# Patient Record
Sex: FEMALE | Race: BLACK OR AFRICAN AMERICAN | NOT HISPANIC OR LATINO | Employment: PART TIME | URBAN - METROPOLITAN AREA
[De-identification: names, ages, dates, MRNs, and addresses within clinical notes are randomized per-mention and may not be internally consistent; named-entity substitution may affect disease eponyms.]

---

## 2017-01-28 ENCOUNTER — HOSPITAL ENCOUNTER (EMERGENCY)
Facility: OTHER | Age: 57
Discharge: HOME | End: 2017-01-28
Attending: EMERGENCY MEDICINE
Payer: MEDICAID

## 2017-01-28 VITALS
SYSTOLIC BLOOD PRESSURE: 185 MMHG | RESPIRATION RATE: 14 BRPM | DIASTOLIC BLOOD PRESSURE: 78 MMHG | WEIGHT: 220.44 LBS | BODY MASS INDEX: 33.41 KG/M2 | HEIGHT: 68 IN | OXYGEN SATURATION: 100 % | HEART RATE: 56 BPM | TEMPERATURE: 99 F

## 2017-01-28 DIAGNOSIS — I10 UNCONTROLLED HYPERTENSION: ICD-10-CM

## 2017-01-28 DIAGNOSIS — M25.519 SHOULDER PAIN: Primary | ICD-10-CM

## 2017-01-28 PROCEDURE — 99284 EMERGENCY DEPT VISIT MOD MDM: CPT

## 2017-01-28 PROCEDURE — 25000003 PHARM REV CODE 250: Performed by: PHYSICIAN ASSISTANT

## 2017-01-28 RX ORDER — FUROSEMIDE 40 MG/1
40 TABLET ORAL DAILY
Qty: 30 TABLET | Refills: 0 | Status: SHIPPED | OUTPATIENT
Start: 2017-01-28 | End: 2017-07-30

## 2017-01-28 RX ORDER — HYDROCODONE BITARTRATE AND ACETAMINOPHEN 5; 325 MG/1; MG/1
1 TABLET ORAL
Status: COMPLETED | OUTPATIENT
Start: 2017-01-28 | End: 2017-01-28

## 2017-01-28 RX ORDER — IBUPROFEN 600 MG/1
600 TABLET ORAL EVERY 6 HOURS PRN
Qty: 20 TABLET | Refills: 0 | Status: SHIPPED | OUTPATIENT
Start: 2017-01-28 | End: 2017-07-30 | Stop reason: ALTCHOICE

## 2017-01-28 RX ORDER — TRAMADOL HYDROCHLORIDE 50 MG/1
50 TABLET ORAL EVERY 6 HOURS PRN
Qty: 15 TABLET | Refills: 0 | Status: SHIPPED | OUTPATIENT
Start: 2017-01-28 | End: 2017-02-07

## 2017-01-28 RX ORDER — GABAPENTIN 400 MG/1
400 CAPSULE ORAL 3 TIMES DAILY PRN
Qty: 90 CAPSULE | Refills: 0 | Status: ON HOLD | OUTPATIENT
Start: 2017-01-28 | End: 2018-03-21 | Stop reason: HOSPADM

## 2017-01-28 RX ADMIN — HYDROCODONE BITARTRATE AND ACETAMINOPHEN 1 TABLET: 5; 325 TABLET ORAL at 11:01

## 2017-01-28 RX ADMIN — HYDROCODONE BITARTRATE AND ACETAMINOPHEN 1 TABLET: 5; 325 TABLET ORAL at 12:01

## 2017-01-28 NOTE — ED PROVIDER NOTES
Encounter Date: 1/28/2017       History     Chief Complaint   Patient presents with    Fall     pt fell yesterday on tile floor onto right shoulder. pt now with pain and  tingling in shoulder.     Review of patient's allergies indicates:  No Known Allergies  HPI Comments: Patient is a 56-year-old female with history of diabetes and hypertension who presents to the emergency department with right shoulder pain.  Patient states yesterday she was mopping her floor, when she lost her balance falling.  Patient states she landed onto her right shoulder.  Patient denies hitting her head or loss of consciousness.  Patient reports 10 out of 10 pain in the right shoulder.  Patient states the pain is worse with movement.  Patient denies deformity.  Patient denies numbness or tingling in the arm.  Patient states she is taking Tylenol with no relief of her symptoms.    The history is provided by the patient.     Past Medical History   Diagnosis Date    Diabetes mellitus     Hypertension     Neuropathy     PE (pulmonary embolism)     Pulmonary embolism      No past medical history pertinent negatives.  Past Surgical History   Procedure Laterality Date    Tonsillectomy      Hernia repair      Hysterectomy      Tubal ligation      Cyst removal       Family History   Problem Relation Age of Onset    Kidney disease Mother     Kidney disease Father     Stroke Maternal Uncle      Social History   Substance Use Topics    Smoking status: Never Smoker    Smokeless tobacco: Never Used    Alcohol use No     Review of Systems   Constitutional: Negative for activity change, appetite change, chills, fatigue and fever.   HENT: Negative for congestion, ear discharge, ear pain, hearing loss, mouth sores, postnasal drip, rhinorrhea, sinus pressure, sore throat and trouble swallowing.    Eyes: Negative for photophobia and visual disturbance.   Respiratory: Negative for cough and shortness of breath.    Cardiovascular: Negative for  chest pain.   Gastrointestinal: Negative for abdominal pain, blood in stool, constipation, diarrhea, nausea and vomiting.   Genitourinary: Negative for dysuria, flank pain and hematuria.   Musculoskeletal: Negative for back pain, neck pain and neck stiffness.        Right shoulder pain   Skin: Negative for rash and wound.   Neurological: Negative for dizziness, syncope, speech difficulty, weakness, light-headedness, numbness and headaches.       Physical Exam   Initial Vitals   BP Pulse Resp Temp SpO2   01/28/17 1030 01/28/17 1030 01/28/17 1030 01/28/17 1030 01/28/17 1030   213/93 57 18 97.7 °F (36.5 °C) 98 %     Physical Exam    Nursing note and vitals reviewed.  Constitutional: She appears well-developed and well-nourished. She is not diaphoretic.  Non-toxic appearance. No distress.   HENT:   Head: Normocephalic.   Right Ear: External ear normal.   Left Ear: External ear normal.   Nose: Nose normal.   Mouth/Throat: Oropharynx is clear and moist. No oropharyngeal exudate.   Eyes: Conjunctivae are normal. Pupils are equal, round, and reactive to light.   Neck: Normal range of motion.   Cardiovascular: Normal rate and regular rhythm.   Pulmonary/Chest: Breath sounds normal. No respiratory distress. She has no wheezes. She has no rhonchi. She has no rales. She exhibits no tenderness.   Abdominal: Soft. Bowel sounds are normal. She exhibits no distension and no mass. There is no tenderness. There is no rebound and no guarding.   Musculoskeletal:        Right shoulder: She exhibits decreased range of motion (secondary to pain) and bony tenderness (humeral head). She exhibits no swelling, no crepitus, no deformity and normal strength.   Lymphadenopathy:     She has no cervical adenopathy.   Neurological: She is alert and oriented to person, place, and time. She has normal strength.   Skin: Skin is warm and dry.   Psychiatric: She has a normal mood and affect.         ED Course   Procedures  Labs Reviewed - No data to  display       X-Rays:   Independently Interpreted Readings:   Other Readings:  Degenerative Changes noted with no acute osseous findings.    Imaging Results         X-Ray Shoulder Trauma Right (Final result) Result time:  01/28/17 11:22:42    Final result by Marla Chavez MD (01/28/17 11:22:42)    Impression:     As above.      Electronically signed by: MARLA CHAVEZ MD  Date:     01/28/17  Time:    11:22     Narrative:    XR SHOULDER TRAUMA 3 VIEW RIGHT.  There are no previous examinations for comparison. No acute fracture or bony destructive process is seen.  Alignment is normal.There are degenerative changes at the acromioclavicular joint with spur formation.  There are also small spurs at the humeral head and the lateral aspect of the acromion.  There are no soft tissue calcifications to suggest calcific tendinitis.              Medical Decision Making:   Initial Assessment:   Urgent evaluation of a 56-year-old female who presents to the emergency department with shoulder pain.  Patient is afebrile, nontoxic appearing, and hemodynamically stable.  Patient did not hit head or lose consciousness during fall.  Patient has bony tenderness of the right shoulder with decreased range of motion.  X-ray will be obtained.  Clinical Tests:   Radiological Study: Ordered and Reviewed  ED Management:  12:17 PM  X-ray reveals degenerative changes with no acute findings.  Patient will be placed in sling.  Given instructions to range shoulder to prevent frozen shoulder.  She is advised to follow-up with ortho for possible MRI.  Patient be sent home with anti-inflammatories and pain medication.  Patient is advised to follow-up with PCP about uncontrolled blood pressure.  Upon discharge, patient states she needs refills on blood pressure medications.  Patient is advised to return to the emergency department with any worsening symptoms or concerns.  Other:   I have discussed this case with another health care provider.        <> Summary of the Discussion: Dr. Perdomo                   ED Course     Clinical Impression:   The primary encounter diagnosis was Shoulder pain. A diagnosis of Uncontrolled hypertension was also pertinent to this visit.          Gay Arriaza PA-C  01/28/17 1228

## 2017-01-28 NOTE — DISCHARGE INSTRUCTIONS
Shoulder Sprain  A sprain is a stretching or tearing of the ligaments that hold a joint together. A sprain may take up to 6 weeks to fully heal, depending on how severe it is. Moderate to severe shoulder sprains are treated with a sling or shoulder immobilizer. Minor sprains can be treated without any special support.  Home care  The following guidelines will help you care for your injury at home:  · If a sling was given to you, leave it in place for the time advised by your health care provider. If you arent sure how long to wear it, ask for advice. If the sling becomes loose, adjust it so that your forearm is level with the ground. Your shoulder should feel well supported.  · Put an ice pack on the injured area for 20 minutes every 1 to 2 hours the first day. You can make your own ice pack by putting ice cubes in a plastic bag. Wrap the bag in a thin towel. Continue with ice packs 3 to 4 times a day for the next 2 days. Then use the pack as needed to ease pain and swelling.  · You may use acetaminophen or ibuprofen to control pain, unless another pain medicine was prescribed. If you have chronic liver or kidney disease, talk with your health care provider before using these medicines. Also talk with your provider if youve had a stomach ulcer or GI bleeding.  · Shoulder joints become stiff if left in a sling for too long. You should start range of motion exercises about 10 days after the injury. Talk with your provider to find out what type of exercises to do and how soon to start.  Follow-up care  Follow up with your health care provider as directed.  Any X-rays you had today dont show any broken bones, breaks, or fractures. Sometimes fractures dont show up on the first X-ray. Bruises and sprains can sometimes hurt as much as a fracture. These injuries can take time to heal completely. If your symptoms dont improve or they get worse, talk with your provider. You may need a repeat X-ray.  When to seek  medical advice  Call your health care provider right away if any of the following occur:  · Shoulder pain or swelling in your arm that gets worse  · Fingers become cold, blue, numb, or tingly  · Large amount of bruising of the shoulder or upper arm  © 1173-3618 [employer]. [address] [place] [address]. All rights reserved. This information is not intended as a substitute for professional medical care. Always follow your healthcare professional's instructions.

## 2017-01-28 NOTE — ED AVS SNAPSHOT
[facility]  [address]  [facility] [address]               Devika Bustillo   2017 10:37 AM   ED    Description:  Female : 1960   Department:  [facility]           Your Care was Coordinated By:     Provider Role From To    Ora Pedromo MD Attending Provider 17 1038 --    Gay Arriaza PA-C Physician Assistant 17 1037 --      Reason for Visit     Fall           Diagnoses this Visit        Comments    Shoulder pain    -  Primary     Uncontrolled hypertension           ED Disposition     None           To Do List           Follow-up Information     Follow up with [facility] In 2 days.    Contact information:    [address] [facility] [id number] [phone]          Follow up with [facility] In 2 days.    Specialty:  Orthopedics    Contact information:    [address]  [facility] [address] [phone]    Additional information:    [facility] [address]       These Medications        Disp Refills Start End    tramadol (ULTRAM) 50 mg tablet 15 tablet 0 2017    Take 1 tablet (50 mg total) by mouth every 6 (six) hours as needed for Pain. - Oral    Pharmacy: [id number] - [place], [place] - [address] Ph #: [phone]       ibuprofen (ADVIL,MOTRIN) 600 MG tablet 20 tablet 0 2017     Take 1 tablet (600 mg total) by mouth every 6 (six) hours as needed for Pain. - Oral    Pharmacy: [facility] - [place], [place] - [address] Ph #: [phone]         [facility]     [facility] Care [facility] -  Assistance  Registered nurses in the [facility] provide clinical advisement, health education, appointment booking, and other advisory services.  Call for this free service at [phone].             Medications           Message regarding Medications     Verify the changes and/or additions to your  medication regime listed below are the same as discussed with your clinician today.  If any of these changes or additions are incorrect, please notify your healthcare provider.        START taking these NEW medications        Refills    tramadol (ULTRAM) 50 mg tablet 0    Sig: Take 1 tablet (50 mg total) by mouth every 6 (six) hours as needed for Pain.    Class: Print    Route: Oral    ibuprofen (ADVIL,MOTRIN) 600 MG tablet 0    Sig: Take 1 tablet (600 mg total) by mouth every 6 (six) hours as needed for Pain.    Class: Print    Route: Oral      These medications were administered today        Dose Freq    hydrocodone-acetaminophen 5-325mg per tablet 1 tablet 1 tablet ED 1 Time    Sig: Take 1 tablet by mouth ED 1 Time.    Class: Normal    Route: Oral    hydrocodone-acetaminophen 5-325mg per tablet 1 tablet 1 tablet ED 1 Time    Sig: Take 1 tablet by mouth ED 1 Time.    Class: Normal    Route: Oral      STOP taking these medications     olmesartan-amlodipin-hcthiazid (TRIBENZOR) 40-5-12.5 mg Tab Take by mouth.    naproxen (NAPROSYN) 500 MG tablet Take 1 tablet (500 mg total) by mouth 2 (two) times daily with meals.           Verify that the below list of medications is an accurate representation of the medications you are currently taking.  If none reported, the list may be blank. If incorrect, please contact your healthcare provider. Carry this list with you in case of emergency.           Current Medications     furosemide (LASIX) 40 MG tablet Take 40 mg by mouth 2 (two) times daily.    gabapentin (NEURONTIN) 400 MG capsule Take 400 mg by mouth 3 (three) times daily.    GLIPIZIDE ORAL Take by mouth.    metformin (GLUCOPHAGE) 1000 MG tablet Take 1,000 mg by mouth 2 (two) times daily with meals.    ibuprofen (ADVIL,MOTRIN) 600 MG tablet Take 1 tablet (600 mg total) by mouth every 6 (six) hours as needed for Pain.    insulin NPH-insulin regular, 70/30, (NOVOLIN 70/30) 100 unit/mL (70-30) injection Inject 30 Units into  "the skin 2 (two) times daily (morning and evening).    lisinopril (PRINIVIL,ZESTRIL) 40 MG tablet Take 40 mg by mouth once daily.    tramadol (ULTRAM) 50 mg tablet Take 1 tablet (50 mg total) by mouth every 6 (six) hours as needed for Pain.           Clinical Reference Information           Your Vitals Were     BP Pulse Temp Resp Height Weight    185/78 (BP Location: Left arm, Patient Position: Sitting, BP Method: Automatic) 56 98.5 °F (36.9 °C) (Oral) 14 5' 8" (1.727 m) 100 kg (220 lb 7.4 oz)    SpO2 BMI             100% 33.52 kg/m2         Allergies as of 1/28/2017     No Known Allergies      Immunizations Administered on Date of Encounter - 1/28/2017     None      ED Micro, Lab, POCT     None      ED Imaging Orders     Start Ordered       Status Ordering Provider    01/28/17 1050 01/28/17 1049  X-Ray Shoulder Trauma Right  1 time imaging      Final result         Discharge Instructions         Shoulder Sprain  A sprain is a stretching or tearing of the ligaments that hold a joint together. A sprain may take up to 6 weeks to fully heal, depending on how severe it is. Moderate to severe shoulder sprains are treated with a sling or shoulder immobilizer. Minor sprains can be treated without any special support.  Home care  The following guidelines will help you care for your injury at home:  · If a sling was given to you, leave it in place for the time advised by your health care provider. If you arent sure how long to wear it, ask for advice. If the sling becomes loose, adjust it so that your forearm is level with the ground. Your shoulder should feel well supported.  · Put an ice pack on the injured area for 20 minutes every 1 to 2 hours the first day. You can make your own ice pack by putting ice cubes in a plastic bag. Wrap the bag in a thin towel. Continue with ice packs 3 to 4 times a day for the next 2 days. Then use the pack as needed to ease pain and swelling.  · You may use acetaminophen or ibuprofen to " control pain, unless another pain medicine was prescribed. If you have chronic liver or kidney disease, talk with your health care provider before using these medicines. Also talk with your provider if youve had a stomach ulcer or GI bleeding.  · Shoulder joints become stiff if left in a sling for too long. You should start range of motion exercises about 10 days after the injury. Talk with your provider to find out what type of exercises to do and how soon to start.  Follow-up care  Follow up with your health care provider as directed.  Any X-rays you had today dont show any broken bones, breaks, or fractures. Sometimes fractures dont show up on the first X-ray. Bruises and sprains can sometimes hurt as much as a fracture. These injuries can take time to heal completely. If your symptoms dont improve or they get worse, talk with your provider. You may need a repeat X-ray.  When to seek medical advice  Call your health care provider right away if any of the following occur:  · Shoulder pain or swelling in your arm that gets worse  · Fingers become cold, blue, numb, or tingly  · Large amount of bruising of the shoulder or upper arm  © 4364-8224 "[employer] [address] [place] [address]. All rights reserved. This information is not intended as a substitute for professional medical care. Always follow your healthcare professional's instructions.          [facility] Sign-Up     Activating your [facility] account is as easy as 1-2-3!     1) Visit [email or web address], select Sign Up Now, enter this activation code and your date of birth, then select Next.  [email or web address]  Expires: 3/14/2017 12:22 PM      2) Create a username and password to use when you visit [facility] in the future and select a security question in case you lose your password and select Next.    3) Enter your e-mail address and click Sign Up!    Additional Information  If you have questions, please e-mail [email or web address]  or call [phone] to talk to our [facility] staff. Remember, [facility] is NOT to be used for urgent needs. For medical emergencies, dial 911.          [facility] complies with applicable Federal civil rights laws and does not discriminate on the basis of race, color, national origin, age, disability, or sex.        Language Assistance Services     ATTENTION: Language assistance services are available, free of charge. Please call [phone].      ATENCIÓN: Si habla vedaelva, tiene a flores disposición servicios gratuitos de asistencia lingüística. Llame al [phone].     LUIS ENRIQUE Ý: N?u b?n nói Ti?ng Vi?t, có các d?ch v? h? tr? ngôn ng? mi?n phí dành cho b?n. G?i s? [phone].

## 2017-01-28 NOTE — ED TRIAGE NOTES
Pt reports having a fall yesterday on tile floor onto right shoulder. pt now with pain and tingling in right shoulder/arm. ROM intact but pain increases with movement

## 2017-06-30 ENCOUNTER — HOSPITAL ENCOUNTER (EMERGENCY)
Facility: HOSPITAL | Age: 57
Discharge: HOME | End: 2017-06-30
Attending: EMERGENCY MEDICINE | Admitting: EMERGENCY MEDICINE
Payer: MEDICAID

## 2017-06-30 VITALS
HEART RATE: 52 BPM | SYSTOLIC BLOOD PRESSURE: 164 MMHG | TEMPERATURE: 98 F | OXYGEN SATURATION: 100 % | DIASTOLIC BLOOD PRESSURE: 79 MMHG | RESPIRATION RATE: 17 BRPM | BODY MASS INDEX: 34.97 KG/M2 | WEIGHT: 230 LBS

## 2017-06-30 DIAGNOSIS — R07.9 CHEST PAIN: ICD-10-CM

## 2017-06-30 LAB
ALBUMIN SERPL BCP-MCNC: 3 G/DL
ALP SERPL-CCNC: 114 U/L
ALT SERPL W/O P-5'-P-CCNC: 27 U/L
ANION GAP SERPL CALC-SCNC: 10 MMOL/L
AST SERPL-CCNC: 17 U/L
BASOPHILS # BLD AUTO: 0.02 K/UL
BASOPHILS NFR BLD: 0.3 %
BILIRUB SERPL-MCNC: 0.3 MG/DL
BNP SERPL-MCNC: 14 PG/ML
BUN SERPL-MCNC: 17 MG/DL
CALCIUM SERPL-MCNC: 9.3 MG/DL
CHLORIDE SERPL-SCNC: 103 MMOL/L
CO2 SERPL-SCNC: 21 MMOL/L
CREAT SERPL-MCNC: 0.9 MG/DL
D DIMER PPP IA.FEU-MCNC: 0.35 MG/L FEU
DIFFERENTIAL METHOD: ABNORMAL
EOSINOPHIL # BLD AUTO: 0.1 K/UL
EOSINOPHIL NFR BLD: 2.1 %
ERYTHROCYTE [DISTWIDTH] IN BLOOD BY AUTOMATED COUNT: 12.4 %
EST. GFR  (AFRICAN AMERICAN): >60 ML/MIN/1.73 M^2
EST. GFR  (NON AFRICAN AMERICAN): >60 ML/MIN/1.73 M^2
GLUCOSE SERPL-MCNC: 376 MG/DL
HCT VFR BLD AUTO: 36.5 %
HGB BLD-MCNC: 12.4 G/DL
LYMPHOCYTES # BLD AUTO: 2.8 K/UL
LYMPHOCYTES NFR BLD: 45 %
MAGNESIUM SERPL-MCNC: 1.7 MG/DL
MCH RBC QN AUTO: 26.7 PG
MCHC RBC AUTO-ENTMCNC: 34 %
MCV RBC AUTO: 79 FL
MONOCYTES # BLD AUTO: 0.5 K/UL
MONOCYTES NFR BLD: 7.7 %
NEUTROPHILS # BLD AUTO: 2.7 K/UL
NEUTROPHILS NFR BLD: 44.6 %
PLATELET # BLD AUTO: 246 K/UL
PMV BLD AUTO: 10 FL
POTASSIUM SERPL-SCNC: 4.2 MMOL/L
PROT SERPL-MCNC: 7.2 G/DL
RBC # BLD AUTO: 4.65 M/UL
SODIUM SERPL-SCNC: 134 MMOL/L
TROPONIN I SERPL DL<=0.01 NG/ML-MCNC: <0.006 NG/ML
WBC # BLD AUTO: 6.11 K/UL

## 2017-06-30 PROCEDURE — 99285 EMERGENCY DEPT VISIT HI MDM: CPT | Mod: 25

## 2017-06-30 PROCEDURE — 84484 ASSAY OF TROPONIN QUANT: CPT

## 2017-06-30 PROCEDURE — 93010 ELECTROCARDIOGRAM REPORT: CPT | Mod: ,,, | Performed by: INTERNAL MEDICINE

## 2017-06-30 PROCEDURE — 99285 EMERGENCY DEPT VISIT HI MDM: CPT | Mod: ,,, | Performed by: EMERGENCY MEDICINE

## 2017-06-30 PROCEDURE — 83735 ASSAY OF MAGNESIUM: CPT

## 2017-06-30 PROCEDURE — 85025 COMPLETE CBC W/AUTO DIFF WBC: CPT

## 2017-06-30 PROCEDURE — 96375 TX/PRO/DX INJ NEW DRUG ADDON: CPT

## 2017-06-30 PROCEDURE — 96374 THER/PROPH/DIAG INJ IV PUSH: CPT

## 2017-06-30 PROCEDURE — 83880 ASSAY OF NATRIURETIC PEPTIDE: CPT

## 2017-06-30 PROCEDURE — 85379 FIBRIN DEGRADATION QUANT: CPT

## 2017-06-30 PROCEDURE — 25000003 PHARM REV CODE 250: Performed by: INTERNAL MEDICINE

## 2017-06-30 PROCEDURE — 80053 COMPREHEN METABOLIC PANEL: CPT

## 2017-06-30 PROCEDURE — 63600175 PHARM REV CODE 636 W HCPCS: Performed by: INTERNAL MEDICINE

## 2017-06-30 PROCEDURE — 63600175 PHARM REV CODE 636 W HCPCS: Performed by: EMERGENCY MEDICINE

## 2017-06-30 PROCEDURE — 93005 ELECTROCARDIOGRAM TRACING: CPT

## 2017-06-30 RX ORDER — ASPIRIN 325 MG
325 TABLET ORAL
Status: COMPLETED | OUTPATIENT
Start: 2017-06-30 | End: 2017-06-30

## 2017-06-30 RX ORDER — MORPHINE SULFATE 2 MG/ML
4 INJECTION, SOLUTION INTRAMUSCULAR; INTRAVENOUS
Status: COMPLETED | OUTPATIENT
Start: 2017-06-30 | End: 2017-06-30

## 2017-06-30 RX ORDER — NITROGLYCERIN 0.4 MG/1
0.4 TABLET SUBLINGUAL EVERY 5 MIN PRN
Status: COMPLETED | OUTPATIENT
Start: 2017-06-30 | End: 2017-06-30

## 2017-06-30 RX ORDER — KETOROLAC TROMETHAMINE 30 MG/ML
10 INJECTION, SOLUTION INTRAMUSCULAR; INTRAVENOUS
Status: COMPLETED | OUTPATIENT
Start: 2017-06-30 | End: 2017-06-30

## 2017-06-30 RX ORDER — KETOROLAC TROMETHAMINE 30 MG/ML
15 INJECTION, SOLUTION INTRAMUSCULAR; INTRAVENOUS
Status: DISCONTINUED | OUTPATIENT
Start: 2017-06-30 | End: 2017-06-30

## 2017-06-30 RX ORDER — ACETAMINOPHEN 10 MG/ML
1000 INJECTION, SOLUTION INTRAVENOUS EVERY 8 HOURS
Status: DISCONTINUED | OUTPATIENT
Start: 2017-06-30 | End: 2017-06-30

## 2017-06-30 RX ORDER — NAPROXEN 375 MG/1
375 TABLET ORAL 2 TIMES DAILY WITH MEALS
Qty: 14 TABLET | Refills: 0 | Status: SHIPPED | OUTPATIENT
Start: 2017-06-30 | End: 2019-03-10

## 2017-06-30 RX ADMIN — NITROGLYCERIN 0.4 MG: 0.4 TABLET SUBLINGUAL at 12:06

## 2017-06-30 RX ADMIN — MORPHINE SULFATE 4 MG: 2 INJECTION, SOLUTION INTRAMUSCULAR; INTRAVENOUS at 01:06

## 2017-06-30 RX ADMIN — ASPIRIN 325 MG ORAL TABLET 325 MG: 325 PILL ORAL at 12:06

## 2017-06-30 RX ADMIN — KETOROLAC TROMETHAMINE 10 MG: 30 INJECTION, SOLUTION INTRAMUSCULAR at 01:06

## 2017-06-30 NOTE — ED PROVIDER NOTES
Encounter Date: 6/30/2017    SCRIBE #1 NOTE: I, Fauzia uYn, am scribing for, and in the presence of,  Dr. García. I have scribed the following portions of the note - the Resident attestation.       History     Chief Complaint   Patient presents with    Chest Pain     chest pressure started yesterday while at work; Hx HTN; did not take BP meds today; no meds given by EMS     HPI   Ms. Bustillo is a pleasant 58 yo AAF w/ h/o PE in mid 90's and uncontrolled DM (2013 11.4%) who presents with chest pain.  Patient reports that starting at 8 pm last night she started having a crushing right sternal border chest pain that radiates to her R arm.  She reports this pain is different from her previous PE since this is associated with more pain that shortness of breath as her previous PE was.  She states that her pain is worse whenever she moves around or stands up, but that is it improved with rest and NTG tablets given in the ED (8/10 - 6/10 after 3 tablets with some improvement noted with all 3 tabs given).    Review of patient's allergies indicates:  No Known Allergies    Past Medical History:   Diagnosis Date    Diabetes mellitus     Hypertension     Neuropathy     PE (pulmonary embolism)     Pulmonary embolism      Past Surgical History:   Procedure Laterality Date    CYST REMOVAL      HERNIA REPAIR      HYSTERECTOMY      TONSILLECTOMY      TUBAL LIGATION       Family History   Problem Relation Age of Onset    Kidney disease Mother     Kidney disease Father     Stroke Maternal Uncle      Social History   Substance Use Topics    Smoking status: Never Smoker    Smokeless tobacco: Never Used    Alcohol use No     Review of Systems   Constitutional: Negative for chills and fever.   HENT: Negative for ear pain, hearing loss, mouth sores and sore throat.    Eyes: Negative for photophobia, pain and visual disturbance.   Respiratory: Positive for chest tightness. Negative for apnea, cough and shortness of breath.     Cardiovascular: Positive for chest pain. Negative for palpitations.   Gastrointestinal: Positive for nausea. Negative for abdominal distention, abdominal pain, anal bleeding, blood in stool, constipation, diarrhea, rectal pain and vomiting.   Endocrine: Negative for cold intolerance, heat intolerance, polydipsia and polyuria.   Genitourinary: Negative for dysuria and hematuria.   Musculoskeletal: Negative for arthralgias and myalgias.   Skin: Negative for rash and wound.   Neurological: Negative for dizziness, syncope and numbness.       Physical Exam     Initial Vitals [06/30/17 1202]   BP Pulse Resp Temp SpO2   (!) 180/79 (!) 52 16 98.4 °F (36.9 °C) 99 %      MAP       112.67         Physical Exam    Nursing note and vitals reviewed.  Constitutional: She is not diaphoretic. She appears distressed.   HENT:   Head: Normocephalic and atraumatic.   Eyes: EOM are normal. Pupils are equal, round, and reactive to light.   Neck: Normal range of motion. Neck supple.   Cardiovascular: Normal rate, regular rhythm and intact distal pulses. Exam reveals no gallop and no friction rub.    No murmur heard.  Pulmonary/Chest: Breath sounds normal. No respiratory distress. She has no wheezes. She has no rhonchi. She has no rales.   Abdominal: Soft. Bowel sounds are normal. She exhibits no distension.   Musculoskeletal: She exhibits no edema or tenderness.   Neurological: She is alert and oriented to person, place, and time. No cranial nerve deficit.   Skin: Skin is warm and dry.         ED Course   Procedures  Labs Reviewed   CBC W/ AUTO DIFFERENTIAL - Abnormal; Notable for the following:        Result Value    Hematocrit 36.5 (*)     MCV 79 (*)     MCH 26.7 (*)     All other components within normal limits   COMPREHENSIVE METABOLIC PANEL - Abnormal; Notable for the following:     Sodium 134 (*)     CO2 21 (*)     Glucose 376 (*)     Albumin 3.0 (*)     All other components within normal limits   MAGNESIUM   TROPONIN I   B-TYPE  NATRIURETIC PEPTIDE   D DIMER, QUANTITATIVE   D DIMER, QUANTITATIVE    Narrative:     ADD ON D DIMER QUANT ORDER [id number] PER DR. ANUSHKA OWEN  06/30/2017    13:55      EKG Readings: (Independently Interpreted)   Sinus bradycardia at 50 BPM, Normal axis, no evidence of hypertrophy or ischemia          Medical Decision Making:   History:   Old Medical Records: I decided to obtain old medical records.  Initial Assessment:   Uncomfortable appearing 58 yo AAF w/ h/o uncontrolled DM & PE p/w crushing chest pain concerning for ACS  Differential Diagnosis:   ACS, PE, costochondritis  Clinical Tests:   Lab Tests: Ordered and Reviewed  Radiological Study: Ordered and Reviewed  Medical Tests: Ordered and Reviewed  ED Management:  12:59 PM  Labs & imaging still pending    1:52 PM  Troponin negative for any involvement and patient reports morphine 4 did not impact her pain making it more suspicious for costochondritis vs muscle strain.  Will give patient one dose of toradol and discharge to home with a work excuse            Scribe Attestation:   Scribe #1: I performed the above scribed service and the documentation accurately describes the services I performed. I attest to the accuracy of the note.    Attending Attestation:   Physician Attestation Statement for Resident:  As the supervising MD   Physician Attestation Statement: I have personally seen and examined this patient.   I agree with the above history. -:   As the supervising MD I agree with the above PE.    As the supervising MD I agree with the above treatment, course, plan, and disposition.   -: Sharp, right-sided chest pain that worsens with inspiration and certain movements, present since yesterday. Pain is exactly reproducible when I pushed on parasternal border. I think she has a pectoralis strain, less likely costochondritis. I highly doubt PE or cardiac ischemia. She has been ruled out for MI. Will discharge with pain control.          Physician Attestation  for Scribe:  Physician Attestation Statement for Scribe #1: I, Dr. García, reviewed documentation, as scribed by Fauzia Yun in my presence, and it is both accurate and complete.                 ED Course     Clinical Impression:   The encounter diagnosis was Chest pain.            Neal García MD  07/01/17 5621

## 2017-06-30 NOTE — ED NOTES
Patient received for discharge.  Patient is awake, alert, and oriented x 4.  Speech clear and appropriate.  RR regular and non labored.  Skin W/D/P.  Given written and verbal discharge instruction, with verbal understanding.  Patient given the opportunity to ask questions, with answers to meet needs.  No complaints or noted concerns.  No pain.  IV access discontinued from left forearm, with catheter intact prior to discharge.

## 2017-06-30 NOTE — ED NOTES
Patient here via EMS c/o right side chest pain that radiates to right shoulder and right arm onset last night.  Pt denies recent trauma and denies shortness of breath.  Pt in no acute distress, respirations even and unlabored, AA&Ox3

## 2017-07-30 ENCOUNTER — HOSPITAL ENCOUNTER (EMERGENCY)
Facility: OTHER | Age: 57
Discharge: HOME | End: 2017-07-30
Attending: EMERGENCY MEDICINE
Payer: MEDICAID

## 2017-07-30 VITALS
OXYGEN SATURATION: 100 % | RESPIRATION RATE: 16 BRPM | WEIGHT: 245.13 LBS | SYSTOLIC BLOOD PRESSURE: 166 MMHG | HEIGHT: 68 IN | HEART RATE: 60 BPM | BODY MASS INDEX: 37.15 KG/M2 | TEMPERATURE: 98 F | DIASTOLIC BLOOD PRESSURE: 78 MMHG

## 2017-07-30 DIAGNOSIS — F43.21 GRIEF REACTION: ICD-10-CM

## 2017-07-30 DIAGNOSIS — R73.9 HYPERGLYCEMIA: ICD-10-CM

## 2017-07-30 DIAGNOSIS — E86.0 MILD DEHYDRATION: Primary | ICD-10-CM

## 2017-07-30 LAB
AMPHET+METHAMPHET UR QL: NEGATIVE
ANION GAP SERPL CALC-SCNC: 10 MMOL/L
BACTERIA #/AREA URNS HPF: NORMAL /HPF
BARBITURATES UR QL SCN>200 NG/ML: NEGATIVE
BASOPHILS # BLD AUTO: 0.02 K/UL
BASOPHILS NFR BLD: 0.2 %
BENZODIAZ UR QL SCN>200 NG/ML: NEGATIVE
BILIRUB UR QL STRIP: NEGATIVE
BUN SERPL-MCNC: 25 MG/DL
BZE UR QL SCN: NEGATIVE
CALCIUM SERPL-MCNC: 9.3 MG/DL
CANNABINOIDS UR QL SCN: NEGATIVE
CHLORIDE SERPL-SCNC: 105 MMOL/L
CLARITY UR: CLEAR
CO2 SERPL-SCNC: 22 MMOL/L
COLOR UR: YELLOW
CREAT SERPL-MCNC: 0.8 MG/DL
CREAT UR-MCNC: 74.4 MG/DL
DIFFERENTIAL METHOD: ABNORMAL
EOSINOPHIL # BLD AUTO: 0.1 K/UL
EOSINOPHIL NFR BLD: 1.3 %
ERYTHROCYTE [DISTWIDTH] IN BLOOD BY AUTOMATED COUNT: 12.4 %
EST. GFR  (AFRICAN AMERICAN): >60 ML/MIN/1.73 M^2
EST. GFR  (NON AFRICAN AMERICAN): >60 ML/MIN/1.73 M^2
GLUCOSE SERPL-MCNC: 325 MG/DL
GLUCOSE UR QL STRIP: ABNORMAL
HCT VFR BLD AUTO: 36.7 %
HGB BLD-MCNC: 12.3 G/DL
HGB UR QL STRIP: NEGATIVE
KETONES UR QL STRIP: NEGATIVE
LEUKOCYTE ESTERASE UR QL STRIP: NEGATIVE
LYMPHOCYTES # BLD AUTO: 3.6 K/UL
LYMPHOCYTES NFR BLD: 43.7 %
MAGNESIUM SERPL-MCNC: 1.9 MG/DL
MCH RBC QN AUTO: 26.4 PG
MCHC RBC AUTO-ENTMCNC: 33.5 G/DL
MCV RBC AUTO: 79 FL
METHADONE UR QL SCN>300 NG/ML: NEGATIVE
MICROSCOPIC COMMENT: NORMAL
MONOCYTES # BLD AUTO: 0.4 K/UL
MONOCYTES NFR BLD: 5.4 %
NEUTROPHILS # BLD AUTO: 4 K/UL
NEUTROPHILS NFR BLD: 49 %
NITRITE UR QL STRIP: NEGATIVE
OPIATES UR QL SCN: NEGATIVE
PCP UR QL SCN>25 NG/ML: NEGATIVE
PH UR STRIP: 6 [PH] (ref 5–8)
PLATELET # BLD AUTO: 257 K/UL
PMV BLD AUTO: 10.3 FL
POCT GLUCOSE: 206 MG/DL (ref 70–110)
POCT GLUCOSE: 278 MG/DL (ref 70–110)
POCT GLUCOSE: 362 MG/DL (ref 70–110)
POCT GLUCOSE: 384 MG/DL (ref 70–110)
POTASSIUM SERPL-SCNC: 4.3 MMOL/L
PROT UR QL STRIP: NEGATIVE
RBC # BLD AUTO: 4.66 M/UL
SODIUM SERPL-SCNC: 137 MMOL/L
SP GR UR STRIP: 1.02 (ref 1–1.03)
SQUAMOUS #/AREA URNS HPF: 3 /HPF
T4 FREE SERPL-MCNC: 0.98 NG/DL
TOXICOLOGY INFORMATION: NORMAL
TSH SERPL DL<=0.005 MIU/L-ACNC: 0.1 UIU/ML
URN SPEC COLLECT METH UR: ABNORMAL
UROBILINOGEN UR STRIP-ACNC: NEGATIVE EU/DL
WBC # BLD AUTO: 8.17 K/UL
YEAST URNS QL MICRO: NORMAL

## 2017-07-30 PROCEDURE — 96374 THER/PROPH/DIAG INJ IV PUSH: CPT

## 2017-07-30 PROCEDURE — 99284 EMERGENCY DEPT VISIT MOD MDM: CPT | Mod: 25

## 2017-07-30 PROCEDURE — 81000 URINALYSIS NONAUTO W/SCOPE: CPT

## 2017-07-30 PROCEDURE — 96361 HYDRATE IV INFUSION ADD-ON: CPT

## 2017-07-30 PROCEDURE — 80048 BASIC METABOLIC PNL TOTAL CA: CPT

## 2017-07-30 PROCEDURE — 82962 GLUCOSE BLOOD TEST: CPT

## 2017-07-30 PROCEDURE — 80307 DRUG TEST PRSMV CHEM ANLYZR: CPT

## 2017-07-30 PROCEDURE — 25000003 PHARM REV CODE 250: Performed by: EMERGENCY MEDICINE

## 2017-07-30 PROCEDURE — 84439 ASSAY OF FREE THYROXINE: CPT

## 2017-07-30 PROCEDURE — 83735 ASSAY OF MAGNESIUM: CPT

## 2017-07-30 PROCEDURE — 84443 ASSAY THYROID STIM HORMONE: CPT

## 2017-07-30 PROCEDURE — 85025 COMPLETE CBC W/AUTO DIFF WBC: CPT

## 2017-07-30 PROCEDURE — 63600175 PHARM REV CODE 636 W HCPCS: Performed by: EMERGENCY MEDICINE

## 2017-07-30 RX ORDER — FLUCONAZOLE 100 MG/1
200 TABLET ORAL
Status: COMPLETED | OUTPATIENT
Start: 2017-07-30 | End: 2017-07-30

## 2017-07-30 RX ORDER — BUTALBITAL, ACETAMINOPHEN AND CAFFEINE 50; 325; 40 MG/1; MG/1; MG/1
1 TABLET ORAL
Status: COMPLETED | OUTPATIENT
Start: 2017-07-30 | End: 2017-07-30

## 2017-07-30 RX ORDER — LISINOPRIL 40 MG/1
40 TABLET ORAL DAILY
Qty: 30 TABLET | Refills: 1 | Status: ON HOLD | OUTPATIENT
Start: 2017-07-30 | End: 2018-03-21 | Stop reason: HOSPADM

## 2017-07-30 RX ORDER — FUROSEMIDE 40 MG/1
40 TABLET ORAL DAILY
Qty: 30 TABLET | Refills: 1 | Status: ON HOLD | OUTPATIENT
Start: 2017-07-30 | End: 2018-03-21

## 2017-07-30 RX ORDER — METFORMIN HYDROCHLORIDE 1000 MG/1
1000 TABLET ORAL 2 TIMES DAILY WITH MEALS
Qty: 60 TABLET | Refills: 1 | Status: SHIPPED | OUTPATIENT
Start: 2017-07-30 | End: 2019-03-10

## 2017-07-30 RX ORDER — BUTALBITAL, ACETAMINOPHEN AND CAFFEINE 50; 325; 40 MG/1; MG/1; MG/1
1 TABLET ORAL EVERY 4 HOURS PRN
Qty: 12 TABLET | Refills: 0 | Status: SHIPPED | OUTPATIENT
Start: 2017-07-30 | End: 2017-08-29

## 2017-07-30 RX ADMIN — BUTALBITAL, ACETAMINOPHEN AND CAFFEINE 1 TABLET: 50; 325; 40 TABLET ORAL at 04:07

## 2017-07-30 RX ADMIN — SODIUM CHLORIDE 1000 ML: 0.9 INJECTION, SOLUTION INTRAVENOUS at 04:07

## 2017-07-30 RX ADMIN — FLUCONAZOLE 200 MG: 100 TABLET ORAL at 06:07

## 2017-07-30 RX ADMIN — INSULIN HUMAN 6 UNITS: 100 INJECTION, SOLUTION PARENTERAL at 06:07

## 2017-07-30 NOTE — ED PROVIDER NOTES
"Encounter Date: 2017    SCRIBE #1 NOTE: I, Ely Tristangiselle, am scribing for, and in the presence of, Dr. Perdomo.       History     Chief Complaint   Patient presents with    Fatigue     " Since my baby  a few days ago I just keep falling asleep. I just feel liek I am always tired". Denies weakness, dizziness, blurred vision, chest pain or SOB at this time.      Time seen by provider: 3:55 PM    This is a 57 y.o. female who presents with complaint of fatigue that has persisted for the past 5 days.  The patient reports that her symptoms have persisted since her son committed suicide, and as such, she admits that she has been under significant stress.  She claims that she has been falling asleep "in the middle of conversations," but she "can hear what everyone is saying."  She also endorses generalized tingling and a HA.  She denies appetite changes, fever, chills, sore throat, rhinorrhea, congestion, cough, SOB, CP, leg swelling, abdominal pain, N/V/D, lightheadedness, or dizziness.  The patient reports no identifying, alleviating, or exacerbating factors.  She admits to history of NIDDM, HTN, and PE.  She admits that she has not been complaint with her medications recently, and she requests recommendations for PCP's with whom she can follow up.  She has no additional complaints.     Additional past medical, surgical, and social history as outlined in the nursing assessment was reviewed by me.       The history is provided by the patient.     Review of patient's allergies indicates:  No Known Allergies  Past Medical History:   Diagnosis Date    Diabetes mellitus     Hypertension     Neuropathy     PE (pulmonary embolism)     Pulmonary embolism      Past Surgical History:   Procedure Laterality Date    CYST REMOVAL      HERNIA REPAIR      HYSTERECTOMY      TONSILLECTOMY      TUBAL LIGATION       Family History   Problem Relation Age of Onset    Kidney disease Mother     Kidney disease Father  "    Stroke Maternal Uncle      Social History   Substance Use Topics    Smoking status: Never Smoker    Smokeless tobacco: Never Used    Alcohol use No     Review of Systems   Constitutional: Positive for fatigue. Negative for appetite change, chills and fever.   HENT: Negative for congestion, rhinorrhea and sore throat.    Respiratory: Negative for cough and shortness of breath.    Cardiovascular: Negative for chest pain, palpitations and leg swelling.   Gastrointestinal: Negative for abdominal pain, diarrhea, nausea and vomiting.   Endocrine: Negative for polyuria.   Genitourinary: Negative for dysuria.        External vaginal itching similar to prior yeast infections.    Musculoskeletal: Negative for back pain.   Skin: Negative for rash.   Allergic/Immunologic: Negative for immunocompromised state.   Neurological: Positive for headaches. Negative for dizziness and weakness.        Positive for generalized tingling.     Hematological: Does not bruise/bleed easily.   Psychiatric/Behavioral: Negative for confusion.        Positive for emotional stress.       Physical Exam     Initial Vitals [07/30/17 1401]   BP Pulse Resp Temp SpO2   (!) 176/79 65 16 98.4 °F (36.9 °C) 99 %      MAP       111.33         Physical Exam    Constitutional: Vital signs are normal. She appears well-developed and well-nourished. She is not diaphoretic. She does not appear ill. No distress.   HENT:   Head: Normocephalic and atraumatic.   Right Ear: External ear normal.   Left Ear: External ear normal.   Eyes: Conjunctivae and EOM are normal. Right eye exhibits no discharge. Left eye exhibits no discharge.   Neck: Normal range of motion. Neck supple. No tracheal deviation present.   Cardiovascular: Normal rate, regular rhythm, S1 normal, S2 normal, normal heart sounds and intact distal pulses. Exam reveals no gallop and no friction rub.    No murmur heard.  Abdominal: Soft. Bowel sounds are normal. She exhibits no distension and no mass.  There is no tenderness. There is no rebound and no guarding.   Musculoskeletal: Normal range of motion. She exhibits edema.   1+ pitting edema. Normal range of motion. He exhibits no  tenderness.    Neurological: She is alert and oriented to person, place, and time. She has normal strength. No cranial nerve deficit. She exhibits normal muscle tone. Gait normal.   Skin: Skin is warm and dry. Capillary refill takes less than 2 seconds. No rash noted. No pallor.   Psychiatric: She has a normal mood and affect. Her speech is normal. Thought content normal.         ED Course   Procedures  Labs Reviewed   URINALYSIS - Abnormal; Notable for the following:        Result Value    Glucose, UA 3+ (*)     All other components within normal limits   BASIC METABOLIC PANEL - Abnormal; Notable for the following:     CO2 22 (*)     Glucose 325 (*)     BUN, Bld 25 (*)     All other components within normal limits   CBC W/ AUTO DIFFERENTIAL - Abnormal; Notable for the following:     Hematocrit 36.7 (*)     MCV 79 (*)     MCH 26.4 (*)     All other components within normal limits   TSH - Abnormal; Notable for the following:     TSH 0.100 (*)     All other components within normal limits   POCT GLUCOSE - Abnormal; Notable for the following:     POCT Glucose 384 (*)     All other components within normal limits   POCT GLUCOSE - Abnormal; Notable for the following:     POCT Glucose 362 (*)     All other components within normal limits   POCT GLUCOSE - Abnormal; Notable for the following:     POCT Glucose 278 (*)     All other components within normal limits   POCT GLUCOSE - Abnormal; Notable for the following:     POCT Glucose 206 (*)     All other components within normal limits   DRUG SCREEN PANEL, URINE EMERGENCY   URINALYSIS MICROSCOPIC   MAGNESIUM   T4, FREE     Imaging Results          CT Head Without Contrast (Final result)  Result time 07/30/17 16:33:47    Final result by Jonnie Wei MD (07/30/17 16:33:47)                  Impression:      No acute intracranial abnormalities are identified. There is no evidence for intracranial hemorrhage.      Electronically signed by: SUMMER APARICIO MD  Date:     07/30/17  Time:    16:33              Narrative:    Comparison is made to prior examination dated 2/18/15.    Contiguous 5 mm noncontrast axial images were obtained through the brain with both coronal and sagittal reconstructions of the brain obtained.    The ventricles are normal in size and configuration. There is normal gray-white matter differentiation maintained. There is no evidence for abnormal masses, mass effect, or midline shift. No abnormal intra- or extra-axial fluid collections are identified, specifically there is no evidence for intracranial hemorrhage. Mastoid air cells and visualized paranasal sinuses are clear. Bony calvarium is intact.                                      Medical Decision Making:   History:   Old Medical Records: I decided to obtain old medical records.  Initial Assessment:   Patient presents with fatigue since her son committed suicide 5 days ago.  Her symptoms are likely somatization given her recent stress.  However, I will obtain a head CT, check her electrolytes, and give IV fluids.  I will reassess.  Clinical Tests:   Lab Tests: Ordered and Reviewed  Radiological Study: Ordered and Reviewed  ED Management:  18:00 - Patient is feeling better. Her FSG is 268. I will give insulin in the ED and discharge her with metformin. I have discussed with patient the diagnostic results, diagnosis, treatment plan, and need for follow-up. Patient has expressed understanding of my instructions. I am comfortable with her discharge home at this time.              Scribe Attestation:   Scribe #1: I performed the above scribed service and the documentation accurately describes the services I performed. I attest to the accuracy of the note.    Attending Attestation:           Physician Attestation for Scribe:  Physician  Attestation Statement for Scribe #1: I, Dr. Perdomo, reviewed documentation, as scribed by Ely Cha in my presence, and it is both accurate and complete.                 ED Course     Clinical Impression:     1. Mild dehydration    2. Grief reaction    3. Hyperglycemia                                 Ora Perdomo MD  07/30/17 3731

## 2017-07-30 NOTE — ED TRIAGE NOTES
"Pt c/o lightheadeness. Reports son committed suicide last Tuesday, symptoms started after that. Pt has DM and HTN and was on medication to treat, but has not taken any medications since march, reports "has not been taking care of herself." Denies any pain, SOB. Pt reports something is "off with her nerves". Pt reports increased thirst, increased volume not frequency with urination  "

## 2017-07-31 NOTE — ED NOTES
"Informed by discharging nurse informed me that patient and her daughter were requesting to speak to someone as they were unhappy with the way the patient's IV was removed.     Upon entering the room, patient began explaining how her IV was removed and she did not feel the proper technique was used. At the same the daughter began talking loudly and angrily over her mother (the patient). The patient continued to explain how the IV was removed and that it caused her discomfort. Apologized to the patient and informed her that I would have the nurse show me her technique and will provide education as needed. Daughter still talking over patient states "she knew she was hurting her, and another thing I been back here for hours with my son and now all the sudden we are told he can't be back here. There were 3 nurses, the lady at the  that brought us back and a doctor in and out and no one said anything."    Attempted to explain that it is an [facility] policy to only have one visitor per patient and no one under the age of 12 allowed in the back unless they are the patient. Daughter listing all of the encounters and facilities which she has been allowed to bring her son in the back with her. While attempting to explain the reasons why a small child should not be in the back the daughter continually talking over me. I asked the daughter to please let me talk, to which she stated "I see where this is going, you are not the person I want to talk to anyway. I want patient relations." Informed both the patient and the daughter that patient relations is not available on the weekend, but that I can provide the number for them to call tomorrow. Patient and daughter provided with the number to patient relations ([phone]).    "

## 2017-07-31 NOTE — ED NOTES
Pt dissatisfied with IV discontinuation technique. RN kindly apologized to pt multiple times. Pt and pt family member reporting RN did not apologize. Pt and family member requesting to speak with charge RN.

## 2017-08-31 ENCOUNTER — HOSPITAL ENCOUNTER (EMERGENCY)
Facility: OTHER | Age: 57
Discharge: HOME | End: 2017-08-31
Attending: EMERGENCY MEDICINE
Payer: MEDICAID

## 2017-08-31 VITALS
HEIGHT: 68 IN | BODY MASS INDEX: 34.86 KG/M2 | WEIGHT: 230 LBS | DIASTOLIC BLOOD PRESSURE: 78 MMHG | SYSTOLIC BLOOD PRESSURE: 142 MMHG | OXYGEN SATURATION: 99 % | HEART RATE: 72 BPM | TEMPERATURE: 99 F | RESPIRATION RATE: 16 BRPM

## 2017-08-31 DIAGNOSIS — L02.414 ABSCESS OF LEFT FOREARM: Primary | ICD-10-CM

## 2017-08-31 PROCEDURE — 99283 EMERGENCY DEPT VISIT LOW MDM: CPT | Mod: 25

## 2017-08-31 PROCEDURE — 25000003 PHARM REV CODE 250: Performed by: EMERGENCY MEDICINE

## 2017-08-31 PROCEDURE — 10060 I&D ABSCESS SIMPLE/SINGLE: CPT

## 2017-08-31 RX ORDER — LIDOCAINE HYDROCHLORIDE 10 MG/ML
10 INJECTION INFILTRATION; PERINEURAL
Status: COMPLETED | OUTPATIENT
Start: 2017-08-31 | End: 2017-08-31

## 2017-08-31 RX ORDER — SULFAMETHOXAZOLE AND TRIMETHOPRIM 800; 160 MG/1; MG/1
2 TABLET ORAL 2 TIMES DAILY
Qty: 28 TABLET | Refills: 0 | Status: SHIPPED | OUTPATIENT
Start: 2017-08-31 | End: 2017-09-07

## 2017-08-31 RX ORDER — SULFAMETHOXAZOLE AND TRIMETHOPRIM 800; 160 MG/1; MG/1
2 TABLET ORAL
Status: COMPLETED | OUTPATIENT
Start: 2017-08-31 | End: 2017-08-31

## 2017-08-31 RX ORDER — FLUCONAZOLE 100 MG/1
200 TABLET ORAL
Status: COMPLETED | OUTPATIENT
Start: 2017-08-31 | End: 2017-08-31

## 2017-08-31 RX ADMIN — SULFAMETHOXAZOLE AND TRIMETHOPRIM 2 TABLET: 800; 160 TABLET ORAL at 02:08

## 2017-08-31 RX ADMIN — FLUCONAZOLE 200 MG: 100 TABLET ORAL at 02:08

## 2017-08-31 RX ADMIN — LIDOCAINE HYDROCHLORIDE 10 ML: 10 INJECTION, SOLUTION INFILTRATION; PERINEURAL at 02:08

## 2017-08-31 NOTE — ED PROVIDER NOTES
Encounter Date: 8/31/2017    SCRIBE #1 NOTE: I, Alondra Desouza, am scribing for, and in the presence of,  Dr. Ramirez. I have scribed the entire note.       History     Chief Complaint   Patient presents with    Abscess     X 2 days to left forearm     Time seen by provider: 1:50 AM    This is a 57 y.o. female with NIDDM, HTN, neuropathy, and a hx of PE who presents with complaint of abscess to the L forearm x 2 days. Pt tried to christina the abscess herself with a clean, but not new, razor. She was able to produce some drainage with pressure. She has a history of one other abscess in the past. She denies any fever, chills, or myalgias.       The history is provided by the patient.     Review of patient's allergies indicates:  No Known Allergies  Past Medical History:   Diagnosis Date    Diabetes mellitus     Hypertension     Neuropathy     PE (pulmonary embolism)     Pulmonary embolism 1998     Past Surgical History:   Procedure Laterality Date    CYST REMOVAL      HERNIA REPAIR      HYSTERECTOMY      TONSILLECTOMY      TUBAL LIGATION       Family History   Problem Relation Age of Onset    Kidney disease Mother     Kidney disease Father     Stroke Maternal Uncle      Social History   Substance Use Topics    Smoking status: Never Smoker    Smokeless tobacco: Never Used    Alcohol use No     Review of Systems   Constitutional: Negative for chills, diaphoresis and fever.   HENT: Negative for congestion, rhinorrhea and sore throat.    Eyes: Negative for pain and visual disturbance.   Respiratory: Negative for cough and shortness of breath.    Cardiovascular: Negative for chest pain.   Gastrointestinal: Negative for abdominal pain, diarrhea, nausea and vomiting.   Endocrine: Negative for polyuria.   Genitourinary: Negative for decreased urine volume and dysuria.   Musculoskeletal: Negative for back pain, myalgias and neck pain.   Skin: Negative for pallor and rash.        Abscess to L forearm.    Allergic/Immunologic: Negative for immunocompromised state.   Neurological: Negative for dizziness and weakness.   Hematological: Does not bruise/bleed easily.   Psychiatric/Behavioral: Negative for behavioral problems and confusion.       Physical Exam     Initial Vitals [08/31/17 0047]   BP Pulse Resp Temp SpO2   -- 69 18 -- 97 %      MAP       --         Physical Exam    Nursing note and vitals reviewed.  Constitutional: She appears well-developed and well-nourished. She is not diaphoretic. No distress.   HENT:   Head: Normocephalic and atraumatic.   Right Ear: External ear normal.   Left Ear: External ear normal.   Eyes: Right eye exhibits no discharge. Left eye exhibits no discharge.   Neck: Normal range of motion. Neck supple.   Cardiovascular: Normal rate, regular rhythm and normal heart sounds. Exam reveals no gallop and no friction rub.    No murmur heard.  Pulmonary/Chest: Breath sounds normal. No respiratory distress. She has no wheezes. She has no rhonchi. She has no rales.   Musculoskeletal: Normal range of motion. She exhibits no edema or tenderness.   Lymphadenopathy:     She has no cervical adenopathy.   Neurological: She is alert and oriented to person, place, and time. She has normal strength. No sensory deficit.   Skin: Skin is warm and dry. Capillary refill takes less than 2 seconds. No rash noted. There is erythema.   4.0cm area of erythema and warmth to the dorsal aspect of the L forarm with minimal fluctuance. No lymphangitis.   Psychiatric: She has a normal mood and affect. Her behavior is normal.         ED Course   I & D - Incision and Drainage  Date/Time: 8/31/2017 1:57 AM  Performed by: SABRINA MANZANARES  Authorized by: SABRINA MANZANARES   Consent Done: Not Needed  Type: abscess  Body area: upper extremity (dorsal aspect of L forearm)  Anesthesia: local infiltration    Anesthesia:  Local Anesthetic: lidocaine 1% without epinephrine  Anesthetic total: 3 mL  Patient sedated: no  Scalpel size:  11  Incision type: single straight  Complexity: simple  Drainage: purulent  Drainage amount: trace.  Wound treatment: incision and  drainage  Complications: No  Specimens: No  Implants: No  Patient tolerance: Patient tolerated the procedure well with no immediate complications        Labs Reviewed - No data to display          Medical Decision Making:   Initial Assessment:    57-year-old female presents with complaint of an abscess to her left forearm that has been present for approximately 2 days.  She denies any systemic symptoms.  ED Management:  Abscess was incised and drained with only a scant amount of purulent drainage expressed.  She was started on antibiotics and instructed to follow-up with her primary care doctor for wound check in 48-72 hours.  She was also given a fluconazole as she reported at the time of discharge having symptoms consistent with a yeast infection.            Scribe Attestation:   Scribe #1: I performed the above scribed service and the documentation accurately describes the services I performed. I attest to the accuracy of the note.    Attending Attestation:           Physician Attestation for Scribe:  Physician Attestation Statement for Scribe #1: I, Dr. Ramirez, reviewed documentation, as scribed by Alondra Desouza in my presence, and it is both accurate and complete.                 ED Course     Clinical Impression:     1. Abscess of left forearm                                 Lucia Ramirez MD  08/31/17 0302

## 2017-11-05 ENCOUNTER — OFFICE VISIT (OUTPATIENT)
Dept: URGENT CARE | Facility: CLINIC | Age: 57
End: 2017-11-05
Payer: MEDICAID

## 2017-11-05 VITALS
RESPIRATION RATE: 16 BRPM | DIASTOLIC BLOOD PRESSURE: 84 MMHG | HEART RATE: 69 BPM | TEMPERATURE: 98 F | WEIGHT: 235 LBS | SYSTOLIC BLOOD PRESSURE: 160 MMHG | OXYGEN SATURATION: 96 % | HEIGHT: 68 IN | BODY MASS INDEX: 35.61 KG/M2

## 2017-11-05 DIAGNOSIS — L85.3 DRY SKIN: ICD-10-CM

## 2017-11-05 DIAGNOSIS — J02.9 PHARYNGITIS, UNSPECIFIED ETIOLOGY: Primary | ICD-10-CM

## 2017-11-05 LAB
CTP QC/QA: YES
S PYO RRNA THROAT QL PROBE: NEGATIVE

## 2017-11-05 PROCEDURE — 87880 STREP A ASSAY W/OPTIC: CPT | Mod: QW,S$GLB,, | Performed by: NURSE PRACTITIONER

## 2017-11-05 PROCEDURE — 99203 OFFICE O/P NEW LOW 30 MIN: CPT | Mod: 25,S$GLB,, | Performed by: NURSE PRACTITIONER

## 2017-11-05 RX ORDER — HYDROXYZINE HYDROCHLORIDE 25 MG/1
25 TABLET, FILM COATED ORAL 3 TIMES DAILY PRN
Qty: 30 TABLET | Refills: 0 | Status: SHIPPED | OUTPATIENT
Start: 2017-11-05 | End: 2017-11-15

## 2017-11-05 RX ORDER — AMOXICILLIN AND CLAVULANATE POTASSIUM 875; 125 MG/1; MG/1
1 TABLET, FILM COATED ORAL 2 TIMES DAILY
Qty: 14 TABLET | Refills: 0 | Status: SHIPPED | OUTPATIENT
Start: 2017-11-05 | End: 2017-11-12

## 2017-11-05 NOTE — PROGRESS NOTES
"Subjective:       Patient ID: Devika Bustillo is a 57 y.o. female.    Vitals:  height is 5' 8" (1.727 m) and weight is 106.6 kg (235 lb). Her oral temperature is 98.3 °F (36.8 °C). Her blood pressure is 160/84 (abnormal) and her pulse is 69. Her respiration is 16 and oxygen saturation is 96%.     Chief Complaint: Sore Throat    Pt reports subjective fevers and sore throat that started last week and is worsening.  Pt reports generalized itch with no rash.      Sore Throat    This is a new problem. The current episode started in the past 7 days. The problem has been gradually worsening. Neither side of throat is experiencing more pain than the other. There has been no fever. The pain is at a severity of 7/10. The pain is moderate. Associated symptoms include a hoarse voice and swollen glands. Pertinent negatives include no congestion, coughing, diarrhea, ear discharge, ear pain, shortness of breath, stridor or trouble swallowing. She has tried oral narcotic analgesics for the symptoms. The treatment provided no relief.     Review of Systems   Constitution: Negative for malaise/fatigue.   HENT: Positive for hoarse voice and sore throat. Negative for congestion, ear discharge, ear pain, stridor and trouble swallowing.    Eyes: Negative for discharge and redness.   Cardiovascular: Negative for dyspnea on exertion and leg swelling.   Respiratory: Negative for cough, shortness of breath, sputum production and wheezing.    Skin: Positive for itching.   Gastrointestinal: Negative for diarrhea.       Objective:      Physical Exam   Constitutional: She is oriented to person, place, and time. Vital signs are normal. She appears well-developed and well-nourished. She is cooperative.  Non-toxic appearance. She does not have a sickly appearance. She does not appear ill. No distress.   HENT:   Head: Normocephalic and atraumatic.   Right Ear: Hearing, external ear and ear canal normal. A middle ear effusion is present.   Left Ear: " Hearing, tympanic membrane, external ear and ear canal normal.   Nose: Mucosal edema and rhinorrhea present.   Mouth/Throat: Uvula is midline. Posterior oropharyngeal edema and posterior oropharyngeal erythema present. No oropharyngeal exudate or tonsillar abscesses.   Eyes: Conjunctivae and lids are normal.   Neck: Normal range of motion and full passive range of motion without pain. Neck supple. No neck rigidity. No edema, no erythema and normal range of motion present.   Cardiovascular: Normal rate, regular rhythm and normal heart sounds.    Pulmonary/Chest: Effort normal and breath sounds normal. No accessory muscle usage. No apnea, no tachypnea and no bradypnea. No respiratory distress. She has no decreased breath sounds. She has no wheezes. She has no rhonchi. She has no rales.   Abdominal:   Obese abdomen   Lymphadenopathy:        Head (right side): No submental, no submandibular, no tonsillar, no preauricular, no posterior auricular and no occipital adenopathy present.        Head (left side): No submental, no submandibular, no tonsillar, no preauricular, no posterior auricular and no occipital adenopathy present.     She has cervical adenopathy.        Right cervical: Superficial cervical adenopathy present. No deep cervical and no posterior cervical adenopathy present.       Left cervical: Superficial cervical adenopathy present. No deep cervical and no posterior cervical adenopathy present.   Neurological: She is alert and oriented to person, place, and time.   Skin: Skin is warm. Capillary refill takes less than 2 seconds. No abrasion, no bruising, no burn, no ecchymosis, no laceration, no lesion, no petechiae and no rash noted. No erythema.   Psychiatric: She has a normal mood and affect. Her speech is normal and behavior is normal.   Nursing note and vitals reviewed.      Assessment:       1. Pharyngitis, unspecified etiology    2. Dry skin        Plan:         Pharyngitis, unspecified etiology  -      POCT rapid strep A  -     amoxicillin-clavulanate 875-125mg (AUGMENTIN) 875-125 mg per tablet; Take 1 tablet by mouth 2 (two) times daily.  Dispense: 14 tablet; Refill: 0  -     hydrOXYzine HCl (ATARAX) 25 MG tablet; Take 1 tablet (25 mg total) by mouth 3 (three) times daily as needed for Itching.  Dispense: 30 tablet; Refill: 0    Dry skin  -     hydrOXYzine HCl (ATARAX) 25 MG tablet; Take 1 tablet (25 mg total) by mouth 3 (three) times daily as needed for Itching.  Dispense: 30 tablet; Refill: 0    Pt instructed to go to ER for worsening of symptoms.  Pt states she has not been taking her medications like she should since she lost her baby.  Discussed with her the importance of her medications and encouraged her to follow up with her primary care.

## 2017-11-05 NOTE — PATIENT INSTRUCTIONS
Please drink plenty of fluids.  Please get plenty of rest.    Please go to the Emergency Department for any concerns or worsening of condition including shortness of breath, difficulty breathing, swelling of tongue or face.    If you were prescribed antibiotics, please take them to completion.    it is ok to take plain over the counter Mucinex for congestion as directed on box.  Take with 2 glasses of water.      If not allergic, please take over the counter Tylenol (Acetaminophen) and/or Motrin (Ibuprofen) as directed on bottle for control of pain and/or fever.    Please follow up with your primary care doctor or specialist as needed.    If you  smoke, please stop smoking.    Self-Care for Sore Throats    Sore throats happen for many reasons, such as colds, allergies, and infections caused by viruses or bacteria. In any case, your throat becomes red and sore. Your goal for self-care is to reduce your discomfort while giving your throat a chance to heal.  Moisten and soothe your throat  Tips include the following:  · Try a sip of water first thing after waking up.  · Keep your throat moist by drinking 6 or more glasses of clear liquids every day.  · Run a cool-air humidifier in your room overnight.  · Avoid cigarette smoke.   · Suck on throat lozenges, cough drops, hard candy, ice chips, or frozen fruit-juice bars. Use the sugar-free versions if your diet or medical condition requires them.  Gargle to ease irritation  Gargling every hour or 2 can ease irritation. Try gargling with 1 of these solutions:  · 1/4 teaspoon of salt in 1/2 cup of warm water  · An over-the-counter anesthetic gargle  Use medicine for more relief  Over-the-counter medicine can reduce sore throat symptoms. Ask your pharmacist if you have questions about which medicine to use:  · Ease pain with anesthetic sprays. Aspirin or an aspirin substitute also helps. Remember, never give aspirin to anyone 18 or younger, or if you are already taking blood  thinners.   · For sore throats caused by allergies, try antihistamines to block the allergic reaction.  · Remember: unless a sore throat is caused by a bacterial infection, antibiotics wont help you.  Prevent future sore throats  Prevention tips include the following:  · Stop smoking or reduce contact with secondhand smoke. Smoke irritates the tender throat lining.  · Limit contact with pets and with allergy-causing substances, such as pollen and mold.  · When youre around someone with a sore throat or cold, wash your hands often to keep viruses or bacteria from spreading.  · Dont strain your vocal cords.  Call your healthcare provider  Contact your healthcare provider if you have:  · A temperature over 101°F (38.3°C)  · White spots on the throat  · Great difficulty swallowing  · Trouble breathing  · A skin rash  · Recent exposure to someone else with strep bacteria  · Severe hoarseness and swollen glands in the neck or jaw   Date Last Reviewed: 8/1/2016  © 4854-9076 [employer]. [address] [place] [address], [place] [address]. All rights reserved. This information is not intended as a substitute for professional medical care. Always follow your healthcare professional's instructions.        When You Have a Sore Throat    A sore throat can be painful. There are many reasons why you may have a sore throat. Your healthcare provider will work with you to find the cause of your sore throat. He or she will also find the best treatment for you.  What causes a sore throat?  Sore throats can be caused or worsened by:  · Cold or flu viruses  · Bacteria  · Irritants such as tobacco smoke or air pollution  · Acid reflux  A healthy throat  The tonsils are on the sides of the throat near the base of the tongue. They collect viruses and bacteria and help fight infection. The throat (pharynx) is the passage for air. Mucus from the nasal cavity also moves down the passage.  An inflamed throat  The tonsils and pharynx can  become inflamed due to a cold or flu virus. Postnasal drip (excess mucus draining from the nasal cavity) can irritate the throat. It can also make the throat or tonsils more likely to be infected by bacteria. Severe, untreated tonsillitis in children or adults can cause a pocket of pus (abscess) to form near the tonsil.  Your evaluation  A medical evaluation can help find the cause of your sore throat. It can also help your healthcare provider choose the best treatment for you. The evaluation may include a health history, physical exam, and diagnostic tests.  Health history  Your healthcare provider may ask you the following:  · How long has the sore throat lasted and how have you been treating it?  · Do you have any other symptoms, such as body aches, fever, or cough?  · Does your sore throat recur? If so, how often? How many days of school or work have you missed because of a sore throat?  · Do you have trouble eating or swallowing?  · Have you been told that you snore or have other sleep problems?  · Do you have bad breath?  · Do you cough up bad-tasting mucus?  Physical exam  During the exam, your healthcare provider checks your ears, nose, and throat for problems. He or she also checks for swelling in the neck, and may listen to your chest.  Possible tests  Other tests your healthcare provider may perform include:  · A throat swab to check for bacteria such as streptococcus (the bacteria that causes strep throat)  · A blood test to check for mononucleosis (a viral infection)  · A chest X-ray to rule out pneumonia, especially if you have a cough  Treating a sore throat  Treatment depends on many factors. What is the likely cause? Is the problem recent? Does it keep coming back? In many cases, the best thing to do is to treat the symptoms, rest, and let the problem heal itself. Antibiotics may help clear up some bacterial infections. For cases of severe or recurring tonsillitis, the tonsils may need to be  "removed.  Relieving your symptoms  · Dont smoke, and avoid secondhand smoke.  · For children, try throat sprays or Popsicles. Adults and older children may try lozenges.  · Drink warm liquids to soothe the throat and help thin mucus. Avoid alcohol, spicy foods, and acidic drinks such as orange juice. These can irritate the throat.  · Gargle with warm saltwater (1 teaspoon of salt to 8 ounces of warm water).  · Use a humidifier to keep air moist and relieve throat dryness.  · Try over-the-counter pain relievers such as acetaminophen or ibuprofen. Use as directed, and dont exceed the recommended dose. Dont give aspirin to children.   Are antibiotics needed?  If your sore throat is due to a bacterial infection, antibiotics may speed healing and prevent complications. Although group A streptococcus ("strep throat" or GAS) is the major treatable infection for a sore throat, GAS causes only 5% to 15% of sore throats in adults who seek medical care. Most sore throats are caused by cold or flu viruses. And antibiotics dont treat viral illness. In fact, using antibiotics when theyre not needed may produce bacteria that are harder to kill. Your healthcare provider will prescribe antibiotics only if he or she thinks they are likely to help.  If antibiotics are prescribed  Take the medicine exactly as directed. Be sure to finish your prescription even if youre feeling better. And be sure to ask your healthcare provider or pharmacist what side effects are common and what to do about them.  Is surgery needed?  In some cases, tonsils need to be removed. This is often done as outpatient (same-day) surgery. Your healthcare provider may advise removing the tonsils in cases of:  · Several severe bouts of tonsillitis in a year. Severe episodes include those that lead to missed days of school or work, or that need to be treated with antibiotics.  · Tonsillitis that causes breathing problems during sleep  · Tonsillitis caused by " food particles collecting in pouches in the tonsils (cryptic tonsillitis)  Call your healthcare provider if any of the following occur:  · Symptoms worsen, or new symptoms develop.  · Swollen tonsils make breathing difficult.  · The pain is severe enough to keep you from drinking liquids.  · A skin rash, hives, or wheezing develops. Any of these could signal an allergic reaction to antibiotics.  · Symptoms dont improve within a week.  · Symptoms dont improve within 2 to 3 days of starting antibiotics.   Date Last Reviewed: 10/1/2016  © 8800-7009 [employer]. [address] [place] [address]. All rights reserved. This information is not intended as a substitute for professional medical care. Always follow your healthcare professional's instructions.

## 2017-11-08 ENCOUNTER — TELEPHONE (OUTPATIENT)
Dept: URGENT CARE | Facility: CLINIC | Age: 57
End: 2017-11-08

## 2018-03-20 ENCOUNTER — HOSPITAL ENCOUNTER (OUTPATIENT)
Facility: HOSPITAL | Age: 58
Discharge: HOME | End: 2018-03-21
Attending: EMERGENCY MEDICINE | Admitting: HOSPITALIST
Payer: MEDICAID

## 2018-03-20 DIAGNOSIS — I10 HYPERTENSION, UNSPECIFIED TYPE: ICD-10-CM

## 2018-03-20 DIAGNOSIS — I10 HYPERTENSION: ICD-10-CM

## 2018-03-20 DIAGNOSIS — R79.89 ELEVATED TROPONIN: ICD-10-CM

## 2018-03-20 DIAGNOSIS — E11.9: ICD-10-CM

## 2018-03-20 DIAGNOSIS — R07.9 CHEST PAIN: Primary | ICD-10-CM

## 2018-03-20 DIAGNOSIS — E04.1 THYROID NODULE: ICD-10-CM

## 2018-03-20 DIAGNOSIS — R51.9 NONINTRACTABLE HEADACHE, UNSPECIFIED CHRONICITY PATTERN, UNSPECIFIED HEADACHE TYPE: ICD-10-CM

## 2018-03-20 PROCEDURE — 96374 THER/PROPH/DIAG INJ IV PUSH: CPT

## 2018-03-20 PROCEDURE — 99285 EMERGENCY DEPT VISIT HI MDM: CPT

## 2018-03-20 PROCEDURE — 99285 EMERGENCY DEPT VISIT HI MDM: CPT | Mod: ,,, | Performed by: EMERGENCY MEDICINE

## 2018-03-20 PROCEDURE — 93010 ELECTROCARDIOGRAM REPORT: CPT | Mod: ,,, | Performed by: INTERNAL MEDICINE

## 2018-03-20 PROCEDURE — 93005 ELECTROCARDIOGRAM TRACING: CPT

## 2018-03-20 PROCEDURE — 96361 HYDRATE IV INFUSION ADD-ON: CPT

## 2018-03-20 RX ORDER — HYDROCHLOROTHIAZIDE 25 MG/1
25 TABLET ORAL DAILY
COMMUNITY
End: 2018-11-29

## 2018-03-20 RX ORDER — OMEGA-3-ACID ETHYL ESTERS 1 G/1
2 CAPSULE, LIQUID FILLED ORAL 2 TIMES DAILY
COMMUNITY
End: 2018-09-07

## 2018-03-20 RX ORDER — PRAVASTATIN SODIUM 40 MG/1
40 TABLET ORAL DAILY
Status: ON HOLD | COMMUNITY
End: 2020-05-22 | Stop reason: HOSPADM

## 2018-03-21 VITALS
OXYGEN SATURATION: 98 % | HEIGHT: 68 IN | SYSTOLIC BLOOD PRESSURE: 119 MMHG | BODY MASS INDEX: 35.31 KG/M2 | TEMPERATURE: 96 F | WEIGHT: 233 LBS | HEART RATE: 60 BPM | RESPIRATION RATE: 16 BRPM | DIASTOLIC BLOOD PRESSURE: 59 MMHG

## 2018-03-21 PROBLEM — R07.9 CHEST PAIN: Status: ACTIVE | Noted: 2018-03-21

## 2018-03-21 LAB
ALBUMIN SERPL BCP-MCNC: 3.6 G/DL
ALP SERPL-CCNC: 126 U/L
ALT SERPL W/O P-5'-P-CCNC: 24 U/L
ANION GAP SERPL CALC-SCNC: 12 MMOL/L
AST SERPL-CCNC: 17 U/L
BASOPHILS # BLD AUTO: 0.06 K/UL
BASOPHILS NFR BLD: 0.7 %
BILIRUB SERPL-MCNC: 0.3 MG/DL
BNP SERPL-MCNC: 10 PG/ML
BUN SERPL-MCNC: 23 MG/DL
CALCIUM SERPL-MCNC: 10.2 MG/DL
CHLORIDE SERPL-SCNC: 98 MMOL/L
CO2 SERPL-SCNC: 24 MMOL/L
CREAT SERPL-MCNC: 1.3 MG/DL
DIASTOLIC DYSFUNCTION: NO
DIFFERENTIAL METHOD: ABNORMAL
EOSINOPHIL # BLD AUTO: 0.1 K/UL
EOSINOPHIL NFR BLD: 1.2 %
ERYTHROCYTE [DISTWIDTH] IN BLOOD BY AUTOMATED COUNT: 12.4 %
EST. GFR  (AFRICAN AMERICAN): 53.4 ML/MIN/1.73 M^2
EST. GFR  (NON AFRICAN AMERICAN): 46.3 ML/MIN/1.73 M^2
ESTIMATED AVG GLUCOSE: ABNORMAL MG/DL
ESTIMATED PA SYSTOLIC PRESSURE: 26.04
GLUCOSE SERPL-MCNC: 423 MG/DL
HBA1C MFR BLD HPLC: >14 %
HCT VFR BLD AUTO: 43.5 %
HGB BLD-MCNC: 13.7 G/DL
IMM GRANULOCYTES # BLD AUTO: 0.02 K/UL
IMM GRANULOCYTES NFR BLD AUTO: 0.2 %
LYMPHOCYTES # BLD AUTO: 3.8 K/UL
LYMPHOCYTES NFR BLD: 44.2 %
MCH RBC QN AUTO: 25.8 PG
MCHC RBC AUTO-ENTMCNC: 31.5 G/DL
MCV RBC AUTO: 82 FL
MONOCYTES # BLD AUTO: 0.6 K/UL
MONOCYTES NFR BLD: 7.2 %
NEUTROPHILS # BLD AUTO: 4 K/UL
NEUTROPHILS NFR BLD: 46.5 %
NRBC BLD-RTO: 0 /100 WBC
PLATELET # BLD AUTO: 291 K/UL
PMV BLD AUTO: 10.2 FL
POCT GLUCOSE: 368 MG/DL (ref 70–110)
POTASSIUM SERPL-SCNC: 4.7 MMOL/L
PROT SERPL-MCNC: 8.6 G/DL
RBC # BLD AUTO: 5.3 M/UL
RETIRED EF AND QEF - SEE NOTES: 70 (ref 55–65)
SODIUM SERPL-SCNC: 134 MMOL/L
T4 SERPL-MCNC: 9.5 UG/DL
TROPONIN I SERPL DL<=0.01 NG/ML-MCNC: 0.06 NG/ML
TROPONIN I SERPL DL<=0.01 NG/ML-MCNC: <0.006 NG/ML
TSH SERPL DL<=0.005 MIU/L-ACNC: 0.56 UIU/ML
WBC # BLD AUTO: 8.52 K/UL

## 2018-03-21 PROCEDURE — 99220 PR INITIAL OBSERVATION CARE,LEVL III: CPT | Mod: ,,, | Performed by: INTERNAL MEDICINE

## 2018-03-21 PROCEDURE — 25000003 PHARM REV CODE 250: Performed by: INTERNAL MEDICINE

## 2018-03-21 PROCEDURE — 84443 ASSAY THYROID STIM HORMONE: CPT

## 2018-03-21 PROCEDURE — 25000003 PHARM REV CODE 250: Performed by: EMERGENCY MEDICINE

## 2018-03-21 PROCEDURE — 93306 TTE W/DOPPLER COMPLETE: CPT | Mod: 26,,, | Performed by: INTERNAL MEDICINE

## 2018-03-21 PROCEDURE — 25500020 PHARM REV CODE 255: Performed by: EMERGENCY MEDICINE

## 2018-03-21 PROCEDURE — 84436 ASSAY OF TOTAL THYROXINE: CPT

## 2018-03-21 PROCEDURE — 63600175 PHARM REV CODE 636 W HCPCS: Performed by: INTERNAL MEDICINE

## 2018-03-21 PROCEDURE — 93306 TTE W/DOPPLER COMPLETE: CPT

## 2018-03-21 PROCEDURE — G0378 HOSPITAL OBSERVATION PER HR: HCPCS

## 2018-03-21 PROCEDURE — 83880 ASSAY OF NATRIURETIC PEPTIDE: CPT

## 2018-03-21 PROCEDURE — 84484 ASSAY OF TROPONIN QUANT: CPT | Mod: 91

## 2018-03-21 PROCEDURE — 93010 ELECTROCARDIOGRAM REPORT: CPT | Mod: ,,, | Performed by: INTERNAL MEDICINE

## 2018-03-21 PROCEDURE — 25000003 PHARM REV CODE 250: Performed by: STUDENT IN AN ORGANIZED HEALTH CARE EDUCATION/TRAINING PROGRAM

## 2018-03-21 PROCEDURE — 36415 COLL VENOUS BLD VENIPUNCTURE: CPT

## 2018-03-21 PROCEDURE — 63600175 PHARM REV CODE 636 W HCPCS: Performed by: EMERGENCY MEDICINE

## 2018-03-21 PROCEDURE — 83036 HEMOGLOBIN GLYCOSYLATED A1C: CPT

## 2018-03-21 PROCEDURE — 80053 COMPREHEN METABOLIC PANEL: CPT

## 2018-03-21 PROCEDURE — 85025 COMPLETE CBC W/AUTO DIFF WBC: CPT

## 2018-03-21 RX ORDER — INSULIN GLARGINE 300 [IU]/ML
20 INJECTION, SOLUTION SUBCUTANEOUS NIGHTLY
COMMUNITY
End: 2020-01-01

## 2018-03-21 RX ORDER — LISINOPRIL 20 MG/1
40 TABLET ORAL DAILY
Status: DISCONTINUED | OUTPATIENT
Start: 2018-03-21 | End: 2018-03-21 | Stop reason: HOSPADM

## 2018-03-21 RX ORDER — HYDRALAZINE HYDROCHLORIDE 20 MG/ML
10 INJECTION INTRAMUSCULAR; INTRAVENOUS
Status: COMPLETED | OUTPATIENT
Start: 2018-03-21 | End: 2018-03-21

## 2018-03-21 RX ORDER — LISINOPRIL 10 MG/1
10 TABLET ORAL DAILY
Status: ON HOLD | COMMUNITY
End: 2018-03-21 | Stop reason: HOSPADM

## 2018-03-21 RX ORDER — PRAVASTATIN SODIUM 40 MG/1
40 TABLET ORAL DAILY
Status: DISCONTINUED | OUTPATIENT
Start: 2018-03-21 | End: 2018-03-21 | Stop reason: HOSPADM

## 2018-03-21 RX ORDER — HYDROCHLOROTHIAZIDE 25 MG/1
25 TABLET ORAL DAILY
Status: DISCONTINUED | OUTPATIENT
Start: 2018-03-21 | End: 2018-03-21 | Stop reason: HOSPADM

## 2018-03-21 RX ORDER — IBUPROFEN 400 MG/1
800 TABLET ORAL ONCE
Status: COMPLETED | OUTPATIENT
Start: 2018-03-21 | End: 2018-03-21

## 2018-03-21 RX ORDER — GABAPENTIN 300 MG/1
300 CAPSULE ORAL 2 TIMES DAILY
Status: DISCONTINUED | OUTPATIENT
Start: 2018-03-21 | End: 2018-03-21 | Stop reason: HOSPADM

## 2018-03-21 RX ORDER — IBUPROFEN 400 MG/1
800 TABLET ORAL EVERY 6 HOURS PRN
Status: DISCONTINUED | OUTPATIENT
Start: 2018-03-21 | End: 2018-03-21 | Stop reason: HOSPADM

## 2018-03-21 RX ORDER — INSULIN ASPART 100 [IU]/ML
1-10 INJECTION, SOLUTION INTRAVENOUS; SUBCUTANEOUS EVERY 6 HOURS PRN
Status: DISCONTINUED | OUTPATIENT
Start: 2018-03-21 | End: 2018-03-21 | Stop reason: HOSPADM

## 2018-03-21 RX ORDER — LORAZEPAM 0.5 MG/1
0.5 TABLET ORAL
Status: COMPLETED | OUTPATIENT
Start: 2018-03-21 | End: 2018-03-21

## 2018-03-21 RX ORDER — LISINOPRIL 40 MG/1
40 TABLET ORAL DAILY
Qty: 90 TABLET | Refills: 3 | Status: SHIPPED | OUTPATIENT
Start: 2018-03-22 | End: 2019-03-22

## 2018-03-21 RX ORDER — LABETALOL HYDROCHLORIDE 5 MG/ML
20 INJECTION, SOLUTION INTRAVENOUS
Status: DISCONTINUED | OUTPATIENT
Start: 2018-03-21 | End: 2018-03-21

## 2018-03-21 RX ORDER — GLUCAGON 1 MG
1 KIT INJECTION
Status: DISCONTINUED | OUTPATIENT
Start: 2018-03-21 | End: 2018-03-21 | Stop reason: HOSPADM

## 2018-03-21 RX ORDER — ACETAMINOPHEN 500 MG
1000 TABLET ORAL
Status: COMPLETED | OUTPATIENT
Start: 2018-03-21 | End: 2018-03-21

## 2018-03-21 RX ORDER — SODIUM CHLORIDE 0.9 % (FLUSH) 0.9 %
5 SYRINGE (ML) INJECTION
Status: DISCONTINUED | OUTPATIENT
Start: 2018-03-21 | End: 2018-03-21 | Stop reason: HOSPADM

## 2018-03-21 RX ORDER — GABAPENTIN 300 MG/1
300 CAPSULE ORAL 2 TIMES DAILY
COMMUNITY
End: 2020-03-01 | Stop reason: CLARIF

## 2018-03-21 RX ORDER — LISINOPRIL 40 MG/1
40 TABLET ORAL DAILY
Qty: 90 TABLET | Refills: 3 | Status: SHIPPED | OUTPATIENT
Start: 2018-03-22 | End: 2018-03-21

## 2018-03-21 RX ORDER — OMEGA-3-ACID ETHYL ESTERS 1 G/1
2 CAPSULE, LIQUID FILLED ORAL 2 TIMES DAILY
Status: DISCONTINUED | OUTPATIENT
Start: 2018-03-21 | End: 2018-03-21 | Stop reason: HOSPADM

## 2018-03-21 RX ADMIN — GABAPENTIN 300 MG: 300 CAPSULE ORAL at 09:03

## 2018-03-21 RX ADMIN — PRAVASTATIN SODIUM 40 MG: 40 TABLET ORAL at 09:03

## 2018-03-21 RX ADMIN — IOHEXOL 100 ML: 350 INJECTION, SOLUTION INTRAVENOUS at 02:03

## 2018-03-21 RX ADMIN — ACETAMINOPHEN 1000 MG: 500 TABLET ORAL at 12:03

## 2018-03-21 RX ADMIN — LORAZEPAM 0.5 MG: 0.5 TABLET ORAL at 12:03

## 2018-03-21 RX ADMIN — INSULIN ASPART 10 UNITS: 100 INJECTION, SOLUTION INTRAVENOUS; SUBCUTANEOUS at 03:03

## 2018-03-21 RX ADMIN — HYDROCHLOROTHIAZIDE 25 MG: 25 TABLET ORAL at 09:03

## 2018-03-21 RX ADMIN — LISINOPRIL 40 MG: 20 TABLET ORAL at 09:03

## 2018-03-21 RX ADMIN — INSULIN DETEMIR 10 UNITS: 100 INJECTION, SOLUTION SUBCUTANEOUS at 09:03

## 2018-03-21 RX ADMIN — SODIUM CHLORIDE 1000 ML: 0.9 INJECTION, SOLUTION INTRAVENOUS at 12:03

## 2018-03-21 RX ADMIN — HYDRALAZINE HYDROCHLORIDE 10 MG: 20 INJECTION INTRAMUSCULAR; INTRAVENOUS at 12:03

## 2018-03-21 RX ADMIN — IBUPROFEN 800 MG: 400 TABLET, FILM COATED ORAL at 09:03

## 2018-03-21 RX ADMIN — OMEGA-3-ACID ETHYL ESTERS 2 G: 1 CAPSULE, LIQUID FILLED ORAL at 10:03

## 2018-03-21 NOTE — PROVIDER PROGRESS NOTES - EMERGENCY DEPT.
Encounter Date: 3/20/2018    ED Physician Progress Notes         EKG - STEMI Decision  Initial Reading: No STEMI present.  Response: No Action Needed.

## 2018-03-21 NOTE — H&P
Hospital Medicine  History and Physical Exam    [place] [facility] Dewey Groves MD  Admit Date: 3/20/2018  FRANK 3/21   Principal Problem:  <principal problem not specified>   Patient information was obtained from patient and ER records.   Primary care Physician: [facility]  Code status: Full Code    HPI: Ms. Bustillo is a 57F h/o HTN, HLD, Dm, remote PE (off anticoagulation since late 90s), presenting with neck / chest pain with shortness of breath, found to be in hypertensive urgency/emergency. Pt reports approx 8pm 3/20 noted to have gradual worsening of R sided neck pain described as sharp, radiation to L side, accompanied by headache. Pt reports has these episodes frequently and usually improves with ibuprofen however began to have blurry vision which prompted her to head to hospital for evaluation. En route to hospital patient noted chest pressure with shortness of breath, overall has improved since admit with pressure now more on R side of chest. Pt denies orthopnea, PND, LE edema. Reports no recent F/C/N/V.     Patient had been out of care of physician for some time recently started back on medications, has been taking HCTZ 25mg daily and lisinopril 10mg daily, has not taken insulin yet as had not picked up syringes.     In ED patient was given 1L Ns, noted to be hypertensive in 200s systolic, given hydralazine 10mg IV x1 with improvement, tylenol. Evaluated for dissection with CTA which was negative, repeat troponin mildly elevated, EKG NSR.     Past Medical History: Patient has a past medical history of Diabetes mellitus; Hypertension; Neuropathy; PE (pulmonary embolism); and Pulmonary embolism (1998).    Past Surgical History: Patient has a past surgical history that includes Tonsillectomy; Hernia repair; Hysterectomy; Tubal ligation; and Cyst Removal.    Social History: Patient reports that she has never smoked. She has never used smokeless tobacco. She reports that she does not  drink alcohol or use drugs.    Family History: family history includes Kidney disease in her father and mother; Stroke in her maternal uncle.    Medications: reviewed     Allergies: Patient is allergic to codeine.    ROS  Pain Scale: 3/10   Constitutional: no fever or chills  Respiratory: no cough, positive for shortness of breath  Cardiovascular:  chest pain present, no palpitations  Gastrointestinal: no nausea or vomiting, no abdominal pain or change in bowel habits  Genitourinary: no hematuria or dysuria  Integument/Breast: no rash or pruritis  Hematologic/Lymphatic: no easy bruising or lymphadenopathy  Musculoskeletal: no arthralgias, + myalgias (neck pain)  Neurological: no seizures or tremors  Behavioral/Psych: no depression or anxiety    PEx  Temp:  [97.2 °F (36.2 °C)-98 °F (36.7 °C)]   Pulse:  [55-86]   Resp:  [11-20]   BP: (106-213)/()   SpO2:  [95 %-100 %]   Body mass index is 35.43 kg/m².     General appearance: no distress, awake alert  Mental status: Alert and oriented x 3  HEENT:  conjunctivae/corneas clear, PERRL  Neck: supple, thyroid not enlarged, normal ROM, tender to palpation paraspinal mm bilatearlly  Pulm:   normal respiratory effort, CTA B, no c/w/r  Card: RRR, S1, S2 normal, no murmur, click, rub or gallop, noted + tenderness to palpation  Abd: soft, NT, ND, BS present; no masses, no organomegaly  Ext: no c/c/e  Pulses: 2+, symmetric  Skin: color, texture, turgor normal. No rashes or lesions  Neuro: CN II-XII grossly intact, no focal numbness or weakness, normal strength and tone         Intake/Output Summary (Last 24 hours) at 03/21/18 0814  Last data filed at 03/21/18 0200   Gross per 24 hour   Intake             1000 ml   Output                0 ml   Net             1000 ml       Recent Labs  Lab 03/21/18  0028   WBC 8.52   HGB 13.7   HCT 43.5          Recent Labs  Lab 03/21/18 0028   *   K 4.7   CL 98   CO2 24   BUN 23*   CREATININE 1.3   *   CALCIUM 10.2        Recent Labs  Lab 03/21/18  0028   ALKPHOS 126   ALT 24   AST 17   ALBUMIN 3.6   PROT 8.6*   BILITOT 0.3      No results for input(s): POCTGLUCOSE in the last 168 hours.  Recent Labs      03/21/18 0028  03/21/18   0320   TROPONINI  <0.006  0.056*       No results for input(s): LACTATE in the last 72 hours.     Active Hospital Problems    Diagnosis  POA    Chest pain [R07.9]  Yes      Resolved Hospital Problems    Diagnosis Date Resolved POA   No resolved problems to display.       Overview    57F h/o Dm, HTN, HLD presenting with chest pain, atypical in nature along with likely MSK neck pain, mild troponin elevation, was found to be in hypertensive emergency/urgency which has improved.    Assessment and Plan for Problems addressed today:    Hypertensive emergency/urgency  -Pt with BP >200 systolic with blurry vision, Ha, chest pressure  -Improved with hydralazine 10mg IV x1  -Restart home medications, increase lisinopril to 40mg daily, cont HCTZ 25mg daily  -Check TSH/T4    Chest pain  -Atypical, reproducible on palpation, improved overall, not worse with exertion, improved with ibuprofen/tylenol  -Mild troponin elevation  -Repeat Trop x1  -If stable or downtrending can d/c with outpatient stress  -2d ECHO with CFD    Neck pain - acute on chronic  -Likely MSK in nature, possible ass'n with hypertensive urgency, dissection ruled out with CTA  -Ibuprofen 800mg TID PRN (limit 3d)    DM II  -Hyperglycemic to 400s in Ed, no insulin given (pt off insuln)  -Detimir 10u daily  -SSI  -Accuchecks AC/HS    Thyroid nodule  -Noted on CT  -Outpatient u/s on discharge  -F/u T4/TSH    DVT PPx: SCD, ambulation    Dewey Groves MD  Department of Hospital Medicine  Pager: [phone]  Spectra: [phone]

## 2018-03-21 NOTE — ED TRIAGE NOTES
"Pt reports she is havnig pains beginning from the back of her neck and radiating to the top of her neck that began a few hours ago. Pt reports pain feels like "something sticking her". Denies any trauma to the area. Pt also reports chest discomfort beginning on the left side and moving to the right side, described as pressure. Reports some nausea, denies emesis.   "

## 2018-03-21 NOTE — PLAN OF CARE
03/21/18 0830   Discharge Assessment   Assessment Type Discharge Planning Assessment   Confirmed/corrected address and phone number on facesheet? Yes   Assessment information obtained from? Patient;Medical Record   Expected Length of Stay (days) 2   Communicated expected length of stay with patient/caregiver yes   Prior to hospitilization cognitive status: Alert/Oriented   Prior to hospitalization functional status: Independent   Current cognitive status: Alert/Oriented   Current Functional Status: Independent   Lives With alone   Able to Return to Prior Arrangements yes   Is patient able to care for self after discharge? Yes   Patient's perception of discharge disposition home or selfcare   Readmission Within The Last 30 Days no previous admission in last 30 days   Patient currently being followed by outpatient case management? No   Patient currently receives any other outside agency services? No   Equipment Currently Used at Home none   Do you have any problems affording any of your prescribed medications? No   Is the patient taking medications as prescribed? yes   Does the patient have transportation home? Yes   Transportation Available car   Does the patient receive services at the [facility]? No   Discharge Plan A Home   Placed in [facility] for CP. Lives alone and is independent in her ADLs. Plan is to DC home. No DC needs identified.

## 2018-03-21 NOTE — ED PROVIDER NOTES
Encounter Date: 3/20/2018       History     Chief Complaint   Patient presents with    Neck Pain     x 2 hours - pt reports bilateral neck pain to the back of her neck; pt states her neck pain moves down her shoulders    Chest Pain     x 30 min ago -  pt describes pain as sticking pains with weighted pressure     Devika Bustillo is a 57 y.o. female with past medical history of diabetes on metformin, hypertension, and pulmonary embolism presents with 2 hours of right-sided chest pain radiating to the neck.  She has also developed a headache recently blurry vision.  Her chest pain is described as a dull pressure-like sensation and radiates up the posterior aspect of the bilateral neck musculature.  Her pain is exacerbated in her neck when she moves, or chest pain is persistent.  She has not attempted any sort of remedies or treatments for her pains.  She states she does frequently get headaches however this one seems worse than normal.  She is noted blurred vision which has been worsening over the past 2 hours.  Upon presentation to the emergency room blood pressure was 213 of the right arm, this was rechecked in the left arm with a systolic blood pressure 187.  She states that her blood pressure is always high, that she has stopped taking her antihypertensives after her son passed away recently.          Chest pain and headache developed within the last 2 hours prior to presentation  Chest pain 8/10, non-radiating, Headache post occipital started gradually 6/10      Review of patient's allergies indicates:   Allergen Reactions    Codeine      Past Medical History:   Diagnosis Date    Diabetes mellitus     Hypertension     Neuropathy     PE (pulmonary embolism)     Pulmonary embolism 1998     Past Surgical History:   Procedure Laterality Date    CYST REMOVAL      HERNIA REPAIR      HYSTERECTOMY      TONSILLECTOMY      TUBAL LIGATION       Family History   Problem Relation Age of Onset    Kidney disease  Mother     Kidney disease Father     Stroke Maternal Uncle      Social History   Substance Use Topics    Smoking status: Never Smoker    Smokeless tobacco: Never Used    Alcohol use No     Review of Systems         Constitutional : neg  HEENT neg  Resp neg  Cardiac  Pos for chest pain  GI neg   neg  Neuro pos for occipital headache  Heme/Immune: neg  Endo neg  Skin neg            Physical Exam     Initial Vitals [03/20/18 2226]   BP Pulse Resp Temp SpO2   (!) 213/119 78 20 98 °F (36.7 °C) 100 %      MAP       150.33         Physical Exam     general: anxious  VS: triage VS reviewed  HEENT: NC/AT, PERRL, EOMI  Neck: trachea midline  CV: RRR, no m/r/g, no LE pitting edema  Resp: CTAB  ABD:  ND, + normal BS, NT  Renal: No CVAT  Neuro: AAO x 3, 5/5 muscle strength in upper and lower extremities, sensation grossly intact, face symmetric, speech normal  Ext: no deformity, +2 symmetrical peripheral pulses        ED Course   Procedures  Labs Reviewed   CBC W/ AUTO DIFFERENTIAL - Abnormal; Notable for the following:        Result Value    MCH 25.8 (*)     MCHC 31.5 (*)     All other components within normal limits   COMPREHENSIVE METABOLIC PANEL - Abnormal; Notable for the following:     Sodium 134 (*)     Glucose 423 (*)     BUN, Bld 23 (*)     Total Protein 8.6 (*)     eGFR if  53.4 (*)     eGFR if non  46.3 (*)     All other components within normal limits   TROPONIN I - Abnormal; Notable for the following:     Troponin I 0.056 (*)     All other components within normal limits   TROPONIN I   B-TYPE NATRIURETIC PEPTIDE                   APC / Resident Notes:   Devika Bustillo is a 57 y.o. female with a pressure-like chest pain on the right side of the chest, neck pain, headaches, blurry vision.  Given the care of physicians for her symptoms and was concerned for MI, intracranial process, aortic dissection.  We will obtain a cardiac workup including chest x-ray and labs at this time.  We  will also give her 10 of IV labetalol.  This is a dissection, this will also help reduce her blood pressure.    Update 0452  All admission labs reviewed.  The chest x-ray and CT of the chest abdomen pelvis are unremarkable.  Her initial troponin was undetectable at less than 0.006.  Repeat troponin was obtained 4 hours later which bumped 0.056.  We will obtain a repeat EKG for comparison.    Update 0513  Repeat EKG is unchanged from EKG upon presentation.  She will be placed in observation to general medicine for cardiac workup and repeat troponins and 5-6 hours.                 Clinical Impression:   The primary encounter diagnosis was Chest pain. Diagnoses of Nonintractable headache, unspecified chronicity pattern, unspecified headache type, Hypertension, unspecified type, Thyroid nodule, and Elevated troponin were also pertinent to this visit.    Disposition:   Disposition: Placed in Observation  Dr Green         I have reviewed and concur with the resident's history, physical, assessment, and plan.  I have personally interviewed and examined the patient at bedside.  See below addendum for my evaluation and additional findings.  initial plan for labetalol for BP control, however, HR 58, given hydralazine 10 mg Iv. Ativan as the patient looks anxious and tylenol 1 g for headache. The rest of the ED stay: patient comfortable, sleeping. Initial trop neg, ekg no stemi/acute ischemic changes. Due to BP differential between arms, will obtain CTA r/o aortic dissection. CTA neg for dissection. 2nd trop positive. Admitted for HTN urgency/emergency               Vanessa Brown MD  03/21/18 1446

## 2018-03-21 NOTE — DISCHARGE SUMMARY
Discharge Summary  Hospital Medicine    Attending Provider on Discharge: Dewey Groves MD  Hospital Medicine Team: [facility]  Date of Admission:  3/20/2018     Date of Discharge:    Code status: Full Code    Active Hospital Problems    Diagnosis  POA    *Chest pain [R07.9]  Yes      Resolved Hospital Problems    Diagnosis Date Resolved POA   No resolved problems to display.        HPI: Ms. Bustillo is a 57F h/o HTN, HLD, Dm, remote PE (off anticoagulation since late 90s), presenting with neck / chest pain with shortness of breath, found to be in hypertensive urgency/emergency. Pt reports approx 8pm 3/20 noted to have gradual worsening of R sided neck pain described as sharp, radiation to L side, accompanied by headache. Pt reports has these episodes frequently and usually improves with ibuprofen however began to have blurry vision which prompted her to head to hospital for evaluation. En route to hospital patient noted chest pressure with shortness of breath, overall has improved since admit with pressure now more on R side of chest. Pt denies orthopnea, PND, LE edema. Reports no recent F/C/N/V.      Patient had been out of care of physician for some time recently started back on medications, has been taking HCTZ 25mg daily and lisinopril 10mg daily, has not taken insulin yet as had not picked up syringes.      In ED patient was given 1L Ns, noted to be hypertensive in 200s systolic, given hydralazine 10mg IV x1 with improvement, tylenol. Evaluated for dissection with CTA which was negative, repeat troponin mildly elevated, EKG NSR.       Hospital Course: Patient admitted to hospital medicine for chest pain, atypical in nature as described above, noted to be hypertensive (urgency vs emergency) given hydralazine 10mg with improvement in BP and symptoms, initial troponin negative however 2nd troponin 0.056, admitted for observation. Pt with improvement in symptoms after ibuprofen. 3rd troponin negative. EKG NSR.  ECHO unremarkable. Glucose elevated 400s on admit, patient had not picked up insulin syringes but has insulin at home and close PCP f/u, received detimir 10u with improvement to 300s, asymptomatic. Patient discharged in stable condition, increased lisinopril from 10mg to 40mg daily, rx provided upon discharge. Pt with close f/[facility] 1 week [facility]. Outpatient Stress ECHO ordered given patient intermediate risk (DM/HTN/HLD, atypical chest pain, last stress >5 yrs ago negative).       Recent Labs  Lab 03/21/18  0028   WBC 8.52   HGB 13.7   HCT 43.5          Recent Labs  Lab 03/21/18  0028   *   K 4.7   CL 98   CO2 24   BUN 23*   CREATININE 1.3   *   CALCIUM 10.2       Recent Labs  Lab 03/21/18  0028   ALKPHOS 126   ALT 24   AST 17   ALBUMIN 3.6   PROT 8.6*   BILITOT 0.3        Recent Labs  Lab 03/21/18  1446   POCTGLUCOSE 368*     Recent Labs      03/21/18   0028  03/21/18   0320  03/21/18   0911   TROPONINI  <0.006  0.056*  <0.006  <0.006       No results for input(s): LACTATE in the last 72 hours.     Procedures: None    Consultants: None     Medication List      CHANGE how you take these medications    gabapentin 300 MG capsule  Commonly known as:  NEURONTIN  What changed:  Another medication with the same name was removed. Continue taking this medication, and follow the directions you see here.     lisinopril 40 MG tablet  Commonly known as:  PRINIVIL,ZESTRIL  Take 1 tablet (40 mg total) by mouth once daily.  What changed:  Another medication with the same name was removed. Continue taking this medication, and follow the directions you see here.        CONTINUE taking these medications    hydroCHLOROthiazide 25 MG tablet  Commonly known as:  HYDRODIURIL     insulin glargine (TOUJEO) 300 unit/mL (1.5 mL) Inpn pen  Commonly known as:  TOUJEO     metFORMIN 1000 MG tablet  Commonly known as:  GLUCOPHAGE  Take 1 tablet (1,000 mg total) by mouth 2 (two) times daily with meals.     naproxen  375 MG tablet  Commonly known as:  NAPROSYN  Take 1 tablet (375 mg total) by mouth 2 (two) times daily with meals.     omega-3 acid ethyl esters 1 gram capsule  Commonly known as:  LOVAZA     pravastatin 40 MG tablet  Commonly known as:  PRAVACHOL        STOP taking these medications    furosemide 40 MG tablet  Commonly known as:  LASIX     GLIPIZIDE ORAL     NovoLIN 70/30 U-100 Insulin 100 unit/mL (70-30) injection  Generic drug:  insulin NPH-insulin regular (70/30)           Where to Get Your Medications      These medications were sent to [facility] [address] [place] [address] [address] [place] [address]  [address] [place] [address]    Phone:  [phone]   · lisinopril 40 MG tablet         Discharge Diet:diabetic diet: 2000 calorie with Normal Fluid intake of 1500 - 2000 mL per day    Activity: activity as tolerated    Discharge Condition: Good    Disposition: Home or Self Care    Tests pending at the time of discharge: none      Time spent  on the discharge of the patient including review of hospital course with the patient. reviewing discharge medications and arranging follow-up care 32    Discharge examination Patient was seen and examined on the date of discharge and determined to be suitable for discharge.    Discharge plan and follow up: F/u PCP 1 week, outpt stress echo (dobutamine)       No future appointments.     Dewey Groves MD  Department of Hospital Medicine  Pager: [phone]  Spectra: [phone]

## 2018-03-22 NOTE — PLAN OF CARE
03/22/18 0736   Final Note   Assessment Type Final Discharge Note   Discharge Disposition Home   Hospital Follow Up  Appt(s) scheduled? Yes

## 2018-04-18 ENCOUNTER — HOSPITAL ENCOUNTER (EMERGENCY)
Facility: HOSPITAL | Age: 58
Discharge: HOME | End: 2018-04-18
Attending: EMERGENCY MEDICINE
Payer: MEDICAID

## 2018-04-18 VITALS
WEIGHT: 230 LBS | DIASTOLIC BLOOD PRESSURE: 68 MMHG | HEIGHT: 68 IN | SYSTOLIC BLOOD PRESSURE: 142 MMHG | HEART RATE: 57 BPM | BODY MASS INDEX: 34.86 KG/M2 | TEMPERATURE: 98 F | OXYGEN SATURATION: 97 % | RESPIRATION RATE: 18 BRPM

## 2018-04-18 DIAGNOSIS — R51.9 NONINTRACTABLE HEADACHE, UNSPECIFIED CHRONICITY PATTERN, UNSPECIFIED HEADACHE TYPE: ICD-10-CM

## 2018-04-18 DIAGNOSIS — S13.4XXA WHIPLASH INJURY TO NECK, INITIAL ENCOUNTER: Primary | ICD-10-CM

## 2018-04-18 DIAGNOSIS — M54.50 ACUTE LEFT-SIDED LOW BACK PAIN WITHOUT SCIATICA: ICD-10-CM

## 2018-04-18 DIAGNOSIS — R07.9 CHEST PAIN: ICD-10-CM

## 2018-04-18 DIAGNOSIS — R10.9 ABDOMINAL PAIN, UNSPECIFIED ABDOMINAL LOCATION: ICD-10-CM

## 2018-04-18 LAB
ALBUMIN SERPL BCP-MCNC: 3 G/DL
ALP SERPL-CCNC: 84 U/L
ALT SERPL W/O P-5'-P-CCNC: 20 U/L
ANION GAP SERPL CALC-SCNC: 10 MMOL/L
AST SERPL-CCNC: 20 U/L
BACTERIA #/AREA URNS HPF: NORMAL /HPF
BASOPHILS # BLD AUTO: 0.02 K/UL
BASOPHILS NFR BLD: 0.3 %
BILIRUB SERPL-MCNC: 0.2 MG/DL
BILIRUB UR QL STRIP: NEGATIVE
BNP SERPL-MCNC: 26 PG/ML
BUN SERPL-MCNC: 17 MG/DL
CALCIUM SERPL-MCNC: 9 MG/DL
CHLORIDE SERPL-SCNC: 104 MMOL/L
CLARITY UR: CLEAR
CO2 SERPL-SCNC: 22 MMOL/L
COLOR UR: YELLOW
CREAT SERPL-MCNC: 0.9 MG/DL
DIFFERENTIAL METHOD: ABNORMAL
EOSINOPHIL # BLD AUTO: 0.1 K/UL
EOSINOPHIL NFR BLD: 1.4 %
ERYTHROCYTE [DISTWIDTH] IN BLOOD BY AUTOMATED COUNT: 12.9 %
EST. GFR  (AFRICAN AMERICAN): >60 ML/MIN/1.73 M^2
EST. GFR  (NON AFRICAN AMERICAN): >60 ML/MIN/1.73 M^2
GLUCOSE SERPL-MCNC: 315 MG/DL
GLUCOSE UR QL STRIP: ABNORMAL
HCT VFR BLD AUTO: 34.6 %
HGB BLD-MCNC: 11.4 G/DL
HGB UR QL STRIP: NEGATIVE
KETONES UR QL STRIP: NEGATIVE
LEUKOCYTE ESTERASE UR QL STRIP: NEGATIVE
LIPASE SERPL-CCNC: 25 U/L
LYMPHOCYTES # BLD AUTO: 3.4 K/UL
LYMPHOCYTES NFR BLD: 44.4 %
MCH RBC QN AUTO: 26.1 PG
MCHC RBC AUTO-ENTMCNC: 32.9 G/DL
MCV RBC AUTO: 79 FL
MICROSCOPIC COMMENT: NORMAL
MONOCYTES # BLD AUTO: 0.5 K/UL
MONOCYTES NFR BLD: 6.3 %
NEUTROPHILS # BLD AUTO: 3.7 K/UL
NEUTROPHILS NFR BLD: 47.3 %
NITRITE UR QL STRIP: NEGATIVE
PH UR STRIP: 5 [PH] (ref 5–8)
PLATELET # BLD AUTO: 235 K/UL
PMV BLD AUTO: 10.7 FL
POTASSIUM SERPL-SCNC: 4.5 MMOL/L
PROT SERPL-MCNC: 6.9 G/DL
PROT UR QL STRIP: NEGATIVE
RBC # BLD AUTO: 4.36 M/UL
SODIUM SERPL-SCNC: 136 MMOL/L
SP GR UR STRIP: >1.03 (ref 1–1.03)
TROPONIN I SERPL DL<=0.01 NG/ML-MCNC: <0.006 NG/ML
URN SPEC COLLECT METH UR: ABNORMAL
UROBILINOGEN UR STRIP-ACNC: NEGATIVE EU/DL
WBC # BLD AUTO: 7.72 K/UL
YEAST URNS QL MICRO: NORMAL

## 2018-04-18 PROCEDURE — 63600175 PHARM REV CODE 636 W HCPCS: Performed by: EMERGENCY MEDICINE

## 2018-04-18 PROCEDURE — 85025 COMPLETE CBC W/AUTO DIFF WBC: CPT

## 2018-04-18 PROCEDURE — 81000 URINALYSIS NONAUTO W/SCOPE: CPT

## 2018-04-18 PROCEDURE — 96372 THER/PROPH/DIAG INJ SC/IM: CPT

## 2018-04-18 PROCEDURE — 93005 ELECTROCARDIOGRAM TRACING: CPT

## 2018-04-18 PROCEDURE — 93010 ELECTROCARDIOGRAM REPORT: CPT | Mod: ,,, | Performed by: INTERNAL MEDICINE

## 2018-04-18 PROCEDURE — 83880 ASSAY OF NATRIURETIC PEPTIDE: CPT

## 2018-04-18 PROCEDURE — 83690 ASSAY OF LIPASE: CPT

## 2018-04-18 PROCEDURE — 87086 URINE CULTURE/COLONY COUNT: CPT

## 2018-04-18 PROCEDURE — 25000003 PHARM REV CODE 250: Performed by: EMERGENCY MEDICINE

## 2018-04-18 PROCEDURE — 80053 COMPREHEN METABOLIC PANEL: CPT

## 2018-04-18 PROCEDURE — 99284 EMERGENCY DEPT VISIT MOD MDM: CPT | Mod: 25

## 2018-04-18 PROCEDURE — 84484 ASSAY OF TROPONIN QUANT: CPT

## 2018-04-18 RX ORDER — ONDANSETRON 4 MG/1
4 TABLET, ORALLY DISINTEGRATING ORAL EVERY 4 HOURS PRN
Qty: 20 TABLET | Refills: 0 | Status: SHIPPED | OUTPATIENT
Start: 2018-04-18 | End: 2019-04-14

## 2018-04-18 RX ORDER — ASPIRIN 325 MG
325 TABLET ORAL
Status: COMPLETED | OUTPATIENT
Start: 2018-04-18 | End: 2018-04-18

## 2018-04-18 RX ORDER — METOCLOPRAMIDE HYDROCHLORIDE 5 MG/ML
10 INJECTION INTRAMUSCULAR; INTRAVENOUS
Status: COMPLETED | OUTPATIENT
Start: 2018-04-18 | End: 2018-04-18

## 2018-04-18 RX ORDER — KETOROLAC TROMETHAMINE 10 MG/1
10 TABLET, FILM COATED ORAL EVERY 6 HOURS PRN
Qty: 20 TABLET | Refills: 0 | Status: SHIPPED | OUTPATIENT
Start: 2018-04-18 | End: 2018-09-07

## 2018-04-18 RX ORDER — KETOROLAC TROMETHAMINE 30 MG/ML
15 INJECTION, SOLUTION INTRAMUSCULAR; INTRAVENOUS
Status: DISCONTINUED | OUTPATIENT
Start: 2018-04-18 | End: 2018-04-18

## 2018-04-18 RX ORDER — KETOROLAC TROMETHAMINE 30 MG/ML
60 INJECTION, SOLUTION INTRAMUSCULAR; INTRAVENOUS
Status: COMPLETED | OUTPATIENT
Start: 2018-04-18 | End: 2018-04-18

## 2018-04-18 RX ADMIN — ASPIRIN 325 MG ORAL TABLET 325 MG: 325 PILL ORAL at 03:04

## 2018-04-18 RX ADMIN — KETOROLAC TROMETHAMINE 60 MG: 30 INJECTION, SOLUTION INTRAMUSCULAR at 04:04

## 2018-04-18 RX ADMIN — METOCLOPRAMIDE 10 MG: 5 INJECTION, SOLUTION INTRAMUSCULAR; INTRAVENOUS at 06:04
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
## 2018-04-20 LAB — BACTERIA UR CULT: NO GROWTH

## 2018-04-22 ENCOUNTER — HOSPITAL ENCOUNTER (EMERGENCY)
Facility: OTHER | Age: 58
Discharge: HOME | End: 2018-04-22
Attending: EMERGENCY MEDICINE
Payer: MEDICAID

## 2018-04-22 VITALS
WEIGHT: 235.69 LBS | SYSTOLIC BLOOD PRESSURE: 171 MMHG | HEIGHT: 68 IN | TEMPERATURE: 99 F | BODY MASS INDEX: 35.72 KG/M2 | OXYGEN SATURATION: 98 % | HEART RATE: 74 BPM | RESPIRATION RATE: 17 BRPM | DIASTOLIC BLOOD PRESSURE: 67 MMHG

## 2018-04-22 DIAGNOSIS — R60.9 EDEMA: ICD-10-CM

## 2018-04-22 DIAGNOSIS — R60.0 BILATERAL LEG EDEMA: Primary | ICD-10-CM

## 2018-04-22 LAB
ALBUMIN SERPL BCP-MCNC: 3.5 G/DL
ALP SERPL-CCNC: 100 U/L
ALT SERPL W/O P-5'-P-CCNC: 25 U/L
ANION GAP SERPL CALC-SCNC: 12 MMOL/L
AST SERPL-CCNC: 19 U/L
BASOPHILS # BLD AUTO: 0.02 K/UL
BASOPHILS NFR BLD: 0.2 %
BILIRUB SERPL-MCNC: 0.2 MG/DL
BNP SERPL-MCNC: 14 PG/ML
BUN SERPL-MCNC: 21 MG/DL
CALCIUM SERPL-MCNC: 9.7 MG/DL
CHLORIDE SERPL-SCNC: 99 MMOL/L
CO2 SERPL-SCNC: 25 MMOL/L
CREAT SERPL-MCNC: 1.1 MG/DL
DIFFERENTIAL METHOD: ABNORMAL
EOSINOPHIL # BLD AUTO: 0.1 K/UL
EOSINOPHIL NFR BLD: 1.1 %
ERYTHROCYTE [DISTWIDTH] IN BLOOD BY AUTOMATED COUNT: 13 %
EST. GFR  (AFRICAN AMERICAN): >60 ML/MIN/1.73 M^2
EST. GFR  (NON AFRICAN AMERICAN): 57 ML/MIN/1.73 M^2
GLUCOSE SERPL-MCNC: 365 MG/DL
HCT VFR BLD AUTO: 36.9 %
HGB BLD-MCNC: 12.3 G/DL
LYMPHOCYTES # BLD AUTO: 3.8 K/UL
LYMPHOCYTES NFR BLD: 42.8 %
MCH RBC QN AUTO: 26.8 PG
MCHC RBC AUTO-ENTMCNC: 33.3 G/DL
MCV RBC AUTO: 80 FL
MONOCYTES # BLD AUTO: 0.6 K/UL
MONOCYTES NFR BLD: 6.3 %
NEUTROPHILS # BLD AUTO: 4.4 K/UL
NEUTROPHILS NFR BLD: 49.5 %
PLATELET # BLD AUTO: 266 K/UL
PMV BLD AUTO: 10.1 FL
POTASSIUM SERPL-SCNC: 4.4 MMOL/L
PROT SERPL-MCNC: 7.9 G/DL
RBC # BLD AUTO: 4.59 M/UL
SODIUM SERPL-SCNC: 136 MMOL/L
WBC # BLD AUTO: 8.88 K/UL

## 2018-04-22 PROCEDURE — 63600175 PHARM REV CODE 636 W HCPCS: Performed by: PHYSICIAN ASSISTANT

## 2018-04-22 PROCEDURE — 80053 COMPREHEN METABOLIC PANEL: CPT

## 2018-04-22 PROCEDURE — 96374 THER/PROPH/DIAG INJ IV PUSH: CPT

## 2018-04-22 PROCEDURE — 83880 ASSAY OF NATRIURETIC PEPTIDE: CPT

## 2018-04-22 PROCEDURE — 85025 COMPLETE CBC W/AUTO DIFF WBC: CPT

## 2018-04-22 PROCEDURE — 99284 EMERGENCY DEPT VISIT MOD MDM: CPT | Mod: 25

## 2018-04-22 RX ORDER — FUROSEMIDE 40 MG/1
40 TABLET ORAL 2 TIMES DAILY
Qty: 42 TABLET | Refills: 0 | Status: SHIPPED | OUTPATIENT
Start: 2018-04-22 | End: 2018-07-17

## 2018-04-22 RX ORDER — FUROSEMIDE 40 MG/1
40 TABLET ORAL 2 TIMES DAILY
COMMUNITY
End: 2018-04-22

## 2018-04-22 RX ORDER — FUROSEMIDE 10 MG/ML
40 INJECTION INTRAMUSCULAR; INTRAVENOUS
Status: COMPLETED | OUTPATIENT
Start: 2018-04-22 | End: 2018-04-22

## 2018-04-22 RX ADMIN — FUROSEMIDE 40 MG: 10 INJECTION, SOLUTION INTRAVENOUS at 08:04

## 2018-04-23 NOTE — ED PROVIDER NOTES
Encounter Date: 4/22/2018       History     Chief Complaint   Patient presents with    Leg Swelling     2+ pitting edema to BLE x 1 week. Out of lasix x 2 weeks. Denies chest pain or SOB.      Patient is a 57 y.o. female with a past medical history of hypertension, presenting to the emergency department with complaints of lower extremity swelling.  The patient states that she typically takes Lasix for her hypertension.  She states she's been out for the past 2 weeks as she has been unable to see her primary care provider in almost one year.  She reports that over the past week she's noticed increased swelling in both of her legs.  She denies any shortness of breath or chest pain.  She states that she has been trying to work out but is not helped her symptoms.  She denies any other complaints.  She denies orthopnea.  She denies a history of CHF.      The history is provided by the patient.     Review of patient's allergies indicates:   Allergen Reactions    Codeine      Past Medical History:   Diagnosis Date    Diabetes mellitus     Hypertension     Neuropathy     PE (pulmonary embolism)     Pulmonary embolism 1998     Past Surgical History:   Procedure Laterality Date    CYST REMOVAL      HERNIA REPAIR      HYSTERECTOMY      TONSILLECTOMY      TUBAL LIGATION       Family History   Problem Relation Age of Onset    Kidney disease Mother     Kidney disease Father     Stroke Maternal Uncle      Social History   Substance Use Topics    Smoking status: Never Smoker    Smokeless tobacco: Never Used    Alcohol use No     Review of Systems   Constitutional: Negative for activity change, appetite change, chills, fatigue and fever.   HENT: Negative for congestion, rhinorrhea and sore throat.    Eyes: Negative for photophobia and visual disturbance.   Respiratory: Negative for cough, shortness of breath and wheezing.    Cardiovascular: Positive for leg swelling. Negative for chest pain.   Gastrointestinal:  Negative for abdominal pain, diarrhea, nausea and vomiting.   Genitourinary: Negative for dysuria, hematuria and urgency.   Musculoskeletal: Negative for back pain, myalgias and neck pain.   Skin: Negative for color change and wound.   Neurological: Negative for weakness and headaches.   Psychiatric/Behavioral: Negative for agitation and confusion.       Physical Exam     Initial Vitals [04/22/18 1814]   BP Pulse Resp Temp SpO2   (!) 176/78 70 14 98.4 °F (36.9 °C) 99 %      MAP       110.67         Physical Exam    Nursing note and vitals reviewed.  Constitutional: She appears well-developed and well-nourished. She is not diaphoretic. She is cooperative.  Non-toxic appearance. She does not have a sickly appearance. She does not appear ill. No distress.   Well appearing, obese, -American female accompanied by another female me emergency department.  Speaking clear and full sentences.  No acute distress.   HENT:   Head: Normocephalic and atraumatic.   Right Ear: External ear normal.   Left Ear: External ear normal.   Nose: Nose normal.   Mouth/Throat: Oropharynx is clear and moist.   Eyes: Conjunctivae and EOM are normal.   Neck: Normal range of motion. Neck supple.   Cardiovascular: Normal rate, regular rhythm and normal heart sounds.   Pulmonary/Chest: Breath sounds normal. No respiratory distress. She has no wheezes.   Musculoskeletal: Normal range of motion.   2+ pitting edema to bilateral lower extremities up to the mid calf with no erythema or warmth.   Neurological: She is alert and oriented to person, place, and time.   Skin: Skin is warm.   Psychiatric: She has a normal mood and affect. Her behavior is normal. Judgment and thought content normal.         ED Course   Procedures  Labs Reviewed   CBC W/ AUTO DIFFERENTIAL - Abnormal; Notable for the following:        Result Value    Hematocrit 36.9 (*)     MCV 80 (*)     MCH 26.8 (*)     All other components within normal limits   COMPREHENSIVE METABOLIC  PANEL - Abnormal; Notable for the following:     Glucose 365 (*)     BUN, Bld 21 (*)     eGFR if non  57 (*)     All other components within normal limits   B-TYPE NATRIURETIC PEPTIDE        Imaging Results          X-Ray Chest PA And Lateral (Final result)  Result time 04/22/18 19:48:48    Final result by Preston Hitchcock MD (04/22/18 19:48:48)                 Impression:      1. No acute cardiopulmonary process, stable chronic findings.      Electronically signed by: Preston Hitchcock MD  Date:    04/22/2018  Time:    19:48             Narrative:    EXAMINATION:  XR CHEST PA AND LATERAL    CLINICAL HISTORY:  Edema, unspecified    TECHNIQUE:  PA and lateral views of the chest were performed.    COMPARISON:  04/18/2018    FINDINGS:  The cardiomediastinal silhouette is not enlarged.  There is no pleural effusion.  The trachea is midline.  The lungs are symmetrically expanded bilaterally with minimally coarse interstitial attenuation.  No large focal consolidation seen.  There is no pneumothorax.  The osseous structures are remarkable for degenerative changes..                               X-Rays:   Independently Interpreted Readings:   Chest X-Ray: Normal heart size.  No infiltrates.  No acute abnormalities.     Medical Decision Making:   Initial Assessment:   Urgent evaluation of a 57-year-old female past medical history of hypertension, presenting with complaints of bilateral lower extremity edema.  Patient is afebrile, nontoxic appearing, hemodynamically stable.  Physical exam reveals 2+ pitting edema to bilateral lower extremities, lungs are clear to auscultation bilaterally.  Will plan for labs including BNP and chest x-ray.  Will also administer IV Lasix.  Independently Interpreted Test(s):   I have ordered and independently interpreted X-rays - see prior notes.  Clinical Tests:   Lab Tests: Ordered and Reviewed  The following lab test(s) were unremarkable: CBC, BMP and BNP  Radiological Study:  Ordered and Reviewed  ED Management:  Chest x-ray shows no significant findings.  CBC shows no leukocytosis, H&H is 12.3/36.9.  CMP and BNP showed no significant abnormalities.  At this time, do not feel that further testing or imaging is warranted.  Patient will be given a refill of her Lasix for 3 weeks, I did talk with her the absolute importance of obtaining close follow-up with PCP. The patient was instructed to follow up with a primary care provider in 2 days or to return to the emergency department for worsening symptoms. The treatment plan was discussed with the patient who demonstrated understanding and comfort with plan. The patient's history, physical exam, and plan of care was discussed with and agreed upon with my supervising physician.    Other:   I have discussed this case with another health care provider.       <> Summary of the Discussion: Dr. Weldon  This note was created using Dragon Medical Dictation. There may be typographical errors secondary to dictation.                       Clinical Impression:     1. Bilateral leg edema    2. Edema       Disposition:   Disposition: Discharged  Condition: Stable                        Kamilla Weller PA-C  04/22/18 [id number]

## 2018-04-23 NOTE — ED TRIAGE NOTES
"Pt presents to the ED with c/o bilateral leg edema x 1.5 weeks. Pt states it hurts when she walks. Pt has a hx of HTN and DM. Pt prescribed 40 mg of Lasix 2x a day and has been out for aprox 2 weeks. Pt reports not taking any of her medications today stating "I came straight here from [facility]".   "

## 2018-07-17 ENCOUNTER — HOSPITAL ENCOUNTER (EMERGENCY)
Facility: OTHER | Age: 58
Discharge: HOME | End: 2018-07-17
Attending: EMERGENCY MEDICINE
Payer: MEDICAID

## 2018-07-17 VITALS
HEART RATE: 68 BPM | WEIGHT: 232.56 LBS | TEMPERATURE: 99 F | SYSTOLIC BLOOD PRESSURE: 150 MMHG | DIASTOLIC BLOOD PRESSURE: 68 MMHG | HEIGHT: 68 IN | BODY MASS INDEX: 35.25 KG/M2 | OXYGEN SATURATION: 95 % | RESPIRATION RATE: 16 BRPM

## 2018-07-17 DIAGNOSIS — R73.9 HYPERGLYCEMIA: Primary | ICD-10-CM

## 2018-07-17 DIAGNOSIS — R42 DIZZINESS: ICD-10-CM

## 2018-07-17 DIAGNOSIS — R06.00 DYSPNEA: ICD-10-CM

## 2018-07-17 LAB
ALBUMIN SERPL BCP-MCNC: 3.2 G/DL
ALP SERPL-CCNC: 106 U/L
ALT SERPL W/O P-5'-P-CCNC: 30 U/L
ANION GAP SERPL CALC-SCNC: 8 MMOL/L
AST SERPL-CCNC: 20 U/L
BACTERIA #/AREA URNS HPF: NORMAL /HPF
BASOPHILS # BLD AUTO: 0.03 K/UL
BASOPHILS NFR BLD: 0.4 %
BILIRUB SERPL-MCNC: 0.2 MG/DL
BILIRUB UR QL STRIP: NEGATIVE
BNP SERPL-MCNC: 14 PG/ML
BUN SERPL-MCNC: 36 MG/DL
CALCIUM SERPL-MCNC: 9.6 MG/DL
CHLORIDE SERPL-SCNC: 100 MMOL/L
CLARITY UR: CLEAR
CO2 SERPL-SCNC: 26 MMOL/L
COLOR UR: YELLOW
CREAT SERPL-MCNC: 1.2 MG/DL
DIFFERENTIAL METHOD: ABNORMAL
EOSINOPHIL # BLD AUTO: 0.1 K/UL
EOSINOPHIL NFR BLD: 1 %
ERYTHROCYTE [DISTWIDTH] IN BLOOD BY AUTOMATED COUNT: 12.8 %
EST. GFR  (AFRICAN AMERICAN): 58 ML/MIN/1.73 M^2
EST. GFR  (NON AFRICAN AMERICAN): 51 ML/MIN/1.73 M^2
GLUCOSE SERPL-MCNC: 398 MG/DL
GLUCOSE UR QL STRIP: ABNORMAL
HCT VFR BLD AUTO: 35 %
HGB BLD-MCNC: 11.4 G/DL
HGB UR QL STRIP: NEGATIVE
KETONES UR QL STRIP: NEGATIVE
LEUKOCYTE ESTERASE UR QL STRIP: NEGATIVE
LYMPHOCYTES # BLD AUTO: 3.2 K/UL
LYMPHOCYTES NFR BLD: 39.9 %
MCH RBC QN AUTO: 26.1 PG
MCHC RBC AUTO-ENTMCNC: 32.6 G/DL
MCV RBC AUTO: 80 FL
MICROSCOPIC COMMENT: NORMAL
MONOCYTES # BLD AUTO: 0.4 K/UL
MONOCYTES NFR BLD: 5.2 %
NEUTROPHILS # BLD AUTO: 4.2 K/UL
NEUTROPHILS NFR BLD: 53.1 %
NITRITE UR QL STRIP: NEGATIVE
PH UR STRIP: 6 [PH] (ref 5–8)
PLATELET # BLD AUTO: 341 K/UL
PMV BLD AUTO: 9.8 FL
POCT GLUCOSE: 335 MG/DL (ref 70–110)
POTASSIUM SERPL-SCNC: 4.5 MMOL/L
PROT SERPL-MCNC: 7.6 G/DL
PROT UR QL STRIP: NEGATIVE
RBC # BLD AUTO: 4.36 M/UL
SODIUM SERPL-SCNC: 134 MMOL/L
SP GR UR STRIP: 1.01 (ref 1–1.03)
SQUAMOUS #/AREA URNS HPF: 1 /HPF
TSH SERPL DL<=0.005 MIU/L-ACNC: 0.66 UIU/ML
URN SPEC COLLECT METH UR: ABNORMAL
UROBILINOGEN UR STRIP-ACNC: NEGATIVE EU/DL
WBC # BLD AUTO: 7.96 K/UL
WBC #/AREA URNS HPF: NORMAL /HPF
YEAST URNS QL MICRO: NORMAL

## 2018-07-17 PROCEDURE — 84443 ASSAY THYROID STIM HORMONE: CPT

## 2018-07-17 PROCEDURE — 82962 GLUCOSE BLOOD TEST: CPT

## 2018-07-17 PROCEDURE — 80053 COMPREHEN METABOLIC PANEL: CPT

## 2018-07-17 PROCEDURE — 63600175 PHARM REV CODE 636 W HCPCS: Performed by: EMERGENCY MEDICINE

## 2018-07-17 PROCEDURE — 85025 COMPLETE CBC W/AUTO DIFF WBC: CPT

## 2018-07-17 PROCEDURE — 96360 HYDRATION IV INFUSION INIT: CPT

## 2018-07-17 PROCEDURE — 81000 URINALYSIS NONAUTO W/SCOPE: CPT

## 2018-07-17 PROCEDURE — 83880 ASSAY OF NATRIURETIC PEPTIDE: CPT

## 2018-07-17 PROCEDURE — 25000003 PHARM REV CODE 250: Performed by: EMERGENCY MEDICINE

## 2018-07-17 PROCEDURE — 96372 THER/PROPH/DIAG INJ SC/IM: CPT | Mod: 59

## 2018-07-17 PROCEDURE — 93010 ELECTROCARDIOGRAM REPORT: CPT | Mod: ,,, | Performed by: INTERNAL MEDICINE

## 2018-07-17 PROCEDURE — 99284 EMERGENCY DEPT VISIT MOD MDM: CPT | Mod: 25

## 2018-07-17 RX ORDER — MECLIZINE HYDROCHLORIDE 25 MG/1
25 TABLET ORAL
Status: COMPLETED | OUTPATIENT
Start: 2018-07-17 | End: 2018-07-17

## 2018-07-17 RX ORDER — ALPRAZOLAM 0.5 MG/1
0.5 TABLET ORAL
Status: COMPLETED | OUTPATIENT
Start: 2018-07-17 | End: 2018-07-17

## 2018-07-17 RX ORDER — FUROSEMIDE 40 MG/1
40 TABLET ORAL DAILY
Qty: 21 TABLET | Refills: 0 | OUTPATIENT
Start: 2018-07-17 | End: 2018-11-29

## 2018-07-17 RX ORDER — SODIUM CHLORIDE 9 MG/ML
500 INJECTION, SOLUTION INTRAVENOUS
Status: COMPLETED | OUTPATIENT
Start: 2018-07-17 | End: 2018-07-17

## 2018-07-17 RX ADMIN — SODIUM CHLORIDE 500 ML: 0.9 INJECTION, SOLUTION INTRAVENOUS at 05:07

## 2018-07-17 RX ADMIN — ALPRAZOLAM 0.5 MG: 0.5 TABLET ORAL at 03:07

## 2018-07-17 RX ADMIN — MECLIZINE HYDROCHLORIDE 25 MG: 25 TABLET ORAL at 03:07

## 2018-07-17 RX ADMIN — INSULIN HUMAN 6 UNITS: 100 INJECTION, SOLUTION PARENTERAL at 05:07

## 2018-07-17 NOTE — ED NOTES
Pt resting quietly on stretcher with eyes closed. Awakens easily to nurse entering room. Resp even and unlabored. Side rails raised x 2. Call light within reach. Bed low and locked. NAD. Will cont to monitor.

## 2018-07-17 NOTE — ED PROVIDER NOTES
"Encounter Date: 7/17/2018    SCRIBE #1 NOTE: I, Hermelindo West, am scribing for, and in the presence of, Dr. Fatima.       History     Chief Complaint   Patient presents with    Dizziness     Pt c/o bilateral lower extremity & hand swelling with SOB X 2 weeks. Pt also c/o dizziness which has caused pt to stumble  X 2 weeks. Pt describes spinning sensation.     Seen by provider: 2:30 PM    Patient is a 58 y.o. female with a history of HTN, DM, and distant PE who presents to the ED with complaint of intermittent dizziness for the past week and a half. She describes dizziness as "room spinning sensation" and states she feels off-balance when dizziness occurs. Dizziness typically occurs while "walking, moving around, and doing things," however she currently feels dizzy in the ED bed. She reports mild associated nausea as well as chronic intermittent headaches. She denies near-syncopal sensation. She denies preceding illness or trauma prior to onset of dizziness. She reports experiencing dizziness in the past "a long time ago," but states she was never prescribed medications for dizziness.     Patient also complains of intermittent bilateral leg swelling for the last month. She states she ran out of her lasix, which she was taking BID, about one month ago. She reports swelling is worse after driving or at the end of the day, and notes she works as a . She also states her abdomen feels swollen.    Patient also reports intermittent shortness of breath, which she states occurs either while sitting or walking. She recently began to exercise again after a one year hiatus. She reports experiencing shortness of breath the two time she exercised recently. She denies fever, cough, or chest pain.    Patient also complains of a dry mouth and lips for about one month. She states she drinks a lot of water. She denies decreased appetite, but states she is "trying not to eat as much" in order to lose weight.     Patient " "also complains of anxiety and depression. She reports that her son committed suicide one year ago. She states she sees a psychiatrist at mental health clinic, but is not on medication for anxiety. She states she was prescribed medication for sleep, but does not take this.       The history is provided by the patient.     Review of patient's allergies indicates:   Allergen Reactions    Codeine      Past Medical History:   Diagnosis Date    Diabetes mellitus     Hypertension     Neuropathy     PE (pulmonary embolism)     Pulmonary embolism 1998     Past Surgical History:   Procedure Laterality Date    CYST REMOVAL      HERNIA REPAIR      HYSTERECTOMY      TONSILLECTOMY      TUBAL LIGATION       Family History   Problem Relation Age of Onset    Kidney disease Mother     Kidney disease Father     Stroke Maternal Uncle      Social History   Substance Use Topics    Smoking status: Never Smoker    Smokeless tobacco: Never Used    Alcohol use No     Review of Systems   Constitutional: Negative for chills and fever.   HENT:        Positive for dry mouth and lips.   Eyes: Negative for visual disturbance.   Respiratory: Positive for shortness of breath. Negative for cough.    Cardiovascular: Positive for leg swelling. Negative for chest pain.   Gastrointestinal: Positive for abdominal distention ("feels swollen") and nausea. Negative for abdominal pain and vomiting.   Genitourinary: Negative for difficulty urinating, dysuria and hematuria.   Musculoskeletal: Negative for back pain.   Skin: Negative for rash.   Neurological: Positive for dizziness and headaches. Negative for weakness, light-headedness and numbness.   Psychiatric/Behavioral: Negative for confusion. The patient is nervous/anxious.        Physical Exam     Initial Vitals [07/17/18 1338]   BP Pulse Resp Temp SpO2   (!) 172/82 78 18 98 °F (36.7 °C) 98 %      MAP       --         Physical Exam    Nursing note and vitals reviewed.  Constitutional: She " appears well-developed and well-nourished. She is not diaphoretic. No distress.   HENT:   Head: Normocephalic and atraumatic.   Eyes: Conjunctivae and EOM are normal. Pupils are equal, round, and reactive to light.   Neck: Normal range of motion. Neck supple.   Cardiovascular: Normal rate, regular rhythm, normal heart sounds and normal pulses.   No murmur heard.  Pulmonary/Chest: Breath sounds normal. No respiratory distress. She has no wheezes. She has no rhonchi. She has no rales.   Abdominal: Soft. There is no tenderness. There is no rebound and no guarding.   Musculoskeletal: Normal range of motion. She exhibits edema (2+ bilateral lower extremity edema).   Neurological: She is alert and oriented to person, place, and time. She has normal strength. No cranial nerve deficit.   Skin: Skin is warm and dry.   Psychiatric: Her behavior is normal. Thought content normal. Her mood appears anxious.   Tearful.         ED Course   Procedures  Labs Reviewed   CBC W/ AUTO DIFFERENTIAL - Abnormal; Notable for the following:        Result Value    Hemoglobin 11.4 (*)     Hematocrit 35.0 (*)     MCV 80 (*)     MCH 26.1 (*)     All other components within normal limits   COMPREHENSIVE METABOLIC PANEL - Abnormal; Notable for the following:     Sodium 134 (*)     Glucose 398 (*)     BUN, Bld 36 (*)     Albumin 3.2 (*)     eGFR if  58 (*)     eGFR if non  51 (*)     All other components within normal limits   URINALYSIS - Abnormal; Notable for the following:     Glucose, UA 3+ (*)     All other components within normal limits   POCT GLUCOSE - Abnormal; Notable for the following:     POCT Glucose 335 (*)     All other components within normal limits   B-TYPE NATRIURETIC PEPTIDE   TSH   URINALYSIS MICROSCOPIC     EKG Readings: (Independently Interpreted)   Normal sinus rhythm. Rate of 76. No acute ischemia. No change from previous.       Imaging Results          X-Ray Chest PA And Lateral (Final  result)  Result time 07/17/18 15:08:24    Final result by Art Tavarez MD (07/17/18 15:08:24)                 Impression:      No evidence of acute cardiopulmonary disease.      Electronically signed by: Art Tavarez MD  Date:    07/17/2018  Time:    15:08             Narrative:    EXAMINATION:  XR CHEST PA AND LATERAL    CLINICAL HISTORY:  Dyspnea, unspecified    TECHNIQUE:  PA and lateral views of the chest were performed.    COMPARISON:  04/22/2018    FINDINGS:  Multiple overlying cardiac monitoring leads. The cardiomediastinal silhouette is normal in size and midline. Pulmonary vascularity appears within normal limits.    The lungs appear clear without confluent pulmonary parenchymal opacity. No pleural fluid.    Osseous structures appear intact.                                 Medical Decision Making:   Initial Assessment:       58F with DM, HTN, distant PE presents with multiple complaints. 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 No unilateral edema to suggest DVT, no sign cellulitis. She also has intermittent SOB; BNP normal and no sign CHF on CXR. She had admission earlier this year for CP with normal Echo and workup. No hypoxia or current SOB to suggest PE, no CP or EKG changes now to suggest ACS.     She also c/o dizziness described as vertigo, worse with standing or movement, with mild nausea. No associated URI sx. Neuro intact with normal gait in ED, no sign CVA. Also with chronic HA which was been worked up in the past with normal imaging. Labs checked and with glucose 398 with no sign DKA. She admits to non-compliance with insulin x months, which likely lead to chronic dehydration, possible orthostatic component to dizziness in addition to vertigo. After IVF and subQ insulin FS improved, patient educated on importance of compliance with insulin and better DM control. This is also likely cause of dry mouth.     She is seeing  psychiatrist and starting therapy for anxiety/depression related to anniversary of her son's death, no SI or indication for PEC. This is likely contributing to her complaints and non-compliance with insulin.   After extensive discussion about workup results, she feels comfortable with discharge and close PCP f/u. She will wear compression stockings and elevate legs for edema, restart Lasix (at QD not BID) only when FS improved and she is no longer dehydrated. Advised to return to ED for any worsening symptoms or other concerns.      Independently Interpreted Test(s):   I have ordered and independently interpreted X-rays - see prior notes.  I have ordered and independently interpreted EKG Reading(s) - see prior notes  Clinical Tests:   Lab Tests: Ordered and Reviewed  Radiological Study: Reviewed and Ordered  Medical Tests: Ordered and Reviewed            Scribe Attestation:   Scribe #1: I performed the above scribed service and the documentation accurately describes the services I performed. I attest to the accuracy of the note.    Attending Attestation:           Physician Attestation for Scribe:  Physician Attestation Statement for Scribe #1: I, Dr. Fatima, reviewed documentation, as scribed by Hermelindo West in my presence, and it is both accurate and complete.                    Clinical Impression:     1. Hyperglycemia    2. Dyspnea    3. Dizziness                                Jcarlos Fatima MD  07/18/18 0033

## 2018-09-07 ENCOUNTER — HOSPITAL ENCOUNTER (EMERGENCY)
Facility: HOSPITAL | Age: 58
Discharge: HOME | End: 2018-09-07
Attending: EMERGENCY MEDICINE
Payer: MEDICAID

## 2018-09-07 VITALS
HEART RATE: 62 BPM | SYSTOLIC BLOOD PRESSURE: 172 MMHG | TEMPERATURE: 97 F | RESPIRATION RATE: 18 BRPM | DIASTOLIC BLOOD PRESSURE: 79 MMHG | BODY MASS INDEX: 34.97 KG/M2 | OXYGEN SATURATION: 99 % | WEIGHT: 230 LBS

## 2018-09-07 DIAGNOSIS — R51.9 NONINTRACTABLE HEADACHE, UNSPECIFIED CHRONICITY PATTERN, UNSPECIFIED HEADACHE TYPE: Primary | ICD-10-CM

## 2018-09-07 PROCEDURE — 99284 EMERGENCY DEPT VISIT MOD MDM: CPT | Mod: ,,, | Performed by: EMERGENCY MEDICINE

## 2018-09-07 PROCEDURE — 63600175 PHARM REV CODE 636 W HCPCS: Performed by: EMERGENCY MEDICINE

## 2018-09-07 PROCEDURE — 99284 EMERGENCY DEPT VISIT MOD MDM: CPT | Mod: 25

## 2018-09-07 PROCEDURE — 96375 TX/PRO/DX INJ NEW DRUG ADDON: CPT

## 2018-09-07 PROCEDURE — 96374 THER/PROPH/DIAG INJ IV PUSH: CPT

## 2018-09-07 PROCEDURE — 25000003 PHARM REV CODE 250: Performed by: EMERGENCY MEDICINE

## 2018-09-07 RX ORDER — KETOROLAC TROMETHAMINE 30 MG/ML
15 INJECTION, SOLUTION INTRAMUSCULAR; INTRAVENOUS
Status: COMPLETED | OUTPATIENT
Start: 2018-09-07 | End: 2018-09-07

## 2018-09-07 RX ORDER — METOCLOPRAMIDE HYDROCHLORIDE 5 MG/ML
10 INJECTION INTRAMUSCULAR; INTRAVENOUS
Status: COMPLETED | OUTPATIENT
Start: 2018-09-07 | End: 2018-09-07

## 2018-09-07 RX ORDER — PROPARACAINE HYDROCHLORIDE 5 MG/ML
1 SOLUTION/ DROPS OPHTHALMIC
Status: COMPLETED | OUTPATIENT
Start: 2018-09-07 | End: 2018-09-07

## 2018-09-07 RX ADMIN — KETOROLAC TROMETHAMINE 15 MG: 30 INJECTION, SOLUTION INTRAMUSCULAR at 06:09

## 2018-09-07 RX ADMIN — PROPARACAINE HYDROCHLORIDE 1 DROP: 5 SOLUTION/ DROPS OPHTHALMIC at 07:09

## 2018-09-07 RX ADMIN — METOCLOPRAMIDE 10 MG: 5 INJECTION, SOLUTION INTRAMUSCULAR; INTRAVENOUS at 06:09

## 2018-09-07 NOTE — ED TRIAGE NOTES
H/a and left eye pain  H'/a  Began  Last year and eye pain  began yesterday.Has been having blurred vision for months.  C/O lower leg and pedal edema. For awhile now. Having sternal chest pain - constant for several years.

## 2018-09-07 NOTE — ED PROVIDER NOTES
"Encounter Date: 2018    SCRIBE #1 NOTE: I, Ct Betts, am scribing for, and in the presence of,  Dr. Simons. I have scribed the following portions of the note - Other sections scribed: HPI, ROS, PE.       History     Chief Complaint   Patient presents with    Headache     "i been having these pains in my head for a while now"     Time patient was seen by the provider: 5:39 PM      The patient is a 58 y.o. female with co-morbidities including: DM, HTN, neuropathy and PE who presents to the ED with a complaint of intermittent headache with onset several months ago. Patient describes headache as, "striking pain," located on the top and back of her head. She notes there is no particular pattern when they occur; however, they occur everyday. Patient reports her headaches initially onset a little over a year ago when her son . She thought it was stress, but they worsened within the past 2 months. She has taken Tylenol, with no relief. She endorses nausea and left eye photophobia that onset yesterday, stating, "it feels like when you have your eyes dilated." She denies vomiting, numbness, or weakness.      The history is provided by the patient and medical records.     Review of patient's allergies indicates:   Allergen Reactions    Codeine      Past Medical History:   Diagnosis Date    Diabetes mellitus     Hyperlipidemia     Hypertension     Neuropathy     PE (pulmonary embolism)     Pulmonary embolism      Past Surgical History:   Procedure Laterality Date    CYST REMOVAL      HERNIA REPAIR      HYSTERECTOMY      TONSILLECTOMY      TUBAL LIGATION       Family History   Problem Relation Age of Onset    Kidney disease Mother     Kidney disease Father     Stroke Maternal Uncle      Social History     Tobacco Use    Smoking status: Never Smoker    Smokeless tobacco: Never Used   Substance Use Topics    Alcohol use: No    Drug use: No     Review of Systems   Constitutional: Negative for " fever.   HENT: Negative for sore throat.    Eyes: Positive for photophobia (left).   Respiratory: Negative for shortness of breath.    Cardiovascular: Negative for chest pain.   Gastrointestinal: Positive for nausea. Negative for vomiting.   Genitourinary: Negative for dysuria.   Musculoskeletal: Negative for back pain.   Skin: Negative for rash.   Neurological: Positive for headaches. Negative for weakness.   Hematological: Does not bruise/bleed easily.       Physical Exam     Initial Vitals [09/07/18 1714]   BP Pulse Resp Temp SpO2   (!) 165/71 72 18 98.9 °F (37.2 °C) 96 %      MAP       --         Physical Exam    Nursing note and vitals reviewed.  Constitutional: She appears well-developed and well-nourished. She is not diaphoretic. No distress.   HENT:   Head: Normocephalic and atraumatic.   Mouth/Throat: Oropharynx is clear and moist.   Eyes: Conjunctivae and EOM are normal. Pupils are equal, round, and reactive to light.   No chemosis, no conjunctival injection   Neck: Normal range of motion. Neck supple.   Cardiovascular: Normal rate and regular rhythm.   Pulmonary/Chest: Breath sounds normal. No respiratory distress.   Abdominal: Soft. She exhibits no distension. There is no tenderness. There is no guarding.   Musculoskeletal: Normal range of motion. She exhibits no edema.   Neurological: She is alert and oriented to person, place, and time. No cranial nerve deficit.   Skin: Skin is warm and dry. No rash noted.         ED Course   Procedures  Labs Reviewed - No data to display       Imaging Results          CT Head Without Contrast (Final result)  Result time 09/07/18 19:14:14    Final result by Preston Hitchcock MD (09/07/18 19:14:14)                 Impression:      1. No acute intracranial abnormalities.      Electronically signed by: Preston Hitchcock MD  Date:    09/07/2018  Time:    19:14             Narrative:    EXAMINATION:  CT HEAD WITHOUT CONTRAST    CLINICAL HISTORY:  headache, severe,  new;    TECHNIQUE:  Low dose axial images were obtained through the head.  Coronal and sagittal reformations were also performed. Contrast was not administered.    COMPARISON:  03/21/2018    FINDINGS:  There is no evidence of acute major vascular territory infarct, hemorrhage, or mass.  There is no hydrocephalus.  There are no abnormal extra-axial fluid collections.  The paranasal sinuses and mastoid air cells are clear, and there is no evidence of calvarial fracture.  The visualized soft tissues are unremarkable.                                 Medical Decision Making:   History:   Old Medical Records: I decided to obtain old medical records.  Initial Assessment:   57 yo f, h/o HTN, DM, here with atypical HA syndrome.  Some concerning features - severe, no h/o HAs, L eye pain but HA has been present for 1 year now (onset after death of son), does fully resolve at times.      On exam, VSS, neuro exam normal      Differential Diagnosis:   Tension HA vs migraine vs CNS process like malignancy    Glaucoma would also be a concern - will need to check IOP in L eye    Meningitis/SAH seem very unlikely given long duration of sx  Clinical Tests:   Radiological Study: Ordered and Reviewed  ED Management:  Reglan/toradol  CT head  Pressure check L eye    7:43 PM  CT head negative  Pt reports HA improved  L IOP measured: 11  Pt now reporting that she just saw a neurologist last week, has f/u with optho and PCP in next couple weeks  Will d/c home            Scribe Attestation:   Scribe #1: I performed the above scribed service and the documentation accurately describes the services I performed. I attest to the accuracy of the note.               Clinical Impression:   The encounter diagnosis was Nonintractable headache, unspecified chronicity pattern, unspecified headache type.      Disposition:   Disposition: Discharged  Condition: Stable    I, Dr. Marti Simons, personally performed the services described in this  documentation. All medical record entries made by the scribe were at my direction and in my presence.  I have reviewed the chart and agree that the record reflects my personal performance and is accurate and complete. Marti Simons MD.  6:54 PM 09/09/2018                        Marti Simons MD  09/09/18 0922

## 2018-09-07 NOTE — ED NOTES
LOC: The patient is awake and alert; oriented x 3 and speaking appropriately.  APPEARANCE: Patient is uncomfortable w/ a h/a., patient is clean and well groomed  SKIN: warm and dry, normal skin turgor & moist mucus membranes, skin intact, no breakdown noted.  MUSCULOSKELETAL: Patient moving all extremities well, no obvious swelling or deformities noted  RESPIRATORY: Airway is open and patent, ; respirations are spontaneous, normal effort and rate  CARDIAC: Patient has a normal rate,  peripheral edema noted in both lower extremities. ( chronic). , capillary refill < 3 seconds; c/o sternal  complaints of chest pain for several yrs - chronic , no change   ABDOMEN: Soft and non tender to palpation, no distention noted. Bowel sounds present x 4

## 2018-10-13 ENCOUNTER — HOSPITAL ENCOUNTER (EMERGENCY)
Facility: OTHER | Age: 58
Discharge: HOME | End: 2018-10-14
Attending: EMERGENCY MEDICINE
Payer: MEDICAID

## 2018-10-13 DIAGNOSIS — E86.0 DEHYDRATION: ICD-10-CM

## 2018-10-13 DIAGNOSIS — E87.6 HYPOKALEMIA: ICD-10-CM

## 2018-10-13 DIAGNOSIS — R73.9 HYPERGLYCEMIA: ICD-10-CM

## 2018-10-13 DIAGNOSIS — G43.909 MIGRAINE WITHOUT STATUS MIGRAINOSUS, NOT INTRACTABLE, UNSPECIFIED MIGRAINE TYPE: Primary | ICD-10-CM

## 2018-10-13 LAB
BACTERIA #/AREA URNS HPF: ABNORMAL /HPF
BILIRUB UR QL STRIP: NEGATIVE
CLARITY UR: CLEAR
COLOR UR: YELLOW
GLUCOSE UR QL STRIP: ABNORMAL
HGB UR QL STRIP: NEGATIVE
KETONES UR QL STRIP: NEGATIVE
LEUKOCYTE ESTERASE UR QL STRIP: NEGATIVE
MICROSCOPIC COMMENT: ABNORMAL
NITRITE UR QL STRIP: NEGATIVE
PH UR STRIP: 6 [PH] (ref 5–8)
POCT GLUCOSE: 346 MG/DL (ref 70–110)
PROT UR QL STRIP: NEGATIVE
RBC #/AREA URNS HPF: 0 /HPF (ref 0–4)
SP GR UR STRIP: 1.01 (ref 1–1.03)
URN SPEC COLLECT METH UR: ABNORMAL
UROBILINOGEN UR STRIP-ACNC: NEGATIVE EU/DL
WBC #/AREA URNS HPF: 3 /HPF (ref 0–5)
YEAST URNS QL MICRO: ABNORMAL

## 2018-10-13 PROCEDURE — 99284 EMERGENCY DEPT VISIT MOD MDM: CPT | Mod: 25

## 2018-10-13 PROCEDURE — 96374 THER/PROPH/DIAG INJ IV PUSH: CPT

## 2018-10-13 PROCEDURE — 82962 GLUCOSE BLOOD TEST: CPT

## 2018-10-13 PROCEDURE — 93010 ELECTROCARDIOGRAM REPORT: CPT | Mod: ,,, | Performed by: INTERNAL MEDICINE

## 2018-10-13 PROCEDURE — 25000003 PHARM REV CODE 250: Performed by: EMERGENCY MEDICINE

## 2018-10-13 PROCEDURE — 96361 HYDRATE IV INFUSION ADD-ON: CPT

## 2018-10-13 PROCEDURE — 93005 ELECTROCARDIOGRAM TRACING: CPT

## 2018-10-13 PROCEDURE — 81000 URINALYSIS NONAUTO W/SCOPE: CPT

## 2018-10-13 RX ORDER — BUTALBITAL, ACETAMINOPHEN AND CAFFEINE 50; 325; 40 MG/1; MG/1; MG/1
1 TABLET ORAL
Status: COMPLETED | OUTPATIENT
Start: 2018-10-13 | End: 2018-10-13

## 2018-10-13 RX ADMIN — BUTALBITAL, ACETAMINOPHEN AND CAFFEINE 1 TABLET: 50; 325; 40 TABLET ORAL at 10:10

## 2018-10-14 VITALS
OXYGEN SATURATION: 96 % | HEART RATE: 66 BPM | TEMPERATURE: 98 F | DIASTOLIC BLOOD PRESSURE: 63 MMHG | SYSTOLIC BLOOD PRESSURE: 137 MMHG | BODY MASS INDEX: 34.65 KG/M2 | RESPIRATION RATE: 12 BRPM | WEIGHT: 228.63 LBS | HEIGHT: 68 IN

## 2018-10-14 LAB
ALBUMIN SERPL BCP-MCNC: 2.9 G/DL
ALLENS TEST: ABNORMAL
ALP SERPL-CCNC: 103 U/L
ALT SERPL W/O P-5'-P-CCNC: 30 U/L
ANION GAP SERPL CALC-SCNC: 13 MMOL/L
AST SERPL-CCNC: 21 U/L
B-OH-BUTYR BLD STRIP-SCNC: 0.1 MMOL/L
BASOPHILS # BLD AUTO: 0.02 K/UL
BASOPHILS NFR BLD: 0.2 %
BILIRUB SERPL-MCNC: 0.2 MG/DL
BUN SERPL-MCNC: 25 MG/DL
CALCIUM SERPL-MCNC: 9.3 MG/DL
CHLORIDE SERPL-SCNC: 106 MMOL/L
CO2 SERPL-SCNC: 22 MMOL/L
CREAT SERPL-MCNC: 0.9 MG/DL
DELSYS: ABNORMAL
DIFFERENTIAL METHOD: ABNORMAL
EOSINOPHIL # BLD AUTO: 0.1 K/UL
EOSINOPHIL NFR BLD: 0.8 %
ERYTHROCYTE [DISTWIDTH] IN BLOOD BY AUTOMATED COUNT: 12.6 %
ERYTHROCYTE [SEDIMENTATION RATE] IN BLOOD BY WESTERGREN METHOD: 20 MM/H
EST. GFR  (AFRICAN AMERICAN): >60 ML/MIN/1.73 M^2
EST. GFR  (NON AFRICAN AMERICAN): >60 ML/MIN/1.73 M^2
FIO2: 21
FLOW: 0
GLUCOSE SERPL-MCNC: 193 MG/DL
HCO3 UR-SCNC: 28.1 MMOL/L (ref 24–28)
HCT VFR BLD AUTO: 38.5 %
HGB BLD-MCNC: 12.7 G/DL
LYMPHOCYTES # BLD AUTO: 3.6 K/UL
LYMPHOCYTES NFR BLD: 44 %
MCH RBC QN AUTO: 26.6 PG
MCHC RBC AUTO-ENTMCNC: 33 G/DL
MCV RBC AUTO: 81 FL
MODE: ABNORMAL
MONOCYTES # BLD AUTO: 0.6 K/UL
MONOCYTES NFR BLD: 6.8 %
NEUTROPHILS # BLD AUTO: 4 K/UL
NEUTROPHILS NFR BLD: 48.1 %
PCO2 BLDA: 46.9 MMHG (ref 35–45)
PH SMN: 7.39 [PH] (ref 7.35–7.45)
PLATELET # BLD AUTO: 281 K/UL
PMV BLD AUTO: 10.4 FL
PO2 BLDA: 33 MMHG (ref 40–60)
POC BE: 3 MMOL/L
POC SATURATED O2: 63 % (ref 95–100)
POCT GLUCOSE: 181 MG/DL (ref 70–110)
POTASSIUM SERPL-SCNC: 3.4 MMOL/L
PROT SERPL-MCNC: 7 G/DL
RBC # BLD AUTO: 4.77 M/UL
SAMPLE: ABNORMAL
SITE: ABNORMAL
SODIUM SERPL-SCNC: 141 MMOL/L
SP02: 99
WBC # BLD AUTO: 8.27 K/UL

## 2018-10-14 PROCEDURE — 85025 COMPLETE CBC W/AUTO DIFF WBC: CPT

## 2018-10-14 PROCEDURE — 25000003 PHARM REV CODE 250: Performed by: EMERGENCY MEDICINE

## 2018-10-14 PROCEDURE — 99900035 HC TECH TIME PER 15 MIN (STAT)

## 2018-10-14 PROCEDURE — 63600175 PHARM REV CODE 636 W HCPCS: Performed by: EMERGENCY MEDICINE

## 2018-10-14 PROCEDURE — 80053 COMPREHEN METABOLIC PANEL: CPT

## 2018-10-14 PROCEDURE — 82010 KETONE BODYS QUAN: CPT

## 2018-10-14 PROCEDURE — 82803 BLOOD GASES ANY COMBINATION: CPT

## 2018-10-14 RX ADMIN — INSULIN HUMAN 6 UNITS: 100 INJECTION, SOLUTION PARENTERAL at 12:10

## 2018-10-14 RX ADMIN — SODIUM CHLORIDE 1000 ML: 0.9 INJECTION, SOLUTION INTRAVENOUS at 12:10

## 2018-10-14 NOTE — ED TRIAGE NOTES
"C/o headaches, nausea, left low back pain, "I just feel sick"    Nausea is associated with headaches;    This headache started today but did have some mild ha pain yesterday;     Onset January, states this is much worse;     Had a CT scan done ~2 months ago;    On meds but ha not improving  "

## 2018-10-31 ENCOUNTER — HOSPITAL ENCOUNTER (EMERGENCY)
Facility: OTHER | Age: 58
Discharge: HOME | End: 2018-10-31
Attending: EMERGENCY MEDICINE
Payer: MEDICAID

## 2018-10-31 VITALS
HEART RATE: 65 BPM | RESPIRATION RATE: 16 BRPM | SYSTOLIC BLOOD PRESSURE: 144 MMHG | WEIGHT: 222.88 LBS | BODY MASS INDEX: 33.78 KG/M2 | TEMPERATURE: 98 F | HEIGHT: 68 IN | DIASTOLIC BLOOD PRESSURE: 54 MMHG | OXYGEN SATURATION: 98 %

## 2018-10-31 DIAGNOSIS — B96.89 BACTERIAL VAGINOSIS: Primary | ICD-10-CM

## 2018-10-31 DIAGNOSIS — N76.0 BACTERIAL VAGINOSIS: Primary | ICD-10-CM

## 2018-10-31 LAB
BACTERIA #/AREA URNS HPF: ABNORMAL /HPF
BACTERIA GENITAL QL WET PREP: ABNORMAL
BILIRUB UR QL STRIP: NEGATIVE
CLARITY UR: CLEAR
CLUE CELLS VAG QL WET PREP: ABNORMAL
COLOR UR: YELLOW
FILAMENT FUNGI VAG WET PREP-#/AREA: ABNORMAL
GLUCOSE UR QL STRIP: ABNORMAL
HGB UR QL STRIP: NEGATIVE
KETONES UR QL STRIP: NEGATIVE
LEUKOCYTE ESTERASE UR QL STRIP: ABNORMAL
MICROSCOPIC COMMENT: ABNORMAL
NITRITE UR QL STRIP: NEGATIVE
PH UR STRIP: 6 [PH] (ref 5–8)
PROT UR QL STRIP: NEGATIVE
SP GR UR STRIP: <=1.005 (ref 1–1.03)
SPECIMEN SOURCE: ABNORMAL
T VAGINALIS GENITAL QL WET PREP: ABNORMAL
URN SPEC COLLECT METH UR: ABNORMAL
UROBILINOGEN UR STRIP-ACNC: NEGATIVE EU/DL
WBC #/AREA URNS HPF: 15 /HPF (ref 0–5)
WBC #/AREA VAG WET PREP: ABNORMAL
WBC CLUMPS URNS QL MICRO: ABNORMAL
YEAST GENITAL QL WET PREP: ABNORMAL
YEAST URNS QL MICRO: ABNORMAL

## 2018-10-31 PROCEDURE — 87086 URINE CULTURE/COLONY COUNT: CPT

## 2018-10-31 PROCEDURE — 87186 SC STD MICRODIL/AGAR DIL: CPT

## 2018-10-31 PROCEDURE — 87088 URINE BACTERIA CULTURE: CPT

## 2018-10-31 PROCEDURE — 87077 CULTURE AEROBIC IDENTIFY: CPT

## 2018-10-31 PROCEDURE — 87210 SMEAR WET MOUNT SALINE/INK: CPT

## 2018-10-31 PROCEDURE — 81000 URINALYSIS NONAUTO W/SCOPE: CPT

## 2018-10-31 PROCEDURE — 87491 CHLMYD TRACH DNA AMP PROBE: CPT

## 2018-10-31 PROCEDURE — 99284 EMERGENCY DEPT VISIT MOD MDM: CPT | Mod: 25

## 2018-10-31 RX ORDER — INSULIN ASPART 100 [IU]/ML
22 INJECTION, SOLUTION INTRAVENOUS; SUBCUTANEOUS
COMMUNITY
End: 2020-01-01

## 2018-10-31 RX ORDER — METRONIDAZOLE 500 MG/1
500 TABLET ORAL EVERY 12 HOURS
Qty: 14 TABLET | Refills: 0 | Status: SHIPPED | OUTPATIENT
Start: 2018-10-31 | End: 2018-11-07

## 2018-10-31 NOTE — ED PROVIDER NOTES
Encounter Date: 10/31/2018       History     Chief Complaint   Patient presents with    Vaginal Itching     Pt c/o vaginal itching and burning for the past four days. She denies vag discharge.      Patient is a 58 y.o. female presenting to the emergency department with complaints of vaginal itching.  The patient states that she had unprotected sex with someone 4 days ago.  She states her symptoms started shortly thereafter.  She denies any vaginal discharge but states she has itching in her introitus.  She states this is not feel like a typical yeast infection.  She admits that she is only sexually active with 1 partner.  She denies any significant abdominal pain, fever, chills. No dysuria hematuria.  No previous episode.  This is the extent of the patient's complaints at this time.         The history is provided by the patient.     Review of patient's allergies indicates:  No Known Allergies  Past Medical History:   Diagnosis Date    Diabetes mellitus     Hyperlipidemia     Hypertension     Neuropathy     PE (pulmonary embolism)     Pulmonary embolism 1998     Past Surgical History:   Procedure Laterality Date    CYST REMOVAL      HERNIA REPAIR      HYSTERECTOMY      TONSILLECTOMY      TUBAL LIGATION       Family History   Problem Relation Age of Onset    Kidney disease Mother     Kidney disease Father     Stroke Maternal Uncle      Social History     Tobacco Use    Smoking status: Never Smoker    Smokeless tobacco: Never Used   Substance Use Topics    Alcohol use: No    Drug use: No     Review of Systems   Constitutional: Negative for activity change, appetite change, chills, fatigue and fever.   HENT: Negative for congestion, rhinorrhea and sore throat.    Eyes: Negative for photophobia and visual disturbance.   Respiratory: Negative for cough, shortness of breath and wheezing.    Cardiovascular: Negative for chest pain.   Gastrointestinal: Negative for abdominal pain, diarrhea, nausea and  vomiting.   Genitourinary: Negative for dysuria, hematuria and urgency.        Vaginal irritation   Musculoskeletal: Negative for back pain, myalgias and neck pain.   Skin: Negative for color change and wound.   Neurological: Negative for weakness and headaches.   Psychiatric/Behavioral: Negative for agitation and confusion.       Physical Exam     Initial Vitals [10/31/18 0954]   BP Pulse Resp Temp SpO2   (!) 122/57 67 18 98.3 °F (36.8 °C) 100 %      MAP       --         Physical Exam    Nursing note and vitals reviewed.  Constitutional: She appears well-developed and well-nourished. She is not diaphoretic. She is cooperative.  Non-toxic appearance. She does not have a sickly appearance. She does not appear ill. No distress.   Well-appearing,  female unaccompanied in the emergency department.  No acute distress, sitting comfortably.   HENT:   Head: Normocephalic and atraumatic.   Right Ear: External ear normal.   Left Ear: External ear normal.   Nose: Nose normal.   Mouth/Throat: Oropharynx is clear and moist.   Eyes: Conjunctivae and EOM are normal.   Neck: Normal range of motion. Neck supple.   Abdominal: Soft. Bowel sounds are normal. She exhibits no distension. There is no tenderness. There is no rebound and no guarding.   Genitourinary:   Genitourinary Comments: Normal appearance of the external genitalia, no skin lesions, erythema or masses. Pink vaginal mucosa. Cervix pink with no erythema, moderate white discharge noted. Cervical os is closed. No CMT, adnexal tenderness.      Musculoskeletal: Normal range of motion.   Neurological: She is alert and oriented to person, place, and time.   Skin: Skin is warm.   Psychiatric: She has a normal mood and affect. Her behavior is normal. Judgment and thought content normal.         ED Course   Procedures  Labs Reviewed   URINALYSIS, REFLEX TO URINE CULTURE - Abnormal; Notable for the following components:       Result Value    Specific Gravity, UA  <=1.005 (*)     Glucose, UA 3+ (*)     Leukocytes, UA 1+ (*)     All other components within normal limits    Narrative:     Preferred Collection Type->Urine, Clean Catch   URINALYSIS MICROSCOPIC - Abnormal; Notable for the following components:    WBC, UA 15 (*)     WBC Clumps, UA Few (*)     All other components within normal limits    Narrative:     Preferred Collection Type->Urine, Clean Catch   VAGINAL SCREEN - Abnormal; Notable for the following components:    Clue Cells, Wet Prep Few (*)     WBC - Vaginal Screen Moderate (*)     Bacteria - Vaginal Screen Many (*)     All other components within normal limits   C. TRACHOMATIS/N. GONORRHOEAE BY AMP DNA   CULTURE, URINE           Medical Decision Making:   Initial Assessment:   Urgent evaluation a 58-year-old female presenting to the emergency department with complaints of vaginal irritation x4 days.  Patient is afebrile, nontoxic appearing, hemodynamically stable. Abdomen is soft and nontender.  Will plan for vaginal exam.  Clinical Tests:   Lab Tests: Ordered and Reviewed  The following lab test(s) were unremarkable: Urinalysis and UPT       <> Summary of Lab: Vaginal screen, chlamydia and gonorrhea cultures  ED Management:  UPT is negative.  UA shows evidence of glucose with white blood cells.  Culture sent.  Vaginal exam is unremarkable, moderate discharge in the vaginal vault with no CMT or adnexal tenderness. Vaginal screen shows clue cells indicative of bacterial vaginosis, will treat with Flagyl.  Counseled on symptomatic care and treatment.  Stable for discharge home. The patient was instructed to follow up with a primary care provider in 2 days or to return to the emergency department for worsening symptoms. The treatment plan was discussed with the patient who demonstrated understanding and comfort with plan. The patient's history, physical exam, and plan of care was discussed with and agreed upon with my supervising physician.     This note was  created using "[employer] Direct. There may be typographical errors secondary to dictation.                         Clinical Impression:     1. Bacterial vaginosis         Disposition:   Disposition: Discharged  Condition: Stable                        Kamilla Weller PA-C  10/31/18 7819

## 2018-10-31 NOTE — ED NOTES
Pt presents with c/o vaginal itching and burning x4  days. Pt denies any discharge. Pt is AAOx4. RR are even and unlabored. Skin is warm and dry. Pt is ambulatory with a steady gait.

## 2018-11-01 LAB
C TRACH DNA SPEC QL NAA+PROBE: NOT DETECTED
N GONORRHOEA DNA SPEC QL NAA+PROBE: NOT DETECTED

## 2018-11-02 LAB — BACTERIA UR CULT: NORMAL

## 2018-11-29 ENCOUNTER — HOSPITAL ENCOUNTER (EMERGENCY)
Facility: HOSPITAL | Age: 58
Discharge: HOME | End: 2018-11-29
Attending: EMERGENCY MEDICINE
Payer: MEDICAID

## 2018-11-29 VITALS
OXYGEN SATURATION: 100 % | WEIGHT: 220 LBS | DIASTOLIC BLOOD PRESSURE: 64 MMHG | RESPIRATION RATE: 20 BRPM | TEMPERATURE: 99 F | SYSTOLIC BLOOD PRESSURE: 162 MMHG | BODY MASS INDEX: 33.34 KG/M2 | HEART RATE: 65 BPM | HEIGHT: 68 IN

## 2018-11-29 DIAGNOSIS — E86.0 DEHYDRATION: ICD-10-CM

## 2018-11-29 DIAGNOSIS — R42 DIZZINESS: ICD-10-CM

## 2018-11-29 DIAGNOSIS — N30.00 ACUTE CYSTITIS WITHOUT HEMATURIA: ICD-10-CM

## 2018-11-29 LAB
ALBUMIN SERPL BCP-MCNC: 3.3 G/DL
ALLENS TEST: ABNORMAL
ALP SERPL-CCNC: 111 U/L
ALT SERPL W/O P-5'-P-CCNC: 31 U/L
ANION GAP SERPL CALC-SCNC: 11 MMOL/L
AST SERPL-CCNC: 18 U/L
B-OH-BUTYR BLD STRIP-SCNC: 0.1 MMOL/L
BACTERIA #/AREA URNS HPF: ABNORMAL /HPF
BASOPHILS # BLD AUTO: 0.02 K/UL
BASOPHILS NFR BLD: 0.2 %
BILIRUB SERPL-MCNC: 0.2 MG/DL
BILIRUB UR QL STRIP: NEGATIVE
BUN SERPL-MCNC: 22 MG/DL
CALCIUM SERPL-MCNC: 10 MG/DL
CHLORIDE SERPL-SCNC: 105 MMOL/L
CLARITY UR: ABNORMAL
CO2 SERPL-SCNC: 21 MMOL/L
COLOR UR: YELLOW
CREAT SERPL-MCNC: 0.9 MG/DL
DELSYS: ABNORMAL
DIFFERENTIAL METHOD: ABNORMAL
EOSINOPHIL # BLD AUTO: 0.1 K/UL
EOSINOPHIL NFR BLD: 0.9 %
ERYTHROCYTE [DISTWIDTH] IN BLOOD BY AUTOMATED COUNT: 13 %
EST. GFR  (AFRICAN AMERICAN): >60 ML/MIN/1.73 M^2
EST. GFR  (NON AFRICAN AMERICAN): >60 ML/MIN/1.73 M^2
FIO2: 21
GLUCOSE SERPL-MCNC: 220 MG/DL (ref 70–110)
GLUCOSE SERPL-MCNC: 269 MG/DL
GLUCOSE SERPL-MCNC: 395 MG/DL (ref 70–110)
GLUCOSE UR QL STRIP: ABNORMAL
HCO3 UR-SCNC: 24.7 MMOL/L (ref 24–28)
HCT VFR BLD AUTO: 40.1 %
HGB BLD-MCNC: 13.1 G/DL
HGB UR QL STRIP: ABNORMAL
KETONES UR QL STRIP: NEGATIVE
LEUKOCYTE ESTERASE UR QL STRIP: ABNORMAL
LYMPHOCYTES # BLD AUTO: 3.6 K/UL
LYMPHOCYTES NFR BLD: 44.3 %
MCH RBC QN AUTO: 25.9 PG
MCHC RBC AUTO-ENTMCNC: 32.7 G/DL
MCV RBC AUTO: 79 FL
MICROSCOPIC COMMENT: ABNORMAL
MODE: ABNORMAL
MONOCYTES # BLD AUTO: 0.5 K/UL
MONOCYTES NFR BLD: 6.2 %
NEUTROPHILS # BLD AUTO: 3.9 K/UL
NEUTROPHILS NFR BLD: 48.4 %
NITRITE UR QL STRIP: NEGATIVE
PCO2 BLDA: 36.8 MMHG (ref 35–45)
PH SMN: 7.43 [PH] (ref 7.35–7.45)
PH UR STRIP: 5 [PH] (ref 5–8)
PLATELET # BLD AUTO: 231 K/UL
PMV BLD AUTO: 10.3 FL
PO2 BLDA: 100 MMHG (ref 40–60)
POC BE: 1 MMOL/L
POC SATURATED O2: 98 % (ref 95–100)
POC TCO2: 26 MMOL/L (ref 24–29)
POCT GLUCOSE: 268 MG/DL (ref 70–110)
POTASSIUM SERPL-SCNC: 4.1 MMOL/L
PROT SERPL-MCNC: 7.7 G/DL
PROT UR QL STRIP: NEGATIVE
RBC # BLD AUTO: 5.05 M/UL
RBC #/AREA URNS HPF: 0 /HPF (ref 0–4)
SAMPLE: ABNORMAL
SITE: ABNORMAL
SODIUM SERPL-SCNC: 137 MMOL/L
SP GR UR STRIP: 1.03 (ref 1–1.03)
SP02: 100
SQUAMOUS #/AREA URNS HPF: ABNORMAL /HPF
URN SPEC COLLECT METH UR: ABNORMAL
UROBILINOGEN UR STRIP-ACNC: NEGATIVE EU/DL
WBC # BLD AUTO: 8.11 K/UL
WBC #/AREA URNS HPF: 100 /HPF (ref 0–5)
WBC CLUMPS URNS QL MICRO: ABNORMAL
YEAST URNS QL MICRO: ABNORMAL

## 2018-11-29 PROCEDURE — 25000003 PHARM REV CODE 250: Performed by: EMERGENCY MEDICINE

## 2018-11-29 PROCEDURE — 87186 SC STD MICRODIL/AGAR DIL: CPT | Mod: 59

## 2018-11-29 PROCEDURE — 99900035 HC TECH TIME PER 15 MIN (STAT)

## 2018-11-29 PROCEDURE — 25000003 PHARM REV CODE 250: Performed by: NURSE PRACTITIONER

## 2018-11-29 PROCEDURE — 85025 COMPLETE CBC W/AUTO DIFF WBC: CPT

## 2018-11-29 PROCEDURE — 87088 URINE BACTERIA CULTURE: CPT

## 2018-11-29 PROCEDURE — 96375 TX/PRO/DX INJ NEW DRUG ADDON: CPT

## 2018-11-29 PROCEDURE — 87086 URINE CULTURE/COLONY COUNT: CPT

## 2018-11-29 PROCEDURE — 81000 URINALYSIS NONAUTO W/SCOPE: CPT

## 2018-11-29 PROCEDURE — 93010 ELECTROCARDIOGRAM REPORT: CPT | Mod: ,,, | Performed by: INTERNAL MEDICINE

## 2018-11-29 PROCEDURE — 63600175 PHARM REV CODE 636 W HCPCS: Performed by: EMERGENCY MEDICINE

## 2018-11-29 PROCEDURE — 87077 CULTURE AEROBIC IDENTIFY: CPT | Mod: 59

## 2018-11-29 PROCEDURE — 82803 BLOOD GASES ANY COMBINATION: CPT

## 2018-11-29 PROCEDURE — 82962 GLUCOSE BLOOD TEST: CPT

## 2018-11-29 PROCEDURE — 96374 THER/PROPH/DIAG INJ IV PUSH: CPT

## 2018-11-29 PROCEDURE — 82010 KETONE BODYS QUAN: CPT

## 2018-11-29 PROCEDURE — 80053 COMPREHEN METABOLIC PANEL: CPT

## 2018-11-29 PROCEDURE — 99285 EMERGENCY DEPT VISIT HI MDM: CPT | Mod: 25

## 2018-11-29 PROCEDURE — 93005 ELECTROCARDIOGRAM TRACING: CPT

## 2018-11-29 PROCEDURE — 96361 HYDRATE IV INFUSION ADD-ON: CPT

## 2018-11-29 RX ORDER — CEPHALEXIN 500 MG/1
1000 CAPSULE ORAL EVERY 12 HOURS
Qty: 28 CAPSULE | Refills: 0 | Status: SHIPPED | OUTPATIENT
Start: 2018-11-29 | End: 2018-12-06

## 2018-11-29 RX ORDER — FUROSEMIDE 40 MG/1
40 TABLET ORAL 2 TIMES DAILY
COMMUNITY
End: 2018-11-29

## 2018-11-29 RX ADMIN — CEFTRIAXONE 1 G: 1 INJECTION, SOLUTION INTRAVENOUS at 02:11

## 2018-11-29 RX ADMIN — SODIUM CHLORIDE 1000 ML: 0.9 INJECTION, SOLUTION INTRAVENOUS at 11:11

## 2018-11-29 RX ADMIN — INSULIN HUMAN 10 UNITS: 100 INJECTION, SOLUTION PARENTERAL at 11:11

## 2018-11-29 NOTE — DISCHARGE INSTRUCTIONS
Lots of liquids.  Follow your diet exercise an insulin regimen as directed.  Please see your doctor in 3 days.  Please check her blood sugar twice a day.  Right the values down.  Return immediately if you get worse or if new problems develop.  Antibiotics as directed.  Rest.  Lots of liquids without sugar.

## 2018-11-29 NOTE — PROGRESS NOTES
Results for TAMMIE BARAHONA (MRN [id number]) as of 11/29/2018 14:38   Ref. Range 11/29/2018 14:31   POC PH Latest Ref Range: 7.35 - 7.45  7.435   POC PCO2 Latest Ref Range: 35 - 45 mmHg 36.8   POC PO2 Latest Ref Range: 40 - 60 mmHg 100 (HH)   POC BE Latest Ref Range: -2 to 2 mmol/L 1   POC HCO3 Latest Ref Range: 24 - 28 mmol/L 24.7   POC SATURATED O2 Latest Ref Range: 95 - 100 % 98   POC TCO2 Latest Ref Range: 24 - 29 mmol/L 26   FiO2 Unknown 21   Sample Unknown VENOUS   DelSys Unknown Room Air   Allens Test Unknown N/A   Site Unknown Other   Mode Unknown SPONT

## 2018-11-29 NOTE — ED PROVIDER NOTES
"Encounter Date: 11/29/2018       History     Chief Complaint   Patient presents with    Hyperglycemia     "my sugar is high 400-500, dizziness and nausea x 3 days;" midsternal chest pain x 1 month     HPI   This 58-year-old female presents to the emergency room complaining of a high blood sugar today.  The patient has been dizzy and nauseated for 3 days.  Her blood sugar was 469 this morning when the [facility] came to her house.  She has some mild frontal head pain and some mild dysuria.  She is otherwise well.  She denies other injuries or problems.  There is no history of fever.  Review of patient's allergies indicates:  No Known Allergies  Past Medical History:   Diagnosis Date    Diabetes mellitus     Hyperlipidemia     Hypertension     Neuropathy     PE (pulmonary embolism)     Pulmonary embolism 1998     Past Surgical History:   Procedure Laterality Date    CYST REMOVAL      HERNIA REPAIR      HYSTERECTOMY      TONSILLECTOMY      TUBAL LIGATION       Family History   Problem Relation Age of Onset    Kidney disease Mother     Kidney disease Father     Stroke Maternal Uncle      Social History     Tobacco Use    Smoking status: Never Smoker    Smokeless tobacco: Never Used   Substance Use Topics    Alcohol use: No    Drug use: No     Review of Systems  The patient was questioned specifically with regard to the following.  General: Fever, chills, sweats. Neuro: Headache. Eyes: eye problems. ENT: Ear pain, sore throat. Cardiovascular: Chest pain. Respiratory: Cough, shortness of breath. Gastrointestinal: Abdominal pain, vomiting, diarrhea. Genitourinary: Painful urination.  Musculoskeletal: Arm and leg problems. Skin: Rash.  The review of systems was negative except for the following:  Dizzy nausea high blood sugar frontal headache painful urination  Physical Exam     Initial Vitals [11/29/18 1106]   BP Pulse Resp Temp SpO2   (!) 176/81 73 20 98.1 °F (36.7 °C) 98 %      MAP       --     "     Physical Exam  The patient was examined specifically for the following:   General:No significant distress, Good color, Warm and dry. Head and neck:Scalp atraumatic, Neck supple. Neurological:Appropriate conversation, Gross motor deficits. Eyes:Conjugate gaze, Clear corneas. ENT: No epistaxis. Cardiac: Regular rate and rhythm, Grossly normal heart tones. Pulmonary: Wheezing, Rales. Gastrointestinal: Abdominal tenderness, Abdominal distention. Musculoskeletal: Extremity deformity, Apparent pain with range of motion of the joints. Skin: Rash.   The findings on examination were normal.  The lungs are clear.  The heart tones are normal.  The abdomen is soft.  The neurologic examination is grossly nonfocal.  ED Course   Procedures  Labs Reviewed   CBC W/ AUTO DIFFERENTIAL - Abnormal; Notable for the following components:       Result Value    MCV 79 (*)     MCH 25.9 (*)     All other components within normal limits   COMPREHENSIVE METABOLIC PANEL - Abnormal; Notable for the following components:    CO2 21 (*)     Glucose 269 (*)     BUN, Bld 22 (*)     Albumin 3.3 (*)     All other components within normal limits   URINALYSIS, REFLEX TO URINE CULTURE - Abnormal; Notable for the following components:    Appearance, UA Hazy (*)     Glucose, UA 3+ (*)     Occult Blood UA 1+ (*)     Leukocytes, UA 2+ (*)     All other components within normal limits    Narrative:     Preferred Collection Type->Urine, Clean Catch   URINALYSIS MICROSCOPIC - Abnormal; Notable for the following components:    WBC,  (*)     WBC Clumps, UA Few (*)     Bacteria, UA Moderate (*)     All other components within normal limits    Narrative:     Preferred Collection Type->Urine, Clean Catch   POCT GLUCOSE - Abnormal; Notable for the following components:    POCT Glucose 268 (*)     All other components within normal limits   ISTAT PROCEDURE - Abnormal; Notable for the following components:    POC PO2 100 (*)     All other components within  normal limits   CULTURE, URINE   BETA - HYDROXYBUTYRATE, SERUM   POCT GLUCOSE MONITORING CONTINUOUS   POCT GLUCOSE MONITORING CONTINUOUS     EKG Readings: (Independently Interpreted)   This patient is in a normal sinus rhythm with a heart rate of 64.  There are no significant ST segment or T-wave changes.  This is a normal EKG.       Imaging Results    None       Medical decision making:  Given the above, this patient presents to the emergency room with a high blood sugar.  She had a low serum CO2.  She has a normal pH she is not in DKA.  She was treated with IV fluids and insulin and she improved.  She was treated with IV Rocephin.  I will discharge her on Keflex.  I will have her return if she gets worse or if new problems develop.  I doubt sepsis.                           Clinical Impression:   The primary encounter diagnosis was Uncontrolled diabetes mellitus type 2 without complications. Diagnoses of Dizziness, Acute cystitis without hematuria, and Dehydration were also pertinent to this visit.                             Robert Villatoro MD  11/29/18 1502       Robert Villatoro MD  11/29/18 3036

## 2018-11-29 NOTE — ED TRIAGE NOTES
"Pt arrived to Ed with c/o hyperglycemia. Pt state "my sugar at home was 469." Pt reports dizziness/N/HA. Pt connected to continuous cardiac monitor, bp cuff, and pulse ox. No acute distress noted.   "

## 2018-12-01 LAB
BACTERIA UR CULT: NORMAL
BACTERIA UR CULT: NORMAL

## 2018-12-03 LAB — POCT GLUCOSE: 395 MG/DL (ref 70–110)

## 2019-02-12 ENCOUNTER — HOSPITAL ENCOUNTER (EMERGENCY)
Facility: HOSPITAL | Age: 59
Discharge: HOME | End: 2019-02-12
Attending: EMERGENCY MEDICINE
Payer: MEDICAID

## 2019-02-12 VITALS
OXYGEN SATURATION: 98 % | HEART RATE: 66 BPM | HEIGHT: 68 IN | WEIGHT: 220 LBS | RESPIRATION RATE: 16 BRPM | TEMPERATURE: 98 F | BODY MASS INDEX: 33.34 KG/M2 | SYSTOLIC BLOOD PRESSURE: 154 MMHG | DIASTOLIC BLOOD PRESSURE: 88 MMHG

## 2019-02-12 DIAGNOSIS — H53.8 BLURRED VISION: ICD-10-CM

## 2019-02-12 DIAGNOSIS — N30.00 ACUTE CYSTITIS WITHOUT HEMATURIA: Primary | ICD-10-CM

## 2019-02-12 DIAGNOSIS — R42 DIZZINESS: ICD-10-CM

## 2019-02-12 DIAGNOSIS — R07.89 CHEST PAIN, NON-CARDIAC: ICD-10-CM

## 2019-02-12 DIAGNOSIS — R73.9 HYPERGLYCEMIA: ICD-10-CM

## 2019-02-12 LAB
ALBUMIN SERPL BCP-MCNC: 3.3 G/DL
ALLENS TEST: ABNORMAL
ALP SERPL-CCNC: 110 U/L
ALT SERPL W/O P-5'-P-CCNC: 27 U/L
ANION GAP SERPL CALC-SCNC: 12 MMOL/L
AST SERPL-CCNC: 17 U/L
B-OH-BUTYR BLD STRIP-SCNC: 0.1 MMOL/L
BACTERIA #/AREA URNS HPF: ABNORMAL /HPF
BASOPHILS # BLD AUTO: 0.04 K/UL
BASOPHILS NFR BLD: 0.6 %
BILIRUB SERPL-MCNC: 0.2 MG/DL
BILIRUB UR QL STRIP: NEGATIVE
BUN SERPL-MCNC: 24 MG/DL
CALCIUM SERPL-MCNC: 9.6 MG/DL
CHLORIDE SERPL-SCNC: 101 MMOL/L
CLARITY UR: CLEAR
CO2 SERPL-SCNC: 21 MMOL/L
COLOR UR: YELLOW
CREAT SERPL-MCNC: 0.9 MG/DL
CTP QC/QA: YES
DELSYS: ABNORMAL
DIFFERENTIAL METHOD: ABNORMAL
EOSINOPHIL # BLD AUTO: 0.1 K/UL
EOSINOPHIL NFR BLD: 1.3 %
ERYTHROCYTE [DISTWIDTH] IN BLOOD BY AUTOMATED COUNT: 12.8 %
EST. GFR  (AFRICAN AMERICAN): >60 ML/MIN/1.73 M^2
EST. GFR  (NON AFRICAN AMERICAN): >60 ML/MIN/1.73 M^2
FIO2: 21
FLUAV AG NPH QL: NEGATIVE
FLUBV AG NPH QL: NEGATIVE
GLUCOSE SERPL-MCNC: 312 MG/DL
GLUCOSE SERPL-MCNC: 322 MG/DL (ref 70–110)
GLUCOSE UR QL STRIP: ABNORMAL
HCO3 UR-SCNC: 22.2 MMOL/L (ref 24–28)
HCT VFR BLD AUTO: 42 %
HGB BLD-MCNC: 13.7 G/DL
HGB UR QL STRIP: NEGATIVE
KETONES UR QL STRIP: NEGATIVE
LEUKOCYTE ESTERASE UR QL STRIP: ABNORMAL
LYMPHOCYTES # BLD AUTO: 3.6 K/UL
LYMPHOCYTES NFR BLD: 51 %
MCH RBC QN AUTO: 26.2 PG
MCHC RBC AUTO-ENTMCNC: 32.6 G/DL
MCV RBC AUTO: 81 FL
MICROSCOPIC COMMENT: ABNORMAL
MODE: ABNORMAL
MONOCYTES # BLD AUTO: 0.4 K/UL
MONOCYTES NFR BLD: 5.4 %
NEUTROPHILS # BLD AUTO: 2.9 K/UL
NEUTROPHILS NFR BLD: 41.7 %
NITRITE UR QL STRIP: NEGATIVE
PCO2 BLDA: 18.8 MMHG (ref 35–45)
PH SMN: 7.68 [PH] (ref 7.35–7.45)
PH UR STRIP: 7 [PH] (ref 5–8)
PLATELET # BLD AUTO: 258 K/UL
PMV BLD AUTO: 10.2 FL
PO2 BLDA: 134 MMHG (ref 40–60)
POC BE: 4 MMOL/L
POC SATURATED O2: 100 % (ref 95–100)
POC TCO2: 23 MMOL/L (ref 24–29)
POCT GLUCOSE: 198 MG/DL (ref 70–110)
POCT GLUCOSE: 312 MG/DL (ref 70–110)
POCT GLUCOSE: 364 MG/DL (ref 70–110)
POTASSIUM SERPL-SCNC: 4.4 MMOL/L
PROT SERPL-MCNC: 7.5 G/DL
PROT UR QL STRIP: NEGATIVE
RBC # BLD AUTO: 5.22 M/UL
RBC #/AREA URNS HPF: 2 /HPF (ref 0–4)
SAMPLE: ABNORMAL
SITE: ABNORMAL
SODIUM SERPL-SCNC: 134 MMOL/L
SP GR UR STRIP: 1.01 (ref 1–1.03)
SP02: 100
SQUAMOUS #/AREA URNS HPF: 2 /HPF
URN SPEC COLLECT METH UR: ABNORMAL
UROBILINOGEN UR STRIP-ACNC: NEGATIVE EU/DL
WBC # BLD AUTO: 7.02 K/UL
WBC #/AREA URNS HPF: 10 /HPF (ref 0–5)
YEAST URNS QL MICRO: ABNORMAL

## 2019-02-12 PROCEDURE — 25000003 PHARM REV CODE 250: Performed by: NURSE PRACTITIONER

## 2019-02-12 PROCEDURE — 82962 GLUCOSE BLOOD TEST: CPT

## 2019-02-12 PROCEDURE — 80053 COMPREHEN METABOLIC PANEL: CPT

## 2019-02-12 PROCEDURE — 82803 BLOOD GASES ANY COMBINATION: CPT

## 2019-02-12 PROCEDURE — 25000003 PHARM REV CODE 250: Performed by: EMERGENCY MEDICINE

## 2019-02-12 PROCEDURE — 96361 HYDRATE IV INFUSION ADD-ON: CPT

## 2019-02-12 PROCEDURE — 99285 EMERGENCY DEPT VISIT HI MDM: CPT | Mod: 25

## 2019-02-12 PROCEDURE — 93005 ELECTROCARDIOGRAM TRACING: CPT

## 2019-02-12 PROCEDURE — 85025 COMPLETE CBC W/AUTO DIFF WBC: CPT

## 2019-02-12 PROCEDURE — 63600175 PHARM REV CODE 636 W HCPCS: Performed by: EMERGENCY MEDICINE

## 2019-02-12 PROCEDURE — 82010 KETONE BODYS QUAN: CPT

## 2019-02-12 PROCEDURE — 93010 EKG 12-LEAD: ICD-10-PCS | Mod: ,,, | Performed by: INTERNAL MEDICINE

## 2019-02-12 PROCEDURE — 99900035 HC TECH TIME PER 15 MIN (STAT)

## 2019-02-12 PROCEDURE — 93010 ELECTROCARDIOGRAM REPORT: CPT | Mod: ,,, | Performed by: INTERNAL MEDICINE

## 2019-02-12 PROCEDURE — 96374 THER/PROPH/DIAG INJ IV PUSH: CPT

## 2019-02-12 PROCEDURE — 36416 COLLJ CAPILLARY BLOOD SPEC: CPT

## 2019-02-12 PROCEDURE — 81000 URINALYSIS NONAUTO W/SCOPE: CPT

## 2019-02-12 RX ORDER — CEPHALEXIN 500 MG/1
1000 CAPSULE ORAL EVERY 12 HOURS
Qty: 28 CAPSULE | Refills: 0 | Status: SHIPPED | OUTPATIENT
Start: 2019-02-12 | End: 2019-02-19

## 2019-02-12 RX ORDER — HYDROCODONE BITARTRATE AND ACETAMINOPHEN 5; 325 MG/1; MG/1
1 TABLET ORAL
Status: COMPLETED | OUTPATIENT
Start: 2019-02-12 | End: 2019-02-12

## 2019-02-12 RX ADMIN — HYDROCODONE BITARTRATE AND ACETAMINOPHEN 1 TABLET: 5; 325 TABLET ORAL at 03:02

## 2019-02-12 RX ADMIN — INSULIN HUMAN 6 UNITS: 100 INJECTION, SOLUTION PARENTERAL at 03:02

## 2019-02-12 RX ADMIN — SODIUM CHLORIDE 1000 ML: 9 INJECTION, SOLUTION INTRAVENOUS at 01:02

## 2019-02-12 NOTE — ED PROVIDER NOTES
"Encounter Date: 2/12/2019  SORT MSE:  Pt is a 58 y.o. female who presents for emergent consideration for hyperglycemia. Pt will be moved to room when one is available, otherwise will wait in waiting room with triage nurse supervision.  Pt arrived by ambulatory. She is not in distress. Orders have been placed. VU Boyer DNP ACNP-BC 2/12/2019 12:44 PM     SCRIBE #1 NOTE: I, Gary Chino, am scribing for, and in the presence of,  Robert Villatoro MD. I have scribed the following portions of the note - Other sections scribed: HPI and ROS.       History     Chief Complaint   Patient presents with    Hyperglycemia     Sent here from [facility] due to elevated blood glucose level of  greater than 400. reports headache, nausea, dizziness. Reports having dizzy spells for the last few days REPORTS TAKING 22 UNITS OF SHORT ACTING INSULIN 30 MINUTES AGO  IN TRIAGE     CC: Hyperglycemia     HPI: This 58 y.o F with DM, HLD, HTN, Neuropathy and PE presents [facility] the ED [facility] for emergent evaluation of hyperglycemia. The pt reports a CBG of 480mg/dL PTA. She also reports bilateral rib pain and cough x2 days. Her rib pain is worse when coughing. She also reports a frontal headache, blurry vision, dizziness described as "stumbling, loosing balance", nausea and malodorous urine x4 days. The pt is Rx'ed 22 units of Novolog at night, which she has been not compliant with. She is compliant with her Lisinopril. The pt denies fever, chills, diaphoresis, emesis, diarrhea, dysuria, SOB, light-headedness, chest pain and rash. She does not smoke cigarettes, consume EtOH or use illicit drugs. She did take 22 units of short acting insulin 30 minutes PTA.     She does have an upcoming appointment with her PCP 2/14/19.      The history is provided by the patient. No  was used.     Review of patient's allergies indicates:  No Known Allergies  Past Medical History:   Diagnosis Date    Diabetes " "mellitus     Hyperlipidemia     Hypertension     Neuropathy     PE (pulmonary embolism)     Pulmonary embolism 1998     Past Surgical History:   Procedure Laterality Date    CYST REMOVAL      HERNIA REPAIR      HYSTERECTOMY      TONSILLECTOMY      TUBAL LIGATION       Family History   Problem Relation Age of Onset    Kidney disease Mother     Kidney disease Father     Stroke Maternal Uncle      Social History     Tobacco Use    Smoking status: Never Smoker    Smokeless tobacco: Never Used   Substance Use Topics    Alcohol use: No    Drug use: No     Review of Systems   Constitutional: Negative for chills, diaphoresis and fever.   HENT: Negative for congestion, ear pain, rhinorrhea and sore throat.    Eyes: Positive for visual disturbance (blurry vision). Negative for pain.   Respiratory: Positive for cough. Negative for shortness of breath.    Cardiovascular: Negative for chest pain.   Gastrointestinal: Negative for abdominal pain, diarrhea, nausea and vomiting.   Genitourinary: Negative for dysuria.        (+) malodorous urine   Musculoskeletal: Negative for back pain and neck pain.        (+) bilateral rib pain   Skin: Negative for rash.   Neurological: Positive for dizziness ("stumbling and losing balance") and headaches (frontal). Negative for light-headedness.       Physical Exam     Initial Vitals [02/12/19 1245]   BP Pulse Resp Temp SpO2   (!) 165/71 64 20 98.5 °F (36.9 °C) 100 %      MAP       --         Physical Exam  The patient was examined specifically for the following:   General:No significant distress, Good color, Warm and dry. Head and neck:Scalp atraumatic, Neck supple. Neurological:Appropriate conversation, Gross motor deficits. Eyes:Conjugate gaze, Clear corneas. ENT: No epistaxis. Cardiac: Regular rate and rhythm, Grossly normal heart tones. Pulmonary: Wheezing, Rales. Gastrointestinal: Abdominal tenderness, Abdominal distention. Musculoskeletal: Extremity deformity, Apparent " pain with range of motion of the joints. Skin: Rash.   The findings on examination were normal except for the following:  The patient has marked tenderness at the costal margins of the ribs bilaterally.  Heart tones are normal.  The patient has regular rate and rhythm. The abdomen is nontender. There is no guarding rebound mass or distention. Patient has no respiratory distress.  Vital signs are stable.   ED Course   Procedures  Labs Reviewed   CBC W/ AUTO DIFFERENTIAL - Abnormal; Notable for the following components:       Result Value    MCV 81 (*)     MCH 26.2 (*)     Lymph% 51.0 (*)     All other components within normal limits   COMPREHENSIVE METABOLIC PANEL - Abnormal; Notable for the following components:    Sodium 134 (*)     CO2 21 (*)     Glucose 312 (*)     BUN, Bld 24 (*)     Albumin 3.3 (*)     All other components within normal limits   URINALYSIS, REFLEX TO URINE CULTURE - Abnormal; Notable for the following components:    Glucose, UA 3+ (*)     Leukocytes, UA Trace (*)     All other components within normal limits    Narrative:     Preferred Collection Type->Urine, Clean Catch   URINALYSIS MICROSCOPIC - Abnormal; Notable for the following components:    WBC, UA 10 (*)     Bacteria, UA Many (*)     All other components within normal limits    Narrative:     Preferred Collection Type->Urine, Clean Catch   POCT GLUCOSE - Abnormal; Notable for the following components:    POCT Glucose 364 (*)     All other components within normal limits   ISTAT PROCEDURE - Abnormal; Notable for the following components:    POC PH 7.679 (*)     POC PCO2 18.8 (*)     POC PO2 134 (*)     POC HCO3 22.2 (*)     POC TCO2 23 (*)     All other components within normal limits   POCT GLUCOSE - Abnormal; Notable for the following components:    POCT Glucose 312 (*)     All other components within normal limits   POCT GLUCOSE - Abnormal; Notable for the following components:    POCT Glucose 198 (*)     All other components within  normal limits   BETA - HYDROXYBUTYRATE, SERUM   POCT INFLUENZA A/B   POCT GLUCOSE MONITORING CONTINUOUS   POCT GLUCOSE MONITORING CONTINUOUS   POCT GLUCOSE MONITORING CONTINUOUS     EKG Readings: (Independently Interpreted)   This patient is in a normal sinus rhythm with a heart rate of 69.  The p.r. QRS and QT intervals are normal.  There is no definite evidence of acute myocardial infarction or malignant arrhythmia.  ST segments and T-waves are normal     ECG Results          EKG 12-lead (In process)  Result time 02/12/19 14:54:37    In process by Interface, Lab In [facility] (02/12/19 14:54:37)                 Narrative:    Test Reason : R73.9,    Vent. Rate : 069 BPM     Atrial Rate : 069 BPM     P-R Int : 146 ms          QRS Dur : 084 ms      QT Int : 394 ms       P-R-T Axes : 078 078 052 degrees     QTc Int : 422 ms    Normal sinus rhythm  Normal ECG  When compared with ECG of 29-NOV-2018 11:11,  No significant change was found    Referred By: [email or web address]   SELF           Confirmed By:                   In process by Interface, Lab In [facility] (02/12/19 14:52:56)                 Narrative:    Test Reason : R73.9,    Vent. Rate : 069 BPM     Atrial Rate : 069 BPM     P-R Int : 146 ms          QRS Dur : 084 ms      QT Int : 394 ms       P-R-T Axes : 078 078 052 degrees     QTc Int : 422 ms    Normal sinus rhythm  Normal ECG  When compared with ECG of 29-NOV-2018 11:11,  No significant change was found    Referred By: [email or web address]   SELF           Confirmed By:                             Imaging Results          X-Ray Chest AP Portable (Final result)  Result time 02/12/19 14:13:14    Final result by Carlos Ledezma MD (02/12/19 14:13:14)                 Impression:      No acute abnormality.      Electronically signed by: Carlos Ledezma MD  Date:    02/12/2019  Time:    14:13             Narrative:    EXAMINATION:  XR CHEST AP PORTABLE    CLINICAL HISTORY:  hyperglycemia;.    TECHNIQUE:  Single frontal portable view of  the chest was performed.    COMPARISON:  07/17/2018    FINDINGS:  Support devices: None    The lungs are clear, with normal appearance of pulmonary vasculature and no pleural effusion or pneumothorax.    The cardiac silhouette is normal in size. The hilar and mediastinal contours are unremarkable.    Bones are intact.                              Medical decision making:  Given the above this patient felt bad.  She has known have hyperglycemia.  She has not been compliant with her insulin.  She took 25 units of insulin prior to coming to the emergency room.  She was treated with IV fluid.  She was treated with IV insulin.  Her blood sugar fell to 198.  I will discharge her to outpatient evaluation and treatment encourage her to be compliant with her diet and with her insulin.  She had some urinary tract symptoms. She has back to urea.  I will treat her for urinary tract infection.  Cultures pending.  I took this patient for a walk.  There is no dizziness or stumbling.  There is no evidence of neurologic deficit at 4:15 a.m. at the time of discharge. I believe the symptoms were from the patient's malaise.  She has a little cough.  She has sharp chest pain at the costal margin, anteriorly.  She is tender there.  I do not think the patient has coronary insufficiency a troponin is negative chest x-ray fails to reveal pneumothorax pneumonia pleural effusion and the EKG fails to reveal evidence of pericarditis myocarditis and myocardial infarction.  I will discharge to outpatient evaluation and treatment.                Scribe Attestation:   Scribe #1: I performed the above scribed service and the documentation accurately describes the services I performed. I attest to the accuracy of the note.    Attending Attestation:           Physician Attestation for Scribe:  Physician Attestation Statement for Scribe #1: I, Robert Villatoro MD, reviewed documentation, as scribed by Gary Chino in my presence, and it is both accurate  and complete.                    Clinical Impression:   The primary encounter diagnosis was Acute cystitis without hematuria. Diagnoses of Hyperglycemia, Chest pain, non-cardiac, Blurred vision, and Dizziness were also pertinent to this visit.                             Robert Villatoro MD  02/12/19 0129

## 2019-02-12 NOTE — PROGRESS NOTES
Results for TAMMIE BARAHONA (MRN [id number]) as of 2/12/2019 15:02   Ref. Range 2/12/2019 14:43   POC PH Latest Ref Range: 7.35 - 7.45  7.679 (H)   POC PCO2 Latest Ref Range: 35 - 45 mmHg 18.8 (L)   POC PO2 Latest Ref Range: 40 - 60 mmHg 134 (HH)   POC BE Latest Ref Range: -2 to 2 mmol/L 4   POC HCO3 Latest Ref Range: 24 - 28 mmol/L 22.2 (L)   POC SATURATED O2 Latest Ref Range: 95 - 100 % 100   POC TCO2 Latest Ref Range: 24 - 29 mmol/L 23 (L)   FiO2 Unknown 21   Sample Unknown VENOUS   DelSys Unknown Room Air   Allens Test Unknown N/A   Site Unknown LF   Mode Unknown SPONT

## 2019-02-12 NOTE — DISCHARGE INSTRUCTIONS
Please follow  your diabetic diet.  Lots of liquids without sugar.  Please take her insulin exactly as directed.  Please return immediately if you get worse or if new problems develop, especially fever.  Please follow-up with your primary care doctor in 48 hr.  Be sure your doctor checks your urine culture.

## 2019-02-14 LAB — POCT GLUCOSE: 322 MG/DL (ref 70–110)

## 2019-03-10 ENCOUNTER — HOSPITAL ENCOUNTER (EMERGENCY)
Facility: HOSPITAL | Age: 59
Discharge: HOME | End: 2019-03-11
Attending: EMERGENCY MEDICINE
Payer: MEDICAID

## 2019-03-10 DIAGNOSIS — K29.70 GASTRITIS WITHOUT BLEEDING, UNSPECIFIED CHRONICITY, UNSPECIFIED GASTRITIS TYPE: Primary | ICD-10-CM

## 2019-03-10 DIAGNOSIS — N30.00 ACUTE CYSTITIS WITHOUT HEMATURIA: ICD-10-CM

## 2019-03-10 DIAGNOSIS — M79.89 LOCALIZED SWELLING OF LOWER EXTREMITY: ICD-10-CM

## 2019-03-10 DIAGNOSIS — R06.02 SHORTNESS OF BREATH: ICD-10-CM

## 2019-03-10 LAB
ALBUMIN SERPL BCP-MCNC: 3.2 G/DL
ALP SERPL-CCNC: 134 U/L
ALT SERPL W/O P-5'-P-CCNC: 40 U/L
ANION GAP SERPL CALC-SCNC: 17 MMOL/L (ref 8–16)
ANION GAP SERPL CALC-SCNC: 8 MMOL/L
AST SERPL-CCNC: 25 U/L
BACTERIA #/AREA URNS HPF: ABNORMAL /HPF
BASOPHILS # BLD AUTO: 0.03 K/UL
BASOPHILS NFR BLD: 0.3 %
BILIRUB SERPL-MCNC: 0.1 MG/DL
BILIRUB UR QL STRIP: NEGATIVE
BNP SERPL-MCNC: 22 PG/ML
BUN SERPL-MCNC: 21 MG/DL
BUN SERPL-MCNC: 21 MG/DL (ref 6–30)
CALCIUM SERPL-MCNC: 9.8 MG/DL
CHLORIDE SERPL-SCNC: 103 MMOL/L (ref 95–110)
CHLORIDE SERPL-SCNC: 104 MMOL/L
CLARITY UR: CLEAR
CO2 SERPL-SCNC: 27 MMOL/L
COLOR UR: ABNORMAL
CREAT SERPL-MCNC: 0.7 MG/DL (ref 0.5–1.4)
CREAT SERPL-MCNC: 1 MG/DL
DIFFERENTIAL METHOD: ABNORMAL
EOSINOPHIL # BLD AUTO: 0.1 K/UL
EOSINOPHIL NFR BLD: 1.3 %
ERYTHROCYTE [DISTWIDTH] IN BLOOD BY AUTOMATED COUNT: 12.7 %
EST. GFR  (AFRICAN AMERICAN): >60 ML/MIN/1.73 M^2
EST. GFR  (NON AFRICAN AMERICAN): >60 ML/MIN/1.73 M^2
GLUCOSE SERPL-MCNC: 330 MG/DL
GLUCOSE SERPL-MCNC: 368 MG/DL (ref 70–110)
GLUCOSE UR QL STRIP: ABNORMAL
HCT VFR BLD AUTO: 37 %
HCT VFR BLD CALC: 43 %PCV (ref 36–54)
HGB BLD-MCNC: 11.8 G/DL
HGB UR QL STRIP: NEGATIVE
KETONES UR QL STRIP: NEGATIVE
LACTATE SERPL-SCNC: 1.4 MMOL/L
LEUKOCYTE ESTERASE UR QL STRIP: ABNORMAL
LIPASE SERPL-CCNC: 37 U/L
LYMPHOCYTES # BLD AUTO: 3.7 K/UL
LYMPHOCYTES NFR BLD: 36.7 %
MCH RBC QN AUTO: 26.2 PG
MCHC RBC AUTO-ENTMCNC: 31.9 G/DL
MCV RBC AUTO: 82 FL
MICROSCOPIC COMMENT: ABNORMAL
MONOCYTES # BLD AUTO: 0.6 K/UL
MONOCYTES NFR BLD: 6 %
NEUTROPHILS # BLD AUTO: 5.6 K/UL
NEUTROPHILS NFR BLD: 55.7 %
NITRITE UR QL STRIP: NEGATIVE
PH UR STRIP: 5 [PH] (ref 5–8)
PLATELET # BLD AUTO: 276 K/UL
PMV BLD AUTO: 10.4 FL
POC IONIZED CALCIUM: 1.21 MMOL/L (ref 1.06–1.42)
POC TCO2 (MEASURED): 24 MMOL/L (ref 23–29)
POTASSIUM BLD-SCNC: 4.1 MMOL/L (ref 3.5–5.1)
POTASSIUM SERPL-SCNC: 3.8 MMOL/L
PROT SERPL-MCNC: 7.7 G/DL
PROT UR QL STRIP: NEGATIVE
RBC # BLD AUTO: 4.51 M/UL
SAMPLE: ABNORMAL
SODIUM BLD-SCNC: 140 MMOL/L (ref 136–145)
SODIUM SERPL-SCNC: 139 MMOL/L
SP GR UR STRIP: 1.03 (ref 1–1.03)
TROPONIN I SERPL DL<=0.01 NG/ML-MCNC: <0.006 NG/ML
URN SPEC COLLECT METH UR: ABNORMAL
UROBILINOGEN UR STRIP-ACNC: NEGATIVE EU/DL
WBC # BLD AUTO: 10.01 K/UL
WBC #/AREA URNS HPF: 15 /HPF (ref 0–5)
YEAST URNS QL MICRO: ABNORMAL

## 2019-03-10 PROCEDURE — 82565 ASSAY OF CREATININE: CPT | Mod: 59

## 2019-03-10 PROCEDURE — 81000 URINALYSIS NONAUTO W/SCOPE: CPT

## 2019-03-10 PROCEDURE — 87077 CULTURE AEROBIC IDENTIFY: CPT | Mod: 59

## 2019-03-10 PROCEDURE — 82330 ASSAY OF CALCIUM: CPT

## 2019-03-10 PROCEDURE — 84295 ASSAY OF SERUM SODIUM: CPT | Mod: 59

## 2019-03-10 PROCEDURE — 84484 ASSAY OF TROPONIN QUANT: CPT

## 2019-03-10 PROCEDURE — 83605 ASSAY OF LACTIC ACID: CPT

## 2019-03-10 PROCEDURE — 93010 ELECTROCARDIOGRAM REPORT: CPT | Mod: ,,, | Performed by: INTERNAL MEDICINE

## 2019-03-10 PROCEDURE — 84132 ASSAY OF SERUM POTASSIUM: CPT

## 2019-03-10 PROCEDURE — 85379 FIBRIN DEGRADATION QUANT: CPT

## 2019-03-10 PROCEDURE — 87086 URINE CULTURE/COLONY COUNT: CPT

## 2019-03-10 PROCEDURE — 85025 COMPLETE CBC W/AUTO DIFF WBC: CPT

## 2019-03-10 PROCEDURE — 83880 ASSAY OF NATRIURETIC PEPTIDE: CPT

## 2019-03-10 PROCEDURE — 83690 ASSAY OF LIPASE: CPT

## 2019-03-10 PROCEDURE — 99900035 HC TECH TIME PER 15 MIN (STAT)

## 2019-03-10 PROCEDURE — 87186 SC STD MICRODIL/AGAR DIL: CPT | Mod: 59

## 2019-03-10 PROCEDURE — 85014 HEMATOCRIT: CPT | Mod: 59

## 2019-03-10 PROCEDURE — 99285 EMERGENCY DEPT VISIT HI MDM: CPT | Mod: 25

## 2019-03-10 PROCEDURE — 87088 URINE BACTERIA CULTURE: CPT

## 2019-03-10 PROCEDURE — 25000003 PHARM REV CODE 250: Performed by: EMERGENCY MEDICINE

## 2019-03-10 PROCEDURE — 80053 COMPREHEN METABOLIC PANEL: CPT

## 2019-03-10 PROCEDURE — 93005 ELECTROCARDIOGRAM TRACING: CPT

## 2019-03-10 PROCEDURE — 93010 EKG 12-LEAD: ICD-10-PCS | Mod: ,,, | Performed by: INTERNAL MEDICINE

## 2019-03-10 RX ORDER — MORPHINE SULFATE 10 MG/ML
3 INJECTION INTRAMUSCULAR; INTRAVENOUS; SUBCUTANEOUS
Status: DISCONTINUED | OUTPATIENT
Start: 2019-03-11 | End: 2019-03-11 | Stop reason: HOSPADM

## 2019-03-10 RX ADMIN — LIDOCAINE HYDROCHLORIDE: 20 SOLUTION ORAL; TOPICAL at 10:03

## 2019-03-11 VITALS
RESPIRATION RATE: 17 BRPM | TEMPERATURE: 98 F | HEIGHT: 68 IN | HEART RATE: 71 BPM | BODY MASS INDEX: 34.86 KG/M2 | SYSTOLIC BLOOD PRESSURE: 170 MMHG | DIASTOLIC BLOOD PRESSURE: 73 MMHG | OXYGEN SATURATION: 100 % | WEIGHT: 230 LBS

## 2019-03-11 LAB — D DIMER PPP IA.FEU-MCNC: 0.33 MG/L FEU

## 2019-03-11 PROCEDURE — 25000003 PHARM REV CODE 250: Performed by: EMERGENCY MEDICINE

## 2019-03-11 RX ORDER — IBUPROFEN 400 MG/1
800 TABLET ORAL
Status: COMPLETED | OUTPATIENT
Start: 2019-03-11 | End: 2019-03-11

## 2019-03-11 RX ORDER — SULFAMETHOXAZOLE AND TRIMETHOPRIM 800; 160 MG/1; MG/1
1 TABLET ORAL 2 TIMES DAILY
Qty: 14 TABLET | Refills: 0 | Status: SHIPPED | OUTPATIENT
Start: 2019-03-11 | End: 2019-03-18

## 2019-03-11 RX ORDER — PANTOPRAZOLE SODIUM 40 MG/1
40 TABLET, DELAYED RELEASE ORAL DAILY
Qty: 30 TABLET | Refills: 0 | Status: SHIPPED | OUTPATIENT
Start: 2019-03-11 | End: 2020-01-01

## 2019-03-11 RX ORDER — SULFAMETHOXAZOLE AND TRIMETHOPRIM 800; 160 MG/1; MG/1
1 TABLET ORAL
Status: COMPLETED | OUTPATIENT
Start: 2019-03-11 | End: 2019-03-11

## 2019-03-11 RX ADMIN — SULFAMETHOXAZOLE AND TRIMETHOPRIM 1 TABLET: 800; 160 TABLET ORAL at 03:03

## 2019-03-11 RX ADMIN — IBUPROFEN 800 MG: 400 TABLET, FILM COATED ORAL at 03:03

## 2019-03-11 NOTE — ED TRIAGE NOTES
Patient presents to ED via EMS. Patient complains of having sore throat last week, now patient is complaining about neck pain radiating to ears and jaw. Patient complains of headache 6 out of 10.

## 2019-03-11 NOTE — ED PROVIDER NOTES
Encounter Date: 3/10/2019  SORT:   57 y/o female with history of HTN, HLD, DM, PE presenting for evaluation of bilateral lower extremity swelling x 4 days. She reports she is taking diiuretic but reports not compliant with meds daily. Burning throat and abdominal burning. Reports some chest discomfort just started. Denies SOB. Initial orders placed. HAJA Frye PA-C    SCRIBE #1 NOTE: I, Yannick Hargrove, am scribing for, and in the presence of,  Reilly Titus MD. I have scribed the following portions of the note - Other sections scribed: HPI and ROS.       History     Chief Complaint   Patient presents with    Leg Swelling     bilateral leg swelling; sore throat; denies fever or chills; abd pain; symptoms since 4 days     CC: Abdominal Pain    HPI: This is a 58 y.o. female PMHx pulmonary embolism, DM, HTN, HLD and PSHx hernia repair, hysterectomy, tubal ligation who presents with a 3-day history of constant burning epigastric/peribumbilical abdominal pain, chest discomfort. She also complains of sore throat that began yesterday. Pt states she has had similar chest pain in the past; however, it does not feel like heartburn which she had when she was pregnant. She has no prior episodes of similar abdominal pain.     Pt also reports productive cough, nausea and constipation for the past 2 days. Denies emesis. Denies any black or bloody stools. No known allergies. No known sick contact reported. She is able to pass flatus.     Pt reports leg swelling for the past 3 or 4 days as well. Denies smoking, EtOH, illicits.       The history is provided by the patient. No  was used.     Review of patient's allergies indicates:  No Known Allergies  Past Medical History:   Diagnosis Date    Diabetes mellitus     Hyperlipidemia     Hypertension     Neuropathy     PE (pulmonary embolism)     Pulmonary embolism 1998     Past Surgical History:   Procedure Laterality Date    CYST REMOVAL      HERNIA  REPAIR      HYSTERECTOMY      TONSILLECTOMY      TUBAL LIGATION       Family History   Problem Relation Age of Onset    Kidney disease Mother     Kidney disease Father     Stroke Maternal Uncle      Social History     Tobacco Use    Smoking status: Never Smoker    Smokeless tobacco: Never Used   Substance Use Topics    Alcohol use: No    Drug use: No     Review of Systems   Constitutional: Negative for chills, diaphoresis and fever.   HENT: Positive for sore throat. Negative for rhinorrhea.    Eyes: Negative for visual disturbance.   Respiratory: Negative for cough and shortness of breath.    Cardiovascular: Positive for chest pain.   Gastrointestinal: Positive for abdominal pain (epigastric/periumbilical). Negative for constipation, diarrhea, nausea and vomiting.   Genitourinary: Negative for dysuria.   Musculoskeletal: Negative for back pain.        (+) leg swelling   Skin: Negative for rash.   Neurological: Negative for headaches.   Psychiatric/Behavioral: Negative for confusion.   All other systems reviewed and are negative.      Physical Exam     Initial Vitals [03/10/19 1912]   BP Pulse Resp Temp SpO2   (!) 199/84 79 18 98.3 °F (36.8 °C) 99 %      MAP       --         Physical Exam    Nursing note and vitals reviewed.  Constitutional: She appears well-developed and well-nourished. She is not diaphoretic. No distress.   HENT:   Head: Normocephalic and atraumatic.   Nose: Nose normal.   Eyes: EOM are normal. Pupils are equal, round, and reactive to light. No scleral icterus.   Neck: Normal range of motion. Neck supple.   Cardiovascular: Normal rate, regular rhythm, normal heart sounds and intact distal pulses. Exam reveals no gallop and no friction rub.    No murmur heard.  Pulmonary/Chest: Breath sounds normal. No stridor. No respiratory distress. She has no wheezes. She has no rhonchi. She has no rales.   Abdominal: Soft. Normal appearance and bowel sounds are normal. She exhibits no distension.  There is tenderness. There is no rebound and no guarding.   Right lower quadrant tenderness   Musculoskeletal: Normal range of motion. She exhibits no edema or tenderness.   Neurological: She is alert and oriented to person, place, and time. No cranial nerve deficit. GCS score is 15. GCS eye subscore is 4. GCS verbal subscore is 5. GCS motor subscore is 6.   Skin: Skin is warm and dry. No rash noted.   Psychiatric: She has a normal mood and affect. Her behavior is normal.         ED Course   Procedures  Labs Reviewed   CBC W/ AUTO DIFFERENTIAL - Abnormal; Notable for the following components:       Result Value    Hemoglobin 11.8 (*)     MCH 26.2 (*)     MCHC 31.9 (*)     All other components within normal limits   COMPREHENSIVE METABOLIC PANEL - Abnormal; Notable for the following components:    Glucose 330 (*)     BUN, Bld 21 (*)     Albumin 3.2 (*)     All other components within normal limits   URINALYSIS, REFLEX TO URINE CULTURE - Abnormal; Notable for the following components:    Glucose, UA 3+ (*)     Leukocytes, UA Trace (*)     All other components within normal limits    Narrative:     Preferred Collection Type->Urine, Clean Catch   URINALYSIS MICROSCOPIC - Abnormal; Notable for the following components:    WBC, UA 15 (*)     Bacteria, UA Many (*)     All other components within normal limits    Narrative:     Preferred Collection Type->Urine, Clean Catch   ISTAT PROCEDURE - Abnormal; Notable for the following components:    POC Glucose 368 (*)     POC Anion Gap 17 (*)     All other components within normal limits   CULTURE, URINE   TROPONIN I   B-TYPE NATRIURETIC PEPTIDE   LIPASE   LACTIC ACID, PLASMA   D DIMER, QUANTITATIVE   D DIMER, QUANTITATIVE   ISTAT CHEM8          Imaging Results          CT Abdomen Pelvis  Without Contrast (Final result)  Result time 03/11/19 03:30:36   Procedure changed from CT Abdomen Pelvis With Contrast     Final result by Erick Pinedo MD (03/11/19 03:30:36)                  Impression:      No acute noncontrast findings in the abdomen or pelvis.    Stable to improved appearance of multicystic left ovary when compared back to 2015.  This could be followed with nonemergent ultrasound if the patient is postmenopausal.    Prior hysterectomy.      Electronically signed by: Erick Pinedo MD  Date:    03/11/2019  Time:    03:30             Narrative:    EXAMINATION:  CT ABDOMEN PELVIS WITHOUT CONTRAST    CLINICAL HISTORY:  RLQ pain, nausea, vomiting;    TECHNIQUE:  Low dose axial images, sagittal and coronal reformations were obtained from the lung bases to the pubic symphysis.  Oral contrast was not administered.    COMPARISON:  03/21/2018 and 11/14/2015.    FINDINGS:  Lower chest: Heart size is normal.  There are coronary artery and mitral valve calcifications.  Mild bibasilar subsegmental atelectasis or scarring.    Abdomen:    Evaluation of the solid abdominal organs and bowel is limited in the absence of IV contrast.    Liver is enlarged but stable in contour.  No focal hepatic lesion identified.  Gallbladder is contracted and not well evaluated.  No intra-or extrahepatic biliary ductal dilatation.    Spleen, adrenals, and pancreas are unremarkable.    Kidneys are symmetric.  No renal or ureteral stones. No hydronephrosis.    No distended loops of small bowel. The appendix is normal.  There is mild to moderate fecal loading noted throughout the colon.    Abdominal aorta is normal in course and caliber.  There is moderate calcific atherosclerosis involving the aorta and its major branches.    No abdominal lymphadenopathy.    No abdominal free fluid or pneumoperitoneum.    Pelvis: Cystic changes and asymmetric enlargement of the left ovary is stable to improved when compared back to 2015.  The largest cyst in the left ovary measures 2.1 cm.  This could be followed up with pelvic ultrasound as an outpatient.  Urinary bladder is unremarkable.  Uterus is surgically absent.  Rectum  appears within normal limits.  No free fluid in the pelvis.    Bones and soft tissues: No aggressive osseous lesions. There is mild generalized body wall edema.                               US Lower Extremity Veins Bilateral (Final result)  Result time 03/11/19 01:17:15    Final result by Luba Payan MD (03/11/19 01:17:15)                 Impression:      No evidence of deep venous thrombosis in either lower extremity.      Electronically signed by: Luba Payan MD  Date:    03/11/2019  Time:    01:17             Narrative:    EXAMINATION:  US LOWER EXTREMITY VEINS BILATERAL    CLINICAL HISTORY:  Other specified soft tissue disorders    TECHNIQUE:  Duplex and color flow Doppler and dynamic compression was performed of the bilateral lower extremity veins was performed.    COMPARISON:  None    FINDINGS:  Right thigh veins: The common femoral, femoral, popliteal, upper greater saphenous, and deep femoral veins are patent and free of thrombus. The veins are normally compressible and have normal phasic flow and augmentation response.    Right calf veins: The visualized calf veins are patent.    Left thigh veins: The common femoral, femoral, popliteal, upper greater saphenous, and deep femoral veins are patent and free of thrombus. The veins are normally compressible and have normal phasic flow and augmentation response.    Left calf veins: The visualized calf veins are patent.    Miscellaneous: None                               X-Ray Chest AP Portable (Final result)  Result time 03/10/19 21:02:00    Final result by Lucie Isaacs MD (03/10/19 21:02:00)                 Impression:      No acute intrathoracic abnormality detected.      Electronically signed by: Lucie Isaacs  Date:    03/10/2019  Time:    21:02             Narrative:    EXAMINATION:  AP PORTABLE CHEST    CLINICAL HISTORY:  CHF;    TECHNIQUE:  AP portable chest radiograph was submitted.    COMPARISON:  02/12/2019    FINDINGS:  AP portable chest  radiograph demonstrates a cardiac silhouette within normal limits.  There is no focal consolidation, pneumothorax, or pleural effusion.                                 Medical Decision Making:   Initial Assessment:   58-year-old female with a history of diabetes, hyperlipidemia, hypertension presenting with abdominal pain, burning, nausea, as well as throat pain. On exam, patient well-appearing in no acute distress. Mild 1+ pitting edema of her lower extremities. Some abdominal tenderness to palpation mostly in the right lower and left lower quadrant, no rebound or guarding. No peritoneal signs.  Bowel sounds normal. Differential includes GERD, pancreatitis, diverticulitis, appendicitis, UTI.  We will get labs, urinalysis, give IV fluids, GI cocktail, and reassess.  ED Management:  Workup largely unremarkable.  Possible UTI noted. Patient given prescription for Bactrim.  Abdominal pain improved with GI cocktail.  D-dimer negative, doubt dissection or PE.  Chest x-ray negative. Troponin negative, symptoms ongoing x3 days, doubt ACS.  EKG shows normal sinus rhythm, rate 81, no significant ST segment elevations or depressions concerning for acute ischemia.  Largely unchanged since 12 February.  Attempted CT scan with contrast.  Some difficulty with IV insertion.  Two ultrasound-guided IVs both infiltrated.  CT scan without contrast negative for acute pathology.  Believe she is safe for discharge home.  Discussed return precautions as well as need for primary care follow-up.            Scribe Attestation:   Scribe #1: I performed the above scribed service and the documentation accurately describes the services I performed. I attest to the accuracy of the note.    Attending Attestation:           Physician Attestation for Scribe:  Physician Attestation Statement for Scribe #1: I, Reilly Titus MD, reviewed documentation, as scribed by Yannick Hargrove in my presence, and it is both accurate and complete.                     Clinical Impression:       ICD-10-CM ICD-9-CM   1. Gastritis without bleeding, unspecified chronicity, unspecified gastritis type K29.70 535.50   2. Shortness of breath R06.02 786.05   3. Localized swelling of lower extremity M79.89 729.81   4. Acute cystitis without hematuria N30.00 595.0         Disposition:   Disposition: Discharged  Condition: Stable                        Reilly Titus MD  03/11/19 0437

## 2019-03-11 NOTE — DISCHARGE INSTRUCTIONS
You were seen in the emergency department for abdominal pain. Your labs, exam, and vitals are reassuring. We gave you some pain medication and you felt better.  We think you're safe to go home.  Please follow-up with your primary care provider.  Please return for any new or worsening pain, black or bloody stool, persistent nausea and vomiting, fevers, chills, dizziness, or you become concerned in any other way.

## 2019-03-11 NOTE — ED NOTES
Attempted 2 PIV's for patient by Nurse Holden. Dr. Titus notified. Dr. Titus verified ultrasound use to access PIV. Charge Nurse Viviana notified.

## 2019-03-23 LAB — BACTERIA UR CULT: NORMAL

## 2019-04-14 ENCOUNTER — HOSPITAL ENCOUNTER (EMERGENCY)
Facility: HOSPITAL | Age: 59
Discharge: HOME | End: 2019-04-14
Attending: EMERGENCY MEDICINE
Payer: MEDICAID

## 2019-04-14 VITALS
WEIGHT: 230 LBS | SYSTOLIC BLOOD PRESSURE: 138 MMHG | OXYGEN SATURATION: 99 % | HEART RATE: 66 BPM | TEMPERATURE: 99 F | HEIGHT: 68 IN | BODY MASS INDEX: 34.86 KG/M2 | DIASTOLIC BLOOD PRESSURE: 65 MMHG | RESPIRATION RATE: 18 BRPM

## 2019-04-14 DIAGNOSIS — N39.0 URINARY TRACT INFECTION WITHOUT HEMATURIA, SITE UNSPECIFIED: Primary | ICD-10-CM

## 2019-04-14 DIAGNOSIS — N30.90 CYSTITIS: ICD-10-CM

## 2019-04-14 LAB
BACTERIA #/AREA URNS HPF: ABNORMAL /HPF
BILIRUB UR QL STRIP: NEGATIVE
CLARITY UR: CLEAR
COLOR UR: ABNORMAL
GLUCOSE UR QL STRIP: ABNORMAL
HGB UR QL STRIP: NEGATIVE
KETONES UR QL STRIP: NEGATIVE
LEUKOCYTE ESTERASE UR QL STRIP: ABNORMAL
MICROSCOPIC COMMENT: ABNORMAL
NITRITE UR QL STRIP: NEGATIVE
PH UR STRIP: 6 [PH] (ref 5–8)
PROT UR QL STRIP: NEGATIVE
SP GR UR STRIP: 1.03 (ref 1–1.03)
SQUAMOUS #/AREA URNS HPF: 2 /HPF
URN SPEC COLLECT METH UR: ABNORMAL
UROBILINOGEN UR STRIP-ACNC: NEGATIVE EU/DL
WBC #/AREA URNS HPF: >100 /HPF (ref 0–5)
WBC CLUMPS URNS QL MICRO: ABNORMAL
YEAST URNS QL MICRO: ABNORMAL

## 2019-04-14 PROCEDURE — 99283 EMERGENCY DEPT VISIT LOW MDM: CPT

## 2019-04-14 PROCEDURE — 87088 URINE BACTERIA CULTURE: CPT

## 2019-04-14 PROCEDURE — 87086 URINE CULTURE/COLONY COUNT: CPT

## 2019-04-14 PROCEDURE — 81000 URINALYSIS NONAUTO W/SCOPE: CPT

## 2019-04-14 PROCEDURE — 87077 CULTURE AEROBIC IDENTIFY: CPT

## 2019-04-14 PROCEDURE — 25000003 PHARM REV CODE 250: Performed by: EMERGENCY MEDICINE

## 2019-04-14 PROCEDURE — 87186 SC STD MICRODIL/AGAR DIL: CPT

## 2019-04-14 RX ORDER — NITROFURANTOIN 25; 75 MG/1; MG/1
100 CAPSULE ORAL ONCE
Status: COMPLETED | OUTPATIENT
Start: 2019-04-14 | End: 2019-04-14

## 2019-04-14 RX ORDER — NITROFURANTOIN 25; 75 MG/1; MG/1
100 CAPSULE ORAL 2 TIMES DAILY
Qty: 20 CAPSULE | Refills: 0 | Status: SHIPPED | OUTPATIENT
Start: 2019-04-14 | End: 2019-04-24

## 2019-04-14 RX ORDER — IBUPROFEN 400 MG/1
800 TABLET ORAL
Status: COMPLETED | OUTPATIENT
Start: 2019-04-14 | End: 2019-04-14

## 2019-04-14 RX ADMIN — NITROFURANTOIN MONOHYDRATE/MACROCRYSTALLINE 100 MG: 25; 75 CAPSULE ORAL at 07:04

## 2019-04-14 RX ADMIN — IBUPROFEN 800 MG: 400 TABLET, FILM COATED ORAL at 06:04

## 2019-04-14 NOTE — DISCHARGE INSTRUCTIONS
Thank you for coming to our Emergency Department today. It is important to remember that some problems are difficult to diagnose and may not be found during your first visit. Be sure to follow up with your primary care doctor and review any labs/imaging that was performed with them. If you do not have a primary care doctor, you may contact the one listed on your discharge paperwork or you may also call the [facility] Appointment Desk at [phone] to schedule an appointment with one.     Return to the ER with any questions/concerns, new/concerning symptoms, worsening or failure to improve. Do not drive or make any important decisions for 24 hours if you have received any pain medications, sedatives or mood altering drugs during your ER visit.

## 2019-04-14 NOTE — ED PROVIDER NOTES
Encounter Date: 4/14/2019       History     Chief Complaint   Patient presents with    Groin Pain     L sided groin pain only when walking x2 days. Pt also reporting dysuria. Denies fever     58 y.o. female Past Medical History:  No date: Diabetes mellitus  No date: Hyperlipidemia  No date: Hypertension  No date: Neuropathy  No date: PE (pulmonary embolism)  1998: Pulmonary embolism     Notes that she had dysuria yesterday, today noted suprapubic pain, notes when she walks she feels pain into her L leg, denies n/v/d or other complaints.          Review of patient's allergies indicates:  No Known Allergies  Past Medical History:   Diagnosis Date    Diabetes mellitus     Hyperlipidemia     Hypertension     Neuropathy     PE (pulmonary embolism)     Pulmonary embolism 1998     Past Surgical History:   Procedure Laterality Date    CYST REMOVAL      HERNIA REPAIR      HYSTERECTOMY      TONSILLECTOMY      TUBAL LIGATION       Family History   Problem Relation Age of Onset    Kidney disease Mother     Kidney disease Father     Stroke Maternal Uncle      Social History     Tobacco Use    Smoking status: Never Smoker    Smokeless tobacco: Never Used   Substance Use Topics    Alcohol use: No    Drug use: No     Review of Systems   Constitutional: Negative for fever.   HENT: Negative for sore throat.    Respiratory: Negative for shortness of breath.    Cardiovascular: Negative for chest pain.   Gastrointestinal: Negative for nausea.   Genitourinary: Negative for dysuria.   Musculoskeletal: Negative for back pain.   Skin: Negative for rash.   Neurological: Negative for weakness.   Hematological: Does not bruise/bleed easily.   All other systems reviewed and are negative.  +dysuria    Physical Exam     Initial Vitals [04/14/19 1826]   BP Pulse Resp Temp SpO2   (!) 178/78 62 16 98.4 °F (36.9 °C) 98 %      MAP       --         Physical Exam    Nursing note and vitals reviewed.  Constitutional: She appears  well-developed and well-nourished.   HENT:   Head: Normocephalic and atraumatic.   Eyes: Conjunctivae and EOM are normal. Pupils are equal, round, and reactive to light.   Neck: Normal range of motion.   Cardiovascular: Normal rate.   Pulmonary/Chest: No respiratory distress.   Abdominal: She exhibits no distension.   Musculoskeletal: Normal range of motion.   Neurological: She is alert. No cranial nerve deficit. GCS score is 15. GCS eye subscore is 4. GCS verbal subscore is 5. GCS motor subscore is 6.   Skin: Skin is warm and dry.   Psychiatric: She has a normal mood and affect. Thought content normal.     +suprapubic ttp, no ttp b/l LQ    ED Course   Procedures  Labs Reviewed   URINALYSIS, REFLEX TO URINE CULTURE          Imaging Results    None          Medical Decision Making:   Initial Assessment:   Given recent dysuria and now suprapubic ttp/pain will treat for cystitis.                      Clinical Impression:       ICD-10-CM ICD-9-CM   1. Urinary tract infection without hematuria, site unspecified N39.0 599.0   2. Cystitis N30.90 595.9                                Bud Briscoe MD  04/14/19 [id number]

## 2019-04-15 NOTE — ED TRIAGE NOTES
Patient presented to the ED stating that she has left sided groin pain that hurts when she walks for the last 2 days. Patient stated that she has pain when she urinates. Patient stated being nauseous at times but denies any vomiting. Patient denies any ELIZABETH or dizziness.

## 2019-04-16 LAB — BACTERIA UR CULT: NORMAL

## 2019-06-05 NOTE — ED PROVIDER NOTES
"Encounter Date: 10/13/2018    SCRIBE #1 NOTE: I, Viktor Ferguson, am scribing for, and in the presence of, Dr. Zavala .       History     Chief Complaint   Patient presents with    Headache     to the top of the head w/ dizziness, SOB and chest pain starting today, head pain has been going on for months     Time seen by provider: 9:52 PM    This is a 58 y.o. female who presents with complaint of intermittent, stabbing, headaches that has been present for ten months. Onset of these headaches reportedly began after the death of her son in July 2017. She states the headaches have worsened in the past two months. Patient states she went to work today, completed a workout, felt nauseous after, and ate a small bowl of food. She states she went to lay down and began experiencing a headache. She states the headache has been constant ever since onset. 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 Patient reports that she experiences these symptoms when she is stressed. She has not been evaluated by neurology. She reportedly presented to [facility] last month, had a CT of her head completed, and states the results were negative. She states "there is a problem with my discs and they put me on a muscle relaxer". She has not been experiencing fevers, chills, congestion, rhinorrhea, sore throat, cough, SOB, chest pain, abdominal pain, vomiting, diarrhea, dysuria, urinary frequency, or urinary urgency. She denies use of alcohol, tobacco, or illicit drugs.         The history is provided by the patient.     Review of patient's allergies indicates:   Allergen Reactions    Codeine      Past Medical History:   Diagnosis Date    Diabetes mellitus     Hyperlipidemia     Hypertension     Neuropathy     PE (pulmonary embolism)     Pulmonary embolism 1998     Past Surgical History:   Procedure Laterality Date    CYST REMOVAL      HERNIA REPAIR      HYSTERECTOMY      TONSILLECTOMY      TUBAL LIGATION       Family History "   Problem Relation Age of Onset    Kidney disease Mother     Kidney disease Father     Stroke Maternal Uncle      Social History     Tobacco Use    Smoking status: Never Smoker    Smokeless tobacco: Never Used   Substance Use Topics    Alcohol use: No    Drug use: No     Review of Systems   Constitutional: Negative for chills and fever.   HENT: Negative for congestion, rhinorrhea and sore throat.    Respiratory: Negative for cough and shortness of breath.    Cardiovascular: Negative for chest pain.   Gastrointestinal: Positive for nausea (resolved). Negative for abdominal pain, diarrhea and vomiting.   Genitourinary: Negative for dysuria, frequency and urgency.   Musculoskeletal: Negative for back pain.   Skin: Negative for rash.   Neurological: Positive for headaches. Negative for dizziness.   Psychiatric/Behavioral: Negative for confusion.       Physical Exam     Initial Vitals [10/13/18 1845]   BP Pulse Resp Temp SpO2   (!) 170/72 74 20 98.8 °F (37.1 °C) 99 %      MAP       --         Physical Exam    Nursing note and vitals reviewed.  Constitutional: She appears well-developed and well-nourished. She is cooperative.  Non-toxic appearance. No distress.   HENT:   Head: Normocephalic and atraumatic.   Mouth/Throat: Oropharynx is clear and moist.   Eyes: Conjunctivae and EOM are normal. Pupils are equal, round, and reactive to light.   Neck: Normal range of motion and full passive range of motion without pain. Neck supple.   Cardiovascular: Normal rate, regular rhythm, normal heart sounds and normal pulses.   Pulmonary/Chest: Effort normal and breath sounds normal. No respiratory distress.   Abdominal: Normal appearance.   Musculoskeletal: Normal range of motion. She exhibits no edema.   Neurological: She is alert and oriented to person, place, and time. She has normal strength. No cranial nerve deficit or sensory deficit.   Skin: Skin is warm, dry and intact.   Psychiatric: She has a normal mood and affect.  Her speech is normal and behavior is normal. Judgment and thought content normal.         ED Course   Procedures  Labs Reviewed   URINALYSIS - Abnormal; Notable for the following components:       Result Value    Glucose, UA 3+ (*)     All other components within normal limits   URINALYSIS MICROSCOPIC - Abnormal; Notable for the following components:    Bacteria, UA Few (*)     All other components within normal limits   CBC W/ AUTO DIFFERENTIAL - Abnormal; Notable for the following components:    MCV 81 (*)     MCH 26.6 (*)     All other components within normal limits   COMPREHENSIVE METABOLIC PANEL - Abnormal; Notable for the following components:    Potassium 3.4 (*)     CO2 22 (*)     Glucose 193 (*)     BUN, Bld 25 (*)     Albumin 2.9 (*)     All other components within normal limits    Narrative:     Recoll. [id number] by LELIA at 10/14/2018 01:06, reason: Specimen   hemolyzed. notified Tobias Lennon RN   POCT GLUCOSE - Abnormal; Notable for the following components:    POCT Glucose 346 (*)     All other components within normal limits   ISTAT PROCEDURE - Abnormal; Notable for the following components:    POC PCO2 46.9 (*)     POC PO2 33 (*)     POC HCO3 28.1 (*)     POC SATURATED O2 63 (*)     All other components within normal limits   POCT GLUCOSE - Abnormal; Notable for the following components:    POCT Glucose 181 (*)     All other components within normal limits   BETA - HYDROXYBUTYRATE, SERUM     EKG Readings: (Independently Interpreted)   Initial Reading: No STEMI.   Normal sinus rhythm at a rate of 74.        Imaging Results    None          Medical Decision Making:   History:   Old Medical Records: I decided to obtain old medical records.  Old Records Summarized: records from another hospital.       <> Summary of Records: On review of patient's medical records, she had a CT of her head completed on 5/22/2014, 2/18/2015, 7/30/2017, 3/21/2018, and 9/7/2018 that were all negative.   Clinical Tests:   Lab  Tests: Ordered and Reviewed  Medical Tests: Ordered and Reviewed            Scribe Attestation:   Scribe #1: I performed the above scribed service and the documentation accurately describes the services I performed. I attest to the accuracy of the note.    Attending Attestation:           Physician Attestation for Scribe:  Physician Attestation Statement for Scribe #1: I, Dr. Whitaker, reviewed documentation, as scribed by Viktor Ferguson  in my presence, and it is both accurate and complete.         Attending ED Notes:   Emergent evaluation a 58-year-old female with complaint of intermittent headaches for the past 10 months.  Patient is afebrile, nontoxic-appearing stable vital signs. Patient is neurovascularly intact without focal neurologic deficits.  Patient has had multiple CT scans over the past few years with no acute findings.  I did review patient's medical record in epic.  No acute findings on CMP except for potassium of 3.4, BUN of 25, albumin of 2.9 and blood glucose of of 193.  Patient has no elevation of white blood cell count.  H&H within normal limits.  Beta hydroxybutyrate is 0.1.  PH is 7.386.  Urinary analysis reveals 3+ sugar.  The patient is extensively counseled her diagnosis and treatment including all diagnostic, laboratory and physical exam findings.  The patient is discharged in good condition and directed to follow up with her PCP and Neurology in the next 24-48 hours.             Clinical Impression:     1. Migraine without status migrainosus, not intractable, unspecified migraine type    2. Hyperglycemia    3. Dehydration    4. Hypokalemia                                   Kong Whitaker MD  10/16/18 6960     Include Z78.9 (Other Specified Conditions Influencing Health Status) As An Associated Diagnosis?: Yes Render Note In Bullet Format When Appropriate: No Medical Necessity Clause: This procedure was medically necessary because the lesions that were treated were: Medical Necessity Information: It is in your best interest to select a reason for this procedure from the list below. All of these items fulfill various CMS LCD requirements except the new and changing color options. Post-Care Instructions: I reviewed with the patient in detail post-care instructions. Patient is to wear sunprotection, and avoid picking at any of the treated lesions. Pt may apply Vaseline to crusted or scabbing areas. Detail Level: Detailed Consent: The patient's consent was obtained including but not limited to risks of crusting, scabbing, blistering, scarring, darker or lighter pigmentary change, recurrence, incomplete removal and infection.

## 2019-10-04 DIAGNOSIS — M54.50 LOW BACK PAIN: Primary | ICD-10-CM

## 2019-11-05 ENCOUNTER — CLINICAL SUPPORT (OUTPATIENT)
Dept: REHABILITATION | Facility: HOSPITAL | Age: 59
End: 2019-11-05
Payer: MEDICAID

## 2019-11-05 DIAGNOSIS — R26.9 GAIT ABNORMALITY: ICD-10-CM

## 2019-11-05 DIAGNOSIS — M54.50 CHRONIC BILATERAL LOW BACK PAIN WITHOUT SCIATICA: ICD-10-CM

## 2019-11-05 DIAGNOSIS — R29.898 DECREASED STRENGTH OF LOWER EXTREMITY: ICD-10-CM

## 2019-11-05 DIAGNOSIS — R29.898 DECREASED STRENGTH OF TRUNK AND BACK: ICD-10-CM

## 2019-11-05 DIAGNOSIS — G89.29 CHRONIC BILATERAL LOW BACK PAIN WITHOUT SCIATICA: ICD-10-CM

## 2019-11-05 DIAGNOSIS — M53.86 DECREASED ROM OF LUMBAR SPINE: ICD-10-CM

## 2019-11-05 PROCEDURE — 97110 THERAPEUTIC EXERCISES: CPT | Mod: PN

## 2019-11-05 PROCEDURE — 97161 PT EVAL LOW COMPLEX 20 MIN: CPT | Mod: PN

## 2019-11-08 PROBLEM — G89.29 CHRONIC BILATERAL LOW BACK PAIN WITHOUT SCIATICA: Status: ACTIVE | Noted: 2019-11-08

## 2019-11-08 PROBLEM — R29.898 DECREASED STRENGTH OF TRUNK AND BACK: Status: ACTIVE | Noted: 2019-11-08

## 2019-11-08 PROBLEM — R26.9 GAIT ABNORMALITY: Status: ACTIVE | Noted: 2019-11-08

## 2019-11-08 PROBLEM — M54.50 CHRONIC BILATERAL LOW BACK PAIN WITHOUT SCIATICA: Status: ACTIVE | Noted: 2019-11-08

## 2019-11-08 PROBLEM — R29.898 DECREASED STRENGTH OF LOWER EXTREMITY: Status: ACTIVE | Noted: 2019-11-08

## 2019-11-08 PROBLEM — M53.86 DECREASED ROM OF LUMBAR SPINE: Status: ACTIVE | Noted: 2019-11-08

## 2019-11-08 NOTE — PLAN OF CARE
"[facility]  Physical Therapy Initial Evaluation    Name: Devika Bustillo  Clinic Number: [id number]    Therapy Diagnosis:   Encounter Diagnoses   Name Primary?    Decreased strength of trunk and back     Decreased strength of lower extremity     Chronic bilateral low back pain without sciatica     Gait abnormality     Decreased ROM of lumbar spine      Physician: Hoang Culver MD    Physician Orders: PT Eval and Treat   Medical Diagnosis from Referral: M54.5 (ICD-10-CM) - Low back pain  Evaluation Date: 11/5/2019  Authorization Period Expiration: 10/3/2020  Plan of Care Expiration: 2/5/2020  Visit # / Visits authorized: 1/ 20    Time In: 1110  Time Out: 1255  Total Billable Time: 45 minutes    Precautions: Standard and Diabetes    Subjective   Date of onset: Chronic, increased in last 6 months  History of current condition - Devika reports: history of back pain that hurts "when I lean back". States "when I walk, pain goes into my butt with every step". Pt reports she had two falls and since back pain has always been there states "I have never been the same". Pt reports history of MRI at one time and reports "it said I have a crack at the bottom of my discs". First fall was in May 2019 and second fall was in June, went to the ER took an X-Ray and didn't see anything. Pt states she has a treadmill at home that she tries to get on and use. Pain is reported in bilateral posterior hips and moves up into back, pain into legs sometimes but not as frequently as back pain. Denies pain below knees.      Medical History:   Past Medical History:   Diagnosis Date    Diabetes mellitus     Hyperlipidemia     Hypertension     Neuropathy     PE (pulmonary embolism)     Pulmonary embolism 1998       Surgical History:   Devika Bustillo  has a past surgical history that includes Tonsillectomy; Hernia repair; Hysterectomy; Tubal ligation; and Cyst Removal.    Medications:   Devika has a current " medication list which includes the following prescription(s): gabapentin, insulin aspart u-100, insulin glargine (toujeo), lisinopril, pantoprazole, and pravastatin.    Allergies:   Review of patient's allergies indicates:  No Known Allergies     Imaging, no recent imagine of low back in chart    Prior Therapy: PT last year from July- I closed my case   Social History: lives alone, I got on ladders and counterops when moving into my new house  Occupation: Working part time, requires sitting a lot it hurts  Prior Level of Function: Prior to two falls had back pain about 3-4/ not limited in any activities  Current Level of Function: limited in walking, sitting, standing, and working out    Pain:  Current 10/10, worst 10/10, best 7/10   Location: bilateral low back   Description: Stabbing, back leg and feet  Aggravating Factors: Standing and sitting for a long time, when I get up it hurts in a standing position  Easing Factors: rest    Pts goals: I want to do for myself and be able to go to the gym     Objective     Observation: Pt with antalgic gait on B LE, decreased step length bilaterally, increased AMANDA    Lumbar Range of Motion:    %   Flexion 40     Extension 0   Left Side Bending 10% !!   Right Side Bending 10%   Left rotation   25% !!   Right Rotation   25% !     Lower Extremity Strength    Right LE  Left LE    Hip flexion: 4/5 Hip flexion: 4/5   Knee extension: 4/5 Knee extension: 4/5   Knee flexion: 4/5 Knee flexion: 4-/5   Hip extension:  NT Hip extension: NT   Hip abduction: 4/5 seated Hip abduction: 4/5 seated   Hip adduction: 4/5 seated Hip adduction 4/5 seated   Ankle dorsiflexion: 4/5 Ankle dorsiflexion: 4-/5     Special Tests:  -Repeated Flexion: positive, decreased pain in supine  -Repeated Ext:positive, increased pain in standing  -Piriformis Test: positive, bilaterally  -Bridge Test: positive    Neuro Dynamic Testing:    Sciatic nerve:      SLR: negative    Tibial bias: negative    Common Peroneal  bias: negative    Palpation: Increased TTP to B QL, paraspinals, and piriformis in standing    Flexibility: Decreased flexibility to B hamstrings, gastrocs, and B piriformis    Popliteal Angle: R = 40 degrees ; L = 40 degrees     PT Evaluation Completed? Yes  Discussed Plan of Care with patient: Yes    CMS Impairment/Limitation/Restriction for FOTO Lumbar Survey    Therapist reviewed FOTO scores for Devika Bustillo on 11/5/2019.   FOTO documents entered into [employer] - see Media section.    Limitation Score: 62%  Category: Mobility    Current : CL = least 60% but < 80% impaired, limited or restricted  Goal: CK = at least 40% but < 60% impaired, limited or restricted         TREATMENT   Treatment Time In: 1140  Treatment Time Out: 1155  Total Treatment time separate from Evaluation: 15 minutes    Devika received therapeutic exercises to develop strength, ROM and core stabilization for 15 minutes including:    +SKTC 10x10 sec ea  +TrA activation 10x10 sec  +LTR x 20    Home Exercises and Patient Education Provided    Education provided:   - role of PT, plan of care, goals    Written Home Exercises Provided: yes.  Exercises were reviewed and Devika was able to demonstrate them prior to the end of the session.  Devika demonstrated good  understanding of the education provided.     See EMR under Patient Instructions for exercises provided 11/5/2019.    Assessment   Devika is a 59 y.o. female referred to outpatient Physical Therapy with a medical diagnosis of low back pain. Pt presents with deficits in B LE strength, pain free lumbar ROM, flexibility, core stabilization, gait, and functional mobility. Pt with decreased pain with LE flexion in supine and significant increase in pain with standing lumbar extension. Pt would benefit from skilled PT services in order to address global muscle imbalance, improve core stabilization, decrease low back pain, and maximize functional independence. Pt appears motivated to participate in exercise  to improve her functional mobility and would like to return to general wellness routine following PT discharge.     Pt prognosis is Good.   Pt will benefit from skilled outpatient Physical Therapy to address the deficits stated above and in the chart below, provide pt/family education, and to maximize pt's level of independence.     Plan of care discussed with patient: Yes  Pt's spiritual, cultural and educational needs considered and patient is agreeable to the plan of care and goals as stated below:     Anticipated Barriers for therapy: intensity of reported pain, chronicity of pain    Medical Necessity is demonstrated by the following  History  Co-morbidities and personal factors that may impact the plan of care Co-morbidities:   diabetes and HTN    Personal Factors:   no deficits     low   Examination  Body Structures and Functions, activity limitations and participation restrictions that may impact the plan of care Body Regions:   lower extremities  trunk    Body Systems:    ROM  strength  balance  gait  transfers  motor control    Participation Restrictions:   Ability to exercise without increased low back pain    Activity limitations:   Learning and applying knowledge  no deficits    General Tasks and Commands  no deficits    Communication  no deficits    Mobility  walking    Self care  no deficits    Domestic Life  shopping  cooking  doing house work (cleaning house, washing dishes, laundry)  assisting others    Interactions/Relationships  no deficits    Life Areas  no deficits    Community and Social Life  no deficits         Complexity: low  See FOTO outcome assessment   Clinical Presentation stable and uncomplicated low   Decision Making/ Complexity Score: low     GOALS: Short Term Goals:  4 weeks  1. Report decreased in pain at worse less than  <   / =  6/10  to increase tolerance for functional activities  2. Pt to increase B popliteal angle by greater than > or = 10 degrees in order to improve  flexibility and posture.   3. Increased B LE MMT 1/2  to increase tolerance for ADL and work activities.  4. Pt to tolerate HEP to improve ROM and independence with ADL's  5. Pt to improve range of motion by 25% to allow for improved functional mobility to allow for improvement in IADLs.     Long Term Goals: 8 weeks  1. Report decreased in pain at worse less than  <   / =  2/10  to increase tolerance for functional mobility.   2 .Pt to increase B popliteal angle by greater than > or = 20 degrees in order to improve flexibility and posture.   3. Increased B LE MMT 1 grade to increase tolerance for ADL and work activities.  4. Pt will report at 49% or less limitation on FOTO Lumbar Survey to demonstrate decrease in disability and improvement in back pain.  5. Pt to be Independent with HEP to improve ROM and independence with ADL.  6. Pt to demonstrate negative Bridge Test in order to show improved core strength for lumbar stabilization.   7. Pt to improve range of motion by 75% to allow for improved functional mobility to allow for improvement in IADLs.     Plan   Plan of care Certification: 11/5/2019 to 2/5/2019.    Outpatient Physical Therapy 2 times weekly for 8 weeks to include the following interventions: Gait Training, Manual Therapy, Moist Heat/ Ice, Neuromuscular Re-ed, Patient Education, Self Care, Therapeutic Activites and Therapeutic Exercise.     Soumya Perez, PT, DPT  11/5/2019

## 2019-11-14 ENCOUNTER — CLINICAL SUPPORT (OUTPATIENT)
Dept: REHABILITATION | Facility: HOSPITAL | Age: 59
End: 2019-11-14
Payer: MEDICAID

## 2019-11-14 DIAGNOSIS — M53.86 DECREASED ROM OF LUMBAR SPINE: ICD-10-CM

## 2019-11-14 DIAGNOSIS — M54.50 CHRONIC BILATERAL LOW BACK PAIN WITHOUT SCIATICA: ICD-10-CM

## 2019-11-14 DIAGNOSIS — G89.29 CHRONIC BILATERAL LOW BACK PAIN WITHOUT SCIATICA: ICD-10-CM

## 2019-11-14 DIAGNOSIS — R29.898 DECREASED STRENGTH OF TRUNK AND BACK: ICD-10-CM

## 2019-11-14 DIAGNOSIS — R29.898 DECREASED STRENGTH OF LOWER EXTREMITY: ICD-10-CM

## 2019-11-14 DIAGNOSIS — R26.9 GAIT ABNORMALITY: ICD-10-CM

## 2019-11-14 PROCEDURE — 97110 THERAPEUTIC EXERCISES: CPT | Mod: PN

## 2019-11-14 PROCEDURE — 97140 MANUAL THERAPY 1/> REGIONS: CPT | Mod: PN

## 2019-11-14 NOTE — PROGRESS NOTES
"  Physical Therapy Daily Treatment Note     Name: Devika Bustillo  Clinic Number: [id number]    Therapy Diagnosis:   Encounter Diagnoses   Name Primary?    Decreased strength of trunk and back     Decreased strength of lower extremity     Chronic bilateral low back pain without sciatica     Gait abnormality     Decreased ROM of lumbar spine      Physician: Hoang Culver MD    Visit Date: 11/14/2019    Physician Orders: PT Eval and Treat   Medical Diagnosis from Referral: M54.5 (ICD-10-CM) - Low back pain  Evaluation Date: 11/5/2019  Authorization Period Expiration: 10/3/2020  Plan of Care Expiration: 2/5/2020  Visit # / Visits authorized: 2/ 20  PN Due: 12-5-19       Time In: 1200  Time Out: 1302  Total Billable Time: 60 minutes    Precautions: Standard and Diabetes       Subjective     Pt reports: constant low back pain.  She was compliant with home exercise program.  Response to previous treatment: mild decrease in pain  Functional change: none to note yet    Pain: 7/10  Location: bilateral low back      Objective       Devika received therapeutic exercises to develop strength, ROM and core stabilization for 40 minutes including:     Bike or Nustep x6'  Standing gastroc stretch 3 x 30"  Seated lumbar flexion roll outs with yoga ball 10 x 10" hold  Supine hamstring stretch 3 x 30" each  Supine piriformis stretch 3 x 30" each  DKTC x 20  SKTC 10x10 sec ea  TrA activation 10x10 sec  LTR x 20  PPT x 15  Marching with PPT x 15    SLR x 10 NP, try next visit if able  Bridges x 10 NP, try next visit if able      Devika received the following manual therapy techniques: Soft tissue Mobilization were applied to the: B lumbar region for 10 minutes, including:  STM using theraroller   Manual TPR        Devika received hot pack for 10 minutes to B low back.        Home Exercises Provided and Patient Education Provided     Education provided:   Cont to perform HEP as provided.   +place pillow under knees when supine to " decrease low back strain    Written Home Exercises Provided: Patient instructed to cont prior HEP.  Exercises were reviewed and Devika was able to demonstrate them prior to the end of the session.  Devika demonstrated good  understanding of the education provided.     See EMR under Patient Instructions for exercises provided 11/5/19.    Assessment     Pt tolerated 1st treatment session post PT eval well. Pt with reports of constant pain, however stretching this session seemed to decrease her pain. Pt demonstrates significant B piriformis and hamstring tightness with good response to stretching. Today's session also focused on lumbar flexion and core strengthening exercises. Notable low back and piriformis tenderness with STM and TPR. Pt able to express relief during/after manual therapy and MHP.    Devika is progressing well towards her goals.   Pt prognosis is Good.     Pt will continue to benefit from skilled outpatient physical therapy to address the deficits listed in the problem list box on initial evaluation, provide pt/family education and to maximize pt's level of independence in the home and community environment.     Pt's spiritual, cultural and educational needs considered and pt agreeable to plan of care and goals.    Anticipated Barriers for therapy: intensity of reported pain, chronicity of pain     GOALS:   Short Term Goals:  4 weeks  1. Report decreased in pain at worse less than  <   / =  6/10  to increase tolerance for functional activities (In Progress)  2. Pt to increase B popliteal angle by greater than > or = 10 degrees in order to improve flexibility and posture. (In Progress)  3. Increased B LE MMT 1/2  to increase tolerance for ADL and work activities. (In Progress)  4. Pt to tolerate HEP to improve ROM and independence with ADL's (In Progress)  5. Pt to improve range of motion by 25% to allow for improved functional mobility to allow for improvement in IADLs.  (In Progress)     Long Term  Goals: 8 weeks  1. Report decreased in pain at worse less than  <   / =  2/10  to increase tolerance for functional mobility.   2 .Pt to increase B popliteal angle by greater than > or = 20 degrees in order to improve flexibility and posture.   3. Increased B LE MMT 1 grade to increase tolerance for ADL and work activities.  4. Pt will report at 49% or less limitation on FOTO Lumbar Survey to demonstrate decrease in disability and improvement in back pain.  5. Pt to be Independent with HEP to improve ROM and independence with ADL.  6. Pt to demonstrate negative Bridge Test in order to show improved core strength for lumbar stabilization.   7. Pt to improve range of motion by 75% to allow for improved functional mobility to allow for improvement in IADLs.       Plan     Plan of care Certification: 11/5/2019 to 2/5/2019.     Outpatient Physical Therapy 2 times weekly for 8 weeks to include the following interventions: Gait Training, Manual Therapy, Moist Heat/ Ice, Neuromuscular Re-ed, Patient Education, Self Care, Therapeutic Activites and Therapeutic Exercise.       Cont skilled PT session towards PT and patient's goals.    Lily Catalan, PTA   11/14/2019

## 2019-11-20 ENCOUNTER — CLINICAL SUPPORT (OUTPATIENT)
Dept: REHABILITATION | Facility: HOSPITAL | Age: 59
End: 2019-11-20
Payer: MEDICAID

## 2019-11-20 DIAGNOSIS — R29.898 DECREASED STRENGTH OF TRUNK AND BACK: ICD-10-CM

## 2019-11-20 DIAGNOSIS — R26.9 GAIT ABNORMALITY: ICD-10-CM

## 2019-11-20 DIAGNOSIS — M53.86 DECREASED ROM OF LUMBAR SPINE: ICD-10-CM

## 2019-11-20 DIAGNOSIS — R29.898 DECREASED STRENGTH OF LOWER EXTREMITY: ICD-10-CM

## 2019-11-20 DIAGNOSIS — G89.29 CHRONIC BILATERAL LOW BACK PAIN WITHOUT SCIATICA: ICD-10-CM

## 2019-11-20 DIAGNOSIS — M54.50 CHRONIC BILATERAL LOW BACK PAIN WITHOUT SCIATICA: ICD-10-CM

## 2019-11-20 PROCEDURE — 97110 THERAPEUTIC EXERCISES: CPT | Mod: PN

## 2019-11-20 PROCEDURE — 97140 MANUAL THERAPY 1/> REGIONS: CPT | Mod: PN

## 2019-11-20 NOTE — PROGRESS NOTES
"  Physical Therapy Daily Treatment Note     Name: Devika Bustillo  Clinic Number: [id number]    Therapy Diagnosis:   Encounter Diagnoses   Name Primary?    Decreased strength of trunk and back     Decreased strength of lower extremity     Chronic bilateral low back pain without sciatica     Gait abnormality     Decreased ROM of lumbar spine      Physician: Hoang Culver MD    Visit Date: 11/20/2019    Physician Orders: PT Eval and Treat   Medical Diagnosis from Referral: M54.5 (ICD-10-CM) - Low back pain  Evaluation Date: 11/5/2019  Authorization Period Expiration: 10/3/2020  Plan of Care Expiration: 2/5/2020  Visit # / Visits authorized: 3/ 20  PN Due: 12-5-19       Time In: 0900  Time Out: 1004  Total Billable Time: 60 minutes    Precautions: Standard and Diabetes       Subjective     Pt reports: continued pain but reports not as intense.   She was compliant with home exercise program.  Response to previous treatment: mild decrease in pain  Functional change: none to note yet    Pain: 7/10  Location: bilateral low back      Objective       Devika received therapeutic exercises to develop strength, ROM and core stabilization for 44 minutes including:     Bike or Nustep x6'  Standing gastroc stretch 3 x 30"  Seated lumbar flexion roll outs with yoga ball 10 x 10" hold  Supine hamstring stretch 3 x 30" each  Supine piriformis stretch 3 x 30" each  DKTC x 20  SKTC 10x10 sec ea  TrA activation 10x10 sec  LTR x 20  PPT x 15  Marching with PPT x 15    +SLR x 10   +Bridges x 10       Devika received the following manual therapy techniques: Soft tissue Mobilization were applied to the: B lumbar region for 10 minutes, including:  STM using theraroller   Manual TPR        Devika received hot pack for 10 minutes to B low back.        Home Exercises Provided and Patient Education Provided     Education provided:   Cont to perform HEP as provided.   +place pillow under knees when supine to decrease low back strain    Written " Home Exercises Provided: Patient instructed to cont prior HEP.  Exercises were reviewed and Devika was able to demonstrate them prior to the end of the session.  Devika demonstrated good  understanding of the education provided.     See EMR under Patient Instructions for exercises provided 11/5/19.    Assessment     Pt tolerated treatment well with no adverse reaction. Pt with reports of constant pain, however pain is less intense since last session. Pt continues to demonstrate significant B piriformis and hamstring tightness with good response to stretching. Added SLR and bridges with emphasis on core activation this visit. Notable low back and piriformis tenderness with STM and TPR. Pt able to express relief during/after manual therapy and MHP.    Devika is progressing well towards her goals.   Pt prognosis is Good.     Pt will continue to benefit from skilled outpatient physical therapy to address the deficits listed in the problem list box on initial evaluation, provide pt/family education and to maximize pt's level of independence in the home and community environment.     Pt's spiritual, cultural and educational needs considered and pt agreeable to plan of care and goals.    Anticipated Barriers for therapy: intensity of reported pain, chronicity of pain     GOALS:   Short Term Goals:  4 weeks  1. Report decreased in pain at worse less than  <   / =  6/10  to increase tolerance for functional activities (In Progress)  2. Pt to increase B popliteal angle by greater than > or = 10 degrees in order to improve flexibility and posture. (In Progress)  3. Increased B LE MMT 1/2  to increase tolerance for ADL and work activities. (In Progress)  4. Pt to tolerate HEP to improve ROM and independence with ADL's (In Progress)  5. Pt to improve range of motion by 25% to allow for improved functional mobility to allow for improvement in IADLs.  (In Progress)     Long Term Goals: 8 weeks  1. Report decreased in pain at worse  less than  <   / =  2/10  to increase tolerance for functional mobility.   2 .Pt to increase B popliteal angle by greater than > or = 20 degrees in order to improve flexibility and posture.   3. Increased B LE MMT 1 grade to increase tolerance for ADL and work activities.  4. Pt will report at 49% or less limitation on FOTO Lumbar Survey to demonstrate decrease in disability and improvement in back pain.  5. Pt to be Independent with HEP to improve ROM and independence with ADL.  6. Pt to demonstrate negative Bridge Test in order to show improved core strength for lumbar stabilization.   7. Pt to improve range of motion by 75% to allow for improved functional mobility to allow for improvement in IADLs.       Plan     Plan of care Certification: 11/5/2019 to 2/5/2019.     Outpatient Physical Therapy 2 times weekly for 8 weeks to include the following interventions: Gait Training, Manual Therapy, Moist Heat/ Ice, Neuromuscular Re-ed, Patient Education, Self Care, Therapeutic Activites and Therapeutic Exercise.       Cont skilled PT session towards PT and patient's goals.    Lily Catalan, PTA   11/20/2019

## 2019-11-21 ENCOUNTER — CLINICAL SUPPORT (OUTPATIENT)
Dept: REHABILITATION | Facility: HOSPITAL | Age: 59
End: 2019-11-21
Payer: MEDICAID

## 2019-11-21 DIAGNOSIS — M54.50 CHRONIC BILATERAL LOW BACK PAIN WITHOUT SCIATICA: ICD-10-CM

## 2019-11-21 DIAGNOSIS — M53.86 DECREASED ROM OF LUMBAR SPINE: ICD-10-CM

## 2019-11-21 DIAGNOSIS — R29.898 DECREASED STRENGTH OF TRUNK AND BACK: ICD-10-CM

## 2019-11-21 DIAGNOSIS — R29.898 DECREASED STRENGTH OF LOWER EXTREMITY: ICD-10-CM

## 2019-11-21 DIAGNOSIS — G89.29 CHRONIC BILATERAL LOW BACK PAIN WITHOUT SCIATICA: ICD-10-CM

## 2019-11-21 DIAGNOSIS — R26.9 GAIT ABNORMALITY: ICD-10-CM

## 2019-11-21 PROCEDURE — 97124 MASSAGE THERAPY: CPT | Mod: PN

## 2019-11-21 PROCEDURE — 97110 THERAPEUTIC EXERCISES: CPT | Mod: PN

## 2019-11-21 NOTE — PROGRESS NOTES
"  Physical Therapy Daily Treatment Note     Name: Devika Bustillo  Clinic Number: [id number]    Therapy Diagnosis:   No diagnosis found.  Physician: Hoang Culver MD    Visit Date: 11/21/2019    Physician Orders: PT Eval and Treat   Medical Diagnosis from Referral: M54.5 (ICD-10-CM) - Low back pain  Evaluation Date: 11/5/2019  Authorization Period Expiration: 10/3/2020  Plan of Care Expiration: 2/5/2020  Visit # / Visits authorized: 3/ 20  PN Due: 12-5-19       Time In: 1005  Time Out: 1100  Total Billable Time: 30 minutes with 1 other    Precautions: Standard and Diabetes       Subjective     Pt reports: feeling much better. " Therapy is really helping me.  I'm begininng to feel much better.  Do you think that it's ok for me to return to the gym."  She was compliant with home exercise program.  Response to previous treatment: mild decrease in pain  Functional change: none to note yet    Pain: 7/10  Location: bilateral low back      Objective     Devika received therapeutic exercises to develop strength, ROM and core stabilization for 44 minutes including:     Bike or Nustep x6'  Standing gastroc stretch 3 x 30"  Seated lumbar flexion roll outs with yoga ball 10 x 10" hold  Supine hamstring stretch 3 x 30" each  Supine piriformis stretch 3 x 30" each  DKTC x 20  SKTC 10x10 sec ea  TrA activation 10x10 sec  LTR x 20  PPT x 15  Marching with PPT x 15    +SLR x 10   +Bridges x 10       Devika received the following manual therapy techniques: Soft tissue Mobilization were applied to the: B lumbar region for 10 minutes, including:  STM using theraroller   Manual TPR        Devika received hot pack for 10 minutes to B low back.        Home Exercises Provided and Patient Education Provided     Education provided:   Cont to perform HEP as provided.   +place pillow under knees when supine to decrease low back strain    Written Home Exercises Provided: Patient instructed to cont prior HEP.  Exercises were reviewed and Devika was " able to demonstrate them prior to the end of the session.  Devika demonstrated good  understanding of the education provided.     See EMR under Patient Instructions for exercises provided 11/5/19.    Assessment     Pt tolerated treatment well with no adverse reaction. Pt with reports of constant pain, however pain is less intense /p tx. Pt continues to demonstrate significant B piriformis and hamstring tightness with good response to stretching. Added SLR and bridges with emphasis on core activation this visit. Notable low back and piriformis tenderness with STM and TPR. Pt able to express relief during/after manual therapy.    Devika is progressing well towards her goals.   Pt prognosis is Good.     Pt will continue to benefit from skilled outpatient physical therapy to address the deficits listed in the problem list box on initial evaluation, provide pt/family education and to maximize pt's level of independence in the home and community environment.     Pt's spiritual, cultural and educational needs considered and pt agreeable to plan of care and goals.    Anticipated Barriers for therapy: intensity of reported pain, chronicity of pain     GOALS:   Short Term Goals:  4 weeks  1. Report decreased in pain at worse less than  <   / =  6/10  to increase tolerance for functional activities (In Progress)  2. Pt to increase B popliteal angle by greater than > or = 10 degrees in order to improve flexibility and posture. (In Progress)  3. Increased B LE MMT 1/2  to increase tolerance for ADL and work activities. (In Progress)  4. Pt to tolerate HEP to improve ROM and independence with ADL's (In Progress)  5. Pt to improve range of motion by 25% to allow for improved functional mobility to allow for improvement in IADLs.  (In Progress)     Long Term Goals: 8 weeks  1. Report decreased in pain at worse less than  <   / =  2/10  to increase tolerance for functional mobility.   2 .Pt to increase B popliteal angle by greater  than > or = 20 degrees in order to improve flexibility and posture.   3. Increased B LE MMT 1 grade to increase tolerance for ADL and work activities.  4. Pt will report at 49% or less limitation on FOTO Lumbar Survey to demonstrate decrease in disability and improvement in back pain.  5. Pt to be Independent with HEP to improve ROM and independence with ADL.  6. Pt to demonstrate negative Bridge Test in order to show improved core strength for lumbar stabilization.   7. Pt to improve range of motion by 75% to allow for improved functional mobility to allow for improvement in IADLs.       Plan     Plan of care Certification: 11/5/2019 to 2/5/2019.     Outpatient Physical Therapy 2 times weekly for 8 weeks to include the following interventions: Gait Training, Manual Therapy, Moist Heat/ Ice, Neuromuscular Re-ed, Patient Education, Self Care, Therapeutic Activites and Therapeutic Exercise.       Cont skilled PT session towards PT and patient's goals.    Frederic Stein, PTA   11/21/2019

## 2019-11-26 ENCOUNTER — CLINICAL SUPPORT (OUTPATIENT)
Dept: REHABILITATION | Facility: HOSPITAL | Age: 59
End: 2019-11-26
Payer: MEDICAID

## 2019-11-26 DIAGNOSIS — M54.50 CHRONIC BILATERAL LOW BACK PAIN WITHOUT SCIATICA: ICD-10-CM

## 2019-11-26 DIAGNOSIS — R29.898 DECREASED STRENGTH OF TRUNK AND BACK: ICD-10-CM

## 2019-11-26 DIAGNOSIS — G89.29 CHRONIC BILATERAL LOW BACK PAIN WITHOUT SCIATICA: ICD-10-CM

## 2019-11-26 DIAGNOSIS — M53.86 DECREASED ROM OF LUMBAR SPINE: ICD-10-CM

## 2019-11-26 DIAGNOSIS — R26.9 GAIT ABNORMALITY: ICD-10-CM

## 2019-11-26 DIAGNOSIS — R29.898 DECREASED STRENGTH OF LOWER EXTREMITY: ICD-10-CM

## 2019-11-26 PROCEDURE — 97110 THERAPEUTIC EXERCISES: CPT | Mod: PN

## 2019-11-26 PROCEDURE — 97140 MANUAL THERAPY 1/> REGIONS: CPT | Mod: PN

## 2019-11-26 NOTE — PROGRESS NOTES
"  Physical Therapy Daily Treatment Note     Name: Devika Bustillo  Clinic Number: [id number]    Therapy Diagnosis:   Encounter Diagnoses   Name Primary?    Decreased strength of trunk and back     Decreased strength of lower extremity     Chronic bilateral low back pain without sciatica     Gait abnormality     Decreased ROM of lumbar spine      Physician: Hoang Culver MD    Visit Date: 11/26/2019    Physician Orders: PT Eval and Treat   Medical Diagnosis from Referral: M54.5 (ICD-10-CM) - Low back pain  Evaluation Date: 11/5/2019  Authorization Period Expiration: 10/3/2020  Plan of Care Expiration: 2/5/2020  Visit # / Visits authorized: 5/ 20  PN Due: 12-5-19       Time In: 1025  Time Out: 1135  Total Billable Time: 30 minutes with 1 other with PTA    Precautions: Standard and Diabetes       Subjective     Pt reports: "I have a new pain in the middle of my back."  She was compliant with home exercise program.  Response to previous treatment: mild decrease in pain  Functional change: none to note yet    Pain: 6/10  Location: bilateral low back  And midback    Objective     Devika received therapeutic exercises to develop strength, ROM and core stabilization for 60 minutes including:     Bike or Nustep x6'  Standing gastroc stretch 3 x 30"  Seated lumbar flexion roll outs with yoga ball 10 x 10" hold  Supine hamstring stretch 3 x 30" each  Supine piriformis stretch 3 x 30" each  DKTC x 20  SKTC 10x10 sec ea  TrA activation 10x10 sec  LTR x 20  PPT x 15  Marching with PPT x 15    +SLR x 10   +Bridges x 10       Devika received the following manual therapy techniques: Soft tissue Mobilization were applied to the: B lumbar region for 10 minutes, including:  STM using theraroller   Manual TPR        Devika received hot pack for 10 minutes to B low back.        Home Exercises Provided and Patient Education Provided     Education provided:   Cont to perform HEP as provided.   +place pillow under knees when supine to " decrease low back strain    Written Home Exercises Provided: Patient instructed to cont prior HEP.  Exercises were reviewed and Devika was able to demonstrate them prior to the end of the session.  Devika demonstrated good  understanding of the education provided.     See EMR under Patient Instructions for exercises provided 11/5/19.    Assessment     Pt tolerated treatment well with some adverse reaction, advised to work within pain free zone.. Pt with reports of constant pain, however pain is less intense /p tx. Pt continues to demonstrate significant B piriformis and hamstring tightness with good response to stretching. Notable low back and piriformis tenderness with STM and TPR. Pt  Had increased pain /p tx today and dizziness.  Pt advised to sit for a while before going home.  Pt sat in lobby with her sister, who was notified by therapist of problem. Pt and sister agreed to wait before heading home.    Devika is progressing well towards her goals.   Pt prognosis is Good.     Pt will continue to benefit from skilled outpatient physical therapy to address the deficits listed in the problem list box on initial evaluation, provide pt/family education and to maximize pt's level of independence in the home and community environment.     Pt's spiritual, cultural and educational needs considered and pt agreeable to plan of care and goals.    Anticipated Barriers for therapy: intensity of reported pain, chronicity of pain     GOALS:   Short Term Goals:  4 weeks  1. Report decreased in pain at worse less than  <   / =  6/10  to increase tolerance for functional activities (In Progress)  2. Pt to increase B popliteal angle by greater than > or = 10 degrees in order to improve flexibility and posture. (In Progress)  3. Increased B LE MMT 1/2  to increase tolerance for ADL and work activities. (In Progress)  4. Pt to tolerate HEP to improve ROM and independence with ADL's (In Progress)  5. Pt to improve range of motion by 25%  to allow for improved functional mobility to allow for improvement in IADLs.  (In Progress)     Long Term Goals: 8 weeks  1. Report decreased in pain at worse less than  <   / =  2/10  to increase tolerance for functional mobility.   2 .Pt to increase B popliteal angle by greater than > or = 20 degrees in order to improve flexibility and posture.   3. Increased B LE MMT 1 grade to increase tolerance for ADL and work activities.  4. Pt will report at 49% or less limitation on FOTO Lumbar Survey to demonstrate decrease in disability and improvement in back pain.  5. Pt to be Independent with HEP to improve ROM and independence with ADL.  6. Pt to demonstrate negative Bridge Test in order to show improved core strength for lumbar stabilization.   7. Pt to improve range of motion by 75% to allow for improved functional mobility to allow for improvement in IADLs.       Plan     Plan of care Certification: 11/5/2019 to 2/5/2019.     Outpatient Physical Therapy 2 times weekly for 8 weeks to include the following interventions: Gait Training, Manual Therapy, Moist Heat/ Ice, Neuromuscular Re-ed, Patient Education, Self Care, Therapeutic Activites and Therapeutic Exercise.       Cont skilled PT session towards PT and patient's goals.    Frederic Stein, PTA   11/26/2019

## 2019-12-05 ENCOUNTER — CLINICAL SUPPORT (OUTPATIENT)
Dept: REHABILITATION | Facility: HOSPITAL | Age: 59
End: 2019-12-05
Payer: MEDICAID

## 2019-12-05 DIAGNOSIS — R29.898 DECREASED STRENGTH OF LOWER EXTREMITY: ICD-10-CM

## 2019-12-05 DIAGNOSIS — R29.898 DECREASED STRENGTH OF TRUNK AND BACK: ICD-10-CM

## 2019-12-05 DIAGNOSIS — M53.86 DECREASED ROM OF LUMBAR SPINE: ICD-10-CM

## 2019-12-05 DIAGNOSIS — M54.50 CHRONIC BILATERAL LOW BACK PAIN WITHOUT SCIATICA: ICD-10-CM

## 2019-12-05 DIAGNOSIS — G89.29 CHRONIC BILATERAL LOW BACK PAIN WITHOUT SCIATICA: ICD-10-CM

## 2019-12-05 DIAGNOSIS — R26.9 GAIT ABNORMALITY: ICD-10-CM

## 2019-12-05 PROCEDURE — 97110 THERAPEUTIC EXERCISES: CPT | Mod: PN

## 2019-12-05 NOTE — PROGRESS NOTES
"  Physical Therapy Daily Treatment Note     Name: Devika Bustillo  Clinic Number: [id number]    Therapy Diagnosis:   Encounter Diagnoses   Name Primary?    Decreased strength of trunk and back     Decreased strength of lower extremity     Chronic bilateral low back pain without sciatica     Gait abnormality     Decreased ROM of lumbar spine      Physician: Hoang Culver MD    Visit Date: 12/5/2019    Physician Orders: PT Eval and Treat   Medical Diagnosis from Referral: M54.5 (ICD-10-CM) - Low back pain  Evaluation Date: 11/5/2019  Authorization Period Expiration: 10/3/2020  Plan of Care Expiration: 2/5/2020  Visit # / Visits authorized: 6/ 20  PN Due: 12-5-19       Time In: 0910 (pt late to session)  Time Out: 1005  Total Billable Time: 30 minutes     Precautions: Standard and Diabetes       Subjective     Pt reports: feeling better today and only minimal pain  She was compliant with home exercise program.  Response to previous treatment: mild decrease in pain  Functional change: none to note yet    Pain: 2/10  Location: bilateral low back  And midback    Objective     Devika received therapeutic exercises to develop strength, ROM and core stabilization for 60 minutes including:     Bike or Nustep x6'  TM incline lv 1 at 1.5 MPH x 7'  Standing gastroc stretch 3 x 30"  Seated lumbar flexion roll outs +3 way with yoga ball 10 x 10" hold  Supine hamstring stretch 3 x 30" each  Supine piriformis stretch 3 x 30" each  DKTC x 20  SKTC 10x10 sec ea  TrA activation 10x10 sec  LTR x 20  PPT x 15  Marching with PPT + on PB x 10 each LE  +Pulldowns with YTB while sitting on PB 2 x 10    SLR x 10   Bridges x 10       Devika received the following manual therapy techniques: Soft tissue Mobilization were applied to the: B lumbar region for 00 minutes, including:  STM using theraroller   Manual TPR        Devika received hot pack for 10 minutes to B low back.        Home Exercises Provided and Patient Education Provided "     Education provided:   Cont to perform HEP as provided.   +place pillow under knees when supine to decrease low back strain    Written Home Exercises Provided: Patient instructed to cont prior HEP.  Exercises were reviewed and Devika was able to demonstrate them prior to the end of the session.  Devika demonstrated good  understanding of the education provided.     See EMR under Patient Instructions for exercises provided 11/5/19.    Assessment     Pt tolerated treatment well with no reports of increased pain or adverse reaction. Added marching and pull downs on PB this date to promote core strengthening and stabilization.  Pt denied dizziness this session.  Able to express pain relief post therex and MHP.     Devika is progressing well towards her goals.   Pt prognosis is Good.     Pt will continue to benefit from skilled outpatient physical therapy to address the deficits listed in the problem list box on initial evaluation, provide pt/family education and to maximize pt's level of independence in the home and community environment.     Pt's spiritual, cultural and educational needs considered and pt agreeable to plan of care and goals.    Anticipated Barriers for therapy: intensity of reported pain, chronicity of pain     GOALS:   Short Term Goals:  4 weeks  1. Report decreased in pain at worse less than  <   / =  6/10  to increase tolerance for functional activities (In Progress)  2. Pt to increase B popliteal angle by greater than > or = 10 degrees in order to improve flexibility and posture. (In Progress)  3. Increased B LE MMT 1/2  to increase tolerance for ADL and work activities. (In Progress)  4. Pt to tolerate HEP to improve ROM and independence with ADL's (In Progress)  5. Pt to improve range of motion by 25% to allow for improved functional mobility to allow for improvement in IADLs.  (In Progress)     Long Term Goals: 8 weeks  1. Report decreased in pain at worse less than  <   / =  2/10  to increase  tolerance for functional mobility.   2 .Pt to increase B popliteal angle by greater than > or = 20 degrees in order to improve flexibility and posture.   3. Increased B LE MMT 1 grade to increase tolerance for ADL and work activities.  4. Pt will report at 49% or less limitation on FOTO Lumbar Survey to demonstrate decrease in disability and improvement in back pain.  5. Pt to be Independent with HEP to improve ROM and independence with ADL.  6. Pt to demonstrate negative Bridge Test in order to show improved core strength for lumbar stabilization.   7. Pt to improve range of motion by 75% to allow for improved functional mobility to allow for improvement in IADLs.       Plan     Plan of care Certification: 11/5/2019 to 2/5/2019.     Outpatient Physical Therapy 2 times weekly for 8 weeks to include the following interventions: Gait Training, Manual Therapy, Moist Heat/ Ice, Neuromuscular Re-ed, Patient Education, Self Care, Therapeutic Activites and Therapeutic Exercise.       Cont skilled PT session towards PT and patient's goals.    Lily Catalan, PTA   12/05/2019

## 2020-01-01 ENCOUNTER — HOSPITAL ENCOUNTER (EMERGENCY)
Facility: HOSPITAL | Age: 60
Discharge: HOME | End: 2020-01-01
Attending: EMERGENCY MEDICINE
Payer: MEDICAID

## 2020-01-01 VITALS
HEART RATE: 68 BPM | RESPIRATION RATE: 18 BRPM | WEIGHT: 237.63 LBS | DIASTOLIC BLOOD PRESSURE: 70 MMHG | BODY MASS INDEX: 36.02 KG/M2 | OXYGEN SATURATION: 98 % | TEMPERATURE: 98 F | HEIGHT: 68 IN | SYSTOLIC BLOOD PRESSURE: 160 MMHG

## 2020-01-01 DIAGNOSIS — R60.0 BILATERAL LEG EDEMA: ICD-10-CM

## 2020-01-01 DIAGNOSIS — R60.9 EDEMA, UNSPECIFIED TYPE: Primary | ICD-10-CM

## 2020-01-01 DIAGNOSIS — R73.9 HYPERGLYCEMIA: ICD-10-CM

## 2020-01-01 DIAGNOSIS — M79.89 LEG SWELLING: ICD-10-CM

## 2020-01-01 LAB
ALBUMIN SERPL BCP-MCNC: 3.1 G/DL (ref 3.5–5.2)
ALP SERPL-CCNC: 129 U/L (ref 55–135)
ALT SERPL W/O P-5'-P-CCNC: 33 U/L (ref 10–44)
ANION GAP SERPL CALC-SCNC: 11 MMOL/L (ref 8–16)
AST SERPL-CCNC: 19 U/L (ref 10–40)
BASOPHILS # BLD AUTO: 0.04 K/UL (ref 0–0.2)
BASOPHILS NFR BLD: 0.6 % (ref 0–1.9)
BILIRUB SERPL-MCNC: 0.3 MG/DL (ref 0.1–1)
BNP SERPL-MCNC: 32 PG/ML (ref 0–99)
BUN SERPL-MCNC: 17 MG/DL (ref 6–20)
BUN SERPL-MCNC: 17 MG/DL (ref 6–30)
CALCIUM SERPL-MCNC: 9.2 MG/DL (ref 8.7–10.5)
CHLORIDE SERPL-SCNC: 104 MMOL/L (ref 95–110)
CHLORIDE SERPL-SCNC: 105 MMOL/L (ref 95–110)
CO2 SERPL-SCNC: 23 MMOL/L (ref 23–29)
CREAT SERPL-MCNC: 0.5 MG/DL (ref 0.5–1.4)
CREAT SERPL-MCNC: 0.9 MG/DL (ref 0.5–1.4)
DIFFERENTIAL METHOD: ABNORMAL
EOSINOPHIL # BLD AUTO: 0.1 K/UL (ref 0–0.5)
EOSINOPHIL NFR BLD: 1.2 % (ref 0–8)
ERYTHROCYTE [DISTWIDTH] IN BLOOD BY AUTOMATED COUNT: 12.8 % (ref 11.5–14.5)
EST. GFR  (AFRICAN AMERICAN): >60 ML/MIN/1.73 M^2
EST. GFR  (NON AFRICAN AMERICAN): >60 ML/MIN/1.73 M^2
GLUCOSE SERPL-MCNC: 296 MG/DL (ref 70–110)
GLUCOSE SERPL-MCNC: 301 MG/DL (ref 70–110)
HCT VFR BLD AUTO: 38.2 % (ref 37–48.5)
HCT VFR BLD CALC: 39 %PCV (ref 36–54)
HGB BLD-MCNC: 12.4 G/DL (ref 12–16)
IMM GRANULOCYTES # BLD AUTO: 0.02 K/UL (ref 0–0.04)
IMM GRANULOCYTES NFR BLD AUTO: 0.3 % (ref 0–0.5)
INR PPP: 1 (ref 0.8–1.2)
LYMPHOCYTES # BLD AUTO: 2.8 K/UL (ref 1–4.8)
LYMPHOCYTES NFR BLD: 41.8 % (ref 18–48)
MCH RBC QN AUTO: 26.4 PG (ref 27–31)
MCHC RBC AUTO-ENTMCNC: 32.5 G/DL (ref 32–36)
MCV RBC AUTO: 81 FL (ref 82–98)
MONOCYTES # BLD AUTO: 0.5 K/UL (ref 0.3–1)
MONOCYTES NFR BLD: 7.3 % (ref 4–15)
NEUTROPHILS # BLD AUTO: 3.3 K/UL (ref 1.8–7.7)
NEUTROPHILS NFR BLD: 48.8 % (ref 38–73)
NRBC BLD-RTO: 0 /100 WBC
PLATELET # BLD AUTO: 291 K/UL (ref 150–350)
PMV BLD AUTO: 10.3 FL (ref 9.2–12.9)
POC IONIZED CALCIUM: 1.15 MMOL/L (ref 1.06–1.42)
POC TCO2 (MEASURED): 25 MMOL/L (ref 23–29)
POTASSIUM BLD-SCNC: 3.9 MMOL/L (ref 3.5–5.1)
POTASSIUM SERPL-SCNC: 3.9 MMOL/L (ref 3.5–5.1)
PROT SERPL-MCNC: 7.3 G/DL (ref 6–8.4)
PROTHROMBIN TIME: 10 SEC (ref 9–12.5)
RBC # BLD AUTO: 4.69 M/UL (ref 4–5.4)
SAMPLE: ABNORMAL
SODIUM BLD-SCNC: 138 MMOL/L (ref 136–145)
SODIUM SERPL-SCNC: 139 MMOL/L (ref 136–145)
TROPONIN I SERPL DL<=0.01 NG/ML-MCNC: <0.006 NG/ML (ref 0–0.03)
WBC # BLD AUTO: 6.73 K/UL (ref 3.9–12.7)

## 2020-01-01 PROCEDURE — 80053 COMPREHEN METABOLIC PANEL: CPT

## 2020-01-01 PROCEDURE — 93010 EKG 12-LEAD: ICD-10-PCS | Mod: ,,, | Performed by: INTERNAL MEDICINE

## 2020-01-01 PROCEDURE — 85610 PROTHROMBIN TIME: CPT

## 2020-01-01 PROCEDURE — 84484 ASSAY OF TROPONIN QUANT: CPT

## 2020-01-01 PROCEDURE — 80047 BASIC METABLC PNL IONIZED CA: CPT | Mod: 59

## 2020-01-01 PROCEDURE — 83880 ASSAY OF NATRIURETIC PEPTIDE: CPT

## 2020-01-01 PROCEDURE — 93005 ELECTROCARDIOGRAM TRACING: CPT | Performed by: INTERNAL MEDICINE

## 2020-01-01 PROCEDURE — 99285 PR EMERGENCY DEPT VISIT,LEVEL V: ICD-10-PCS | Mod: ,,, | Performed by: EMERGENCY MEDICINE

## 2020-01-01 PROCEDURE — 85025 COMPLETE CBC W/AUTO DIFF WBC: CPT

## 2020-01-01 PROCEDURE — 63600175 PHARM REV CODE 636 W HCPCS: Performed by: EMERGENCY MEDICINE

## 2020-01-01 PROCEDURE — 96374 THER/PROPH/DIAG INJ IV PUSH: CPT

## 2020-01-01 PROCEDURE — 99285 EMERGENCY DEPT VISIT HI MDM: CPT | Mod: 25

## 2020-01-01 PROCEDURE — 99285 EMERGENCY DEPT VISIT HI MDM: CPT | Mod: ,,, | Performed by: EMERGENCY MEDICINE

## 2020-01-01 PROCEDURE — 93010 ELECTROCARDIOGRAM REPORT: CPT | Mod: ,,, | Performed by: INTERNAL MEDICINE

## 2020-01-01 RX ORDER — METFORMIN HYDROCHLORIDE 500 MG/1
1000 TABLET, EXTENDED RELEASE ORAL
Status: ON HOLD | COMMUNITY
Start: 2014-12-22 | End: 2020-05-22 | Stop reason: HOSPADM

## 2020-01-01 RX ORDER — PEN NEEDLE, DIABETIC 30 GX3/16"
1 NEEDLE, DISPOSABLE MISCELLANEOUS
COMMUNITY
Start: 2014-12-09

## 2020-01-01 RX ORDER — DEXTROSE 4 G
TABLET,CHEWABLE ORAL
COMMUNITY
Start: 2014-09-12 | End: 2020-06-05

## 2020-01-01 RX ORDER — INSULIN GLARGINE 100 [IU]/ML
INJECTION, SOLUTION SUBCUTANEOUS
Status: ON HOLD | COMMUNITY
Start: 2019-10-02 | End: 2020-05-22 | Stop reason: SDUPTHER

## 2020-01-01 RX ORDER — IBUPROFEN 800 MG/1
TABLET ORAL
Status: ON HOLD | COMMUNITY
Start: 2019-12-11 | End: 2020-05-22 | Stop reason: HOSPADM

## 2020-01-01 RX ORDER — FUROSEMIDE 10 MG/ML
80 INJECTION INTRAMUSCULAR; INTRAVENOUS
Status: COMPLETED | OUTPATIENT
Start: 2020-01-01 | End: 2020-01-01

## 2020-01-01 RX ORDER — FUROSEMIDE 20 MG/1
TABLET ORAL
COMMUNITY
Start: 2019-11-25 | End: 2020-01-01

## 2020-01-01 RX ADMIN — FUROSEMIDE 80 MG: 10 INJECTION, SOLUTION INTRAMUSCULAR; INTRAVENOUS at 02:01

## 2020-01-01 NOTE — DISCHARGE INSTRUCTIONS
Please continue take your water pills (furosemide) as prescribed and follow up with your primary care this week for repeat evaluation.  Avoid salt products including fried foods and increased sweets.  Continue to avoid increased carbohydrates to keep her blood sugars improved.    Our goal in the emergency department is to always give you outstanding care and exceptional service. You may receive a survey by mail or e-mail in the next week regarding your experience in our ED. We would greatly appreciate your completing and returning the survey. Your feedback provides us with a way to recognize our staff who give very good care and it helps us learn how to improve when your experience was below our aspiration of excellence.

## 2020-01-01 NOTE — ED PROVIDER NOTES
Encounter Date: 1/1/2020       History     Chief Complaint   Patient presents with    Edema     both my legs up to my thighs     HPI     The patient is a 59-year-old female with a history of type 2 diabetes, hyperlipidemia, hypertension, peripheral neuropathy and a history of PE presenting with bilateral lower extremity edema.  Patient is currently on furosemide at home, but has run out of her prescription.  She states she is having increased of salt diet for the holidays, and has noticed increased weight gain and bilateral lower extremity swelling. She denies any shortness of breath, chest pain, back pain, neck pain, lightheadedness or dizziness.  She states her legs feel heavy, bilaterally. No recent falls, trauma or injury. Denies any leg pain or calf pain.  Current pain scale 0/10, no other aggravating or alleviating factors.    Review of patient's allergies indicates:  No Known Allergies  Past Medical History:   Diagnosis Date    Diabetes mellitus     Hyperlipidemia     Hypertension     Neuropathy     PE (pulmonary embolism)     Pulmonary embolism 1998     Past Surgical History:   Procedure Laterality Date    CYST REMOVAL      HERNIA REPAIR      HYSTERECTOMY      TONSILLECTOMY      TUBAL LIGATION       Family History   Problem Relation Age of Onset    Kidney disease Mother     Kidney disease Father     Stroke Maternal Uncle      Social History     Tobacco Use    Smoking status: Never Smoker    Smokeless tobacco: Never Used   Substance Use Topics    Alcohol use: No    Drug use: No     Review of Systems   Constitutional: Negative for chills, fatigue and fever.   HENT: Negative for congestion and sore throat.    Eyes: Negative for photophobia and visual disturbance.   Respiratory: Negative for apnea, chest tightness, shortness of breath and wheezing.    Cardiovascular: Positive for leg swelling. Negative for chest pain and palpitations.   Gastrointestinal: Negative for abdominal pain, nausea and  vomiting.   Endocrine: Negative for polydipsia.   Genitourinary: Negative for dysuria, hematuria and pelvic pain.   Musculoskeletal: Negative for back pain, myalgias and neck stiffness.   Skin: Negative for color change and wound.   Neurological: Negative for tremors, light-headedness and headaches.   Hematological: Negative for adenopathy.   Psychiatric/Behavioral: Negative for confusion. The patient is not nervous/anxious.        Physical Exam     Initial Vitals [01/01/20 1216]   BP Pulse Resp Temp SpO2   (!) 164/100 75 18 98.1 °F (36.7 °C) 97 %      MAP       --         Physical Exam    Nursing note and vitals reviewed.    Gen/Constitutional: Interactive. No acute distress  Head: Normocephalic, Atraumatic  Neck: supple, no masses or LAD, no JVD  Eyes: PERRLA, conjunctiva clear  Ears, Nose and Throat: No rhinorrhea or stridor.  Cardiac:  Regular rate, Reg Rhythm, No murmur, rubs or gallops  Pulmonary: CTA Bilat, no wheezes, rhonchi, rales.  GI: Abdomen soft, non-tender, non-distended; no rebound or guarding  : No CVA tenderness.  Musculoskeletal: Extremities warm, well perfused, no erythema, , negative Homans sign,  bilateral 2+ pitting edema to the knees, no calf tenderness, soft compartments, no calf pain  Skin: No rashes  Neuro: Alert and Oriented x 3; No focal motor or sensory deficits.    Psych: Normal affect      ED Course   Procedures  Labs Reviewed   CBC W/ AUTO DIFFERENTIAL - Abnormal; Notable for the following components:       Result Value    Mean Corpuscular Volume 81 (*)     Mean Corpuscular Hemoglobin 26.4 (*)     All other components within normal limits   COMPREHENSIVE METABOLIC PANEL - Abnormal; Notable for the following components:    Glucose 296 (*)     Albumin 3.1 (*)     All other components within normal limits   ISTAT PROCEDURE - Abnormal; Notable for the following components:    POC Glucose 301 (*)     All other components within normal limits   TROPONIN I   B-TYPE NATRIURETIC PEPTIDE    PROTIME-INR     EKG Readings: (Independently Interpreted)   Initial Reading: No STEMI. Previous EKG: Compared with most recent EKG Rhythm: Normal Sinus Rhythm. Heart Rate: 64. ST Segments: Normal ST Segments. T Waves: Normal.     ECG Results          EKG 12-lead (Final result)  Result time 01/02/20 09:22:37    Final result by Interface, Lab In [facility] (01/02/20 09:22:37)                 Narrative:    Test Reason : R06.02,    Vent. Rate : 064 BPM     Atrial Rate : 064 BPM     P-R Int : 144 ms          QRS Dur : 084 ms      QT Int : 400 ms       P-R-T Axes : 062 061 029 degrees     QTc Int : 412 ms    Normal sinus rhythm  Normal ECG  When compared with ECG of 10-MAR-2019 19:50,  Nonspecific T wave abnormality no longer evident in Lateral leads  Confirmed by Horacio Hernandes MD ([id number]) on 1/2/2020 9:22:28 AM    Referred By: [email or web address]   SELF           Confirmed By:Horacio Hernandes MD                            Imaging Results          X-Ray Chest PA And Lateral (Final result)  Result time 01/01/20 13:34:37    Final result by Preston Hitchcock MD (01/01/20 13:34:37)                 Impression:      1. No acute cardiopulmonary process.      Electronically signed by: Preston Hitchcock MD  Date:    01/01/2020  Time:    13:34             Narrative:    EXAMINATION:  XR CHEST PA AND LATERAL    CLINICAL HISTORY:  Localized edema    TECHNIQUE:  PA and lateral views of the chest were performed.    COMPARISON:  03/10/2019    FINDINGS:  The cardiomediastinal silhouette is not enlarged.  There is mild elevation of the right hemidiaphragm..  There is no pleural effusion.  The trachea is midline.  The lungs are symmetrically expanded bilaterally without evidence of acute parenchymal process. No large focal consolidation seen.  There is no pneumothorax.  The osseous structures are remarkable for degenerative changes..                              X-Rays:   Independently Interpreted Readings:   Chest X-Ray: Normal heart size.  No  infiltrates.  No acute abnormalities.     Medical Decision Making:   History:   Old Medical Records: I decided to obtain old medical records.  Old Records Summarized: records from clinic visits and records from previous admission(s).  Initial Assessment:     The patient is a 59-year-old female with a history of type 2 diabetes, hyperlipidemia, hypertension, peripheral neuropathy and a history of PE presenting with bilateral lower extremity edema.  Differential Diagnosis:   Differential diagnosis includes but is not limited to:  Hyperkalemia, bilateral lower extremity edema, DVT, heart failure exacerbation, poor diet, metabolic abnormality, ACS, PE, renal failure,    Independently Interpreted Test(s):   I have ordered and independently interpreted X-rays - see prior notes.  I have ordered and independently interpreted EKG Reading(s) - see prior notes  Clinical Tests:   Lab Tests: Ordered and Reviewed  Radiological Study: Ordered and Reviewed  Medical Tests: Ordered and Reviewed    Emergent evaluation of patient presenting with bilateral lower extremity edema.  She is afebrile, vital signs are stable. Physical exam findings remarkable for bilateral pitting edema to the knees, 2+ without calf tenderness, negative Homans sign. Suspect likely volume overload, as patient is asymptomatic otherwise.  No shortness of breath, chest pain, pleuritic chest pain, fevers, chills, knee pain or signs of trauma.  Broad workup conducted including ECG obtained with no signs of ischemia or STEMI on my read.  Chest x-ray with no acute effusions, pulmonary edema or acute findings on my read.  Labs unremarkable, and at baseline.  However, patient does have hyperglycemia, and was educating the patient on diet control, diabetes and appropriate management of carbohydrates especially during holidays.  Patient verbalized understanding, will continue glycemic control at home.  No evidence of renal failure.  Suspect likely poor compliance with  home furosemide and poor diet.  Patient was given IV dose of Lasix 80 mg in the ED with significant urinary output greater than 1500 mL.  Symptoms improved, weight improved.  Patient feels comfortable with discharge, as no other symptoms.  Patient's furosemide was refilled for 10 days with plans for her to follow up with her PCP in the next 3-4 days for repeat evaluation, continued prescription and ongoing management.  No other red flags for any other or acute emergent pathology to include PE, DVT, heart failure exacerbation, ACS or renal failure. Patient agreeable to discharge plan. Strict ED precautions and return instructions discussed at length and patient verbalized understanding. All questions were answered and ample time was given for questions.      Complexity:  High risk-level 5                               Clinical Impression:       ICD-10-CM ICD-9-CM   1. Edema, unspecified type R60.9 782.3   2. Bilateral leg edema R60.0 782.3   3. Leg swelling M79.89 729.81   4. Hyperglycemia R73.9 790.29         Disposition:   Disposition: Discharged  Condition: Stable      Agapito Martinez DO  Dept of Emergency Medicine   [facility]  Spectralink: [id number]                   Agapito Martinez DO  01/03/20 1114

## 2020-01-01 NOTE — ED TRIAGE NOTES
"Pt reports ongoing issues with fluid retention for 2 weeks, states she's been taking lasix 20 mg BID but is still retaining fluid, also reports frequent dizziness and and blurred vision, also reports CP 5/10 under left breast feels like something "sticking" her, denies SOB, cold or flu like symptoms, weakness or loss of consciousness or recent illness.          LOC: The patient is awake, alert, and oriented to place, time, situation. Affect is appropriate.  Speech is appropriate and clear.     APPEARANCE: Patient resting comfortably in no acute distress.  Patient is clean and well groomed.    SKIN: The skin is warm and dry; color consistent with ethnicity.  Patient has normal skin turgor and moist mucus membranes.  Skin intact; no breakdown or bruising noted.     MUSCULOSKELETAL: Patient moving upper and lower extremities without difficulty.  Denies weakness.     RESPIRATORY: Airway is open and patent. Respirations spontaneous, even, easy, and non-labored.  Patient has a normal effort and rate.  No accessory muscle use noted. Denies cough.     CARDIAC:  Normal rhythm and rate noted.  BLE peripheral edema 2+ pitting noted. complaints of chest pain.      ABDOMEN: Soft and non tender to palpation.  No distention noted.     NEUROLOGIC: Eyes open spontaneously.  Behavior appropriate to situation.  Follows commands; facial expression symmetrical.  Purposeful motor response noted; normal sensation in all extremities.      "

## 2020-01-26 ENCOUNTER — HOSPITAL ENCOUNTER (EMERGENCY)
Facility: HOSPITAL | Age: 60
Discharge: HOME | End: 2020-01-27
Attending: EMERGENCY MEDICINE
Payer: MEDICAID

## 2020-01-26 DIAGNOSIS — E11.65 TYPE 2 DIABETES MELLITUS WITH HYPERGLYCEMIA, WITH LONG-TERM CURRENT USE OF INSULIN: ICD-10-CM

## 2020-01-26 DIAGNOSIS — J02.0 STREP PHARYNGITIS: Primary | ICD-10-CM

## 2020-01-26 DIAGNOSIS — Z79.4 TYPE 2 DIABETES MELLITUS WITH HYPERGLYCEMIA, WITH LONG-TERM CURRENT USE OF INSULIN: ICD-10-CM

## 2020-01-26 DIAGNOSIS — Z91.148 NON COMPLIANCE W MEDICATION REGIMEN: ICD-10-CM

## 2020-01-26 DIAGNOSIS — R50.9 FEVER, UNSPECIFIED FEVER CAUSE: ICD-10-CM

## 2020-01-26 LAB
CTP QC/QA: YES
DEPRECATED S PYO AG THROAT QL EIA: POSITIVE
GLUCOSE SERPL-MCNC: 434 MG/DL (ref 70–110)
POC MOLECULAR INFLUENZA A AGN: NEGATIVE
POC MOLECULAR INFLUENZA B AGN: NEGATIVE
POCT GLUCOSE: 427 MG/DL (ref 70–110)

## 2020-01-26 PROCEDURE — 82962 GLUCOSE BLOOD TEST: CPT

## 2020-01-26 PROCEDURE — 87880 STREP A ASSAY W/OPTIC: CPT

## 2020-01-26 PROCEDURE — 87502 INFLUENZA DNA AMP PROBE: CPT

## 2020-01-26 PROCEDURE — 63600175 PHARM REV CODE 636 W HCPCS: Performed by: PHYSICIAN ASSISTANT

## 2020-01-26 PROCEDURE — 99284 PR EMERGENCY DEPT VISIT,LEVEL IV: ICD-10-PCS | Mod: ,,, | Performed by: PHYSICIAN ASSISTANT

## 2020-01-26 PROCEDURE — 96372 THER/PROPH/DIAG INJ SC/IM: CPT

## 2020-01-26 PROCEDURE — 99284 EMERGENCY DEPT VISIT MOD MDM: CPT | Mod: 25

## 2020-01-26 PROCEDURE — 25000003 PHARM REV CODE 250: Performed by: PHYSICIAN ASSISTANT

## 2020-01-26 PROCEDURE — 96360 HYDRATION IV INFUSION INIT: CPT

## 2020-01-26 PROCEDURE — 99284 EMERGENCY DEPT VISIT MOD MDM: CPT | Mod: ,,, | Performed by: PHYSICIAN ASSISTANT

## 2020-01-26 RX ORDER — ACETAMINOPHEN 500 MG
1000 TABLET ORAL
Status: COMPLETED | OUTPATIENT
Start: 2020-01-26 | End: 2020-01-26

## 2020-01-26 RX ADMIN — ACETAMINOPHEN 1000 MG: 500 TABLET ORAL at 11:01

## 2020-01-26 RX ADMIN — INSULIN HUMAN 10 UNITS: 100 INJECTION, SOLUTION PARENTERAL at 11:01

## 2020-01-27 VITALS
RESPIRATION RATE: 16 BRPM | BODY MASS INDEX: 36.14 KG/M2 | TEMPERATURE: 99 F | WEIGHT: 237.63 LBS | HEART RATE: 100 BPM | OXYGEN SATURATION: 96 % | DIASTOLIC BLOOD PRESSURE: 79 MMHG | SYSTOLIC BLOOD PRESSURE: 170 MMHG

## 2020-01-27 LAB
POCT GLUCOSE: 395 MG/DL (ref 70–110)
POCT GLUCOSE: 423 MG/DL (ref 70–110)
POCT GLUCOSE: 434 MG/DL (ref 70–110)

## 2020-01-27 PROCEDURE — 63600175 PHARM REV CODE 636 W HCPCS: Performed by: PHYSICIAN ASSISTANT

## 2020-01-27 PROCEDURE — 25000003 PHARM REV CODE 250: Performed by: PHYSICIAN ASSISTANT

## 2020-01-27 RX ORDER — IBUPROFEN 400 MG/1
800 TABLET ORAL
Status: COMPLETED | OUTPATIENT
Start: 2020-01-27 | End: 2020-01-27

## 2020-01-27 RX ADMIN — IBUPROFEN 800 MG: 400 TABLET, FILM COATED ORAL at 12:01

## 2020-01-27 RX ADMIN — SODIUM CHLORIDE 1000 ML: 0.9 INJECTION, SOLUTION INTRAVENOUS at 01:01

## 2020-01-27 RX ADMIN — PENICILLIN G BENZATHINE 1.2 MILLION UNITS: 1200000 INJECTION, SUSPENSION INTRAMUSCULAR at 12:01

## 2020-01-27 NOTE — ED PROVIDER NOTES
"Encounter Date: 1/26/2020       History     Chief Complaint   Patient presents with    Sore Throat     Patient states that she has a sore throat. States "I feel bad."      The patient presents to the ER c/o fever, sore throat, chills, and body aches. She states that she has been feeling ill for the past 2 days. She reports moderate to severe pain with swallowing. She states that the course is constant. She denies any additional symptoms. She denies any drooling, trismus, or stridor. She denies any trouble speaking or breathing. She denies any known sick contacts. She denies any pre-arrival treatment. She states that she had a tonsillectomy as a child. She states that she has had Strep throat in the past and this feels the same. Pt states that she did not take her insulin today. She is eating today.         Review of patient's allergies indicates:  No Known Allergies  Past Medical History:   Diagnosis Date    Diabetes mellitus     Hyperlipidemia     Hypertension     Neuropathy     Pulmonary embolism 1998     Past Surgical History:   Procedure Laterality Date    CYST REMOVAL      HERNIA REPAIR      HYSTERECTOMY      TONSILLECTOMY      TUBAL LIGATION       Family History   Problem Relation Age of Onset    Kidney disease Mother     Kidney disease Father     Stroke Maternal Uncle      Social History     Tobacco Use    Smoking status: Never Smoker    Smokeless tobacco: Never Used   Substance Use Topics    Alcohol use: No    Drug use: No     Review of Systems   Constitutional: Positive for chills and fever.   HENT: Positive for sore throat. Negative for congestion, drooling, facial swelling, mouth sores, sinus pain, trouble swallowing and voice change.    Respiratory: Negative for apnea, cough, choking, chest tightness, shortness of breath, wheezing and stridor.    Cardiovascular: Negative for chest pain.   Gastrointestinal: Negative for abdominal pain, diarrhea, nausea and vomiting.   Genitourinary: " Negative for difficulty urinating.   Musculoskeletal: Positive for myalgias. Negative for arthralgias, gait problem, neck pain and neck stiffness.   Skin: Negative for color change and rash.   Allergic/Immunologic: Negative for immunocompromised state.   Neurological: Negative for dizziness, syncope, light-headedness and headaches.   Hematological: Negative for adenopathy.   Psychiatric/Behavioral: Negative for confusion.       Physical Exam     Initial Vitals [01/26/20 2247]   BP Pulse Resp Temp SpO2   (!) 185/81 104 20 (!) 101 °F (38.3 °C) 99 %      MAP       --         Physical Exam    Nursing note and vitals reviewed.  Constitutional: She appears well-developed and well-nourished. She is not diaphoretic.   HENT:   Head: Normocephalic.   Mild posterior oropharyngeal erythema and swelling w/o obvious exudate. Patent airway. No abscess. No induration to oral floor. No drooling, trismus, or stridor. No muffled voice. No adenopathy.    Eyes: Conjunctivae are normal.   Neck: Normal range of motion. Neck supple.   Cardiovascular: Normal rate and intact distal pulses.   Pulmonary/Chest: No respiratory distress. She has no wheezes. She has no rhonchi. She has no rales.   Abdominal: Soft. She exhibits no distension. There is no tenderness. There is no rebound and no guarding.   Musculoskeletal: Normal range of motion.   Lymphadenopathy:     She has no cervical adenopathy.   Neurological: She is alert and oriented to person, place, and time. She has normal strength. No sensory deficit.   Skin: Skin is warm and dry. No rash noted.   Psychiatric: She has a normal mood and affect. Her behavior is normal.         ED Course   Procedures  Labs Reviewed   THROAT SCREEN, RAPID - Abnormal; Notable for the following components:       Result Value    Rapid Strep A Screen Positive (*)     All other components within normal limits   POCT GLUCOSE - Abnormal; Notable for the following components:    POCT Glucose 427 (*)     All other  components within normal limits   POCT GLUCOSE - Abnormal; Notable for the following components:    POCT Glucose 423 (*)     All other components within normal limits   POCT GLUCOSE - Abnormal; Notable for the following components:    POCT Glucose 434 (*)     All other components within normal limits   POCT INFLUENZA A/B MOLECULAR   POCT GLUCOSE MONITORING CONTINUOUS   POCT GLUCOSE MONITORING CONTINUOUS     Results for orders placed or performed during the hospital encounter of 01/26/20   Rapid strep screen   Result Value Ref Range    Rapid Strep A Screen Positive (A) Negative   POCT Influenza A/B Molecular   Result Value Ref Range    POC Molecular Influenza A Ag Negative Negative, Not Reported    POC Molecular Influenza B Ag Negative Negative, Not Reported     Acceptable Yes    POCT Glucose, Hand-Held Device   Result Value Ref Range    POC Glucose 434 (A) 70 - 110 MG/DL   POCT glucose   Result Value Ref Range    POCT Glucose 427 (H) 70 - 110 mg/dL   POCT glucose   Result Value Ref Range    POCT Glucose 423 (H) 70 - 110 mg/dL   POCT glucose   Result Value Ref Range    POCT Glucose 434 (H) 70 - 110 mg/dL     Vitals:    01/26/20 2247 01/26/20 2354 01/27/20 0028   BP: (!) 185/81 (!) 170/79    BP Location:  Left arm    Patient Position:  Lying    Pulse: 104 100    Resp: 20 16    Temp: (!) 101 °F (38.3 °C) (!) 100.9 °F (38.3 °C) 100.1 °F (37.8 °C)   TempSrc: Oral Oral Oral   SpO2: 99% 96%    Weight: 107.8 kg (237 lb 10.5 oz)                Imaging Results    None          Medical Decision Making:   History:   Old Medical Records: I decided to obtain old medical records.  Initial Assessment:   Pt here due to acute fever, body aches, and sore throat since yesterday. Handles secretions.   Differential Diagnosis:   Strep, Mono, viral pharyngitis, Influenza, epiglottitis, Retropharyngeal abscess, Laryngitis, URI, etc   Clinical Tests:   Lab Tests: Ordered and Reviewed  ED Management:  I discussed the case with  the ER attending physician   Tylenol and Ibuprofen given for fever and pain   Strep positive - Bicillin LA IM ordered   Accucheck done - glucose greater than 400 - pt did not take her insulin at home   10 units regular insulin given SQ with 1 Liter NS bolus   Temp and Glucose improved  Pt advised to take medication as directed   Pt advised to take Tylenol as directed as needed for any fever or pain   Pt advised to follow up with primary care in the next 2 days for re-check   Pt advised to return to the ER if worse in any way                                  Clinical Impression:       ICD-10-CM ICD-9-CM   1. Strep pharyngitis J02.0 034.0   2. Fever, unspecified fever cause R50.9 780.60   3. Type 2 diabetes mellitus with hyperglycemia, with long-term current use of insulin E11.65 250.00    Z79.4 790.29     V58.67   4. Non compliance w medication regimen Z91.14 V15.81         Disposition:   Disposition: Discharged  Condition: Stable                     Matheus Mcgill PA-C  01/27/20 0156

## 2020-01-27 NOTE — ED TRIAGE NOTES
"Devika Bustillo, an 59 y.o. female presents to the ED. Pt states she started to feel bad earlier today (bodyaches, headache, sore throat,cough, runny nose, generalized abd pain, burning sensation when peeing, constipation-LBM today, fever and nausea. Reports taking ibuprofen with slight relief.      Chief Complaint   Patient presents with    Sore Throat     Patient states that she has a sore throat. States "I feel bad."      Review of patient's allergies indicates:  No Known Allergies  Past Medical History:   Diagnosis Date    Diabetes mellitus     Hyperlipidemia     Hypertension     Neuropathy     PE (pulmonary embolism)     Pulmonary embolism 1998       LOC: The patient is awake, alert, aware of environment with an appropriate affect. Oriented x4, speaking appropriately  APPEARANCE: Pt resting comfortably, in no acute distress, pt is clean and well groomed, clothing properly fastened  SKIN:The skin is warm and dry, color consistent with ethnicity, patient has normal skin turgor and moist mucus membranes  RESPIRATORY:Airway is open and patent, respirations are spontaneous, patient has a normal effort and rate, no accessory muscle use noted. +cough  CARDIAC: Normal rate and rhythm, no peripheral edema noted, capillary refill < 3 seconds, bilateral radial pulses 2+.  ABDOMEN: Soft, non tender, non distended. +pain and nausea  NEUROLOGIC: PERRLA, facial expression is symmetrical, patient moving all extremities spontaneously, normal sensation in all extremities when touched with a finger.  Follows all commands appropriately  MUSCULOSKELETAL: Patient moving all extremities spontaneously, no obvious swelling or deformities noted.         "

## 2020-03-01 ENCOUNTER — HOSPITAL ENCOUNTER (EMERGENCY)
Facility: HOSPITAL | Age: 60
Discharge: HOME | End: 2020-03-02
Attending: EMERGENCY MEDICINE
Payer: MEDICAID

## 2020-03-01 VITALS
SYSTOLIC BLOOD PRESSURE: 112 MMHG | BODY MASS INDEX: 33.34 KG/M2 | OXYGEN SATURATION: 100 % | HEIGHT: 68 IN | DIASTOLIC BLOOD PRESSURE: 64 MMHG | RESPIRATION RATE: 16 BRPM | HEART RATE: 89 BPM | WEIGHT: 220 LBS | TEMPERATURE: 99 F

## 2020-03-01 DIAGNOSIS — R07.9 CHEST PAIN: ICD-10-CM

## 2020-03-01 DIAGNOSIS — R10.11 RIGHT UPPER QUADRANT ABDOMINAL PAIN: ICD-10-CM

## 2020-03-01 DIAGNOSIS — R19.7 DIARRHEA, UNSPECIFIED TYPE: Primary | ICD-10-CM

## 2020-03-01 LAB
ALBUMIN SERPL BCP-MCNC: 3.2 G/DL (ref 3.5–5.2)
ALP SERPL-CCNC: 148 U/L (ref 55–135)
ALT SERPL W/O P-5'-P-CCNC: 18 U/L (ref 10–44)
ANION GAP SERPL CALC-SCNC: 15 MMOL/L (ref 8–16)
AST SERPL-CCNC: 14 U/L (ref 10–40)
BASOPHILS # BLD AUTO: 0.05 K/UL (ref 0–0.2)
BASOPHILS NFR BLD: 0.4 % (ref 0–1.9)
BILIRUB SERPL-MCNC: 0.2 MG/DL (ref 0.1–1)
BNP SERPL-MCNC: <10 PG/ML (ref 0–99)
BUN SERPL-MCNC: 31 MG/DL (ref 6–20)
CALCIUM SERPL-MCNC: 9.4 MG/DL (ref 8.7–10.5)
CHLORIDE SERPL-SCNC: 102 MMOL/L (ref 95–110)
CO2 SERPL-SCNC: 19 MMOL/L (ref 23–29)
CREAT SERPL-MCNC: 1.5 MG/DL (ref 0.5–1.4)
DIFFERENTIAL METHOD: ABNORMAL
EOSINOPHIL # BLD AUTO: 0.7 K/UL (ref 0–0.5)
EOSINOPHIL NFR BLD: 4.9 % (ref 0–8)
ERYTHROCYTE [DISTWIDTH] IN BLOOD BY AUTOMATED COUNT: 12.4 % (ref 11.5–14.5)
EST. GFR  (AFRICAN AMERICAN): 44.3 ML/MIN/1.73 M^2
EST. GFR  (NON AFRICAN AMERICAN): 38.4 ML/MIN/1.73 M^2
GLUCOSE SERPL-MCNC: 326 MG/DL (ref 70–110)
HCT VFR BLD AUTO: 47.5 % (ref 37–48.5)
HGB BLD-MCNC: 14.9 G/DL (ref 12–16)
IMM GRANULOCYTES # BLD AUTO: 0.16 K/UL (ref 0–0.04)
IMM GRANULOCYTES NFR BLD AUTO: 1.1 % (ref 0–0.5)
LIPASE SERPL-CCNC: 16 U/L (ref 4–60)
LYMPHOCYTES # BLD AUTO: 3.3 K/UL (ref 1–4.8)
LYMPHOCYTES NFR BLD: 23.3 % (ref 18–48)
MCH RBC QN AUTO: 26.1 PG (ref 27–31)
MCHC RBC AUTO-ENTMCNC: 31.4 G/DL (ref 32–36)
MCV RBC AUTO: 83 FL (ref 82–98)
MONOCYTES # BLD AUTO: 0.7 K/UL (ref 0.3–1)
MONOCYTES NFR BLD: 5.1 % (ref 4–15)
NEUTROPHILS # BLD AUTO: 9.3 K/UL (ref 1.8–7.7)
NEUTROPHILS NFR BLD: 65.2 % (ref 38–73)
NRBC BLD-RTO: 0 /100 WBC
PLATELET # BLD AUTO: 274 K/UL (ref 150–350)
PMV BLD AUTO: 10.4 FL (ref 9.2–12.9)
POTASSIUM SERPL-SCNC: 4.4 MMOL/L (ref 3.5–5.1)
PROT SERPL-MCNC: 8.4 G/DL (ref 6–8.4)
RBC # BLD AUTO: 5.71 M/UL (ref 4–5.4)
SODIUM SERPL-SCNC: 136 MMOL/L (ref 136–145)
TROPONIN I SERPL DL<=0.01 NG/ML-MCNC: <0.006 NG/ML (ref 0–0.03)
WBC # BLD AUTO: 14.22 K/UL (ref 3.9–12.7)

## 2020-03-01 PROCEDURE — 99285 EMERGENCY DEPT VISIT HI MDM: CPT | Mod: ,,, | Performed by: EMERGENCY MEDICINE

## 2020-03-01 PROCEDURE — 93010 ELECTROCARDIOGRAM REPORT: CPT | Mod: ,,, | Performed by: INTERNAL MEDICINE

## 2020-03-01 PROCEDURE — 96374 THER/PROPH/DIAG INJ IV PUSH: CPT

## 2020-03-01 PROCEDURE — 85025 COMPLETE CBC W/AUTO DIFF WBC: CPT

## 2020-03-01 PROCEDURE — 80053 COMPREHEN METABOLIC PANEL: CPT

## 2020-03-01 PROCEDURE — 84484 ASSAY OF TROPONIN QUANT: CPT

## 2020-03-01 PROCEDURE — 93010 EKG 12-LEAD: ICD-10-PCS | Mod: ,,, | Performed by: INTERNAL MEDICINE

## 2020-03-01 PROCEDURE — 99285 EMERGENCY DEPT VISIT HI MDM: CPT | Mod: 25

## 2020-03-01 PROCEDURE — 83880 ASSAY OF NATRIURETIC PEPTIDE: CPT

## 2020-03-01 PROCEDURE — 83690 ASSAY OF LIPASE: CPT

## 2020-03-01 PROCEDURE — 25000003 PHARM REV CODE 250: Performed by: EMERGENCY MEDICINE

## 2020-03-01 PROCEDURE — 93005 ELECTROCARDIOGRAM TRACING: CPT

## 2020-03-01 PROCEDURE — 63600175 PHARM REV CODE 636 W HCPCS: Performed by: EMERGENCY MEDICINE

## 2020-03-01 PROCEDURE — 99285 PR EMERGENCY DEPT VISIT,LEVEL V: ICD-10-PCS | Mod: ,,, | Performed by: EMERGENCY MEDICINE

## 2020-03-01 RX ORDER — POTASSIUM CHLORIDE 20 MEQ/1
20 TABLET, EXTENDED RELEASE ORAL 2 TIMES DAILY
Status: ON HOLD | COMMUNITY
End: 2020-05-22 | Stop reason: HOSPADM

## 2020-03-01 RX ORDER — LISINOPRIL 10 MG/1
10 TABLET ORAL DAILY
Status: ON HOLD | COMMUNITY
End: 2020-05-22 | Stop reason: HOSPADM

## 2020-03-01 RX ORDER — BUMETANIDE 2 MG/1
2 TABLET ORAL DAILY
Status: ON HOLD | COMMUNITY
End: 2023-06-27

## 2020-03-01 RX ORDER — ONDANSETRON 2 MG/ML
4 INJECTION INTRAMUSCULAR; INTRAVENOUS
Status: COMPLETED | OUTPATIENT
Start: 2020-03-01 | End: 2020-03-01

## 2020-03-01 RX ORDER — LOPERAMIDE HYDROCHLORIDE 2 MG/1
4 CAPSULE ORAL
Status: COMPLETED | OUTPATIENT
Start: 2020-03-01 | End: 2020-03-01

## 2020-03-01 RX ADMIN — ALUMINUM HYDROXIDE, MAGNESIUM HYDROXIDE, AND SIMETHICONE 50 ML: 200; 200; 20 SUSPENSION ORAL at 09:03

## 2020-03-01 RX ADMIN — ONDANSETRON 4 MG: 2 INJECTION INTRAMUSCULAR; INTRAVENOUS at 08:03

## 2020-03-01 RX ADMIN — LOPERAMIDE HYDROCHLORIDE 4 MG: 2 CAPSULE ORAL at 08:03

## 2020-03-01 RX ADMIN — SODIUM CHLORIDE 500 ML: 0.9 INJECTION, SOLUTION INTRAVENOUS at 08:03

## 2020-03-02 NOTE — DISCHARGE INSTRUCTIONS
Use pepto bismol and imodium over the counter as directed on packaging.    Our goal in the emergency department is to always give you outstanding care and exceptional service. You may receive a survey by mail or e-mail in the next week regarding your experience in our ED. We would greatly appreciate your completing and returning the survey. Your feedback provides us with a way to recognize our staff who give very good care and it helps us learn how to improve when your experience was below our aspiration of excellence.

## 2020-03-02 NOTE — ED TRIAGE NOTES
"Pt reports around 0300 began having diarrhea and stomach burning with about 5 episodes in the past two hours, CP, dizziness, blurry vision that began around noon today. "feel like I'm about to pass out and fall"      "

## 2020-03-02 NOTE — ED PROVIDER NOTES
Encounter Date: 3/1/2020    SCRIBE #1 NOTE: I, Jose Apodaca, am scribing for, and in the presence of,  Dr. Tolliver. I have scribed the following portions of the note - Other sections scribed: HPI, ROS, PE.       History     Chief Complaint   Patient presents with    Chest Pain     Patient is a 59 year old diabetic female with hypertension and hyperlipidemia, presenting with chest pain. This chest pain began around 10AM and other symptoms began around 11. Other associated symptoms include nausea, light-headedness, diarrhea, and shortness of breath. Notes too many episodes of diarrhea to count. Around 2PM she began to experience blurry vision in both eyes.  Abdominal surgeries include hysterectomy and tubal ligation. She has not had any uncooked foods or recent travel.  No recent antibiotics.  Patient is a difficult historian with limited insight into their medical issues.  A ten point review of systems was completed and is negative except as documented above.  Patient denies any other acute medical complaint.  The patients available PMH, PSH, Social History, medications, allergies, and triage vital signs were reviewed just prior to their medical evaluation.    The history is provided by the patient and medical records.     Review of patient's allergies indicates:  No Known Allergies  Past Medical History:   Diagnosis Date    Diabetes mellitus     Hyperlipidemia     Hypertension     Neuropathy     Pulmonary embolism 1998     Past Surgical History:   Procedure Laterality Date    CYST REMOVAL      HERNIA REPAIR      HYSTERECTOMY      TONSILLECTOMY      TUBAL LIGATION       Family History   Problem Relation Age of Onset    Kidney disease Mother     Kidney disease Father     Stroke Maternal Uncle      Social History     Tobacco Use    Smoking status: Never Smoker    Smokeless tobacco: Never Used   Substance Use Topics    Alcohol use: No    Drug use: No     Review of Systems   Constitutional: Negative  for fever.   HENT: Negative for sore throat.    Eyes: Negative for discharge.   Respiratory: Positive for shortness of breath. Negative for cough.    Cardiovascular: Positive for chest pain. Negative for leg swelling.   Gastrointestinal: Positive for abdominal pain, diarrhea and nausea. Negative for blood in stool and vomiting.   Genitourinary: Negative for dysuria.   Musculoskeletal: Negative for neck pain.   Skin: Negative for rash and wound.   Allergic/Immunologic: Negative for immunocompromised state.   Neurological: Negative for syncope.   Psychiatric/Behavioral: Negative for confusion.       Physical Exam     Initial Vitals [03/01/20 1725]   BP Pulse Resp Temp SpO2   112/64 89 16 99.3 °F (37.4 °C) 100 %      MAP       --         Physical Exam    Nursing note and vitals reviewed.  Constitutional: She appears well-developed and well-nourished. She is not diaphoretic. No distress.   HENT:   Head: Normocephalic and atraumatic.   Nose: Nose normal.   Eyes: Conjunctivae are normal. Right eye exhibits no discharge. Left eye exhibits no discharge.   Neck: Normal range of motion. Neck supple.   Cardiovascular: Normal rate, regular rhythm, normal heart sounds and intact distal pulses. Exam reveals no gallop and no friction rub.    No murmur heard.  Pulmonary/Chest: Breath sounds normal. No respiratory distress. She has no wheezes. She has no rhonchi. She has no rales.   Abdominal: Soft. She exhibits no distension. There is tenderness in the right upper quadrant. There is no rebound and no guarding.   Musculoskeletal: Normal range of motion. She exhibits no edema or tenderness.   Neurological: She is alert and oriented to person, place, and time. She has normal strength. GCS score is 15. GCS eye subscore is 4. GCS verbal subscore is 5. GCS motor subscore is 6.   Skin: Skin is warm and dry. No rash noted. No erythema.   Psychiatric: She has a normal mood and affect. Her behavior is normal. Judgment and thought content  normal.         ED Course   Procedures  Labs Reviewed   CBC W/ AUTO DIFFERENTIAL - Abnormal; Notable for the following components:       Result Value    WBC 14.22 (*)     RBC 5.71 (*)     Mean Corpuscular Hemoglobin 26.1 (*)     Mean Corpuscular Hemoglobin Conc 31.4 (*)     Immature Granulocytes 1.1 (*)     Gran # (ANC) 9.3 (*)     Immature Grans (Abs) 0.16 (*)     Eos # 0.7 (*)     All other components within normal limits   COMPREHENSIVE METABOLIC PANEL - Abnormal; Notable for the following components:    CO2 19 (*)     Glucose 326 (*)     BUN, Bld 31 (*)     Creatinine 1.5 (*)     Albumin 3.2 (*)     Alkaline Phosphatase 148 (*)     eGFR if  44.3 (*)     eGFR if non  38.4 (*)     All other components within normal limits   TROPONIN I   B-TYPE NATRIURETIC PEPTIDE   LIPASE     EKG Readings: (Independently Interpreted)   Initial Reading: No STEMI. Rhythm: Normal Sinus Rhythm. Heart Rate: 82. Ectopy: No Ectopy. Conduction: Normal. ST Segments: Normal ST Segments. T Waves: Normal. Clinical Impression: Normal Sinus Rhythm     ECG Results          EKG 12-lead (In process)  Result time 03/02/20 09:14:19    In process by Interface, Lab In [facility] (03/02/20 09:14:19)                 Narrative:    Test Reason : R07.9,    Vent. Rate : 082 BPM     Atrial Rate : 082 BPM     P-R Int : 126 ms          QRS Dur : 088 ms      QT Int : 376 ms       P-R-T Axes : 086 066 048 degrees     QTc Int : 439 ms    Normal sinus rhythm  Normal ECG  When compared with ECG of 01-JAN-2020 12:51,  No significant change was found    Referred By: [email or web address]   SELF           Confirmed By:                             Imaging Results          US Abdomen Limited (Final result)  Result time 03/01/20 23:05:17    Final result by Rico Sewell MD (03/01/20 23:05:17)                 Impression:      Cholelithiasis without evidence of acute cholecystitis.    Electronically signed by resident: Foreign  Deshawn  Date:    03/01/2020  Time:    22:46    Electronically signed by: Rico Sewell MD  Date:    03/01/2020  Time:    23:05             Narrative:    EXAMINATION:  US ABDOMEN LIMITED    CLINICAL HISTORY:  ruq ttp    TECHNIQUE:  Limited ultrasound of the abdomen (including pancreas, liver, gallbladder, common bile duct, and spleen) was performed.    COMPARISON:  CT abdomen and pelvis 03/11/2019    FINDINGS:  Gallbladder: Small echogenic structures floating non dependently within the gallbladder, measuring up to 2 mm, likely small stones.  No pericholecystic fluid or gallbladder wall thickening.  No gallbladder hyperemia or hypervascularity.    Biliary system: The common duct is not dilated, measuring 4 mm.    Pancreas: Unremarkable.    Miscellaneous: No upper abdominal ascites.                               X-Ray Chest PA And Lateral (Final result)  Result time 03/01/20 20:21:14    Final result by Thang Tadeo MD (03/01/20 20:21:14)                 Impression:      No detrimental change or radiographic acute intrathoracic process seen.      Electronically signed by: Thang Tadeo MD  Date:    03/01/2020  Time:    20:21             Narrative:    EXAMINATION:  XR CHEST PA AND LATERAL    CLINICAL HISTORY:  Chest Pain;    TECHNIQUE:  PA and lateral views of the chest were performed.    COMPARISON:  Chest radiograph 01/01/2020 and CT thorax 03/21/2018    FINDINGS:  No detrimental change.The lungs are clear, with normal appearance of pulmonary vasculature and no pleural effusion or pneumothorax.    The cardiac silhouette is normal in size. The hilar and mediastinal contours are unremarkable.    Osseous structures appear grossly stable without acute or destructive process seen.  Large body habitus.                                 Medical Decision Making:   History:   I obtained history from: someone other than patient.       <> Summary of History:  assists HPI  Old Medical Records: I decided to obtain old  medical records.  Independently Interpreted Test(s):   I have ordered and independently interpreted EKG Reading(s) - see prior notes  Clinical Tests:   Lab Tests: Ordered and Reviewed  Radiological Study: Ordered and Reviewed  Medical Tests: Ordered and Reviewed  ED Management:  58 yo F presents with multiple complaints:  CP, SOB, abd pain, and diarrhea.  Vitals normal.  PE as above.  Labs with elevated Glucose and WBC which is nonspecific.  ECG is normal.  CXR unremarkable.  Troponin negative x 1.  No indication for repeat troponin testing given duration of symptoms.  Doubt ACS, PE, dissection, PNX, PNA, or tamponade.  2 episodes of diarrhea in ED.  No risk factors for c.diff or dysentery.  Improved with imodium.  Viral etiology likely.  RUQ US negative for acute cholecystitis.  Doubt acute life threat or surgical emergency.  She will take pepto and imodium over the counter as directed on packaging.  Patient will return to ED for worsening symptoms, inability to eat/drink, fever greater than 100.4, or any other concerns.  Did bedside teaching with return precautions.  All questions answered.  The patient acknowledges understanding.  Gave verbal discharge instructions.            Scribe Attestation:   Scribe #1: I performed the above scribed service and the documentation accurately describes the services I performed. I attest to the accuracy of the note.                          Clinical Impression:       ICD-10-CM ICD-9-CM   1. Diarrhea, unspecified type R19.7 787.91   2. Chest pain R07.9 786.50   3. Right upper quadrant abdominal pain R10.11 789.01         Disposition:   Disposition: Discharged  Condition: Stable     ED Disposition Condition    Discharge Stable        ED Prescriptions     None        Follow-up Information     Follow up With Specialties Details Why Contact Info    Follow up with primary physician as soon as possible.  Call tomorrow for an appointment.        [facility]  Medicine  Return to ED for worsening symptoms, inability to eat/drink, fever greater than 100.4, or any other concerns. [address] [facility] [address] [phone]                    Level of Complexity:  High, level 5.                 Jose Tolliver MD  03/02/20 4827

## 2020-03-05 ENCOUNTER — HOSPITAL ENCOUNTER (EMERGENCY)
Facility: HOSPITAL | Age: 60
Discharge: HOME | End: 2020-03-06
Attending: EMERGENCY MEDICINE
Payer: MEDICAID

## 2020-03-05 DIAGNOSIS — R19.7 DIARRHEA, UNSPECIFIED TYPE: Primary | ICD-10-CM

## 2020-03-05 DIAGNOSIS — K92.1 BLOOD IN STOOL: ICD-10-CM

## 2020-03-05 LAB
ALBUMIN SERPL BCP-MCNC: 3 G/DL (ref 3.5–5.2)
ALP SERPL-CCNC: 117 U/L (ref 55–135)
ALT SERPL W/O P-5'-P-CCNC: 12 U/L (ref 10–44)
ANION GAP SERPL CALC-SCNC: 10 MMOL/L (ref 8–16)
AST SERPL-CCNC: 18 U/L (ref 10–40)
BASOPHILS # BLD AUTO: 0.05 K/UL (ref 0–0.2)
BASOPHILS NFR BLD: 0.4 % (ref 0–1.9)
BILIRUB SERPL-MCNC: 0.1 MG/DL (ref 0.1–1)
BUN SERPL-MCNC: 19 MG/DL (ref 6–20)
CALCIUM SERPL-MCNC: 9 MG/DL (ref 8.7–10.5)
CHLORIDE SERPL-SCNC: 112 MMOL/L (ref 95–110)
CO2 SERPL-SCNC: 17 MMOL/L (ref 23–29)
CREAT SERPL-MCNC: 0.9 MG/DL (ref 0.5–1.4)
CTP QC/QA: YES
DIFFERENTIAL METHOD: ABNORMAL
EOSINOPHIL # BLD AUTO: 2.5 K/UL (ref 0–0.5)
EOSINOPHIL NFR BLD: 18.2 % (ref 0–8)
ERYTHROCYTE [DISTWIDTH] IN BLOOD BY AUTOMATED COUNT: 12.5 % (ref 11.5–14.5)
EST. GFR  (AFRICAN AMERICAN): >60 ML/MIN/1.73 M^2
EST. GFR  (NON AFRICAN AMERICAN): >60 ML/MIN/1.73 M^2
GLUCOSE SERPL-MCNC: 147 MG/DL (ref 70–110)
HCT VFR BLD AUTO: 39 % (ref 37–48.5)
HGB BLD-MCNC: 12.2 G/DL (ref 12–16)
IMM GRANULOCYTES # BLD AUTO: 0.09 K/UL (ref 0–0.04)
IMM GRANULOCYTES NFR BLD AUTO: 0.7 % (ref 0–0.5)
LIPASE SERPL-CCNC: 19 U/L (ref 4–60)
LYMPHOCYTES # BLD AUTO: 4.8 K/UL (ref 1–4.8)
LYMPHOCYTES NFR BLD: 34.8 % (ref 18–48)
MCH RBC QN AUTO: 25.7 PG (ref 27–31)
MCHC RBC AUTO-ENTMCNC: 31.3 G/DL (ref 32–36)
MCV RBC AUTO: 82 FL (ref 82–98)
MONOCYTES # BLD AUTO: 0.9 K/UL (ref 0.3–1)
MONOCYTES NFR BLD: 6.5 % (ref 4–15)
NEUTROPHILS # BLD AUTO: 5.4 K/UL (ref 1.8–7.7)
NEUTROPHILS NFR BLD: 39.4 % (ref 38–73)
NRBC BLD-RTO: 0 /100 WBC
OB PNL STL: POSITIVE
PLATELET # BLD AUTO: 238 K/UL (ref 150–350)
PMV BLD AUTO: 10.6 FL (ref 9.2–12.9)
POC MOLECULAR INFLUENZA A AGN: NEGATIVE
POC MOLECULAR INFLUENZA B AGN: NEGATIVE
POCT GLUCOSE: 156 MG/DL (ref 70–110)
POTASSIUM SERPL-SCNC: 4.6 MMOL/L (ref 3.5–5.1)
PROT SERPL-MCNC: 7.6 G/DL (ref 6–8.4)
RBC # BLD AUTO: 4.75 M/UL (ref 4–5.4)
RV AG STL QL IA.RAPID: NEGATIVE
SODIUM SERPL-SCNC: 139 MMOL/L (ref 136–145)
WBC # BLD AUTO: 13.76 K/UL (ref 3.9–12.7)
WBC #/AREA STL HPF: NORMAL /[HPF]

## 2020-03-05 PROCEDURE — 63600175 PHARM REV CODE 636 W HCPCS: Performed by: PHYSICIAN ASSISTANT

## 2020-03-05 PROCEDURE — 82272 OCCULT BLD FECES 1-3 TESTS: CPT

## 2020-03-05 PROCEDURE — 87449 NOS EACH ORGANISM AG IA: CPT

## 2020-03-05 PROCEDURE — 87209 SMEAR COMPLEX STAIN: CPT

## 2020-03-05 PROCEDURE — 89055 LEUKOCYTE ASSESSMENT FECAL: CPT

## 2020-03-05 PROCEDURE — 83690 ASSAY OF LIPASE: CPT

## 2020-03-05 PROCEDURE — 87045 FECES CULTURE AEROBIC BACT: CPT

## 2020-03-05 PROCEDURE — 89125 SPECIMEN FAT STAIN: CPT

## 2020-03-05 PROCEDURE — 82962 GLUCOSE BLOOD TEST: CPT

## 2020-03-05 PROCEDURE — 25000003 PHARM REV CODE 250: Performed by: PHYSICIAN ASSISTANT

## 2020-03-05 PROCEDURE — 99284 EMERGENCY DEPT VISIT MOD MDM: CPT | Mod: 25

## 2020-03-05 PROCEDURE — 96374 THER/PROPH/DIAG INJ IV PUSH: CPT

## 2020-03-05 PROCEDURE — C9113 INJ PANTOPRAZOLE SODIUM, VIA: HCPCS | Performed by: PHYSICIAN ASSISTANT

## 2020-03-05 PROCEDURE — 87324 CLOSTRIDIUM AG IA: CPT

## 2020-03-05 PROCEDURE — 81003 URINALYSIS AUTO W/O SCOPE: CPT

## 2020-03-05 PROCEDURE — 87427 SHIGA-LIKE TOXIN AG IA: CPT | Mod: 59

## 2020-03-05 PROCEDURE — 87502 INFLUENZA DNA AMP PROBE: CPT

## 2020-03-05 PROCEDURE — 85025 COMPLETE CBC W/AUTO DIFF WBC: CPT

## 2020-03-05 PROCEDURE — 87338 HPYLORI STOOL AG IA: CPT

## 2020-03-05 PROCEDURE — 80053 COMPREHEN METABOLIC PANEL: CPT

## 2020-03-05 PROCEDURE — 87425 ROTAVIRUS AG IA: CPT

## 2020-03-05 PROCEDURE — 87046 STOOL CULTR AEROBIC BACT EA: CPT | Mod: 59

## 2020-03-05 RX ORDER — FUROSEMIDE 20 MG/1
20 TABLET ORAL 2 TIMES DAILY
Status: ON HOLD | COMMUNITY
End: 2020-05-22 | Stop reason: HOSPADM

## 2020-03-05 RX ORDER — LOPERAMIDE HYDROCHLORIDE 2 MG/1
4 CAPSULE ORAL
Status: COMPLETED | OUTPATIENT
Start: 2020-03-05 | End: 2020-03-05

## 2020-03-05 RX ORDER — PANTOPRAZOLE SODIUM 40 MG/10ML
40 INJECTION, POWDER, LYOPHILIZED, FOR SOLUTION INTRAVENOUS
Status: COMPLETED | OUTPATIENT
Start: 2020-03-05 | End: 2020-03-05

## 2020-03-05 RX ORDER — IBUPROFEN 800 MG/1
800 TABLET ORAL 3 TIMES DAILY
Status: ON HOLD | COMMUNITY
End: 2020-05-22 | Stop reason: HOSPADM

## 2020-03-05 RX ORDER — LISINOPRIL 10 MG/1
10 TABLET ORAL DAILY
Status: ON HOLD | COMMUNITY
End: 2021-11-04 | Stop reason: HOSPADM

## 2020-03-05 RX ADMIN — SODIUM CHLORIDE 1000 ML: 0.9 INJECTION, SOLUTION INTRAVENOUS at 11:03

## 2020-03-05 RX ADMIN — PANTOPRAZOLE SODIUM 40 MG: 40 INJECTION, POWDER, FOR SOLUTION INTRAVENOUS at 11:03

## 2020-03-05 RX ADMIN — LOPERAMIDE HYDROCHLORIDE 4 MG: 2 CAPSULE ORAL at 11:03

## 2020-03-06 VITALS
BODY MASS INDEX: 32.89 KG/M2 | WEIGHT: 217 LBS | SYSTOLIC BLOOD PRESSURE: 149 MMHG | TEMPERATURE: 98 F | HEIGHT: 68 IN | HEART RATE: 77 BPM | RESPIRATION RATE: 18 BRPM | OXYGEN SATURATION: 100 % | DIASTOLIC BLOOD PRESSURE: 78 MMHG

## 2020-03-06 LAB
BILIRUB UR QL STRIP: NEGATIVE
C DIFF GDH STL QL: NEGATIVE
C DIFF TOX A+B STL QL IA: NEGATIVE
CLARITY UR: CLEAR
COLOR UR: COLORLESS
E COLI SXT1 STL QL IA: NEGATIVE
E COLI SXT2 STL QL IA: NEGATIVE
GLUCOSE UR QL STRIP: ABNORMAL
HGB UR QL STRIP: NEGATIVE
KETONES UR QL STRIP: NEGATIVE
LEUKOCYTE ESTERASE UR QL STRIP: NEGATIVE
NITRITE UR QL STRIP: NEGATIVE
PH UR STRIP: 5 [PH] (ref 5–8)
PROT UR QL STRIP: NEGATIVE
SP GR UR STRIP: 1 (ref 1–1.03)
URN SPEC COLLECT METH UR: ABNORMAL
UROBILINOGEN UR STRIP-ACNC: NEGATIVE EU/DL

## 2020-03-06 RX ORDER — CIPROFLOXACIN 500 MG/1
500 TABLET ORAL 2 TIMES DAILY
Qty: 20 TABLET | Refills: 0 | Status: SHIPPED | OUTPATIENT
Start: 2020-03-06 | End: 2020-03-16

## 2020-03-06 RX ORDER — METRONIDAZOLE 500 MG/1
500 TABLET ORAL 3 TIMES DAILY
Qty: 30 TABLET | Refills: 0 | Status: SHIPPED | OUTPATIENT
Start: 2020-03-06 | End: 2020-03-16

## 2020-03-06 RX ORDER — PANTOPRAZOLE SODIUM 20 MG/1
20 TABLET, DELAYED RELEASE ORAL DAILY
Qty: 30 TABLET | Refills: 0 | Status: ON HOLD | OUTPATIENT
Start: 2020-03-06 | End: 2023-06-27

## 2020-03-06 NOTE — ED PROVIDER NOTES
Encounter Date: 3/5/2020       History     Chief Complaint   Patient presents with    Diarrhea     pt reports diarrhea ongoing for 7 days; pt reports being seen in ED earlier this week, given fluids & medicine for diarrhea; pt took Imodium & Pepto-Bismol with no relief of diarrhea     60yo F with pmh HTN, HLD, IDDM, hx peripheral neuropathy, hx LE edema, with chief complaint 7 day hx watery stools. No melena, no hematochezia, no hx GI bleed. No fever. No urinary complaints or flank pain. No neck pain/stiffness. No headache or photophobia. She does admit to generalized cramping abdominal pain over the past week as well, cramping in nature, constant; pain not worsened with meals. No hx IBD, no recent travel, no sick contacts at home, no immunosuppression. Denies n/v. No abdominal distension. Abdominal surgical hx: hysterectomy.    She does admit to nonproductive cough over the past week, which is improved. She denies recent hospitalization or abx. No SOB, STOUT, or CP.     Recently evaluated at local ED due to diarrhea, CP, RUQ pain. Discharged with pepto and imodium after reassuring w/u.        Review of patient's allergies indicates:  No Known Allergies  Past Medical History:   Diagnosis Date    Asthma     Diabetes mellitus     Hypertension      No past surgical history on file.  No family history on file.  Social History     Tobacco Use    Smoking status: Not on file   Substance Use Topics    Alcohol use: Not on file    Drug use: Not on file     Review of Systems   Constitutional: Negative for appetite change, chills and fever.   Respiratory: Positive for cough. Negative for chest tightness and shortness of breath.    Cardiovascular: Negative for chest pain and leg swelling.   Gastrointestinal: Positive for abdominal pain and diarrhea. Negative for abdominal distention, blood in stool, constipation, nausea and vomiting.   Genitourinary: Negative for dysuria, flank pain and frequency.   Musculoskeletal:  Negative for arthralgias, myalgias, neck pain and neck stiffness.   Skin: Negative for rash.   Neurological: Negative for dizziness, syncope, weakness, light-headedness and headaches.       Physical Exam     Initial Vitals [03/05/20 2043]   BP Pulse Resp Temp SpO2   (!) 158/84 70 17 98.6 °F (37 °C) 100 %      MAP       --         Physical Exam    Nursing note and vitals reviewed.  Constitutional: She appears well-developed and well-nourished. She is not diaphoretic. No distress.   Well-appearing and nontoxic.  Resting comfortably on exam table.   HENT:   Head: Normocephalic and atraumatic.   Mouth/Throat: Oropharynx is clear and moist.   Eyes: EOM are normal.   Neck: Normal range of motion. Neck supple.   Cardiovascular: Intact distal pulses.   Pulmonary/Chest: No respiratory distress.   Abdominal:   Abdomen overall soft, normal bowel sounds ×4. TTP epigastric, suprapubic region.  No rebound or guarding.  No palpable mass or distention.  No flank or CVA tenderness.  Negative Catalan sign.  No pain over McBurney's point.   Neurological: She is alert and oriented to person, place, and time.   Skin: Skin is warm and dry. Capillary refill takes less than 2 seconds.   Psychiatric: She has a normal mood and affect. Thought content normal.         ED Course   Procedures  Labs Reviewed   CBC W/ AUTO DIFFERENTIAL - Abnormal; Notable for the following components:       Result Value    WBC 13.76 (*)     Mean Corpuscular Hemoglobin 25.7 (*)     Mean Corpuscular Hemoglobin Conc 31.3 (*)     Immature Granulocytes 0.7 (*)     Immature Grans (Abs) 0.09 (*)     Eos # 2.5 (*)     Eosinophil% 18.2 (*)     All other components within normal limits   COMPREHENSIVE METABOLIC PANEL - Abnormal; Notable for the following components:    Chloride 112 (*)     CO2 17 (*)     Glucose 147 (*)     Albumin 3.0 (*)     All other components within normal limits   URINALYSIS, REFLEX TO URINE CULTURE - Abnormal; Notable for the following components:     Color, UA Colorless (*)     Glucose, UA 1+ (*)     All other components within normal limits    Narrative:     Preferred Collection Type->Urine, Clean Catch   OCCULT BLOOD X 1, STOOL - Abnormal; Notable for the following components:    Occult Blood Positive (*)     All other components within normal limits   POCT GLUCOSE - Abnormal; Notable for the following components:    POCT Glucose 156 (*)     All other components within normal limits   CULTURE, STOOL   CLOSTRIDIUM DIFFICILE   ENTEROHEMORRHAGIC E.COLI   LIPASE   WBC, STOOL   ROTAVIRUS ANTIGEN, STOOL   H. PYLORI ANTIGEN, STOOL   FECAL FAT, QUALITATIVE   STOOL EXAM-OVA,CYSTS,PARASITES   POCT INFLUENZA A/B MOLECULAR          Imaging Results    None          Medical Decision Making:   Differential Diagnosis:   Viral gastroenteritis, enteritis, colitis  ED Management:  No antibiotic or healthcare exposure. No travel. No fever. No bloody stools. No immunosuppression. Appears overall well hydrated with normal BP and HR, normal skin turgor, normal cap refill, moist mucous membranes, continues with normal urination frequency. No known community outbreak. No hx C. Difficile infection.    Labs unremarkable. Hemoccult positive, but no melena or c/o hematochezia. No anemia. No granulocytosis. Low suspicion for bacterial pathogen, but cultures sent. Will start on cipro/flagyl as pt has hard time getting into PCP. Gastroenterology referral placed. On reevaluation, pt's abdominal pain resolved, no longer tender. Will d/c with instructions to f/u. Return precautions given.                                  Clinical Impression:       ICD-10-CM ICD-9-CM   1. Diarrhea, unspecified type R19.7 787.91   2. Blood in stool K92.1 578.1         Disposition:   Disposition: Discharged  Condition: Stable     ED Disposition Condition    Discharge Stable        ED Prescriptions     Medication Sig Dispense Start Date End Date Auth. Provider    pantoprazole (PROTONIX) 20 MG tablet Take 1 tablet  (20 mg total) by mouth once daily. 30 tablet 3/6/2020 3/6/2021 Nehemiah Fuller PA-C    ciprofloxacin HCl (CIPRO) 500 MG tablet Take 1 tablet (500 mg total) by mouth 2 (two) times daily. for 10 days 20 tablet 3/6/2020 3/16/2020 Nehemiah Fuller PA-C    metroNIDAZOLE (FLAGYL) 500 MG tablet Take 1 tablet (500 mg total) by mouth 3 (three) times daily. for 10 days 30 tablet 3/6/2020 3/16/2020 Nehemiah Fuller PA-C        Follow-up Information     Follow up With Specialties Details Why Contact Info    [facility] - Med Records  Schedule an appointment as soon as possible for a visit  For reevaluation [address] [facility] [address]  [phone]      [facility] - Gastroenterology Gastroenterology Schedule an appointment as soon as possible for a visit  For reevaluation by Gastroenterology [address] [facility] [address]  [phone]      [facility] Emergency Medicine  As needed, If symptoms worsen [address]  [facility] [id number] [phone]                                     Nehemiah Fuller PA-C  03/06/20 0609

## 2020-03-06 NOTE — DISCHARGE INSTRUCTIONS
Continue with Imodium and Pepto Bismol as written on packaging. Begin taking Protonix daily. Ciprofloxacin and Flagyl antibiotics as prescribed. Follow-up with your primary care provider for reevaluation of loose stools; schedule an appt with gastroenterology.    Please return if you begin with bloody stools, if you begin with fever, if any worsening abdominal pain, if any other problems occur.

## 2020-03-06 NOTE — ED NOTES
BM passed, foul odor, ? c-dif.  Specimen sent to lab.  Back to BR voiding and loose stool, IVF infusing to left FA 20g.  Pt AAO x 3.

## 2020-03-06 NOTE — ED TRIAGE NOTES
amb to BR voided and hopefully we collected a stool sample.  Awaiting pt to come out of BR.  Son at bedside.   Vs and orders noted.

## 2020-03-08 LAB — BACTERIA STL CULT: NORMAL

## 2020-03-09 LAB
FAT STL SUDAN IV STN: NORMAL
H PYLORI AG STL QL IA: NOT DETECTED
O+P STL TRI STN: NORMAL

## 2020-05-20 ENCOUNTER — HOSPITAL ENCOUNTER (OUTPATIENT)
Facility: HOSPITAL | Age: 60
Discharge: HOME | End: 2020-05-22
Attending: EMERGENCY MEDICINE | Admitting: INTERNAL MEDICINE
Payer: MEDICAID

## 2020-05-20 DIAGNOSIS — I10 ESSENTIAL HYPERTENSION: ICD-10-CM

## 2020-05-20 DIAGNOSIS — R07.9 CHEST PAIN: Primary | ICD-10-CM

## 2020-05-20 DIAGNOSIS — Z86.711 HISTORY OF PULMONARY EMBOLISM: ICD-10-CM

## 2020-05-20 DIAGNOSIS — R07.89 OTHER CHEST PAIN: ICD-10-CM

## 2020-05-20 DIAGNOSIS — E11.69 TYPE 2 DIABETES MELLITUS WITH OTHER SPECIFIED COMPLICATION, WITH LONG-TERM CURRENT USE OF INSULIN: ICD-10-CM

## 2020-05-20 DIAGNOSIS — E66.9 OBESITY (BMI 30.0-34.9): ICD-10-CM

## 2020-05-20 DIAGNOSIS — E11.59 OBESITY, DIABETES, AND HYPERTENSION SYNDROME: ICD-10-CM

## 2020-05-20 DIAGNOSIS — E04.9 ENLARGED THYROID GLAND: Chronic | ICD-10-CM

## 2020-05-20 DIAGNOSIS — E66.9 OBESITY, DIABETES, AND HYPERTENSION SYNDROME: ICD-10-CM

## 2020-05-20 DIAGNOSIS — E66.9 OBESITY, UNSPECIFIED CLASSIFICATION, UNSPECIFIED OBESITY TYPE, UNSPECIFIED WHETHER SERIOUS COMORBIDITY PRESENT: ICD-10-CM

## 2020-05-20 DIAGNOSIS — R07.9 CHEST PAIN, UNSPECIFIED TYPE: ICD-10-CM

## 2020-05-20 DIAGNOSIS — E11.65 TYPE 2 DIABETES MELLITUS WITH HYPERGLYCEMIA, WITH LONG-TERM CURRENT USE OF INSULIN: ICD-10-CM

## 2020-05-20 DIAGNOSIS — E11.69 OBESITY, DIABETES, AND HYPERTENSION SYNDROME: ICD-10-CM

## 2020-05-20 DIAGNOSIS — I20.0 UNSTABLE ANGINA: ICD-10-CM

## 2020-05-20 DIAGNOSIS — Z79.4 TYPE 2 DIABETES MELLITUS WITH HYPERGLYCEMIA, WITH LONG-TERM CURRENT USE OF INSULIN: ICD-10-CM

## 2020-05-20 DIAGNOSIS — Z79.4 TYPE 2 DIABETES MELLITUS WITH OTHER SPECIFIED COMPLICATION, WITH LONG-TERM CURRENT USE OF INSULIN: ICD-10-CM

## 2020-05-20 DIAGNOSIS — I15.2 OBESITY, DIABETES, AND HYPERTENSION SYNDROME: ICD-10-CM

## 2020-05-20 PROBLEM — L85.3 DRY SKIN: Status: RESOLVED | Noted: 2017-11-05 | Resolved: 2020-05-20

## 2020-05-20 PROBLEM — J02.9 SORE THROAT: Status: RESOLVED | Noted: 2017-11-05 | Resolved: 2020-05-20

## 2020-05-20 LAB
ALBUMIN SERPL BCP-MCNC: 3.2 G/DL (ref 3.5–5.2)
ALP SERPL-CCNC: 126 U/L (ref 55–135)
ALT SERPL W/O P-5'-P-CCNC: 34 U/L (ref 10–44)
ANION GAP SERPL CALC-SCNC: 11 MMOL/L (ref 8–16)
AST SERPL-CCNC: 22 U/L (ref 10–40)
BASOPHILS # BLD AUTO: 0.05 K/UL (ref 0–0.2)
BASOPHILS NFR BLD: 0.7 % (ref 0–1.9)
BILIRUB SERPL-MCNC: 0.2 MG/DL (ref 0.1–1)
BNP SERPL-MCNC: 19 PG/ML (ref 0–99)
BUN SERPL-MCNC: 27 MG/DL (ref 6–20)
CALCIUM SERPL-MCNC: 9.4 MG/DL (ref 8.7–10.5)
CHLORIDE SERPL-SCNC: 102 MMOL/L (ref 95–110)
CO2 SERPL-SCNC: 22 MMOL/L (ref 23–29)
CREAT SERPL-MCNC: 1 MG/DL (ref 0.5–1.4)
D DIMER PPP IA.FEU-MCNC: 0.58 MG/L FEU
DIFFERENTIAL METHOD: ABNORMAL
EOSINOPHIL # BLD AUTO: 0.1 K/UL (ref 0–0.5)
EOSINOPHIL NFR BLD: 0.8 % (ref 0–8)
ERYTHROCYTE [DISTWIDTH] IN BLOOD BY AUTOMATED COUNT: 13.6 % (ref 11.5–14.5)
EST. GFR  (AFRICAN AMERICAN): >60 ML/MIN/1.73 M^2
EST. GFR  (NON AFRICAN AMERICAN): >60 ML/MIN/1.73 M^2
ESTIMATED AVG GLUCOSE: 315 MG/DL (ref 68–131)
GLUCOSE SERPL-MCNC: 289 MG/DL (ref 70–110)
HBA1C MFR BLD HPLC: 12.6 % (ref 4–5.6)
HCT VFR BLD AUTO: 41.8 % (ref 37–48.5)
HGB BLD-MCNC: 12.5 G/DL (ref 12–16)
IMM GRANULOCYTES # BLD AUTO: 0.02 K/UL (ref 0–0.04)
IMM GRANULOCYTES NFR BLD AUTO: 0.3 % (ref 0–0.5)
LYMPHOCYTES # BLD AUTO: 3 K/UL (ref 1–4.8)
LYMPHOCYTES NFR BLD: 39.3 % (ref 18–48)
MCH RBC QN AUTO: 26.1 PG (ref 27–31)
MCHC RBC AUTO-ENTMCNC: 29.9 G/DL (ref 32–36)
MCV RBC AUTO: 87 FL (ref 82–98)
MONOCYTES # BLD AUTO: 0.5 K/UL (ref 0.3–1)
MONOCYTES NFR BLD: 6.8 % (ref 4–15)
NEUTROPHILS # BLD AUTO: 3.9 K/UL (ref 1.8–7.7)
NEUTROPHILS NFR BLD: 52.1 % (ref 38–73)
NRBC BLD-RTO: 0 /100 WBC
PLATELET # BLD AUTO: 193 K/UL (ref 150–350)
PMV BLD AUTO: 11.4 FL (ref 9.2–12.9)
POCT GLUCOSE: 190 MG/DL (ref 70–110)
POCT GLUCOSE: 195 MG/DL (ref 70–110)
POTASSIUM SERPL-SCNC: 4 MMOL/L (ref 3.5–5.1)
PROT SERPL-MCNC: 8 G/DL (ref 6–8.4)
RBC # BLD AUTO: 4.79 M/UL (ref 4–5.4)
SARS-COV-2 RDRP RESP QL NAA+PROBE: NEGATIVE
SODIUM SERPL-SCNC: 135 MMOL/L (ref 136–145)
TROPONIN I SERPL DL<=0.01 NG/ML-MCNC: <0.006 NG/ML (ref 0–0.03)
TSH SERPL DL<=0.005 MIU/L-ACNC: 0.49 UIU/ML (ref 0.4–4)
WBC # BLD AUTO: 7.5 K/UL (ref 3.9–12.7)

## 2020-05-20 PROCEDURE — C1751 CATH, INF, PER/CENT/MIDLINE: HCPCS

## 2020-05-20 PROCEDURE — C9399 UNCLASSIFIED DRUGS OR BIOLOG: HCPCS | Performed by: HOSPITALIST

## 2020-05-20 PROCEDURE — 93005 ELECTROCARDIOGRAM TRACING: CPT

## 2020-05-20 PROCEDURE — 93010 EKG 12-LEAD: ICD-10-PCS | Mod: ,,, | Performed by: INTERNAL MEDICINE

## 2020-05-20 PROCEDURE — 85379 FIBRIN DEGRADATION QUANT: CPT

## 2020-05-20 PROCEDURE — 76937 US GUIDE VASCULAR ACCESS: CPT

## 2020-05-20 PROCEDURE — 25000003 PHARM REV CODE 250: Performed by: HOSPITALIST

## 2020-05-20 PROCEDURE — 99285 EMERGENCY DEPT VISIT HI MDM: CPT | Mod: ,,, | Performed by: PHYSICIAN ASSISTANT

## 2020-05-20 PROCEDURE — 99220 PR INITIAL OBSERVATION CARE,LEVL III: CPT | Mod: ,,, | Performed by: HOSPITALIST

## 2020-05-20 PROCEDURE — 99285 EMERGENCY DEPT VISIT HI MDM: CPT | Mod: 25

## 2020-05-20 PROCEDURE — 83880 ASSAY OF NATRIURETIC PEPTIDE: CPT

## 2020-05-20 PROCEDURE — G0378 HOSPITAL OBSERVATION PER HR: HCPCS

## 2020-05-20 PROCEDURE — 25000003 PHARM REV CODE 250: Performed by: PHYSICIAN ASSISTANT

## 2020-05-20 PROCEDURE — 83036 HEMOGLOBIN GLYCOSYLATED A1C: CPT

## 2020-05-20 PROCEDURE — 96372 THER/PROPH/DIAG INJ SC/IM: CPT

## 2020-05-20 PROCEDURE — 85025 COMPLETE CBC W/AUTO DIFF WBC: CPT

## 2020-05-20 PROCEDURE — 63600175 PHARM REV CODE 636 W HCPCS: Performed by: HOSPITALIST

## 2020-05-20 PROCEDURE — U0002 COVID-19 LAB TEST NON-CDC: HCPCS

## 2020-05-20 PROCEDURE — 99220 PR INITIAL OBSERVATION CARE,LEVL III: ICD-10-PCS | Mod: ,,, | Performed by: HOSPITALIST

## 2020-05-20 PROCEDURE — 25000242 PHARM REV CODE 250 ALT 637 W/ HCPCS: Performed by: PHYSICIAN ASSISTANT

## 2020-05-20 PROCEDURE — 25500020 PHARM REV CODE 255: Performed by: INTERNAL MEDICINE

## 2020-05-20 PROCEDURE — 84443 ASSAY THYROID STIM HORMONE: CPT

## 2020-05-20 PROCEDURE — 99285 PR EMERGENCY DEPT VISIT,LEVEL V: ICD-10-PCS | Mod: ,,, | Performed by: PHYSICIAN ASSISTANT

## 2020-05-20 PROCEDURE — 80053 COMPREHEN METABOLIC PANEL: CPT

## 2020-05-20 PROCEDURE — 93010 ELECTROCARDIOGRAM REPORT: CPT | Mod: ,,, | Performed by: INTERNAL MEDICINE

## 2020-05-20 PROCEDURE — 36410 VNPNXR 3YR/> PHY/QHP DX/THER: CPT

## 2020-05-20 PROCEDURE — 84484 ASSAY OF TROPONIN QUANT: CPT

## 2020-05-20 RX ORDER — IBUPROFEN 200 MG
16 TABLET ORAL
Status: DISCONTINUED | OUTPATIENT
Start: 2020-05-20 | End: 2020-05-22 | Stop reason: HOSPADM

## 2020-05-20 RX ORDER — NITROGLYCERIN 0.4 MG/1
0.4 TABLET SUBLINGUAL EVERY 5 MIN PRN
Status: DISCONTINUED | OUTPATIENT
Start: 2020-05-20 | End: 2020-05-22 | Stop reason: HOSPADM

## 2020-05-20 RX ORDER — SODIUM CHLORIDE 0.9 % (FLUSH) 0.9 %
5 SYRINGE (ML) INJECTION
Status: DISCONTINUED | OUTPATIENT
Start: 2020-05-20 | End: 2020-05-22 | Stop reason: HOSPADM

## 2020-05-20 RX ORDER — LISINOPRIL 10 MG/1
10 TABLET ORAL
Status: COMPLETED | OUTPATIENT
Start: 2020-05-20 | End: 2020-05-20

## 2020-05-20 RX ORDER — ATORVASTATIN CALCIUM 40 MG/1
40 TABLET, FILM COATED ORAL DAILY
Status: DISCONTINUED | OUTPATIENT
Start: 2020-05-21 | End: 2020-05-21

## 2020-05-20 RX ORDER — SODIUM CHLORIDE 0.9 % (FLUSH) 0.9 %
10 SYRINGE (ML) INJECTION
Status: CANCELLED | OUTPATIENT
Start: 2020-05-20

## 2020-05-20 RX ORDER — ASPIRIN 325 MG
325 TABLET ORAL
Status: COMPLETED | OUTPATIENT
Start: 2020-05-20 | End: 2020-05-20

## 2020-05-20 RX ORDER — GLUCAGON 1 MG
1 KIT INJECTION
Status: DISCONTINUED | OUTPATIENT
Start: 2020-05-20 | End: 2020-05-20

## 2020-05-20 RX ORDER — IBUPROFEN 200 MG
16 TABLET ORAL
Status: DISCONTINUED | OUTPATIENT
Start: 2020-05-20 | End: 2020-05-20

## 2020-05-20 RX ORDER — ACETAMINOPHEN 325 MG/1
650 TABLET ORAL EVERY 4 HOURS PRN
Status: DISCONTINUED | OUTPATIENT
Start: 2020-05-20 | End: 2020-05-22 | Stop reason: HOSPADM

## 2020-05-20 RX ORDER — BUMETANIDE 1 MG/1
2 TABLET ORAL 2 TIMES DAILY
Status: DISCONTINUED | OUTPATIENT
Start: 2020-05-20 | End: 2020-05-22 | Stop reason: HOSPADM

## 2020-05-20 RX ORDER — IBUPROFEN 200 MG
24 TABLET ORAL
Status: DISCONTINUED | OUTPATIENT
Start: 2020-05-20 | End: 2020-05-20

## 2020-05-20 RX ORDER — IBUPROFEN 200 MG
24 TABLET ORAL
Status: DISCONTINUED | OUTPATIENT
Start: 2020-05-20 | End: 2020-05-22 | Stop reason: HOSPADM

## 2020-05-20 RX ORDER — AMOXICILLIN 250 MG
1 CAPSULE ORAL 2 TIMES DAILY PRN
Status: DISCONTINUED | OUTPATIENT
Start: 2020-05-20 | End: 2020-05-22 | Stop reason: HOSPADM

## 2020-05-20 RX ORDER — ONDANSETRON 2 MG/ML
8 INJECTION INTRAMUSCULAR; INTRAVENOUS EVERY 8 HOURS PRN
Status: DISCONTINUED | OUTPATIENT
Start: 2020-05-20 | End: 2020-05-22 | Stop reason: HOSPADM

## 2020-05-20 RX ORDER — LISINOPRIL 5 MG/1
10 TABLET ORAL DAILY
Status: DISCONTINUED | OUTPATIENT
Start: 2020-05-21 | End: 2020-05-22 | Stop reason: HOSPADM

## 2020-05-20 RX ORDER — NAPROXEN SODIUM 220 MG/1
81 TABLET, FILM COATED ORAL DAILY
Status: DISCONTINUED | OUTPATIENT
Start: 2020-05-21 | End: 2020-05-22 | Stop reason: HOSPADM

## 2020-05-20 RX ORDER — TALC
6 POWDER (GRAM) TOPICAL NIGHTLY PRN
Status: DISCONTINUED | OUTPATIENT
Start: 2020-05-20 | End: 2020-05-22 | Stop reason: HOSPADM

## 2020-05-20 RX ORDER — HYDROCODONE BITARTRATE AND ACETAMINOPHEN 10; 325 MG/1; MG/1
1 TABLET ORAL EVERY 4 HOURS PRN
Status: DISCONTINUED | OUTPATIENT
Start: 2020-05-20 | End: 2020-05-22 | Stop reason: HOSPADM

## 2020-05-20 RX ORDER — POTASSIUM CHLORIDE 20 MEQ/1
20 TABLET, EXTENDED RELEASE ORAL 2 TIMES DAILY
Status: DISCONTINUED | OUTPATIENT
Start: 2020-05-20 | End: 2020-05-22 | Stop reason: HOSPADM

## 2020-05-20 RX ORDER — ENOXAPARIN SODIUM 100 MG/ML
40 INJECTION SUBCUTANEOUS EVERY 24 HOURS
Status: DISCONTINUED | OUTPATIENT
Start: 2020-05-20 | End: 2020-05-22 | Stop reason: HOSPADM

## 2020-05-20 RX ORDER — INSULIN ASPART 100 [IU]/ML
1-10 INJECTION, SOLUTION INTRAVENOUS; SUBCUTANEOUS
Status: DISCONTINUED | OUTPATIENT
Start: 2020-05-20 | End: 2020-05-21

## 2020-05-20 RX ORDER — GLUCAGON 1 MG
1 KIT INJECTION
Status: DISCONTINUED | OUTPATIENT
Start: 2020-05-20 | End: 2020-05-22 | Stop reason: HOSPADM

## 2020-05-20 RX ORDER — HYDROCODONE BITARTRATE AND ACETAMINOPHEN 5; 325 MG/1; MG/1
1 TABLET ORAL EVERY 4 HOURS PRN
Status: DISCONTINUED | OUTPATIENT
Start: 2020-05-20 | End: 2020-05-22 | Stop reason: HOSPADM

## 2020-05-20 RX ADMIN — INSULIN DETEMIR 20 UNITS: 100 INJECTION, SOLUTION SUBCUTANEOUS at 08:05

## 2020-05-20 RX ADMIN — LISINOPRIL 10 MG: 10 TABLET ORAL at 04:05

## 2020-05-20 RX ADMIN — IOHEXOL 100 ML: 350 INJECTION, SOLUTION INTRAVENOUS at 07:05

## 2020-05-20 RX ADMIN — ACETAMINOPHEN 650 MG: 325 TABLET ORAL at 08:05

## 2020-05-20 RX ADMIN — NITROGLYCERIN 0.4 MG: 0.4 TABLET SUBLINGUAL at 12:05

## 2020-05-20 RX ADMIN — Medication 6 MG: at 08:05

## 2020-05-20 RX ADMIN — ENOXAPARIN SODIUM 40 MG: 100 INJECTION SUBCUTANEOUS at 05:05

## 2020-05-20 RX ADMIN — HYDROCODONE BITARTRATE AND ACETAMINOPHEN 1 TABLET: 5; 325 TABLET ORAL at 08:05

## 2020-05-20 RX ADMIN — BUMETANIDE 2 MG: 1 TABLET ORAL at 05:05

## 2020-05-20 RX ADMIN — ASPIRIN 325 MG ORAL TABLET 325 MG: 325 PILL ORAL at 04:05

## 2020-05-20 RX ADMIN — POTASSIUM CHLORIDE 20 MEQ: 1500 TABLET, EXTENDED RELEASE ORAL at 08:05

## 2020-05-20 NOTE — CONSULTS
Placed 18g X 10cm midline in left brachial vein of LUE using realtime ultrasound guidance. Lot#[id number]. Remove on or before 06/18/2020.

## 2020-05-20 NOTE — ED TRIAGE NOTES
Patient states irregular HR this am at 0700 lasting 1 hour, states after episode began with right sided CP, positive cough, denies fevers. Positive nausea, no emesis

## 2020-05-20 NOTE — EKG INTERPRETATIONS - EMERGENCY DEPT.
Time of study: 05/20/2020 10:37    Independent interpretation by ED physician.    Sinus rhythm with occasional PVCs.  Ventricular rate 78 beats per minute.  Normal axis.  Normal QRS and QT intervals.  No ST segment elevation or depression.  Normal T-wave morphology.

## 2020-05-20 NOTE — ED PROVIDER NOTES
"Encounter Date: 5/20/2020       History     Chief Complaint   Patient presents with    Chest Pain     states "heart started fluttering this morning and after it stopped I got chest pain"     59-year-old female with DM2, hypertension, asthma, history of PE presents for chest pain.  Her symptoms started this morning with sensation of heart racing and fluttering, when this resolved she started to feel pain in her chest which is like "a weight".  Her pain radiates down to her epigastrium.  Her pain is worse with exertion.  She denies any palliating factors.  She has some very mild shortness of breath with exertion but denies shortness of breath at rest.  She reports associated bilateral leg swelling and nausea.  She denies abdominal pain, vomiting, fever/chills or cough.  She denies any recent surgeries or immobilization.  She is unsure whether or not her last pulmonary embolism was provoked.        Review of patient's allergies indicates:  No Known Allergies  Past Medical History:   Diagnosis Date    Asthma     Diabetes mellitus     Diabetes mellitus     Hyperlipidemia     Hypertension     Neuropathy     Pulmonary embolism 1998     Past Surgical History:   Procedure Laterality Date    CYST REMOVAL      HERNIA REPAIR      HYSTERECTOMY      TONSILLECTOMY      TUBAL LIGATION       Family History   Problem Relation Age of Onset    Kidney disease Mother     Kidney disease Father     Stroke Maternal Uncle      Social History     Tobacco Use    Smoking status: Never Smoker    Smokeless tobacco: Never Used   Substance Use Topics    Alcohol use: No    Drug use: No     Review of Systems   Constitutional: Negative for chills, diaphoresis, fatigue and fever.   HENT: Negative for sore throat.    Respiratory: Negative for cough and shortness of breath.    Cardiovascular: Positive for chest pain, palpitations and leg swelling.   Gastrointestinal: Positive for abdominal pain and nausea. Negative for abdominal " distention, anal bleeding, blood in stool, constipation, diarrhea, rectal pain and vomiting.   Endocrine: Positive for heat intolerance. Negative for cold intolerance, polydipsia, polyphagia and polyuria.   Genitourinary: Negative for dysuria and hematuria.   Musculoskeletal: Negative for back pain.   Skin: Negative for rash.   Neurological: Negative for weakness and numbness.   Hematological: Does not bruise/bleed easily.       Physical Exam     Initial Vitals [05/20/20 1031]   BP Pulse Resp Temp SpO2   (!) 195/88 78 15 97.9 °F (36.6 °C) 98 %      MAP       --         Physical Exam    Nursing note and vitals reviewed.  Constitutional: She appears well-developed and well-nourished. She is not diaphoretic. No distress.   HENT:   Head: Normocephalic and atraumatic.   Eyes: EOM are normal. Pupils are equal, round, and reactive to light.   Neck: Normal range of motion. No JVD present.   Cardiovascular: Normal rate, regular rhythm, normal heart sounds and intact distal pulses. Exam reveals no gallop and no friction rub.    No murmur heard.  1+ pitting edema bilateral lower legs   Pulmonary/Chest: Breath sounds normal. No respiratory distress. She has no wheezes. She has no rhonchi. She has no rales. She exhibits no tenderness.   Abdominal: Soft. Bowel sounds are normal. She exhibits no distension and no mass. There is no tenderness. There is no rebound and no guarding.   Musculoskeletal: Normal range of motion.   Neurological: She is alert and oriented to person, place, and time.   Skin: Skin is warm and dry.   Psychiatric: She has a normal mood and affect.         ED Course   Procedures  Labs Reviewed   CBC W/ AUTO DIFFERENTIAL - Abnormal; Notable for the following components:       Result Value    Mean Corpuscular Hemoglobin 26.1 (*)     Mean Corpuscular Hemoglobin Conc 29.9 (*)     All other components within normal limits   COMPREHENSIVE METABOLIC PANEL - Abnormal; Notable for the following components:    Sodium 135  (*)     CO2 22 (*)     Glucose 289 (*)     BUN, Bld 27 (*)     Albumin 3.2 (*)     All other components within normal limits   D DIMER, QUANTITATIVE - Abnormal; Notable for the following components:    D-Dimer 0.58 (*)     All other components within normal limits   B-TYPE NATRIURETIC PEPTIDE   TROPONIN I   TSH   SARS-COV-2 RNA AMPLIFICATION, QUAL   HEMOGLOBIN A1C     EKG Readings: (Independently Interpreted)   Initial Reading: No STEMI. Previous EKG: Compared with most recent EKG Previous EKG Date: 3/1/2020. Rhythm: Normal Sinus Rhythm. Heart Rate: 78. Ectopy: PVCs. ST Segments: Normal ST Segments. T Waves: Normal. Clinical Impression: Normal Sinus Rhythm with PVCs     ECG Results          EKG 12-lead (In process)  Result time 05/20/20 11:02:18    In process by Interface, Lab In [facility] (05/20/20 11:02:18)                 Narrative:    Test Reason : R07.9,    Vent. Rate : 078 BPM     Atrial Rate : 078 BPM     P-R Int : 140 ms          QRS Dur : 086 ms      QT Int : 376 ms       P-R-T Axes : 077 064 048 degrees     QTc Int : 428 ms    Sinus rhythm with occasional Premature ventricular complexes  Otherwise normal ECG  When compared with ECG of 01-MAR-2020 17:26,  Premature ventricular complexes are now Present    Referred By: [email or web address]   SELF           Confirmed By:                             Imaging Results          CTA Chest Non-Coronary - PE Study (No Result on File)                X-Ray Chest PA And Lateral (Final result)  Result time 05/20/20 12:39:22    Final result by Carlos Ledezma MD (05/20/20 12:39:22)                 Impression:      No acute abnormality.      Electronically signed by: Carlos Ledezma MD  Date:    05/20/2020  Time:    12:39             Narrative:    EXAMINATION:  XR CHEST PA AND LATERAL    CLINICAL HISTORY:  Chest pain, unspecified    TECHNIQUE:  PA and lateral views of the chest were performed.    COMPARISON:  03/01/2020    FINDINGS:  The lungs are clear, with normal appearance of  pulmonary vasculature and no pleural effusion or pneumothorax.    The cardiac silhouette is normal in size. The hilar and mediastinal contours are unremarkable.    Bones show mild degenerative changes.                                 Medical Decision Making:   History:   Old Medical Records: I decided to obtain old medical records.  Old Records Summarized: records from previous admission(s).       <> Summary of Records: Patient was discharged 03/22/2020 after a 2 day admission for hypertensive emergency  Initial Assessment:   59-year-old female presenting for exertional chest pain.  She is hypertensive at 95/88 with otherwise normal vitals.  She appears uncomfortable but nontoxic.  Differential Diagnosis:   Hypertensive emergency  ACS  Lower clinical suspicion for pulmonary embolism, however I do have some concern given the patient's history therefore will check D-dimer  Anemia  Independently Interpreted Test(s):   I have ordered and independently interpreted X-rays - see summary below.       <> Summary of X-Ray Reading(s): No consolidation or pulmonary edema  I have ordered and independently interpreted EKG Reading(s) - see prior notes  Clinical Tests:   Lab Tests: Ordered and Reviewed  Radiological Study: Ordered and Reviewed  Medical Tests: Ordered and Reviewed  ED Management:  Will check labs, chest x-ray, EKG, give nitro and reassess.    Chest pain relieved after nitro x3.  BP normalized.  Unclear if relief from nitro is secondary to CAD versus hypertensive urgency.  Labs are notable for mildly elevated D-dimer.  There was significant delay in obtaining CTA of the chest due to inability to keep IV access.  The PICC team was consulted.  Clinically, I think that pulmonary embolism is unlikely.  The CTA of the chest is pending.  The patient will be admitted to Hospital Medicine for observation.  Patient is comfortable with admission.  I discussed this patient with my supervising physician.    Additional MDM:      Well's Criteria Score:  -Clinical symptoms of DVT (leg swelling, pain with palpation) = 0.0  -Other diagnosis less likely than pulmonary embolism =            0.0  -Heart Rate >100 =   0.0  -Immobilization (= or > than 3 days) or surgery in the previous 4 weeks = 0.0  -Previous DVT/PE = 1.5  -Hemoptysis =          0.0  -Malignancy =           0.0  Well's Probability Score =    1.5      Heart Score:    History:          Highly suspicious.  ECG:             Normal  Age:               45-65 years  Risk factors: >= 3 risk factors or history of atherosclerotic disease  Troponin:       Less than or equal to normal limit  Final Score: 5                    ED Course as of May 20 1616   Wed May 20, 2020   1201 No anemia or leukocytosis   CBC auto differential(!) [CC]   1314 Troponin I [CC]   1556 COVID-19 Rapid Screening [CC]      ED Course User Index  [CC] Ally Genao PA-C                Clinical Impression:       ICD-10-CM ICD-9-CM   1. Chest pain R07.9 786.50         Disposition:   Disposition: Placed in Observation  Condition: Fair                        Ally Genao PA-C  05/20/20 2596

## 2020-05-20 NOTE — H&P
Hospital Medicine  History and Physical  [facility]      Patient Name: Devika Bustillo  MRN:  [id number]  [facility] Medicine Team: [facility] J Katy Jeffers MD  Date of Admission:  5/20/2020     Principal Problem:  Chest pain   Primary Care Physician: Primary Doctor No       History of Present Illness:    Ms. Devika Bustillo is a 59 y.o.  female with T2DM, HTN, and obesity who presents to the ER for evaluation of chest pain.  She mentions that she had just cleaning a closet, when she sat down and developed a fluttering in her chest.  Shortly after, she developed midsternal pressure that radiated down her arm.  She denies any radiation of the pain to her back or neck.  She has had similar pain before that has been off and on, but this persisted, prompting her to go to the ER.  She endorses some lower extremity edema, but has had some shortness of breath.  Of note, she does have a history of a PE but says that this feels different.  She is not on Aspirin or a Statin.    Upon arrival to the ER, vitals were temp 97.9F, HR 78 and /88.  EKG was normal.  Labs showed a D-dimer of 0.58.  She was given 3 doses of Nitro with improvement in her pain from a 10 to a 7, but then the pain returned.  Her case was discussed with Cardiology, who suggested a stress test and to hold on further medications given normal EKG and troponin.  CTA was ordered to rule out PE.  She was admitted to Hospital Medicine for further management.        Review of Systems:  Constitutional: Negative for chills, fever, fatigue, weakness  HENT: Negative for sore throat, trouble swallowing.    Eyes: Negative for photophobia, visual disturbance.   Respiratory: + shortness of breath.    Cardiovascular: + chest pain, palpitations, leg swelling.   Gastrointestinal: Negative for abdominal pain, nausea, vomiting, diarrhea, constipation  Endocrine: Negative for cold intolerance, heat intolerance.   Genitourinary: Negative  for dysuria, frequency.   Musculoskeletal: Negative for arthralgias, myalgias.   Skin: Negative for rash, wound, erythema   Neurological: Negative for numbness, paresthesias  Psychiatric/Behavioral: Negative for confusion, hallucinations, anxiety  All other systems reviewed and are negative.      Past Medical History: Patient has a past medical history of Asthma, Diabetes mellitus, Diabetes mellitus, Hyperlipidemia, Hypertension, Neuropathy, and Pulmonary embolism (1998).      Past Surgical History: Patient has a past surgical history that includes Tonsillectomy; Hernia repair; Hysterectomy; Tubal ligation; and Cyst Removal.      Social History: Patient reports that she has never smoked. She has never used smokeless tobacco. She reports that she does not drink alcohol or use drugs.      Family History: Patient's family history includes Kidney disease in her father and mother; Stroke in her maternal uncle.      Medications: Scheduled Meds:   [START ON 5/21/2020] aspirin  81 mg Oral Daily    aspirin  325 mg Oral ED 1 Time    [START ON 5/21/2020] atorvastatin  40 mg Oral Daily    bumetanide  2 mg Oral BID    [START ON 5/21/2020] lisinopriL  10 mg Oral Daily    potassium chloride SA  20 mEq Oral BID     Continuous Infusions:  PRN Meds:.Dextrose 10% Bolus, Dextrose 10% Bolus, glucagon (human recombinant), glucose, glucose, insulin aspart U-100, nitroGLYCERIN      Allergies: Patient has No Known Allergies.      Physical Exam:    Temp:  [97.9 °F (36.6 °C)]   Pulse:  [78]   Resp:  [15]   BP: (127-195)/(57-88)   SpO2:  [98 %]     Constitutional: Appears well developed and well nourished.  Obese  Head: Normocephalic and atraumatic.   Mouth/Throat: Oropharynx is clear and moist.   Eyes: EOM are normal. Pupils are equal, round, and reactive to light. No scleral icterus.   Neck: Normal range of motion. Neck supple.   Cardiovascular: Normal heart rate.  Regular heart rhythm.  No murmur heard.  Pulmonary/Chest: Effort  normal. No respiratory distress. No wheezes, rales, or rhonchi  Abdominal: Soft. Bowel sounds are normal.  No distension.  No tenderness  Musculoskeletal: Normal range of motion. Mild pedal edema.  Neurological: Alert and oriented to person, place, and time.   Skin: Skin is warm and dry.   Psychiatric: Normal mood and affect. Behavior is normal.       No intake or output data in the 24 hours ending 05/20/20 1644  Recent Labs   Lab 05/20/20  1114   WBC 7.50   HGB 12.5   HCT 41.8        Recent Labs   Lab 05/20/20  1114   *   K 4.0      CO2 22*   BUN 27*   CREATININE 1.0   *   CALCIUM 9.4     Recent Labs   Lab 05/20/20  1114   ALKPHOS 126   ALT 34   AST 22   ALBUMIN 3.2*   PROT 8.0   BILITOT 0.2      No results for input(s): LACTATE in the last 72 hours.   Recent Labs     05/20/20  1114   TROPONINI <0.006         Assessment and Plan:    Ms. Devika Bustillo is a 59 y.o. female who presented to [facility] on 5/20/2020 with chest pain.    Chest Pain  · EKG without STEMI or any acute ST/T wave changes  · Initial troponin negative, will trend until peak  · Start Aspirin 81mg PO daily  · Start Lipitor 40mg PO daily  · Continue tele  · Nitro prn  · NPO at midnight for stress echo  · Can consider Cardiology consult pending echo    Type 2 DM with Long Term Insulin Use  · HbA1c pending  · Home DM regimen:  Insulin 40 units qHS  · Start Detemir 20 units qHS  · SSI with POCT accuchecks and Diabetic diet    Essential HTN  · Chronic issue but hypertensive in the ER  · Continue Lisinopril 10mg PO daily.  Can consider increasing dose  · Continue Bumex 2mg PO BID    Morbid Obesity  · Body mass index is 33.45 kg/m².  · Encourage diet, weight loss, exercise    Obesity, Diabetes, and HTN Syndrome  · As above    History of PE  · Chronic issue   · Not on anticoagulation  · D dimer 0.58 with an age adjusted level of 0.57  · Wells Score 1.5 (low) given history of PE  · CTA pending     Diet:  Diabetic and Cardiac, then NPO  at midnight  VTE PPx:  Lovenox  Goals of Care:  Full      Disposition:  Pending chest pain  Discharge Needs:  Cardiology clinic      Katy Jeffers MD  [facility]  Cell:  [phone]  Spectra:  [phone]

## 2020-05-21 ENCOUNTER — TELEPHONE (OUTPATIENT)
Dept: ENDOCRINOLOGY | Facility: HOSPITAL | Age: 60
End: 2020-05-21

## 2020-05-21 PROBLEM — E04.9 ENLARGED THYROID GLAND: Chronic | Status: ACTIVE | Noted: 2020-05-21

## 2020-05-21 LAB
ALBUMIN SERPL BCP-MCNC: 2.9 G/DL (ref 3.5–5.2)
ALP SERPL-CCNC: 115 U/L (ref 55–135)
ALT SERPL W/O P-5'-P-CCNC: 24 U/L (ref 10–44)
ANION GAP SERPL CALC-SCNC: 11 MMOL/L (ref 8–16)
AORTIC ROOT ANNULUS: 1.9 CM
ASCENDING AORTA: 2.44 CM
AST SERPL-CCNC: 14 U/L (ref 10–40)
BASOPHILS # BLD AUTO: 0.04 K/UL (ref 0–0.2)
BASOPHILS NFR BLD: 0.5 % (ref 0–1.9)
BILIRUB SERPL-MCNC: 0.3 MG/DL (ref 0.1–1)
BSA FOR ECHO PROCEDURE: 2.19 M2
BUN SERPL-MCNC: 28 MG/DL (ref 6–20)
CALCIUM SERPL-MCNC: 8.9 MG/DL (ref 8.7–10.5)
CHLORIDE SERPL-SCNC: 102 MMOL/L (ref 95–110)
CHOLEST SERPL-MCNC: 232 MG/DL (ref 120–199)
CHOLEST/HDLC SERPL: 5.8 {RATIO} (ref 2–5)
CO2 SERPL-SCNC: 25 MMOL/L (ref 23–29)
CREAT SERPL-MCNC: 1.3 MG/DL (ref 0.5–1.4)
CV ECHO LV RWT: 0.3 CM
CV STRESS BASE HR: 67 BPM
DIASTOLIC BLOOD PRESSURE: 57 MMHG
DIFFERENTIAL METHOD: ABNORMAL
DOP CALC LVOT AREA: 2.8 CM2
DOP CALC LVOT DIAMETER: 1.88 CM
DOP CALC LVOT PEAK VEL: 1.16 M/S
DOP CALC LVOT STROKE VOLUME: 72.33 CM3
DOP CALCLVOT PEAK VEL VTI: 26.07 CM
E WAVE DECELERATION TIME: 223 MSEC
E/A RATIO: 0.89
E/E' RATIO: 10 M/S
ECHO LV POSTERIOR WALL: 0.74 CM (ref 0.6–1.1)
EOSINOPHIL # BLD AUTO: 0.1 K/UL (ref 0–0.5)
EOSINOPHIL NFR BLD: 1.3 % (ref 0–8)
ERYTHROCYTE [DISTWIDTH] IN BLOOD BY AUTOMATED COUNT: 13.2 % (ref 11.5–14.5)
EST. GFR  (AFRICAN AMERICAN): 52.6 ML/MIN/1.73 M^2
EST. GFR  (NON AFRICAN AMERICAN): 45.6 ML/MIN/1.73 M^2
FRACTIONAL SHORTENING: 34 % (ref 28–44)
GLUCOSE SERPL-MCNC: 287 MG/DL (ref 70–110)
HCT VFR BLD AUTO: 34.8 % (ref 37–48.5)
HDLC SERPL-MCNC: 40 MG/DL (ref 40–75)
HDLC SERPL: 17.2 % (ref 20–50)
HGB BLD-MCNC: 10.7 G/DL (ref 12–16)
IMM GRANULOCYTES # BLD AUTO: 0.02 K/UL (ref 0–0.04)
IMM GRANULOCYTES NFR BLD AUTO: 0.3 % (ref 0–0.5)
INTERVENTRICULAR SEPTUM: 0.65 CM (ref 0.6–1.1)
IVRT: 91.34 MSEC
LA MAJOR: 4.65 CM
LA MINOR: 4.93 CM
LA WIDTH: 4.25 CM
LDLC SERPL CALC-MCNC: 143.8 MG/DL (ref 63–159)
LEFT ATRIUM SIZE: 4.15 CM
LEFT ATRIUM VOLUME INDEX: 33.7 ML/M2
LEFT ATRIUM VOLUME: 71.75 CM3
LEFT INTERNAL DIMENSION IN SYSTOLE: 3.29 CM (ref 2.1–4)
LEFT VENTRICLE DIASTOLIC VOLUME INDEX: 54.38 ML/M2
LEFT VENTRICLE DIASTOLIC VOLUME: 115.75 ML
LEFT VENTRICLE MASS INDEX: 52 G/M2
LEFT VENTRICLE SYSTOLIC VOLUME INDEX: 20.5 ML/M2
LEFT VENTRICLE SYSTOLIC VOLUME: 43.67 ML
LEFT VENTRICULAR INTERNAL DIMENSION IN DIASTOLE: 4.95 CM (ref 3.5–6)
LEFT VENTRICULAR MASS: 111.72 G
LV LATERAL E/E' RATIO: 8.64 M/S
LV SEPTAL E/E' RATIO: 11.88 M/S
LYMPHOCYTES # BLD AUTO: 3 K/UL (ref 1–4.8)
LYMPHOCYTES NFR BLD: 41 % (ref 18–48)
MAGNESIUM SERPL-MCNC: 1.7 MG/DL (ref 1.6–2.6)
MCH RBC QN AUTO: 25.9 PG (ref 27–31)
MCHC RBC AUTO-ENTMCNC: 30.7 G/DL (ref 32–36)
MCV RBC AUTO: 84 FL (ref 82–98)
MONOCYTES # BLD AUTO: 0.6 K/UL (ref 0.3–1)
MONOCYTES NFR BLD: 8 % (ref 4–15)
MV PEAK A VEL: 1.07 M/S
MV PEAK E VEL: 0.95 M/S
NEUTROPHILS # BLD AUTO: 3.6 K/UL (ref 1.8–7.7)
NEUTROPHILS NFR BLD: 48.9 % (ref 38–73)
NONHDLC SERPL-MCNC: 192 MG/DL
NRBC BLD-RTO: 0 /100 WBC
OHS CV CPX 1 MINUTE RECOVERY HEART RATE: 100 BPM
OHS CV CPX 85 PERCENT MAX PREDICTED HEART RATE MALE: 132
OHS CV CPX ESTIMATED METS: 9
OHS CV CPX MAX PREDICTED HEART RATE: 156
OHS CV CPX PATIENT IS FEMALE: 1
OHS CV CPX PATIENT IS MALE: 0
OHS CV CPX PEAK DIASTOLIC BLOOD PRESSURE: 106 MMHG
OHS CV CPX PEAK HEAR RATE: 139 BPM
OHS CV CPX PEAK RATE PRESSURE PRODUCT: NORMAL
OHS CV CPX PEAK SYSTOLIC BLOOD PRESSURE: 194 MMHG
OHS CV CPX PERCENT MAX PREDICTED HEART RATE ACHIEVED: 89
OHS CV CPX RATE PRESSURE PRODUCT PRESENTING: 6767
PHOSPHATE SERPL-MCNC: 6 MG/DL (ref 2.7–4.5)
PLATELET # BLD AUTO: 294 K/UL (ref 150–350)
PMV BLD AUTO: 10.3 FL (ref 9.2–12.9)
POCT GLUCOSE: 179 MG/DL (ref 70–110)
POCT GLUCOSE: 184 MG/DL (ref 70–110)
POCT GLUCOSE: 247 MG/DL (ref 70–110)
POCT GLUCOSE: 266 MG/DL (ref 70–110)
POTASSIUM SERPL-SCNC: 4.3 MMOL/L (ref 3.5–5.1)
PROT SERPL-MCNC: 7.1 G/DL (ref 6–8.4)
PULM VEIN S/D RATIO: 1.85
PV PEAK D VEL: 0.27 M/S
PV PEAK S VEL: 0.5 M/S
RA MAJOR: 4.56 CM
RA WIDTH: 2.48 CM
RBC # BLD AUTO: 4.13 M/UL (ref 4–5.4)
RIGHT VENTRICULAR END-DIASTOLIC DIMENSION: 2.5 CM
RV TISSUE DOPPLER FREE WALL SYSTOLIC VELOCITY 1 (APICAL 4 CHAMBER VIEW): 12.03 CM/S
SINUS: 2.14 CM
SODIUM SERPL-SCNC: 138 MMOL/L (ref 136–145)
STJ: 2.2 CM
STRESS ECHO POST EXERCISE DUR MIN: 5 MINUTES
STRESS ECHO POST EXERCISE DUR SEC: 52 SECONDS
SYSTOLIC BLOOD PRESSURE: 101 MMHG
TDI LATERAL: 0.11 M/S
TDI SEPTAL: 0.08 M/S
TDI: 0.1 M/S
TRICUSPID ANNULAR PLANE SYSTOLIC EXCURSION: 1.55 CM
TRIGL SERPL-MCNC: 241 MG/DL (ref 30–150)
TROPONIN I SERPL DL<=0.01 NG/ML-MCNC: 0.02 NG/ML (ref 0–0.03)
WBC # BLD AUTO: 7.42 K/UL (ref 3.9–12.7)

## 2020-05-21 PROCEDURE — 96372 THER/PROPH/DIAG INJ SC/IM: CPT | Mod: 59

## 2020-05-21 PROCEDURE — 25000003 PHARM REV CODE 250: Performed by: HOSPITALIST

## 2020-05-21 PROCEDURE — 96374 THER/PROPH/DIAG INJ IV PUSH: CPT

## 2020-05-21 PROCEDURE — C9399 UNCLASSIFIED DRUGS OR BIOLOG: HCPCS | Performed by: NURSE PRACTITIONER

## 2020-05-21 PROCEDURE — 85025 COMPLETE CBC W/AUTO DIFF WBC: CPT

## 2020-05-21 PROCEDURE — 25000003 PHARM REV CODE 250: Performed by: NURSE PRACTITIONER

## 2020-05-21 PROCEDURE — 80053 COMPREHEN METABOLIC PANEL: CPT

## 2020-05-21 PROCEDURE — 99226 PR SUBSEQUENT OBSERVATION CARE,LEVEL III: CPT | Mod: ,,, | Performed by: NURSE PRACTITIONER

## 2020-05-21 PROCEDURE — 99214 PR OFFICE/OUTPT VISIT, EST, LEVL IV, 30-39 MIN: ICD-10-PCS | Mod: ,,, | Performed by: NURSE PRACTITIONER

## 2020-05-21 PROCEDURE — 63600175 PHARM REV CODE 636 W HCPCS: Performed by: NURSE PRACTITIONER

## 2020-05-21 PROCEDURE — 97803 MED NUTRITION INDIV SUBSEQ: CPT

## 2020-05-21 PROCEDURE — 99226 PR SUBSEQUENT OBSERVATION CARE,LEVEL III: ICD-10-PCS | Mod: ,,, | Performed by: NURSE PRACTITIONER

## 2020-05-21 PROCEDURE — G0378 HOSPITAL OBSERVATION PER HR: HCPCS

## 2020-05-21 PROCEDURE — 84100 ASSAY OF PHOSPHORUS: CPT

## 2020-05-21 PROCEDURE — 83735 ASSAY OF MAGNESIUM: CPT

## 2020-05-21 PROCEDURE — 80061 LIPID PANEL: CPT

## 2020-05-21 PROCEDURE — 84484 ASSAY OF TROPONIN QUANT: CPT

## 2020-05-21 PROCEDURE — 36415 COLL VENOUS BLD VENIPUNCTURE: CPT

## 2020-05-21 PROCEDURE — 63600175 PHARM REV CODE 636 W HCPCS: Performed by: HOSPITALIST

## 2020-05-21 PROCEDURE — 99214 OFFICE O/P EST MOD 30 MIN: CPT | Mod: ,,, | Performed by: NURSE PRACTITIONER

## 2020-05-21 RX ORDER — INSULIN ASPART 100 [IU]/ML
8 INJECTION, SOLUTION INTRAVENOUS; SUBCUTANEOUS
Status: DISCONTINUED | OUTPATIENT
Start: 2020-05-21 | End: 2020-05-22 | Stop reason: HOSPADM

## 2020-05-21 RX ORDER — ATORVASTATIN CALCIUM 40 MG/1
80 TABLET, FILM COATED ORAL DAILY
Status: DISCONTINUED | OUTPATIENT
Start: 2020-05-22 | End: 2020-05-22 | Stop reason: HOSPADM

## 2020-05-21 RX ORDER — INSULIN ASPART 100 [IU]/ML
0-5 INJECTION, SOLUTION INTRAVENOUS; SUBCUTANEOUS
Status: DISCONTINUED | OUTPATIENT
Start: 2020-05-21 | End: 2020-05-22 | Stop reason: HOSPADM

## 2020-05-21 RX ORDER — OMEGA-3/DHA/EPA/FISH OIL 300-1000MG
1 CAPSULE,DELAYED RELEASE (ENTERIC COATED) ORAL DAILY
Status: DISCONTINUED | OUTPATIENT
Start: 2020-05-21 | End: 2020-05-22 | Stop reason: HOSPADM

## 2020-05-21 RX ORDER — MAGNESIUM SULFATE HEPTAHYDRATE 40 MG/ML
2 INJECTION, SOLUTION INTRAVENOUS ONCE
Status: DISCONTINUED | OUTPATIENT
Start: 2020-05-21 | End: 2020-05-22 | Stop reason: HOSPADM

## 2020-05-21 RX ORDER — INSULIN ASPART 100 [IU]/ML
10 INJECTION, SOLUTION INTRAVENOUS; SUBCUTANEOUS
Status: DISCONTINUED | OUTPATIENT
Start: 2020-05-21 | End: 2020-05-21

## 2020-05-21 RX ADMIN — INSULIN ASPART 4 UNITS: 100 INJECTION, SOLUTION INTRAVENOUS; SUBCUTANEOUS at 09:05

## 2020-05-21 RX ADMIN — ASPIRIN 81 MG 81 MG: 81 TABLET ORAL at 09:05

## 2020-05-21 RX ADMIN — OMEGA-3 FATTY ACIDS CAP DELAYED RELEASE 1000 MG 1 CAPSULE: 1000 CAPSULE DELAYED RELEASE at 11:05

## 2020-05-21 RX ADMIN — BUMETANIDE 2 MG: 1 TABLET ORAL at 05:05

## 2020-05-21 RX ADMIN — ATORVASTATIN CALCIUM 40 MG: 40 TABLET, FILM COATED ORAL at 09:05

## 2020-05-21 RX ADMIN — BUMETANIDE 2 MG: 1 TABLET ORAL at 09:05

## 2020-05-21 RX ADMIN — INSULIN DETEMIR 32 UNITS: 100 INJECTION, SOLUTION SUBCUTANEOUS at 09:05

## 2020-05-21 RX ADMIN — INSULIN ASPART 8 UNITS: 100 INJECTION, SOLUTION INTRAVENOUS; SUBCUTANEOUS at 04:05

## 2020-05-21 RX ADMIN — POTASSIUM CHLORIDE 20 MEQ: 1500 TABLET, EXTENDED RELEASE ORAL at 09:05

## 2020-05-21 RX ADMIN — LISINOPRIL 10 MG: 5 TABLET ORAL at 09:05

## 2020-05-21 NOTE — PROGRESS NOTES
Nutrition-Related Diabetes Education      Time Spent:10 mins    Learners: Pt    Current HbA1c: 12.6     Is patient aware of their A1c and their goal A1c?    yes    Home diabetes medication(s): On home insulin    Nutrition Education with handouts: Diabetic diet, A1C education    Comments: Unable to reach pt but spoke with nurse. She will inform pt that education on A1C and the diabetic diet will be attached to her discharge papers. Nurse will also tell the pt about her A1C numbers. Pt can contact RD with any follow up questions.      Barriers to Learning: none    Follow up:yes 5/28/20    Please consult as needed.  Thank you!

## 2020-05-21 NOTE — PLAN OF CARE
CM met with patient at the bedside to discuss discharge planning assessment. Pt lives alone and will drive herself home at discharge.  Pt verified PCP and Pharmacy. CM will continue to follow for discharge needs.        05/21/20 5187   Discharge Assessment   Assessment Type Discharge Planning Assessment   Confirmed/corrected address and phone number on facesheet? Yes   Assessment information obtained from? Patient   Expected Length of Stay (days) 1   Communicated expected length of stay with patient/caregiver yes   Prior to hospitilization cognitive status: Alert/Oriented   Prior to hospitalization functional status: Independent   Current cognitive status: Alert/Oriented   Current Functional Status: Independent   Lives With alone   Able to Return to Prior Arrangements yes   Is patient able to care for self after discharge? Yes   Who are your caregiver(s) and their phone number(s)? Brittny Bustillo- [phone]   Patient's perception of discharge disposition home or selfcare   Readmission Within the Last 30 Days no previous admission in last 30 days   Patient currently being followed by outpatient case management? No   Patient currently receives any other outside agency services? No   Equipment Currently Used at Home glucometer   Do you have any problems affording any of your prescribed medications? No   Is the patient taking medications as prescribed? yes   Does the patient have transportation home? Yes   Transportation Anticipated family or friend will provide   Does the patient receive services at the Coumadin Clinic? No   Discharge Plan A Home   Discharge Plan B Home with family   DME Needed Upon Discharge  none   Patient/Family in Agreement with Plan yes

## 2020-05-21 NOTE — SUBJECTIVE & OBJECTIVE
Interval HPI:   Overnight events: BG elevated above goal ranges on current SQ insulin regimen. ADA/Cardiac diet ordered this afternoon.  Eating:   NPO this morning for echo  Nausea: No  Hypoglycemia and intervention: No  Fever: No  TPN and/or TF: No  If yes, type of TF/TPN and rate: none    PMH, PSH, FH, SH reviewed     ROS:  Constitutional: Negative for weight changes.  Eyes: Negative for visual disturbance.  Respiratory: Negative for cough.   Cardiovascular: Positive for chest pain.  Gastrointestinal: Positve for nausea.  Endocrine: Negative for polyuria, polydipsia.  Musculoskeletal: Negative for back pain.  Skin: Negative for rash.  Neurological: Negative for syncope.  Psychiatric/Behavioral: Negative for depression.    Review of Systems    Current Medications and/or Treatments Impacting Glycemic Control  Immunotherapy:    Immunosuppressants     None        Steroids:   Hormones (From admission, onward)    Start     Stop Route Frequency Ordered    05/20/20 1751  melatonin tablet 6 mg      -- Oral Nightly PRN 05/20/20 1652        Pressors:    Autonomic Drugs (From admission, onward)    None        Hyperglycemia/Diabetes Medications:   Antihyperglycemics (From admission, onward)    Start     Stop Route Frequency Ordered    05/20/20 2100  insulin detemir U-100 pen 20 Units      -- SubQ Nightly 05/20/20 1650    05/20/20 1728  insulin aspart U-100 pen 1-10 Units      -- SubQ Before meals & nightly PRN 05/20/20 1629             PHYSICAL EXAMINATION:  Vitals:    05/21/20 0929   BP:    Pulse: 72   Resp:    Temp:      Body mass index is 33.45 kg/m².    Physical Exam   PE deferred to decrease risk/exposure of COVID-19.

## 2020-05-21 NOTE — HPI
Reason for Consult: Management of T2DM, Hyperglycemia     Surgical Procedure and Date: n/a    Diabetes diagnosis year: 1996    Lab Results   Component Value Date    HGBA1C 12.6 (H) 05/20/2020     Home Diabetes Medications:  Toujeo 40 units q HS  Patient reports she has taken Metformin in the past and stopped do to nausea and dizziness.    How often checking glucose at home? daily   BG readings on regimen: 200-400s  Hypoglycemia on the regimen?  No  Missed doses on regimen?  Yes, reports frequently skipping Toujeo    Diabetes Complications include:     Hyperglycemia and Diabetic peripheral neuropathy     Complicating diabetes co morbidities:   HTN, HLD, obesity       HPI:   Patient is a 59 y.o. female with a diagnosis of T2DM, HTN, and obesity who presented to the ER for evaluation of chest pain.   Upon arrival to the ER, vitals were temp 97.9F, HR 78 and /88.  EKG was normal.  Labs showed a D-dimer of 0.58.  She was given 3 doses of Nitro with improvement in her pain from a 10 to a 7, but then the pain returned.  Her case was discussed with Cardiology, who suggested a stress test and to hold on further medications given normal EKG and troponin.  CTA was ordered to rule out PE.  She was admitted to Hospital Medicine for further management. Pt sees Dr. Elizalde for DM management.  Endocrinology consulted for management of T2DM.

## 2020-05-21 NOTE — CONSULTS
[facility]  Endocrinology  Diabetes Consult Note    Consult Requested by: Sherman Medina MD   Reason for admit: Chest pain    HISTORY OF PRESENT ILLNESS:  Reason for Consult: Management of T2DM, Hyperglycemia     Surgical Procedure and Date: n/a    Diabetes diagnosis year: 1996    Lab Results   Component Value Date    HGBA1C 12.6 (H) 05/20/2020     Home Diabetes Medications:  Toujeo 40 units q HS  Patient reports she has taken Metformin in the past and stopped do to nausea and dizziness.    How often checking glucose at home? daily   BG readings on regimen: 200-400s  Hypoglycemia on the regimen?  No  Missed doses on regimen?  Yes, reports frequently skipping Toujeo    Diabetes Complications include:     Hyperglycemia and Diabetic peripheral neuropathy     Complicating diabetes co morbidities:   HTN, HLD, obesity       HPI:   Patient is a 59 y.o. female with a diagnosis of T2DM, HTN, and obesity who presented to the ER for evaluation of chest pain. Upon arrival to the ER, vitals were temp 97.9F, HR 78 and /88.  EKG was normal.  Labs showed a D-dimer of 0.58.  She was given 3 doses of Nitro with improvement in her pain from a 10 to a 7, but then the pain returned.  Her case was discussed with Cardiology, who suggested a stress test and to hold on further medications given normal EKG and troponin.  CTA was ordered to rule out PE.  She was admitted to Hospital Medicine for further management. Pt sees Dr. Elizalde for DM management.  Endocrinology consulted for management of T2DM.          Interval HPI:   Overnight events: BG elevated above goal ranges on current SQ insulin regimen. ADA/Cardiac diet ordered this afternoon.  Eating:   NPO this morning for echo  Nausea: No  Hypoglycemia and intervention: No  Fever: No  TPN and/or TF: No  If yes, type of TF/TPN and rate: none    PMH, PSH, FH, SH reviewed     ROS:  Constitutional: Negative for weight changes.  Eyes: Negative for visual  disturbance.  Respiratory: Negative for cough.   Cardiovascular: Positive for chest pain.  Gastrointestinal: Positve for nausea.  Endocrine: Negative for polyuria, polydipsia.  Musculoskeletal: Negative for back pain.  Skin: Negative for rash.  Neurological: Negative for syncope.  Psychiatric/Behavioral: Negative for depression.    Review of Systems    Current Medications and/or Treatments Impacting Glycemic Control  Immunotherapy:    Immunosuppressants     None        Steroids:   Hormones (From admission, onward)    Start     Stop Route Frequency Ordered    05/20/20 1751  melatonin tablet 6 mg      -- Oral Nightly PRN 05/20/20 1652        Pressors:    Autonomic Drugs (From admission, onward)    None        Hyperglycemia/Diabetes Medications:   Antihyperglycemics (From admission, onward)    Start     Stop Route Frequency Ordered    05/20/20 2100  insulin detemir U-100 pen 20 Units      -- SubQ Nightly 05/20/20 1650    05/20/20 1728  insulin aspart U-100 pen 1-10 Units      -- SubQ Before meals & nightly PRN 05/20/20 1629             PHYSICAL EXAMINATION:  Vitals:    05/21/20 0929   BP:    Pulse: 72   Resp:    Temp:      Body mass index is 33.45 kg/m².    Physical Exam   PE deferred to decrease risk/exposure of COVID-19.      Labs Reviewed and Include   Recent Labs   Lab 05/21/20  0350   *   CALCIUM 8.9   ALBUMIN 2.9*   PROT 7.1      K 4.3   CO2 25      BUN 28*   CREATININE 1.3   ALKPHOS 115   ALT 24   AST 14   BILITOT 0.3     Lab Results   Component Value Date    WBC 7.42 05/21/2020    HGB 10.7 (L) 05/21/2020    HCT 34.8 (L) 05/21/2020    MCV 84 05/21/2020     05/21/2020     Recent Labs   Lab 05/20/20  1114   TSH 0.488     Lab Results   Component Value Date    HGBA1C 12.6 (H) 05/20/2020       Nutritional status:   Body mass index is 33.45 kg/m².  Lab Results   Component Value Date    ALBUMIN 2.9 (L) 05/21/2020    ALBUMIN 3.2 (L) 05/20/2020    ALBUMIN 3.0 (L) 03/05/2020     No results found  for: PREALBUMIN    Estimated Creatinine Clearance: 59 mL/min (based on SCr of 1.3 mg/dL).    Accu-Checks  Recent Labs     05/20/20  1754 05/20/20  2044 05/21/20  0717 05/21/20  1059   POCTGLUCOSE 195* 190* 247* 266*        ASSESSMENT and PLAN    * Chest pain  Managed per primary team  Optimize BG control      Diabetes mellitus type II  BG goal 140-180  BG elevated above goal ranges.     Increase Levemir to 32 units q HS (20% reduction from home dose) Pt reports frequently skipping Toujeo at home.   Start Novolog 8 units TID with meals (0.5 u/kg dosing)   Low Dose Correction Scale  BG monitoring ac/hs    ** Please call Endocrine for any BG related issues **    Discharge plans:  Lab Results   Component Value Date    HGBA1C 12.6 (H) 05/20/2020      Please notify endocrine prior to discharge. Given current a1c, recommend patient discharge on MDI regimen.  Tenatively plan, continue Toujeo 40 units q HS and start Novolog 8 units TID with meals and Novolog Correction Scale (150-200/+1) prn ac/hs.  Patient to monitor and record BG 4x daily. Patient requesting to schedule a follow up clinic apt in [facility] endocrine clinic.       Obesity  Body mass index is 33.45 kg/m².  May increase insulin resistance.             Plan discussed with patient, family, and RN at bedside.     Sherly Heredia NP  Endocrinology  [facility]

## 2020-05-21 NOTE — NURSING
Pt wheeled to room 731 from ED via hospital transport staff.   Pt presented to the ED earlier in the day 2/2 persistent radiating CP.   Hx of DMT2, HTN, and PE  Pt ambulatory, A/Ox4, GCS of 15, CM in place-NSR.  At this time; pt denies CP, SOB, n/v  Bed in lowest position, wheels locked, HOB elevated, SR upx2, call light within reach.   Pending MT workup, NADN.  Will cont to monitor.

## 2020-05-21 NOTE — ASSESSMENT & PLAN NOTE
BG goal 140-180  BG elevated above goal ranges.     Increase Levemir to 32 units q HS (20% reduction from home dose) Pt reports frequently skipping Toujeo at home.   Start Novolog 10 units TID with meals (Basal/Prandial match)   Low Dose Correction Scale  BG monitoring ac/hs    ** Please call Endocrine for any BG related issues **    Discharge plans:  Lab Results   Component Value Date    HGBA1C 12.6 (H) 05/20/2020      Please notify endocrine prior to discharge. Given current a1c, recommend patient discharge on MDI regimen.  Tenatively plan, continue Toujeo 40 units q HS and start Novolog 10 units TID with meals and Novolog Correction Scale (150-200/+1) prn ac/hs.  Patient to monitor and record BG 4x daily. Patient requesting to schedule a follow up clinic apt in [facility] endocrine clinic.

## 2020-05-21 NOTE — NURSING NOTE
Pt here for CASTRO.  Attempted to flush WALT midline IV, unable to flush.  Dressing removed, catheter manipulated & still unable to flush line.  Midline IV removed w/ catheter intact.  Attempted IV access X 4 by 2 different nurses, all unsuccessful.  Pts nurse Cindy notified.

## 2020-05-22 VITALS
SYSTOLIC BLOOD PRESSURE: 123 MMHG | HEART RATE: 63 BPM | OXYGEN SATURATION: 98 % | WEIGHT: 220 LBS | HEIGHT: 68 IN | BODY MASS INDEX: 33.34 KG/M2 | DIASTOLIC BLOOD PRESSURE: 60 MMHG | TEMPERATURE: 98 F | RESPIRATION RATE: 18 BRPM

## 2020-05-22 PROBLEM — R07.9 CHEST PAIN: Status: RESOLVED | Noted: 2018-03-21 | Resolved: 2020-05-22

## 2020-05-22 LAB
ALBUMIN SERPL BCP-MCNC: 2.8 G/DL (ref 3.5–5.2)
ALP SERPL-CCNC: 111 U/L (ref 55–135)
ALT SERPL W/O P-5'-P-CCNC: 25 U/L (ref 10–44)
ANION GAP SERPL CALC-SCNC: 10 MMOL/L (ref 8–16)
AST SERPL-CCNC: 22 U/L (ref 10–40)
BASOPHILS # BLD AUTO: 0.03 K/UL (ref 0–0.2)
BASOPHILS NFR BLD: 0.5 % (ref 0–1.9)
BILIRUB SERPL-MCNC: 0.2 MG/DL (ref 0.1–1)
BUN SERPL-MCNC: 36 MG/DL (ref 6–20)
CALCIUM SERPL-MCNC: 8.9 MG/DL (ref 8.7–10.5)
CHLORIDE SERPL-SCNC: 106 MMOL/L (ref 95–110)
CO2 SERPL-SCNC: 24 MMOL/L (ref 23–29)
CREAT SERPL-MCNC: 1.2 MG/DL (ref 0.5–1.4)
DIFFERENTIAL METHOD: ABNORMAL
EOSINOPHIL # BLD AUTO: 0.1 K/UL (ref 0–0.5)
EOSINOPHIL NFR BLD: 1.5 % (ref 0–8)
ERYTHROCYTE [DISTWIDTH] IN BLOOD BY AUTOMATED COUNT: 13.1 % (ref 11.5–14.5)
EST. GFR  (AFRICAN AMERICAN): 58 ML/MIN/1.73 M^2
EST. GFR  (NON AFRICAN AMERICAN): 50.3 ML/MIN/1.73 M^2
GLUCOSE SERPL-MCNC: 242 MG/DL (ref 70–110)
HCT VFR BLD AUTO: 35.3 % (ref 37–48.5)
HGB BLD-MCNC: 11 G/DL (ref 12–16)
IMM GRANULOCYTES # BLD AUTO: 0.02 K/UL (ref 0–0.04)
IMM GRANULOCYTES NFR BLD AUTO: 0.3 % (ref 0–0.5)
LYMPHOCYTES # BLD AUTO: 2.8 K/UL (ref 1–4.8)
LYMPHOCYTES NFR BLD: 42.5 % (ref 18–48)
MAGNESIUM SERPL-MCNC: 2.1 MG/DL (ref 1.6–2.6)
MCH RBC QN AUTO: 26.5 PG (ref 27–31)
MCHC RBC AUTO-ENTMCNC: 31.2 G/DL (ref 32–36)
MCV RBC AUTO: 85 FL (ref 82–98)
MONOCYTES # BLD AUTO: 0.5 K/UL (ref 0.3–1)
MONOCYTES NFR BLD: 8 % (ref 4–15)
NEUTROPHILS # BLD AUTO: 3.1 K/UL (ref 1.8–7.7)
NEUTROPHILS NFR BLD: 47.2 % (ref 38–73)
NRBC BLD-RTO: 0 /100 WBC
PHOSPHATE SERPL-MCNC: 5.3 MG/DL (ref 2.7–4.5)
PLATELET # BLD AUTO: 289 K/UL (ref 150–350)
PMV BLD AUTO: 9.8 FL (ref 9.2–12.9)
POCT GLUCOSE: 192 MG/DL (ref 70–110)
POCT GLUCOSE: 213 MG/DL (ref 70–110)
POTASSIUM SERPL-SCNC: 4.4 MMOL/L (ref 3.5–5.1)
PROT SERPL-MCNC: 6.9 G/DL (ref 6–8.4)
RBC # BLD AUTO: 4.15 M/UL (ref 4–5.4)
SODIUM SERPL-SCNC: 140 MMOL/L (ref 136–145)
WBC # BLD AUTO: 6.51 K/UL (ref 3.9–12.7)

## 2020-05-22 PROCEDURE — 99226 PR SUBSEQUENT OBSERVATION CARE,LEVEL III: CPT | Mod: ,,, | Performed by: NURSE PRACTITIONER

## 2020-05-22 PROCEDURE — 99213 OFFICE O/P EST LOW 20 MIN: CPT | Mod: ,,, | Performed by: NURSE PRACTITIONER

## 2020-05-22 PROCEDURE — 83735 ASSAY OF MAGNESIUM: CPT

## 2020-05-22 PROCEDURE — 96372 THER/PROPH/DIAG INJ SC/IM: CPT

## 2020-05-22 PROCEDURE — 85025 COMPLETE CBC W/AUTO DIFF WBC: CPT

## 2020-05-22 PROCEDURE — 84100 ASSAY OF PHOSPHORUS: CPT

## 2020-05-22 PROCEDURE — 99213 PR OFFICE/OUTPT VISIT, EST, LEVL III, 20-29 MIN: ICD-10-PCS | Mod: ,,, | Performed by: NURSE PRACTITIONER

## 2020-05-22 PROCEDURE — G0378 HOSPITAL OBSERVATION PER HR: HCPCS

## 2020-05-22 PROCEDURE — 36415 COLL VENOUS BLD VENIPUNCTURE: CPT

## 2020-05-22 PROCEDURE — 25000003 PHARM REV CODE 250: Performed by: HOSPITALIST

## 2020-05-22 PROCEDURE — 80053 COMPREHEN METABOLIC PANEL: CPT

## 2020-05-22 PROCEDURE — 99226 PR SUBSEQUENT OBSERVATION CARE,LEVEL III: ICD-10-PCS | Mod: ,,, | Performed by: NURSE PRACTITIONER

## 2020-05-22 PROCEDURE — 25000003 PHARM REV CODE 250: Performed by: NURSE PRACTITIONER

## 2020-05-22 RX ORDER — OMEGA-3S/DHA/EPA/FISH OIL/D3 300MG-1000
1 CAPSULE ORAL DAILY
Status: ON HOLD | COMMUNITY
Start: 2020-05-22 | End: 2023-06-27

## 2020-05-22 RX ORDER — INSULIN ASPART 100 [IU]/ML
8 INJECTION, SOLUTION INTRAVENOUS; SUBCUTANEOUS
Qty: 15 ML | Refills: 0 | Status: ON HOLD | OUTPATIENT
Start: 2020-05-22 | End: 2021-11-04 | Stop reason: HOSPADM

## 2020-05-22 RX ORDER — ATORVASTATIN CALCIUM 80 MG/1
80 TABLET, FILM COATED ORAL DAILY
Qty: 90 TABLET | Refills: 3 | Status: SHIPPED | OUTPATIENT
Start: 2020-05-23 | End: 2023-06-27

## 2020-05-22 RX ORDER — NAPROXEN SODIUM 220 MG/1
81 TABLET, FILM COATED ORAL DAILY
Refills: 0 | Status: ON HOLD
Start: 2020-05-23 | End: 2023-06-27

## 2020-05-22 RX ORDER — INSULIN ASPART 100 [IU]/ML
1-10 INJECTION, SOLUTION INTRAVENOUS; SUBCUTANEOUS
Qty: 15 ML | Refills: 2 | Status: SHIPPED | OUTPATIENT
Start: 2020-05-22 | End: 2020-06-05

## 2020-05-22 RX ORDER — INSULIN GLARGINE 100 [IU]/ML
38 INJECTION, SOLUTION SUBCUTANEOUS NIGHTLY
Qty: 15 ML | Refills: 1 | Status: SHIPPED | OUTPATIENT
Start: 2020-05-22 | End: 2020-06-05

## 2020-05-22 RX ORDER — ACETAMINOPHEN 325 MG/1
650 TABLET ORAL EVERY 4 HOURS PRN
Qty: 30 TABLET | Refills: 0 | Status: ON HOLD
Start: 2020-05-22 | End: 2021-11-04 | Stop reason: HOSPADM

## 2020-05-22 RX ADMIN — ASPIRIN 81 MG 81 MG: 81 TABLET ORAL at 09:05

## 2020-05-22 RX ADMIN — INSULIN ASPART 8 UNITS: 100 INJECTION, SOLUTION INTRAVENOUS; SUBCUTANEOUS at 12:05

## 2020-05-22 RX ADMIN — LISINOPRIL 10 MG: 5 TABLET ORAL at 09:05

## 2020-05-22 RX ADMIN — OMEGA-3 FATTY ACIDS CAP DELAYED RELEASE 1000 MG 1 CAPSULE: 1000 CAPSULE DELAYED RELEASE at 09:05

## 2020-05-22 RX ADMIN — POTASSIUM CHLORIDE 20 MEQ: 1500 TABLET, EXTENDED RELEASE ORAL at 09:05

## 2020-05-22 RX ADMIN — BUMETANIDE 2 MG: 1 TABLET ORAL at 09:05

## 2020-05-22 RX ADMIN — INSULIN ASPART 8 UNITS: 100 INJECTION, SOLUTION INTRAVENOUS; SUBCUTANEOUS at 09:05

## 2020-05-22 RX ADMIN — ATORVASTATIN CALCIUM 80 MG: 40 TABLET, FILM COATED ORAL at 09:05

## 2020-05-22 NOTE — ASSESSMENT & PLAN NOTE
· Chronic issue but hypertensive in the ER  · Continue Lisinopril 10mg PO daily.    · Continue Bumex 2mg PO BID

## 2020-05-22 NOTE — ASSESSMENT & PLAN NOTE
· Enlarged thyroid gland, noting the known 3.8 cm left thyroid lobe nodule is not as conspicuous on today's exam due to contrast bolus timing.  Recommend further evaluation with a dedicated thyroid ultrasound on a nonemergent basis if not already performed.  - h/o biopsy, will recheck US to see if new changes as it's been 15 yrs or more

## 2020-05-22 NOTE — ASSESSMENT & PLAN NOTE
· HbA1c 12.5/303  · Home DM regimen:  Insulin 40 units qHS  Start Detemir 20 units qHS, endo consulted increased Levemir to 32 units q HS (20% reduction from home dose) Pt reports frequently skipping Toujeo at home.   Start Novolog 8 units TID with meals (0.5 u/kg dosing)   Low Dose Correction Scale  · BG monitoring ac/hs  · SSI with POCT accuchecks and Diabetic diet

## 2020-05-22 NOTE — PLAN OF CARE
05/22/2020      Devika Bustillo  [address] [facility] [address]          Hospital Medicine Dept.  [facility] [address] [address] [phone] after hours  [phone] fax Devika Bustillo has been hospitalized at the [facility] since 5/20/2020.  Please excuse the patient from duties.  Patient may return on 5/22/20.  No restrictions.     Please contact me if you have any questions.                  __________________________  Simona Boyer NP  05/22/2020

## 2020-05-22 NOTE — ASSESSMENT & PLAN NOTE
· HbA1c 12.5/303  · Home DM regimen:  Insulin 40 units qHS  Endo consulted increased Toujeo to 38 units q HS and start Novolog 8 units TID with meals and Novolog Correction Scale (150-200/+1) prn ac/hs.  Patient to monitor and record BG 4x daily. Patient to follow up at [facility] for long term DM management.    · BG monitoring ac/hs

## 2020-05-22 NOTE — SUBJECTIVE & OBJECTIVE
"Interval HPI:   Overnight events: BG reasonably controlled on current SQ insulin regimen. Possible discharge today per primary.  Eatin%  Nausea: No  Hypoglycemia and intervention: No  Fever: No  TPN and/or TF: No  If yes, type of TF/TPN and rate: none    /60 (BP Location: Right arm, Patient Position: Lying)   Pulse 63   Temp 97.8 °F (36.6 °C) (Oral)   Resp 18   Ht 5' 8" (1.727 m)   Wt 99.8 kg (220 lb)   SpO2 98%   Breastfeeding? No   BMI 33.45 kg/m²     Labs Reviewed and Include    Recent Labs   Lab 20  0416   *   CALCIUM 8.9   ALBUMIN 2.8*   PROT 6.9      K 4.4   CO2 24      BUN 36*   CREATININE 1.2   ALKPHOS 111   ALT 25   AST 22   BILITOT 0.2     Lab Results   Component Value Date    WBC 6.51 2020    HGB 11.0 (L) 2020    HCT 35.3 (L) 2020    MCV 85 2020     2020     Recent Labs   Lab 20  1114   TSH 0.488     Lab Results   Component Value Date    HGBA1C 12.6 (H) 2020       Nutritional status:   Body mass index is 33.45 kg/m².  Lab Results   Component Value Date    ALBUMIN 2.8 (L) 2020    ALBUMIN 2.9 (L) 2020    ALBUMIN 3.2 (L) 2020     No results found for: PREALBUMIN    Estimated Creatinine Clearance: 63.9 mL/min (based on SCr of 1.2 mg/dL).    Accu-Checks  Recent Labs     20  1754 20  2044 20  0717 20  1059 20  1550 20  2116 20  0726   POCTGLUCOSE 195* 190* 247* 266* 184* 179* 192*       Current Medications and/or Treatments Impacting Glycemic Control  Immunotherapy:    Immunosuppressants     None        Steroids:   Hormones (From admission, onward)    Start     Stop Route Frequency Ordered    20 1751  melatonin tablet 6 mg      -- Oral Nightly PRN 20 1652        Pressors:    Autonomic Drugs (From admission, onward)    None        Hyperglycemia/Diabetes Medications:   Antihyperglycemics (From admission, onward)    Start     Stop Route Frequency " Ordered    05/21/20 2100  insulin detemir U-100 pen 32 Units      -- SubQ Nightly 05/21/20 1553    05/21/20 1700  insulin aspart U-100 pen 8 Units      -- SubQ 3 times daily with meals 05/21/20 1601    05/21/20 1654  insulin aspart U-100 pen 0-5 Units      -- SubQ Before meals & nightly PRN 05/21/20 8899

## 2020-05-22 NOTE — ASSESSMENT & PLAN NOTE
BG goal 140-180  BG reasonably well controlled on current regimen. Fasting BG slightly above goal ranges.    Increase Levemir to 38 units q HS (20% dose increase)   Continue Novolog 8 units TID with meals (will monitor for prandial excursions)  Low Dose Correction Scale  BG monitoring ac/hs    ** Please call Endocrine for any BG related issues **    Discharge plans:  Lab Results   Component Value Date    HGBA1C 12.6 (H) 05/20/2020     Given current a1c, recommend patient discharge on MDI regimen. Toujeo 38 units q HS and start Novolog 8 units TID with meals and Novolog Correction Scale (150-200/+1) prn ac/hs.  Patient to monitor and record BG 4x daily. Patient to follow up at [facility] for long term DM management.

## 2020-05-22 NOTE — DISCHARGE SUMMARY
[facility] Hospital Medicine  Discharge Summary      Patient Name: Devika Bustillo  MRN: [id number]  Admission Date: 5/20/2020  Hospital Length of Stay: 0 days  Discharge Date and Time:  05/22/2020 1:40 PM  Attending Physician: Sherman Medina MD   Discharging Provider: Simona Boyer NP  Primary Care Provider: JADE Cabrera  [facility] Medicine Team: [facility] HOSP MED  Simona Boyer NP    HPI:   Ms. Devika Bustillo is a 59 y.o.  female with T2DM, HTN, and obesity who presents to the ER for evaluation of chest pain.  She mentions that she had just cleaning a closet, when she sat down and developed a fluttering in her chest.  Shortly after, she developed midsternal pressure that radiated down her arm.  She denies any radiation of the pain to her back or neck.  She has had similar pain before that has been off and on, but this persisted, prompting her to go to the ER.  She endorses some lower extremity edema, but has had some shortness of breath.  Of note, she does have a history of a PE but says that this feels different.  She is not on Aspirin or a Statin.   Upon arrival to the ER, vitals were temp 97.9F, HR 78 and /88.  EKG was normal.  Labs showed a D-dimer of 0.58.  She was given 3 doses of Nitro with improvement in her pain from a 10 to a 7, but then the pain returned.  Her case was discussed with Cardiology, who suggested a stress test and to hold on further medications given normal EKG and troponin.  CTA was ordered to rule out PE.  She was admitted to Hospital Medicine for further management.      * No surgery found *      Hospital Course:   Placed in observation for c/o chest pain s/p exercise stress echo.  Her IV was nonfunctioning and images were not optimized d/t inability to get optison. Trop negative x2. Stress test read as negative for ischemia.  Chest pain resolved.  Notable hyperphosphatemia 6.0 is most likely d/t elevated lipid panel added fish oil and  changed pravastatin to high dose lipitor.  Her Echo was unremarkable with normal EF 65%, Normal LV Diastolic fxn, RV fxn.  CTA neg for PE but noted a continued enlarged thyroid gland which she had biopsied around 15 years ago during [facility].  She had an US of her thyroid which revealed multinodular thyroid glands.  The largest nodule in the left thyroid lobe meets criteria for FNA and the 2nd largest meets criteria for follow-up by TI RAD. Her Hgba1c notable at 12.5, endocrine consulted and changed her insulin regimen and observed overnight.      Patient is still reeling from the loss of her son to suicide and she continues to be forgetful of her own care/ medication regimen. She was encouraged to reach out to a  or psychiatrist to continue grief counseling as she continues to lack self care.      Dispo: f/u [place] [facility] with PCP and Endo to schedule a biopsy of her thyroid gland, continued exercise and diet to lose weight and tone up     Consults:   Consults (From admission, onward)        Status Ordering Provider     Inpatient consult to Endocrinology  Once     Provider:  (Not yet assigned)    Completed MAJO NDIAYE     Inpatient consult to PICC team ([facility])  Once     Provider:  (Not yet assigned)    Completed JOSE ANGEL YADAV     Inpatient consult to PICC team ([facility])  Once     Provider:  (Not yet assigned)    Completed RADHA ROY      Review of Systems   Constitutional: Negative for activity change, appetite change, chills, fatigue and fever.   HENT: Negative for congestion, sore throat and trouble swallowing.    Eyes: Negative for visual disturbance.   Respiratory: Negative for cough, chest tightness (resolved since admit), shortness of breath and wheezing.    Cardiovascular: Negative for chest pain, palpitations and leg swelling.   Gastrointestinal: Negative for abdominal pain, constipation, diarrhea, nausea and vomiting.   Genitourinary: Negative for decreased urine volume,  difficulty urinating and hematuria.   Musculoskeletal: Negative for back pain and myalgias.   Skin: Negative for color change, rash and wound.   Neurological: Negative for dizziness, weakness, light-headedness, numbness and headaches.   Hematological: Does not bruise/bleed easily.   Psychiatric/Behavioral: Negative for agitation, decreased concentration and sleep disturbance. The patient is not nervous/anxious.      Physical Exam   Constitutional: She is oriented to person, place, and time. She appears well-developed and well-nourished. No distress.   HENT:   Head: Normocephalic and atraumatic.   Right Ear: External ear normal.   Left Ear: External ear normal.   Nose: Nose normal.   Eyes: Conjunctivae are normal.   Neck: Normal range of motion. Neck supple. No JVD present.   Cardiovascular: Normal rate, regular rhythm, normal heart sounds and intact distal pulses.   No murmur heard.  Pulmonary/Chest: Effort normal and breath sounds normal. No respiratory distress. She has no wheezes. She has no rales.   Abdominal: Soft. Bowel sounds are normal. She exhibits no distension and no mass. There is no tenderness. There is no guarding.   Musculoskeletal: Normal range of motion. She exhibits no edema.   Neurological: She is alert and oriented to person, place, and time. No cranial nerve deficit or sensory deficit. Coordination normal.   Skin: Skin is warm and dry. No rash noted. She is not diaphoretic. No erythema.   Psychiatric: She has a normal mood and affect. Her behavior is normal. Judgment and thought content normal.     * Chest pain-resolved as of 5/22/2020  Chest Pain  · EKG without STEMI or any acute ST/T wave changes  · troponin negative x2  ·  Aspirin 81mg PO daily  · Lipitor 80mg PO daily  · Risk factors and lipid panel elevated, added fish oil   · Continue tele  · Nitro prn  · stress echo negative for ischemia  · Pulm htn on last echo, most likely contributory       Enlarged thyroid gland  · Enlarged thyroid  gland, noting the known 3.8 cm left thyroid lobe nodule is not as conspicuous on today's exam due to contrast bolus timing.  Recommend further evaluation with a dedicated thyroid ultrasound on a nonemergent basis if not already performed.  - h/o biopsy 15 yrs ago during [facility],   US : Right thyroid lobe measures 5.9 x 2.6 x 2.4 cm isthmus measures 1.1 cm.  Left thyroid lobe measures 6.5 x 3.9 x 3.9 cm.    There are 2 small nodules identified in the right thyroid lobe appearing solid and isoechoic to hyperechoic.  The largest measures 0.8 x 0.6 x 0.8 cm.  These nodules do not meet criteria for FNA.  Multiple small hyperechoic nodule noted at the isthmus.    There are several nodules identified within the left thyroid lobe, the largest is seen inferiorly and appears to be taller than wide measuring in the order of 4.0 x 2.9 x 3.1 cm.  This nodule is isoechoic to hypoechoic, solid with smooth margins.  Nodule is TR 4 by TI RAD criteria and meets criteria for FNA.  The 2nd largest nodule is seen just medially measuring in the order of 2.7 x 1.7 x 1.7 cm.  This nodule is isodense to hypoechoic solid with smooth margins.  Nodule is TR 3 by TI RAD criteria and meets criteria for follow-up.  Other smaller nodules no right meet criteria for FNA or follow-up.    History of pulmonary embolism  · Chronic issue   · Not on anticoagulation  · D dimer 0.58 with an age adjusted level of 0.57  · Wells Score 1.5 (low) given history of PE  CTA No pulmonary thromboembolism.  Patchy ground-glass attenuation throughout both lungs with a lower lobe predominance, suggesting an airspace process such as pulmonary edema, atypical pneumonia, or aspiration.        Obesity, diabetes, and hypertension syndrome  · As above      Obesity  · Body mass index is 33.45 kg/m².  · Encouraged diet, weight loss, exercise         Hypertension  · Chronic issue but hypertensive in the ER  · Continue Lisinopril 10mg PO daily.    · Continue Bumex 2mg PO  BID    Diabetes mellitus type II  · HbA1c 12.5/303  · Home DM regimen:  Insulin 40 units qHS  Endo consulted increased Toujeo to 38 units q HS and start Novolog 8 units TID with meals and Novolog Correction Scale (150-200/+1) prn ac/hs.  Patient to monitor and record BG 4x daily. Patient to follow up at [facility] for long term DM management.    · BG monitoring ac/hs        Final Active Diagnoses:    Diagnosis Date Noted POA    Enlarged thyroid gland [E04.9] 05/21/2020 Yes     Chronic    Obesity, diabetes, and hypertension syndrome [E11.69, I10, E66.9, E11.59] 05/20/2020 Yes    History of pulmonary embolism [Z86.711] 05/20/2020 Yes    Obesity [E66.9] 07/08/2013 Yes    Diabetes mellitus type II [E11.9] 02/28/2013 Yes    Hypertension [I10] 02/28/2013 Yes      Problems Resolved During this Admission:    Diagnosis Date Noted Date Resolved POA    PRINCIPAL PROBLEM:  Chest pain [R07.9] 03/21/2018 05/22/2020 Yes       Discharged Condition: good    Disposition: Home or Self Care    Follow Up:  Follow-up Information     [facility] - Endo/Diabetes/Coumadin .    Specialties:  Endocrinology, Nephrology  Contact information:  [address] [place] [id number]  [phone]                 Patient Instructions:      Ambulatory referral/consult to Endocrinology   Standing Status: Future   Referral Priority: Routine Referral Type: Consultation   Referred to Provider: [facility] - ENDO/DIABETES/COUMADIN Requested Specialty: Endocrinology   Number of Visits Requested: 1     Diet diabetic     Diet Cardiac     Notify your health care provider if you experience any of the following:  temperature >100.4     Notify your health care provider if you experience any of the following:  persistent nausea and vomiting or diarrhea     Notify your health care provider if you experience any of the following:  severe uncontrolled pain     Notify your health care provider if you experience any of the  following:  redness, tenderness, or signs of infection (pain, swelling, redness, odor or green/yellow discharge around incision site)     Notify your health care provider if you experience any of the following:  difficulty breathing or increased cough     Notify your health care provider if you experience any of the following:  severe persistent headache     Notify your health care provider if you experience any of the following:  worsening rash     Notify your health care provider if you experience any of the following:  persistent dizziness, light-headedness, or visual disturbances     Notify your health care provider if you experience any of the following:  increased confusion or weakness     Activity as tolerated       Significant Diagnostic Studies: Labs: All labs within the past 24 hours have been reviewed    Pending Diagnostic Studies:     None         Medications:  Reconciled Home Medications:      Medication List      START taking these medications    acetaminophen 325 MG tablet  Commonly known as:  TYLENOL  Take 2 tablets (650 mg total) by mouth every 4 (four) hours as needed.     aspirin 81 MG Chew  Take 1 tablet (81 mg total) by mouth once daily.  Start taking on:  May 23, 2020     atorvastatin 80 MG tablet  Commonly known as:  LIPITOR  Take 1 tablet (80 mg total) by mouth once daily.  Start taking on:  May 23, 2020     * insulin aspart U-100 100 unit/mL (3 mL) Inpn pen  Commonly known as:  NovoLOG  Inject 8 Units into the skin 3 (three) times daily with meals. Inject 1-10 Units into the skin before meals and at bedtime as needed. 150 - 200 + 1 unit 201 - 250 + 2 units 251 - 300 + 3 units 301 - 350 + 4 units  > 350   + 5 units     * insulin aspart U-100 100 unit/mL (3 mL) Inpn pen  Commonly known as:  NovoLOG Flexpen U-100 Insulin  Inject 1-10 Units into the skin before meals and at bedtime as needed. 150 - 200 + 1 unit 201 - 250 + 2 units 251 - 300 + 3 units 301 - 350 + 4 units  > 350   + 5 units    "  omega-3s-dha-epa-fish oil 1,000-1,400 mg Cpdr  Take 1 capsule by mouth once daily.         * This list has 2 medication(s) that are the same as other medications prescribed for you. Read the directions carefully, and ask your doctor or other care provider to review them with you.            CHANGE how you take these medications    LANTUS SOLOSTAR U-100 INSULIN glargine 100 units/mL (3mL) SubQ pen  Generic drug:  insulin  Inject 38 Units into the skin every evening.  What changed:    · how much to take  · how to take this  · when to take this     lisinopriL 10 MG tablet  Take 10 mg by mouth once daily.  What changed:  Another medication with the same name was removed. Continue taking this medication, and follow the directions you see here.        CONTINUE taking these medications    blood-glucose meter Misc  Use as directed to check sugar     bumetanide 2 MG tablet  Commonly known as:  BUMEX  Take 2 mg by mouth once daily.     pantoprazole 20 MG tablet  Commonly known as:  PROTONIX  Take 1 tablet (20 mg total) by mouth once daily.     potassium bicarbonate disintegrating tablet  Commonly known as:  K-LYTE  Take 25 mEq by mouth 2 (two) times daily.        STOP taking these medications    furosemide 20 MG tablet  Commonly known as:  LASIX     ibuprofen 800 MG tablet  Commonly known as:  ADVIL,MOTRIN     metFORMIN 500 MG XR 24hr tablet  Commonly known as:  GLUCOPHAGE-XR     potassium chloride SA 20 MEQ tablet  Commonly known as:  K-DUR,KLOR-CON     pravastatin 40 MG tablet  Commonly known as:  PRAVACHOL        ASK your doctor about these medications    * pen needle, diabetic 31 gauge x 3/16" Ndle  1 each.  Ask about: Which instructions should I use?     * pen needle, diabetic 32 gauge x 5/32" Ndle  Use to inject insulin four times daily  Ask about: Which instructions should I use?         * This list has 2 medication(s) that are the same as other medications prescribed for you. Read the directions carefully, and ask " your doctor or other care provider to review them with you.                Indwelling Lines/Drains at time of discharge:   Lines/Drains/Airways     None                 Time spent on the discharge of patient: 45 minutes  Patient was seen and examined on the date of discharge and determined to be suitable for discharge.         Simona Boyer NP  Department of Hospital Medicine  [facility]

## 2020-05-22 NOTE — PROGRESS NOTES
"[facility]  Endocrinology  Progress Note    Admit Date: 2020     Reason for Consult: Management of T2DM, Hyperglycemia     Surgical Procedure and Date: n/a    Diabetes diagnosis year:     Lab Results   Component Value Date    HGBA1C 12.6 (H) 2020     Home Diabetes Medications:  Toujeo 40 units q HS  Patient reports she has taken Metformin in the past and stopped do to nausea and dizziness.    How often checking glucose at home? daily   BG readings on regimen: 200-400s  Hypoglycemia on the regimen?  No  Missed doses on regimen?  Yes, reports frequently skipping Toujeo    Diabetes Complications include:     Hyperglycemia and Diabetic peripheral neuropathy     Complicating diabetes co morbidities:   HTN, HLD, obesity       HPI:   Patient is a 59 y.o. female with a diagnosis of T2DM, HTN, and obesity who presented to the ER for evaluation of chest pain.   Upon arrival to the ER, vitals were temp 97.9F, HR 78 and /88.  EKG was normal.  Labs showed a D-dimer of 0.58.  She was given 3 doses of Nitro with improvement in her pain from a 10 to a 7, but then the pain returned.  Her case was discussed with Cardiology, who suggested a stress test and to hold on further medications given normal EKG and troponin.  CTA was ordered to rule out PE.  She was admitted to Hospital Medicine for further management. Pt sees Dr. Elizalde for DM management.  Endocrinology consulted for management of T2DM.          Interval HPI:   Overnight events: BG reasonably controlled on current SQ insulin regimen. Possible discharge today per primary.  Eatin%  Nausea: No  Hypoglycemia and intervention: No  Fever: No  TPN and/or TF: No  If yes, type of TF/TPN and rate: none    /60 (BP Location: Right arm, Patient Position: Lying)   Pulse 63   Temp 97.8 °F (36.6 °C) (Oral)   Resp 18   Ht 5' 8" (1.727 m)   Wt 99.8 kg (220 lb)   SpO2 98%   Breastfeeding? No   BMI 33.45 kg/m²      Labs Reviewed and " Include    Recent Labs   Lab 05/22/20  0416   *   CALCIUM 8.9   ALBUMIN 2.8*   PROT 6.9      K 4.4   CO2 24      BUN 36*   CREATININE 1.2   ALKPHOS 111   ALT 25   AST 22   BILITOT 0.2     Lab Results   Component Value Date    WBC 6.51 05/22/2020    HGB 11.0 (L) 05/22/2020    HCT 35.3 (L) 05/22/2020    MCV 85 05/22/2020     05/22/2020     Recent Labs   Lab 05/20/20  1114   TSH 0.488     Lab Results   Component Value Date    HGBA1C 12.6 (H) 05/20/2020       Nutritional status:   Body mass index is 33.45 kg/m².  Lab Results   Component Value Date    ALBUMIN 2.8 (L) 05/22/2020    ALBUMIN 2.9 (L) 05/21/2020    ALBUMIN 3.2 (L) 05/20/2020     No results found for: PREALBUMIN    Estimated Creatinine Clearance: 63.9 mL/min (based on SCr of 1.2 mg/dL).    Accu-Checks  Recent Labs     05/20/20  1754 05/20/20  2044 05/21/20  0717 05/21/20  1059 05/21/20  1550 05/21/20  2116 05/22/20  0726   POCTGLUCOSE 195* 190* 247* 266* 184* 179* 192*       Current Medications and/or Treatments Impacting Glycemic Control  Immunotherapy:    Immunosuppressants     None        Steroids:   Hormones (From admission, onward)    Start     Stop Route Frequency Ordered    05/20/20 1751  melatonin tablet 6 mg      -- Oral Nightly PRN 05/20/20 1652        Pressors:    Autonomic Drugs (From admission, onward)    None        Hyperglycemia/Diabetes Medications:   Antihyperglycemics (From admission, onward)    Start     Stop Route Frequency Ordered    05/21/20 2100  insulin detemir U-100 pen 32 Units      -- SubQ Nightly 05/21/20 1553    05/21/20 1700  insulin aspart U-100 pen 8 Units      -- SubQ 3 times daily with meals 05/21/20 1601    05/21/20 1654  insulin aspart U-100 pen 0-5 Units      -- SubQ Before meals & nightly PRN 05/21/20 1554          ASSESSMENT and PLAN    * Chest pain  Managed per primary team  Optimize BG control      Diabetes mellitus type II  BG goal 140-180  BG reasonably well controlled on current regimen.  Fasting BG slightly above goal ranges.    Increase Levemir to 38 units q HS (20% dose increase)   Continue Novolog 8 units TID with meals (will monitor for prandial excursions)  Low Dose Correction Scale  BG monitoring ac/hs    ** Please call Endocrine for any BG related issues **    Discharge plans:  Lab Results   Component Value Date    HGBA1C 12.6 (H) 05/20/2020     Given current a1c, recommend patient discharge on MDI regimen. Toujeo 38 units q HS and start Novolog 8 units TID with meals and Novolog Correction Scale (150-200/+1) prn ac/hs.  Patient to monitor and record BG 4x daily. Patient to follow up at [facility] for long term DM management.       Obesity  Body mass index is 33.45 kg/m².  May increase insulin resistance.             Sherly Heredia NP  Endocrinology  [facility]

## 2020-05-22 NOTE — SUBJECTIVE & OBJECTIVE
Interval History: Stress test negative, endo wants to monitor o/n with med changes    Review of Systems   Constitutional: Negative for activity change, appetite change, chills, fatigue and fever.   HENT: Negative for congestion, sore throat and trouble swallowing.    Eyes: Negative for visual disturbance.   Respiratory: Negative for cough, chest tightness (resolved since admit), shortness of breath and wheezing.    Cardiovascular: Negative for chest pain, palpitations and leg swelling.   Gastrointestinal: Negative for abdominal pain, constipation, diarrhea, nausea and vomiting.   Genitourinary: Negative for decreased urine volume, difficulty urinating and hematuria.   Musculoskeletal: Negative for back pain and myalgias.   Skin: Negative for color change, rash and wound.   Neurological: Negative for dizziness, weakness, light-headedness, numbness and headaches.   Hematological: Does not bruise/bleed easily.   Psychiatric/Behavioral: Negative for agitation, decreased concentration and sleep disturbance. The patient is not nervous/anxious.      Objective:     Vital Signs (Most Recent):  Temp: 97.9 °F (36.6 °C) (05/21/20 1942)  Pulse: 71 (05/21/20 2000)  Resp: 18 (05/21/20 1942)  BP: (!) 142/66 (05/21/20 1942)  SpO2: 95 % (05/21/20 1942) Vital Signs (24h Range):  Temp:  [96.5 °F (35.8 °C)-98.5 °F (36.9 °C)] 97.9 °F (36.6 °C)  Pulse:  [60-73] 71  Resp:  [18] 18  SpO2:  [95 %-100 %] 95 %  BP: (114-145)/(62-66) 142/66     Weight: 99.8 kg (220 lb)  Body mass index is 33.45 kg/m².    Intake/Output Summary (Last 24 hours) at 5/21/2020 2159  Last data filed at 5/21/2020 1700  Gross per 24 hour   Intake --   Output 1000 ml   Net -1000 ml      Physical Exam   Constitutional: She is oriented to person, place, and time. She appears well-developed and well-nourished. No distress.   HENT:   Head: Normocephalic and atraumatic.   Right Ear: External ear normal.   Left Ear: External ear normal.   Nose: Nose normal.   Eyes:  Conjunctivae are normal.   Neck: Normal range of motion. Neck supple. No JVD present.   Cardiovascular: Normal rate, regular rhythm, normal heart sounds and intact distal pulses.   No murmur heard.  Pulmonary/Chest: Effort normal and breath sounds normal. No respiratory distress. She has no wheezes. She has no rales.   Abdominal: Soft. Bowel sounds are normal. She exhibits no distension and no mass. There is no tenderness. There is no guarding.   Musculoskeletal: Normal range of motion. She exhibits no edema.   Neurological: She is alert and oriented to person, place, and time. No cranial nerve deficit or sensory deficit. Coordination normal.   Skin: Skin is warm and dry. No rash noted. She is not diaphoretic. No erythema.   Psychiatric: She has a normal mood and affect. Her behavior is normal. Judgment and thought content normal.   Nursing note and vitals reviewed.      Significant Labs:   CBC:   Recent Labs   Lab 05/20/20  1114 05/21/20  0350   WBC 7.50 7.42   HGB 12.5 10.7*   HCT 41.8 34.8*    294     CMP:   Recent Labs   Lab 05/20/20  1114 05/21/20  0350   * 138   K 4.0 4.3    102   CO2 22* 25   * 287*   BUN 27* 28*   CREATININE 1.0 1.3   CALCIUM 9.4 8.9   PROT 8.0 7.1   ALBUMIN 3.2* 2.9*   BILITOT 0.2 0.3   ALKPHOS 126 115   AST 22 14   ALT 34 24   ANIONGAP 11 11   EGFRNONAA >60.0 45.6*     Cardiac Markers:   Recent Labs   Lab 05/20/20  1114   BNP 19     Lipid Panel:   Recent Labs   Lab 05/21/20  0350   CHOL 232*   HDL 40   LDLCALC 143.8   TRIG 241*   CHOLHDL 17.2*     Magnesium:   Recent Labs   Lab 05/21/20  0350   MG 1.7       Significant Imaging: I have reviewed all pertinent imaging results/findings within the past 24 hours.

## 2020-05-22 NOTE — ASSESSMENT & PLAN NOTE
· Chronic issue but hypertensive in the ER  · Continue Lisinopril 10mg PO daily.  Can consider increasing dose  · Continue Bumex 2mg PO BID

## 2020-05-22 NOTE — HOSPITAL COURSE
Placed in observation for c/o chest pain s/p exercise stress echo.  Her IV was nonfunctioning and images were not optimized d/t inability to get optison. Trop negative x2. Stress test read as negative for ischemia.  Chest pain resolved.  Notable hyperphosphatemia 6.0 is most likely d/t elevated lipid panel added fish oil and changed pravastatin to high dose lipitor.  Her Echo was unremarkable with normal EF 65%, Normal LV Diastolic fxn, RV fxn.  CTA neg for PE but noted a continued enlarged thyroid gland which she had biopsied around 15 years ago during [facility].  She had an US of her thyroid which revealed multinodular thyroid glands.  The largest nodule in the left thyroid lobe meets criteria for FNA and the 2nd largest meets criteria for follow-up by TI RAD. Her Hgba1c notable at 12.5, endocrine consulted and changed her insulin regimen and observed overnight.      Patient is still reeling from the loss of her son to suicide and she continues to be forgetful of her own care/ medication regimen. She was encouraged to reach out to a  or psychiatrist to continue grief counseling as she continues to lack self care.      Dispo: f/u [facility] with PCP and Endo to schedule a biopsy of her thyroid gland, continued exercise and diet to lose weight and tone up

## 2020-05-22 NOTE — PLAN OF CARE
05/22/20 1337   Final Note   Assessment Type Final Discharge Note   Anticipated Discharge Disposition Home   What phone number can be called within the next 1-3 days to see how you are doing after discharge? [phone]   Discharge plans and expectations educations in teach back method with documentation complete? Yes   Right Care Referral Info   Post Acute Recommendation No Care   Post-Acute Status   Post-Acute Authorization Other   Other Status No Post-Acute Service Needs     Future Appointments   Date Time Provider Department Center   6/5/2020  7:30 AM Lillian Bergeron NP [facility] PRANEETH B[place] sarita

## 2020-05-22 NOTE — ASSESSMENT & PLAN NOTE
Chest Pain  · EKG without STEMI or any acute ST/T wave changes  · troponin negative x2  ·  Aspirin 81mg PO daily  · Lipitor 80mg PO daily  · Risk factors and lipid panel elevated, added fish oil   · Continue tele  · Nitro prn  · stress echo negative for ischemia  · Pulm htn on last echo, most likely contributory

## 2020-05-22 NOTE — HPI
Ms. Devika Bustillo is a 59 y.o.  female with T2DM, HTN, and obesity who presents to the ER for evaluation of chest pain.  She mentions that she had just cleaning a closet, when she sat down and developed a fluttering in her chest.  Shortly after, she developed midsternal pressure that radiated down her arm.  She denies any radiation of the pain to her back or neck.  She has had similar pain before that has been off and on, but this persisted, prompting her to go to the ER.  She endorses some lower extremity edema, but has had some shortness of breath.  Of note, she does have a history of a PE but says that this feels different.  She is not on Aspirin or a Statin.     Upon arrival to the ER, vitals were temp 97.9F, HR 78 and /88.  EKG was normal.  Labs showed a D-dimer of 0.58.  She was given 3 doses of Nitro with improvement in her pain from a 10 to a 7, but then the pain returned.  Her case was discussed with Cardiology, who suggested a stress test and to hold on further medications given normal EKG and troponin.  CTA was ordered to rule out PE.  She was admitted to Hospital Medicine for further management.

## 2020-05-22 NOTE — ASSESSMENT & PLAN NOTE
· Enlarged thyroid gland, noting the known 3.8 cm left thyroid lobe nodule is not as conspicuous on today's exam due to contrast bolus timing.  Recommend further evaluation with a dedicated thyroid ultrasound on a nonemergent basis if not already performed.  - h/o biopsy 15 yrs ago during [facility],   US : Right thyroid lobe measures 5.9 x 2.6 x 2.4 cm isthmus measures 1.1 cm.  Left thyroid lobe measures 6.5 x 3.9 x 3.9 cm.    There are 2 small nodules identified in the right thyroid lobe appearing solid and isoechoic to hyperechoic.  The largest measures 0.8 x 0.6 x 0.8 cm.  These nodules do not meet criteria for FNA.  Multiple small hyperechoic nodule noted at the isthmus.    There are several nodules identified within the left thyroid lobe, the largest is seen inferiorly and appears to be taller than wide measuring in the order of 4.0 x 2.9 x 3.1 cm.  This nodule is isoechoic to hypoechoic, solid with smooth margins.  Nodule is TR 4 by TI RAD criteria and meets criteria for FNA.  The 2nd largest nodule is seen just medially measuring in the order of 2.7 x 1.7 x 1.7 cm.  This nodule is isodense to hypoechoic solid with smooth margins.  Nodule is TR 3 by TI RAD criteria and meets criteria for follow-up.  Other smaller nodules no right meet criteria for FNA or follow-up.

## 2020-05-22 NOTE — PROGRESS NOTES
[facility] Hospital Medicine  Progress Note    Patient Name: Devika Bustillo  MRN: [id number]  Patient Class: OP- Observation   Admission Date: 5/20/2020  Length of Stay: 0 days  Attending Physician: Sherman Medina MD  Primary Care Provider: JADE Cabrera    [facility] Medicine Team: [facility] MED ADAM Boyer NP    Subjective:     Principal Problem:Chest pain        HPI:  Ms. Devika Bustillo is a 59 y.o.  female with T2DM, HTN, and obesity who presents to the ER for evaluation of chest pain.  She mentions that she had just cleaning a closet, when she sat down and developed a fluttering in her chest.  Shortly after, she developed midsternal pressure that radiated down her arm.  She denies any radiation of the pain to her back or neck.  She has had similar pain before that has been off and on, but this persisted, prompting her to go to the ER.  She endorses some lower extremity edema, but has had some shortness of breath.  Of note, she does have a history of a PE but says that this feels different.  She is not on Aspirin or a Statin.   Upon arrival to the ER, vitals were temp 97.9F, HR 78 and /88.  EKG was normal.  Labs showed a D-dimer of 0.58.  She was given 3 doses of Nitro with improvement in her pain from a 10 to a 7, but then the pain returned.  Her case was discussed with Cardiology, who suggested a stress test and to hold on further medications given normal EKG and troponin.  CTA was ordered to rule out PE.  She was admitted to Hospital Medicine for further management.      Overview/Hospital Course:  Placed in observation for c/o chest pain s/p exercise stress echo however IV nonfunctioning and unable to get optison. Trop negative x2. Stress test negative for ischemia.  Hgba1c notable at 12.5, endocrine consulted and changed her insulin regimen and wanted to observe overnight.  Patient is still reeling from the loss of her son to suicide and she continues to be  forgetful of her own care/ medication regimen.  Notable hyperphosphatemia 6.0 is most likely d/t elevated lipid panel.  Echo portion EF 65%, Normal LV Diastolic fxn, RV fxn.     Dispo: home in am with endo f/u, f/u [facility], continued exercise and diet to lose weight and tone up    Interval History: Stress test negative, endo wants to monitor o/n with med changes    Review of Systems   Constitutional: Negative for activity change, appetite change, chills, fatigue and fever.   HENT: Negative for congestion, sore throat and trouble swallowing.    Eyes: Negative for visual disturbance.   Respiratory: Negative for cough, chest tightness (resolved since admit), shortness of breath and wheezing.    Cardiovascular: Negative for chest pain, palpitations and leg swelling.   Gastrointestinal: Negative for abdominal pain, constipation, diarrhea, nausea and vomiting.   Genitourinary: Negative for decreased urine volume, difficulty urinating and hematuria.   Musculoskeletal: Negative for back pain and myalgias.   Skin: Negative for color change, rash and wound.   Neurological: Negative for dizziness, weakness, light-headedness, numbness and headaches.   Hematological: Does not bruise/bleed easily.   Psychiatric/Behavioral: Negative for agitation, decreased concentration and sleep disturbance. The patient is not nervous/anxious.      Objective:     Vital Signs (Most Recent):  Temp: 97.9 °F (36.6 °C) (05/21/20 1942)  Pulse: 71 (05/21/20 2000)  Resp: 18 (05/21/20 1942)  BP: (!) 142/66 (05/21/20 1942)  SpO2: 95 % (05/21/20 1942) Vital Signs (24h Range):  Temp:  [96.5 °F (35.8 °C)-98.5 °F (36.9 °C)] 97.9 °F (36.6 °C)  Pulse:  [60-73] 71  Resp:  [18] 18  SpO2:  [95 %-100 %] 95 %  BP: (114-145)/(62-66) 142/66     Weight: 99.8 kg (220 lb)  Body mass index is 33.45 kg/m².    Intake/Output Summary (Last 24 hours) at 5/21/2020 7158  Last data filed at 5/21/2020 1700  Gross per 24 hour   Intake --   Output 1000 ml   Net -1000 ml       Physical Exam   Constitutional: She is oriented to person, place, and time. She appears well-developed and well-nourished. No distress.   HENT:   Head: Normocephalic and atraumatic.   Right Ear: External ear normal.   Left Ear: External ear normal.   Nose: Nose normal.   Eyes: Conjunctivae are normal.   Neck: Normal range of motion. Neck supple. No JVD present.   Cardiovascular: Normal rate, regular rhythm, normal heart sounds and intact distal pulses.   No murmur heard.  Pulmonary/Chest: Effort normal and breath sounds normal. No respiratory distress. She has no wheezes. She has no rales.   Abdominal: Soft. Bowel sounds are normal. She exhibits no distension and no mass. There is no tenderness. There is no guarding.   Musculoskeletal: Normal range of motion. She exhibits no edema.   Neurological: She is alert and oriented to person, place, and time. No cranial nerve deficit or sensory deficit. Coordination normal.   Skin: Skin is warm and dry. No rash noted. She is not diaphoretic. No erythema.   Psychiatric: She has a normal mood and affect. Her behavior is normal. Judgment and thought content normal.   Nursing note and vitals reviewed.      Significant Labs:   CBC:   Recent Labs   Lab 05/20/20  1114 05/21/20  0350   WBC 7.50 7.42   HGB 12.5 10.7*   HCT 41.8 34.8*    294     CMP:   Recent Labs   Lab 05/20/20  1114 05/21/20  0350   * 138   K 4.0 4.3    102   CO2 22* 25   * 287*   BUN 27* 28*   CREATININE 1.0 1.3   CALCIUM 9.4 8.9   PROT 8.0 7.1   ALBUMIN 3.2* 2.9*   BILITOT 0.2 0.3   ALKPHOS 126 115   AST 22 14   ALT 34 24   ANIONGAP 11 11   EGFRNONAA >60.0 45.6*     Cardiac Markers:   Recent Labs   Lab 05/20/20  1114   BNP 19     Lipid Panel:   Recent Labs   Lab 05/21/20  0350   CHOL 232*   HDL 40   LDLCALC 143.8   TRIG 241*   CHOLHDL 17.2*     Magnesium:   Recent Labs   Lab 05/21/20  0350   MG 1.7       Significant Imaging: I have reviewed all pertinent imaging results/findings within  the past 24 hours.      Assessment/Plan:      * Chest pain  Chest Pain  · EKG without STEMI or any acute ST/T wave changes  · troponin negative x2  ·  Aspirin 81mg PO daily  · Lipitor 80mg PO daily  · Risk factors and lipid panel elevated, added fish oil   · Continue tele  · Nitro prn  · stress echo negative for ischemia  · Pulm htn on last echo, most likely contributory       Enlarged thyroid gland  · Enlarged thyroid gland, noting the known 3.8 cm left thyroid lobe nodule is not as conspicuous on today's exam due to contrast bolus timing.  Recommend further evaluation with a dedicated thyroid ultrasound on a nonemergent basis if not already performed.  - h/o biopsy, will recheck US to see if new changes as it's been 15 yrs or more      History of pulmonary embolism  · Chronic issue   · Not on anticoagulation  · D dimer 0.58 with an age adjusted level of 0.57  · Wells Score 1.5 (low) given history of PE  CTA No pulmonary thromboembolism.  Patchy ground-glass attenuation throughout both lungs with a lower lobe predominance, suggesting an airspace process such as pulmonary edema, atypical pneumonia, or aspiration.        Obesity, diabetes, and hypertension syndrome  · As above      Obesity  · Body mass index is 33.45 kg/m².  · Encouraged diet, weight loss, exercise         Hypertension  · Chronic issue but hypertensive in the ER  · Continue Lisinopril 10mg PO daily.  Can consider increasing dose  · Continue Bumex 2mg PO BID    Diabetes mellitus type II  · HbA1c 12.5/303  · Home DM regimen:  Insulin 40 units qHS  Start Detemir 20 units qHS, endo consulted increased Levemir to 32 units q HS (20% reduction from home dose) Pt reports frequently skipping Toujeo at home.   Start Novolog 8 units TID with meals (0.5 u/kg dosing)   Low Dose Correction Scale  · BG monitoring ac/hs  · SSI with POCT accuchecks and Diabetic diet        VTE Risk Mitigation (From admission, onward)         Ordered     enoxaparin injection 40 mg   Daily      05/20/20 1652     IP VTE HIGH RISK PATIENT  Once      05/20/20 1652                      Simona Boyer NP  Department of Hospital Medicine   [facility]

## 2020-05-22 NOTE — ASSESSMENT & PLAN NOTE
· Chronic issue   · Not on anticoagulation  · D dimer 0.58 with an age adjusted level of 0.57  · Wells Score 1.5 (low) given history of PE  CTA No pulmonary thromboembolism.  Patchy ground-glass attenuation throughout both lungs with a lower lobe predominance, suggesting an airspace process such as pulmonary edema, atypical pneumonia, or aspiration.

## 2020-05-22 NOTE — PLAN OF CARE
Pt awake alert and oriented,vss tele monitor shows SR no complaints of CP no SOB noted. Pt. Up in room without difficulty safety precautions in place no injuries sustained. Last blood sugar 179 no additional insulin required. Continue current plan of care.

## 2020-06-03 NOTE — PROGRESS NOTES
"Subjective:      Patient ID: Devika Bustillo is a 59 y.o. female.    Chief Complaint:  Follow-up    History of Present Illness  Devika Bustillo is here for follow up of T2DM.  Previously seen by inpatient endocrine team 5/2020. This is their first visit with me.    Evaluate thyroid nodule.    Hospital admit 5/2020 for chest pain - endocrine consulted for management of DM.    With regards to diabetes:    Diagnosed: 1994  Known complications:  DKA -  RN -  PN +  Nephropathy -  CAD -    Current regimen:  Toujeo 40units nightly  Novolog 8units TIDAC    Occasionally forgets insulin. Giving Novolog after meals or with snacks.     Ran out of Januvia "a while ago".    Other medications tried:  MFM - GI  Lantus - GI  Januvia 100mg daily    Glucose Monitor:   0 times a day testing  Log reviewed: None.  Declined in clinic glucose check.     Hypoglycemia:  Unsure.   Knows how to correct with 15 grams of carbs- juice, coke, or a peppermint.     Diet/Exercise:  Eats 3 meals a day.   B: cereal, toast, milk  L: salad, meat  D: meat, vegetable, rice or pasta  Snacks : occasionally  Drinks : water, occasionally soft drinks   Exercise - tries to stay active      Education - last visit: None  Eye Exam: within the last year - outside [facility]  Podiatry: Denies    Denies history of pancreatitis & personal/family history of medullary thyroid cancer.     Diabetes Management Status    Hemoglobin A1C   Date Value Ref Range Status   05/20/2020 12.6 (H) 4.0 - 5.6 % Final     Comment:     ADA Screening Guidelines:  5.7-6.4%  Consistent with prediabetes  >or=6.5%  Consistent with diabetes  High levels of fetal hemoglobin interfere with the HbA1C  assay. Heterozygous hemoglobin variants (HbS, HgC, etc)do  not significantly interfere with this assay.   However, presence of multiple variants may affect accuracy.     03/21/2018 >14.0 (H) 4.0 - 5.6 % Final     Comment:     According to ADA guidelines, hemoglobin A1c <7.0% represents  optimal control in " non-pregnant diabetic patients. Different  metrics may apply to specific patient populations.   Standards of Medical Care in Diabetes-2016.  For the purpose of screening for the presence of diabetes:  <5.7%     Consistent with the absence of diabetes  5.7-6.4%  Consistent with increasing risk for diabetes   (prediabetes)  >or=6.5%  Consistent with diabetes  Currently, no consensus exists for use of hemoglobin A1c  for diagnosis of diabetes for children.  This Hemoglobin A1c assay has significant interference with fetal   hemoglobin   (HbF). The results are invalid for patients with abnormal amounts of   HbF,   including those with known Hereditary Persistence   of Fetal Hemoglobin. Heterozygous hemoglobin variants (HbAS, HbAC,   HbAD, HbAE, HbA2) do not significantly interfere with this assay;   however, presence of multiple variants in a sample may impact the %   interference.     07/08/2013 11.4 (H) 4.0 - 6.2 % Final       Statin: Taking  ACE/ARB: Taking  Screening or Prevention Patient's value Goal Complete/Controlled?   HgA1C Testing and Control   Lab Results   Component Value Date    HGBA1C 12.6 (H) 05/20/2020      Annually/Less than 8% No   Lipid profile : 05/21/2020 Annually Yes   LDL control Lab Results   Component Value Date    LDLCALC 143.8 05/21/2020    Annually/Less than 100 mg/dl  No   Nephropathy screening No results found for: LABMICR  Lab Results   Component Value Date    PROTEINUA Negative 03/05/2020    Annually Yes   Blood pressure BP Readings from Last 1 Encounters:   06/05/20 132/86    Less than 140/90 Yes   Dilated retinal exam Most Recent Eye Exam Date: Not Found Annually No   Foot exam   Most Recent Foot Exam Date: Not Found Annually No     With regards to thyroid nodule:    Thyroid US: 5/22/20  Right thyroid lobe measures 5.9 x 2.6 x 2.4 cm isthmus measures 1.1 cm.  Left thyroid lobe measures 6.5 x 3.9 x 3.9 cm.  There are 2 small nodules identified in the right thyroid lobe appearing solid  and isoechoic to hyperechoic.  The largest measures 0.8 x 0.6 x 0.8 cm.  These nodules do not meet criteria for FNA.  Multiple small hyperechoic nodule noted at the isthmus.  There are several nodules identified within the left thyroid lobe, the largest is seen inferiorly and appears to be taller than wide measuring in the order of 4.0 x 2.9 x 3.1 cm.  This nodule is isoechoic to hypoechoic, solid with smooth margins.  Nodule is TR 4 by TI RAD criteria and meets criteria for FNA.  The 2nd largest nodule is seen just medially measuring in the order of 2.7 x 1.7 x 1.7 cm.  This nodule is isodense to hypoechoic solid with smooth margins.  Nodule is TR 3 by TI RAD criteria and meets criteria for follow-up.  Other smaller nodules no right meet criteria for FNA or follow-up.  There is a benign appearing right cervical lymph node with fatty hilum.  IMPRESSION:   Multinodular thyroid glands.  The largest nodule in the left thyroid lobe meets criteria for FNA and the 2nd largest meets criteria for follow-up by TI RAD, as above.    FNA:   Per patient, 2005 benign as far as she knows.    Signs or Symptoms:   Difficulty breathing: Denies  Difficulty swallowing: Denies  Voice Changes: Denies  FH of thyroid cancer: Denies  Personal history of radiation treatment or exposure: Denies    Lab Results   Component Value Date    TSH 0.488 05/20/2020    T9RSTJJ 9.5 03/21/2018    FREET4 0.98 07/30/2017     Denies signs or symptoms of hyper or hypothyroidism    Review of Systems   Constitutional: Negative for fatigue.   Eyes: Negative for visual disturbance.   Respiratory: Negative for shortness of breath.    Cardiovascular: Negative for chest pain.   Gastrointestinal: Negative for abdominal pain.   Musculoskeletal: Negative for arthralgias.   Skin: Negative for wound.   Neurological: Negative for headaches.   Hematological: Does not bruise/bleed easily.   Psychiatric/Behavioral: Negative for sleep disturbance.     Objective:   Physical  "Exam   Neck: Thyromegaly present.   Cardiovascular: Normal rate.   No edema present   Pulmonary/Chest: Effort normal.   Abdominal: Soft.   Vitals reviewed.    Injection sites are without edema or erythema. No lipo hypertropthy or atrophy.    Visit Vitals  /86   Pulse 78   Ht 5' 8" (1.727 m)   Wt 104.9 kg (231 lb 4.2 oz)   BMI 35.16 kg/m²     Body mass index is 35.16 kg/m².    Lab Review:   Lab Results   Component Value Date    HGBA1C 12.6 (H) 05/20/2020    HGBA1C >14.0 (H) 03/21/2018    HGBA1C 11.4 (H) 07/08/2013       Lab Results   Component Value Date    CHOL 232 (H) 05/21/2020    HDL 40 05/21/2020    LDLCALC 143.8 05/21/2020    TRIG 241 (H) 05/21/2020    CHOLHDL 17.2 (L) 05/21/2020     Lab Results   Component Value Date     05/22/2020    K 4.4 05/22/2020     05/22/2020    CO2 24 05/22/2020     (H) 05/22/2020    BUN 36 (H) 05/22/2020    CREATININE 1.2 05/22/2020    CALCIUM 8.9 05/22/2020    PROT 6.9 05/22/2020    ALBUMIN 2.8 (L) 05/22/2020    BILITOT 0.2 05/22/2020    ALKPHOS 111 05/22/2020    AST 22 05/22/2020    ALT 25 05/22/2020    ANIONGAP 10 05/22/2020    ESTGFRAFRICA 58.0 (A) 05/22/2020    EGFRNONAA 50.3 (A) 05/22/2020    TSH 0.488 05/20/2020     No results found for: [id number]  Assessment and Plan     1. Type 2 diabetes mellitus with hyperglycemia, with long-term current use of insulin  Ambulatory referral/consult to Optometry    Ambulatory referral/consult to Podiatry    blood-glucose meter kit    lancets Misc    blood sugar diagnostic Strp    JANUVIA 100 mg Tab    TOUJEO SOLOSTAR U-300 INSULIN 300 unit/mL (1.5 mL) InPn pen    Ambulatory referral/consult to Diabetes Education   2. Multinodular goiter  US FNA Thyroid, 1st Lesion    US FNA Thyroid Ea Addl Lesion   3. Enlarged thyroid gland  US FNA Thyroid, 1st Lesion    US FNA Thyroid Ea Addl Lesion   4. Class 2 obesity with body mass index (BMI) of 38.0 to 38.9 in adult, unspecified obesity type, unspecified whether serious " comorbidity present     5. Essential hypertension     6. Dyslipidemia       Diabetes mellitus type II  -- [place] referral.  Optometry referral.  Podiatry referral.  -- A1c goal <7.0%.  -- Medications discussed:  MFM - GI  GLP1-DPP4   Insulin   -- Reviewed logs/CGM:  Insufficient data to make medication changes. Recent addition of prandial insulin. Will weight base insulin and titrate up slowly.  Glucometer ordered.  Glucose logs provided. Instructed to check before meals and before bed.  Instructed to send glucose logs in 4 days.  Reach out to me sooner for any glucose <70 or consistently >250.  -- Medication Changes:   CHANGE:  Toujeo 26units nightly  Novolog 8 units TIDAC - in depth instruction into timing of administration  Januvia 100mg daily  -- Reviewed goals of therapy are to get the best control we can without hypoglycemia.  -- Reviewed patient's current insulin regimen. Clarified proper insulin dose and timing in relation to meals, etc. Insulin injection sites and proper rotation instructed.    -- Advised frequent self blood glucose monitoring.  Patient encouraged to document glucose results and bring them to every clinic visit.  -- Hypoglycemia precautions discussed. Instructed on precautions before driving.    -- Call for Bg repeatedly < 90 or > 180.   -- Close adherence to lifestyle changes recommended.   -- Periodic follow ups for eye evaluations, foot care and dental care suggested.    Multinodular goiter  -- I have reviewed management options including observation, FNA or surgery.  -- Will proceed with FNA.  Left 4.0 x 2.9 x 3.1 cm  Left 2.7 x 1.7 x 1.7 cm  -- Discussed it would be reasonable if she were experiencing compressive symptoms to have a surgical evaluation.  She is okay continuing to monitor this (after FNA) and will go to surgery if needed or development of compressive symptoms.   -- FNA procedure explained in depth.  -- Discussed indications for repeat FNA as well as surgical indications  (abnormal FNA, compressive symptoms or interval change).  -- Discussed possible results of FNA- Benign, Malignant, FLUS, or insufficient cells.   -- Patient is not on any blood thinners   -- If FNA is negative then plan RTO in 1 year with TSH and thyroid US prior.  -- All of the patients questions were answered.    Hypertension  -- On ACEI.  -- Controlled.  -- Blood pressure goals discussed with patient.    Dyslipidemia  -- Controlled.  -- On statin per ADA recommendations.    Obesity  -- Encouraged dietary and lifestyle modifications.  -- Emphasized weight loss goals.    Follow up in about 2 weeks (around 6/19/2020).    Patient to set up patient portal - tech support number given.

## 2020-06-05 ENCOUNTER — OFFICE VISIT (OUTPATIENT)
Dept: ENDOCRINOLOGY | Facility: CLINIC | Age: 60
End: 2020-06-05
Payer: MEDICAID

## 2020-06-05 VITALS
HEART RATE: 78 BPM | BODY MASS INDEX: 35.05 KG/M2 | SYSTOLIC BLOOD PRESSURE: 132 MMHG | HEIGHT: 68 IN | DIASTOLIC BLOOD PRESSURE: 86 MMHG | WEIGHT: 231.25 LBS

## 2020-06-05 DIAGNOSIS — E66.9 CLASS 2 OBESITY WITH BODY MASS INDEX (BMI) OF 38.0 TO 38.9 IN ADULT, UNSPECIFIED OBESITY TYPE, UNSPECIFIED WHETHER SERIOUS COMORBIDITY PRESENT: ICD-10-CM

## 2020-06-05 DIAGNOSIS — E04.9 ENLARGED THYROID GLAND: Chronic | ICD-10-CM

## 2020-06-05 DIAGNOSIS — Z79.4 TYPE 2 DIABETES MELLITUS WITH HYPERGLYCEMIA, WITH LONG-TERM CURRENT USE OF INSULIN: Primary | ICD-10-CM

## 2020-06-05 DIAGNOSIS — E78.5 DYSLIPIDEMIA: ICD-10-CM

## 2020-06-05 DIAGNOSIS — E04.2 MULTINODULAR GOITER: ICD-10-CM

## 2020-06-05 DIAGNOSIS — E11.65 TYPE 2 DIABETES MELLITUS WITH HYPERGLYCEMIA, WITH LONG-TERM CURRENT USE OF INSULIN: Primary | ICD-10-CM

## 2020-06-05 DIAGNOSIS — I10 ESSENTIAL HYPERTENSION: ICD-10-CM

## 2020-06-05 PROCEDURE — 99999 PR PBB SHADOW E&M-EST. PATIENT-LVL V: CPT | Mod: PBBFAC,,, | Performed by: NURSE PRACTITIONER

## 2020-06-05 PROCEDURE — 99214 OFFICE O/P EST MOD 30 MIN: CPT | Mod: S$PBB,,, | Performed by: NURSE PRACTITIONER

## 2020-06-05 PROCEDURE — 99999 PR PBB SHADOW E&M-EST. PATIENT-LVL V: ICD-10-PCS | Mod: PBBFAC,,, | Performed by: NURSE PRACTITIONER

## 2020-06-05 PROCEDURE — 99214 PR OFFICE/OUTPT VISIT, EST, LEVL IV, 30-39 MIN: ICD-10-PCS | Mod: S$PBB,,, | Performed by: NURSE PRACTITIONER

## 2020-06-05 PROCEDURE — 99215 OFFICE O/P EST HI 40 MIN: CPT | Mod: PBBFAC | Performed by: NURSE PRACTITIONER

## 2020-06-05 RX ORDER — INSULIN PUMP SYRINGE, 3 ML
EACH MISCELLANEOUS
Qty: 1 EACH | Refills: 0 | Status: SHIPPED | OUTPATIENT
Start: 2020-06-05

## 2020-06-05 RX ORDER — INSULIN GLARGINE 300 U/ML
26 INJECTION, SOLUTION SUBCUTANEOUS NIGHTLY
Qty: 3 SYRINGE | Refills: 6 | Status: ON HOLD | OUTPATIENT
Start: 2020-06-05 | End: 2021-11-04 | Stop reason: SDUPTHER

## 2020-06-05 RX ORDER — INSULIN GLARGINE 300 U/ML
INJECTION, SOLUTION SUBCUTANEOUS
COMMUNITY
Start: 2020-04-19 | End: 2020-06-05

## 2020-06-05 RX ORDER — SITAGLIPTIN 100 MG/1
100 TABLET, FILM COATED ORAL DAILY
Qty: 30 TABLET | Refills: 6 | Status: SHIPPED | OUTPATIENT
Start: 2020-06-05 | End: 2021-05-07

## 2020-06-05 RX ORDER — SITAGLIPTIN 100 MG/1
100 TABLET, FILM COATED ORAL DAILY
COMMUNITY
Start: 2020-05-08 | End: 2020-06-05 | Stop reason: SDUPTHER

## 2020-06-05 RX ORDER — LANCETS
EACH MISCELLANEOUS
Qty: 200 EACH | Refills: 11 | Status: SHIPPED | OUTPATIENT
Start: 2020-06-05

## 2020-06-05 NOTE — ASSESSMENT & PLAN NOTE
-- I have reviewed management options including observation, FNA or surgery.  -- Will proceed with FNA.  Left 4.0 x 2.9 x 3.1 cm  Left 2.7 x 1.7 x 1.7 cm  -- Discussed it would be reasonable if she were experiencing compressive symptoms to have a surgical evaluation.  She is okay continuing to monitor this (after FNA) and will go to surgery if needed or development of compressive symptoms.   -- FNA procedure explained in depth.  -- Discussed indications for repeat FNA as well as surgical indications (abnormal FNA, compressive symptoms or interval change).  -- Discussed possible results of FNA- Benign, Malignant, FLUS, or insufficient cells.   -- Patient is not on any blood thinners   -- If FNA is negative then plan RTO in 1 year with TSH and thyroid US prior.  -- All of the patients questions were answered.

## 2020-06-05 NOTE — ASSESSMENT & PLAN NOTE
-- [place] referral.  Optometry referral.  Podiatry referral.  -- A1c goal <7.0%.  -- Medications discussed:  MFM - GI  GLP1-DPP4   Insulin   -- Reviewed logs/CGM:  Insufficient data to make medication changes. Recent addition of prandial insulin. Will weight base insulin and titrate up slowly.  Glucometer ordered.  Glucose logs provided. Instructed to check before meals and before bed.  Instructed to send glucose logs in 4 days.  Reach out to me sooner for any glucose <70 or consistently >250.  -- Medication Changes:   CHANGE:  Toujeo 26units nightly  Novolog 8 units TIDAC - in depth instruction into timing of administration  Januvia 100mg daily  -- Reviewed goals of therapy are to get the best control we can without hypoglycemia.  -- Reviewed patient's current insulin regimen. Clarified proper insulin dose and timing in relation to meals, etc. Insulin injection sites and proper rotation instructed.    -- Advised frequent self blood glucose monitoring.  Patient encouraged to document glucose results and bring them to every clinic visit.  -- Hypoglycemia precautions discussed. Instructed on precautions before driving.    -- Call for Bg repeatedly < 90 or > 180.   -- Close adherence to lifestyle changes recommended.   -- Periodic follow ups for eye evaluations, foot care and dental care suggested.

## 2020-06-05 NOTE — PATIENT INSTRUCTIONS
Instructed to send glucose logs in 4 days.  Reach out to me sooner for any glucose <70 or consistently >250.

## 2020-06-05 NOTE — ASSESSMENT & PLAN NOTE
-- Encouraged dietary and lifestyle modifications.  -- Emphasized weight loss goals.   Pt called back, she does need a script for the pill form sent to [facility]  

## 2020-06-09 ENCOUNTER — TELEPHONE (OUTPATIENT)
Dept: ENDOCRINOLOGY | Facility: CLINIC | Age: 60
End: 2020-06-09

## 2020-06-09 NOTE — TELEPHONE ENCOUNTER
----- Message from Delphine Garcia sent at 6/9/2020  8:12 AM CDT -----  Contact: Devika     [phone]   Pt. Says  She is supposed to have an ultrasound today at 3:15pm /  Pls call.

## 2020-06-15 ENCOUNTER — PATIENT OUTREACH (OUTPATIENT)
Dept: ADMINISTRATIVE | Facility: HOSPITAL | Age: 60
End: 2020-06-15

## 2020-06-15 NOTE — PROGRESS NOTES
Health Maintenance Due   Topic Date Due    Hepatitis C Screening  1960    Foot Exam  06/30/1972    Eye Exam  06/30/1972    HIV Screening  06/30/1977    TETANUS VACCINE  06/30/1980    Pneumococcal Vaccine (Medium Risk) (1 of 1 - PPSV23) 06/30/1981    Cervical Cancer Screening  06/30/1983    Mammogram  06/30/2002    Shingles Vaccine (1 of 2) 06/30/2012    Colorectal Cancer Screening  03/06/2020     Pt has appointment scheduled with optometry on 7/28/2020.  Chart review completed.

## 2020-06-16 ENCOUNTER — HOSPITAL ENCOUNTER (OUTPATIENT)
Dept: ENDOCRINOLOGY | Facility: CLINIC | Age: 60
Discharge: HOME | End: 2020-06-16
Attending: NURSE PRACTITIONER
Payer: MEDICAID

## 2020-06-16 DIAGNOSIS — E04.2 MULTINODULAR GOITER: ICD-10-CM

## 2020-06-16 DIAGNOSIS — E04.9 ENLARGED THYROID GLAND: Chronic | ICD-10-CM

## 2020-06-16 PROCEDURE — 88173 PR  INTERPRETATION OF FNA SMEAR: ICD-10-PCS | Mod: 26,59,, | Performed by: PATHOLOGY

## 2020-06-16 PROCEDURE — 88173 CYTOPATH EVAL FNA REPORT: CPT | Mod: 26,,, | Performed by: PATHOLOGY

## 2020-06-16 PROCEDURE — 10006 US FINE NEEDLE ASPIRATION THYROID EA ADDITIONAL LESION: ICD-10-PCS | Mod: ,,, | Performed by: INTERNAL MEDICINE

## 2020-06-16 PROCEDURE — 10005 FNA BX W/US GDN 1ST LES: CPT | Mod: ,,, | Performed by: INTERNAL MEDICINE

## 2020-06-16 PROCEDURE — 88173 CYTOPATH EVAL FNA REPORT: CPT | Performed by: PATHOLOGY

## 2020-06-16 PROCEDURE — 10005 US FINE NEEDLE ASPIRATION THYROID, FIRST LESION: ICD-10-PCS | Mod: ,,, | Performed by: INTERNAL MEDICINE

## 2020-06-16 PROCEDURE — 88173 CYTOPATH EVAL FNA REPORT: CPT | Mod: 26,59,, | Performed by: PATHOLOGY

## 2020-06-16 PROCEDURE — 88173 PR  INTERPRETATION OF FNA SMEAR: ICD-10-PCS | Mod: 26,,, | Performed by: PATHOLOGY

## 2020-06-16 PROCEDURE — 10006 FNA BX W/US GDN EA ADDL: CPT | Mod: ,,, | Performed by: INTERNAL MEDICINE

## 2020-06-16 PROCEDURE — 88173 CYTOPATH EVAL FNA REPORT: CPT | Mod: 59 | Performed by: PATHOLOGY

## 2020-06-18 LAB — FINAL PATHOLOGIC DIAGNOSIS: NORMAL

## 2020-06-19 LAB — FINAL PATHOLOGIC DIAGNOSIS: NORMAL

## 2020-08-05 ENCOUNTER — TELEPHONE (OUTPATIENT)
Dept: ENDOCRINOLOGY | Facility: CLINIC | Age: 60
End: 2020-08-05

## 2020-08-05 RX ORDER — FLUCONAZOLE 150 MG/1
150 TABLET ORAL DAILY
Qty: 1 TABLET | Refills: 1 | Status: SHIPPED | OUTPATIENT
Start: 2020-08-05 | End: 2020-08-06

## 2020-08-05 NOTE — TELEPHONE ENCOUNTER
----- Message from Mi Llanos sent at 8/5/2020 10:41 AM CDT -----  Pt states she would like to speak with nurse regards to questions about her diabetes medication

## 2020-08-05 NOTE — TELEPHONE ENCOUNTER
Spoke to patient and she wants to know if you can order something for her yeast infection and bladder infection? She also needs an appointment soon , the first available was in December , she wants to see if the doctor can see her sooner?

## 2020-08-11 ENCOUNTER — TELEPHONE (OUTPATIENT)
Dept: ENDOCRINOLOGY | Facility: CLINIC | Age: 60
End: 2020-08-11

## 2020-08-11 NOTE — TELEPHONE ENCOUNTER
----- Message from Mi Llanos sent at 8/11/2020  1:51 PM CDT -----  Pt states she need a prescription for bladder infection states when she urinates it burns please call back to discuss

## 2020-09-11 ENCOUNTER — TELEPHONE (OUTPATIENT)
Dept: ENDOCRINOLOGY | Facility: CLINIC | Age: 60
End: 2020-09-11

## 2020-09-11 NOTE — TELEPHONE ENCOUNTER
Spoke with Devika and informed he there are no available appointments this month she understood and had no further questions she will schedule with someone else.

## 2020-12-04 ENCOUNTER — CLINICAL SUPPORT (OUTPATIENT)
Dept: REHABILITATION | Facility: OTHER | Age: 60
End: 2020-12-04
Payer: MEDICAID

## 2020-12-04 DIAGNOSIS — R29.898 DECREASED STRENGTH OF TRUNK AND BACK: Primary | ICD-10-CM

## 2020-12-04 DIAGNOSIS — M54.50 CHRONIC BILATERAL LOW BACK PAIN WITHOUT SCIATICA: ICD-10-CM

## 2020-12-04 DIAGNOSIS — R29.898 DECREASED STRENGTH OF LOWER EXTREMITY: ICD-10-CM

## 2020-12-04 DIAGNOSIS — G89.29 CHRONIC BILATERAL LOW BACK PAIN WITHOUT SCIATICA: ICD-10-CM

## 2020-12-04 PROBLEM — M53.86 DECREASED ROM OF LUMBAR SPINE: Status: RESOLVED | Noted: 2019-11-08 | Resolved: 2020-12-04

## 2020-12-04 PROCEDURE — 97162 PT EVAL MOD COMPLEX 30 MIN: CPT | Mod: PN
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
## 2021-03-04 ENCOUNTER — IMMUNIZATION (OUTPATIENT)
Dept: OBSTETRICS AND GYNECOLOGY | Facility: CLINIC | Age: 61
End: 2021-03-04
Payer: MEDICAID

## 2021-03-04 DIAGNOSIS — Z23 NEED FOR VACCINATION: Primary | ICD-10-CM

## 2021-03-04 PROCEDURE — 91300 COVID-19, MRNA, LNP-S, PF, 30 MCG/0.3 ML DOSE VACCINE: CPT | Mod: PBBFAC | Performed by: FAMILY MEDICINE

## 2021-03-21 ENCOUNTER — HOSPITAL ENCOUNTER (EMERGENCY)
Facility: HOSPITAL | Age: 61
Discharge: HOME | End: 2021-03-21
Attending: EMERGENCY MEDICINE
Payer: MEDICAID

## 2021-03-21 VITALS
DIASTOLIC BLOOD PRESSURE: 81 MMHG | WEIGHT: 230 LBS | HEIGHT: 68 IN | HEART RATE: 77 BPM | BODY MASS INDEX: 34.86 KG/M2 | SYSTOLIC BLOOD PRESSURE: 185 MMHG | TEMPERATURE: 98 F | OXYGEN SATURATION: 100 % | RESPIRATION RATE: 18 BRPM

## 2021-03-21 DIAGNOSIS — M25.561 ACUTE PAIN OF RIGHT KNEE: Primary | ICD-10-CM

## 2021-03-21 DIAGNOSIS — M25.569 KNEE PAIN: ICD-10-CM

## 2021-03-21 PROCEDURE — 99284 EMERGENCY DEPT VISIT MOD MDM: CPT | Mod: 25

## 2021-03-21 PROCEDURE — 96372 THER/PROPH/DIAG INJ SC/IM: CPT

## 2021-03-21 PROCEDURE — 63600175 PHARM REV CODE 636 W HCPCS: Performed by: EMERGENCY MEDICINE

## 2021-03-21 RX ORDER — NAPROXEN 500 MG/1
500 TABLET ORAL 2 TIMES DAILY PRN
Qty: 20 TABLET | Refills: 0 | Status: ON HOLD | OUTPATIENT
Start: 2021-03-21 | End: 2021-11-04 | Stop reason: HOSPADM

## 2021-03-21 RX ORDER — TRAMADOL HYDROCHLORIDE 50 MG/1
50 TABLET ORAL EVERY 6 HOURS PRN
Qty: 10 TABLET | Refills: 0 | Status: ON HOLD | OUTPATIENT
Start: 2021-03-21 | End: 2023-06-27

## 2021-03-21 RX ORDER — KETOROLAC TROMETHAMINE 30 MG/ML
15 INJECTION, SOLUTION INTRAMUSCULAR; INTRAVENOUS
Status: COMPLETED | OUTPATIENT
Start: 2021-03-21 | End: 2021-03-21

## 2021-03-21 RX ADMIN — KETOROLAC TROMETHAMINE 15 MG: 30 INJECTION, SOLUTION INTRAMUSCULAR; INTRAVENOUS at 01:03

## 2021-03-25 ENCOUNTER — IMMUNIZATION (OUTPATIENT)
Dept: OBSTETRICS AND GYNECOLOGY | Facility: CLINIC | Age: 61
End: 2021-03-25
Payer: MEDICAID

## 2021-03-25 DIAGNOSIS — Z23 NEED FOR VACCINATION: Primary | ICD-10-CM

## 2021-03-25 PROCEDURE — 0002A COVID-19, MRNA, LNP-S, PF, 30 MCG/0.3 ML DOSE VACCINE: ICD-10-PCS | Mod: CV19,S$GLB,, | Performed by: FAMILY MEDICINE

## 2021-03-25 PROCEDURE — 0002A COVID-19, MRNA, LNP-S, PF, 30 MCG/0.3 ML DOSE VACCINE: CPT | Mod: CV19,S$GLB,, | Performed by: FAMILY MEDICINE

## 2021-03-25 PROCEDURE — 91300 COVID-19, MRNA, LNP-S, PF, 30 MCG/0.3 ML DOSE VACCINE: ICD-10-PCS | Mod: S$GLB,,, | Performed by: FAMILY MEDICINE

## 2021-03-25 PROCEDURE — 91300 COVID-19, MRNA, LNP-S, PF, 30 MCG/0.3 ML DOSE VACCINE: CPT | Mod: S$GLB,,, | Performed by: FAMILY MEDICINE

## 2021-05-07 ENCOUNTER — HOSPITAL ENCOUNTER (EMERGENCY)
Facility: HOSPITAL | Age: 61
Discharge: HOME | End: 2021-05-07
Attending: EMERGENCY MEDICINE
Payer: MEDICAID

## 2021-05-07 ENCOUNTER — PATIENT OUTREACH (OUTPATIENT)
Dept: EMERGENCY MEDICINE | Facility: HOSPITAL | Age: 61
End: 2021-05-07

## 2021-05-07 VITALS
WEIGHT: 230 LBS | OXYGEN SATURATION: 100 % | HEART RATE: 70 BPM | TEMPERATURE: 99 F | BODY MASS INDEX: 34.97 KG/M2 | SYSTOLIC BLOOD PRESSURE: 147 MMHG | DIASTOLIC BLOOD PRESSURE: 67 MMHG | RESPIRATION RATE: 17 BRPM

## 2021-05-07 DIAGNOSIS — R06.02 SHORTNESS OF BREATH: Primary | ICD-10-CM

## 2021-05-07 DIAGNOSIS — R42 DIZZINESS: ICD-10-CM

## 2021-05-07 LAB
ALBUMIN SERPL BCP-MCNC: 2.6 G/DL (ref 3.5–5.2)
ALP SERPL-CCNC: 111 U/L (ref 55–135)
ALT SERPL W/O P-5'-P-CCNC: 66 U/L (ref 10–44)
ANION GAP SERPL CALC-SCNC: 9 MMOL/L (ref 8–16)
AST SERPL-CCNC: 28 U/L (ref 10–40)
BASOPHILS # BLD AUTO: 0.06 K/UL (ref 0–0.2)
BASOPHILS NFR BLD: 0.6 % (ref 0–1.9)
BILIRUB SERPL-MCNC: 0.2 MG/DL (ref 0.1–1)
BNP SERPL-MCNC: <10 PG/ML (ref 0–99)
BUN SERPL-MCNC: 47 MG/DL (ref 6–20)
BUN SERPL-MCNC: 77 MG/DL (ref 6–30)
CALCIUM SERPL-MCNC: 8.5 MG/DL (ref 8.7–10.5)
CHLORIDE SERPL-SCNC: 107 MMOL/L (ref 95–110)
CHLORIDE SERPL-SCNC: 113 MMOL/L (ref 95–110)
CO2 SERPL-SCNC: 20 MMOL/L (ref 23–29)
CREAT SERPL-MCNC: 1.2 MG/DL (ref 0.5–1.4)
CREAT SERPL-MCNC: 1.5 MG/DL (ref 0.5–1.4)
CTP QC/QA: YES
D DIMER PPP IA.FEU-MCNC: 0.48 MG/L FEU
DIFFERENTIAL METHOD: ABNORMAL
EOSINOPHIL # BLD AUTO: 0.1 K/UL (ref 0–0.5)
EOSINOPHIL NFR BLD: 1 % (ref 0–8)
ERYTHROCYTE [DISTWIDTH] IN BLOOD BY AUTOMATED COUNT: 13.2 % (ref 11.5–14.5)
EST. GFR  (AFRICAN AMERICAN): 57.6 ML/MIN/1.73 M^2
EST. GFR  (NON AFRICAN AMERICAN): 49.9 ML/MIN/1.73 M^2
GLUCOSE SERPL-MCNC: 74 MG/DL (ref 70–110)
GLUCOSE SERPL-MCNC: 93 MG/DL (ref 70–110)
HCT VFR BLD AUTO: 39.2 % (ref 37–48.5)
HCT VFR BLD CALC: 39 %PCV (ref 36–54)
HCV AB SERPL QL IA: NEGATIVE
HGB BLD-MCNC: 12.5 G/DL (ref 12–16)
IMM GRANULOCYTES # BLD AUTO: 0.04 K/UL (ref 0–0.04)
IMM GRANULOCYTES NFR BLD AUTO: 0.4 % (ref 0–0.5)
LYMPHOCYTES # BLD AUTO: 3.8 K/UL (ref 1–4.8)
LYMPHOCYTES NFR BLD: 36.5 % (ref 18–48)
MAGNESIUM SERPL-MCNC: 1.7 MG/DL (ref 1.6–2.6)
MCH RBC QN AUTO: 27.2 PG (ref 27–31)
MCHC RBC AUTO-ENTMCNC: 31.9 G/DL (ref 32–36)
MCV RBC AUTO: 85 FL (ref 82–98)
MONOCYTES # BLD AUTO: 0.6 K/UL (ref 0.3–1)
MONOCYTES NFR BLD: 5.5 % (ref 4–15)
NEUTROPHILS # BLD AUTO: 5.9 K/UL (ref 1.8–7.7)
NEUTROPHILS NFR BLD: 56 % (ref 38–73)
NRBC BLD-RTO: 0 /100 WBC
PLATELET # BLD AUTO: 301 K/UL (ref 150–450)
PMV BLD AUTO: 10.5 FL (ref 9.2–12.9)
POC IONIZED CALCIUM: 1.05 MMOL/L (ref 1.06–1.42)
POC TCO2 (MEASURED): 26 MMOL/L (ref 23–29)
POCT GLUCOSE: 113 MG/DL (ref 70–110)
POTASSIUM BLD-SCNC: 8.7 MMOL/L (ref 3.5–5.1)
POTASSIUM SERPL-SCNC: 5 MMOL/L (ref 3.5–5.1)
PROT SERPL-MCNC: 6.6 G/DL (ref 6–8.4)
RBC # BLD AUTO: 4.59 M/UL (ref 4–5.4)
SAMPLE: ABNORMAL
SARS-COV-2 RDRP RESP QL NAA+PROBE: NEGATIVE
SODIUM BLD-SCNC: 135 MMOL/L (ref 136–145)
SODIUM SERPL-SCNC: 142 MMOL/L (ref 136–145)
TROPONIN I SERPL DL<=0.01 NG/ML-MCNC: <0.006 NG/ML (ref 0–0.03)
TROPONIN I SERPL DL<=0.01 NG/ML-MCNC: <0.006 NG/ML (ref 0–0.03)
WBC # BLD AUTO: 10.43 K/UL (ref 3.9–12.7)

## 2021-05-07 PROCEDURE — 82962 GLUCOSE BLOOD TEST: CPT

## 2021-05-07 PROCEDURE — U0002 COVID-19 LAB TEST NON-CDC: HCPCS | Performed by: PHYSICIAN ASSISTANT

## 2021-05-07 PROCEDURE — 25500020 PHARM REV CODE 255: Performed by: EMERGENCY MEDICINE

## 2021-05-07 PROCEDURE — 93010 EKG 12-LEAD: ICD-10-PCS | Mod: ,,, | Performed by: INTERNAL MEDICINE

## 2021-05-07 PROCEDURE — 85025 COMPLETE CBC W/AUTO DIFF WBC: CPT | Performed by: PHYSICIAN ASSISTANT

## 2021-05-07 PROCEDURE — 83880 ASSAY OF NATRIURETIC PEPTIDE: CPT | Performed by: PHYSICIAN ASSISTANT

## 2021-05-07 PROCEDURE — 83735 ASSAY OF MAGNESIUM: CPT | Performed by: EMERGENCY MEDICINE

## 2021-05-07 PROCEDURE — 80047 BASIC METABLC PNL IONIZED CA: CPT

## 2021-05-07 PROCEDURE — 84484 ASSAY OF TROPONIN QUANT: CPT | Performed by: PHYSICIAN ASSISTANT

## 2021-05-07 PROCEDURE — 86803 HEPATITIS C AB TEST: CPT | Performed by: EMERGENCY MEDICINE

## 2021-05-07 PROCEDURE — 82330 ASSAY OF CALCIUM: CPT

## 2021-05-07 PROCEDURE — 85379 FIBRIN DEGRADATION QUANT: CPT | Performed by: PHYSICIAN ASSISTANT

## 2021-05-07 PROCEDURE — 80053 COMPREHEN METABOLIC PANEL: CPT | Performed by: EMERGENCY MEDICINE

## 2021-05-07 PROCEDURE — 87389 HIV-1 AG W/HIV-1&-2 AB AG IA: CPT | Performed by: EMERGENCY MEDICINE

## 2021-05-07 PROCEDURE — 93005 ELECTROCARDIOGRAM TRACING: CPT

## 2021-05-07 PROCEDURE — 84484 ASSAY OF TROPONIN QUANT: CPT | Mod: 91 | Performed by: PHYSICIAN ASSISTANT

## 2021-05-07 PROCEDURE — 86703 HIV-1/HIV-2 1 RESULT ANTBDY: CPT | Performed by: EMERGENCY MEDICINE

## 2021-05-07 PROCEDURE — 99285 EMERGENCY DEPT VISIT HI MDM: CPT | Mod: 25

## 2021-05-07 PROCEDURE — 25000003 PHARM REV CODE 250: Performed by: PHYSICIAN ASSISTANT

## 2021-05-07 PROCEDURE — 99284 PR EMERGENCY DEPT VISIT,LEVEL IV: ICD-10-PCS | Mod: CR,CS,, | Performed by: PHYSICIAN ASSISTANT

## 2021-05-07 PROCEDURE — 93010 ELECTROCARDIOGRAM REPORT: CPT | Mod: ,,, | Performed by: INTERNAL MEDICINE

## 2021-05-07 PROCEDURE — 99284 EMERGENCY DEPT VISIT MOD MDM: CPT | Mod: CR,CS,, | Performed by: PHYSICIAN ASSISTANT

## 2021-05-07 RX ORDER — ASPIRIN 325 MG
325 TABLET ORAL
Status: COMPLETED | OUTPATIENT
Start: 2021-05-07 | End: 2021-05-07

## 2021-05-07 RX ORDER — DULAGLUTIDE 0.75 MG/.5ML
0.75 INJECTION, SOLUTION SUBCUTANEOUS
COMMUNITY
Start: 2021-05-06 | End: 2023-06-27 | Stop reason: DRUGHIGH

## 2021-05-07 RX ORDER — NAPROXEN 500 MG/1
500 TABLET ORAL 2 TIMES DAILY WITH MEALS
Qty: 14 TABLET | Refills: 0 | Status: ON HOLD | OUTPATIENT
Start: 2021-05-07 | End: 2021-11-04 | Stop reason: HOSPADM

## 2021-05-07 RX ADMIN — ASPIRIN 325 MG ORAL TABLET 325 MG: 325 PILL ORAL at 12:05

## 2021-05-07 RX ADMIN — IOHEXOL 100 ML: 350 INJECTION, SOLUTION INTRAVENOUS at 02:05

## 2021-05-10 LAB
HIV 1+2 AB+HIV1 P24 AG SERPL QL IA: POSITIVE
HIV SUPPLEMENTAL ASSAY INTERPRETATION: NORMAL
HIV-1 RESULT: NEGATIVE
HIV-2 RESULT: NEGATIVE

## 2021-05-11 ENCOUNTER — TELEPHONE (OUTPATIENT)
Dept: EMERGENCY MEDICINE | Facility: HOSPITAL | Age: 61
End: 2021-05-11

## 2021-05-11 ENCOUNTER — LAB VISIT (OUTPATIENT)
Dept: LAB | Facility: HOSPITAL | Age: 61
End: 2021-05-11
Attending: NURSE PRACTITIONER
Payer: MEDICAID

## 2021-05-11 DIAGNOSIS — Z21 HIV ANTIBODY POSITIVE: ICD-10-CM

## 2021-05-11 DIAGNOSIS — Z21 HIV ANTIBODY POSITIVE: Primary | ICD-10-CM

## 2021-05-11 PROCEDURE — 87535 HIV-1 PROBE&REVERSE TRNSCRPJ: CPT | Performed by: NURSE PRACTITIONER

## 2021-05-11 PROCEDURE — 36415 COLL VENOUS BLD VENIPUNCTURE: CPT | Performed by: NURSE PRACTITIONER

## 2021-05-14 LAB — HIV 1 RNA+PROVIRAL DNA PLAS QL NAA+PROBE: NORMAL

## 2021-08-24 ENCOUNTER — PATIENT OUTREACH (OUTPATIENT)
Dept: EMERGENCY MEDICINE | Facility: HOSPITAL | Age: 61
End: 2021-08-24

## 2021-08-24 ENCOUNTER — HOSPITAL ENCOUNTER (EMERGENCY)
Facility: HOSPITAL | Age: 61
Discharge: HOME | End: 2021-08-24
Attending: EMERGENCY MEDICINE
Payer: MEDICAID

## 2021-08-24 VITALS
TEMPERATURE: 98 F | SYSTOLIC BLOOD PRESSURE: 183 MMHG | WEIGHT: 230 LBS | HEART RATE: 87 BPM | RESPIRATION RATE: 18 BRPM | BODY MASS INDEX: 34.86 KG/M2 | HEIGHT: 68 IN | DIASTOLIC BLOOD PRESSURE: 77 MMHG | OXYGEN SATURATION: 99 %

## 2021-08-24 DIAGNOSIS — R42 DIZZINESS: ICD-10-CM

## 2021-08-24 DIAGNOSIS — R06.02 SOB (SHORTNESS OF BREATH): ICD-10-CM

## 2021-08-24 DIAGNOSIS — R73.9 HYPERGLYCEMIA: ICD-10-CM

## 2021-08-24 DIAGNOSIS — I99.8 BLOOD PRESSURE INSTABILITY: Primary | ICD-10-CM

## 2021-08-24 LAB
ALBUMIN SERPL BCP-MCNC: 3.3 G/DL (ref 3.5–5.2)
ALP SERPL-CCNC: 130 U/L (ref 55–135)
ALT SERPL W/O P-5'-P-CCNC: 29 U/L (ref 10–44)
ANION GAP SERPL CALC-SCNC: 11 MMOL/L (ref 8–16)
AST SERPL-CCNC: 18 U/L (ref 10–40)
BACTERIA #/AREA URNS HPF: ABNORMAL /HPF
BASOPHILS # BLD AUTO: 0.03 K/UL (ref 0–0.2)
BASOPHILS NFR BLD: 0.5 % (ref 0–1.9)
BILIRUB SERPL-MCNC: 0.2 MG/DL (ref 0.1–1)
BILIRUB UR QL STRIP: NEGATIVE
BNP SERPL-MCNC: <10 PG/ML (ref 0–99)
BUN SERPL-MCNC: 44 MG/DL (ref 6–20)
CALCIUM SERPL-MCNC: 10 MG/DL (ref 8.7–10.5)
CHLORIDE SERPL-SCNC: 107 MMOL/L (ref 95–110)
CLARITY UR: CLEAR
CO2 SERPL-SCNC: 21 MMOL/L (ref 23–29)
COLOR UR: YELLOW
CREAT SERPL-MCNC: 1.2 MG/DL (ref 0.5–1.4)
CTP QC/QA: YES
DIFFERENTIAL METHOD: ABNORMAL
EOSINOPHIL # BLD AUTO: 0.2 K/UL (ref 0–0.5)
EOSINOPHIL NFR BLD: 2.6 % (ref 0–8)
ERYTHROCYTE [DISTWIDTH] IN BLOOD BY AUTOMATED COUNT: 12.8 % (ref 11.5–14.5)
EST. GFR  (AFRICAN AMERICAN): 57 ML/MIN/1.73 M^2
EST. GFR  (NON AFRICAN AMERICAN): 50 ML/MIN/1.73 M^2
GLUCOSE SERPL-MCNC: 251 MG/DL (ref 70–110)
GLUCOSE SERPL-MCNC: 275 MG/DL (ref 70–110)
GLUCOSE UR QL STRIP: ABNORMAL
HCT VFR BLD AUTO: 41 % (ref 37–48.5)
HGB BLD-MCNC: 12.9 G/DL (ref 12–16)
HGB UR QL STRIP: NEGATIVE
HYALINE CASTS #/AREA URNS LPF: 7 /LPF
IMM GRANULOCYTES # BLD AUTO: 0.03 K/UL (ref 0–0.04)
IMM GRANULOCYTES NFR BLD AUTO: 0.5 % (ref 0–0.5)
KETONES UR QL STRIP: NEGATIVE
LEUKOCYTE ESTERASE UR QL STRIP: NEGATIVE
LYMPHOCYTES # BLD AUTO: 2.1 K/UL (ref 1–4.8)
LYMPHOCYTES NFR BLD: 32 % (ref 18–48)
MCH RBC QN AUTO: 26.7 PG (ref 27–31)
MCHC RBC AUTO-ENTMCNC: 31.5 G/DL (ref 32–36)
MCV RBC AUTO: 85 FL (ref 82–98)
MICROSCOPIC COMMENT: ABNORMAL
MONOCYTES # BLD AUTO: 0.5 K/UL (ref 0.3–1)
MONOCYTES NFR BLD: 7.3 % (ref 4–15)
NEUTROPHILS # BLD AUTO: 3.8 K/UL (ref 1.8–7.7)
NEUTROPHILS NFR BLD: 57.1 % (ref 38–73)
NITRITE UR QL STRIP: NEGATIVE
NRBC BLD-RTO: 0 /100 WBC
PH UR STRIP: 6 [PH] (ref 5–8)
PLATELET # BLD AUTO: 214 K/UL (ref 150–450)
PLATELET BLD QL SMEAR: ABNORMAL
PMV BLD AUTO: 10.7 FL (ref 9.2–12.9)
POCT GLUCOSE: 275 MG/DL (ref 70–110)
POTASSIUM SERPL-SCNC: 4.9 MMOL/L (ref 3.5–5.1)
PROT SERPL-MCNC: 8 G/DL (ref 6–8.4)
PROT UR QL STRIP: ABNORMAL
RBC # BLD AUTO: 4.84 M/UL (ref 4–5.4)
SARS-COV-2 RDRP RESP QL NAA+PROBE: NEGATIVE
SODIUM SERPL-SCNC: 139 MMOL/L (ref 136–145)
SP GR UR STRIP: 1.02 (ref 1–1.03)
SQUAMOUS #/AREA URNS HPF: 0 /HPF
TROPONIN I SERPL DL<=0.01 NG/ML-MCNC: <0.006 NG/ML (ref 0–0.03)
URN SPEC COLLECT METH UR: ABNORMAL
UROBILINOGEN UR STRIP-ACNC: NEGATIVE EU/DL
WBC # BLD AUTO: 6.59 K/UL (ref 3.9–12.7)
YEAST URNS QL MICRO: ABNORMAL

## 2021-08-24 PROCEDURE — 93010 EKG 12-LEAD: ICD-10-PCS | Mod: ,,, | Performed by: INTERNAL MEDICINE

## 2021-08-24 PROCEDURE — U0002 COVID-19 LAB TEST NON-CDC: HCPCS | Performed by: EMERGENCY MEDICINE

## 2021-08-24 PROCEDURE — 84484 ASSAY OF TROPONIN QUANT: CPT | Performed by: EMERGENCY MEDICINE

## 2021-08-24 PROCEDURE — 83880 ASSAY OF NATRIURETIC PEPTIDE: CPT | Performed by: EMERGENCY MEDICINE

## 2021-08-24 PROCEDURE — 80053 COMPREHEN METABOLIC PANEL: CPT | Performed by: EMERGENCY MEDICINE

## 2021-08-24 PROCEDURE — 85025 COMPLETE CBC W/AUTO DIFF WBC: CPT | Performed by: EMERGENCY MEDICINE

## 2021-08-24 PROCEDURE — 82962 GLUCOSE BLOOD TEST: CPT

## 2021-08-24 PROCEDURE — 93005 ELECTROCARDIOGRAM TRACING: CPT

## 2021-08-24 PROCEDURE — 93010 ELECTROCARDIOGRAM REPORT: CPT | Mod: ,,, | Performed by: INTERNAL MEDICINE

## 2021-08-24 PROCEDURE — 96361 HYDRATE IV INFUSION ADD-ON: CPT

## 2021-08-24 PROCEDURE — 99285 EMERGENCY DEPT VISIT HI MDM: CPT | Mod: 25

## 2021-08-24 PROCEDURE — 25000003 PHARM REV CODE 250: Performed by: EMERGENCY MEDICINE

## 2021-08-24 PROCEDURE — 96360 HYDRATION IV INFUSION INIT: CPT

## 2021-08-24 PROCEDURE — 81000 URINALYSIS NONAUTO W/SCOPE: CPT | Performed by: EMERGENCY MEDICINE

## 2021-08-24 RX ORDER — CLOTRIMAZOLE AND BETAMETHASONE DIPROPIONATE 10; .64 MG/G; MG/G
CREAM TOPICAL 2 TIMES DAILY
Qty: 45 G | Refills: 1 | Status: ON HOLD | OUTPATIENT
Start: 2021-08-24 | End: 2023-06-27

## 2021-08-24 RX ADMIN — SODIUM CHLORIDE 1000 ML: 0.9 INJECTION, SOLUTION INTRAVENOUS at 11:08

## 2021-09-06 ENCOUNTER — PATIENT OUTREACH (OUTPATIENT)
Dept: EMERGENCY MEDICINE | Facility: HOSPITAL | Age: 61
End: 2021-09-06

## 2021-09-13 ENCOUNTER — PATIENT OUTREACH (OUTPATIENT)
Dept: EMERGENCY MEDICINE | Facility: HOSPITAL | Age: 61
End: 2021-09-13

## 2021-09-23 ENCOUNTER — PATIENT OUTREACH (OUTPATIENT)
Dept: EMERGENCY MEDICINE | Facility: HOSPITAL | Age: 61
End: 2021-09-23

## 2021-09-28 ENCOUNTER — PATIENT OUTREACH (OUTPATIENT)
Dept: EMERGENCY MEDICINE | Facility: HOSPITAL | Age: 61
End: 2021-09-28

## 2021-11-03 ENCOUNTER — HOSPITAL ENCOUNTER (OUTPATIENT)
Facility: HOSPITAL | Age: 61
Discharge: HOME | End: 2021-11-04
Attending: EMERGENCY MEDICINE | Admitting: STUDENT IN AN ORGANIZED HEALTH CARE EDUCATION/TRAINING PROGRAM
Payer: MEDICAID

## 2021-11-03 DIAGNOSIS — R06.02 SOB (SHORTNESS OF BREATH): ICD-10-CM

## 2021-11-03 DIAGNOSIS — R79.89 ELEVATED TROPONIN: ICD-10-CM

## 2021-11-03 DIAGNOSIS — E11.65 TYPE 2 DIABETES MELLITUS WITH HYPERGLYCEMIA, WITH LONG-TERM CURRENT USE OF INSULIN: ICD-10-CM

## 2021-11-03 DIAGNOSIS — R07.89 CHEST PRESSURE: ICD-10-CM

## 2021-11-03 DIAGNOSIS — M79.604 RIGHT LEG PAIN: ICD-10-CM

## 2021-11-03 DIAGNOSIS — Z79.4 TYPE 2 DIABETES MELLITUS WITH HYPERGLYCEMIA, WITH LONG-TERM CURRENT USE OF INSULIN: ICD-10-CM

## 2021-11-03 DIAGNOSIS — R06.00 DYSPNEA: ICD-10-CM

## 2021-11-03 DIAGNOSIS — R06.02 SHORTNESS OF BREATH: ICD-10-CM

## 2021-11-03 DIAGNOSIS — R06.09 DOE (DYSPNEA ON EXERTION): Primary | ICD-10-CM

## 2021-11-03 LAB
ALBUMIN SERPL BCP-MCNC: 3.3 G/DL (ref 3.5–5.2)
ALP SERPL-CCNC: 151 U/L (ref 55–135)
ALT SERPL W/O P-5'-P-CCNC: 55 U/L (ref 10–44)
ANION GAP SERPL CALC-SCNC: 13 MMOL/L (ref 8–16)
AST SERPL-CCNC: 28 U/L (ref 10–40)
BACTERIA #/AREA URNS HPF: ABNORMAL /HPF
BASOPHILS # BLD AUTO: 0.05 K/UL (ref 0–0.2)
BASOPHILS NFR BLD: 0.5 % (ref 0–1.9)
BILIRUB SERPL-MCNC: 0.3 MG/DL (ref 0.1–1)
BILIRUB UR QL STRIP: NEGATIVE
BNP SERPL-MCNC: 17 PG/ML (ref 0–99)
BUN SERPL-MCNC: 24 MG/DL (ref 6–20)
CALCIUM SERPL-MCNC: 10.3 MG/DL (ref 8.7–10.5)
CHLORIDE SERPL-SCNC: 99 MMOL/L (ref 95–110)
CLARITY UR: CLEAR
CO2 SERPL-SCNC: 25 MMOL/L (ref 23–29)
COLOR UR: COLORLESS
CREAT SERPL-MCNC: 1.3 MG/DL (ref 0.5–1.4)
D DIMER PPP IA.FEU-MCNC: 0.69 MG/L FEU
DIFFERENTIAL METHOD: ABNORMAL
EOSINOPHIL # BLD AUTO: 0.1 K/UL (ref 0–0.5)
EOSINOPHIL NFR BLD: 1.2 % (ref 0–8)
ERYTHROCYTE [DISTWIDTH] IN BLOOD BY AUTOMATED COUNT: 12.7 % (ref 11.5–14.5)
EST. GFR  (AFRICAN AMERICAN): 52 ML/MIN/1.73 M^2
EST. GFR  (NON AFRICAN AMERICAN): 45 ML/MIN/1.73 M^2
GLUCOSE SERPL-MCNC: 343 MG/DL (ref 70–110)
GLUCOSE UR QL STRIP: ABNORMAL
HCT VFR BLD AUTO: 38.3 % (ref 37–48.5)
HGB BLD-MCNC: 12.1 G/DL (ref 12–16)
HGB UR QL STRIP: NEGATIVE
HYALINE CASTS #/AREA URNS LPF: 6 /LPF
IMM GRANULOCYTES # BLD AUTO: 0.03 K/UL (ref 0–0.04)
IMM GRANULOCYTES NFR BLD AUTO: 0.3 % (ref 0–0.5)
KETONES UR QL STRIP: NEGATIVE
LEUKOCYTE ESTERASE UR QL STRIP: NEGATIVE
LYMPHOCYTES # BLD AUTO: 2.6 K/UL (ref 1–4.8)
LYMPHOCYTES NFR BLD: 27.2 % (ref 18–48)
MCH RBC QN AUTO: 26.4 PG (ref 27–31)
MCHC RBC AUTO-ENTMCNC: 31.6 G/DL (ref 32–36)
MCV RBC AUTO: 83 FL (ref 82–98)
MICROSCOPIC COMMENT: ABNORMAL
MONOCYTES # BLD AUTO: 0.7 K/UL (ref 0.3–1)
MONOCYTES NFR BLD: 6.9 % (ref 4–15)
NEUTROPHILS # BLD AUTO: 6.1 K/UL (ref 1.8–7.7)
NEUTROPHILS NFR BLD: 63.9 % (ref 38–73)
NITRITE UR QL STRIP: NEGATIVE
NRBC BLD-RTO: 0 /100 WBC
PH UR STRIP: 8 [PH] (ref 5–8)
PLATELET # BLD AUTO: 363 K/UL (ref 150–450)
PMV BLD AUTO: 10 FL (ref 9.2–12.9)
POCT GLUCOSE: 346 MG/DL (ref 70–110)
POTASSIUM SERPL-SCNC: 4.5 MMOL/L (ref 3.5–5.1)
PROT SERPL-MCNC: 8.2 G/DL (ref 6–8.4)
PROT UR QL STRIP: ABNORMAL
RBC # BLD AUTO: 4.59 M/UL (ref 4–5.4)
RBC #/AREA URNS HPF: 1 /HPF (ref 0–4)
SODIUM SERPL-SCNC: 137 MMOL/L (ref 136–145)
SP GR UR STRIP: 1.01 (ref 1–1.03)
SQUAMOUS #/AREA URNS HPF: 1 /HPF
TROPONIN I SERPL DL<=0.01 NG/ML-MCNC: 0.02 NG/ML (ref 0–0.03)
TROPONIN I SERPL DL<=0.01 NG/ML-MCNC: 0.04 NG/ML (ref 0–0.03)
URN SPEC COLLECT METH UR: ABNORMAL
UROBILINOGEN UR STRIP-ACNC: NEGATIVE EU/DL
WBC # BLD AUTO: 9.46 K/UL (ref 3.9–12.7)
WBC #/AREA URNS HPF: 0 /HPF (ref 0–5)
YEAST URNS QL MICRO: ABNORMAL

## 2021-11-03 PROCEDURE — 99285 EMERGENCY DEPT VISIT HI MDM: CPT | Mod: 25

## 2021-11-03 PROCEDURE — 93010 ELECTROCARDIOGRAM REPORT: CPT | Mod: ,,, | Performed by: INTERNAL MEDICINE

## 2021-11-03 PROCEDURE — 81000 URINALYSIS NONAUTO W/SCOPE: CPT | Performed by: EMERGENCY MEDICINE

## 2021-11-03 PROCEDURE — 96374 THER/PROPH/DIAG INJ IV PUSH: CPT | Mod: 59

## 2021-11-03 PROCEDURE — 82962 GLUCOSE BLOOD TEST: CPT

## 2021-11-03 PROCEDURE — 85379 FIBRIN DEGRADATION QUANT: CPT | Performed by: EMERGENCY MEDICINE

## 2021-11-03 PROCEDURE — 63600175 PHARM REV CODE 636 W HCPCS: Performed by: EMERGENCY MEDICINE

## 2021-11-03 PROCEDURE — 25000003 PHARM REV CODE 250: Performed by: EMERGENCY MEDICINE

## 2021-11-03 PROCEDURE — 93010 EKG 12-LEAD: ICD-10-PCS | Mod: ,,, | Performed by: INTERNAL MEDICINE

## 2021-11-03 PROCEDURE — 93005 ELECTROCARDIOGRAM TRACING: CPT

## 2021-11-03 PROCEDURE — 96361 HYDRATE IV INFUSION ADD-ON: CPT

## 2021-11-03 PROCEDURE — 85025 COMPLETE CBC W/AUTO DIFF WBC: CPT | Performed by: EMERGENCY MEDICINE

## 2021-11-03 PROCEDURE — 84484 ASSAY OF TROPONIN QUANT: CPT | Performed by: EMERGENCY MEDICINE

## 2021-11-03 PROCEDURE — 25500020 PHARM REV CODE 255: Performed by: EMERGENCY MEDICINE

## 2021-11-03 PROCEDURE — 80053 COMPREHEN METABOLIC PANEL: CPT | Performed by: EMERGENCY MEDICINE

## 2021-11-03 PROCEDURE — 83880 ASSAY OF NATRIURETIC PEPTIDE: CPT | Performed by: EMERGENCY MEDICINE

## 2021-11-03 RX ORDER — LISINOPRIL 5 MG/1
10 TABLET ORAL
Status: COMPLETED | OUTPATIENT
Start: 2021-11-03 | End: 2021-11-03

## 2021-11-03 RX ADMIN — SODIUM CHLORIDE 1000 ML: 0.9 INJECTION, SOLUTION INTRAVENOUS at 09:11

## 2021-11-03 RX ADMIN — INSULIN HUMAN 2 UNITS: 100 INJECTION, SOLUTION PARENTERAL at 09:11

## 2021-11-03 RX ADMIN — IOHEXOL 70 ML: 350 INJECTION, SOLUTION INTRAVENOUS at 08:11

## 2021-11-03 RX ADMIN — LISINOPRIL 10 MG: 5 TABLET ORAL at 06:11

## 2021-11-04 VITALS
TEMPERATURE: 98 F | WEIGHT: 222 LBS | BODY MASS INDEX: 33.65 KG/M2 | SYSTOLIC BLOOD PRESSURE: 138 MMHG | OXYGEN SATURATION: 98 % | HEART RATE: 76 BPM | RESPIRATION RATE: 18 BRPM | HEIGHT: 68 IN | DIASTOLIC BLOOD PRESSURE: 101 MMHG

## 2021-11-04 PROBLEM — J45.909 ASTHMA: Status: ACTIVE | Noted: 2021-11-04

## 2021-11-04 PROBLEM — R06.02 SHORTNESS OF BREATH: Status: ACTIVE | Noted: 2021-11-04

## 2021-11-04 PROBLEM — R79.89 ELEVATED TROPONIN: Status: ACTIVE | Noted: 2021-11-04

## 2021-11-04 PROBLEM — Z86.711 HISTORY OF PULMONARY EMBOLISM: Chronic | Status: ACTIVE | Noted: 2020-05-20

## 2021-11-04 LAB
ALBUMIN SERPL BCP-MCNC: 1.8 G/DL (ref 3.5–5.2)
ALBUMIN SERPL BCP-MCNC: 2.8 G/DL (ref 3.5–5.2)
ALP SERPL-CCNC: 129 U/L (ref 55–135)
ALP SERPL-CCNC: 84 U/L (ref 55–135)
ALT SERPL W/O P-5'-P-CCNC: 29 U/L (ref 10–44)
ALT SERPL W/O P-5'-P-CCNC: 41 U/L (ref 10–44)
ANION GAP SERPL CALC-SCNC: 13 MMOL/L (ref 8–16)
ANION GAP SERPL CALC-SCNC: 8 MMOL/L (ref 8–16)
AORTIC ROOT ANNULUS: 2.39 CM
AORTIC VALVE CUSP SEPERATION: 1.67 CM
AST SERPL-CCNC: 15 U/L (ref 10–40)
AST SERPL-CCNC: 20 U/L (ref 10–40)
AV INDEX (PROSTH): 0.61
AV MEAN GRADIENT: 9 MMHG
AV PEAK GRADIENT: 17 MMHG
AV VALVE AREA: 2.12 CM2
AV VELOCITY RATIO: 0.65
BASOPHILS # BLD AUTO: 0.06 K/UL (ref 0–0.2)
BASOPHILS NFR BLD: 0.6 % (ref 0–1.9)
BILIRUB SERPL-MCNC: 0.2 MG/DL (ref 0.1–1)
BILIRUB SERPL-MCNC: 0.3 MG/DL (ref 0.1–1)
BSA FOR ECHO PROCEDURE: 2.2 M2
BUN SERPL-MCNC: 17 MG/DL (ref 6–20)
BUN SERPL-MCNC: 25 MG/DL (ref 6–20)
CALCIUM SERPL-MCNC: 6.2 MG/DL (ref 8.7–10.5)
CALCIUM SERPL-MCNC: 9.6 MG/DL (ref 8.7–10.5)
CHLORIDE SERPL-SCNC: 107 MMOL/L (ref 95–110)
CHLORIDE SERPL-SCNC: 119 MMOL/L (ref 95–110)
CHOLEST SERPL-MCNC: 107 MG/DL (ref 120–199)
CHOLEST/HDLC SERPL: 4.3 {RATIO} (ref 2–5)
CO2 SERPL-SCNC: 16 MMOL/L (ref 23–29)
CO2 SERPL-SCNC: 20 MMOL/L (ref 23–29)
CREAT SERPL-MCNC: 0.6 MG/DL (ref 0.5–1.4)
CREAT SERPL-MCNC: 1.1 MG/DL (ref 0.5–1.4)
CV ECHO LV RWT: 0.51 CM
CV STRESS BASE HR: 61 BPM
DIASTOLIC BLOOD PRESSURE: 94 MMHG
DIFFERENTIAL METHOD: ABNORMAL
DOP CALC AO PEAK VEL: 2.06 M/S
DOP CALC AO VTI: 43.9 CM
DOP CALC LVOT AREA: 3.5 CM2
DOP CALC LVOT DIAMETER: 2.11 CM
DOP CALC LVOT PEAK VEL: 1.34 M/S
DOP CALC LVOT STROKE VOLUME: 93 CM3
DOP CALCLVOT PEAK VEL VTI: 26.61 CM
E WAVE DECELERATION TIME: 212.58 MSEC
E/A RATIO: 0.7
E/E' RATIO: 17 M/S
ECHO LV POSTERIOR WALL: 1.05 CM (ref 0.6–1.1)
EJECTION FRACTION: 65 %
EOSINOPHIL # BLD AUTO: 0.1 K/UL (ref 0–0.5)
EOSINOPHIL NFR BLD: 1.3 % (ref 0–8)
ERYTHROCYTE [DISTWIDTH] IN BLOOD BY AUTOMATED COUNT: 12.6 % (ref 11.5–14.5)
EST. GFR  (AFRICAN AMERICAN): >60 ML/MIN/1.73 M^2
EST. GFR  (AFRICAN AMERICAN): >60 ML/MIN/1.73 M^2
EST. GFR  (NON AFRICAN AMERICAN): 55 ML/MIN/1.73 M^2
EST. GFR  (NON AFRICAN AMERICAN): >60 ML/MIN/1.73 M^2
ESTIMATED AVG GLUCOSE: 278 MG/DL (ref 68–131)
FERRITIN SERPL-MCNC: 578 NG/ML (ref 20–300)
FOLATE SERPL-MCNC: 7.6 NG/ML (ref 4–24)
FRACTIONAL SHORTENING: 45 % (ref 28–44)
GLUCOSE SERPL-MCNC: 161 MG/DL (ref 70–110)
GLUCOSE SERPL-MCNC: 267 MG/DL (ref 70–110)
HBA1C MFR BLD: 11.3 % (ref 4–5.6)
HCT VFR BLD AUTO: 34.8 % (ref 37–48.5)
HDLC SERPL-MCNC: 25 MG/DL (ref 40–75)
HDLC SERPL: 23.4 % (ref 20–50)
HGB BLD-MCNC: 10.9 G/DL (ref 12–16)
IMM GRANULOCYTES # BLD AUTO: 0.05 K/UL (ref 0–0.04)
IMM GRANULOCYTES NFR BLD AUTO: 0.5 % (ref 0–0.5)
INTERVENTRICULAR SEPTUM: 0.88 CM (ref 0.6–1.1)
IRON SERPL-MCNC: 58 UG/DL (ref 30–160)
IVRT: 101.5 MSEC
LA MAJOR: 5.5 CM
LA MINOR: 5.1 CM
LA WIDTH: 4.7 CM
LDLC SERPL CALC-MCNC: 62.2 MG/DL (ref 63–159)
LEFT ATRIUM SIZE: 4.13 CM
LEFT ATRIUM VOLUME INDEX: 40.8 ML/M2
LEFT ATRIUM VOLUME: 87.32 CM3
LEFT INTERNAL DIMENSION IN SYSTOLE: 2.24 CM (ref 2.1–4)
LEFT VENTRICLE DIASTOLIC VOLUME INDEX: 34.93 ML/M2
LEFT VENTRICLE DIASTOLIC VOLUME: 74.75 ML
LEFT VENTRICLE MASS INDEX: 59 G/M2
LEFT VENTRICLE SYSTOLIC VOLUME INDEX: 8 ML/M2
LEFT VENTRICLE SYSTOLIC VOLUME: 17.04 ML
LEFT VENTRICULAR INTERNAL DIMENSION IN DIASTOLE: 4.11 CM (ref 3.5–6)
LEFT VENTRICULAR MASS: 126.17 G
LV LATERAL E/E' RATIO: 14.57 M/S
LV SEPTAL E/E' RATIO: 20.4 M/S
LYMPHOCYTES # BLD AUTO: 3.3 K/UL (ref 1–4.8)
LYMPHOCYTES NFR BLD: 32.1 % (ref 18–48)
MAGNESIUM SERPL-MCNC: 1.2 MG/DL (ref 1.6–2.6)
MCH RBC QN AUTO: 26.7 PG (ref 27–31)
MCHC RBC AUTO-ENTMCNC: 31.3 G/DL (ref 32–36)
MCV RBC AUTO: 85 FL (ref 82–98)
MONOCYTES # BLD AUTO: 0.8 K/UL (ref 0.3–1)
MONOCYTES NFR BLD: 8 % (ref 4–15)
MV PEAK A VEL: 1.46 M/S
MV PEAK E VEL: 1.02 M/S
MV STENOSIS PRESSURE HALF TIME: 86.54 MS
MV VALVE AREA P 1/2 METHOD: 2.54 CM2
NEUTROPHILS # BLD AUTO: 5.9 K/UL (ref 1.8–7.7)
NEUTROPHILS NFR BLD: 57.5 % (ref 38–73)
NONHDLC SERPL-MCNC: 82 MG/DL
NRBC BLD-RTO: 0 /100 WBC
NUC STRESS EJECTION FRACTION: 68 %
OHS CV CPX 85 PERCENT MAX PREDICTED HEART RATE MALE: 131
OHS CV CPX MAX PREDICTED HEART RATE: 154
OHS CV CPX PATIENT IS FEMALE: 1
OHS CV CPX PATIENT IS MALE: 0
OHS CV CPX PEAK DIASTOLIC BLOOD PRESSURE: 61 MMHG
OHS CV CPX PEAK HEAR RATE: 81 BPM
OHS CV CPX PEAK RATE PRESSURE PRODUCT: 7533
OHS CV CPX PEAK SYSTOLIC BLOOD PRESSURE: 93 MMHG
OHS CV CPX PERCENT MAX PREDICTED HEART RATE ACHIEVED: 53
OHS CV CPX RATE PRESSURE PRODUCT PRESENTING: 8540
PHOSPHATE SERPL-MCNC: 2.4 MG/DL (ref 2.7–4.5)
PISA TR MAX VEL: 3.41 M/S
PLATELET # BLD AUTO: 319 K/UL (ref 150–450)
PMV BLD AUTO: 10 FL (ref 9.2–12.9)
POCT GLUCOSE: 254 MG/DL (ref 70–110)
POCT GLUCOSE: 276 MG/DL (ref 70–110)
POCT GLUCOSE: 357 MG/DL (ref 70–110)
POCT GLUCOSE: 366 MG/DL (ref 70–110)
POTASSIUM SERPL-SCNC: 2.9 MMOL/L (ref 3.5–5.1)
POTASSIUM SERPL-SCNC: 4.8 MMOL/L (ref 3.5–5.1)
POTASSIUM SERPL-SCNC: 4.9 MMOL/L (ref 3.5–5.1)
PROT SERPL-MCNC: 4.4 G/DL (ref 6–8.4)
PROT SERPL-MCNC: 6.9 G/DL (ref 6–8.4)
PULM VEIN S/D RATIO: 2.13
PV PEAK D VEL: 0.31 M/S
PV PEAK S VEL: 0.66 M/S
PV PEAK VELOCITY: 1.1 CM/S
RA MAJOR: 4.4 CM
RA PRESSURE: 3 MMHG
RA WIDTH: 3.84 CM
RBC # BLD AUTO: 4.08 M/UL (ref 4–5.4)
RIGHT VENTRICULAR END-DIASTOLIC DIMENSION: 2.76 CM
RV TISSUE DOPPLER FREE WALL SYSTOLIC VELOCITY 1 (APICAL 4 CHAMBER VIEW): 11.2 CM/S
SATURATED IRON: 18 % (ref 20–50)
SINUS: 2.39 CM
SODIUM SERPL-SCNC: 140 MMOL/L (ref 136–145)
SODIUM SERPL-SCNC: 143 MMOL/L (ref 136–145)
STJ: 1.95 CM
SYSTOLIC BLOOD PRESSURE: 140 MMHG
TDI LATERAL: 0.07 M/S
TDI SEPTAL: 0.05 M/S
TDI: 0.06 M/S
TOTAL IRON BINDING CAPACITY: 327 UG/DL (ref 250–450)
TR MAX PG: 47 MMHG
TRANSFERRIN SERPL-MCNC: 221 MG/DL (ref 200–375)
TRICUSPID ANNULAR PLANE SYSTOLIC EXCURSION: 2.6 CM
TRIGL SERPL-MCNC: 99 MG/DL (ref 30–150)
TROPONIN I SERPL DL<=0.01 NG/ML-MCNC: 0.02 NG/ML (ref 0–0.03)
TROPONIN I SERPL DL<=0.01 NG/ML-MCNC: 0.03 NG/ML (ref 0–0.03)
TROPONIN I SERPL DL<=0.01 NG/ML-MCNC: 0.04 NG/ML (ref 0–0.03)
TSH SERPL DL<=0.005 MIU/L-ACNC: 0.61 UIU/ML (ref 0.4–4)
TV REST PULMONARY ARTERY PRESSURE: 50 MMHG
VIT B12 SERPL-MCNC: 453 PG/ML (ref 210–950)
WBC # BLD AUTO: 10.24 K/UL (ref 3.9–12.7)

## 2021-11-04 PROCEDURE — G0378 HOSPITAL OBSERVATION PER HR: HCPCS

## 2021-11-04 PROCEDURE — 82728 ASSAY OF FERRITIN: CPT | Performed by: HOSPITALIST

## 2021-11-04 PROCEDURE — 82962 GLUCOSE BLOOD TEST: CPT

## 2021-11-04 PROCEDURE — 36415 COLL VENOUS BLD VENIPUNCTURE: CPT | Performed by: STUDENT IN AN ORGANIZED HEALTH CARE EDUCATION/TRAINING PROGRAM

## 2021-11-04 PROCEDURE — 99220 PR INITIAL OBSERVATION CARE,LEVL III: ICD-10-PCS | Mod: 25,,, | Performed by: INTERNAL MEDICINE

## 2021-11-04 PROCEDURE — 25000242 PHARM REV CODE 250 ALT 637 W/ HCPCS: Performed by: HOSPITALIST

## 2021-11-04 PROCEDURE — 83036 HEMOGLOBIN GLYCOSYLATED A1C: CPT | Performed by: HOSPITALIST

## 2021-11-04 PROCEDURE — 25000003 PHARM REV CODE 250: Performed by: HOSPITALIST

## 2021-11-04 PROCEDURE — 85025 COMPLETE CBC W/AUTO DIFF WBC: CPT | Performed by: HOSPITALIST

## 2021-11-04 PROCEDURE — 84100 ASSAY OF PHOSPHORUS: CPT | Performed by: HOSPITALIST

## 2021-11-04 PROCEDURE — 63600175 PHARM REV CODE 636 W HCPCS: Performed by: HOSPITALIST

## 2021-11-04 PROCEDURE — 84484 ASSAY OF TROPONIN QUANT: CPT | Mod: 91 | Performed by: HOSPITALIST

## 2021-11-04 PROCEDURE — 93010 ELECTROCARDIOGRAM REPORT: CPT | Mod: ,,, | Performed by: INTERNAL MEDICINE

## 2021-11-04 PROCEDURE — C9399 UNCLASSIFIED DRUGS OR BIOLOG: HCPCS | Performed by: HOSPITALIST

## 2021-11-04 PROCEDURE — 84132 ASSAY OF SERUM POTASSIUM: CPT | Performed by: HOSPITALIST

## 2021-11-04 PROCEDURE — 84466 ASSAY OF TRANSFERRIN: CPT | Performed by: HOSPITALIST

## 2021-11-04 PROCEDURE — 82607 VITAMIN B-12: CPT | Performed by: HOSPITALIST

## 2021-11-04 PROCEDURE — 83735 ASSAY OF MAGNESIUM: CPT | Performed by: HOSPITALIST

## 2021-11-04 PROCEDURE — 96376 TX/PRO/DX INJ SAME DRUG ADON: CPT

## 2021-11-04 PROCEDURE — 93010 EKG 12-LEAD: ICD-10-PCS | Mod: ,,, | Performed by: INTERNAL MEDICINE

## 2021-11-04 PROCEDURE — 63600175 PHARM REV CODE 636 W HCPCS: Performed by: EMERGENCY MEDICINE

## 2021-11-04 PROCEDURE — 82746 ASSAY OF FOLIC ACID SERUM: CPT | Performed by: HOSPITALIST

## 2021-11-04 PROCEDURE — 96375 TX/PRO/DX INJ NEW DRUG ADDON: CPT | Mod: 59 | Performed by: EMERGENCY MEDICINE

## 2021-11-04 PROCEDURE — 84443 ASSAY THYROID STIM HORMONE: CPT | Performed by: HOSPITALIST

## 2021-11-04 PROCEDURE — 96376 TX/PRO/DX INJ SAME DRUG ADON: CPT | Mod: 59 | Performed by: EMERGENCY MEDICINE

## 2021-11-04 PROCEDURE — 36415 COLL VENOUS BLD VENIPUNCTURE: CPT | Performed by: HOSPITALIST

## 2021-11-04 PROCEDURE — 80053 COMPREHEN METABOLIC PANEL: CPT | Mod: 91 | Performed by: HOSPITALIST

## 2021-11-04 PROCEDURE — 84484 ASSAY OF TROPONIN QUANT: CPT | Mod: 91 | Performed by: EMERGENCY MEDICINE

## 2021-11-04 PROCEDURE — 99220 PR INITIAL OBSERVATION CARE,LEVL III: CPT | Mod: 25,,, | Performed by: INTERNAL MEDICINE

## 2021-11-04 PROCEDURE — 63600175 PHARM REV CODE 636 W HCPCS: Performed by: INTERNAL MEDICINE

## 2021-11-04 PROCEDURE — 80053 COMPREHEN METABOLIC PANEL: CPT | Performed by: STUDENT IN AN ORGANIZED HEALTH CARE EDUCATION/TRAINING PROGRAM

## 2021-11-04 PROCEDURE — 80061 LIPID PANEL: CPT | Performed by: HOSPITALIST

## 2021-11-04 PROCEDURE — 93005 ELECTROCARDIOGRAM TRACING: CPT

## 2021-11-04 RX ORDER — REGADENOSON 0.08 MG/ML
0.4 INJECTION, SOLUTION INTRAVENOUS ONCE
Status: COMPLETED | OUTPATIENT
Start: 2021-11-04 | End: 2021-11-04

## 2021-11-04 RX ORDER — POTASSIUM CHLORIDE 20 MEQ/1
25 TABLET, EXTENDED RELEASE ORAL 2 TIMES DAILY
Qty: 60 TABLET | Refills: 1 | Status: ON HOLD | OUTPATIENT
Start: 2021-11-04 | End: 2023-06-27 | Stop reason: DRUGHIGH

## 2021-11-04 RX ORDER — HYDRALAZINE HYDROCHLORIDE 20 MG/ML
15 INJECTION INTRAMUSCULAR; INTRAVENOUS EVERY 4 HOURS PRN
Status: DISCONTINUED | OUTPATIENT
Start: 2021-11-04 | End: 2021-11-05 | Stop reason: HOSPADM

## 2021-11-04 RX ORDER — NAPROXEN SODIUM 220 MG/1
81 TABLET, FILM COATED ORAL DAILY
Status: DISCONTINUED | OUTPATIENT
Start: 2021-11-04 | End: 2021-11-05 | Stop reason: HOSPADM

## 2021-11-04 RX ORDER — INSULIN GLARGINE 300 U/ML
55 INJECTION, SOLUTION SUBCUTANEOUS NIGHTLY
Qty: 3 PEN | Refills: 6 | Status: ON HOLD
Start: 2021-11-04 | End: 2023-06-28 | Stop reason: HOSPADM

## 2021-11-04 RX ORDER — PANTOPRAZOLE SODIUM 40 MG/1
40 TABLET, DELAYED RELEASE ORAL DAILY
Status: DISCONTINUED | OUTPATIENT
Start: 2021-11-04 | End: 2021-11-05 | Stop reason: HOSPADM

## 2021-11-04 RX ORDER — GLUCAGON 1 MG
1 KIT INJECTION
Status: DISCONTINUED | OUTPATIENT
Start: 2021-11-04 | End: 2021-11-05 | Stop reason: HOSPADM

## 2021-11-04 RX ORDER — CETIRIZINE HYDROCHLORIDE 5 MG/1
5 TABLET ORAL DAILY
Status: DISCONTINUED | OUTPATIENT
Start: 2021-11-04 | End: 2021-11-05 | Stop reason: HOSPADM

## 2021-11-04 RX ORDER — ACETAMINOPHEN 325 MG/1
650 TABLET ORAL EVERY 4 HOURS PRN
Status: DISCONTINUED | OUTPATIENT
Start: 2021-11-04 | End: 2021-11-05 | Stop reason: HOSPADM

## 2021-11-04 RX ORDER — METOPROLOL TARTRATE 25 MG/1
25 TABLET, FILM COATED ORAL 2 TIMES DAILY
Status: DISCONTINUED | OUTPATIENT
Start: 2021-11-04 | End: 2021-11-05 | Stop reason: HOSPADM

## 2021-11-04 RX ORDER — LISINOPRIL 5 MG/1
10 TABLET ORAL DAILY
Status: DISCONTINUED | OUTPATIENT
Start: 2021-11-04 | End: 2021-11-04

## 2021-11-04 RX ORDER — LISINOPRIL 20 MG/1
20 TABLET ORAL DAILY
Status: DISCONTINUED | OUTPATIENT
Start: 2021-11-04 | End: 2021-11-05 | Stop reason: HOSPADM

## 2021-11-04 RX ORDER — MAGNESIUM SULFATE HEPTAHYDRATE 40 MG/ML
2 INJECTION, SOLUTION INTRAVENOUS ONCE
Status: COMPLETED | OUTPATIENT
Start: 2021-11-04 | End: 2021-11-04

## 2021-11-04 RX ORDER — ENOXAPARIN SODIUM 100 MG/ML
40 INJECTION SUBCUTANEOUS EVERY 24 HOURS
Status: DISCONTINUED | OUTPATIENT
Start: 2021-11-04 | End: 2021-11-05 | Stop reason: HOSPADM

## 2021-11-04 RX ORDER — LISINOPRIL 20 MG/1
20 TABLET ORAL DAILY
Qty: 30 TABLET | Refills: 1 | Status: ON HOLD | OUTPATIENT
Start: 2021-11-05 | End: 2024-01-18

## 2021-11-04 RX ORDER — SODIUM CHLORIDE 0.9 % (FLUSH) 0.9 %
10 SYRINGE (ML) INJECTION
Status: DISCONTINUED | OUTPATIENT
Start: 2021-11-04 | End: 2021-11-05 | Stop reason: HOSPADM

## 2021-11-04 RX ORDER — INSULIN ASPART 100 [IU]/ML
1-10 INJECTION, SOLUTION INTRAVENOUS; SUBCUTANEOUS
Status: DISCONTINUED | OUTPATIENT
Start: 2021-11-04 | End: 2021-11-05 | Stop reason: HOSPADM

## 2021-11-04 RX ORDER — FLUTICASONE PROPIONATE 50 MCG
2 SPRAY, SUSPENSION (ML) NASAL DAILY
Status: DISCONTINUED | OUTPATIENT
Start: 2021-11-04 | End: 2021-11-05 | Stop reason: HOSPADM

## 2021-11-04 RX ORDER — METOPROLOL TARTRATE 25 MG/1
25 TABLET, FILM COATED ORAL 2 TIMES DAILY
Qty: 60 TABLET | Refills: 1 | Status: ON HOLD | OUTPATIENT
Start: 2021-11-04 | End: 2023-06-27

## 2021-11-04 RX ORDER — IBUPROFEN 200 MG
16 TABLET ORAL
Status: DISCONTINUED | OUTPATIENT
Start: 2021-11-04 | End: 2021-11-05 | Stop reason: HOSPADM

## 2021-11-04 RX ORDER — BUMETANIDE 1 MG/1
2 TABLET ORAL DAILY
Status: DISCONTINUED | OUTPATIENT
Start: 2021-11-04 | End: 2021-11-05 | Stop reason: HOSPADM

## 2021-11-04 RX ORDER — HYDROXYZINE HYDROCHLORIDE 25 MG/1
25 TABLET, FILM COATED ORAL 3 TIMES DAILY PRN
Status: DISCONTINUED | OUTPATIENT
Start: 2021-11-04 | End: 2021-11-05 | Stop reason: HOSPADM

## 2021-11-04 RX ORDER — PANTOPRAZOLE SODIUM 20 MG/1
20 TABLET, DELAYED RELEASE ORAL DAILY
Status: DISCONTINUED | OUTPATIENT
Start: 2021-11-04 | End: 2021-11-04 | Stop reason: CLARIF

## 2021-11-04 RX ORDER — IBUPROFEN 200 MG
24 TABLET ORAL
Status: DISCONTINUED | OUTPATIENT
Start: 2021-11-04 | End: 2021-11-05 | Stop reason: HOSPADM

## 2021-11-04 RX ADMIN — REGADENOSON 0.4 MG: 0.08 INJECTION, SOLUTION INTRAVENOUS at 12:11

## 2021-11-04 RX ADMIN — MAGNESIUM SULFATE 2 G: 2 INJECTION INTRAVENOUS at 05:11

## 2021-11-04 RX ADMIN — INSULIN HUMAN 5 UNITS: 100 INJECTION, SOLUTION PARENTERAL at 01:11

## 2021-11-04 RX ADMIN — FLUTICASONE PROPIONATE 100 MCG: 50 SPRAY, METERED NASAL at 10:11

## 2021-11-04 RX ADMIN — BUMETANIDE 2 MG: 1 TABLET ORAL at 10:11

## 2021-11-04 RX ADMIN — CETIRIZINE HYDROCHLORIDE 5 MG: 5 TABLET ORAL at 10:11

## 2021-11-04 RX ADMIN — METOPROLOL TARTRATE 25 MG: 25 TABLET, FILM COATED ORAL at 10:11

## 2021-11-04 RX ADMIN — ASPIRIN 81 MG CHEWABLE TABLET 81 MG: 81 TABLET CHEWABLE at 10:11

## 2021-11-04 RX ADMIN — LISINOPRIL 20 MG: 20 TABLET ORAL at 10:11

## 2021-11-04 RX ADMIN — PANTOPRAZOLE SODIUM 40 MG: 40 TABLET, DELAYED RELEASE ORAL at 10:11

## 2021-11-04 RX ADMIN — INSULIN DETEMIR 20 UNITS: 100 INJECTION, SOLUTION SUBCUTANEOUS at 10:11

## 2021-11-04 RX ADMIN — INSULIN ASPART 6 UNITS: 100 INJECTION, SOLUTION INTRAVENOUS; SUBCUTANEOUS at 11:11

## 2021-12-10 ENCOUNTER — PATIENT OUTREACH (OUTPATIENT)
Dept: EMERGENCY MEDICINE | Facility: HOSPITAL | Age: 61
End: 2021-12-10
Payer: MEDICAID

## 2021-12-11 ENCOUNTER — HOSPITAL ENCOUNTER (EMERGENCY)
Facility: HOSPITAL | Age: 61
Discharge: HOME | End: 2021-12-11
Attending: EMERGENCY MEDICINE
Payer: MEDICAID

## 2021-12-11 VITALS
BODY MASS INDEX: 34.1 KG/M2 | TEMPERATURE: 98 F | HEART RATE: 81 BPM | OXYGEN SATURATION: 100 % | WEIGHT: 225 LBS | RESPIRATION RATE: 22 BRPM | HEIGHT: 68 IN | SYSTOLIC BLOOD PRESSURE: 153 MMHG | DIASTOLIC BLOOD PRESSURE: 68 MMHG

## 2021-12-11 DIAGNOSIS — E86.0 DEHYDRATION: ICD-10-CM

## 2021-12-11 DIAGNOSIS — R06.02 SOB (SHORTNESS OF BREATH): ICD-10-CM

## 2021-12-11 DIAGNOSIS — R73.9 HYPERGLYCEMIA: Primary | ICD-10-CM

## 2021-12-11 LAB
ALBUMIN SERPL BCP-MCNC: 3 G/DL (ref 3.5–5.2)
ALLENS TEST: ABNORMAL
ALP SERPL-CCNC: 152 U/L (ref 55–135)
ALT SERPL W/O P-5'-P-CCNC: 51 U/L (ref 10–44)
ANION GAP SERPL CALC-SCNC: 14 MMOL/L (ref 8–16)
AST SERPL-CCNC: 25 U/L (ref 10–40)
B-OH-BUTYR BLD STRIP-SCNC: 0.1 MMOL/L (ref 0–0.5)
BACTERIA #/AREA URNS HPF: ABNORMAL /HPF
BASOPHILS # BLD AUTO: 0.05 K/UL (ref 0–0.2)
BASOPHILS NFR BLD: 0.6 % (ref 0–1.9)
BILIRUB SERPL-MCNC: 0.2 MG/DL (ref 0.1–1)
BILIRUB UR QL STRIP: NEGATIVE
BNP SERPL-MCNC: <10 PG/ML (ref 0–99)
BUN SERPL-MCNC: 44 MG/DL (ref 6–20)
CALCIUM SERPL-MCNC: 9.4 MG/DL (ref 8.7–10.5)
CHLORIDE SERPL-SCNC: 103 MMOL/L (ref 95–110)
CLARITY UR: CLEAR
CO2 SERPL-SCNC: 20 MMOL/L (ref 23–29)
COLOR UR: COLORLESS
CREAT SERPL-MCNC: 1.4 MG/DL (ref 0.5–1.4)
DELSYS: ABNORMAL
DIFFERENTIAL METHOD: ABNORMAL
EOSINOPHIL # BLD AUTO: 0.1 K/UL (ref 0–0.5)
EOSINOPHIL NFR BLD: 0.8 % (ref 0–8)
ERYTHROCYTE [DISTWIDTH] IN BLOOD BY AUTOMATED COUNT: 12.4 % (ref 11.5–14.5)
EST. GFR  (AFRICAN AMERICAN): 47 ML/MIN/1.73 M^2
EST. GFR  (NON AFRICAN AMERICAN): 41 ML/MIN/1.73 M^2
GLUCOSE SERPL-MCNC: 298 MG/DL (ref 70–110)
GLUCOSE UR QL STRIP: ABNORMAL
HCO3 UR-SCNC: 16.2 MMOL/L (ref 24–28)
HCT VFR BLD AUTO: 38 % (ref 37–48.5)
HGB BLD-MCNC: 12.3 G/DL (ref 12–16)
HGB UR QL STRIP: NEGATIVE
IMM GRANULOCYTES # BLD AUTO: 0.03 K/UL (ref 0–0.04)
IMM GRANULOCYTES NFR BLD AUTO: 0.3 % (ref 0–0.5)
KETONES UR QL STRIP: NEGATIVE
LEUKOCYTE ESTERASE UR QL STRIP: ABNORMAL
LYMPHOCYTES # BLD AUTO: 3.2 K/UL (ref 1–4.8)
LYMPHOCYTES NFR BLD: 36.5 % (ref 18–48)
MAGNESIUM SERPL-MCNC: 1.8 MG/DL (ref 1.6–2.6)
MCH RBC QN AUTO: 26.6 PG (ref 27–31)
MCHC RBC AUTO-ENTMCNC: 32.4 G/DL (ref 32–36)
MCV RBC AUTO: 82 FL (ref 82–98)
MICROSCOPIC COMMENT: ABNORMAL
MONOCYTES # BLD AUTO: 0.7 K/UL (ref 0.3–1)
MONOCYTES NFR BLD: 8.3 % (ref 4–15)
NEUTROPHILS # BLD AUTO: 4.8 K/UL (ref 1.8–7.7)
NEUTROPHILS NFR BLD: 53.5 % (ref 38–73)
NITRITE UR QL STRIP: NEGATIVE
NRBC BLD-RTO: 0 /100 WBC
PCO2 BLDA: 30.1 MMHG (ref 35–45)
PH SMN: 7.34 [PH] (ref 7.35–7.45)
PH UR STRIP: 6 [PH] (ref 5–8)
PLATELET # BLD AUTO: 300 K/UL (ref 150–450)
PMV BLD AUTO: 10.8 FL (ref 9.2–12.9)
PO2 BLDA: 33 MMHG (ref 40–60)
POC BE: -9 MMOL/L
POC SATURATED O2: 60 % (ref 95–100)
POC TCO2: 17 MMOL/L (ref 24–29)
POCT GLUCOSE: 258 MG/DL (ref 70–110)
POCT GLUCOSE: 330 MG/DL (ref 70–110)
POCT GLUCOSE: 489 MG/DL (ref 70–110)
POTASSIUM SERPL-SCNC: 5.4 MMOL/L (ref 3.5–5.1)
PROT SERPL-MCNC: 7.3 G/DL (ref 6–8.4)
PROT UR QL STRIP: NEGATIVE
RBC # BLD AUTO: 4.63 M/UL (ref 4–5.4)
RBC #/AREA URNS HPF: 0 /HPF (ref 0–4)
SAMPLE: ABNORMAL
SITE: ABNORMAL
SODIUM SERPL-SCNC: 137 MMOL/L (ref 136–145)
SP GR UR STRIP: 1.01 (ref 1–1.03)
TROPONIN I SERPL DL<=0.01 NG/ML-MCNC: 0.01 NG/ML (ref 0–0.03)
URN SPEC COLLECT METH UR: ABNORMAL
UROBILINOGEN UR STRIP-ACNC: NEGATIVE EU/DL
WBC # BLD AUTO: 8.88 K/UL (ref 3.9–12.7)
WBC #/AREA URNS HPF: 65 /HPF (ref 0–5)
WBC CLUMPS URNS QL MICRO: ABNORMAL
YEAST URNS QL MICRO: ABNORMAL

## 2021-12-11 PROCEDURE — 83735 ASSAY OF MAGNESIUM: CPT | Performed by: EMERGENCY MEDICINE

## 2021-12-11 PROCEDURE — 87088 URINE BACTERIA CULTURE: CPT | Performed by: EMERGENCY MEDICINE

## 2021-12-11 PROCEDURE — 96361 HYDRATE IV INFUSION ADD-ON: CPT

## 2021-12-11 PROCEDURE — 83880 ASSAY OF NATRIURETIC PEPTIDE: CPT | Performed by: EMERGENCY MEDICINE

## 2021-12-11 PROCEDURE — 82962 GLUCOSE BLOOD TEST: CPT

## 2021-12-11 PROCEDURE — 63600175 PHARM REV CODE 636 W HCPCS: Performed by: EMERGENCY MEDICINE

## 2021-12-11 PROCEDURE — 84484 ASSAY OF TROPONIN QUANT: CPT | Performed by: EMERGENCY MEDICINE

## 2021-12-11 PROCEDURE — 93010 EKG 12-LEAD: ICD-10-PCS | Mod: ,,, | Performed by: INTERNAL MEDICINE

## 2021-12-11 PROCEDURE — 82010 KETONE BODYS QUAN: CPT | Performed by: EMERGENCY MEDICINE

## 2021-12-11 PROCEDURE — 93005 ELECTROCARDIOGRAM TRACING: CPT

## 2021-12-11 PROCEDURE — 93010 ELECTROCARDIOGRAM REPORT: CPT | Mod: ,,, | Performed by: INTERNAL MEDICINE

## 2021-12-11 PROCEDURE — 87186 SC STD MICRODIL/AGAR DIL: CPT | Performed by: EMERGENCY MEDICINE

## 2021-12-11 PROCEDURE — 25000003 PHARM REV CODE 250: Performed by: EMERGENCY MEDICINE

## 2021-12-11 PROCEDURE — 87086 URINE CULTURE/COLONY COUNT: CPT | Performed by: EMERGENCY MEDICINE

## 2021-12-11 PROCEDURE — 83605 ASSAY OF LACTIC ACID: CPT | Performed by: EMERGENCY MEDICINE

## 2021-12-11 PROCEDURE — 87077 CULTURE AEROBIC IDENTIFY: CPT | Performed by: EMERGENCY MEDICINE

## 2021-12-11 PROCEDURE — 82803 BLOOD GASES ANY COMBINATION: CPT

## 2021-12-11 PROCEDURE — 87040 BLOOD CULTURE FOR BACTERIA: CPT | Performed by: EMERGENCY MEDICINE

## 2021-12-11 PROCEDURE — 81000 URINALYSIS NONAUTO W/SCOPE: CPT | Performed by: EMERGENCY MEDICINE

## 2021-12-11 PROCEDURE — 85025 COMPLETE CBC W/AUTO DIFF WBC: CPT | Performed by: EMERGENCY MEDICINE

## 2021-12-11 PROCEDURE — 80053 COMPREHEN METABOLIC PANEL: CPT | Performed by: EMERGENCY MEDICINE

## 2021-12-11 PROCEDURE — 96374 THER/PROPH/DIAG INJ IV PUSH: CPT

## 2021-12-11 PROCEDURE — 99285 EMERGENCY DEPT VISIT HI MDM: CPT | Mod: 25

## 2021-12-11 PROCEDURE — 99900035 HC TECH TIME PER 15 MIN (STAT)

## 2021-12-11 RX ADMIN — INSULIN HUMAN 10 UNITS: 100 INJECTION, SOLUTION PARENTERAL at 02:12

## 2021-12-11 RX ADMIN — SODIUM CHLORIDE 1000 ML: 0.9 INJECTION, SOLUTION INTRAVENOUS at 03:12

## 2021-12-12 LAB — POCT GLUCOSE: >500 MG/DL (ref 70–110)

## 2021-12-13 ENCOUNTER — TELEPHONE (OUTPATIENT)
Dept: EMERGENCY MEDICINE | Facility: HOSPITAL | Age: 61
End: 2021-12-13
Payer: MEDICAID

## 2021-12-13 LAB — BACTERIA UR CULT: ABNORMAL

## 2021-12-13 RX ORDER — CEPHALEXIN 500 MG/1
500 CAPSULE ORAL 4 TIMES DAILY
Qty: 20 CAPSULE | Refills: 0 | Status: SHIPPED | OUTPATIENT
Start: 2021-12-13 | End: 2021-12-18

## 2021-12-15 LAB
BACTERIA BLD CULT: NORMAL
BACTERIA BLD CULT: NORMAL

## 2022-10-13 ENCOUNTER — HOSPITAL ENCOUNTER (OUTPATIENT)
Dept: RADIOLOGY | Facility: HOSPITAL | Age: 62
Discharge: HOME | End: 2022-10-13
Payer: MEDICAID

## 2022-10-13 DIAGNOSIS — Z12.31 VISIT FOR SCREENING MAMMOGRAM: ICD-10-CM

## 2022-10-13 PROCEDURE — 77063 MAMMO DIGITAL SCREENING BILAT WITH TOMO: ICD-10-PCS | Mod: 26,,, | Performed by: RADIOLOGY

## 2022-10-13 PROCEDURE — 77067 SCR MAMMO BI INCL CAD: CPT | Mod: 26,,, | Performed by: RADIOLOGY

## 2022-10-13 PROCEDURE — 77063 BREAST TOMOSYNTHESIS BI: CPT | Mod: TC

## 2022-10-13 PROCEDURE — 77063 BREAST TOMOSYNTHESIS BI: CPT | Mod: 26,,, | Performed by: RADIOLOGY

## 2022-10-13 PROCEDURE — 77067 MAMMO DIGITAL SCREENING BILAT WITH TOMO: ICD-10-PCS | Mod: 26,,, | Performed by: RADIOLOGY

## 2022-10-26 ENCOUNTER — HOSPITAL ENCOUNTER (OUTPATIENT)
Dept: RADIOLOGY | Facility: HOSPITAL | Age: 62
Discharge: HOME | End: 2022-10-26
Payer: MEDICAID

## 2022-10-26 DIAGNOSIS — M79.671 RIGHT FOOT PAIN: ICD-10-CM

## 2022-10-26 DIAGNOSIS — M79.672 LEFT FOOT PAIN: ICD-10-CM

## 2022-10-26 DIAGNOSIS — M72.2 PLANTAR FASCIAL FIBROMATOSIS: ICD-10-CM

## 2022-10-26 DIAGNOSIS — M72.2 PLANTAR FASCIAL FIBROMATOSIS: Primary | ICD-10-CM

## 2022-10-26 PROCEDURE — 73630 XR FOOT COMPLETE 3 VIEW RIGHT: ICD-10-PCS | Mod: 26,RT,, | Performed by: RADIOLOGY

## 2022-10-26 PROCEDURE — 73630 X-RAY EXAM OF FOOT: CPT | Mod: TC,FY,LT

## 2022-10-26 PROCEDURE — 73630 X-RAY EXAM OF FOOT: CPT | Mod: TC,FY,RT

## 2022-10-26 PROCEDURE — 73630 X-RAY EXAM OF FOOT: CPT | Mod: 26,RT,, | Performed by: RADIOLOGY

## 2022-10-26 PROCEDURE — 73630 X-RAY EXAM OF FOOT: CPT | Mod: 26,LT,, | Performed by: RADIOLOGY

## 2022-12-20 ENCOUNTER — HOSPITAL ENCOUNTER (EMERGENCY)
Facility: HOSPITAL | Age: 62
Discharge: HOME | End: 2022-12-20
Attending: STUDENT IN AN ORGANIZED HEALTH CARE EDUCATION/TRAINING PROGRAM
Payer: MEDICAID

## 2022-12-20 VITALS
SYSTOLIC BLOOD PRESSURE: 109 MMHG | DIASTOLIC BLOOD PRESSURE: 56 MMHG | TEMPERATURE: 100 F | OXYGEN SATURATION: 100 % | WEIGHT: 224.88 LBS | BODY MASS INDEX: 32.19 KG/M2 | HEIGHT: 70 IN | HEART RATE: 79 BPM | RESPIRATION RATE: 12 BRPM

## 2022-12-20 DIAGNOSIS — E04.9 ENLARGED THYROID: ICD-10-CM

## 2022-12-20 DIAGNOSIS — J18.9 PNEUMONIA OF RIGHT LUNG DUE TO INFECTIOUS ORGANISM, UNSPECIFIED PART OF LUNG: ICD-10-CM

## 2022-12-20 DIAGNOSIS — R07.9 CHEST PAIN: Primary | ICD-10-CM

## 2022-12-20 LAB
ALBUMIN SERPL BCP-MCNC: 3.4 G/DL (ref 3.5–5.2)
ALP SERPL-CCNC: 148 U/L (ref 55–135)
ALT SERPL W/O P-5'-P-CCNC: 96 U/L (ref 10–44)
ANION GAP SERPL CALC-SCNC: 16 MMOL/L (ref 8–16)
AST SERPL-CCNC: 117 U/L (ref 10–40)
BASOPHILS # BLD AUTO: 0.04 K/UL (ref 0–0.2)
BASOPHILS NFR BLD: 0.3 % (ref 0–1.9)
BILIRUB SERPL-MCNC: 0.2 MG/DL (ref 0.1–1)
BILIRUB UR QL STRIP: NEGATIVE
BNP SERPL-MCNC: <10 PG/ML (ref 0–99)
BUN SERPL-MCNC: 51 MG/DL (ref 6–20)
CALCIUM SERPL-MCNC: 9.4 MG/DL (ref 8.7–10.5)
CHLORIDE SERPL-SCNC: 105 MMOL/L (ref 95–110)
CLARITY UR: CLEAR
CO2 SERPL-SCNC: 19 MMOL/L (ref 23–29)
COLOR UR: COLORLESS
CREAT SERPL-MCNC: 1.3 MG/DL (ref 0.5–1.4)
CRP SERPL-MCNC: 6.2 MG/L (ref 0–8.2)
CTP QC/QA: YES
CTP QC/QA: YES
D DIMER PPP IA.FEU-MCNC: 1.08 MG/L FEU
DIFFERENTIAL METHOD: ABNORMAL
EOSINOPHIL # BLD AUTO: 0.1 K/UL (ref 0–0.5)
EOSINOPHIL NFR BLD: 1 % (ref 0–8)
ERYTHROCYTE [DISTWIDTH] IN BLOOD BY AUTOMATED COUNT: 12.7 % (ref 11.5–14.5)
EST. GFR  (NO RACE VARIABLE): 47 ML/MIN/1.73 M^2
GLUCOSE SERPL-MCNC: 200 MG/DL (ref 70–110)
GLUCOSE UR QL STRIP: NEGATIVE
HCT VFR BLD AUTO: 33.7 % (ref 37–48.5)
HGB BLD-MCNC: 10.9 G/DL (ref 12–16)
HGB UR QL STRIP: NEGATIVE
IMM GRANULOCYTES # BLD AUTO: 0.06 K/UL (ref 0–0.04)
IMM GRANULOCYTES NFR BLD AUTO: 0.4 % (ref 0–0.5)
KETONES UR QL STRIP: NEGATIVE
LACTATE SERPL-SCNC: 1.6 MMOL/L (ref 0.5–2.2)
LEUKOCYTE ESTERASE UR QL STRIP: NEGATIVE
LIPASE SERPL-CCNC: 74 U/L (ref 4–60)
LYMPHOCYTES # BLD AUTO: 1.2 K/UL (ref 1–4.8)
LYMPHOCYTES NFR BLD: 9 % (ref 18–48)
MAGNESIUM SERPL-MCNC: 1.7 MG/DL (ref 1.6–2.6)
MCH RBC QN AUTO: 26.5 PG (ref 27–31)
MCHC RBC AUTO-ENTMCNC: 32.3 G/DL (ref 32–36)
MCV RBC AUTO: 82 FL (ref 82–98)
MONOCYTES # BLD AUTO: 0.7 K/UL (ref 0.3–1)
MONOCYTES NFR BLD: 5.1 % (ref 4–15)
NEUTROPHILS # BLD AUTO: 11.3 K/UL (ref 1.8–7.7)
NEUTROPHILS NFR BLD: 84.2 % (ref 38–73)
NITRITE UR QL STRIP: NEGATIVE
NRBC BLD-RTO: 0 /100 WBC
PH UR STRIP: 5 [PH] (ref 5–8)
PLATELET # BLD AUTO: 288 K/UL (ref 150–450)
PMV BLD AUTO: 9.7 FL (ref 9.2–12.9)
POC MOLECULAR INFLUENZA A AGN: NEGATIVE
POC MOLECULAR INFLUENZA B AGN: NEGATIVE
POCT GLUCOSE: 190 MG/DL (ref 70–110)
POTASSIUM SERPL-SCNC: 4 MMOL/L (ref 3.5–5.1)
PROCALCITONIN SERPL IA-MCNC: 0.1 NG/ML
PROT SERPL-MCNC: 7.8 G/DL (ref 6–8.4)
PROT UR QL STRIP: NEGATIVE
RBC # BLD AUTO: 4.12 M/UL (ref 4–5.4)
RSV AG SPEC QL IA: NEGATIVE
SARS-COV-2 RDRP RESP QL NAA+PROBE: NEGATIVE
SODIUM SERPL-SCNC: 140 MMOL/L (ref 136–145)
SP GR UR STRIP: 1.01 (ref 1–1.03)
SPECIMEN SOURCE: NORMAL
T4 FREE SERPL-MCNC: 0.99 NG/DL (ref 0.71–1.51)
TROPONIN I SERPL DL<=0.01 NG/ML-MCNC: 0.01 NG/ML (ref 0–0.03)
TROPONIN I SERPL DL<=0.01 NG/ML-MCNC: <0.006 NG/ML (ref 0–0.03)
TSH SERPL DL<=0.005 MIU/L-ACNC: 0.14 UIU/ML (ref 0.4–4)
URN SPEC COLLECT METH UR: ABNORMAL
UROBILINOGEN UR STRIP-ACNC: NEGATIVE EU/DL
WBC # BLD AUTO: 13.39 K/UL (ref 3.9–12.7)

## 2022-12-20 PROCEDURE — 85025 COMPLETE CBC W/AUTO DIFF WBC: CPT | Performed by: STUDENT IN AN ORGANIZED HEALTH CARE EDUCATION/TRAINING PROGRAM

## 2022-12-20 PROCEDURE — 84439 ASSAY OF FREE THYROXINE: CPT | Performed by: STUDENT IN AN ORGANIZED HEALTH CARE EDUCATION/TRAINING PROGRAM

## 2022-12-20 PROCEDURE — 84484 ASSAY OF TROPONIN QUANT: CPT | Mod: 91 | Performed by: STUDENT IN AN ORGANIZED HEALTH CARE EDUCATION/TRAINING PROGRAM

## 2022-12-20 PROCEDURE — 84443 ASSAY THYROID STIM HORMONE: CPT | Performed by: STUDENT IN AN ORGANIZED HEALTH CARE EDUCATION/TRAINING PROGRAM

## 2022-12-20 PROCEDURE — 82962 GLUCOSE BLOOD TEST: CPT

## 2022-12-20 PROCEDURE — 81003 URINALYSIS AUTO W/O SCOPE: CPT | Performed by: STUDENT IN AN ORGANIZED HEALTH CARE EDUCATION/TRAINING PROGRAM

## 2022-12-20 PROCEDURE — 83690 ASSAY OF LIPASE: CPT | Performed by: STUDENT IN AN ORGANIZED HEALTH CARE EDUCATION/TRAINING PROGRAM

## 2022-12-20 PROCEDURE — 25500020 PHARM REV CODE 255: Performed by: STUDENT IN AN ORGANIZED HEALTH CARE EDUCATION/TRAINING PROGRAM

## 2022-12-20 PROCEDURE — 63700000 PHARM REV CODE 250 ALT 637 W/O HCPCS: Performed by: STUDENT IN AN ORGANIZED HEALTH CARE EDUCATION/TRAINING PROGRAM

## 2022-12-20 PROCEDURE — 25000003 PHARM REV CODE 250: Performed by: STUDENT IN AN ORGANIZED HEALTH CARE EDUCATION/TRAINING PROGRAM

## 2022-12-20 PROCEDURE — 87634 RSV DNA/RNA AMP PROBE: CPT | Performed by: STUDENT IN AN ORGANIZED HEALTH CARE EDUCATION/TRAINING PROGRAM

## 2022-12-20 PROCEDURE — 80053 COMPREHEN METABOLIC PANEL: CPT | Performed by: STUDENT IN AN ORGANIZED HEALTH CARE EDUCATION/TRAINING PROGRAM

## 2022-12-20 PROCEDURE — 87502 INFLUENZA DNA AMP PROBE: CPT

## 2022-12-20 PROCEDURE — 87040 BLOOD CULTURE FOR BACTERIA: CPT | Performed by: STUDENT IN AN ORGANIZED HEALTH CARE EDUCATION/TRAINING PROGRAM

## 2022-12-20 PROCEDURE — 85379 FIBRIN DEGRADATION QUANT: CPT | Performed by: STUDENT IN AN ORGANIZED HEALTH CARE EDUCATION/TRAINING PROGRAM

## 2022-12-20 PROCEDURE — 83735 ASSAY OF MAGNESIUM: CPT | Performed by: STUDENT IN AN ORGANIZED HEALTH CARE EDUCATION/TRAINING PROGRAM

## 2022-12-20 PROCEDURE — 99285 EMERGENCY DEPT VISIT HI MDM: CPT | Mod: 25

## 2022-12-20 PROCEDURE — 84145 PROCALCITONIN (PCT): CPT | Performed by: STUDENT IN AN ORGANIZED HEALTH CARE EDUCATION/TRAINING PROGRAM

## 2022-12-20 PROCEDURE — 86140 C-REACTIVE PROTEIN: CPT | Performed by: STUDENT IN AN ORGANIZED HEALTH CARE EDUCATION/TRAINING PROGRAM

## 2022-12-20 PROCEDURE — 25000003 PHARM REV CODE 250: Performed by: PHYSICIAN ASSISTANT

## 2022-12-20 PROCEDURE — 83605 ASSAY OF LACTIC ACID: CPT | Performed by: STUDENT IN AN ORGANIZED HEALTH CARE EDUCATION/TRAINING PROGRAM

## 2022-12-20 PROCEDURE — 87635 SARS-COV-2 COVID-19 AMP PRB: CPT | Performed by: STUDENT IN AN ORGANIZED HEALTH CARE EDUCATION/TRAINING PROGRAM

## 2022-12-20 PROCEDURE — 96360 HYDRATION IV INFUSION INIT: CPT | Mod: 59

## 2022-12-20 PROCEDURE — 83880 ASSAY OF NATRIURETIC PEPTIDE: CPT | Performed by: STUDENT IN AN ORGANIZED HEALTH CARE EDUCATION/TRAINING PROGRAM

## 2022-12-20 RX ORDER — AMOXICILLIN AND CLAVULANATE POTASSIUM 875; 125 MG/1; MG/1
1 TABLET, FILM COATED ORAL EVERY 12 HOURS
Qty: 10 TABLET | Refills: 0 | Status: SHIPPED | OUTPATIENT
Start: 2022-12-20 | End: 2022-12-25

## 2022-12-20 RX ORDER — AMOXICILLIN AND CLAVULANATE POTASSIUM 875; 125 MG/1; MG/1
1 TABLET, FILM COATED ORAL
Status: COMPLETED | OUTPATIENT
Start: 2022-12-20 | End: 2022-12-20

## 2022-12-20 RX ORDER — AZITHROMYCIN 250 MG/1
250 TABLET, FILM COATED ORAL DAILY
Qty: 4 TABLET | Refills: 0 | Status: SHIPPED | OUTPATIENT
Start: 2022-12-20 | End: 2022-12-24

## 2022-12-20 RX ORDER — AZITHROMYCIN 250 MG/1
500 TABLET, FILM COATED ORAL
Status: COMPLETED | OUTPATIENT
Start: 2022-12-20 | End: 2022-12-20

## 2022-12-20 RX ORDER — AZITHROMYCIN 250 MG/1
250 TABLET, FILM COATED ORAL DAILY
Qty: 4 TABLET | Refills: 0 | Status: SHIPPED | OUTPATIENT
Start: 2022-12-20 | End: 2022-12-20 | Stop reason: SDUPTHER

## 2022-12-20 RX ORDER — ONDANSETRON 4 MG/1
4 TABLET, ORALLY DISINTEGRATING ORAL
Status: COMPLETED | OUTPATIENT
Start: 2022-12-20 | End: 2022-12-20

## 2022-12-20 RX ORDER — BUMETANIDE 0.25 MG/ML
3 INJECTION INTRAMUSCULAR; INTRAVENOUS
Status: DISCONTINUED | OUTPATIENT
Start: 2022-12-20 | End: 2022-12-20

## 2022-12-20 RX ORDER — ACETAMINOPHEN 500 MG
1000 TABLET ORAL
Status: COMPLETED | OUTPATIENT
Start: 2022-12-20 | End: 2022-12-20

## 2022-12-20 RX ADMIN — AZITHROMYCIN MONOHYDRATE 500 MG: 250 TABLET ORAL at 05:12

## 2022-12-20 RX ADMIN — NITROGLYCERIN 1 INCH: 20 OINTMENT TOPICAL at 03:12

## 2022-12-20 RX ADMIN — ACETAMINOPHEN 1000 MG: 500 TABLET ORAL at 01:12

## 2022-12-20 RX ADMIN — AMOXICILLIN AND CLAVULANATE POTASSIUM 1 TABLET: 875; 125 TABLET ORAL at 05:12

## 2022-12-20 RX ADMIN — SODIUM CHLORIDE 1000 ML: 0.9 INJECTION, SOLUTION INTRAVENOUS at 05:12

## 2022-12-20 RX ADMIN — IOHEXOL 85 ML: 350 INJECTION, SOLUTION INTRAVENOUS at 04:12

## 2022-12-20 RX ADMIN — ONDANSETRON 4 MG: 4 TABLET, ORALLY DISINTEGRATING ORAL at 01:12

## 2022-12-20 NOTE — ED PROVIDER NOTES
Encounter Date: 12/20/2022    SCRIBE #1 NOTE: I, Gregorio Fierro, am scribing for, and in the presence of,  Phil Du MD.     History     Chief Complaint   Patient presents with    Chest Pain    Shortness of Breath    Vomiting     Patient brought in by EMS with chest pain and shortness of breath for the past 3 hours. Substernal pain, was 10/10 but now 8/10 after EMS interventions. Was given ASA and 1 spray of nitro. BP dropped and nitro discontinued. Patient began vomiting after moving to ER bed     Devika Bustillo is a 62 y.o. female with a PMHx of PE, asthma, DM, HTN, HLD who presents to the Emergency Department via EMS for evaluation of acute, constant, non radiating, mid sternal chest pain with associated right sided abdominal pain and chills that began 3 hours prior to arrival. Patient explains that her symptoms began while getting ready for work. She states that on her way home from work yesterday she noticed that she was short of breath. She endorses feeling short of breath currently. Patient is also complaining of a dry cough, congestion, and chronic bilateral leg swelling. She states that she became nauseated and vomited once upon arrival to the ED. Patient denies any dysuria, hematuria, black or bloody stool, hematochezia, hemoptysis, vaginal bleeding, vaginal discharge, or other associated symptoms. Per EMS, patient was given Asprin and one spray of nitro en route.      The history is provided by the patient and the EMS personnel.   Review of patient's allergies indicates:  No Known Allergies  Past Medical History:   Diagnosis Date    Asthma     Diabetes mellitus     Diabetes mellitus     Hyperlipidemia     Hypertension     Neuropathy     Pulmonary embolism 1998     Past Surgical History:   Procedure Laterality Date    CYST REMOVAL      HERNIA REPAIR      HYSTERECTOMY      TONSILLECTOMY      TUBAL LIGATION       Family History   Problem Relation Age of Onset    Kidney disease Mother     Kidney disease  Father     Stroke Maternal Uncle      Social History     Tobacco Use    Smoking status: Never    Smokeless tobacco: Never   Substance Use Topics    Alcohol use: No    Drug use: No     Review of Systems   Constitutional:  Positive for chills. Negative for fever.   HENT:  Positive for congestion. Negative for facial swelling and sore throat.    Eyes:  Negative for visual disturbance.   Respiratory:  Positive for cough and shortness of breath.    Cardiovascular:  Positive for chest pain and leg swelling. Negative for palpitations.   Gastrointestinal:  Positive for abdominal pain, nausea and vomiting. Negative for blood in stool, constipation and diarrhea.   Genitourinary:  Negative for dysuria and hematuria.   Musculoskeletal:  Negative for back pain.   Skin:  Negative for rash.   Neurological:  Negative for weakness and headaches.   Hematological:  Does not bruise/bleed easily.   Psychiatric/Behavioral: Negative.       Physical Exam     Initial Vitals [12/20/22 0129]   BP Pulse Resp Temp SpO2   110/70 103 18 (!) 100.4 °F (38 °C) 99 %      MAP       --         Physical Exam    Nursing note and vitals reviewed.  Constitutional: She appears well-developed and well-nourished. She is not diaphoretic. No distress.   Shivering on exam.   HENT:   Head: Normocephalic and atraumatic.   Right Ear: External ear normal.   Left Ear: External ear normal.   Nose: Nose normal.   Eyes: Conjunctivae are normal. No scleral icterus.   Neck: Neck supple. No tracheal deviation present.   Normal range of motion.  Cardiovascular:  Normal rate, regular rhythm and normal heart sounds.     Exam reveals no gallop and no friction rub.       No murmur heard.  Pulses:       Dorsalis pedis pulses are 2+ on the right side and 2+ on the left side.   Pulmonary/Chest: Breath sounds normal. No respiratory distress. She has no wheezes. She has no rhonchi. She has no rales.   Abdominal: Abdomen is soft. Bowel sounds are normal. There is abdominal tenderness  in the right lower quadrant and periumbilical area. There is no rebound and no guarding.   Musculoskeletal:      Cervical back: Normal range of motion and neck supple.      Comments: No leg edema noted.     Neurological: She is alert and oriented to person, place, and time.   Skin: Skin is warm and dry.   Psychiatric: She has a normal mood and affect. Thought content normal.       ED Course   Procedures  Labs Reviewed   CBC W/ AUTO DIFFERENTIAL - Abnormal; Notable for the following components:       Result Value    WBC 13.39 (*)     Hemoglobin 10.9 (*)     Hematocrit 33.7 (*)     MCH 26.5 (*)     Gran # (ANC) 11.3 (*)     Immature Grans (Abs) 0.06 (*)     Gran % 84.2 (*)     Lymph % 9.0 (*)     All other components within normal limits   COMPREHENSIVE METABOLIC PANEL - Abnormal; Notable for the following components:    CO2 19 (*)     Glucose 200 (*)     BUN 51 (*)     Albumin 3.4 (*)     Alkaline Phosphatase 148 (*)      (*)     ALT 96 (*)     eGFR 47 (*)     All other components within normal limits   D DIMER, QUANTITATIVE - Abnormal; Notable for the following components:    D-Dimer 1.08 (*)     All other components within normal limits   URINALYSIS, REFLEX TO URINE CULTURE - Abnormal; Notable for the following components:    Color, UA Colorless (*)     All other components within normal limits    Narrative:     Specimen Source->Urine   LIPASE - Abnormal; Notable for the following components:    Lipase 74 (*)     All other components within normal limits   CULTURE, BLOOD   CULTURE, BLOOD   TROPONIN I   B-TYPE NATRIURETIC PEPTIDE   LIPASE   MAGNESIUM   MAGNESIUM   LACTIC ACID, PLASMA   PROCALCITONIN   C-REACTIVE PROTEIN   C-REACTIVE PROTEIN   RSV ANTIGEN DETECTION   TROPONIN I   T4, FREE   TSH   TSH   T4, FREE   SARS-COV-2 RDRP GENE   POCT INFLUENZA A/B MOLECULAR   POCT GLUCOSE MONITORING CONTINUOUS          Imaging Results              CTA Chest Non-Coronary (PE Studies) (Final result)  Result time 12/20/22  05:06:28      Final result by Jaspal Kwon MD (12/20/22 05:06:28)                   Impression:      There is no large central saddle type pulmonary embolus, there is no additional evidence for pulmonary embolus on this exam.    The lungs demonstrate mild motion artifact and atelectatic change, there is mild ground-glass infiltrate on the right.    Enlarged heterogeneous appearance of the thyroid most notably involving the left thyroid lobe, clinical and historical correlation is needed to determine need for additional follow-up.    There is a finding of the left pelvis that may relate to the history of multi cystic left ovary, clinical and historical correlation is needed to determine need for further evaluation, if indicated ultrasound follow-up.    Additional findings as above.      Electronically signed by: Jaspal Kwon  Date:    12/20/2022  Time:    05:06               Narrative:    EXAMINATION:  CTA CHEST NON CORONARY (PE STUDIES); CT ABDOMEN PELVIS WITH CONTRAST    CLINICAL HISTORY:  Pulmonary embolism (PE) suspected, positive D-dimer;; RLQ abdominal pain (Age >= 14y);    TECHNIQUE:  CT examination of the chest abdomen pelvis was performed following the IV administration of 85 mL of Omnipaque 350.  Axial CT examination of the chest was performed via pulmonary arterial angiographic protocol.  Axial imaging, sagittal and coronal reconstruction imaging of the chest as well as axial MIP reconstruction imaging of the chest is submitted.  Single phase postcontrast axial CT examination of the abdomen and pelvis is submitted.  Sagittal and coronal reconstruction imaging submitted.  Oral contrast was not utilized.    COMPARISON:  CT examination of the chest November 3, 2021, CT examination of the abdomen pelvis March 11, 2019    FINDINGS:  The thyroid appears enlarged and heterogeneous, the left lobe more prominent than the right with some deviation of the trachea to the right.  This is not optimally evaluated  on this examination, as the examination was optimized for evaluation of the pulmonary arterial vasculature, this may relate to multinodular thyroid, clinical and historical correlation is needed to determine need for additional follow-up.    There is no large central saddle type pulmonary embolus.  The pulmonary arterial vascular demonstrate opacification, when accounting for artifact and abnormal pattern of pulmonary arterial filling defects specific for pulmonary emboli is not seen.    The thoracic aorta demonstrates appropriate opacification.  Atherosclerotic change noted.  There is no enlarged mediastinal or hilar adenopathy.  There is no evidence for pericardial effusion.    The lungs demonstrate mild motion artifact and atelectatic change.  Mild ground-glass infiltrate on the right.  There is no evidence for pleural effusion, there is no evidence for pneumothorax.    There is appearance of mild wall prominence along the mid to distal stomach, nonspecific, clinical and historical correlation is needed to determine need for additional follow-up.    When accounting for limitations of the examination, there is no evidence for acute process of the liver, gallbladder, pancreas, spleen, or adrenal glands.    There is no evidence for hydronephrosis or obstructive uropathy or perinephric inflammatory change bilaterally.  The arterial vascular structures including the abdominal aorta demonstrate atherosclerotic change.  The abdominal aorta demonstrates appropriate opacification.  The urinary bladder appears unremarkable for degree of distention.  The patient appears status post hysterectomy.    There is a finding of the left pelvis as seen on axial image 120 measuring approximately 5.3 x 2.9 cm in size, this appears similar to the prior CT examination, with areas of diminished attenuation, as previously noted, this may relate to the left ovary and may relate to the patient's history of multi cystic left ovary.    There  is postoperative change of the lower anterior abdominopelvic wall.  There is no evidence for small bowel obstructive process.  The appendix is identified, it does not appear inflamed.  There is no evidence for inflammatory or obstructive process of the colon.  There is no evidence for free intraperitoneal air.    The visualized osseous structures appear intact with chronic change noted.                                       CT Abdomen Pelvis With Contrast (Final result)  Result time 12/20/22 05:06:28      Final result by Jaspal Kwon MD (12/20/22 05:06:28)                   Impression:      There is no large central saddle type pulmonary embolus, there is no additional evidence for pulmonary embolus on this exam.    The lungs demonstrate mild motion artifact and atelectatic change, there is mild ground-glass infiltrate on the right.    Enlarged heterogeneous appearance of the thyroid most notably involving the left thyroid lobe, clinical and historical correlation is needed to determine need for additional follow-up.    There is a finding of the left pelvis that may relate to the history of multi cystic left ovary, clinical and historical correlation is needed to determine need for further evaluation, if indicated ultrasound follow-up.    Additional findings as above.      Electronically signed by: Jaspal Kwon  Date:    12/20/2022  Time:    05:06               Narrative:    EXAMINATION:  CTA CHEST NON CORONARY (PE STUDIES); CT ABDOMEN PELVIS WITH CONTRAST    CLINICAL HISTORY:  Pulmonary embolism (PE) suspected, positive D-dimer;; RLQ abdominal pain (Age >= 14y);    TECHNIQUE:  CT examination of the chest abdomen pelvis was performed following the IV administration of 85 mL of Omnipaque 350.  Axial CT examination of the chest was performed via pulmonary arterial angiographic protocol.  Axial imaging, sagittal and coronal reconstruction imaging of the chest as well as axial MIP reconstruction imaging of the  chest is submitted.  Single phase postcontrast axial CT examination of the abdomen and pelvis is submitted.  Sagittal and coronal reconstruction imaging submitted.  Oral contrast was not utilized.    COMPARISON:  CT examination of the chest November 3, 2021, CT examination of the abdomen pelvis March 11, 2019    FINDINGS:  The thyroid appears enlarged and heterogeneous, the left lobe more prominent than the right with some deviation of the trachea to the right.  This is not optimally evaluated on this examination, as the examination was optimized for evaluation of the pulmonary arterial vasculature, this may relate to multinodular thyroid, clinical and historical correlation is needed to determine need for additional follow-up.    There is no large central saddle type pulmonary embolus.  The pulmonary arterial vascular demonstrate opacification, when accounting for artifact and abnormal pattern of pulmonary arterial filling defects specific for pulmonary emboli is not seen.    The thoracic aorta demonstrates appropriate opacification.  Atherosclerotic change noted.  There is no enlarged mediastinal or hilar adenopathy.  There is no evidence for pericardial effusion.    The lungs demonstrate mild motion artifact and atelectatic change.  Mild ground-glass infiltrate on the right.  There is no evidence for pleural effusion, there is no evidence for pneumothorax.    There is appearance of mild wall prominence along the mid to distal stomach, nonspecific, clinical and historical correlation is needed to determine need for additional follow-up.    When accounting for limitations of the examination, there is no evidence for acute process of the liver, gallbladder, pancreas, spleen, or adrenal glands.    There is no evidence for hydronephrosis or obstructive uropathy or perinephric inflammatory change bilaterally.  The arterial vascular structures including the abdominal aorta demonstrate atherosclerotic change.  The  abdominal aorta demonstrates appropriate opacification.  The urinary bladder appears unremarkable for degree of distention.  The patient appears status post hysterectomy.    There is a finding of the left pelvis as seen on axial image 120 measuring approximately 5.3 x 2.9 cm in size, this appears similar to the prior CT examination, with areas of diminished attenuation, as previously noted, this may relate to the left ovary and may relate to the patient's history of multi cystic left ovary.    There is postoperative change of the lower anterior abdominopelvic wall.  There is no evidence for small bowel obstructive process.  The appendix is identified, it does not appear inflamed.  There is no evidence for inflammatory or obstructive process of the colon.  There is no evidence for free intraperitoneal air.    The visualized osseous structures appear intact with chronic change noted.                                       X-Ray Chest PA And Lateral (Final result)  Result time 12/20/22 02:48:36      Final result by Jaspal Kwon MD (12/20/22 02:48:36)                   Impression:      Mild accentuation of interstitial markings, as above.      Electronically signed by: Jaspal Kwon  Date:    12/20/2022  Time:    02:48               Narrative:    EXAMINATION:  XR CHEST PA AND LATERAL    CLINICAL HISTORY:  Chest Pain;    TECHNIQUE:  PA and lateral views of the chest were performed.    COMPARISON:  Chest radiograph December 11, 2021    FINDINGS:  Two views of the chest are submitted.  The cardiomediastinal silhouette appears stable.  There is subtle appearance of accentuation of interstitial markings this may relate to a mild pattern of interstitial infiltrate or edema.  There is no dense consolidation, significant pleural effusion or pneumothorax.  The osseous structures demonstrate chronic change.                                       Medications   sodium chloride 0.9% bolus 1,000 mL 1,000 mL (1,000 mLs  Intravenous New Bag 12/20/22 0520)   ondansetron disintegrating tablet 4 mg (4 mg Oral Given 12/20/22 0127)   acetaminophen tablet 1,000 mg (1,000 mg Oral Given 12/20/22 0155)   nitroGLYCERIN 2% TD oint ointment 1 inch (1 inch Topical (Top) Given 12/20/22 0321)   iohexoL (OMNIPAQUE 350) injection 85 mL (85 mLs Intravenous Given 12/20/22 0404)   amoxicillin-clavulanate 875-125mg per tablet 1 tablet (1 tablet Oral Given 12/20/22 0523)   azithromycin tablet 500 mg (500 mg Oral Given 12/20/22 0523)     Medical Decision Making:   History:   Old Medical Records: I decided to obtain old medical records.  Independently Interpreted Test(s):   I have ordered and independently interpreted EKG Reading(s) - see prior notes  Clinical Tests:   Lab Tests: Reviewed and Ordered  Radiological Study: Ordered and Reviewed  Medical Tests: Reviewed and Ordered        Scribe Attestation:   Scribe #1: I performed the above scribed service and the documentation accurately describes the services I performed. I attest to the accuracy of the note.      ED Course as of 12/20/22 0601   Tue Dec 20, 2022   0144 Echo 11/2021:· The left ventricle is normal in size with normal systolic function.  · The estimated ejection fraction is 65%.  · Grade I left ventricular diastolic dysfunction.  · Normal right ventricular size with normal right ventricular systolic function.  · Mild left atrial enlargement.  · Normal central venous pressure (3 mmHg).  · The estimated PA systolic pressure is 50 mmHg.  · There is pulmonary hypertension.      [CC]   0152 EKG: Rate 117, regular rhythm, sinus rhythm, intervals within normal limits, no ST elevations depressions noted, sinus tachycardia. [CC]   0513 CTA Chest Non-Coronary (PE Studies)     Impression:     There is no large central saddle type pulmonary embolus, there is no additional evidence for pulmonary embolus on this exam.     The lungs demonstrate mild motion artifact and atelectatic change, there is mild  ground-glass infiltrate on the right.     Enlarged heterogeneous appearance of the thyroid most notably involving the left thyroid lobe, clinical and historical correlation is needed to determine need for additional follow-up.     There is a finding of the left pelvis that may relate to the history of multi cystic left ovary, clinical and historical correlation is needed to determine need for further evaluation, if indicated ultrasound follow-up.     Additional findings as above.    [CC]   0516 Heart score 3   [CC]   0557 Patient presenting to the emergency department for evaluation of chest pain, shortness of breath and mild abdominal cramping.  Abdominal pain is improved.  Chest pain improved.  Patient with a fever noted.  Patient has a history of a pulmonary embolism.  Elevated D-dimer.  CTA shows no evidence of pulmonary embolism.  There is evidence of ground-glass opacities in the right lung concerning for possible pneumonia.  I am going to treat patient for pneumonia.  She is low risk.  Satting 100% on room air.  No lactic acidosis.  Mild leukocytosis.  Creatinine normal.  Lipase mildly elevated but not indicative of acute pancreatitis.  Procalcitonin is normal.  BNP is normal.  Doubt CHF exacerbation.  Patient's heart rate improved.  Blood pressure improved.  Will send patient to Endocrinology for evaluation of enlarged thyroid noted on CT.  Patient counseled on need to follow back up with OBGYN for concern of multicystic left ovary noted on CT.  Patient without significant left abdominal pain.  Plan to treat community acquired pneumonia with Augmentin and azithromycin.  Patient counseled on the need to follow up with her doctor in 2 days for re-evaluation.  Patient's UA is negative.  COVID and flu were negative.  Strict return precautions given. I discussed with the patient/family the diagnosis, treatment plan, indications for return to the emergency department, and for expected follow-up. The patient/family  verbalized an understanding. The patient/family is asked if there are any questions or concerns. We discuss the case, until all issues are addressed to the patient/family's satisfaction. Patient/family understands and is agreeable to the plan. Patient is stable and ready for discharge.      [CC]      ED Course User Index  [CC] Phil Du MD                 Clinical Impression:   Final diagnoses:  [R07.9] Chest pain (Primary)  [J18.9] Pneumonia of right lung due to infectious organism, unspecified part of lung  [E04.9] Enlarged thyroid        ED Disposition Condition    Discharge Stable          ED Prescriptions       Medication Sig Dispense Start Date End Date Auth. Provider    amoxicillin-clavulanate 875-125mg (AUGMENTIN) 875-125 mg per tablet Take 1 tablet by mouth every 12 (twelve) hours. for 5 days 10 tablet 12/20/2022 12/25/2022 Phil Du MD    azithromycin (Z-WERNER) 250 MG tablet  (Status: Discontinued) Take 1 tablet (250 mg total) by mouth once daily. Take first 2 tablets together, then 1 every day until finished. for 4 days 4 tablet 12/20/2022 12/20/2022 Phil Du MD    azithromycin (Z-WERNER) 250 MG tablet Take 1 tablet (250 mg total) by mouth once daily. for 4 days 4 tablet 12/20/2022 12/24/2022 Phil Du MD          Follow-up Information       Follow up With Specialties Details Why Contact Info    OBGYN   To further investigate cystic left ovary.     Sherly Elizalde, NP-C Urgent Care Schedule an appointment as soon as possible for a visit in 2 days  [address] [facility] [address]  [phone]      [facility] Emergency Medicine Go to  If symptoms worsen [address] [place] [address] [phone]          I, Phil Du MD, personally performed the services described in this documentation. All medical record entries made by the scribe were at my direction and in my presence. I have  reviewed the chart and agree that the record reflects my personal performance and is accurate and complete.       Phil Du MD  12/20/22 0601

## 2022-12-20 NOTE — DISCHARGE INSTRUCTIONS
I have referred you to Endocrinology to evaluate your enlarged thyroid noted on CT today.  Please follow-up with OBGYN to further evaluate the cystic ovary noted on CT today.  Take medication as prescribed.  Return to the ER with any new or worsening symptoms.  Take a 1000 mg of Tylenol every 6 hours for fevers over 100.4.  Please follow-up with your doctor in 2 days for re-evaluation.  Return to the ER with any new or worsening symptoms.      Thank you for coming to our Emergency Department today. It is important to remember that some problems or medical conditions are difficult to diagnose and may not be found or addressed during your Emergency Department visit.     Be sure to follow up with your primary care doctor and review all labs/imaging/tests that were performed during your ER visit with them. Some labs/imaging/tests may be outside of the normal range, and require non-emergent follow-up and/or further investigation/treatment/procedures/testing to help diagnose/exclude/prevent complications or other potentially serious medical conditions that were not discussed or addressed during your ER visit.    If you do not have a primary care doctor, you may contact the one listed on your discharge paperwork or you may also call the [facility] Appointment Desk at [phone] to schedule an appointment and establish care with one. It is important to your health that you have a primary care doctor.    Please take all medications as directed. All medications may potentially have side-effects and it is impossible to predict which medications may give you side-effects or what side-effects (if any) they will give you.. If you feel that you are having a negative effect or side-effect of any medication you should immediately stop taking them and seek medical attention. If you feel that you are having a life-threatening reaction call 911.    Return to the ER with any questions/concerns, new/concerning symptoms, worsening  or failure to improve.     Do not drive, swim, climb to height, take a bath, operate heavy machinery, drink alcohol or take potentially sedating medications, sign any legal documents or make any important decisions for 24 hours if you have received any pain medications, sedatives or mood altering drugs during your ER visit or within 24 hours of taking them if they have been prescribed to you.     You can find additional resources for Dentists, hearing aids, durable medical equipment, low cost pharmacies and other resources at [email or web address]    BELOW THIS LINE ONLY APPLIES IF YOU HAVE A COVID TEST PENDING OR IF YOU HAVE BEEN DIAGNOSED WITH COVID:  Please access [facility] to review the results of your test. Until the results of your COVID test return, you should isolate yourself so as not to potentially spread illness to others.   If your COVID test returns positive, you should isolate yourself so as not to spread illness to others. After five full days, if you are feeling better and you have not had fever for 24 hours, you can return to your typical daily activities, but you must wear a mask around others for an additional 5 days.   If your COVID test returns negative and you are either unvaccinated or more than six months out from your two-dose vaccine and are not yet boosted, you should still quarantine for 5 full days followed by strict mask use for an additional 5 full days.   If your COVID test returns negative and you have received your 2-dose initial vaccine as well as a booster, you should continue strict mask use for 10 full days after the exposure.  For all those exposed, best practice includes a test at day 5 after the exposure. This can be a home test or a test through one of the many testing centers throughout our community.   Masking is always advised to limit the spread of COVID. Cdc.gov is an excellent site to obtain the latest up to date recommendations regarding COVID and COVID testing.      CDC Testing and Quarantine Guidelines for patients with exposure to a known-positive COVID-19 person:  A close exposure is defined as anyone who has had an exposure (masked or unmasked) to a known COVID -19 positive person within 6 feet of someone for a cumulative total of 15 minutes or more over a 24-hour period.   Vaccinated and/or if you recently had a positive covid test within 90 days do NOT need to quarantine after contact with someone who had COVID-19 unless you develop symptoms.   Fully vaccinated people who have not had a positive test within 90 days, should get tested 3-5 days after their exposure, even if they don't have symptoms and wear a mask indoors in public for 14 days following exposure or until their test result is negative.      Unvaccinated and/or NOT had a positive test within 90 days and meet close exposure  You are required by CDC guidelines to quarantine for at least 5 days from time of exposure followed by 5 days of strict masking. It is recommended, but not required to test after 5 days, unless you develop symptoms, in which case you should test at that time.  If you get tested after 5 days and your test is positive, your 5 day period of isolation starts the day of the positive test.    If your exposure does not meet the above definition, you can return to your normal daily activities to include social distancing, wearing a mask and frequent handwashing.      Here is a link to guidance from the CDC:  https://www.cdc.gov/media/releases/2021/s1227-isolation-quarantine-guidance.html      [facility] Testing Sites:  https://ldh.la.gov/page/3934      [facility] website with testing locations and guidance:  [email or web address]

## 2022-12-24 LAB
BACTERIA BLD CULT: NORMAL
BACTERIA BLD CULT: NORMAL

## 2023-01-26 ENCOUNTER — HOSPITAL ENCOUNTER (EMERGENCY)
Facility: HOSPITAL | Age: 63
Discharge: HOME | End: 2023-01-26
Attending: EMERGENCY MEDICINE
Payer: MEDICAID

## 2023-01-26 VITALS
OXYGEN SATURATION: 100 % | SYSTOLIC BLOOD PRESSURE: 218 MMHG | HEART RATE: 74 BPM | BODY MASS INDEX: 35.61 KG/M2 | TEMPERATURE: 98 F | DIASTOLIC BLOOD PRESSURE: 86 MMHG | RESPIRATION RATE: 13 BRPM | WEIGHT: 235 LBS | HEIGHT: 68 IN

## 2023-01-26 DIAGNOSIS — B34.9 VIRAL SYNDROME: ICD-10-CM

## 2023-01-26 DIAGNOSIS — R21 RASH AND NONSPECIFIC SKIN ERUPTION: ICD-10-CM

## 2023-01-26 DIAGNOSIS — R06.02 SOB (SHORTNESS OF BREATH): Primary | ICD-10-CM

## 2023-01-26 LAB
ALBUMIN SERPL BCP-MCNC: 3.2 G/DL (ref 3.5–5.2)
ALP SERPL-CCNC: 133 U/L (ref 55–135)
ALT SERPL W/O P-5'-P-CCNC: 28 U/L (ref 10–44)
ANION GAP SERPL CALC-SCNC: 15 MMOL/L (ref 8–16)
ANION GAP SERPL CALC-SCNC: 8 MMOL/L (ref 8–16)
AST SERPL-CCNC: 24 U/L (ref 10–40)
BASOPHILS # BLD AUTO: 0.04 K/UL (ref 0–0.2)
BASOPHILS NFR BLD: 0.5 % (ref 0–1.9)
BILIRUB SERPL-MCNC: 0.2 MG/DL (ref 0.1–1)
BNP SERPL-MCNC: <10 PG/ML (ref 0–99)
BUN SERPL-MCNC: 29 MG/DL (ref 6–30)
BUN SERPL-MCNC: 30 MG/DL (ref 6–20)
CALCIUM SERPL-MCNC: 9.2 MG/DL (ref 8.7–10.5)
CHLORIDE SERPL-SCNC: 109 MMOL/L (ref 95–110)
CHLORIDE SERPL-SCNC: 110 MMOL/L (ref 95–110)
CO2 SERPL-SCNC: 20 MMOL/L (ref 23–29)
CREAT SERPL-MCNC: 1 MG/DL (ref 0.5–1.4)
CREAT SERPL-MCNC: 1.1 MG/DL (ref 0.5–1.4)
CTP QC/QA: YES
CTP QC/QA: YES
DIFFERENTIAL METHOD: ABNORMAL
EOSINOPHIL # BLD AUTO: 0.5 K/UL (ref 0–0.5)
EOSINOPHIL NFR BLD: 5.4 % (ref 0–8)
ERYTHROCYTE [DISTWIDTH] IN BLOOD BY AUTOMATED COUNT: 13.3 % (ref 11.5–14.5)
EST. GFR  (NO RACE VARIABLE): 58 ML/MIN/1.73 M^2
GLUCOSE SERPL-MCNC: 72 MG/DL (ref 70–110)
GLUCOSE SERPL-MCNC: 77 MG/DL (ref 70–110)
HCT VFR BLD AUTO: 32.1 % (ref 37–48.5)
HCT VFR BLD CALC: 35 %PCV (ref 36–54)
HGB BLD-MCNC: 10.5 G/DL (ref 12–16)
IMM GRANULOCYTES # BLD AUTO: 0.03 K/UL (ref 0–0.04)
IMM GRANULOCYTES NFR BLD AUTO: 0.3 % (ref 0–0.5)
LYMPHOCYTES # BLD AUTO: 3.2 K/UL (ref 1–4.8)
LYMPHOCYTES NFR BLD: 36.4 % (ref 18–48)
MCH RBC QN AUTO: 26.8 PG (ref 27–31)
MCHC RBC AUTO-ENTMCNC: 32.7 G/DL (ref 32–36)
MCV RBC AUTO: 82 FL (ref 82–98)
MONOCYTES # BLD AUTO: 0.6 K/UL (ref 0.3–1)
MONOCYTES NFR BLD: 7.3 % (ref 4–15)
NEUTROPHILS # BLD AUTO: 4.4 K/UL (ref 1.8–7.7)
NEUTROPHILS NFR BLD: 50.1 % (ref 38–73)
NRBC BLD-RTO: 0 /100 WBC
PLATELET # BLD AUTO: 282 K/UL (ref 150–450)
PMV BLD AUTO: 9.6 FL (ref 9.2–12.9)
POC IONIZED CALCIUM: 1.33 MMOL/L (ref 1.06–1.42)
POC MOLECULAR INFLUENZA A AGN: NEGATIVE
POC MOLECULAR INFLUENZA B AGN: NEGATIVE
POC TCO2 (MEASURED): 23 MMOL/L (ref 23–29)
POTASSIUM BLD-SCNC: 4.9 MMOL/L (ref 3.5–5.1)
POTASSIUM SERPL-SCNC: 5.5 MMOL/L (ref 3.5–5.1)
PROT SERPL-MCNC: 7.6 G/DL (ref 6–8.4)
RBC # BLD AUTO: 3.92 M/UL (ref 4–5.4)
SAMPLE: ABNORMAL
SARS-COV-2 RDRP RESP QL NAA+PROBE: NEGATIVE
SODIUM BLD-SCNC: 142 MMOL/L (ref 136–145)
SODIUM SERPL-SCNC: 138 MMOL/L (ref 136–145)
TROPONIN I SERPL DL<=0.01 NG/ML-MCNC: <0.006 NG/ML (ref 0–0.03)
WBC # BLD AUTO: 8.68 K/UL (ref 3.9–12.7)

## 2023-01-26 PROCEDURE — 82330 ASSAY OF CALCIUM: CPT

## 2023-01-26 PROCEDURE — 84295 ASSAY OF SERUM SODIUM: CPT

## 2023-01-26 PROCEDURE — 82565 ASSAY OF CREATININE: CPT | Mod: 59

## 2023-01-26 PROCEDURE — 25000003 PHARM REV CODE 250

## 2023-01-26 PROCEDURE — 84132 ASSAY OF SERUM POTASSIUM: CPT | Mod: 59

## 2023-01-26 PROCEDURE — 99285 EMERGENCY DEPT VISIT HI MDM: CPT | Mod: 25

## 2023-01-26 PROCEDURE — 80053 COMPREHEN METABOLIC PANEL: CPT

## 2023-01-26 PROCEDURE — 85025 COMPLETE CBC W/AUTO DIFF WBC: CPT

## 2023-01-26 PROCEDURE — 83880 ASSAY OF NATRIURETIC PEPTIDE: CPT

## 2023-01-26 PROCEDURE — 84484 ASSAY OF TROPONIN QUANT: CPT

## 2023-01-26 PROCEDURE — 93010 EKG 12-LEAD: ICD-10-PCS | Mod: ,,, | Performed by: INTERNAL MEDICINE

## 2023-01-26 PROCEDURE — 85014 HEMATOCRIT: CPT

## 2023-01-26 PROCEDURE — 87502 INFLUENZA DNA AMP PROBE: CPT

## 2023-01-26 PROCEDURE — 80048 BASIC METABOLIC PNL TOTAL CA: CPT | Mod: XB

## 2023-01-26 PROCEDURE — 99900035 HC TECH TIME PER 15 MIN (STAT)

## 2023-01-26 PROCEDURE — 87635 SARS-COV-2 COVID-19 AMP PRB: CPT | Performed by: NURSE PRACTITIONER

## 2023-01-26 PROCEDURE — 93005 ELECTROCARDIOGRAM TRACING: CPT

## 2023-01-26 PROCEDURE — 93010 ELECTROCARDIOGRAM REPORT: CPT | Mod: ,,, | Performed by: INTERNAL MEDICINE

## 2023-01-26 RX ORDER — GUAIFENESIN 100 MG/5ML
200 SOLUTION ORAL EVERY 4 HOURS PRN
Qty: 118 ML | Refills: 0 | Status: SHIPPED | OUTPATIENT
Start: 2023-01-26 | End: 2023-02-05

## 2023-01-26 RX ORDER — CLONIDINE HYDROCHLORIDE 0.1 MG/1
0.1 TABLET ORAL
Status: COMPLETED | OUTPATIENT
Start: 2023-01-26 | End: 2023-01-26

## 2023-01-26 RX ORDER — ACETAMINOPHEN 500 MG
500 TABLET ORAL EVERY 4 HOURS PRN
Qty: 30 TABLET | Refills: 0 | Status: ON HOLD | OUTPATIENT
Start: 2023-01-26 | End: 2024-01-18

## 2023-01-26 RX ORDER — HYDROCORTISONE 25 MG/G
CREAM TOPICAL 2 TIMES DAILY
Qty: 3.5 G | Refills: 0 | Status: ON HOLD | OUTPATIENT
Start: 2023-01-26 | End: 2023-06-27

## 2023-01-26 RX ADMIN — CLONIDINE HYDROCHLORIDE 0.1 MG: 0.1 TABLET ORAL at 12:01

## 2023-01-26 NOTE — ED PROVIDER NOTES
Encounter Date: 1/26/2023       History     Chief Complaint   Patient presents with    Shortness of Breath     SOB for several months with no improvements, using 2 different inhalers with no relief. Also has raised rash for past 3 days to neck, BUE & BLE that itches     62-year-old female with past medical history of diabetes type 2, hypertension presents to ED complaining of multiple complaints.  She reports a one-week history of chills, nonproductive cough, nasal congestion.  She also reports a 3 day history of rash with associated itching to her bilateral upper extremities and lower extremities.  She was advised that this was due to eczema in the past and was prescribed a topical cream with transient relief.  She also reports shortness of breath for years.  She denies any recent long distance travel, surgeries, or leg swelling.  She also reports a 2 month history of black stools.  She also reports 3 day history of diarrhea.  She denies any diarrhea today.  She presented to this facility on 12/20/2022 where she was diagnosed with pneumonia prescribed Augmentin and azithromycin.  Patient denies any fever, otalgia, sore throat, chest pain, abdominal pain, nausea, vomiting, dysuria, hematuria.  No other symptoms reported.    The history is provided by the patient. No  was used.   Review of patient's allergies indicates:  No Known Allergies  Past Medical History:   Diagnosis Date    Asthma     Diabetes mellitus     Diabetes mellitus     Hyperlipidemia     Hypertension     Neuropathy     Pulmonary embolism 1998     Past Surgical History:   Procedure Laterality Date    CYST REMOVAL      HERNIA REPAIR      HYSTERECTOMY      TONSILLECTOMY      TUBAL LIGATION       Family History   Problem Relation Age of Onset    Kidney disease Mother     Kidney disease Father     Stroke Maternal Uncle      Social History     Tobacco Use    Smoking status: Never    Smokeless tobacco: Never   Substance Use Topics     Alcohol use: No    Drug use: No     Review of Systems   Constitutional:  Negative for chills and fever.   HENT:  Positive for congestion and rhinorrhea. Negative for ear pain and sore throat.    Eyes:  Negative for redness.   Respiratory:  Positive for cough and shortness of breath.         (-) hemoptysis   Cardiovascular:  Negative for chest pain.   Gastrointestinal:  Positive for blood in stool and diarrhea. Negative for abdominal pain, nausea and vomiting.   Genitourinary:  Negative for decreased urine volume, difficulty urinating, dysuria, frequency, hematuria and urgency.   Musculoskeletal:  Negative for back pain and neck pain.   Skin:  Positive for rash.   Neurological:  Negative for headaches.   Psychiatric/Behavioral:  Negative for confusion.      Physical Exam     Initial Vitals [01/26/23 0958]   BP Pulse Resp Temp SpO2   (!) 181/84 80 18 97.8 °F (36.6 °C) 100 %      MAP       --         Physical Exam    Nursing note and vitals reviewed.  Constitutional: She appears well-developed and well-nourished.  Non-toxic appearance. She does not appear ill.   HENT:   Head: Normocephalic and atraumatic.   Right Ear: Hearing, tympanic membrane, external ear and ear canal normal. Tympanic membrane is not perforated, not erythematous and not bulging.   Left Ear: Hearing, tympanic membrane, external ear and ear canal normal. Tympanic membrane is not perforated, not erythematous and not bulging.   Nose: Nose normal.   Mouth/Throat: Uvula is midline, oropharynx is clear and moist and mucous membranes are normal.   Eyes: Conjunctivae and EOM are normal.   Neck: Neck supple.   Normal range of motion.   Full passive range of motion without pain.     Cardiovascular:  Normal rate and regular rhythm.           Pulses:       Radial pulses are 2+ on the right side and 2+ on the left side.   Pulmonary/Chest: Effort normal and breath sounds normal. No respiratory distress.   Abdominal: Abdomen is soft. Bowel sounds are normal. She  exhibits no distension. There is no abdominal tenderness. There is no rebound and no guarding.   Genitourinary:    Genitourinary Comments: Rectal exam chaperoned by nurseTran.  Brown stool.  No melena.  Guaiac positive.  Normal rectal tone.     Musculoskeletal:         General: Normal range of motion.      Cervical back: Full passive range of motion without pain, normal range of motion and neck supple. No rigidity.     Neurological: She is alert.   Skin: Skin is warm and dry.   Scattered erythematous papules to bilateral upper extremity and bilateral lower extremity with excoriations.  Negative Nikolsky sign.    Psychiatric: She has a normal mood and affect.       ED Course   Procedures  Labs Reviewed   CBC W/ AUTO DIFFERENTIAL - Abnormal; Notable for the following components:       Result Value    RBC 3.92 (*)     Hemoglobin 10.5 (*)     Hematocrit 32.1 (*)     MCH 26.8 (*)     All other components within normal limits   COMPREHENSIVE METABOLIC PANEL - Abnormal; Notable for the following components:    Potassium 5.5 (*)     CO2 20 (*)     BUN 30 (*)     Albumin 3.2 (*)     eGFR 58 (*)     All other components within normal limits   ISTAT PROCEDURE - Abnormal; Notable for the following components:    POC Hematocrit 35 (*)     All other components within normal limits   TROPONIN I   B-TYPE NATRIURETIC PEPTIDE   SARS-COV-2 RDRP GENE   POCT INFLUENZA A/B MOLECULAR     EKG Readings: (Independently Interpreted)   Initial Reading: No STEMI. Rhythm: Normal Sinus Rhythm. Heart Rate: 75.   ECG Results              EKG 12-lead (Final result)  Result time 01/26/23 17:22:41      Final result by Interface, Lab In [facility] (01/26/23 17:22:41)                   Narrative:    Test Reason : R06.02,    Vent. Rate : 075 BPM     Atrial Rate : 075 BPM     P-R Int : 138 ms          QRS Dur : 086 ms      QT Int : 380 ms       P-R-T Axes : 073 077 050 degrees     QTc Int : 424 ms    Normal sinus rhythm  Normal ECG  When compared with ECG  of 11-DEC-2021 13:26,  No significant change was found  Confirmed by Ramu Munoz MD ([id number]) on 1/26/2023 5:22:31 PM    Referred By: JACE   SELF           Confirmed By:Ramu Munoz MD                                  Imaging Results              X-Ray Chest 1 View (Final result)  Result time 01/26/23 11:43:03   Procedure changed from X-Ray Chest PA And Lateral     Final result by Pritesh Garcia III, MD (01/26/23 11:43:03)                   Impression:      No acute process seen.      Electronically signed by: Pritesh Garcia MD  Date:    01/26/2023  Time:    11:43               Narrative:    EXAMINATION:  XR CHEST 1 VIEW    CLINICAL HISTORY:  sob;  Shortness of breath    FINDINGS:  Chest one view: Heart size is normal.  Lungs are clear.  There is aortic plaque.  Bones are noncontributory.                                       Medications   cloNIDine tablet 0.1 mg (0.1 mg Oral Given 1/26/23 1245)     Medical Decision Making:   Clinical Tests:   Lab Tests: Ordered and Reviewed  Radiological Study: Ordered and Reviewed  Medical Tests: Ordered and Reviewed  ED Management:  This is a 62-year-old female with past medical history of diabetes type 2, hypertension presents to ED complaining of multiple complaints.  She reports a one-week history of chills, nonproductive cough, nasal congestion.  She also reports a 3 day history of rash with associated itching to her bilateral upper extremities and lower extremities.  She was advised that this was due to eczema in the past and was prescribed a topical cream with transient relief.  She also reports shortness of breath for years.  She denies any recent long distance travel, surgeries, or leg swelling.  She also reports a 2 month history of black stools.  She also reports 3 day history of diarrhea.  On physical exam, patient is well-appearing and in no acute distress.  Nontoxic appearing.  Lungs are clear to auscultation bilaterally.  Abdomen is soft and nontender.  No  guarding, rigidity, rebound.  2+ radial pulses bilaterally.  Posterior oropharynx is not erythematous.  No edema or exudate.  Uvula midline.  Bilateral tympanic membrane is normal.  No erythema, bulging, or perforations.  Neuro intact.  Strength and sensation intact bilateral upper and lower extremities.  Full range of motion of neck.  No neck rigidity.  Scattered erythematous papules to bilateral upper extremity and bilateral lower extremity with excoriations.  Negative Nikolsky sign.  Rectal exam chaperoned by nurseTran.  Brown stool.  No melena.  Guaiac positive.  Normal rectal tone.  CBC revealed hemoglobin of 10.5, hematocrit 32.1.  CMP revealed potassium 5.5 (specimen slightly hemolyzed) CO2 20, BUN 30, GFR 58.  Troponin unremarkable.  BNP unremarkable.  COVID and flu negative.  Chest x-ray revealed no acute cardiopulmonary processes.  No evidence of any focal consolidations, pneumothorax, or pleural effusions.  EKG revealed normal sinus rhythm with a rate of 75.  No STEMI.  Doubt MI.  Doubt CHF. Well's criteria for PE is 0. Doubt PE especially since patient has been having SOB for years.  Patient's blood pressure elevated upon triage.  Clonidine ordered.  Upon reassessment, patient's blood pressure still elevated; however, patient would like to go home.  Advised patient that I would like to continue to monitor her blood pressure; however, patient states she wants to go home.  Encouraged the patient to continue to check her blood pressure at home and present back to this facility for any new or worsening symptoms.  Will discharge patient on hydrocortisone cream for rash.  Ambulatory referral to Dermatology ordered.  Will also discharge patient on Tylenol and Robitussin as needed for her. viral  symptoms.  No melena appreciated on rectal exam despite patient noticing black stools for the past few months.  Patient states she has an appointment with her gastroenterologist.  Advised patient to follow-up with  gastroenterology for further evaluation.  Urged prompt follow-up with PCP, Dermatology, and Gastroenterology for further evaluation.    Strict return precautions given. I discussed with the patient/family the diagnosis, treatment plan, indications for return to the emergency department, and for expected follow-up. The patient/family verbalized an understanding. The patient/family is asked if there are any questions or concerns. We discuss the case, until all issues are addressed to the patient/family's satisfaction. Patient/family understands and is agreeable to the plan. Patient is stable and ready for discharge.                          Clinical Impression:   Final diagnoses:  [R06.02] SOB (shortness of breath) (Primary)  [R21] Rash and nonspecific skin eruption  [B34.9] Viral syndrome        ED Disposition Condition    Discharge Stable          ED Prescriptions       Medication Sig Dispense Start Date End Date Auth. Provider    acetaminophen (TYLENOL) 500 MG tablet Take 1 tablet (500 mg total) by mouth every 4 (four) hours as needed for Pain or Temperature greater than (100.5 or greater). 30 tablet 1/26/2023 -- Armando Mckeon PA-C    guaiFENesin 100 mg/5 ml (ROBITUSSIN) 100 mg/5 mL syrup Take 10 mLs (200 mg total) by mouth every 4 (four) hours as needed for Cough. 118 mL 1/26/2023 2/5/2023 Armando Mckeon PA-C    hydrocortisone 2.5 % cream Apply topically 2 (two) times daily. for 5 days 3.5 g 1/26/2023 1/31/2023 Armando Mckeon PA-C          Follow-up Information       Follow up With Specialties Details Why Contact Info    [facility] In 2 days for further evaluation [address] [facility] [address]  [phone]      [facility] - Emergency Dept Emergency Medicine In 2 days If symptoms worsen [address]  [facility] [address] [phone]             Armando Mckeon PA-C  01/26/23 2122

## 2023-01-26 NOTE — DISCHARGE INSTRUCTIONS

## 2023-01-26 NOTE — ED TRIAGE NOTES
"Pt to ED with complaints of cough, chills, and congestion X3 days. Pt reports that she is also feeling SOB, but reports has been feeling SOB for several months with no improvements. States she has two PRN inhalers but have given no relief. Reports PCP is "running tests to figure out why." Pt also states had pneumonia 2 months ago and "is feeling like she did then." Denies any chest pain. Has a raised rash on bilateral arms- reports hx of eczema. NADN. Hypertensive, otherwise vitals WNL.   "

## 2023-01-30 ENCOUNTER — PATIENT OUTREACH (OUTPATIENT)
Dept: EMERGENCY MEDICINE | Facility: HOSPITAL | Age: 63
End: 2023-01-30
Payer: MEDICAID

## 2023-01-31 ENCOUNTER — PATIENT OUTREACH (OUTPATIENT)
Dept: EMERGENCY MEDICINE | Facility: HOSPITAL | Age: 63
End: 2023-01-31
Payer: MEDICAID

## 2023-02-18 ENCOUNTER — HOSPITAL ENCOUNTER (EMERGENCY)
Facility: HOSPITAL | Age: 63
Discharge: HOME | End: 2023-02-19
Attending: STUDENT IN AN ORGANIZED HEALTH CARE EDUCATION/TRAINING PROGRAM
Payer: MEDICAID

## 2023-02-18 DIAGNOSIS — R06.02 SHORTNESS OF BREATH: ICD-10-CM

## 2023-02-18 DIAGNOSIS — R60.9 DEPENDENT EDEMA: Primary | ICD-10-CM

## 2023-02-18 LAB
ALBUMIN SERPL BCP-MCNC: 3.3 G/DL (ref 3.5–5.2)
ALP SERPL-CCNC: 118 U/L (ref 55–135)
ALT SERPL W/O P-5'-P-CCNC: 36 U/L (ref 10–44)
ANION GAP SERPL CALC-SCNC: 11 MMOL/L (ref 8–16)
AST SERPL-CCNC: 30 U/L (ref 10–40)
BASOPHILS # BLD AUTO: 0.04 K/UL (ref 0–0.2)
BASOPHILS NFR BLD: 0.5 % (ref 0–1.9)
BILIRUB SERPL-MCNC: 0.2 MG/DL (ref 0.1–1)
BNP SERPL-MCNC: 53 PG/ML (ref 0–99)
BUN SERPL-MCNC: 27 MG/DL (ref 6–20)
CALCIUM SERPL-MCNC: 9.1 MG/DL (ref 8.7–10.5)
CHLORIDE SERPL-SCNC: 111 MMOL/L (ref 95–110)
CO2 SERPL-SCNC: 18 MMOL/L (ref 23–29)
CREAT SERPL-MCNC: 1.4 MG/DL (ref 0.5–1.4)
CTP QC/QA: YES
CTP QC/QA: YES
DIFFERENTIAL METHOD: ABNORMAL
EOSINOPHIL # BLD AUTO: 0.3 K/UL (ref 0–0.5)
EOSINOPHIL NFR BLD: 4.1 % (ref 0–8)
ERYTHROCYTE [DISTWIDTH] IN BLOOD BY AUTOMATED COUNT: 13.2 % (ref 11.5–14.5)
EST. GFR  (NO RACE VARIABLE): 43 ML/MIN/1.73 M^2
GLUCOSE SERPL-MCNC: 86 MG/DL (ref 70–110)
HCT VFR BLD AUTO: 33.3 % (ref 37–48.5)
HGB BLD-MCNC: 10.6 G/DL (ref 12–16)
IMM GRANULOCYTES # BLD AUTO: 0.03 K/UL (ref 0–0.04)
IMM GRANULOCYTES NFR BLD AUTO: 0.4 % (ref 0–0.5)
LYMPHOCYTES # BLD AUTO: 3.1 K/UL (ref 1–4.8)
LYMPHOCYTES NFR BLD: 39.4 % (ref 18–48)
MCH RBC QN AUTO: 26.6 PG (ref 27–31)
MCHC RBC AUTO-ENTMCNC: 31.8 G/DL (ref 32–36)
MCV RBC AUTO: 84 FL (ref 82–98)
MONOCYTES # BLD AUTO: 0.6 K/UL (ref 0.3–1)
MONOCYTES NFR BLD: 7.8 % (ref 4–15)
NEUTROPHILS # BLD AUTO: 3.7 K/UL (ref 1.8–7.7)
NEUTROPHILS NFR BLD: 47.8 % (ref 38–73)
NRBC BLD-RTO: 0 /100 WBC
PLATELET # BLD AUTO: 318 K/UL (ref 150–450)
PMV BLD AUTO: 9.4 FL (ref 9.2–12.9)
POC MOLECULAR INFLUENZA A AGN: NEGATIVE
POC MOLECULAR INFLUENZA B AGN: NEGATIVE
POTASSIUM SERPL-SCNC: 4.3 MMOL/L (ref 3.5–5.1)
PROT SERPL-MCNC: 7.6 G/DL (ref 6–8.4)
RBC # BLD AUTO: 3.99 M/UL (ref 4–5.4)
SARS-COV-2 RDRP RESP QL NAA+PROBE: NEGATIVE
SODIUM SERPL-SCNC: 140 MMOL/L (ref 136–145)
TROPONIN I SERPL DL<=0.01 NG/ML-MCNC: <0.006 NG/ML (ref 0–0.03)
WBC # BLD AUTO: 7.82 K/UL (ref 3.9–12.7)

## 2023-02-18 PROCEDURE — 96374 THER/PROPH/DIAG INJ IV PUSH: CPT

## 2023-02-18 PROCEDURE — 85025 COMPLETE CBC W/AUTO DIFF WBC: CPT | Performed by: STUDENT IN AN ORGANIZED HEALTH CARE EDUCATION/TRAINING PROGRAM

## 2023-02-18 PROCEDURE — 84484 ASSAY OF TROPONIN QUANT: CPT | Performed by: STUDENT IN AN ORGANIZED HEALTH CARE EDUCATION/TRAINING PROGRAM

## 2023-02-18 PROCEDURE — 83880 ASSAY OF NATRIURETIC PEPTIDE: CPT | Performed by: STUDENT IN AN ORGANIZED HEALTH CARE EDUCATION/TRAINING PROGRAM

## 2023-02-18 PROCEDURE — 99285 EMERGENCY DEPT VISIT HI MDM: CPT | Mod: 25

## 2023-02-18 PROCEDURE — 80053 COMPREHEN METABOLIC PANEL: CPT | Performed by: STUDENT IN AN ORGANIZED HEALTH CARE EDUCATION/TRAINING PROGRAM

## 2023-02-18 PROCEDURE — 87502 INFLUENZA DNA AMP PROBE: CPT

## 2023-02-18 NOTE — Clinical Note
"Devika "Devikacande Bustillo was seen and treated in our emergency department on 2/18/2023.  She may return to work on 02/21/2023.       If you have any questions or concerns, please don't hesitate to call.      Marti Polk, DO"

## 2023-02-19 VITALS
OXYGEN SATURATION: 100 % | DIASTOLIC BLOOD PRESSURE: 72 MMHG | RESPIRATION RATE: 15 BRPM | SYSTOLIC BLOOD PRESSURE: 166 MMHG | TEMPERATURE: 98 F | HEART RATE: 80 BPM | BODY MASS INDEX: 35.28 KG/M2 | WEIGHT: 232 LBS

## 2023-02-19 PROCEDURE — 25000242 PHARM REV CODE 250 ALT 637 W/ HCPCS: Performed by: STUDENT IN AN ORGANIZED HEALTH CARE EDUCATION/TRAINING PROGRAM

## 2023-02-19 PROCEDURE — 94761 N-INVAS EAR/PLS OXIMETRY MLT: CPT

## 2023-02-19 PROCEDURE — 63600175 PHARM REV CODE 636 W HCPCS: Performed by: STUDENT IN AN ORGANIZED HEALTH CARE EDUCATION/TRAINING PROGRAM

## 2023-02-19 PROCEDURE — 94640 AIRWAY INHALATION TREATMENT: CPT

## 2023-02-19 RX ORDER — FUROSEMIDE 10 MG/ML
20 INJECTION INTRAMUSCULAR; INTRAVENOUS
Status: COMPLETED | OUTPATIENT
Start: 2023-02-19 | End: 2023-02-19

## 2023-02-19 RX ORDER — IPRATROPIUM BROMIDE AND ALBUTEROL SULFATE 2.5; .5 MG/3ML; MG/3ML
3 SOLUTION RESPIRATORY (INHALATION)
Status: DISCONTINUED | OUTPATIENT
Start: 2023-02-19 | End: 2023-02-19 | Stop reason: RX

## 2023-02-19 RX ORDER — ALBUTEROL SULFATE 2.5 MG/.5ML
2.5 SOLUTION RESPIRATORY (INHALATION) ONCE
Status: COMPLETED | OUTPATIENT
Start: 2023-02-19 | End: 2023-02-19

## 2023-02-19 RX ORDER — IPRATROPIUM BROMIDE 0.5 MG/2.5ML
0.5 SOLUTION RESPIRATORY (INHALATION) ONCE
Status: COMPLETED | OUTPATIENT
Start: 2023-02-19 | End: 2023-02-19

## 2023-02-19 RX ADMIN — FUROSEMIDE 20 MG: 10 INJECTION, SOLUTION INTRAMUSCULAR; INTRAVENOUS at 01:02

## 2023-02-19 RX ADMIN — IPRATROPIUM BROMIDE 0.5 MG: 0.5 SOLUTION RESPIRATORY (INHALATION) at 12:02

## 2023-02-19 RX ADMIN — ALBUTEROL SULFATE 2.5 MG: 2.5 SOLUTION RESPIRATORY (INHALATION) at 12:02

## 2023-02-19 NOTE — ED TRIAGE NOTES
Pt states seen here a few times for SOB; denies any hx of CHF; 1-2+ pitting edema noted to bilateral lower extremities

## 2023-02-19 NOTE — DISCHARGE INSTRUCTIONS
Thank you for coming to our Emergency Department today. It is important to remember that some problems or medical conditions are difficult to diagnose and may not be found during your Emergency Department visit.     Be sure to follow up with your primary care doctor and review all labs/imaging/tests that were performed during your ER visit with them. Some labs/tests may be outside of the normal range and require non-emergent follow-up and further investigation to help diagnose/exclude/prevent complications or other potentially serious medical conditions that were not addressed during your ER visit.    If you do not have a primary care doctor, you may contact the one listed on your discharge paperwork or you may also call the [facility] Appointment Desk at [phone] to schedule an appointment and establish care with one. It is important to your health that you have a primary care doctor.    Please take all medications as directed. All medications may potentially have side-effects and it is impossible to predict which medications may give you side-effects or what side-effects (if any) they will give you.. If you feel that you are having a negative effect or side-effect of any medication you should immediately stop taking them and seek medical attention. If you feel that you are having a life-threatening reaction call 911.    Return to the ER with any questions/concerns, new/concerning symptoms, worsening or failure to improve.     Do not drive, swim, climb to height, take a bath, operate heavy machinery, drink alcohol or take potentially sedating medications, sign any legal documents or make any important decisions for 24 hours if you have received any pain medications, sedatives or mood altering drugs during your ER visit or within 24 hours of taking them if they have been prescribed to you.     You can find additional resources for Dentists, hearing aids, durable medical equipment, low cost pharmacies and  other resources at https://[employer].org    BELOW THIS LINE ONLY APPLIES IF YOU HAVE A COVID TEST PENDING OR IF YOU HAVE BEEN DIAGNOSED WITH COVID:  Please access [facility] to review the results of your test. Until the results of your COVID test return, you should isolate yourself so as not to potentially spread illness to others.   If your COVID test returns positive, you should isolate yourself so as not to spread illness to others. After five full days, if you are feeling better and you have not had fever for 24 hours, you can return to your typical daily activities, but you must wear a mask around others for an additional 5 days.   If your COVID test returns negative and you are either unvaccinated or more than six months out from your two-dose vaccine and are not yet boosted, you should still quarantine for 5 full days followed by strict mask use for an additional 5 full days.   If your COVID test returns negative and you have received your 2-dose initial vaccine as well as a booster, you should continue strict mask use for 10 full days after the exposure.  For all those exposed, best practice includes a test at day 5 after the exposure. This can be a home test or a test through one of the many testing centers throughout our community.   Masking is always advised to limit the spread of COVID. Cdc.gov is an excellent site to obtain the latest up to date recommendations regarding COVID and COVID testing.     CDC Testing and Quarantine Guidelines for patients with exposure to a known-positive COVID-19 person:  A close exposure is defined as anyone who has had an exposure (masked or unmasked) to a known COVID -19 positive person within 6 feet of someone for a cumulative total of 15 minutes or more over a 24-hour period.   Vaccinated and/or if you recently had a positive covid test within 90 days do NOT need to quarantine after contact with someone who had COVID-19 unless you develop symptoms.   Fully vaccinated  people who have not had a positive test within 90 days, should get tested 3-5 days after their exposure, even if they don't have symptoms and wear a mask indoors in public for 14 days following exposure or until their test result is negative.      Unvaccinated and/or NOT had a positive test within 90 days and meet close exposure  You are required by CDC guidelines to quarantine for at least 5 days from time of exposure followed by 5 days of strict masking. It is recommended, but not required to test after 5 days, unless you develop symptoms, in which case you should test at that time.  If you get tested after 5 days and your test is positive, your 5 day period of isolation starts the day of the positive test.    If your exposure does not meet the above definition, you can return to your normal daily activities to include social distancing, wearing a mask and frequent handwashing.      Here is a link to guidance from the CDC:  https://www.cdc.gov/media/releases/2021/s1227-isolation-quarantine-guidance.html      [place] [facility] Testing Sites:  https://ldh.la.gov/page/3934      [facility] website with testing locations and guidance:  [email or web address]

## 2023-02-22 ENCOUNTER — PATIENT OUTREACH (OUTPATIENT)
Dept: EMERGENCY MEDICINE | Facility: HOSPITAL | Age: 63
End: 2023-02-22
Payer: MEDICAID

## 2023-02-24 NOTE — ED PROVIDER NOTES
Encounter Date: 2/18/2023       History     Chief Complaint   Patient presents with    Shortness of Breath     X 3 days with cough     HPI    61 yo female with a PMH of HTN and DM presents with a 3 day history of shortness of breath. She reports her symptoms are worse with movement. She also reports associated cough, congestion, lower extremity swelling and finger swelling, 5/10 substernal chest pain, and abdominal pain. No treatment prior to ED arrival. She did not take her home blood pressure medication today. Denies other associated symptoms.     Review of patient's allergies indicates:  No Known Allergies  Past Medical History:   Diagnosis Date    Asthma     Diabetes mellitus     Diabetes mellitus     Hyperlipidemia     Hypertension     Neuropathy     Pulmonary embolism 1998     Past Surgical History:   Procedure Laterality Date    CYST REMOVAL      HERNIA REPAIR      HYSTERECTOMY      TONSILLECTOMY      TUBAL LIGATION       Family History   Problem Relation Age of Onset    Kidney disease Mother     Kidney disease Father     Stroke Maternal Uncle      Social History     Tobacco Use    Smoking status: Never    Smokeless tobacco: Never   Substance Use Topics    Alcohol use: No    Drug use: No     Review of Systems   Constitutional:  Negative for chills and fever.   HENT:  Positive for congestion.    Eyes:  Negative for visual disturbance.   Respiratory:  Positive for cough and shortness of breath.    Cardiovascular:  Positive for chest pain and leg swelling.   Gastrointestinal:  Positive for abdominal pain. Negative for nausea and vomiting.   Genitourinary:  Negative for dysuria.   Musculoskeletal:  Negative for back pain, neck pain and neck stiffness.   Neurological:  Negative for dizziness, syncope, weakness, light-headedness, numbness and headaches.   Psychiatric/Behavioral:  Negative for confusion.      Physical Exam     Initial Vitals [02/18/23 2232]   BP Pulse Resp Temp SpO2   (!) 216/88 80 18 98.5 °F (36.9  °C) 100 %      MAP       --         Physical Exam    Nursing note and vitals reviewed.  Constitutional: She appears well-developed and well-nourished. She is not diaphoretic.  Non-toxic appearance. She does not have a sickly appearance. She does not appear ill.   Hypertensive   HENT:   Head: Normocephalic and atraumatic.   Eyes: Conjunctivae are normal. No scleral icterus.   Neck: Neck supple. No JVD present.   Normal range of motion.  Cardiovascular:  Normal rate, regular rhythm, normal heart sounds and intact distal pulses.           Pulmonary/Chest: Breath sounds normal. No respiratory distress.   Abdominal: Abdomen is soft. She exhibits no distension. There is no abdominal tenderness. There is no rebound and no guarding.   Musculoskeletal:         General: Edema present. No tenderness. Normal range of motion.      Cervical back: Normal range of motion and neck supple.      Right lower leg: No deformity or tenderness. 2+ Edema present.      Left lower leg: No deformity or tenderness. 2+ Edema present.     Neurological: She is alert. She has normal strength.   Moves all extremities, follows all commands, no focal neurologic deficits.     Skin: Skin is warm and dry. No rash noted. No erythema.   Psychiatric: She has a normal mood and affect.       ED Course   Procedures  Labs Reviewed   CBC W/ AUTO DIFFERENTIAL - Abnormal; Notable for the following components:       Result Value    RBC 3.99 (*)     Hemoglobin 10.6 (*)     Hematocrit 33.3 (*)     MCH 26.6 (*)     MCHC 31.8 (*)     All other components within normal limits   COMPREHENSIVE METABOLIC PANEL - Abnormal; Notable for the following components:    Chloride 111 (*)     CO2 18 (*)     BUN 27 (*)     Albumin 3.3 (*)     eGFR 43 (*)     All other components within normal limits   TROPONIN I   B-TYPE NATRIURETIC PEPTIDE   SARS-COV-2 RDRP GENE    Narrative:     This test utilizes isothermal nucleic acid amplification technology to detect the SARS-CoV-2 RdRp  nucleic acid segment. The analytical sensitivity (limit of detection) is 500 copies/swab.     A POSITIVE result is indicative of the presence of SARS-CoV-2 RNA; clinical correlation with patient history and other diagnostic information is necessary to determine patient infection status.    A NEGATIVE result means that SARS-CoV-2 nucleic acids are not present above the limit of detection. A NEGATIVE result should be treated as presumptive. It does not rule out the possibility of COVID-19 and should not be the sole basis for treatment decisions. If COVID-19 is strongly suspected based on clinical and exposure history, re-testing using an alternate molecular assay should be considered.     This test is only for use under the Food and Drug Administration s Emergency Use Authorization (EUA).     Commercial kits are provided by [employer]. Performance characteristics of the EUA have been independently verified by [facility] Department of Pathology and Laboratory Medicine.   _________________________________________________________________   The authorized Fact Sheet for Healthcare Providers and the authorized Fact Sheet for Patients of the ID NOW COVID-19 are available on the FDA website:    https://www.fda.gov/media/606053/download      https://www.fda.gov/media/052805/download       POCT INFLUENZA A/B MOLECULAR     EKG Readings: (Independently Interpreted)   Initial Reading: No STEMI. Previous EKG: Compared with most recent EKG Rhythm: Normal Sinus Rhythm. Heart Rate: 79 bpm. Ectopy: No Ectopy. Conduction: Normal. Axis: Normal. Clinical Impression: Normal Sinus Rhythm     Imaging Results              X-Ray Chest AP Portable (Final result)  Result time 02/18/23 23:53:45      Final result by uLcie Isaacs MD (02/18/23 23:53:45)                   Impression:      No acute intrathoracic abnormality detected.      Electronically signed by: Lucie Isaacs  Date:    02/18/2023  Time:    23:53                Narrative:    EXAMINATION:  AP PORTABLE CHEST    CLINICAL HISTORY:  CHF;    TECHNIQUE:  AP portable chest radiograph was submitted.    COMPARISON:  01/26/2023    FINDINGS:  AP portable chest radiograph demonstrates a cardiac silhouette within normal limits.  There is no focal consolidation, pneumothorax, or pleural effusion.                                       Medications   albuterol sulfate nebulizer solution 2.5 mg (2.5 mg Nebulization Given 2/19/23 0018)   ipratropium 0.02 % nebulizer solution 0.5 mg (0.5 mg Nebulization Given 2/19/23 0018)   furosemide injection 20 mg (20 mg Intravenous Given 2/19/23 0149)     Medical Decision Making:   Clinical Tests:   Lab Tests: Ordered and Reviewed  Radiological Study: Ordered and Reviewed  Medical Tests: Reviewed and Ordered  ED Management:   MDM  This is an emergent evaluation of a 63 yo female with a PMH of HTN and DM presents with a 3 day history of shortness of breath. Initial vitals in the ED  [02/18/23 2232]    BP: (!) 216/88  Pulse: 80  Resp: 18  Temp: 98.5 °F (36.9 °C)  SpO2: 100 % .     Physical exam noted above. DDx includes but is not limited to CHF, ACS, liver failure, renal failure, pneumonia, pleural effusion, hypertensive emergency. Also considered but clinically less likely to be PE, dissection. Will obtain labs and imaging including CBC, CMP, cardiac enzymes, COVID, and influenza. Will also provide Duoneb treatments, Lasix. Will continue to monitor and frequently reassess pending results of labs, treatments and final disposition.    Patient is aware of plan and is amenable.     Marti Polk D.O  EMERGENCY MEDICINE  11:14 PM 02/18/2023    UPDATE:    Patient presents for emergent evaluation of acute shortness of breath that poses a threat to life and/or bodily function.    In the ED patient found to have dependent edema.    I ordered labs and personally reviewed them. Labs significant for a biacrb of 18, stable normocytic anemia.    I  ordered X-rays and personally reviewed them and reviewed the radiologist interpretation. Xray significant for no acute intrathoracic abnormality.    I ordered EKG and personally reviewed it. EKG significant for no acute ischemic changes.    Patient was managed in the ED with IV Lasix. She also received a Duoneb treatment.     The response to treatment was improved symptoms and blood pressure. Pulse ox unremarkable.    Patient was discharged in stable condition with PCP follow-up. Detailed return precautions discussed.    This chart was completed using dictation software, as a result there may be some transcription errors                         Clinical Impression:   Final diagnoses:  [R06.02] Shortness of breath  [R60.9] Dependent edema (Primary)        ED Disposition Condition    Discharge Stable          ED Prescriptions    None       Follow-up Information       Follow up With Specialties Details Why Contact Info    Sherly Elizalde, NP-C Urgent Care Schedule an appointment as soon as possible for a visit in 3 days Emergency Room Follow-up [address] [facility] [address]  [phone]      [facility] Emergency Dept Emergency Medicine Go to  If symptoms worsen [address]  [facility] [address] [phone]             Marti Polk, DO  02/24/23 0005

## 2023-04-12 ENCOUNTER — PATIENT MESSAGE (OUTPATIENT)
Dept: INFECTIOUS DISEASES | Facility: CLINIC | Age: 63
End: 2023-04-12
Payer: MEDICAID

## 2023-05-08 ENCOUNTER — HOSPITAL ENCOUNTER (EMERGENCY)
Facility: HOSPITAL | Age: 63
Discharge: HOME | End: 2023-05-08
Attending: EMERGENCY MEDICINE
Payer: MEDICAID

## 2023-05-08 VITALS
TEMPERATURE: 98 F | DIASTOLIC BLOOD PRESSURE: 67 MMHG | RESPIRATION RATE: 16 BRPM | WEIGHT: 235 LBS | BODY MASS INDEX: 35.61 KG/M2 | OXYGEN SATURATION: 100 % | HEIGHT: 68 IN | HEART RATE: 70 BPM | SYSTOLIC BLOOD PRESSURE: 162 MMHG

## 2023-05-08 DIAGNOSIS — T14.8XXA MUSCLE STRAIN: ICD-10-CM

## 2023-05-08 DIAGNOSIS — R07.9 CHEST PAIN: ICD-10-CM

## 2023-05-08 DIAGNOSIS — R07.9 CHEST PAIN, UNSPECIFIED TYPE: Primary | ICD-10-CM

## 2023-05-08 LAB
ALBUMIN SERPL BCP-MCNC: 3.1 G/DL (ref 3.5–5.2)
ALP SERPL-CCNC: 109 U/L (ref 55–135)
ALT SERPL W/O P-5'-P-CCNC: 32 U/L (ref 10–44)
ANION GAP SERPL CALC-SCNC: 7 MMOL/L (ref 8–16)
AST SERPL-CCNC: 24 U/L (ref 10–40)
BASOPHILS # BLD AUTO: 0.06 K/UL (ref 0–0.2)
BASOPHILS NFR BLD: 0.9 % (ref 0–1.9)
BILIRUB SERPL-MCNC: 0.2 MG/DL (ref 0.1–1)
BNP SERPL-MCNC: 30 PG/ML (ref 0–99)
BUN SERPL-MCNC: 28 MG/DL (ref 6–20)
CALCIUM SERPL-MCNC: 9 MG/DL (ref 8.7–10.5)
CHLORIDE SERPL-SCNC: 110 MMOL/L (ref 95–110)
CO2 SERPL-SCNC: 21 MMOL/L (ref 23–29)
CREAT SERPL-MCNC: 1 MG/DL (ref 0.5–1.4)
DIFFERENTIAL METHOD: ABNORMAL
EOSINOPHIL # BLD AUTO: 0.2 K/UL (ref 0–0.5)
EOSINOPHIL NFR BLD: 2.6 % (ref 0–8)
ERYTHROCYTE [DISTWIDTH] IN BLOOD BY AUTOMATED COUNT: 13.2 % (ref 11.5–14.5)
EST. GFR  (NO RACE VARIABLE): >60 ML/MIN/1.73 M^2
GLUCOSE SERPL-MCNC: 158 MG/DL (ref 70–110)
HCT VFR BLD AUTO: 31.7 % (ref 37–48.5)
HGB BLD-MCNC: 10.3 G/DL (ref 12–16)
IMM GRANULOCYTES # BLD AUTO: 0.01 K/UL (ref 0–0.04)
IMM GRANULOCYTES NFR BLD AUTO: 0.1 % (ref 0–0.5)
LYMPHOCYTES # BLD AUTO: 2.7 K/UL (ref 1–4.8)
LYMPHOCYTES NFR BLD: 38.5 % (ref 18–48)
MCH RBC QN AUTO: 26.5 PG (ref 27–31)
MCHC RBC AUTO-ENTMCNC: 32.5 G/DL (ref 32–36)
MCV RBC AUTO: 82 FL (ref 82–98)
MONOCYTES # BLD AUTO: 0.5 K/UL (ref 0.3–1)
MONOCYTES NFR BLD: 7.7 % (ref 4–15)
NEUTROPHILS # BLD AUTO: 3.5 K/UL (ref 1.8–7.7)
NEUTROPHILS NFR BLD: 50.2 % (ref 38–73)
NRBC BLD-RTO: 0 /100 WBC
PLATELET # BLD AUTO: 268 K/UL (ref 150–450)
PMV BLD AUTO: 9.7 FL (ref 9.2–12.9)
POCT GLUCOSE: 167 MG/DL (ref 70–110)
POTASSIUM SERPL-SCNC: 4.4 MMOL/L (ref 3.5–5.1)
PROT SERPL-MCNC: 7 G/DL (ref 6–8.4)
RBC # BLD AUTO: 3.89 M/UL (ref 4–5.4)
SODIUM SERPL-SCNC: 138 MMOL/L (ref 136–145)
TROPONIN I SERPL DL<=0.01 NG/ML-MCNC: <0.006 NG/ML (ref 0–0.03)
WBC # BLD AUTO: 7.02 K/UL (ref 3.9–12.7)

## 2023-05-08 PROCEDURE — 82962 GLUCOSE BLOOD TEST: CPT

## 2023-05-08 PROCEDURE — 99285 EMERGENCY DEPT VISIT HI MDM: CPT | Mod: 25

## 2023-05-08 PROCEDURE — 83880 ASSAY OF NATRIURETIC PEPTIDE: CPT | Performed by: PHYSICIAN ASSISTANT

## 2023-05-08 PROCEDURE — 93005 ELECTROCARDIOGRAM TRACING: CPT

## 2023-05-08 PROCEDURE — 84484 ASSAY OF TROPONIN QUANT: CPT | Performed by: PHYSICIAN ASSISTANT

## 2023-05-08 PROCEDURE — 25000003 PHARM REV CODE 250: Performed by: EMERGENCY MEDICINE

## 2023-05-08 PROCEDURE — 93010 ELECTROCARDIOGRAM REPORT: CPT | Mod: ,,, | Performed by: INTERNAL MEDICINE

## 2023-05-08 PROCEDURE — 80053 COMPREHEN METABOLIC PANEL: CPT | Performed by: PHYSICIAN ASSISTANT

## 2023-05-08 PROCEDURE — 93010 EKG 12-LEAD: ICD-10-PCS | Mod: ,,, | Performed by: INTERNAL MEDICINE

## 2023-05-08 PROCEDURE — 85025 COMPLETE CBC W/AUTO DIFF WBC: CPT | Performed by: PHYSICIAN ASSISTANT

## 2023-05-08 RX ORDER — OXYCODONE AND ACETAMINOPHEN 5; 325 MG/1; MG/1
1 TABLET ORAL EVERY 6 HOURS PRN
Qty: 20 TABLET | Refills: 0 | Status: SHIPPED | OUTPATIENT
Start: 2023-05-08 | End: 2023-05-11

## 2023-05-08 RX ORDER — LISINOPRIL 20 MG/1
20 TABLET ORAL
Status: COMPLETED | OUTPATIENT
Start: 2023-05-08 | End: 2023-05-08

## 2023-05-08 RX ORDER — ACETAMINOPHEN 500 MG
1000 TABLET ORAL
Status: COMPLETED | OUTPATIENT
Start: 2023-05-08 | End: 2023-05-08

## 2023-05-08 RX ORDER — METHOCARBAMOL 750 MG/1
1500 TABLET, FILM COATED ORAL 3 TIMES DAILY
Qty: 30 TABLET | Refills: 0 | Status: SHIPPED | OUTPATIENT
Start: 2023-05-08 | End: 2023-05-13

## 2023-05-08 RX ADMIN — LISINOPRIL 20 MG: 20 TABLET ORAL at 12:05

## 2023-05-08 RX ADMIN — ACETAMINOPHEN 1000 MG: 500 TABLET ORAL at 11:05

## 2023-05-08 NOTE — ED TRIAGE NOTES
Chest Pain (Pt to ER with c/o right sided chest pain x 1 day. Pt reports pain worse today. Pt describe pain 10/10. + SOB, N/V)

## 2023-05-08 NOTE — ED PROVIDER NOTES
"Encounter Date: 5/8/2023    SCRIBE #1 NOTE: I, Ara Silveira, am scribing for, and in the presence of,  Amanda Fox MD. I have scribed the following portions of the note - Other sections scribed: HPI, ROS, PE.     History     Chief Complaint   Patient presents with    Chest Pain     Pt to ER with c/o right sided chest pain x 1 day. Pt reports pain worse today. Pt describe pain 10/10. + SOB, N/V     Devika Bustillo is a 62 y.o. female, with a PMHx of PE, DM, and HTN, who presents to the ED with "sharp" intermittent right-sided chest pain that started yesterday afternoon. Patient reports the pain first started when she was at work yesterday. Patient notes she assumed it was gas so she drank a soda to help release any "trapped gas", but to no avail. Patient notes walking or lying down neither exacerbates nor alleviates the pain. Patient is also complaining of nausea, bilateral leg swelling, dizziness, cough, and sob for the past few days. Patient states she is using her albuterol pump more frequently lately due to her allergies. Patient endorsed 3 Tylenol arthritis today. No other exacerbating or alleviating factors. Patient denies vomiting, fever, chills, or other associated symptoms. This is the extent of the patient's complaints today in the Emergency Department.       The history is provided by the patient. No  was used.   Review of patient's allergies indicates:  No Known Allergies  Past Medical History:   Diagnosis Date    Asthma     Diabetes mellitus     Diabetes mellitus     Hyperlipidemia     Hypertension     Neuropathy     Pulmonary embolism 1998     Past Surgical History:   Procedure Laterality Date    CYST REMOVAL      HERNIA REPAIR      HYSTERECTOMY      TONSILLECTOMY      TUBAL LIGATION       Family History   Problem Relation Age of Onset    Kidney disease Mother     Kidney disease Father     Stroke Maternal Uncle      Social History     Tobacco Use    Smoking status: Never    " "Smokeless tobacco: Never   Substance Use Topics    Alcohol use: No    Drug use: No     Review of Systems   Constitutional:  Negative for activity change, chills and fever.   HENT:  Negative for congestion, sore throat and voice change.    Eyes:  Negative for pain and visual disturbance.   Respiratory:  Positive for cough and shortness of breath. Negative for chest tightness and wheezing.    Cardiovascular:  Positive for chest pain (intermittent, "sharp" right-sided) and leg swelling (bilateral).   Gastrointestinal:  Positive for nausea. Negative for abdominal distention, abdominal pain, constipation, diarrhea and vomiting.   Endocrine: Negative for polydipsia and polyuria.   Genitourinary:  Negative for dysuria and flank pain.   Musculoskeletal:  Negative for back pain, gait problem, neck pain and neck stiffness.   Skin:  Negative for rash.   Allergic/Immunologic: Negative for immunocompromised state.   Neurological:  Positive for dizziness. Negative for seizures, syncope, weakness, numbness and headaches.   Hematological:  Negative for adenopathy. Does not bruise/bleed easily.   Psychiatric/Behavioral:  Negative for agitation, behavioral problems and confusion.      Physical Exam     Initial Vitals [05/08/23 1025]   BP Pulse Resp Temp SpO2   (!) 220/88 75 18 98.2 °F (36.8 °C) 99 %      MAP       --         Physical Exam    Nursing note and vitals reviewed.  Constitutional: She appears well-developed and well-nourished. She is not diaphoretic. She is cooperative. She does not appear ill. No distress.   HENT:   Head: Normocephalic and atraumatic.   Right Ear: Hearing, tympanic membrane, external ear and ear canal normal.   Left Ear: Hearing, tympanic membrane, external ear and ear canal normal.   Nose: Rhinorrhea present. No mucosal edema or sinus tenderness. Right sinus exhibits no maxillary sinus tenderness and no frontal sinus tenderness. Left sinus exhibits no maxillary sinus tenderness and no frontal sinus " tenderness.   Mouth/Throat: Oropharynx is clear and moist and mucous membranes are normal. No posterior oropharyngeal edema, posterior oropharyngeal erythema or tonsillar abscesses.   Eyes: Conjunctivae and EOM are normal. Pupils are equal, round, and reactive to light. Right eye exhibits no discharge. Left eye exhibits no discharge.   Neck: Neck supple. No thyromegaly present. No tracheal deviation present.   Normal range of motion.   Full passive range of motion without pain.     Cardiovascular:  Normal rate, regular rhythm, S1 normal, S2 normal, normal heart sounds and normal pulses.     Exam reveals no gallop.       No murmur heard.  Pulses:       Radial pulses are 2+ on the right side and 2+ on the left side.        Dorsalis pedis pulses are 2+ on the right side and 2+ on the left side.   Pulmonary/Chest: Effort normal and breath sounds normal. No accessory muscle usage or stridor. No respiratory distress. She has no rhonchi.   Reproducible right breast pain with no overlying skin changes.   Abdominal: Abdomen is soft and flat. She exhibits no distension and no mass. There is no abdominal tenderness. There is no rebound.   Musculoskeletal:         General: No tenderness or edema. Normal range of motion.      Cervical back: Full passive range of motion without pain, normal range of motion and neck supple. No edema or rigidity. No muscular tenderness. Normal range of motion.     Lymphadenopathy:     She has no cervical adenopathy.   Neurological: She is alert and oriented to person, place, and time. She has normal strength. She displays normal reflexes. No sensory deficit. GCS score is 15. GCS eye subscore is 4. GCS verbal subscore is 5. GCS motor subscore is 6.   Skin: Skin is warm and dry. Capillary refill takes less than 2 seconds. No abrasion, no lesion and no rash noted.       ED Course   Procedures  Labs Reviewed   CBC W/ AUTO DIFFERENTIAL - Abnormal; Notable for the following components:       Result Value     RBC 3.89 (*)     Hemoglobin 10.3 (*)     Hematocrit 31.7 (*)     MCH 26.5 (*)     All other components within normal limits   COMPREHENSIVE METABOLIC PANEL - Abnormal; Notable for the following components:    CO2 21 (*)     Glucose 158 (*)     BUN 28 (*)     Albumin 3.1 (*)     Anion Gap 7 (*)     All other components within normal limits   POCT GLUCOSE - Abnormal; Notable for the following components:    POCT Glucose 167 (*)     All other components within normal limits   TROPONIN I   B-TYPE NATRIURETIC PEPTIDE     EKG Readings: (Independently Interpreted)   Initial Reading: No STEMI. Rhythm: Normal Sinus Rhythm. Heart Rate: 71. Ectopy: No Ectopy. Conduction: Normal. ST Segments: Normal ST Segments. T Waves Flipped: III. Axis: Normal. Clinical Impression: Normal Sinus Rhythm   ECG Results              EKG 12-lead (Final result)  Result time 05/08/23 14:15:51      Final result by Interface, Lab In [facility] (05/08/23 14:15:51)                   Narrative:    Test Reason : R07.9,    Vent. Rate : 071 BPM     Atrial Rate : 071 BPM     P-R Int : 116 ms          QRS Dur : 082 ms      QT Int : 376 ms       P-R-T Axes : 074 071 004 degrees     QTc Int : 408 ms    Normal sinus rhythm  Nonspecific T wave abnormality  Abnormal ECG  When compared with ECG of 26-JAN-2023 11:18,  Significant changes have occurred  Confirmed by Jose E Mccarthy MD ([id number]) on 5/8/2023 2:15:38 PM    Referred By:             Confirmed By:Jose E Mccarthy MD                                  Imaging Results              X-Ray Chest AP Portable (Final result)  Result time 05/08/23 12:02:58      Final result by Kaiser Jean MD (05/08/23 12:02:58)                   Impression:      No convincing evidence of acute cardiopulmonary disease.      Electronically signed by: Kaiser Jean  Date:    05/08/2023  Time:    12:02               Narrative:    EXAMINATION:  XR CHEST AP PORTABLE    CLINICAL HISTORY:  Chest pain, unspecified    TECHNIQUE:  Single  frontal view of the chest was performed.    COMPARISON:  Chest radiograph performed 02/18/2023    FINDINGS:  Monitoring leads overlie the chest.  Cardiomediastinal contour appears to be within normal limits.  Lungs appear essentially clear.    No definite pneumothorax or large volume pleural effusion.    No acute findings in the visualized abdomen.  Osseous and soft tissue structures appear without definite acute change.                                    X-Rays:   Independently Interpreted Readings:   Other Readings:  Chest x-ray was reviewed.  No focal consolidation, pneumothorax, pleural effusion or cardiomegaly  Medications   acetaminophen tablet 1,000 mg (1,000 mg Oral Given 5/8/23 1109)   lisinopriL tablet 20 mg (20 mg Oral Given 5/8/23 1234)     Medical Decision Making:   History:   Old Medical Records: I decided to obtain old medical records.  Independently Interpreted Test(s):   I have ordered and independently interpreted EKG Reading(s) - see prior notes  Clinical Tests:   Lab Tests: Ordered and Reviewed  Radiological Study: Ordered and Reviewed  62-year-old female with history of multiple medical problems presents emergency department complaining of intermittent right-sided chest pain.  On exam she is afebrile with stable vital signs.  She is nontoxic and well-appearing.  Differential diagnosis includes was not limited to muscle strain versus sprain versus anemia versus cardiac arrhythmia versus acute coronary syndrome.  I have considered but do not suspect CHF, sepsis, pulmonary embolism, aortic dissection or pneumonia.  Labs and imaging were reviewed.  There are no acute findings as above.  EKG as above.  Given the patient's duration of symptoms, I doubt ACS.  There is no indication for further emergent workup or imaging at this time.  She was given Tylenol with relief of her pain.  She was also given her home lisinopril.  She agrees to follow up with primary care as well as Cardiology.  Return  precautions were given.        Scribe Attestation:   Scribe #1: I performed the above scribed service and the documentation accurately describes the services I performed. I attest to the accuracy of the note.               I personally performed the services described in this documentation. All medical record entries made by the scribe were at my direction and in my presence. I have reviewed the chart and agree that the record reflects my personal performance and is accurate and complete.     Clinical Impression:   Final diagnoses:  [R07.9] Chest pain  [R07.9] Chest pain, unspecified type (Primary)  [T14.8XXA] Muscle strain        ED Disposition Condition    Discharge Stable          ED Prescriptions       Medication Sig Dispense Start Date End Date Auth. Provider    methocarbamoL (ROBAXIN) 750 MG Tab (Expires today) Take 2 tablets (1,500 mg total) by mouth 3 (three) times daily. for 5 days 30 tablet 2023 Amanda Fox MD    oxyCODONE-acetaminophen (PERCOCET) 5-325 mg per tablet () Take 1 tablet by mouth every 6 (six) hours as needed for Pain. 20 tablet 2023 Amanda Fox MD          Follow-up Information       Follow up With Specialties Details Why Contact Info    Sherly Elizalde, NP-C Urgent Care   [address] [facility] [id number] [phone]      Jose E Mccarthy MD Cardiovascular Disease, Interventional Cardiology, Cardiology Schedule an appointment as soon as possible for a visit in 1 week  [address]  [address]  [facility] [address]  [phone]               Amanda Fox MD  23 [phone]

## 2023-05-08 NOTE — ED NOTES
Patient settled into room and gown. Attached to bedside cardiac and vital sign monitoring. NAD and breathing regular. Patient reports pain to the R chest.

## 2023-05-08 NOTE — Clinical Note
"Devika"Tom Bustillo was seen and treated in our emergency department on 5/8/2023.  She may return to work on 05/09/2023.       If you have any questions or concerns, please don't hesitate to call.      Amanda Fox MD"

## 2023-05-08 NOTE — ED NOTES
Patient stuck for IV 3 times. Blood obtained but no IV access. MD monique to wait until lab results come back before attempting IV access again.

## 2023-05-08 NOTE — DISCHARGE INSTRUCTIONS
Take medications as directed.  Follow up with your primary care doctor as well as cardiologist.  Return for any new or worsening complaints.  Keep a daily log of your blood pressure to follow up with her primary care.

## 2023-06-27 ENCOUNTER — HOSPITAL ENCOUNTER (OUTPATIENT)
Facility: HOSPITAL | Age: 63
Discharge: HOME | End: 2023-06-28
Attending: STUDENT IN AN ORGANIZED HEALTH CARE EDUCATION/TRAINING PROGRAM | Admitting: HOSPITALIST
Payer: MEDICAID

## 2023-06-27 DIAGNOSIS — E16.2 HYPOGLYCEMIA: ICD-10-CM

## 2023-06-27 DIAGNOSIS — R07.9 CHEST PAIN: ICD-10-CM

## 2023-06-27 PROBLEM — I50.32 CHRONIC DIASTOLIC HEART FAILURE: Status: ACTIVE | Noted: 2023-06-27

## 2023-06-27 LAB
ALBUMIN SERPL BCP-MCNC: 3.1 G/DL (ref 3.5–5.2)
ALP SERPL-CCNC: 137 U/L (ref 55–135)
ALT SERPL W/O P-5'-P-CCNC: 80 U/L (ref 10–44)
ANION GAP SERPL CALC-SCNC: 6 MMOL/L (ref 8–16)
AST SERPL-CCNC: 44 U/L (ref 10–40)
BACTERIA #/AREA URNS HPF: ABNORMAL /HPF
BASOPHILS # BLD AUTO: 0.05 K/UL (ref 0–0.2)
BASOPHILS NFR BLD: 0.7 % (ref 0–1.9)
BILIRUB SERPL-MCNC: 0.2 MG/DL (ref 0.1–1)
BILIRUB UR QL STRIP: NEGATIVE
BNP SERPL-MCNC: 51 PG/ML (ref 0–99)
BUN SERPL-MCNC: 21 MG/DL (ref 6–20)
CALCIUM SERPL-MCNC: 8.9 MG/DL (ref 8.7–10.5)
CHLORIDE SERPL-SCNC: 111 MMOL/L (ref 95–110)
CLARITY UR: CLEAR
CO2 SERPL-SCNC: 24 MMOL/L (ref 23–29)
COLOR UR: YELLOW
CREAT SERPL-MCNC: 1 MG/DL (ref 0.5–1.4)
D DIMER PPP IA.FEU-MCNC: 0.67 MG/L FEU
DIFFERENTIAL METHOD: ABNORMAL
EOSINOPHIL # BLD AUTO: 0.2 K/UL (ref 0–0.5)
EOSINOPHIL NFR BLD: 2.4 % (ref 0–8)
ERYTHROCYTE [DISTWIDTH] IN BLOOD BY AUTOMATED COUNT: 13.7 % (ref 11.5–14.5)
EST. GFR  (NO RACE VARIABLE): >60 ML/MIN/1.73 M^2
ESTIMATED AVG GLUCOSE: 143 MG/DL (ref 68–131)
GLUCOSE SERPL-MCNC: 66 MG/DL (ref 70–110)
GLUCOSE UR QL STRIP: NEGATIVE
HBA1C MFR BLD: 6.6 % (ref 4–5.6)
HCT VFR BLD AUTO: 33 % (ref 37–48.5)
HGB BLD-MCNC: 10.5 G/DL (ref 12–16)
HGB UR QL STRIP: ABNORMAL
HYALINE CASTS #/AREA URNS LPF: 0 /LPF
IMM GRANULOCYTES # BLD AUTO: 0.01 K/UL (ref 0–0.04)
IMM GRANULOCYTES NFR BLD AUTO: 0.1 % (ref 0–0.5)
KETONES UR QL STRIP: NEGATIVE
LEUKOCYTE ESTERASE UR QL STRIP: NEGATIVE
LIPASE SERPL-CCNC: 47 U/L (ref 4–60)
LYMPHOCYTES # BLD AUTO: 2.7 K/UL (ref 1–4.8)
LYMPHOCYTES NFR BLD: 37.8 % (ref 18–48)
MAGNESIUM SERPL-MCNC: 1.9 MG/DL (ref 1.6–2.6)
MCH RBC QN AUTO: 26.4 PG (ref 27–31)
MCHC RBC AUTO-ENTMCNC: 31.8 G/DL (ref 32–36)
MCV RBC AUTO: 83 FL (ref 82–98)
MICROSCOPIC COMMENT: ABNORMAL
MONOCYTES # BLD AUTO: 0.6 K/UL (ref 0.3–1)
MONOCYTES NFR BLD: 9 % (ref 4–15)
NEUTROPHILS # BLD AUTO: 3.6 K/UL (ref 1.8–7.7)
NEUTROPHILS NFR BLD: 50 % (ref 38–73)
NITRITE UR QL STRIP: NEGATIVE
NRBC BLD-RTO: 0 /100 WBC
PH UR STRIP: 6 [PH] (ref 5–8)
PLATELET # BLD AUTO: 265 K/UL (ref 150–450)
PMV BLD AUTO: 9.8 FL (ref 9.2–12.9)
POCT GLUCOSE: 104 MG/DL (ref 70–110)
POCT GLUCOSE: 114 MG/DL (ref 70–110)
POCT GLUCOSE: 128 MG/DL (ref 70–110)
POCT GLUCOSE: 134 MG/DL (ref 70–110)
POCT GLUCOSE: 152 MG/DL (ref 70–110)
POCT GLUCOSE: 162 MG/DL (ref 70–110)
POCT GLUCOSE: 55 MG/DL (ref 70–110)
POCT GLUCOSE: 64 MG/DL (ref 70–110)
POCT GLUCOSE: 79 MG/DL (ref 70–110)
POCT GLUCOSE: 79 MG/DL (ref 70–110)
POCT GLUCOSE: 83 MG/DL (ref 70–110)
POTASSIUM SERPL-SCNC: 4 MMOL/L (ref 3.5–5.1)
PROT SERPL-MCNC: 6.9 G/DL (ref 6–8.4)
PROT UR QL STRIP: ABNORMAL
RBC # BLD AUTO: 3.98 M/UL (ref 4–5.4)
RBC #/AREA URNS HPF: 5 /HPF (ref 0–4)
SODIUM SERPL-SCNC: 141 MMOL/L (ref 136–145)
SP GR UR STRIP: 1.02 (ref 1–1.03)
SQUAMOUS #/AREA URNS HPF: 1 /HPF
TROPONIN I SERPL DL<=0.01 NG/ML-MCNC: 0.01 NG/ML (ref 0–0.03)
TSH SERPL DL<=0.005 MIU/L-ACNC: 0.92 UIU/ML (ref 0.4–4)
URN SPEC COLLECT METH UR: ABNORMAL
UROBILINOGEN UR STRIP-ACNC: NEGATIVE EU/DL
WBC # BLD AUTO: 7.15 K/UL (ref 3.9–12.7)
WBC #/AREA URNS HPF: 1 /HPF (ref 0–5)

## 2023-06-27 PROCEDURE — G0378 HOSPITAL OBSERVATION PER HR: HCPCS

## 2023-06-27 PROCEDURE — 80053 COMPREHEN METABOLIC PANEL: CPT | Performed by: STUDENT IN AN ORGANIZED HEALTH CARE EDUCATION/TRAINING PROGRAM

## 2023-06-27 PROCEDURE — 84443 ASSAY THYROID STIM HORMONE: CPT | Performed by: STUDENT IN AN ORGANIZED HEALTH CARE EDUCATION/TRAINING PROGRAM

## 2023-06-27 PROCEDURE — 25000003 PHARM REV CODE 250: Performed by: STUDENT IN AN ORGANIZED HEALTH CARE EDUCATION/TRAINING PROGRAM

## 2023-06-27 PROCEDURE — 82962 GLUCOSE BLOOD TEST: CPT | Mod: 91

## 2023-06-27 PROCEDURE — 96375 TX/PRO/DX INJ NEW DRUG ADDON: CPT | Mod: 59

## 2023-06-27 PROCEDURE — 83036 HEMOGLOBIN GLYCOSYLATED A1C: CPT | Performed by: STUDENT IN AN ORGANIZED HEALTH CARE EDUCATION/TRAINING PROGRAM

## 2023-06-27 PROCEDURE — 83690 ASSAY OF LIPASE: CPT | Performed by: STUDENT IN AN ORGANIZED HEALTH CARE EDUCATION/TRAINING PROGRAM

## 2023-06-27 PROCEDURE — 25000003 PHARM REV CODE 250: Performed by: NURSE PRACTITIONER

## 2023-06-27 PROCEDURE — 93005 ELECTROCARDIOGRAM TRACING: CPT

## 2023-06-27 PROCEDURE — 83735 ASSAY OF MAGNESIUM: CPT | Performed by: STUDENT IN AN ORGANIZED HEALTH CARE EDUCATION/TRAINING PROGRAM

## 2023-06-27 PROCEDURE — 25500020 PHARM REV CODE 255: Performed by: STUDENT IN AN ORGANIZED HEALTH CARE EDUCATION/TRAINING PROGRAM

## 2023-06-27 PROCEDURE — 96361 HYDRATE IV INFUSION ADD-ON: CPT

## 2023-06-27 PROCEDURE — 83880 ASSAY OF NATRIURETIC PEPTIDE: CPT | Performed by: STUDENT IN AN ORGANIZED HEALTH CARE EDUCATION/TRAINING PROGRAM

## 2023-06-27 PROCEDURE — 93010 ELECTROCARDIOGRAM REPORT: CPT | Mod: ,,, | Performed by: INTERNAL MEDICINE

## 2023-06-27 PROCEDURE — 85379 FIBRIN DEGRADATION QUANT: CPT | Performed by: STUDENT IN AN ORGANIZED HEALTH CARE EDUCATION/TRAINING PROGRAM

## 2023-06-27 PROCEDURE — 99285 EMERGENCY DEPT VISIT HI MDM: CPT | Mod: 25

## 2023-06-27 PROCEDURE — 93010 EKG 12-LEAD: ICD-10-PCS | Mod: ,,, | Performed by: INTERNAL MEDICINE

## 2023-06-27 PROCEDURE — 63600175 PHARM REV CODE 636 W HCPCS: Performed by: STUDENT IN AN ORGANIZED HEALTH CARE EDUCATION/TRAINING PROGRAM

## 2023-06-27 PROCEDURE — 85025 COMPLETE CBC W/AUTO DIFF WBC: CPT | Performed by: STUDENT IN AN ORGANIZED HEALTH CARE EDUCATION/TRAINING PROGRAM

## 2023-06-27 PROCEDURE — 84484 ASSAY OF TROPONIN QUANT: CPT | Performed by: STUDENT IN AN ORGANIZED HEALTH CARE EDUCATION/TRAINING PROGRAM

## 2023-06-27 PROCEDURE — 96365 THER/PROPH/DIAG IV INF INIT: CPT

## 2023-06-27 PROCEDURE — 81000 URINALYSIS NONAUTO W/SCOPE: CPT | Performed by: STUDENT IN AN ORGANIZED HEALTH CARE EDUCATION/TRAINING PROGRAM

## 2023-06-27 RX ORDER — PANTOPRAZOLE SODIUM 40 MG/1
40 TABLET, DELAYED RELEASE ORAL DAILY
Status: DISCONTINUED | OUTPATIENT
Start: 2023-06-27 | End: 2023-06-28 | Stop reason: HOSPADM

## 2023-06-27 RX ORDER — PREDNISOLONE ACETATE 10 MG/ML
SUSPENSION/ DROPS OPHTHALMIC
COMMUNITY
Start: 2023-06-13

## 2023-06-27 RX ORDER — DEXTROSE 40 %
30 GEL (GRAM) ORAL
Status: DISCONTINUED | OUTPATIENT
Start: 2023-06-27 | End: 2023-06-28 | Stop reason: HOSPADM

## 2023-06-27 RX ORDER — FUROSEMIDE 20 MG/1
20 TABLET ORAL DAILY
Status: ON HOLD | COMMUNITY
Start: 2023-06-07 | End: 2024-01-18

## 2023-06-27 RX ORDER — ASPIRIN 81 MG/1
81 TABLET ORAL DAILY
COMMUNITY

## 2023-06-27 RX ORDER — ATORVASTATIN CALCIUM 40 MG/1
80 TABLET, FILM COATED ORAL DAILY
Status: DISCONTINUED | OUTPATIENT
Start: 2023-06-27 | End: 2023-06-28 | Stop reason: HOSPADM

## 2023-06-27 RX ORDER — POTASSIUM CHLORIDE 750 MG/1
10 TABLET, EXTENDED RELEASE ORAL DAILY
Status: ON HOLD | COMMUNITY
Start: 2023-06-06 | End: 2024-01-18

## 2023-06-27 RX ORDER — DULAGLUTIDE 3 MG/.5ML
3 INJECTION, SOLUTION SUBCUTANEOUS
COMMUNITY
Start: 2023-06-05

## 2023-06-27 RX ORDER — CLOBETASOL PROPIONATE 0.5 MG/G
OINTMENT TOPICAL 2 TIMES DAILY
COMMUNITY
Start: 2023-03-27

## 2023-06-27 RX ORDER — HYDRALAZINE HYDROCHLORIDE 20 MG/ML
10 INJECTION INTRAMUSCULAR; INTRAVENOUS EVERY 8 HOURS PRN
Status: DISCONTINUED | OUTPATIENT
Start: 2023-06-27 | End: 2023-06-28 | Stop reason: HOSPADM

## 2023-06-27 RX ORDER — LISINOPRIL 20 MG/1
20 TABLET ORAL DAILY
Status: DISCONTINUED | OUTPATIENT
Start: 2023-06-27 | End: 2023-06-28 | Stop reason: HOSPADM

## 2023-06-27 RX ORDER — ALBUTEROL SULFATE 90 UG/1
2 AEROSOL, METERED RESPIRATORY (INHALATION) EVERY 6 HOURS PRN
Status: ON HOLD | COMMUNITY
Start: 2023-01-07 | End: 2024-01-18 | Stop reason: HOSPADM

## 2023-06-27 RX ORDER — POTASSIUM CHLORIDE 20 MEQ/1
20 TABLET, EXTENDED RELEASE ORAL 2 TIMES DAILY
Status: DISCONTINUED | OUTPATIENT
Start: 2023-06-27 | End: 2023-06-28 | Stop reason: HOSPADM

## 2023-06-27 RX ORDER — FUROSEMIDE 20 MG/1
20 TABLET ORAL DAILY
Status: DISCONTINUED | OUTPATIENT
Start: 2023-06-27 | End: 2023-06-28 | Stop reason: HOSPADM

## 2023-06-27 RX ORDER — DUPILUMAB 300 MG/2ML
INJECTION, SOLUTION SUBCUTANEOUS
COMMUNITY
Start: 2023-06-12

## 2023-06-27 RX ORDER — HYDRALAZINE HYDROCHLORIDE 20 MG/ML
10 INJECTION INTRAMUSCULAR; INTRAVENOUS
Status: COMPLETED | OUTPATIENT
Start: 2023-06-27 | End: 2023-06-27

## 2023-06-27 RX ORDER — LISINOPRIL 20 MG/1
20 TABLET ORAL
Status: COMPLETED | OUTPATIENT
Start: 2023-06-27 | End: 2023-06-27

## 2023-06-27 RX ORDER — BUDESONIDE AND FORMOTEROL FUMARATE DIHYDRATE 160; 4.5 UG/1; UG/1
2 AEROSOL RESPIRATORY (INHALATION) 2 TIMES DAILY
Status: ON HOLD | COMMUNITY
Start: 2023-01-11 | End: 2024-01-18

## 2023-06-27 RX ORDER — CLONIDINE HYDROCHLORIDE 0.1 MG/1
0.2 TABLET ORAL
Status: COMPLETED | OUTPATIENT
Start: 2023-06-27 | End: 2023-06-27

## 2023-06-27 RX ORDER — METOPROLOL TARTRATE 25 MG/1
25 TABLET, FILM COATED ORAL 2 TIMES DAILY
Status: DISCONTINUED | OUTPATIENT
Start: 2023-06-27 | End: 2023-06-28 | Stop reason: HOSPADM

## 2023-06-27 RX ORDER — ATORVASTATIN CALCIUM 40 MG/1
40 TABLET, FILM COATED ORAL NIGHTLY
COMMUNITY
Start: 2023-02-08

## 2023-06-27 RX ORDER — IBUPROFEN 200 MG
16 TABLET ORAL
Status: DISCONTINUED | OUTPATIENT
Start: 2023-06-27 | End: 2023-06-28 | Stop reason: HOSPADM

## 2023-06-27 RX ORDER — TRAMADOL HYDROCHLORIDE 50 MG/1
50 TABLET ORAL EVERY 6 HOURS PRN
Status: DISCONTINUED | OUTPATIENT
Start: 2023-06-27 | End: 2023-06-28 | Stop reason: HOSPADM

## 2023-06-27 RX ORDER — DEXTROSE 40 %
15 GEL (GRAM) ORAL
Status: DISCONTINUED | OUTPATIENT
Start: 2023-06-27 | End: 2023-06-28 | Stop reason: HOSPADM

## 2023-06-27 RX ORDER — DEXTROSE MONOHYDRATE 50 MG/ML
INJECTION, SOLUTION INTRAVENOUS CONTINUOUS
Status: DISCONTINUED | OUTPATIENT
Start: 2023-06-27 | End: 2023-06-27

## 2023-06-27 RX ORDER — NAPROXEN SODIUM 220 MG/1
81 TABLET, FILM COATED ORAL DAILY
Status: DISCONTINUED | OUTPATIENT
Start: 2023-06-27 | End: 2023-06-28 | Stop reason: HOSPADM

## 2023-06-27 RX ORDER — GLUCAGON 1 MG
1 KIT INJECTION
Status: DISCONTINUED | OUTPATIENT
Start: 2023-06-27 | End: 2023-06-28 | Stop reason: HOSPADM

## 2023-06-27 RX ORDER — GABAPENTIN 300 MG/1
300 CAPSULE ORAL 2 TIMES DAILY PRN
COMMUNITY
Start: 2023-03-27

## 2023-06-27 RX ORDER — ACETAMINOPHEN 325 MG/1
650 TABLET ORAL EVERY 6 HOURS PRN
Status: DISCONTINUED | OUTPATIENT
Start: 2023-06-27 | End: 2023-06-28 | Stop reason: HOSPADM

## 2023-06-27 RX ORDER — IBUPROFEN 200 MG
24 TABLET ORAL
Status: DISCONTINUED | OUTPATIENT
Start: 2023-06-27 | End: 2023-06-28 | Stop reason: HOSPADM

## 2023-06-27 RX ORDER — BUMETANIDE 0.25 MG/ML
2 INJECTION INTRAMUSCULAR; INTRAVENOUS
Status: COMPLETED | OUTPATIENT
Start: 2023-06-27 | End: 2023-06-27

## 2023-06-27 RX ADMIN — POTASSIUM CHLORIDE 20 MEQ: 1500 TABLET, EXTENDED RELEASE ORAL at 09:06

## 2023-06-27 RX ADMIN — BUMETANIDE 2 MG: 0.25 INJECTION INTRAMUSCULAR; INTRAVENOUS at 03:06

## 2023-06-27 RX ADMIN — METOPROLOL TARTRATE 25 MG: 25 TABLET, FILM COATED ORAL at 09:06

## 2023-06-27 RX ADMIN — CLONIDINE HYDROCHLORIDE 0.2 MG: 0.1 TABLET ORAL at 06:06

## 2023-06-27 RX ADMIN — DEXTROSE MONOHYDRATE 250 ML: 100 INJECTION, SOLUTION INTRAVENOUS at 02:06

## 2023-06-27 RX ADMIN — LISINOPRIL 20 MG: 20 TABLET ORAL at 09:06

## 2023-06-27 RX ADMIN — HYDRALAZINE HYDROCHLORIDE 10 MG: 20 INJECTION INTRAMUSCULAR; INTRAVENOUS at 05:06

## 2023-06-27 RX ADMIN — ATORVASTATIN CALCIUM 80 MG: 40 TABLET, FILM COATED ORAL at 09:06

## 2023-06-27 RX ADMIN — FUROSEMIDE 20 MG: 20 TABLET ORAL at 01:06

## 2023-06-27 RX ADMIN — ACETAMINOPHEN 650 MG: 325 TABLET ORAL at 11:06

## 2023-06-27 RX ADMIN — IOHEXOL 75 ML: 350 INJECTION, SOLUTION INTRAVENOUS at 03:06

## 2023-06-27 RX ADMIN — ASPIRIN 81 MG CHEWABLE TABLET 81 MG: 81 TABLET CHEWABLE at 09:06

## 2023-06-27 RX ADMIN — PANTOPRAZOLE SODIUM 40 MG: 40 TABLET, DELAYED RELEASE ORAL at 09:06

## 2023-06-27 RX ADMIN — LISINOPRIL 20 MG: 20 TABLET ORAL at 05:06

## 2023-06-27 RX ADMIN — DEXTROSE MONOHYDRATE: 50 INJECTION, SOLUTION INTRAVENOUS at 06:06

## 2023-06-27 RX ADMIN — ACETAMINOPHEN 650 MG: 325 TABLET ORAL at 08:06

## 2023-06-27 NOTE — NURSING
Arrived to floor room 420 via stretcher ambulated to bed, AAO x 4 , can make all her needs known, denies any pain at this, safety maintained,[facility]  Nurses Note -- 4 Eyes      6/27/2023       Skin assessed on: Admit      [x] No Pressure Injuries Present    []Prevention Measures Documented    [] Yes LDA  for Pressure Injury Previously documented     [] Yes New Pressure Injury Discovered   [] LDA for New Pressure Injury Added      Attending RN:  Letty Chávez RN     Second RN:  Zafar Henry RN

## 2023-06-27 NOTE — ASSESSMENT & PLAN NOTE
PE in the 1980s.  Complaint of right-sided chest pain due to musculoskeletal origin  CTA chest no PE, EKG no evidence of ischemia and troponin BNP negative

## 2023-06-27 NOTE — ASSESSMENT & PLAN NOTE
Uncontrolled.  Patient states to lisinopril and metoprolol however metoprolol is only taken once daily  Switch metoprolol to XL  Hydralazine p.r.n.

## 2023-06-27 NOTE — ED PROVIDER NOTES
Encounter Date: 6/27/2023    SCRIBE #1 NOTE: I, Shane Weiner, am scribing for, and in the presence of,  Phil Du MD.     History     Chief Complaint   Patient presents with    Shortness of Breath    Headache    Dizziness    Nausea     Pt presents to ED c/o sob, ha, dizziness, nausea and blurry vision x 1 months.  Denies cp, vomiting, numbness, tingling, weakness or any other symptoms. Hx of DM.  Pt reports low blood sugar readings today.  Pain 5/10.     63 y/o female with DM, HTN, HLD, PE, asthma, neuropathy, anxiety, depression presents to the ED with acute on chronic shortness of breath. She says that she always has STOUT and orthopnea. She also notes nausea, chronic L eye pain, dry cough, sharp headache, and sweating at night. Patient expresses concern about her blood glucose, saying that it has been dropping into the ~50s between meals and she feels nauseous during these times. She denies fever, chills, vomiting, diarrhea, dysuria, hematuria, or any othe associated symptoms. No missed meals. She endorses compliance with dulaglutide, insulin and she says that she has been on the same dose for a while. She is also on furosemide, among others. No PO DM medications. NKDA.     The history is provided by the patient. No  was used.   Review of patient's allergies indicates:  No Known Allergies  Past Medical History:   Diagnosis Date    Asthma     Diabetes mellitus     Diabetes mellitus     Hyperlipidemia     Hypertension     Neuropathy     Pulmonary embolism 1998     Past Surgical History:   Procedure Laterality Date    CYST REMOVAL      HERNIA REPAIR      HYSTERECTOMY      TONSILLECTOMY      TUBAL LIGATION       Family History   Problem Relation Age of Onset    Kidney disease Mother     Kidney disease Father     Stroke Maternal Uncle      Social History     Tobacco Use    Smoking status: Never    Smokeless tobacco: Never   Substance Use Topics    Alcohol use: No    Drug use: No     Review of  Systems   Constitutional:  Positive for diaphoresis. Negative for chills and fever.   HENT:  Negative for facial swelling and sore throat.    Eyes:  Positive for pain (L). Negative for visual disturbance.   Respiratory:  Positive for cough and shortness of breath.    Cardiovascular:  Negative for chest pain and palpitations.   Gastrointestinal:  Positive for nausea. Negative for abdominal pain, diarrhea and vomiting.   Genitourinary:  Negative for dysuria and hematuria.   Musculoskeletal:  Negative for back pain.   Skin:  Negative for rash.   Neurological:  Positive for headaches. Negative for weakness.   Hematological:  Does not bruise/bleed easily.   Psychiatric/Behavioral: Negative.       Physical Exam     Initial Vitals [23 0028]   BP Pulse Resp Temp SpO2   (!) 217/91 70 18 99.1 °F (37.3 °C) 98 %      MAP       --         Physical Exam    Nursing note and vitals reviewed.  Constitutional: She appears well-developed and well-nourished. She is not diaphoretic. She is Obese . No distress.   HENT:   Head: Normocephalic and atraumatic.   Right Ear: External ear normal.   Left Ear: External ear normal.   Nose: Nose normal.   Eyes: Conjunctivae are normal. Pupils are equal, round, and reactive to light. No scleral icterus.   Neck: Neck supple. No tracheal deviation present.   Normal range of motion.  Cardiovascular:  Normal rate, regular rhythm and normal heart sounds.           Pulses:       Dorsalis pedis pulses are 2+ on the right side and 2+ on the left side.   Pulmonary/Chest: Breath sounds normal. No respiratory distress.   Abdominal: Abdomen is soft. Bowel sounds are normal. She exhibits no mass. There is no abdominal tenderness. There is no rebound and no guarding.   Musculoskeletal:      Cervical back: Normal range of motion and neck supple.      Right lower le+ Pitting Edema present.      Left lower le+ Pitting Edema present.     Neurological: She is alert and oriented to person, place, and  time. She has normal strength. GCS eye subscore is 4. GCS verbal subscore is 5. GCS motor subscore is 6.   Skin: Skin is warm and dry.   Psychiatric: She has a normal mood and affect. Thought content normal.       ED Course   Procedures  Labs Reviewed   CBC W/ AUTO DIFFERENTIAL - Abnormal; Notable for the following components:       Result Value    RBC 3.98 (*)     Hemoglobin 10.5 (*)     Hematocrit 33.0 (*)     MCH 26.4 (*)     MCHC 31.8 (*)     All other components within normal limits   COMPREHENSIVE METABOLIC PANEL - Abnormal; Notable for the following components:    Chloride 111 (*)     Glucose 66 (*)     BUN 21 (*)     Albumin 3.1 (*)     Alkaline Phosphatase 137 (*)     AST 44 (*)     ALT 80 (*)     Anion Gap 6 (*)     All other components within normal limits   URINALYSIS, REFLEX TO URINE CULTURE - Abnormal; Notable for the following components:    Protein, UA 2+ (*)     Occult Blood UA Trace (*)     All other components within normal limits    Narrative:     Specimen Source->Urine   D DIMER, QUANTITATIVE - Abnormal; Notable for the following components:    D-Dimer 0.67 (*)     All other components within normal limits   URINALYSIS MICROSCOPIC - Abnormal; Notable for the following components:    RBC, UA 5 (*)     All other components within normal limits    Narrative:     Specimen Source->Urine   POCT GLUCOSE - Abnormal; Notable for the following components:    POCT Glucose 55 (*)     All other components within normal limits   POCT GLUCOSE - Abnormal; Notable for the following components:    POCT Glucose 162 (*)     All other components within normal limits   POCT GLUCOSE - Abnormal; Notable for the following components:    POCT Glucose 64 (*)     All other components within normal limits   LIPASE   B-TYPE NATRIURETIC PEPTIDE   TROPONIN I   MAGNESIUM   HEMOGLOBIN A1C   POCT GLUCOSE   POCT GLUCOSE   POCT GLUCOSE MONITORING CONTINUOUS   POCT GLUCOSE MONITORING CONTINUOUS          Imaging Results               CTA Chest Non-Coronary (PE Studies) (Final result)  Result time 06/27/23 05:05:12      Final result by Jaspal Kwon MD (06/27/23 05:05:12)                   Impression:      There is no large central saddle type pulmonary embolus, when accounting for artifact and limitations of the exam there is no additional evidence for pulmonary embolus on this exam.    Enlarged heterogeneous appearance of the thyroid, most notably the left lobe of the thyroid appearing similar to the prior study, for which clinical and historical correlation is needed to determine need for further evaluation and follow-up.    The lungs demonstrate mild atelectatic change, mild ground-glass infiltrates.    Additional findings as above.      Electronically signed by: Jaspal Kwon  Date:    06/27/2023  Time:    05:05               Narrative:    EXAMINATION:  CTA CHEST NON CORONARY (PE STUDIES)    CLINICAL HISTORY:  Pulmonary embolism (PE) suspected, positive D-dimer;    TECHNIQUE:  Low dose axial images, sagittal and coronal reformations were obtained from the thoracic inlet to the lung bases following the IV administration of 75 mL of Omnipaque 350.  Contrast timing was optimized to evaluate the pulmonary arteries.  MIP images were performed.    COMPARISON:  Chest radiograph June 27, 2023, CT examination of the chest December 20, 2022    FINDINGS:  There is no large central saddle type pulmonary embolus.  The pulmonary arterial vascular demonstrate heterogeneity consistent with artifact, when accounting for artifact and limitations of the exam an abnormal pattern of pulmonary arterial filling defects specific for pulmonary emboli is not seen.    The thyroid appears enlarged and heterogeneous most notably the left lobe of the thyroid appearing similar to the prior study for which clinical and historical correlation is needed.    The thoracic aorta demonstrates appropriate opacification.  Atherosclerotic change noted.  There is no enlarged  mediastinal or hilar adenopathy.  There is no pericardial effusion.    The lungs demonstrate mild motion artifact and atelectatic change mild patchy ground-glass infiltrates without dense consolidation.  There is no pleural effusion and there is no evidence for pneumothorax.    Limited imaging of the upper abdomen demonstrates nonspecific appearance of mild prominence of the stomach with ingested material.  There is no acute upper abdominal inflammatory change seen.    The osseous structures demonstrate chronic change.                                       X-Ray Chest AP Portable (Final result)  Result time 06/27/23 01:08:20      Final result by Neal Miles MD (06/27/23 01:08:20)                   Impression:      No acute abnormality.      Electronically signed by: Neal Miles  Date:    06/27/2023  Time:    01:08               Narrative:    EXAMINATION:  XR CHEST AP PORTABLE    CLINICAL HISTORY:  CP, SOB;    TECHNIQUE:  Single frontal view of the chest was performed.    COMPARISON:  Of 05/08/2023    FINDINGS:  The lungs are clear, with normal appearance of pulmonary vasculature and no pleural effusion or pneumothorax.    The cardiac silhouette is normal in size. The hilar and mediastinal contours are unremarkable.    Bones are intact.  Atherosclerotic calcifications in the carotids are noted bilaterally.                                       Medications   dextrose 5 % (D5W) infusion ( Intravenous New Bag 6/27/23 0618)   cloNIDine tablet 0.2 mg (has no administration in time range)   hydrALAZINE injection 10 mg (10 mg Intravenous Given 6/27/23 0519)   dextrose 10% bolus 250 mL 250 mL (0 mLs Intravenous Stopped 6/27/23 0316)   bumetanide injection 2 mg (2 mg Intravenous Given 6/27/23 0322)   iohexoL (OMNIPAQUE 350) injection 75 mL (75 mLs Intravenous Given 6/27/23 0337)   lisinopriL tablet 20 mg (20 mg Oral Given 6/27/23 0556)              Scribe Attestation:   Scribe #1: I performed the above scribed  service and the documentation accurately describes the services I performed. I attest to the accuracy of the note.      ED Course as of 06/27/23 2118 Tue Jun 27, 2023   0101 EKG:  Rate 68, regular, sinus rhythm, intervals within normal limits, no ST elevations or depressions noted, similar to previous.  Interpreted by me, reviewed by me. [CC]   0126 X-Ray Chest AP Portable     Impression:     No acute abnormality.    [CC]   0309 POCT Glucose(!): 55 [CC]   0542 CTA Chest Non-Coronary (PE Studies)  Impression:     There is no large central saddle type pulmonary embolus, when accounting for artifact and limitations of the exam there is no additional evidence for pulmonary embolus on this exam.     Enlarged heterogeneous appearance of the thyroid, most notably the left lobe of the thyroid appearing similar to the prior study, for which clinical and historical correlation is needed to determine need for further evaluation and follow-up.     The lungs demonstrate mild atelectatic change, mild ground-glass infiltrates.     Additional findings as above.    [CC]   0641 Patient is a 62-year-old female presenting to the emergency department for evaluation of multiple symptoms.  Mild headache, mild shortness of breath that she states is chronic, lower extremity swelling, recurrent hypoglycemia at home.  She takes Trulicity as well as long-acting insulin.  Recurrent hypoglycemia noted in the emergency department.  No evidence of pneumonia on chest x-ray or CTA.  UA is not indicative of UTI.  Patient afebrile, no leukocytosis, infectious etiology less likely.  D-dimer was elevated.  No evidence of PE noted on CTA.  BNP is normal.  Troponin normal.  EKG nonischemic.  Chest wall pain on exam.  Lipase normal, doubt pancreatitis.  Lungs are clear to auscultation.  Patient with lower extremity edema, concern for dependent edema potentially.  Patient given Bumex in the emergency department.  Given patient's recurrent hypoglycemia  have started patient on D5 drip at 100 mL/hr.  I spoke withHaydenuy, with Hospital Medicine to place in observation. [CC]   6882 Patient hypertensive in the emergency department.  Initial blood pressure reduction with hydralazine.  She was given her lisinopril.  Clonidine for continued hypertension as well.  Creatinine is normal.  GFR is normal.  Think ACS is less likely.  No pulmonary edema.  Patient without encephalopathy.  Doubt hypertensive emergency. [CC]      ED Course User Index  [CC] Phil Du MD          I, Phil Du MD, personally performed the services described in this documentation.  All medical record entries made by the scribe were at my direction and in my presence.  I have reviewed the chart and agree that the record reflects my personal performance and is accurate and complete.       Clinical Impression:   Final diagnoses:  [R07.9] Chest pain  [E16.2] Hypoglycemia        ED Disposition Condition    Observation                 Phil Du MD  06/27/23 1419

## 2023-06-27 NOTE — ED NOTES
Pt wants to see doctor, worried and crying regarding her decreasing trends in blood glucose level. Informed MD

## 2023-06-27 NOTE — ASSESSMENT & PLAN NOTE
Presents to the hospital for complaint of low blood sugar 50s despite eating lunch and dinner.  Reports headache dizziness nausea without vomiting in which she contributes to low blood sugar.  Per patient hemoglobin A1c 7.6 a few months ago however continue with same dose Trulicity and Toujeo  Dextrose drip started in ED with improvement in blood sugar 104 currently  No UTI or pneumonia or obvious infectious process  Continue dextrose drip until 2 consecutive readings above 120  Add hemoglobin A1c

## 2023-06-27 NOTE — H&P
[facility]  History & Physical    Patient Name: Devika Bustillo  MRN: [id number]  Patient Class: OP- Observation  Admission Date: 6/27/2023  Attending Physician: Jim King MD   Primary Care Provider: JADE Cabrera         Patient information was obtained from patient and ER records.     Subjective:     Principal Problem:Hypoglycemia    Chief Complaint:   Chief Complaint   Patient presents with    Shortness of Breath    Headache    Dizziness    Nausea     Pt presents to ED c/o sob, ha, dizziness, nausea and blurry vision x 1 months.  Denies cp, vomiting, numbness, tingling, weakness or any other symptoms. Hx of DM.  Pt reports low blood sugar readings today.  Pain 5/10.        HPI: Devika Bustillo is a 63 yo female with significant history for hypertension, hyperlipidemia, asthma, history of PE reported 1980s, chronic low back pain and diabetes type 2 who presents to the hospital for low blood sugar.  Patient states blood sugar been running low since last week requiring her to eat to raise blood sugar.  Yesterday blood sugar 50s despite eating lunch and dinner.  Associated symptoms include headache nausea and dizziness denies room spinning.  Compliant with home insulin Toujeo and Trulicity.  States last hemoglobin A1c a few months ago was 7.6.  No aggravating or relieving factors.  Currently denies shortness a breath.  Reports right chest wall pain in which she contributes to moving things around at home recently.  Patient works at a  few hours a day.  Noted both lower extremity edema however better than baseline.  Primary care switch Bumex 2 mg daily 2 Lasix 20 mg daily.    Denies smoking alcohol or use of illicit drugs.    11/04/2021 2D echo showed normal EF 65%, grade 1 diastolic dysfunction.    EKG normal sinus rhythm no evidence of ischemia.  Troponin negative  D-dimer 0.67.  CTA no PE.  Dextrose drip started in ED with improvement in blood sugar.  Currently  10      Past Medical History:   Diagnosis Date    Asthma     Diabetes mellitus     Diabetes mellitus     Hyperlipidemia     Hypertension     Neuropathy     Pulmonary embolism 1998       Past Surgical History:   Procedure Laterality Date    CYST REMOVAL      HERNIA REPAIR      HYSTERECTOMY      TONSILLECTOMY      TUBAL LIGATION         Review of patient's allergies indicates:  No Known Allergies    No current facility-administered medications on file prior to encounter.     Current Outpatient Medications on File Prior to Encounter   Medication Sig    acetaminophen (TYLENOL) 500 MG tablet Take 1 tablet (500 mg total) by mouth every 4 (four) hours as needed for Pain or Temperature greater than (100.5 or greater).    albuterol (PROVENTIL/VENTOLIN HFA) 90 mcg/actuation inhaler 2 puffs every 6 (six) hours as needed.    aspirin (ECOTRIN) 81 MG EC tablet Take 81 mg by mouth once daily.    atorvastatin (LIPITOR) 40 MG tablet Take 40 mg by mouth every evening.    clobetasol 0.05% (TEMOVATE) 0.05 % Oint Apply topically 2 (two) times daily.    DUPIXENT  mg/2 mL PnIj INJECT 300MG UNDER THE SKIN EVERY OTHER WEEK AS DIRECTED    gabapentin (NEURONTIN) 300 MG capsule Take 300 mg by mouth 2 (two) times daily as needed.    LASIX 20 mg tablet Take 20 mg by mouth once daily.    lisinopriL (PRINIVIL,ZESTRIL) 20 MG tablet Take 1 tablet (20 mg total) by mouth once daily.    potassium chloride (KLOR-CON) 10 MEQ TbSR Take 10 mEq by mouth once daily.    prednisoLONE acetate (PRED FORTE) 1 % DrpS INSTILL 1 DROP 4 TIMES DAILY INTO LEFT EYE FOR 2 WEEKS, THEN 1 DROP 3 TIMES DAILY FOR 2 WEEKS, THEN 1 DROP TWICE DAILY FOR 2 WEEKS    SYMBICORT 160-4.5 mcg/actuation HFAA 2 puffs 2 (two) times daily.    TOUJEO SOLOSTAR U-300 INSULIN 300 unit/mL (1.5 mL) InPn pen Inject 55 Units into the skin every evening.    TRULICITY 3 mg/0.5 mL pen injector Inject 3 mg into the skin every 7 days. Fridays    blood sugar diagnostic  "Strp To check BG 4 times daily, to use with insurance preferred meter    blood-glucose meter kit To check BG 4 times daily, to use with insurance preferred meter    lancets Misc To check BG 4 times daily, to use with insurance preferred meter    pen needle, diabetic 31 gauge x 3/16" Ndle 1 each.    pen needle, diabetic 32 gauge x 5/32" Ndle Use to inject insulin four times daily    [DISCONTINUED] aspirin 81 MG Chew Take 1 tablet (81 mg total) by mouth once daily.    [DISCONTINUED] atorvastatin (LIPITOR) 80 MG tablet Take 1 tablet (80 mg total) by mouth once daily.    [DISCONTINUED] bumetanide (BUMEX) 2 MG tablet Take 2 mg by mouth once daily.    [DISCONTINUED] clotrimazole-betamethasone 1-0.05% (LOTRISONE) cream Apply topically 2 (two) times daily.    [DISCONTINUED] hydrocortisone 2.5 % cream Apply topically 2 (two) times daily. for 5 days    [DISCONTINUED] metoprolol tartrate (LOPRESSOR) 25 MG tablet Take 1 tablet (25 mg total) by mouth 2 (two) times daily.    [DISCONTINUED] omega-3s-dha-epa-fish oil 1,000-1,400 mg CpDR Take 1 capsule by mouth once daily.    [DISCONTINUED] pantoprazole (PROTONIX) 20 MG tablet Take 1 tablet (20 mg total) by mouth once daily.    [DISCONTINUED] potassium chloride SA (K-DUR,KLOR-CON) 20 MEQ tablet Take 1 tablet (20 mEq total) by mouth 2 (two) times daily.    [DISCONTINUED] traMADoL (ULTRAM) 50 mg tablet Take 1 tablet (50 mg total) by mouth every 6 (six) hours as needed for Pain.    [DISCONTINUED] TRULICITY 0.75 mg/0.5 mL pen injector Inject 0.75 mg into the skin every 7 days.     Family History       Problem Relation (Age of Onset)    Kidney disease Mother, Father    Stroke Maternal Uncle          Tobacco Use    Smoking status: Never    Smokeless tobacco: Never   Substance and Sexual Activity    Alcohol use: No    Drug use: No    Sexual activity: Not Currently     Partners: Male     Review of Systems   Constitutional:  Negative for activity change, diaphoresis and " fatigue.   HENT: Negative.     Eyes: Negative.    Respiratory:  Positive for chest tightness (Right chest). Negative for shortness of breath.    Cardiovascular:  Positive for leg swelling. Negative for palpitations.   Gastrointestinal: Negative.    Endocrine: Negative.    Genitourinary: Negative.    Neurological:  Positive for dizziness and headaches. Negative for tremors, syncope, weakness and numbness.   Hematological: Negative.    Psychiatric/Behavioral: Negative.     Objective:     Vital Signs (Most Recent):  Temp: 98.5 °F (36.9 °C) (06/27/23 0755)  Pulse: 76 (06/27/23 0755)  Resp: 20 (06/27/23 0755)  BP: (!) 161/95 (06/27/23 0755)  SpO2: 99 % (06/27/23 0755) Vital Signs (24h Range):  Temp:  [98.3 °F (36.8 °C)-99.1 °F (37.3 °C)] 98.5 °F (36.9 °C)  Pulse:  [70-79] 76  Resp:  [18-20] 20  SpO2:  [98 %-100 %] 99 %  BP: (161-217)/(80-98) 161/95     Weight: 110.3 kg (243 lb 3.2 oz)  Body mass index is 36.98 kg/m².     Physical Exam  Constitutional:       Appearance: Normal appearance. She is not ill-appearing.   HENT:      Head: Atraumatic.   Cardiovascular:      Rate and Rhythm: Normal rate and regular rhythm.   Pulmonary:      Effort: Pulmonary effort is normal.      Breath sounds: Normal breath sounds.   Abdominal:      Palpations: Abdomen is soft.   Musculoskeletal:         General: Normal range of motion.      Cervical back: Normal range of motion.      Right lower leg: Edema present.      Left lower leg: Edema present.   Skin:     General: Skin is warm.   Neurological:      General: No focal deficit present.      Mental Status: She is alert.              Significant Labs: All pertinent labs within the past 24 hours have been reviewed.    Significant Imaging: I have reviewed all pertinent imaging results/findings within the past 24 hours.    Assessment/Plan:     * Hypoglycemia  Presents to the hospital for complaint of low blood sugar 50s despite eating lunch and dinner.  Reports headache dizziness nausea without  vomiting in which she contributes to low blood sugar.  Per patient hemoglobin A1c 7.6 a few months ago however continue with same dose Trulicity and Toujeo  Dextrose drip started in ED with improvement in blood sugar 104 currently  No UTI or pneumonia or obvious infectious process  Continue dextrose drip until 2 consecutive readings above 120  Add hemoglobin A1c      Diabetes mellitus type II  See above #1    Chronic diastolic heart failure  Last 2D echo in 2021 EF normal with grade 1 diastolic dysfunction  Breathing comfortably on room air lung sound clear  Continue Lasix 20 mg daily      Asthma  No wheezing  DuoNeb p.r.n.      History of pulmonary embolism  PE in the 1980s.  Complaint of right-sided chest pain due to musculoskeletal origin  CTA chest no PE, EKG no evidence of ischemia and troponin BNP negative      Hypertension  Uncontrolled.  Patient states to lisinopril and metoprolol however metoprolol is only taken once daily  Switch metoprolol to XL  Hydralazine p.r.n.      VTE Risk Mitigation (From admission, onward)         Ordered     Place MAHI hose  Until discontinued         06/27/23 0911                   On 06/27/2023, patient should be placed in hospital observation services under my care in collaboration with Neal Palomo MD.      Lizz Cervantes NP  Department of Hospital Medicine  [facility]

## 2023-06-27 NOTE — PLAN OF CARE
06/27/23 1053   Discharge Planning   Assessment Type Discharge Planning Brief Assessment   Resource/Environmental Concerns none   Support Systems Children;Family members   Equipment Currently Used at Home none   Current Living Arrangements home   Patient/Family Anticipates Transition to home   Patient/Family Anticipated Services at Transition none   DME Needed Upon Discharge  none   Discharge Plan A Home with family       [facility] [address], [place] - [address] [place] [address]  Phone: [phone] Fax: [phone]    [facility] [address], [place] - [address] [address]  Phone: [phone] Fax: [phone]

## 2023-06-27 NOTE — ASSESSMENT & PLAN NOTE
Last 2D echo in 2021 EF normal with grade 1 diastolic dysfunction  Breathing comfortably on room air lung sound clear  Continue Lasix 20 mg daily

## 2023-06-27 NOTE — SUBJECTIVE & OBJECTIVE
Past Medical History:   Diagnosis Date    Asthma     Diabetes mellitus     Diabetes mellitus     Hyperlipidemia     Hypertension     Neuropathy     Pulmonary embolism 1998       Past Surgical History:   Procedure Laterality Date    CYST REMOVAL      HERNIA REPAIR      HYSTERECTOMY      TONSILLECTOMY      TUBAL LIGATION         Review of patient's allergies indicates:  No Known Allergies    No current facility-administered medications on file prior to encounter.     Current Outpatient Medications on File Prior to Encounter   Medication Sig    acetaminophen (TYLENOL) 500 MG tablet Take 1 tablet (500 mg total) by mouth every 4 (four) hours as needed for Pain or Temperature greater than (100.5 or greater).    albuterol (PROVENTIL/VENTOLIN HFA) 90 mcg/actuation inhaler 2 puffs every 6 (six) hours as needed.    aspirin (ECOTRIN) 81 MG EC tablet Take 81 mg by mouth once daily.    atorvastatin (LIPITOR) 40 MG tablet Take 40 mg by mouth every evening.    clobetasol 0.05% (TEMOVATE) 0.05 % Oint Apply topically 2 (two) times daily.    DUPIXENT  mg/2 mL PnIj INJECT 300MG UNDER THE SKIN EVERY OTHER WEEK AS DIRECTED    gabapentin (NEURONTIN) 300 MG capsule Take 300 mg by mouth 2 (two) times daily as needed.    LASIX 20 mg tablet Take 20 mg by mouth once daily.    lisinopriL (PRINIVIL,ZESTRIL) 20 MG tablet Take 1 tablet (20 mg total) by mouth once daily.    potassium chloride (KLOR-CON) 10 MEQ TbSR Take 10 mEq by mouth once daily.    prednisoLONE acetate (PRED FORTE) 1 % DrpS INSTILL 1 DROP 4 TIMES DAILY INTO LEFT EYE FOR 2 WEEKS, THEN 1 DROP 3 TIMES DAILY FOR 2 WEEKS, THEN 1 DROP TWICE DAILY FOR 2 WEEKS    SYMBICORT 160-4.5 mcg/actuation HFAA 2 puffs 2 (two) times daily.    TOUJEO SOLOSTAR U-300 INSULIN 300 unit/mL (1.5 mL) InPn pen Inject 55 Units into the skin every evening.    TRULICITY 3 mg/0.5 mL pen injector Inject 3 mg into the skin every 7 days. Fridays    blood sugar diagnostic Strp To check BG 4 times daily, to  "use with insurance preferred meter    blood-glucose meter kit To check BG 4 times daily, to use with insurance preferred meter    lancets Misc To check BG 4 times daily, to use with insurance preferred meter    pen needle, diabetic 31 gauge x 3/16" Ndle 1 each.    pen needle, diabetic 32 gauge x 5/32" Ndle Use to inject insulin four times daily    [DISCONTINUED] aspirin 81 MG Chew Take 1 tablet (81 mg total) by mouth once daily.    [DISCONTINUED] atorvastatin (LIPITOR) 80 MG tablet Take 1 tablet (80 mg total) by mouth once daily.    [DISCONTINUED] bumetanide (BUMEX) 2 MG tablet Take 2 mg by mouth once daily.    [DISCONTINUED] clotrimazole-betamethasone 1-0.05% (LOTRISONE) cream Apply topically 2 (two) times daily.    [DISCONTINUED] hydrocortisone 2.5 % cream Apply topically 2 (two) times daily. for 5 days    [DISCONTINUED] metoprolol tartrate (LOPRESSOR) 25 MG tablet Take 1 tablet (25 mg total) by mouth 2 (two) times daily.    [DISCONTINUED] omega-3s-dha-epa-fish oil 1,000-1,400 mg CpDR Take 1 capsule by mouth once daily.    [DISCONTINUED] pantoprazole (PROTONIX) 20 MG tablet Take 1 tablet (20 mg total) by mouth once daily.    [DISCONTINUED] potassium chloride SA (K-DUR,KLOR-CON) 20 MEQ tablet Take 1 tablet (20 mEq total) by mouth 2 (two) times daily.    [DISCONTINUED] traMADoL (ULTRAM) 50 mg tablet Take 1 tablet (50 mg total) by mouth every 6 (six) hours as needed for Pain.    [DISCONTINUED] TRULICITY 0.75 mg/0.5 mL pen injector Inject 0.75 mg into the skin every 7 days.     Family History       Problem Relation (Age of Onset)    Kidney disease Mother, Father    Stroke Maternal Uncle          Tobacco Use    Smoking status: Never    Smokeless tobacco: Never   Substance and Sexual Activity    Alcohol use: No    Drug use: No    Sexual activity: Not Currently     Partners: Male     Review of Systems   Constitutional:  Negative for activity change, diaphoresis and fatigue.   HENT: Negative.     Eyes: Negative.  "   Respiratory:  Positive for chest tightness (Right chest). Negative for shortness of breath.    Cardiovascular:  Positive for leg swelling. Negative for palpitations.   Gastrointestinal: Negative.    Endocrine: Negative.    Genitourinary: Negative.    Neurological:  Positive for dizziness and headaches. Negative for tremors, syncope, weakness and numbness.   Hematological: Negative.    Psychiatric/Behavioral: Negative.     Objective:     Vital Signs (Most Recent):  Temp: 98.5 °F (36.9 °C) (06/27/23 0755)  Pulse: 76 (06/27/23 0755)  Resp: 20 (06/27/23 0755)  BP: (!) 161/95 (06/27/23 0755)  SpO2: 99 % (06/27/23 0755) Vital Signs (24h Range):  Temp:  [98.3 °F (36.8 °C)-99.1 °F (37.3 °C)] 98.5 °F (36.9 °C)  Pulse:  [70-79] 76  Resp:  [18-20] 20  SpO2:  [98 %-100 %] 99 %  BP: (161-217)/(80-98) 161/95     Weight: 110.3 kg (243 lb 3.2 oz)  Body mass index is 36.98 kg/m².     Physical Exam  Constitutional:       Appearance: Normal appearance. She is not ill-appearing.   HENT:      Head: Atraumatic.   Cardiovascular:      Rate and Rhythm: Normal rate and regular rhythm.   Pulmonary:      Effort: Pulmonary effort is normal.      Breath sounds: Normal breath sounds.   Abdominal:      Palpations: Abdomen is soft.   Musculoskeletal:         General: Normal range of motion.      Cervical back: Normal range of motion.      Right lower leg: Edema present.      Left lower leg: Edema present.   Skin:     General: Skin is warm.   Neurological:      General: No focal deficit present.      Mental Status: She is alert.              Significant Labs: All pertinent labs within the past 24 hours have been reviewed.    Significant Imaging: I have reviewed all pertinent imaging results/findings within the past 24 hours.

## 2023-06-27 NOTE — ED TRIAGE NOTES
Pt arrived in ED with a C/O infrequent cough, SOB, right sided chest pain, headache, B/L lower limbs swelling, left eye pain and nausea. Pt reports of decreasing her blood glucose level multiple times, despite of not skipping any meals. Pt is on insulin. Pt denies of diarrhea and vomiting.  Pt is AAO x4, vitals-  BP is elevated on arrival. Pt hadn't miss any of her anti hypertensive medicine.

## 2023-06-27 NOTE — LETTER
June 28, 2023         [place] [address]  Phone: [phone]  Fax: [phone]       Patient: Devika Bustillo   YOB: 1960  Date of Visit: 06/28/2023    To Whom It May Concern:    Jony Bustillo  was at [facility] on 06/27/2023 to 06/28/2023. The patient may return to work/school with NO restrictions. If you have any questions or concerns, or if I can be of further assistance, please do not hesitate to contact me.      Sincerely,      Sherri Davalos RN

## 2023-06-27 NOTE — HPI
Devika Bustillo is a 63 yo female with significant history for hypertension, hyperlipidemia, asthma, history of PE reported 1980s, chronic low back pain and diabetes type 2 who presents to the hospital for low blood sugar.  Patient states blood sugar been running low since last week requiring her to eat to raise blood sugar.  Yesterday blood sugar 50s despite eating lunch and dinner.  Associated symptoms include headache nausea and dizziness denies room spinning.  Compliant with home insulin Toujeo and Trulicity.  States last hemoglobin A1c a few months ago was 7.6.  No aggravating or relieving factors.  Currently denies shortness a breath.  Reports right chest wall pain in which she contributes to moving things around at home recently.  Patient works at a  few hours a day.  Noted both lower extremity edema however better than baseline.  Primary care switch Bumex 2 mg daily 2 Lasix 20 mg daily.    Denies smoking alcohol or use of illicit drugs.    11/04/2021 2D echo showed normal EF 65%, grade 1 diastolic dysfunction.    EKG normal sinus rhythm no evidence of ischemia.  Troponin negative  D-dimer 0.67.  CTA no PE.  Dextrose drip started in ED with improvement in blood sugar.  Currently 10

## 2023-06-28 VITALS
TEMPERATURE: 98 F | BODY MASS INDEX: 36.86 KG/M2 | RESPIRATION RATE: 14 BRPM | HEART RATE: 72 BPM | OXYGEN SATURATION: 99 % | SYSTOLIC BLOOD PRESSURE: 174 MMHG | WEIGHT: 243.19 LBS | DIASTOLIC BLOOD PRESSURE: 78 MMHG | HEIGHT: 68 IN

## 2023-06-28 LAB
ALBUMIN SERPL BCP-MCNC: 2.9 G/DL (ref 3.5–5.2)
ALP SERPL-CCNC: 119 U/L (ref 55–135)
ALT SERPL W/O P-5'-P-CCNC: 62 U/L (ref 10–44)
ANION GAP SERPL CALC-SCNC: 8 MMOL/L (ref 8–16)
AST SERPL-CCNC: 30 U/L (ref 10–40)
BASOPHILS # BLD AUTO: 0.03 K/UL (ref 0–0.2)
BASOPHILS NFR BLD: 0.5 % (ref 0–1.9)
BILIRUB SERPL-MCNC: 0.2 MG/DL (ref 0.1–1)
BUN SERPL-MCNC: 34 MG/DL (ref 6–20)
CALCIUM SERPL-MCNC: 9 MG/DL (ref 8.7–10.5)
CHLORIDE SERPL-SCNC: 109 MMOL/L (ref 95–110)
CO2 SERPL-SCNC: 23 MMOL/L (ref 23–29)
CREAT SERPL-MCNC: 1.5 MG/DL (ref 0.5–1.4)
DIFFERENTIAL METHOD: ABNORMAL
EOSINOPHIL # BLD AUTO: 0.1 K/UL (ref 0–0.5)
EOSINOPHIL NFR BLD: 2.1 % (ref 0–8)
ERYTHROCYTE [DISTWIDTH] IN BLOOD BY AUTOMATED COUNT: 13.7 % (ref 11.5–14.5)
EST. GFR  (NO RACE VARIABLE): 40 ML/MIN/1.73 M^2
GLUCOSE SERPL-MCNC: 133 MG/DL (ref 70–110)
HCT VFR BLD AUTO: 35.3 % (ref 37–48.5)
HGB BLD-MCNC: 10.9 G/DL (ref 12–16)
IMM GRANULOCYTES # BLD AUTO: 0.02 K/UL (ref 0–0.04)
IMM GRANULOCYTES NFR BLD AUTO: 0.3 % (ref 0–0.5)
LYMPHOCYTES # BLD AUTO: 2.3 K/UL (ref 1–4.8)
LYMPHOCYTES NFR BLD: 34.4 % (ref 18–48)
MCH RBC QN AUTO: 26.5 PG (ref 27–31)
MCHC RBC AUTO-ENTMCNC: 30.9 G/DL (ref 32–36)
MCV RBC AUTO: 86 FL (ref 82–98)
MONOCYTES # BLD AUTO: 0.6 K/UL (ref 0.3–1)
MONOCYTES NFR BLD: 8.9 % (ref 4–15)
NEUTROPHILS # BLD AUTO: 3.6 K/UL (ref 1.8–7.7)
NEUTROPHILS NFR BLD: 53.8 % (ref 38–73)
NRBC BLD-RTO: 0 /100 WBC
PLATELET # BLD AUTO: 275 K/UL (ref 150–450)
PMV BLD AUTO: 10 FL (ref 9.2–12.9)
POCT GLUCOSE: 126 MG/DL (ref 70–110)
POTASSIUM SERPL-SCNC: 4.5 MMOL/L (ref 3.5–5.1)
PROT SERPL-MCNC: 6.5 G/DL (ref 6–8.4)
RBC # BLD AUTO: 4.11 M/UL (ref 4–5.4)
SODIUM SERPL-SCNC: 140 MMOL/L (ref 136–145)
WBC # BLD AUTO: 6.62 K/UL (ref 3.9–12.7)

## 2023-06-28 PROCEDURE — 25000003 PHARM REV CODE 250: Performed by: NURSE PRACTITIONER

## 2023-06-28 PROCEDURE — 36415 COLL VENOUS BLD VENIPUNCTURE: CPT | Performed by: NURSE PRACTITIONER

## 2023-06-28 PROCEDURE — G0378 HOSPITAL OBSERVATION PER HR: HCPCS

## 2023-06-28 PROCEDURE — 85025 COMPLETE CBC W/AUTO DIFF WBC: CPT | Performed by: NURSE PRACTITIONER

## 2023-06-28 PROCEDURE — 80053 COMPREHEN METABOLIC PANEL: CPT | Performed by: NURSE PRACTITIONER

## 2023-06-28 RX ORDER — PANTOPRAZOLE SODIUM 20 MG/1
20 TABLET, DELAYED RELEASE ORAL DAILY
Qty: 30 TABLET | Refills: 0 | Status: ON HOLD | OUTPATIENT
Start: 2023-06-28 | End: 2024-01-18

## 2023-06-28 RX ADMIN — METOPROLOL TARTRATE 25 MG: 25 TABLET, FILM COATED ORAL at 09:06

## 2023-06-28 RX ADMIN — ATORVASTATIN CALCIUM 80 MG: 40 TABLET, FILM COATED ORAL at 09:06

## 2023-06-28 RX ADMIN — FUROSEMIDE 20 MG: 20 TABLET ORAL at 09:06

## 2023-06-28 RX ADMIN — PANTOPRAZOLE SODIUM 40 MG: 40 TABLET, DELAYED RELEASE ORAL at 09:06

## 2023-06-28 RX ADMIN — LISINOPRIL 20 MG: 20 TABLET ORAL at 09:06

## 2023-06-28 RX ADMIN — ASPIRIN 81 MG CHEWABLE TABLET 81 MG: 81 TABLET CHEWABLE at 09:06

## 2023-06-28 RX ADMIN — POTASSIUM CHLORIDE 20 MEQ: 1500 TABLET, EXTENDED RELEASE ORAL at 09:06

## 2023-06-28 NOTE — PLAN OF CARE
VSS on RA, NAD, Afebrile, voids without difficulty, ambulatory. Discharge instructions explained and handed to pt. Pt verbalizes understanding. IV access dc. Discharged home via wheelchair, surgical mask in place.     Problem: Adult Inpatient Plan of Care  Goal: Plan of Care Review  Outcome: Met  Goal: Patient-Specific Goal (Individualized)  Outcome: Met  Goal: Absence of Hospital-Acquired Illness or Injury  Outcome: Met  Goal: Optimal Comfort and Wellbeing  Outcome: Met  Goal: Readiness for Transition of Care  Outcome: Met     Problem: Diabetes Comorbidity  Goal: Blood Glucose Level Within Targeted Range  Outcome: Met

## 2023-06-28 NOTE — DISCHARGE SUMMARY
[facility] Medicine  Discharge Summary      Patient Name: Devika Bustillo  MRN: [id number]  [facility]: [id number]  Patient Class: OP- Observation  Admission Date: 6/27/2023  Hospital Length of Stay: 0 days  Discharge Date and Time:  06/28/2023 10:26 AM  Attending Physician: Neal Palomo MD  Discharging Provider: Lizz Lay NP  Primary Care Provider: KAYLI CabreraC    Primary Care Team: LIZZ LAY    HPI:   Devika Bustillo is a 61 yo female with significant history for hypertension, hyperlipidemia, asthma, history of PE reported 1980s, chronic low back pain and diabetes type 2 who presents to the hospital for low blood sugar.  Patient states blood sugar been running low since last week requiring her to eat to raise blood sugar.  Yesterday blood sugar 50s despite eating lunch and dinner.  Associated symptoms include headache nausea and dizziness denies room spinning.  Compliant with home insulin Toujeo and Trulicity.  States last hemoglobin A1c a few months ago was 7.6.  No aggravating or relieving factors.  Currently denies shortness a breath.  Reports right chest wall pain in which she contributes to moving things around at home recently.  Patient works at a  few hours a day.  Noted both lower extremity edema however better than baseline.  Primary care switch Bumex 2 mg daily 2 Lasix 20 mg daily.    Denies smoking alcohol or use of illicit drugs.    11/04/2021 2D echo showed normal EF 65%, grade 1 diastolic dysfunction.    EKG normal sinus rhythm no evidence of ischemia.  Troponin negative  D-dimer 0.67.  CTA no PE.  Dextrose drip started in ED with improvement in blood sugar.  Currently 10      * No surgery found *      Hospital Course:   Admission admission to observation for hypoglycemia.  Hemoglobin A1c 6.6.  Blood sugar improved and discontinue dextrose drip blood sugar remains above 120.  Instruct to stop Toujeo.  Okay continue Trulicity.  Patient requesting for discharge home  no further headache dizziness nausea.  All findings and plan were explained to the patient. All questions and concerns were answered. Patient verbalized understanding. Patient is in stable condition to d/c home and has been informed to follow up with PCP.       Goals of Care Treatment Preferences:  Code Status: Full Code      Consults:     No new Assessment & Plan notes have been filed under this hospital service since the last note was generated.  Service: Hospital Medicine    Final Active Diagnoses:    Diagnosis Date Noted POA    PRINCIPAL PROBLEM:  Hypoglycemia [E16.2] 06/27/2023 Yes    Diabetes mellitus type II [E11.9] 02/28/2013 Yes    Chronic diastolic heart failure [I50.32] 06/27/2023 Yes    Asthma [J45.909] 11/04/2021 Yes    History of pulmonary embolism [Z86.711] 05/20/2020 Yes     Chronic    Hypertension [I10] 02/28/2013 Yes      Problems Resolved During this Admission:       Discharged Condition: good    Disposition: Home or Self Care    Follow Up:   Follow-up Information     [facility]. Schedule an appointment as soon as possible for a visit in 1 week(s).    Contact information:  [address] [place] [address] [phone]                       Patient Instructions:      Diet Cardiac     Diet diabetic     Notify your health care provider if you experience any of the following:  temperature >100.4     Notify your health care provider if you experience any of the following:  difficulty breathing or increased cough     Notify your health care provider if you experience any of the following:  increased confusion or weakness     Activity as tolerated       Significant Diagnostic Studies: N/A    Pending Diagnostic Studies:     None         Medications:  Reconciled Home Medications:      Medication List      CONTINUE taking these medications    acetaminophen 500 MG tablet  Commonly known as: TYLENOL  Take 1 tablet (500 mg total) by mouth every 4 (four) hours as needed  "for Pain or Temperature greater than (100.5 or greater).     albuterol 90 mcg/actuation inhaler  Commonly known as: PROVENTIL/VENTOLIN HFA  2 puffs every 6 (six) hours as needed.     aspirin 81 MG EC tablet  Commonly known as: ECOTRIN  Take 81 mg by mouth once daily.     atorvastatin 40 MG tablet  Commonly known as: LIPITOR  Take 40 mg by mouth every evening.     blood sugar diagnostic Strp  To check BG 4 times daily, to use with insurance preferred meter     blood-glucose meter kit  To check BG 4 times daily, to use with insurance preferred meter     clobetasol 0.05% 0.05 % Oint  Commonly known as: TEMOVATE  Apply topically 2 (two) times daily.     DUPIXENT  mg/2 mL Pnij  Generic drug: dupilumab  INJECT 300MG UNDER THE SKIN EVERY OTHER WEEK AS DIRECTED     gabapentin 300 MG capsule  Commonly known as: NEURONTIN  Take 300 mg by mouth 2 (two) times daily as needed.     lancets Misc  To check BG 4 times daily, to use with insurance preferred meter     LASIX 20 MG tablet  Generic drug: furosemide  Take 20 mg by mouth once daily.     lisinopriL 20 MG tablet  Commonly known as: PRINIVIL,ZESTRIL  Take 1 tablet (20 mg total) by mouth once daily.     pantoprazole 20 MG tablet  Commonly known as: PROTONIX  Take 1 tablet (20 mg total) by mouth once daily.     * pen needle, diabetic 31 gauge x 3/16" Ndle  1 each.     * BD ULTRA-FINE MARY PEN NEEDLE 32 gauge x 5/32" Ndle  Generic drug: pen needle, diabetic  Use to inject insulin four times daily     potassium chloride 10 MEQ Tbsr  Commonly known as: KLOR-CON  Take 10 mEq by mouth once daily.     prednisoLONE acetate 1 % Drps  Commonly known as: PRED FORTE  INSTILL 1 DROP 4 TIMES DAILY INTO LEFT EYE FOR 2 WEEKS, THEN 1 DROP 3 TIMES DAILY FOR 2 WEEKS, THEN 1 DROP TWICE DAILY FOR 2 WEEKS     SYMBICORT 160-4.5 mcg/actuation Hfaa  Generic drug: budesonide-formoterol 160-4.5 mcg  2 puffs 2 (two) times daily.     TRULICITY 3 mg/0.5 mL pen injector  Generic drug: " dulaglutide  Inject 3 mg into the skin every 7 days. Fridays         * This list has 2 medication(s) that are the same as other medications prescribed for you. Read the directions carefully, and ask your doctor or other care provider to review them with you.            STOP taking these medications    TOUJEO SOLOSTAR U-300 INSULIN 300 unit/mL (1.5 mL) Inpn pen  Generic drug: insulin glargine (TOUJEO)            Indwelling Lines/Drains at time of discharge:   Lines/Drains/Airways     None                 Time spent on the discharge of patient: 35 minutes         Lizz Cevrantes NP  Department of Hospital Medicine  [facility] [place] [facility] Surg

## 2023-06-28 NOTE — PLAN OF CARE
[facility] - Med Surg  Discharge Final Note    Patient clear to discharge from case management stand point. Pt to follow up with [facility], listed on avs.     Primary Care Provider: KAYLI CabreraC    Expected Discharge Date: 6/28/2023    Final Discharge Note (most recent)       Final Note - 06/28/23 0836          Final Note    Assessment Type Final Discharge Note (P)      Anticipated Discharge Disposition Home or Self Care (P)      What phone number can be called within the next 1-3 days to see how you are doing after discharge? [phone] (P)      Hospital Resources/Appts/Education Provided Provided patient/caregiver with written discharge plan information (P)         Post-Acute Status    Discharge Delays None known at this time (P)                      Important Message from Medicare             Contact Info       [place] [id number] [facility] [id number]   Phone: [phone]       Next Steps: Schedule an appointment as soon as possible for a visit in 1 week(s)

## 2023-06-28 NOTE — HOSPITAL COURSE
Admission admission to observation for hypoglycemia.  Hemoglobin A1c 6.6.  Blood sugar improved and discontinue dextrose drip blood sugar remains above 120.  Instruct to stop Toujeo.  Okay continue Trulicity.  Patient requesting for discharge home no further headache dizziness nausea.  All findings and plan were explained to the patient. All questions and concerns were answered. Patient verbalized understanding. Patient is in stable condition to d/c home and has been informed to follow up with PCP.

## 2023-06-28 NOTE — NURSING
[facility]  Nurses Note -- 4 Eyes      6/27/2023       Skin assessed on: Q Shift      [] No Pressure Injuries Present    []Prevention Measures Documented    [] Yes LDA  for Pressure Injury Previously documented     [] Yes New Pressure Injury Discovered   [] LDA for New Pressure Injury Added      Attending RN:  Bernadette Bran LPN     Second RN:  Alisha RYAN

## 2023-08-30 ENCOUNTER — PATIENT OUTREACH (OUTPATIENT)
Dept: EMERGENCY MEDICINE | Facility: HOSPITAL | Age: 63
End: 2023-08-30
Payer: MEDICAID

## 2023-08-30 NOTE — PROGRESS NOTES
Attempted additional follow-up. Call was answered and disconnected. Patient was unavailable. No further follow-up's scheduled.  Melisa Perales

## 2023-09-13 ENCOUNTER — HOSPITAL ENCOUNTER (EMERGENCY)
Facility: HOSPITAL | Age: 63
Discharge: HOME | End: 2023-09-13
Attending: EMERGENCY MEDICINE
Payer: MEDICAID

## 2023-09-13 VITALS
HEART RATE: 65 BPM | SYSTOLIC BLOOD PRESSURE: 187 MMHG | TEMPERATURE: 98 F | BODY MASS INDEX: 36.83 KG/M2 | RESPIRATION RATE: 18 BRPM | HEIGHT: 68 IN | WEIGHT: 243 LBS | OXYGEN SATURATION: 98 % | DIASTOLIC BLOOD PRESSURE: 78 MMHG

## 2023-09-13 DIAGNOSIS — L29.9 ITCHING: Primary | ICD-10-CM

## 2023-09-13 LAB
ALBUMIN SERPL BCP-MCNC: 3.6 G/DL (ref 3.5–5.2)
ALP SERPL-CCNC: 156 U/L (ref 55–135)
ALT SERPL W/O P-5'-P-CCNC: 54 U/L (ref 10–44)
ANION GAP SERPL CALC-SCNC: 12 MMOL/L (ref 8–16)
AST SERPL-CCNC: 26 U/L (ref 10–40)
BASOPHILS # BLD AUTO: 0.07 K/UL (ref 0–0.2)
BASOPHILS NFR BLD: 0.8 % (ref 0–1.9)
BILIRUB SERPL-MCNC: 0.2 MG/DL (ref 0.1–1)
BUN SERPL-MCNC: 23 MG/DL (ref 8–23)
CALCIUM SERPL-MCNC: 9.9 MG/DL (ref 8.7–10.5)
CHLORIDE SERPL-SCNC: 107 MMOL/L (ref 95–110)
CO2 SERPL-SCNC: 22 MMOL/L (ref 23–29)
CREAT SERPL-MCNC: 1.2 MG/DL (ref 0.5–1.4)
DIFFERENTIAL METHOD: ABNORMAL
EOSINOPHIL # BLD AUTO: 0.2 K/UL (ref 0–0.5)
EOSINOPHIL NFR BLD: 2 % (ref 0–8)
ERYTHROCYTE [DISTWIDTH] IN BLOOD BY AUTOMATED COUNT: 12.3 % (ref 11.5–14.5)
EST. GFR  (NO RACE VARIABLE): 52 ML/MIN/1.73 M^2
GLUCOSE SERPL-MCNC: 124 MG/DL (ref 70–110)
HCT VFR BLD AUTO: 42.7 % (ref 37–48.5)
HGB BLD-MCNC: 13.4 G/DL (ref 12–16)
IMM GRANULOCYTES # BLD AUTO: 0.02 K/UL (ref 0–0.04)
IMM GRANULOCYTES NFR BLD AUTO: 0.2 % (ref 0–0.5)
LYMPHOCYTES # BLD AUTO: 3.5 K/UL (ref 1–4.8)
LYMPHOCYTES NFR BLD: 42 % (ref 18–48)
MCH RBC QN AUTO: 25.6 PG (ref 27–31)
MCHC RBC AUTO-ENTMCNC: 31.4 G/DL (ref 32–36)
MCV RBC AUTO: 82 FL (ref 82–98)
MONOCYTES # BLD AUTO: 0.6 K/UL (ref 0.3–1)
MONOCYTES NFR BLD: 6.5 % (ref 4–15)
NEUTROPHILS # BLD AUTO: 4.1 K/UL (ref 1.8–7.7)
NEUTROPHILS NFR BLD: 48.5 % (ref 38–73)
NRBC BLD-RTO: 0 /100 WBC
PLATELET # BLD AUTO: ABNORMAL K/UL (ref 150–450)
PMV BLD AUTO: ABNORMAL FL (ref 9.2–12.9)
POTASSIUM SERPL-SCNC: 4.2 MMOL/L (ref 3.5–5.1)
PROT SERPL-MCNC: 8.3 G/DL (ref 6–8.4)
RBC # BLD AUTO: 5.23 M/UL (ref 4–5.4)
SODIUM SERPL-SCNC: 141 MMOL/L (ref 136–145)
WBC # BLD AUTO: 8.42 K/UL (ref 3.9–12.7)

## 2023-09-13 PROCEDURE — 99283 EMERGENCY DEPT VISIT LOW MDM: CPT

## 2023-09-13 PROCEDURE — 80053 COMPREHEN METABOLIC PANEL: CPT | Performed by: PHYSICIAN ASSISTANT

## 2023-09-13 PROCEDURE — 85025 COMPLETE CBC W/AUTO DIFF WBC: CPT | Performed by: PHYSICIAN ASSISTANT

## 2023-09-14 DIAGNOSIS — Z12.31 VISIT FOR SCREENING MAMMOGRAM: Primary | ICD-10-CM

## 2023-09-14 NOTE — ED TRIAGE NOTES
62 yo female presents to the ED with c/o bumps to skin with itching. Pt is AAO x 3 upon assessment; reports that she has been experiencing for about two weeks. Pt explains that she believes the car that she rented from [employer] may have had some sort of bugs in it due to the itching starting after receiving the rental car. Pt endorses that she was prescribed hydroxyzine but the itching has not subsided. Pt denies CP, SOB, N/V/D, chills/fever, or any other symptoms at this time. NAD is noted at this time. Small bumps are noted to neck, arms, hands, and legs. Plan of care is ongoing.

## 2023-09-14 NOTE — ED PROVIDER NOTES
Encounter Date: 9/13/2023       History     Chief Complaint   Patient presents with    Itching     Patient arrives with itching and small bumps to arms, hands, legs, and neck. She has been using a car provided by [employer] and feels that there may be bugs in this car biting her. She reports it ongoing for the past 2 weeks. She was seen by her doctor and given hydroxyzine that has not helped.      63-year-old female presents to ED complaining of persistent pruritus x2 weeks.    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  She states since then she is begun with pruritus.  She states she experienced worsening pruritus when she is in her car, now when she is in bed at home.  She followed up with primary care provider. Patient was initially given permethrin, denies any relief.  She is also been using Atarax without any relief.  She denies any worsening of the rash or any new rash, however admits to persistent itching mostly to her scalp, to her neck, anterior chest, bilateral shoulders and upper arms.  She denies history of similar.  Denies history of liver disease.  Lives alone.  No fever.  No facial, lip, tongue swelling.  No history of anaphylaxis.  No shortness of breath or wheeze.  No cough.    PMH:  HTN  Diastolic CHF  HLD  Hx PE  Asthma  IDDM  Arthritis   Depression   Generalized anxiety disorder       Review of patient's allergies indicates:  No Known Allergies  Past Medical History:   Diagnosis Date    Asthma     Diabetes mellitus     Diabetes mellitus     Hyperlipidemia     Hypertension     Neuropathy     Pulmonary embolism 1998     Past Surgical History:   Procedure Laterality Date    CYST REMOVAL      HERNIA REPAIR      HYSTERECTOMY      TONSILLECTOMY      TUBAL LIGATION       Family History   Problem Relation Age of Onset    Kidney disease Mother     Kidney disease Father     Stroke Maternal Uncle      Social History     Tobacco Use    Smoking status: Never     Smokeless tobacco: Never   Substance Use Topics    Alcohol use: No    Drug use: No     Review of Systems   Constitutional:  Negative for fever.   HENT:  Negative for facial swelling.    Respiratory:  Negative for cough and shortness of breath.    Cardiovascular:  Negative for chest pain.   Skin:  Negative for rash.   Neurological:  Negative for syncope.   Psychiatric/Behavioral:          Pruritus       Physical Exam     Initial Vitals [09/13/23 2023]   BP Pulse Resp Temp SpO2   (!) 187/78 65 18 98.1 °F (36.7 °C) 98 %      MAP       --         Physical Exam    Nursing note and vitals reviewed.  Constitutional: She appears well-developed. She is not diaphoretic. No distress.   HENT:   Head: Normocephalic and atraumatic.   Questionable very faint skin colored papular lesions to her submental chin, to her left-sided anterior neck.  No tenderness.   Neck: Neck supple.   Normal range of motion.  Cardiovascular:  Normal rate and regular rhythm.           1+ pitting pretibial edema bilaterally.   Pulmonary/Chest: No respiratory distress.   Musculoskeletal:         General: No tenderness. Normal range of motion.      Cervical back: Normal range of motion and neck supple.     Neurological: She is alert and oriented to person, place, and time. GCS score is 15. GCS eye subscore is 4. GCS verbal subscore is 5. GCS motor subscore is 6.   Skin: Skin is warm. Capillary refill takes less than 2 seconds.   No obvious rash related to the areas of pruritus.  No intertriginous lesions.   Psychiatric: She has a normal mood and affect.         ED Course   Procedures  Labs Reviewed   COMPREHENSIVE METABOLIC PANEL - Abnormal; Notable for the following components:       Result Value    CO2 22 (*)     Glucose 124 (*)     Alkaline Phosphatase 156 (*)     ALT 54 (*)     eGFR 52 (*)     All other components within normal limits   CBC W/ AUTO DIFFERENTIAL - Abnormal; Notable for the following components:    MCH 25.6 (*)     MCHC 31.4 (*)      All other components within normal limits          Imaging Results    None          Medications - No data to display  Medical Decision Making  Differential diagnosis:  Renal failure, elevated LFTs, anxiety, infestation, scabies    Amount and/or Complexity of Data Reviewed  Labs: ordered. Decision-making details documented in ED Course.     Details: No evidence of worsening renal function or elevated liver enzymes.  Discussion of management or test interpretation with external provider(s): Unsure culprit of patient's presentation today.  There is no real convincing rash to the areas.  Possibly something to the submental region.  No relief with permethrin.  No relief with Atarax.  At this time, I am unsure of any treatment for her pruritus although I have advised her to discuss it with the primary again.               ED Course as of 09/14/23 0332   Wed Sep 13, 2023   2313 CBC resulted, unremarkable.  Patient unwilling to await CMP results. [SM]      ED Course User Index  [SM] Nehemiah Fuller PA-C                    Clinical Impression:   Final diagnoses:  [L29.9] Itching (Primary)        ED Disposition Condition    Discharge Stable          ED Prescriptions    None       Follow-up Information       Follow up With Specialties Details Why Contact Info    Hermelindo Mcneal MD Family Medicine Schedule an appointment as soon as possible for a visit  For reevaluation [address] [facility] [address]  [phone]               Nehemiah Fuller PA-C  09/14/23 0334

## 2023-09-20 ENCOUNTER — OFFICE VISIT (OUTPATIENT)
Dept: PSYCHOLOGY | Facility: CLINIC | Age: 63
End: 2023-09-20
Payer: MEDICAID

## 2023-09-20 ENCOUNTER — PATIENT MESSAGE (OUTPATIENT)
Dept: PSYCHOLOGY | Facility: CLINIC | Age: 63
End: 2023-09-20
Payer: MEDICAID

## 2023-09-20 VITALS — TEMPERATURE: 98 F | OXYGEN SATURATION: 100 %

## 2023-09-20 DIAGNOSIS — F33.2 SEVERE EPISODE OF RECURRENT MAJOR DEPRESSIVE DISORDER, WITHOUT PSYCHOTIC FEATURES: Primary | ICD-10-CM

## 2023-09-20 DIAGNOSIS — F41.1 GENERALIZED ANXIETY DISORDER: ICD-10-CM

## 2023-09-20 DIAGNOSIS — G47.00 INSOMNIA, UNSPECIFIED TYPE: ICD-10-CM

## 2023-09-20 PROCEDURE — 1159F MED LIST DOCD IN RCRD: CPT | Mod: SA,HB,CPTII, | Performed by: NURSE PRACTITIONER

## 2023-09-20 PROCEDURE — 4010F ACE/ARB THERAPY RXD/TAKEN: CPT | Mod: SA,HB,CPTII, | Performed by: NURSE PRACTITIONER

## 2023-09-20 PROCEDURE — 1159F PR MEDICATION LIST DOCUMENTED IN MEDICAL RECORD: ICD-10-PCS | Mod: SA,HB,CPTII, | Performed by: NURSE PRACTITIONER

## 2023-09-20 PROCEDURE — 99205 OFFICE O/P NEW HI 60 MIN: CPT | Mod: S$PBB,SA,HB, | Performed by: NURSE PRACTITIONER

## 2023-09-20 PROCEDURE — 4010F PR ACE/ARB THEARPY RXD/TAKEN: ICD-10-PCS | Mod: SA,HB,CPTII, | Performed by: NURSE PRACTITIONER

## 2023-09-20 PROCEDURE — 99999 PR PBB SHADOW E&M-EST. PATIENT-LVL IV: ICD-10-PCS | Mod: PBBFAC,SA,HB, | Performed by: NURSE PRACTITIONER

## 2023-09-20 PROCEDURE — 3044F HG A1C LEVEL LT 7.0%: CPT | Mod: SA,HB,CPTII, | Performed by: NURSE PRACTITIONER

## 2023-09-20 PROCEDURE — 3044F PR MOST RECENT HEMOGLOBIN A1C LEVEL <7.0%: ICD-10-PCS | Mod: SA,HB,CPTII, | Performed by: NURSE PRACTITIONER

## 2023-09-20 PROCEDURE — 99214 OFFICE O/P EST MOD 30 MIN: CPT | Mod: PBBFAC,PN | Performed by: NURSE PRACTITIONER

## 2023-09-20 PROCEDURE — 99205 PR OFFICE/OUTPT VISIT, NEW, LEVL V, 60-74 MIN: ICD-10-PCS | Mod: S$PBB,SA,HB, | Performed by: NURSE PRACTITIONER

## 2023-09-20 PROCEDURE — 99999 PR PBB SHADOW E&M-EST. PATIENT-LVL IV: CPT | Mod: PBBFAC,SA,HB, | Performed by: NURSE PRACTITIONER

## 2023-09-20 RX ORDER — SERTRALINE HYDROCHLORIDE 100 MG/1
100 TABLET, FILM COATED ORAL DAILY
Qty: 30 TABLET | Refills: 1 | Status: SHIPPED | OUTPATIENT
Start: 2023-09-20 | End: 2023-11-22 | Stop reason: SDUPTHER

## 2023-09-20 RX ORDER — SERTRALINE HYDROCHLORIDE 50 MG/1
50 TABLET, FILM COATED ORAL DAILY
COMMUNITY
End: 2023-09-20

## 2023-09-20 RX ORDER — TRAZODONE HYDROCHLORIDE 50 MG/1
50 TABLET ORAL NIGHTLY
Qty: 60 TABLET | Refills: 0 | Status: SHIPPED | OUTPATIENT
Start: 2023-09-20 | End: 2023-10-30

## 2023-09-20 RX ORDER — MECLIZINE HYDROCHLORIDE 25 MG/1
25 TABLET ORAL DAILY
Status: ON HOLD | COMMUNITY
End: 2024-01-18 | Stop reason: HOSPADM
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
## 2023-09-20 NOTE — PATIENT INSTRUCTIONS
Continue Zoloft 50 mg for 2 more weeks, then On October 4th, increase Zoloft to 100 mg Daily  Start Trazodone 50 mg at bedtime. You may take 1 or 2 tablets as needed for sleep.

## 2023-09-29 ENCOUNTER — HOSPITAL ENCOUNTER (EMERGENCY)
Facility: HOSPITAL | Age: 63
Discharge: HOME | End: 2023-09-29
Attending: EMERGENCY MEDICINE
Payer: MEDICAID

## 2023-09-29 VITALS
TEMPERATURE: 98 F | DIASTOLIC BLOOD PRESSURE: 77 MMHG | BODY MASS INDEX: 33.34 KG/M2 | HEART RATE: 65 BPM | HEIGHT: 68 IN | SYSTOLIC BLOOD PRESSURE: 173 MMHG | WEIGHT: 220 LBS | RESPIRATION RATE: 18 BRPM | OXYGEN SATURATION: 97 %

## 2023-09-29 DIAGNOSIS — S16.1XXA CERVICAL STRAIN, ACUTE, INITIAL ENCOUNTER: Primary | ICD-10-CM

## 2023-09-29 LAB — POCT GLUCOSE: 169 MG/DL (ref 70–110)

## 2023-09-29 PROCEDURE — 82962 GLUCOSE BLOOD TEST: CPT

## 2023-09-29 PROCEDURE — 99284 EMERGENCY DEPT VISIT MOD MDM: CPT

## 2023-09-29 RX ORDER — NAPROXEN 500 MG/1
500 TABLET ORAL 2 TIMES DAILY WITH MEALS
Qty: 20 TABLET | Refills: 0 | Status: SHIPPED | OUTPATIENT
Start: 2023-09-29 | End: 2023-10-09

## 2023-09-29 RX ORDER — TIZANIDINE 4 MG/1
4 TABLET ORAL EVERY 8 HOURS PRN
Qty: 30 TABLET | Refills: 0 | Status: SHIPPED | OUTPATIENT
Start: 2023-09-29 | End: 2023-10-09

## 2023-09-29 NOTE — ED TRIAGE NOTES
Pt presents today from PCP office to evaluate for left sided facial and neck pain with swelling since 9/21/23. Pt states she got an MRI done on that day and swelling and pain to the left side of her neck and head began. Pt states she had the MRI done due to ongoing headaches. Pt currently reports left neck pain and swelling but denies any headache or loss of balance today. Pt states HA and facial pain is intermittent. Pt denies slurred speech, facial droop, unilateral weakness or visual changes.

## 2023-09-29 NOTE — ED PROVIDER NOTES
"Encounter Date: 9/29/2023    SCRIBE #1 NOTE: I, Pallavi Carrasco, am scribing for, and in the presence of,  Gregorio Keys MD. I have scribed the following portions of the note - Other sections scribed: HPI, ROS, PE, MDM.       History     Chief Complaint   Patient presents with    Facial Pain     LEFT sided facial and neck pain x8 days. Denies rash, fever, weakness. Saw PCP today for same and was referred to ED. Pt also reports vision changes- states when walking looks like "floor is elevated".      This 63 y.o female, with a medical history of Asthma, Diabetes mellitus, Hyperlipidemia, Hypertension, and Pulmonary embolism, presents to the ED c/o constant, severe (8/10) neck pain for the last 8x days. Pt reports that she initially began to experience a sharp pain to the left neck and face shortly after undergoing an MRI on 9/21/23. She states that the facial pain has since resolved, however, she continues to experience pain to the left anterior and posterior neck accompanied by swelling. Pt notes that the pain is exacerbated when turning the neck. She also notes experience dental and tongue soreness. Pt reports attempting treatment with Tylenol Arthritis. Additionally, she c/o a visual disturbance, noting that "it looks like the floor is up (lifted)" when she is ambulating causing her to trip. Pt also notes flashes of light to the bilateral eyes. Pt states that she has chronic blurred vision for which she is currently being treated and followed by Optometry. No thyroid issues. She denies chest pain, temperature change sensations, or any other associated symptoms.     The history is provided by the patient.     Review of patient's allergies indicates:  No Known Allergies  Past Medical History:   Diagnosis Date    Asthma     Diabetes mellitus     Diabetes mellitus     Hyperlipidemia     Hypertension     Neuropathy     Pulmonary embolism 1998     Past Surgical History:   Procedure Laterality Date    CYST REMOVAL   " "   HERNIA REPAIR      HYSTERECTOMY      TONSILLECTOMY      TUBAL LIGATION       Family History   Problem Relation Age of Onset    Kidney disease Mother     Kidney disease Father     Stroke Maternal Uncle      Social History     Tobacco Use    Smoking status: Never    Smokeless tobacco: Never   Substance Use Topics    Alcohol use: No    Drug use: No     Review of Systems   Constitutional:  Negative for fever.   HENT:  Positive for dental problem (dental soreness). Negative for sore throat.         (+) tongue soreness   Eyes:  Positive for visual disturbance ("looks like the floor is up"; flashes of light).   Respiratory:  Negative for shortness of breath.    Cardiovascular:  Negative for chest pain.   Gastrointestinal:  Negative for nausea.   Genitourinary:  Negative for dysuria.   Musculoskeletal:  Positive for neck pain (left sided anterior and posterior; with swelling). Negative for back pain.   Skin:  Negative for rash.   Neurological:  Negative for weakness.       Physical Exam     Initial Vitals [09/29/23 1117]   BP Pulse Resp Temp SpO2   (!) 143/65 68 18 97.9 °F (36.6 °C) 98 %      MAP       --         Physical Exam    Nursing note and vitals reviewed.  Constitutional: She appears well-developed and well-nourished. She is not diaphoretic. No distress.   HENT:   Head: Normocephalic and atraumatic.   Eyes: Conjunctivae are normal.   Neck:   There is a small goiter that is non-tender. No nodules. No lymphadenopathy.   Cardiovascular:  Normal rate and regular rhythm.           Pulmonary/Chest: Breath sounds normal.   Abdominal: Abdomen is soft. There is no abdominal tenderness.   Musculoskeletal:         General: Tenderness present.      Comments: There is isolated tenderness to the left sternocleidomastoid with reproducible tenderness to the left sternocleidomastoid muscle. She has pain with turning the neck and movement of the sternocleidomastoid muscle.     Neurological: She is alert and oriented to person, " place, and time.   Skin: Skin is warm and dry.   Psychiatric: She has a normal mood and affect.         ED Course   Procedures  Labs Reviewed   POCT GLUCOSE - Abnormal; Notable for the following components:       Result Value    POCT Glucose 169 (*)     All other components within normal limits          Imaging Results    None          Medications - No data to display  Medical Decision Making  This is an emergent evaluation of a 63 y.o. female who presents with left sided neck pain. The patient was seen and examined. The history and physical exam was obtained. The nursing notes and vital signs were reviewed. Secondary to symptoms and examination findings, will discharge home on Tizanidine and Naproxen.       Risk  Prescription drug management.    Patient has highly reproducible left sternocleidomastoid pain.  Consistent with cervical muscle strain.  Will treat for such.  Stable for discharge.  Patient has chronic visual problems that are already being evaluated and treated by an eye doctor.        Scribe Attestation:   Scribe #1: I performed the above scribed service and the documentation accurately describes the services I performed. I attest to the accuracy of the note.                      I, Gregorio Keys, personally performed the services described in this documentation. All medical record entries made by the scribe were at my direction and in my presence. I have reviewed the chart and agree that the record reflects my personal performance and is accurate and complete.   Clinical Impression:   Final diagnoses:  [S16.1XXA] Cervical strain, acute, initial encounter (Primary)        ED Disposition Condition    Discharge Stable          ED Prescriptions       Medication Sig Dispense Start Date End Date Auth. Provider    tiZANidine (ZANAFLEX) 4 MG tablet Take 1 tablet (4 mg total) by mouth every 8 (eight) hours as needed (muscle spasms). 30 tablet 9/29/2023 10/9/2023 Gregorio Keys MD    naproxen  (NAPROSYN) 500 MG tablet Take 1 tablet (500 mg total) by mouth 2 (two) times daily with meals. As needed for pain for 10 days 20 tablet 9/29/2023 10/9/2023 Gregorio Keys MD          Follow-up Information       Follow up With Specialties Details Why Contact Info    Sherly Elizalde, NP-C Ur[facility] Schedule an appointment as soon as possible for a visit  for recheck. [address] [facility] [address]  [phone]          follow up with your eye doctor.             Gregorio Keys MD  09/30/23 [id number]

## 2023-10-29 DIAGNOSIS — G47.00 INSOMNIA, UNSPECIFIED TYPE: ICD-10-CM

## 2023-10-30 RX ORDER — TRAZODONE HYDROCHLORIDE 50 MG/1
50 TABLET ORAL NIGHTLY
Qty: 60 TABLET | Refills: 0 | Status: SHIPPED | OUTPATIENT
Start: 2023-10-30 | End: 2023-11-22 | Stop reason: SDUPTHER

## 2023-11-08 ENCOUNTER — TELEPHONE (OUTPATIENT)
Dept: PSYCHIATRY | Facility: CLINIC | Age: 63
End: 2023-11-08
Payer: MEDICAID

## 2023-11-08 NOTE — TELEPHONE ENCOUNTER
Patient was called to make sure she was not out of medication between now and her rescheduled visit. She plans to check when she returns home.

## 2023-11-15 ENCOUNTER — TELEPHONE (OUTPATIENT)
Dept: PSYCHIATRY | Facility: CLINIC | Age: 63
End: 2023-11-15
Payer: MEDICAID

## 2023-11-22 ENCOUNTER — OFFICE VISIT (OUTPATIENT)
Dept: PSYCHOLOGY | Facility: CLINIC | Age: 63
End: 2023-11-22
Payer: MEDICAID

## 2023-11-22 VITALS — HEART RATE: 79 BPM | DIASTOLIC BLOOD PRESSURE: 72 MMHG | SYSTOLIC BLOOD PRESSURE: 135 MMHG

## 2023-11-22 DIAGNOSIS — G47.00 INSOMNIA, UNSPECIFIED TYPE: ICD-10-CM

## 2023-11-22 DIAGNOSIS — F41.1 GENERALIZED ANXIETY DISORDER: ICD-10-CM

## 2023-11-22 DIAGNOSIS — F33.2 SEVERE EPISODE OF RECURRENT MAJOR DEPRESSIVE DISORDER, WITHOUT PSYCHOTIC FEATURES: ICD-10-CM

## 2023-11-22 PROCEDURE — 3044F PR MOST RECENT HEMOGLOBIN A1C LEVEL <7.0%: ICD-10-PCS | Mod: SA,HB,CPTII, | Performed by: NURSE PRACTITIONER

## 2023-11-22 PROCEDURE — 99214 PR OFFICE/OUTPT VISIT, EST, LEVL IV, 30-39 MIN: ICD-10-PCS | Mod: S$PBB,SA,HB, | Performed by: NURSE PRACTITIONER

## 2023-11-22 PROCEDURE — 3078F PR MOST RECENT DIASTOLIC BLOOD PRESSURE < 80 MM HG: ICD-10-PCS | Mod: SA,HB,CPTII, | Performed by: NURSE PRACTITIONER

## 2023-11-22 PROCEDURE — 99999 PR PBB SHADOW E&M-EST. PATIENT-LVL I: CPT | Mod: PBBFAC,SA,HB, | Performed by: NURSE PRACTITIONER

## 2023-11-22 PROCEDURE — 3075F SYST BP GE 130 - 139MM HG: CPT | Mod: SA,HB,CPTII, | Performed by: NURSE PRACTITIONER

## 2023-11-22 PROCEDURE — 3075F PR MOST RECENT SYSTOLIC BLOOD PRESS GE 130-139MM HG: ICD-10-PCS | Mod: SA,HB,CPTII, | Performed by: NURSE PRACTITIONER

## 2023-11-22 PROCEDURE — 3078F DIAST BP <80 MM HG: CPT | Mod: SA,HB,CPTII, | Performed by: NURSE PRACTITIONER

## 2023-11-22 PROCEDURE — 4010F ACE/ARB THERAPY RXD/TAKEN: CPT | Mod: SA,HB,CPTII, | Performed by: NURSE PRACTITIONER

## 2023-11-22 PROCEDURE — 99211 OFF/OP EST MAY X REQ PHY/QHP: CPT | Mod: PBBFAC,PN | Performed by: NURSE PRACTITIONER

## 2023-11-22 PROCEDURE — 3044F HG A1C LEVEL LT 7.0%: CPT | Mod: SA,HB,CPTII, | Performed by: NURSE PRACTITIONER

## 2023-11-22 PROCEDURE — 4010F PR ACE/ARB THEARPY RXD/TAKEN: ICD-10-PCS | Mod: SA,HB,CPTII, | Performed by: NURSE PRACTITIONER

## 2023-11-22 PROCEDURE — 99999 PR PBB SHADOW E&M-EST. PATIENT-LVL I: ICD-10-PCS | Mod: PBBFAC,SA,HB, | Performed by: NURSE PRACTITIONER

## 2023-11-22 PROCEDURE — 99214 OFFICE O/P EST MOD 30 MIN: CPT | Mod: S$PBB,SA,HB, | Performed by: NURSE PRACTITIONER

## 2023-11-22 RX ORDER — TRAZODONE HYDROCHLORIDE 50 MG/1
50 TABLET ORAL NIGHTLY
Qty: 30 TABLET | Refills: 2 | Status: ON HOLD | OUTPATIENT
Start: 2023-11-22 | End: 2024-01-18 | Stop reason: HOSPADM

## 2023-11-22 RX ORDER — SERTRALINE HYDROCHLORIDE 100 MG/1
150 TABLET, FILM COATED ORAL DAILY
Qty: 135 TABLET | Refills: 0 | Status: SHIPPED | OUTPATIENT
Start: 2023-11-22 | End: 2024-01-02 | Stop reason: SDUPTHER

## 2023-11-22 RX ORDER — BUPROPION HYDROCHLORIDE 150 MG/1
150 TABLET ORAL DAILY
Qty: 30 TABLET | Refills: 2 | Status: SHIPPED | OUTPATIENT
Start: 2023-11-22 | End: 2024-01-02 | Stop reason: SDUPTHER

## 2023-11-22 NOTE — PROGRESS NOTES
"Outpatient Psychiatry Follow-Up Visit ([facility])    11/30/2023    Clinical Status of Patient:  Outpatient (Ambulatory)    Chief Complaint:  Devika Bustillo is a 63 y.o. female who presents today for follow-up of depression and anxiety.  Met with patient.     Current Medications:   Zoloft 100 mg Daily   Trazodone 50 mg Daily  Hydroxyzine 50 mg TID for itching     Past Medication Trials:  unable to remember medications  Seroquel- did not like the way it made her feel    Interval History and Content of Current Session:  Patient seen and chart reviewed. Last seen on 09/20/23    Patint reports frequent falling this past 2 months. She reports great frustrations from her physical symptoms . Emotionally her depression and anxiety has worsened. Report severe depression with inability to take baths or leave her hosue for days at at time, frequent crying spells, wishing she were dead. Denies plan and states" I wouldn't do that to my self, my children need me and God wouldn't want that". She reports asking for prayers from family and friends from [employer]. She feels desperate at this point.     Denies SI/HI/AVH. Denies adverse effects from medication  Pt reports taking medications as prescribed and behaving appropriately during interview today.    Pt appears:  Appropriate attire and affect    Mood:  Depressed, down, tearful    Sleep:  "Still not good" patient reports forgetting to take trazodone   medication    Appetite:  normal    Self Rates Depression: 10/10  Self Rated Anxiety:  8 /10      Psychotherapy:  Target symptoms: depression, recurrent depression, anxiety , grief  Why chosen therapy is appropriate versus another modality: relevant to diagnosis  Outcome monitoring methods: self-report, observation  Therapeutic intervention type: supportive psychotherapy  Topics discussed/themes: parenting issues, illness/death of a loved one, symptom recognition, financial stressors  The patient's response to the intervention is motivated. " The patient's progress toward treatment goals is poor.   Duration of intervention: 20 minutes.    Review of Systems   PSYCHIATRIC: Pertinant items are noted in the narrative.        Past Medical, Family and Social History: The patient's past medical, family and social history have been reviewed and updated as appropriate within the electronic medical record - see encounter notes.    Compliance: yes    Side effects: None    Risk Parameters:  Patient reports no suicidal ideation  Patient reports no homicidal ideation  Patient reports no self-injurious behavior  Patient reports no violent behavior    Exam (detailed: at least 9 elements; comprehensive: all 15 elements)   Constitutional  Vitals:  Most recent vital signs, dated less than 90 days prior to this appointment, were reviewed.   Vitals:    11/22/23 0832   BP: 135/72   Pulse: 79        General:  unremarkable, age appropriate     Musculoskeletal  Muscle Strength/Tone:  no spasicity, no rigidity, no cogwheeling, no flaccidity, no paratonia, no dyskinesia, no dystonia, no tremor, no tic, no choreoathetosis, no atrophy   Gait & Station:  unsteady, slow     Psychiatric  Speech:  no latency; no press   Mood & Affect:  depressed, sad  congruent and appropriate, sad, tearful   Thought Process:  normal and logical   Associations:  intact   Thought Content:  normal, no suicidality, no homicidality, delusions, or paranoia   Insight:  intact, has awareness of illness   Judgement: behavior is adequate to circumstances, age appropriate   Orientation:  grossly intact, person, place, situation, time/date, day of week, month of year, year   Memory: intact for content of interview, grossly intact   Language: grossly intact, able to name, able to repeat   Attention Span & Concentration:  able to focus, completed tasks   Fund of Knowledge:  intact and appropriate to age and level of education, familiar with aspects of current personal life     Assessment and Diagnosis    Status/Progress: Based on the examination today, the patient's problem(s) is/are failing to respond as expected to treatment.  New problems have not been presented today.   Co-morbidities, Diagnostic uncertainty, and Lack of compliance are not complicating management of the primary condition.  There are no active rule-out diagnoses for this patient at this time.     General Impression: Devika Bustillo, a 63 y.o. female, for follow up of MDD and Anxiety with 2 previous suicide attempts.  Presents 9/20/23- Patient was started on Zoloft 50 mg 2 weeks ago by PCP, has notived some improvement in symptoms. Will increase to 100 mg in 2 more weeks. Start Trazodone 50 mg for sleep.  Presents 11/22/23- Increase Zoloft to 150 mg, Start Wellbutrin 150 mg.       ICD-10-CM ICD-9-CM    1. Severe episode of recurrent major depressive disorder, without psychotic features  F33.2 296.33 sertraline (ZOLOFT) 100 MG tablet      buPROPion (WELLBUTRIN XL) 150 MG TB24 tablet      2. Generalized anxiety disorder  F41.1 300.02 sertraline (ZOLOFT) 100 MG tablet      3. Insomnia, unspecified type  G47.00 780.52 traZODone (DESYREL) 50 MG tablet          Intervention/Counseling/Treatment Plan   Medication Management: The risks and benefits of medication were discussed with the patient.  Increase Zoloft to 150 mg for depression and anxiety  Start Wellbutrin 150 mg for depression  Continue taking Trazodone 50 mg at bedtime   Discussed diagnosis, risk and benefits of proposed treatment above vs alternative treatment vs no treatment, and potential side effects of these treatments, and the inherent unpredictability of individual responses to these treatments. The patient expresses understanding and gives informed consent to pursue treatment at this time, believing that the potential benefits outweigh the potential risks. Patient has no other questions. Risks/adverse effects at this time include but are not limited to: GI side effects, sexual  dysfunction, activation vs sedation, triggering of suicidal ideation, and serotonin syndrome.   Patient voices understanding and agreement with this plan  Provided crisis numbers  Encouraged patient to keep future appointments  Instruct patient to call or message with questions  In the event of an emergency, including suicidal ideation, patient was advised to go to the emergency room      Return to Clinic: 2 weeks    Total time: 30 Minutes       This includes face to face time and non-face to face time preparing to see the patient (eg, review of tests), obtaining and/or reviewing separately obtained history, documenting clinical information in the electronic or other health record, independently interpreting results and communicating results to the patient/family/caregiver, or care coordinator.        Nimisha Barclay DNP, PMHNP, FNP

## 2024-01-02 ENCOUNTER — OFFICE VISIT (OUTPATIENT)
Dept: PSYCHOLOGY | Facility: CLINIC | Age: 64
End: 2024-01-02
Payer: MEDICAID

## 2024-01-02 VITALS — SYSTOLIC BLOOD PRESSURE: 135 MMHG | HEART RATE: 65 BPM | TEMPERATURE: 98 F | DIASTOLIC BLOOD PRESSURE: 77 MMHG

## 2024-01-02 DIAGNOSIS — G47.00 INSOMNIA, UNSPECIFIED TYPE: Primary | ICD-10-CM

## 2024-01-02 DIAGNOSIS — F41.1 GENERALIZED ANXIETY DISORDER: ICD-10-CM

## 2024-01-02 DIAGNOSIS — F33.2 SEVERE EPISODE OF RECURRENT MAJOR DEPRESSIVE DISORDER, WITHOUT PSYCHOTIC FEATURES: ICD-10-CM

## 2024-01-02 PROCEDURE — 3075F SYST BP GE 130 - 139MM HG: CPT | Mod: SA,HB,CPTII, | Performed by: NURSE PRACTITIONER

## 2024-01-02 PROCEDURE — 99999 PR PBB SHADOW E&M-EST. PATIENT-LVL II: CPT | Mod: PBBFAC,SA,HB, | Performed by: NURSE PRACTITIONER

## 2024-01-02 PROCEDURE — 3078F DIAST BP <80 MM HG: CPT | Mod: SA,HB,CPTII, | Performed by: NURSE PRACTITIONER

## 2024-01-02 PROCEDURE — 99214 OFFICE O/P EST MOD 30 MIN: CPT | Mod: S$PBB,SA,HB, | Performed by: NURSE PRACTITIONER

## 2024-01-02 PROCEDURE — 99212 OFFICE O/P EST SF 10 MIN: CPT | Mod: PBBFAC,PN | Performed by: NURSE PRACTITIONER

## 2024-01-02 RX ORDER — SERTRALINE HYDROCHLORIDE 100 MG/1
200 TABLET, FILM COATED ORAL DAILY
Qty: 60 TABLET | Refills: 2 | Status: SHIPPED | OUTPATIENT
Start: 2024-01-02 | End: 2024-01-30 | Stop reason: SDUPTHER

## 2024-01-02 RX ORDER — TRAZODONE HYDROCHLORIDE 50 MG/1
50 TABLET ORAL NIGHTLY
Qty: 30 TABLET | Refills: 2 | Status: SHIPPED | OUTPATIENT
Start: 2024-01-02 | End: 2024-01-30 | Stop reason: SDUPTHER

## 2024-01-02 RX ORDER — BUPROPION HYDROCHLORIDE 150 MG/1
300 TABLET ORAL DAILY
Qty: 60 TABLET | Refills: 2 | Status: SHIPPED | OUTPATIENT
Start: 2024-01-02 | End: 2024-01-30 | Stop reason: SDUPTHER

## 2024-01-02 NOTE — PATIENT INSTRUCTIONS
Increase Zoloft to 200 mg ( taker 2 tablets of 100 mg) for depression and anxiety  Increase Wellbutrin 150 mg (take 2 tablets of 150 mg) for depression  Continue taking Trazodone 50 mg at bedtime for sleep

## 2024-01-02 NOTE — PROGRESS NOTES
"Outpatient Psychiatry Follow-Up Visit ([facility])    01/02/2024    Clinical Status of Patient:  Outpatient (Ambulatory)    Chief Complaint:  Devika Bustillo is a 63 y.o. female who presents today for follow-up of depression and anxiety.  Met with patient.     Current Medications:   Zoloft 150 mg Daily   Wellbutrin 150 mg Daily  Trazodone 50 mg Daily  Hydroxyzine 50 mg TID for itching     Past Medication Trials:  unable to remember medications  Seroquel- did not like the way it made her feel    Interval History and Content of Current Session:  Patient seen and chart reviewed. Last seen on 09/20/23    Patient was started on Wellbutrin 150 mg daily, and Zoloft was increased to 150 mg at the last visit. She reports feeling more calm than she was before. She can tell when she misses a dose because she feels anxious and more depressed.  She reports crying less, reports she continues to think about her family that she's lost but not being as affected by it as much. She reports being able to clean her house like she used to, make her bed and even wash her dishes and keep things in her home clean again.     Pt appears:  Appropriate attire and affect    Mood:  Depressed, down, tearful    Sleep:  "Way better" reports 4-5 hrs hrs/night    Appetite:  normal    Self Rates Depression: 7-8/10  Self Rated Anxiety:  8 /10      Psychotherapy:  Target symptoms: depression, recurrent depression, anxiety , grief  Why chosen therapy is appropriate versus another modality: relevant to diagnosis  Outcome monitoring methods: self-report, observation  Therapeutic intervention type: supportive psychotherapy  Topics discussed/themes: parenting issues, illness/death of a loved one, symptom recognition, financial stressors  The patient's response to the intervention is motivated. The patient's progress toward treatment goals is poor.   Duration of intervention: 20 minutes.    Review of Systems   PSYCHIATRIC: Pertinant items are noted in the " narrative.        Past Medical, Family and Social History: The patient's past medical, family and social history have been reviewed and updated as appropriate within the electronic medical record - see encounter notes.    Compliance: yes    Side effects: None    Risk Parameters:  Patient reports no suicidal ideation  Patient reports no homicidal ideation  Patient reports no self-injurious behavior  Patient reports no violent behavior    Exam (detailed: at least 9 elements; comprehensive: all 15 elements)   Constitutional  Vitals:  Most recent vital signs, dated less than 90 days prior to this appointment, were reviewed.   Vitals:    01/02/24 1009   BP: 135/77   Pulse: 65   Temp: 98.3 °F (36.8 °C)          General:  unremarkable, age appropriate     Musculoskeletal  Muscle Strength/Tone:  no spasicity, no rigidity, no cogwheeling, no flaccidity, no paratonia, no dyskinesia, no dystonia, no tremor, no tic, no choreoathetosis, no atrophy   Gait & Station:  unsteady, slow     Psychiatric  Speech:  no latency; no press   Mood & Affect:  depressed, sad  congruent and appropriate, sad, tearful   Thought Process:  normal and logical   Associations:  intact   Thought Content:  normal, no suicidality, no homicidality, delusions, or paranoia   Insight:  intact, has awareness of illness   Judgement: behavior is adequate to circumstances, age appropriate   Orientation:  grossly intact, person, place, situation, time/date, day of week, month of year, year   Memory: intact for content of interview, grossly intact   Language: grossly intact, able to name, able to repeat   Attention Span & Concentration:  able to focus, completed tasks   Fund of Knowledge:  intact and appropriate to age and level of education, familiar with aspects of current personal life     Assessment and Diagnosis   Status/Progress: Based on the examination today, the patient's problem(s) is/are improved and inadequately controlled.  New problems have not been  presented today.   Co-morbidities, Diagnostic uncertainty, and Lack of compliance are not complicating management of the primary condition.  There are no active rule-out diagnoses for this patient at this time.     General Impression: Devika Bustillo, a 63 y.o. female, for follow up of MDD and Anxiety with 2 previous suicide attempts.  Presents 9/20/23- Patient was started on Zoloft 50 mg 2 weeks ago by PCP, has notived some improvement in symptoms. Will increase to 100 mg in 2 more weeks. Start Trazodone 50 mg for sleep.  Presents 11/22/23- Increase Zoloft to 150 mg, Start Wellbutrin 150 mg.   Presents 1/2/23 - Some improvement in Anxiety and Depression, increase Zoloft to 200 mg Daily increase Wellbutrin to 300 mg Daily      ICD-10-CM ICD-9-CM    1. Insomnia, unspecified type  G47.00 780.52 traZODone (DESYREL) 50 MG tablet      2. Severe episode of recurrent major depressive disorder, without psychotic features  F33.2 296.33 buPROPion (WELLBUTRIN XL) 150 MG TB24 tablet      sertraline (ZOLOFT) 100 MG tablet      3. Generalized anxiety disorder  F41.1 300.02 sertraline (ZOLOFT) 100 MG tablet          Intervention/Counseling/Treatment Plan   Medication Management: The risks and benefits of medication were discussed with the patient.  Increase Zoloft to 200 mg for depression and anxiety  Increase Wellbutrin 300 mg for depression  Continue taking Trazodone 50 mg at bedtime   Discussed diagnosis, risk and benefits of proposed treatment above vs alternative treatment vs no treatment, and potential side effects of these treatments, and the inherent unpredictability of individual responses to these treatments. The patient expresses understanding and gives informed consent to pursue treatment at this time, believing that the potential benefits outweigh the potential risks. Patient has no other questions. Risks/adverse effects at this time include but are not limited to: GI side effects, sexual dysfunction, activation vs  sedation, triggering of suicidal ideation, and serotonin syndrome.   Patient voices understanding and agreement with this plan  Provided crisis numbers  Encouraged patient to keep future appointments  Instruct patient to call or message with questions  In the event of an emergency, including suicidal ideation, patient was advised to go to the emergency room      Return to Clinic: 2 weeks    Total time: 30 Minutes       This includes face to face time and non-face to face time preparing to see the patient (eg, review of tests), obtaining and/or reviewing separately obtained history, documenting clinical information in the electronic or other health record, independently interpreting results and communicating results to the patient/family/caregiver, or care coordinator.        Nimisha Barclay DNP, PMHNP, FNP

## 2024-01-15 ENCOUNTER — HOSPITAL ENCOUNTER (INPATIENT)
Facility: HOSPITAL | Age: 64
LOS: 3 days | Discharge: HOME | DRG: 287 | End: 2024-01-18
Attending: EMERGENCY MEDICINE | Admitting: STUDENT IN AN ORGANIZED HEALTH CARE EDUCATION/TRAINING PROGRAM
Payer: MEDICAID

## 2024-01-15 DIAGNOSIS — R06.09 DOE (DYSPNEA ON EXERTION): ICD-10-CM

## 2024-01-15 DIAGNOSIS — I25.10 CAD (CORONARY ARTERY DISEASE): ICD-10-CM

## 2024-01-15 DIAGNOSIS — I50.30 (HFPEF) HEART FAILURE WITH PRESERVED EJECTION FRACTION: ICD-10-CM

## 2024-01-15 DIAGNOSIS — R94.39 ABNORMAL STRESS ECHO: Primary | ICD-10-CM

## 2024-01-15 LAB
ALBUMIN SERPL BCP-MCNC: 3 G/DL (ref 3.5–5.2)
ALP SERPL-CCNC: 134 U/L (ref 55–135)
ALT SERPL W/O P-5'-P-CCNC: 40 U/L (ref 10–44)
ANION GAP SERPL CALC-SCNC: 9 MMOL/L (ref 8–16)
AST SERPL-CCNC: 23 U/L (ref 10–40)
BILIRUB SERPL-MCNC: 0.3 MG/DL (ref 0.1–1)
BILIRUB UR QL STRIP: NEGATIVE
BNP SERPL-MCNC: 44 PG/ML (ref 0–99)
BUN SERPL-MCNC: 48 MG/DL (ref 8–23)
CALCIUM SERPL-MCNC: 9.4 MG/DL (ref 8.7–10.5)
CHLORIDE SERPL-SCNC: 108 MMOL/L (ref 95–110)
CLARITY UR REFRACT.AUTO: CLEAR
CO2 SERPL-SCNC: 20 MMOL/L (ref 23–29)
COLOR UR AUTO: YELLOW
CREAT SERPL-MCNC: 1.3 MG/DL (ref 0.5–1.4)
ERYTHROCYTE [DISTWIDTH] IN BLOOD BY AUTOMATED COUNT: 13.5 % (ref 11.5–14.5)
EST. GFR  (NO RACE VARIABLE): 46.8 ML/MIN/1.73 M^2
ESTIMATED AVG GLUCOSE: 166 MG/DL (ref 68–131)
GLUCOSE SERPL-MCNC: 128 MG/DL (ref 70–110)
GLUCOSE UR QL STRIP: NEGATIVE
HBA1C MFR BLD: 7.4 % (ref 4–5.6)
HCT VFR BLD AUTO: 35.1 % (ref 37–48.5)
HGB BLD-MCNC: 11.1 G/DL (ref 12–16)
HGB UR QL STRIP: NEGATIVE
KETONES UR QL STRIP: NEGATIVE
LEUKOCYTE ESTERASE UR QL STRIP: NEGATIVE
MCH RBC QN AUTO: 26.7 PG (ref 27–31)
MCHC RBC AUTO-ENTMCNC: 31.6 G/DL (ref 32–36)
MCV RBC AUTO: 85 FL (ref 82–98)
NITRITE UR QL STRIP: NEGATIVE
PH UR STRIP: 7 [PH] (ref 5–8)
PLATELET # BLD AUTO: 314 K/UL (ref 150–450)
PMV BLD AUTO: 9.8 FL (ref 9.2–12.9)
POTASSIUM SERPL-SCNC: 4.5 MMOL/L (ref 3.5–5.1)
PROT SERPL-MCNC: 7.5 G/DL (ref 6–8.4)
PROT UR QL STRIP: ABNORMAL
RBC # BLD AUTO: 4.15 M/UL (ref 4–5.4)
SODIUM SERPL-SCNC: 137 MMOL/L (ref 136–145)
SP GR UR STRIP: 1.01 (ref 1–1.03)
TROPONIN I SERPL DL<=0.01 NG/ML-MCNC: 0.01 NG/ML (ref 0–0.03)
TROPONIN I SERPL DL<=0.01 NG/ML-MCNC: <0.006 NG/ML (ref 0–0.03)
TSH SERPL DL<=0.005 MIU/L-ACNC: 0.5 UIU/ML (ref 0.4–4)
URN SPEC COLLECT METH UR: ABNORMAL
WBC # BLD AUTO: 9.38 K/UL (ref 3.9–12.7)

## 2024-01-15 PROCEDURE — 83036 HEMOGLOBIN GLYCOSYLATED A1C: CPT

## 2024-01-15 PROCEDURE — 93010 ELECTROCARDIOGRAM REPORT: CPT | Mod: ,,, | Performed by: INTERNAL MEDICINE

## 2024-01-15 PROCEDURE — 83880 ASSAY OF NATRIURETIC PEPTIDE: CPT

## 2024-01-15 PROCEDURE — 25000003 PHARM REV CODE 250: Performed by: PHYSICIAN ASSISTANT

## 2024-01-15 PROCEDURE — 84443 ASSAY THYROID STIM HORMONE: CPT

## 2024-01-15 PROCEDURE — 85027 COMPLETE CBC AUTOMATED: CPT

## 2024-01-15 PROCEDURE — 20600001 HC STEP DOWN PRIVATE ROOM

## 2024-01-15 PROCEDURE — 25000003 PHARM REV CODE 250

## 2024-01-15 PROCEDURE — 93005 ELECTROCARDIOGRAM TRACING: CPT

## 2024-01-15 PROCEDURE — 81003 URINALYSIS AUTO W/O SCOPE: CPT

## 2024-01-15 PROCEDURE — G0378 HOSPITAL OBSERVATION PER HR: HCPCS

## 2024-01-15 PROCEDURE — 99285 EMERGENCY DEPT VISIT HI MDM: CPT | Mod: 25

## 2024-01-15 PROCEDURE — 25500020 PHARM REV CODE 255: Performed by: EMERGENCY MEDICINE

## 2024-01-15 PROCEDURE — 84484 ASSAY OF TROPONIN QUANT: CPT | Mod: 91 | Performed by: PHYSICIAN ASSISTANT

## 2024-01-15 PROCEDURE — 84484 ASSAY OF TROPONIN QUANT: CPT

## 2024-01-15 PROCEDURE — 63600175 PHARM REV CODE 636 W HCPCS: Performed by: PHYSICIAN ASSISTANT

## 2024-01-15 PROCEDURE — 80053 COMPREHEN METABOLIC PANEL: CPT

## 2024-01-15 RX ORDER — METHOCARBAMOL 750 MG/1
750 TABLET, FILM COATED ORAL EVERY 8 HOURS PRN
Status: DISPENSED | OUTPATIENT
Start: 2024-01-15 | End: 2024-01-17

## 2024-01-15 RX ORDER — LISINOPRIL 20 MG/1
40 TABLET ORAL DAILY
Status: COMPLETED | OUTPATIENT
Start: 2024-01-15 | End: 2024-01-16

## 2024-01-15 RX ORDER — INSULIN ASPART 100 [IU]/ML
0-5 INJECTION, SOLUTION INTRAVENOUS; SUBCUTANEOUS
Status: DISCONTINUED | OUTPATIENT
Start: 2024-01-15 | End: 2024-01-18 | Stop reason: HOSPADM

## 2024-01-15 RX ORDER — ACETAMINOPHEN 325 MG/1
650 TABLET ORAL EVERY 6 HOURS PRN
Status: ACTIVE | OUTPATIENT
Start: 2024-01-15 | End: 2024-01-17

## 2024-01-15 RX ORDER — IBUPROFEN 200 MG
24 TABLET ORAL
Status: DISCONTINUED | OUTPATIENT
Start: 2024-01-15 | End: 2024-01-18 | Stop reason: HOSPADM

## 2024-01-15 RX ORDER — IBUPROFEN 200 MG
16 TABLET ORAL
Status: DISCONTINUED | OUTPATIENT
Start: 2024-01-15 | End: 2024-01-18 | Stop reason: HOSPADM

## 2024-01-15 RX ORDER — ACETAMINOPHEN 325 MG/1
650 TABLET ORAL
Status: COMPLETED | OUTPATIENT
Start: 2024-01-15 | End: 2024-01-15

## 2024-01-15 RX ORDER — METOCLOPRAMIDE HYDROCHLORIDE 5 MG/ML
10 INJECTION INTRAMUSCULAR; INTRAVENOUS EVERY 6 HOURS PRN
Status: DISPENSED | OUTPATIENT
Start: 2024-01-15 | End: 2024-01-17

## 2024-01-15 RX ORDER — TRAZODONE HYDROCHLORIDE 50 MG/1
50 TABLET ORAL NIGHTLY
Status: DISCONTINUED | OUTPATIENT
Start: 2024-01-15 | End: 2024-01-18 | Stop reason: HOSPADM

## 2024-01-15 RX ORDER — GLUCAGON 1 MG
1 KIT INJECTION
Status: DISCONTINUED | OUTPATIENT
Start: 2024-01-15 | End: 2024-01-18 | Stop reason: HOSPADM

## 2024-01-15 RX ORDER — ONDANSETRON HYDROCHLORIDE 2 MG/ML
4 INJECTION, SOLUTION INTRAVENOUS EVERY 6 HOURS PRN
Status: DISPENSED | OUTPATIENT
Start: 2024-01-15 | End: 2024-01-17

## 2024-01-15 RX ORDER — GABAPENTIN 300 MG/1
300 CAPSULE ORAL EVERY 12 HOURS PRN
Status: DISCONTINUED | OUTPATIENT
Start: 2024-01-15 | End: 2024-01-18 | Stop reason: HOSPADM

## 2024-01-15 RX ADMIN — TRAZODONE HYDROCHLORIDE 50 MG: 50 TABLET ORAL at 09:01

## 2024-01-15 RX ADMIN — LISINOPRIL 40 MG: 20 TABLET ORAL at 09:01

## 2024-01-15 RX ADMIN — ACETAMINOPHEN 650 MG: 325 TABLET ORAL at 04:01

## 2024-01-15 RX ADMIN — IOHEXOL 100 ML: 350 INJECTION, SOLUTION INTRAVENOUS at 09:01

## 2024-01-15 RX ADMIN — ONDANSETRON 4 MG: 2 INJECTION INTRAMUSCULAR; INTRAVENOUS at 09:01

## 2024-01-15 NOTE — Clinical Note
The sheath insertion attempt was made into the right radial artery. Further attempts at R radial artery aborted.

## 2024-01-15 NOTE — ED PROVIDER NOTES
"Encounter Date: 1/15/2024       History     Chief Complaint   Patient presents with    Gait Problem     2 days more trouble standing, chills, been progressing for few years     61 yo female with hx of hypertension, hyperlipidemia, asthma, HFpEF, chronic low back pain, anxiety/depression, and diabetes type 2 c/b neuropathy who presents to the ED from home with generalized weakness and gait instability. Pt is here with her daughter who assists in hx taking. Per the patient and daughter, her sxs have been present over the past couple of years though she has noticed a progressive decline over the past several months. During this time, she complains of bilateral lower and upper extremity weakness and loss of sensation, tremors, headaches, visual disturbances, and issues with balance coordination. Pt reports she was sent to a neurologist for which she had an MRI brain revealing "hemorrhaging in the back of her brain." Upon chart review, I do not see documentation consistent with these findings. She states that this brain bleed has not been treated and they have been "waiting to address it until they fix her chronic back issues." Pt states that she woke up this morning and as she got out of bed she became very lightheaded and felt as though she was about to fall. She braced herself and got back in bed without ever falling or losing consciousness. Pt had no prodromes, nor confusion during the episode. She states she drinks plenty of water and hydrates very well. She called her daughter who lives nearby and she took her to the emergency room. The patient lives at home independently and ambulates without the assistance of walking aids.     Upon examination, pt endorses headaches, blurry vision, neck soreness, chronic right sided chest pain, shortness of breath, non-productive cough, generalized abdominal pain, nausea, constipation, BL weakness and sensory deficits. No other acute complaints including fever, recent illnesses, " vomiting, diarrhea, urinary sxs, abdominal tenderness, vertigo-like sxs, focal neurological deficits or issues with fine motor coordination.      Review of patient's allergies indicates:  No Known Allergies  Past Medical History:   Diagnosis Date    Asthma     Diabetes mellitus     Diabetes mellitus     Hyperlipidemia     Hypertension     Neuropathy     Pulmonary embolism 1998     Past Surgical History:   Procedure Laterality Date    CYST REMOVAL      HERNIA REPAIR      HYSTERECTOMY      TONSILLECTOMY      TUBAL LIGATION       Family History   Problem Relation Age of Onset    Kidney disease Mother     Kidney disease Father     Stroke Maternal Uncle      Social History     Tobacco Use    Smoking status: Never    Smokeless tobacco: Never   Substance Use Topics    Alcohol use: No    Drug use: No     Review of Systems   Constitutional:  Positive for fatigue. Negative for fever.   HENT:  Negative for hearing loss and tinnitus.    Eyes:  Positive for visual disturbance. Negative for redness.   Respiratory:  Positive for cough and shortness of breath. Negative for chest tightness, wheezing and stridor.    Cardiovascular:  Positive for chest pain (right-sided and chronic) and leg swelling. Negative for palpitations.   Gastrointestinal:  Positive for abdominal pain, constipation and nausea. Negative for abdominal distention, diarrhea and vomiting.   Genitourinary:  Negative for difficulty urinating and dysuria.   Musculoskeletal:  Positive for back pain, gait problem and neck pain. Negative for arthralgias, joint swelling and myalgias.   Neurological:  Positive for tremors, weakness, light-headedness, numbness and headaches. Negative for dizziness, seizures, syncope and speech difficulty.   Psychiatric/Behavioral:  Negative for agitation, behavioral problems, confusion and decreased concentration. The patient is nervous/anxious.        Physical Exam     Initial Vitals [01/15/24 0952]   BP Pulse Resp Temp SpO2   (!) 161/67 63  20 98.2 °F (36.8 °C) 100 %      MAP       --         Physical Exam    Vitals reviewed.  Constitutional: She appears well-nourished. She is not diaphoretic. She appears distressed.   HENT:   Head: Normocephalic and atraumatic.   Eyes: Conjunctivae and EOM are normal. Pupils are equal, round, and reactive to light. Right eye exhibits no discharge. Left eye exhibits no discharge. No scleral icterus.   Neck:   Slightly restricted due to pain   Cardiovascular:  Normal rate, regular rhythm and normal heart sounds.           Pulmonary/Chest: Breath sounds normal. No respiratory distress. She has no wheezes. She has no rales.   Abdominal: Abdomen is soft. Bowel sounds are normal. She exhibits no distension. There is no abdominal tenderness. There is no rebound.   Musculoskeletal:         General: Edema present. No tenderness. Normal range of motion.     Neurological: She is alert and oriented to person, place, and time. She has normal strength. A sensory deficit is present. No cranial nerve deficit.   Skin: Skin is warm and dry.   Psychiatric: Her behavior is normal. Thought content normal.         ED Course   Procedures    Labs Reviewed   CBC WITHOUT DIFFERENTIAL - Abnormal; Notable for the following components:       Result Value    Hemoglobin 11.1 (*)     Hematocrit 35.1 (*)     MCH 26.7 (*)     MCHC 31.6 (*)     All other components within normal limits   COMPREHENSIVE METABOLIC PANEL - Abnormal; Notable for the following components:    CO2 20 (*)     Glucose 128 (*)     BUN 48 (*)     Albumin 3.0 (*)     eGFR 46.8 (*)     All other components within normal limits   URINALYSIS, REFLEX TO URINE CULTURE - Abnormal; Notable for the following components:    Protein, UA Trace (*)     All other components within normal limits    Narrative:     Specimen Source->Urine   B-TYPE NATRIURETIC PEPTIDE   TROPONIN I   TSH          Imaging Results              CT Head Without Contrast (Final result)  Result time 01/15/24 16:08:52       Final result by Thang Tadeo MD (01/15/24 16:08:52)                   Impression:      No acute intracranial abnormality.  Specifically, no intracranial hemorrhage.      Electronically signed by: Thang Tadeo MD  Date:    01/15/2024  Time:    16:08               Narrative:    EXAMINATION:  CT HEAD WITHOUT CONTRAST    CLINICAL HISTORY:  Headache, new or worsening (Age >= 50y);    TECHNIQUE:  Low dose axial CT images obtained throughout the head without intravenous contrast. Sagittal and coronal reconstructions were performed.    COMPARISON:  Head CT 09/07/2018    FINDINGS:  Intracranial compartment:    Ventricles and sulci are normal in size for age without evidence of hydrocephalus. No extra-axial blood or fluid collections.    Grossly similar mild periventricular white matter hypoattenuation likely sequela of chronic microvascular ischemic change.  No definite new focal areas of abnormal parenchymal attenuation.  No parenchymal mass, hemorrhage, edema or major vascular distribution infarct.  Skull base atherosclerotic vascular calcifications noted.    Skull/extracranial contents (limited evaluation): No fracture. Mastoid air cells and paranasal sinuses are essentially clear.  Remote operative change of the left ocular lens.                                       X-Ray Chest AP Portable (Final result)  Result time 01/15/24 13:03:53      Final result by Destiny Mueller MD (01/15/24 13:03:53)                   Impression:      No significant change from the prior study 06/27/2023.      Electronically signed by: Destiny Mueller MD  Date:    01/15/2024  Time:    13:03               Narrative:    EXAMINATION:  XR CHEST AP PORTABLE    CLINICAL HISTORY:  Unspecified diastolic (congestive) heart failure    TECHNIQUE:  Single frontal view of the chest was performed.    COMPARISON:  06/27/2023    FINDINGS:  The cardiac silhouette is slightly prominent possibly accentuated by the AP technique.  The pulmonary  vascularity is mildly increased centrally.    No focal airspace disease.    No pneumothorax.  No pleural effusion.    The osseous structures appear normal.                                       Medications   acetaminophen tablet 650 mg (has no administration in time range)     Medical Decision Making  Labs ordered to assess for anemia or any electrolyte disturbances, no evidence of either. Elevated Bun/Cr, though around pt's baseline. BNP and trop wnl suggesting presentation is not due to worsening heart failure. TSH obtained to assess thyroid function as etiology of chronic weakness; thyroid studies normal. EKG.... CXR in setting of cough, SOB w/o detrimental interval changes from prior studies. CT Head ordered in the absence of focal deficits/trauma given pt's reported hx of brain bleed and Headaches. Head imaging non-concerning for any acute intracranial pathology, negative for bleed.     Ddx considered include Pre-Syncope vs Intracranial HTN vs Cerebral Aneurysm vs Chronic debility in the setting of worsening diabetic neuropathy    Most likely pre-syncope on background of HFpEF and chronic weakness/debility. Pt able to get up and ambulate appropriately on assessment and not feeling lightheaded/dizziness. She is no longer concerned about falling and feels comfortable going home where she has a walker. Pt medically stable and safe to discharge home       Clinical Impression: Stable for discharge  Final diagnoses: Pre-Syncope          Tony Bains MD  Resident  01/15/24 [id number]       Tony Bains MD  Resident  01/15/24 [id number]

## 2024-01-15 NOTE — ED TRIAGE NOTES
Pt presents to ED w/ C/o unbalanced gait and generalized leg weakness x2 days. Pt endorses tremors. Family is suspicious for vertigo. Pt endorses headache and dizziness.

## 2024-01-15 NOTE — ED NOTES
Assumed care of the patient. Pt in hospital gown, side rails up X2, bed low and locked, and call light is placed within reach. Family at bedside at this time. Pt denies any complaints or needs.

## 2024-01-15 NOTE — Clinical Note
Diagnosis: STOUT (dyspnea on exertion) [405695]   Future Attending Provider: SABRINA FERNANDES [[id number]]   Is the patient being sent to ED Observation?: Yes   Special Needs:: No Special Needs [1]

## 2024-01-16 PROBLEM — R94.39 ABNORMAL STRESS ECHO: Status: ACTIVE | Noted: 2024-01-16

## 2024-01-16 PROBLEM — F41.9 ANXIETY: Status: ACTIVE | Noted: 2024-01-16

## 2024-01-16 LAB
ASCENDING AORTA: 1.89 CM
BSA FOR ECHO PROCEDURE: 2.24 M2
CV ECHO LV RWT: 0.39 CM
CV STRESS BASE HR: 64 BPM
DIASTOLIC BLOOD PRESSURE: 76 MMHG
DOP CALC LVOT AREA: 2.5 CM2
DOP CALC LVOT DIAMETER: 1.8 CM
DOP CALC LVOT PEAK VEL: 1.04 M/S
DOP CALC LVOT STROKE VOLUME: 67.04 CM3
DOP CALCLVOT PEAK VEL VTI: 26.36 CM
E WAVE DECELERATION TIME: 248.73 MSEC
E/A RATIO: 0.78
E/E' RATIO: 11.89 M/S
ECHO LV POSTERIOR WALL: 0.89 CM (ref 0.6–1.1)
FRACTIONAL SHORTENING: 35 % (ref 28–44)
GLUCOSE SERPL-MCNC: 184 MG/DL (ref 70–110)
INTERVENTRICULAR SEPTUM: 0.37 CM (ref 0.6–1.1)
IVRT: 62.8 MSEC
LA MAJOR: 5.92 CM
LA MINOR: 6.06 CM
LA WIDTH: 4.6 CM
LEFT ATRIUM SIZE: 4.24 CM
LEFT ATRIUM VOLUME INDEX MOD: 35 ML/M2
LEFT ATRIUM VOLUME INDEX: 45.8 ML/M2
LEFT ATRIUM VOLUME MOD: 75.97 CM3
LEFT ATRIUM VOLUME: 99.29 CM3
LEFT INTERNAL DIMENSION IN SYSTOLE: 2.97 CM (ref 2.1–4)
LEFT VENTRICLE DIASTOLIC VOLUME INDEX: 44.72 ML/M2
LEFT VENTRICLE DIASTOLIC VOLUME: 97.04 ML
LEFT VENTRICLE MASS INDEX: 40 G/M2
LEFT VENTRICLE SYSTOLIC VOLUME INDEX: 15.8 ML/M2
LEFT VENTRICLE SYSTOLIC VOLUME: 34.28 ML
LEFT VENTRICULAR INTERNAL DIMENSION IN DIASTOLE: 4.59 CM (ref 3.5–6)
LEFT VENTRICULAR MASS: 86.71 G
LV LATERAL E/E' RATIO: 9.73 M/S
LV SEPTAL E/E' RATIO: 15.29 M/S
MV A" WAVE DURATION": 9.71 MSEC
MV PEAK A VEL: 1.37 M/S
MV PEAK E VEL: 1.07 M/S
MV STENOSIS PRESSURE HALF TIME: 72.13 MS
MV VALVE AREA P 1/2 METHOD: 3.05 CM2
OHS CV CPX 1 MINUTE RECOVERY HEART RATE: 146 BPM
OHS CV CPX 85 PERCENT MAX PREDICTED HEART RATE MALE: 135
OHS CV CPX MAX PREDICTED HEART RATE: 159
OHS CV CPX PATIENT IS FEMALE: 1
OHS CV CPX PATIENT IS MALE: 0
OHS CV CPX PEAK DIASTOLIC BLOOD PRESSURE: 84 MMHG
OHS CV CPX PEAK HEAR RATE: 146 BPM
OHS CV CPX PEAK RATE PRESSURE PRODUCT: ABNORMAL
OHS CV CPX PEAK SYSTOLIC BLOOD PRESSURE: 183 MMHG
OHS CV CPX PERCENT MAX PREDICTED HEART RATE ACHIEVED: 96
OHS CV CPX RATE PRESSURE PRODUCT PRESENTING: ABNORMAL
OHS CV INITIAL DOSE: 10 MCG/KG/MIN
OHS CV PEAK DOSE: 40 MCG/KG/MIN
PISA TR MAX VEL: 3.16 M/S
POCT GLUCOSE: 182 MG/DL (ref 70–110)
POCT GLUCOSE: 184 MG/DL (ref 70–110)
POCT GLUCOSE: 195 MG/DL (ref 70–110)
PULM VEIN S/D RATIO: 1.44
PV PEAK D VEL: 0.34 M/S
PV PEAK S VEL: 0.49 M/S
RA MAJOR: 5.62 CM
RA PRESSURE ESTIMATED: 3 MMHG
RA WIDTH: 4.19 CM
RIGHT VENTRICULAR END-DIASTOLIC DIMENSION: 2.85 CM
RV TB RVSP: 6 MMHG
SINUS: 2.09 CM
STJ: 1.74 CM
SYSTOLIC BLOOD PRESSURE: 195 MMHG
TDI LATERAL: 0.11 M/S
TDI SEPTAL: 0.07 M/S
TDI: 0.09 M/S
TR MAX PG: 40 MMHG
TRICUSPID ANNULAR PLANE SYSTOLIC EXCURSION: 2.19 CM
TROPONIN I SERPL DL<=0.01 NG/ML-MCNC: <0.006 NG/ML (ref 0–0.03)
TV REST PULMONARY ARTERY PRESSURE: 43 MMHG
Z-SCORE OF LEFT VENTRICULAR DIMENSION IN END DIASTOLE: -4.47
Z-SCORE OF LEFT VENTRICULAR DIMENSION IN END SYSTOLE: -3.05

## 2024-01-16 PROCEDURE — 25500020 PHARM REV CODE 255: Performed by: PHYSICIAN ASSISTANT

## 2024-01-16 PROCEDURE — 63600150 PHARM REV CODE 636: Performed by: PHYSICIAN ASSISTANT

## 2024-01-16 PROCEDURE — 25000003 PHARM REV CODE 250: Performed by: PHYSICIAN ASSISTANT

## 2024-01-16 PROCEDURE — G0378 HOSPITAL OBSERVATION PER HR: HCPCS

## 2024-01-16 PROCEDURE — 63600175 PHARM REV CODE 636 W HCPCS: Performed by: PHYSICIAN ASSISTANT

## 2024-01-16 PROCEDURE — 25000003 PHARM REV CODE 250: Performed by: FAMILY MEDICINE

## 2024-01-16 PROCEDURE — 20600001 HC STEP DOWN PRIVATE ROOM

## 2024-01-16 RX ORDER — ALBUTEROL SULFATE 90 UG/1
2 AEROSOL, METERED RESPIRATORY (INHALATION) EVERY 6 HOURS PRN
Status: DISCONTINUED | OUTPATIENT
Start: 2024-01-16 | End: 2024-01-18 | Stop reason: HOSPADM

## 2024-01-16 RX ORDER — ATROPINE SULFATE 0.1 MG/ML
1 INJECTION INTRAVENOUS
Status: COMPLETED | OUTPATIENT
Start: 2024-01-16 | End: 2024-01-16

## 2024-01-16 RX ORDER — FUROSEMIDE 40 MG/1
40 TABLET ORAL DAILY
Status: DISCONTINUED | OUTPATIENT
Start: 2024-01-17 | End: 2024-01-18

## 2024-01-16 RX ORDER — LISINOPRIL 20 MG/1
20 TABLET ORAL DAILY
Status: DISCONTINUED | OUTPATIENT
Start: 2024-01-17 | End: 2024-01-18

## 2024-01-16 RX ORDER — SERTRALINE HYDROCHLORIDE 100 MG/1
200 TABLET, FILM COATED ORAL DAILY
Status: DISCONTINUED | OUTPATIENT
Start: 2024-01-17 | End: 2024-01-18 | Stop reason: HOSPADM

## 2024-01-16 RX ORDER — BUPROPION HYDROCHLORIDE 150 MG/1
300 TABLET ORAL DAILY
Status: DISCONTINUED | OUTPATIENT
Start: 2024-01-17 | End: 2024-01-18 | Stop reason: HOSPADM

## 2024-01-16 RX ORDER — ASPIRIN 81 MG/1
81 TABLET ORAL DAILY
Status: DISCONTINUED | OUTPATIENT
Start: 2024-01-17 | End: 2024-01-18 | Stop reason: HOSPADM

## 2024-01-16 RX ORDER — GABAPENTIN 300 MG/1
300 CAPSULE ORAL 2 TIMES DAILY PRN
Status: CANCELLED | OUTPATIENT
Start: 2024-01-16

## 2024-01-16 RX ORDER — DOBUTAMINE HYDROCHLORIDE 100 MG/100ML
10 INJECTION INTRAVENOUS
Status: COMPLETED | OUTPATIENT
Start: 2024-01-16 | End: 2024-01-16

## 2024-01-16 RX ORDER — ATORVASTATIN CALCIUM 20 MG/1
40 TABLET, FILM COATED ORAL NIGHTLY
Status: DISCONTINUED | OUTPATIENT
Start: 2024-01-16 | End: 2024-01-18 | Stop reason: HOSPADM

## 2024-01-16 RX ORDER — PANTOPRAZOLE SODIUM 20 MG/1
20 TABLET, DELAYED RELEASE ORAL DAILY
Status: DISCONTINUED | OUTPATIENT
Start: 2024-01-17 | End: 2024-01-18 | Stop reason: HOSPADM

## 2024-01-16 RX ADMIN — ATROPINE SULFATE 1 MG: 0.1 INJECTION INTRAVENOUS at 02:01

## 2024-01-16 RX ADMIN — DOBUTAMINE HYDROCHLORIDE 10 MCG/KG/MIN: 100 INJECTION INTRAVENOUS at 02:01

## 2024-01-16 RX ADMIN — HUMAN ALBUMIN MICROSPHERES AND PERFLUTREN 0.66 MG: 10; .22 INJECTION, SOLUTION INTRAVENOUS at 02:01

## 2024-01-16 RX ADMIN — TRAZODONE HYDROCHLORIDE 50 MG: 50 TABLET ORAL at 09:01

## 2024-01-16 RX ADMIN — ATORVASTATIN CALCIUM 40 MG: 40 TABLET, FILM COATED ORAL at 09:01

## 2024-01-16 RX ADMIN — LISINOPRIL 40 MG: 20 TABLET ORAL at 08:01

## 2024-01-16 NOTE — PROGRESS NOTES
Case discussed with cardiology fellow Dr. Gong.  Cardiology will evaluate the patient while inpatient.  She will be upgraded to Hospital Medicine for further management.

## 2024-01-16 NOTE — PROVIDER PROGRESS NOTES - EMERGENCY DEPT.
"ED Observation Unit  Progress Note      HPI   "63 yo female with hx of HTN, HLD, asthma, HFpEF, chronic low back pain, anxiety/depression, and DM2 c/b neuropathy who [facility] from home with generalized weakness and gait instability.      Pt is here with her daughter who assists in hx taking. Per the patient and daughter, her sxs have been present over the past couple of years though she has noticed a progressive decline over the past several months. During this time, she complains of bilateral lower and upper extremity weakness and loss of sensation, tremors, headaches, visual disturbances, and issues with balance coordination. She has followed up with NSGY at  and had MRIs and LP (results below). Pt states that she woke up this morning and as she got out of bed she became very lightheaded and felt as though she was about to fall. She braced herself and got back in bed without ever falling or losing consciousness. Pt had no prodromes, nor confusion during the episode. She states she drinks plenty of water and hydrates very well. She called her daughter who lives nearby and she took her to the emergency room. The patient lives at home independently and ambulates without the assistance of walking aids. She endorses headaches, blurry vision, neck soreness, chronic right sided chest pain, shortness of breath, non-productive cough, generalized abdominal pain, nausea, constipation, BL weakness and sensory deficits. She denies fever, recent illnesses, vomiting, diarrhea, urinary sxs, abdominal tenderness, vertigo-like sxs, focal neurological deficits or issues with fine motor coordination.     Per chart review,   Echo 08/14/23:  1. Blood Pressure: 114/66 mmHg.  2. Left Ventricle: LV size, wall thickness and overall systolic function are normal, with an EF >  55%.  3. Left Ventricle: Grade II Diastolic Dysfunction is present with elevated LA pressure.  4. Left Ventricle: Mean global longitudinal strain is -16 %, " "which is normal.  5. Left Atrium: Mildly dilated left atrium.  6. Right Ventricle: Normal RV systolic function.  7. Pulmonary Artery: There is mild pulmonary hypertension with a pulmonary artery systolic  pressure calculated to be 36 mmHg.  8. Systemic Veins: Normal central venous pressure.      MRI brain w wo contrast on 9/21/2023 showed:  1.   Bilateral distal transverse sinus stenoses, as well as mild expansion of the optic nerve sheaths, raise concern for underlying idiopathic intracranial hypertension in the setting of headaches. No other imaging sequelae of elevated intracranial pressures. If high clinical suspicion, consider dedicated lumbar puncture for opening pressures.   2.   No acute intracranial abnormality.   3.   Mild to moderate chronic microvascular ischemic changes.      LP on 10/18/2023 showed:  1.  Technically successful fluoroscopic guided lumbar puncture without complication.  Samples were sent to the lab for analysis.   2. Opening pressure 26 cm water and closing pressure 18 cm water.      MRI C spine on 10/27/2023 showed:  there is relatively minor to mild degenerative findings     In the ED, VSS. ECG with NSR, no ST changes, normal intervals, and no STEMI. No leukocytosis. Hgb at 11.1, above baseline. Plt wnl. BUN/Cr at 48/1.3. Na, K, Cl, and Ca wnl. BNP and 1st trop wnl. UA without signs of infection. CXR clear. CT head without acute processes.      I reviewed the ED Provider Note dated 1/15/2023 prior to my evaluation of this patient.  I reviewed all labs and imaging performed in the Main ED, prior to patient being placed in Observation. Patient was placed in the ED Observation Unit for further work up of STOUT. She is pending her CTA, although suspicion is low give normal vitals, neg cardiac markers, ECG without right heart straining, and plethera of symptoms. Plan to trend trops and obtain Stress Echo." (copy from [facility] H/P)    Interval History   Pt reports feeling better. No new or " worsening complaints. Pt requesting breakfast. RN double checked with echo lab, pt can eat now, but will need to be NPO after 9 am for scheduled stress echo at 1 pm. Breakfast provided.     PMHx   Past Medical History:   Diagnosis Date    Asthma     Back pain     Diabetes mellitus     Grade II diastolic dysfunction     Hyperlipidemia     Hypertension     Neuropathy     Obesity     Pulmonary embolism 1998    Pulmonary hypertension       Past Surgical History:   Procedure Laterality Date    CYST REMOVAL      HERNIA REPAIR      HYSTERECTOMY      TONSILLECTOMY      TUBAL LIGATION          Family Hx   Family History   Problem Relation Age of Onset    Kidney disease Mother     Kidney disease Father     Stroke Maternal Uncle         Social Hx   Social History     Socioeconomic History    Marital status: Single   Tobacco Use    Smoking status: Never    Smokeless tobacco: Never   Substance and Sexual Activity    Alcohol use: No    Drug use: No    Sexual activity: Not Currently     Partners: Male   Social History Narrative    ** Merged History Encounter **          Social Determinants of Health     Financial Resource Strain: Low Risk  (1/16/2024)    Overall Financial Resource Strain (CARDIA)     Difficulty of Paying Living Expenses: Not very hard   Food Insecurity: Food Insecurity Present (1/16/2024)    Hunger Vital Sign     Worried About Running Out of Food in the Last Year: Sometimes true     Ran Out of Food in the Last Year: Sometimes true   Transportation Needs: No Transportation Needs (1/16/2024)    PRAPARE - Transportation     Lack of Transportation (Medical): No     Lack of Transportation (Non-Medical): No   Physical Activity: Inactive (1/16/2024)    Exercise Vital Sign     Days of Exercise per Week: 0 days     Minutes of Exercise per Session: 0 min   Stress: No Stress Concern Present (1/16/2024)    [place] [employer] - Occupational Stress Questionnaire     Feeling of Stress : Only a little    Social Connections: Moderately Isolated (1/16/2024)    Social Connection and Isolation Panel [NHANES]     Frequency of Communication with Friends and Family: More than three times a week     Frequency of Social Gatherings with Friends and Family: More than three times a week     Attends Restoration Services: More than 4 times per year     Active Member of Clubs or Organizations: No     Attends Club or Organization Meetings: Never     Marital Status:    Housing Stability: Unknown (1/16/2024)    Housing Stability Vital Sign     Unable to Pay for Housing in the Last Year: No     Unstable Housing in the Last Year: No        Vital Signs   Vitals:    01/15/24 2153 01/16/24 0158 01/16/24 0613 01/16/24 0725   BP:  (!) 152/71 (!) 128/54 (!) 151/64   Pulse: 62 71 66 69   Resp:  18 18    Temp:  98 °F (36.7 °C) 98 °F (36.7 °C) 98.4 °F (36.9 °C)   TempSrc:  Oral Oral Oral   SpO2: 99% 96% 96% 96%   Weight:       Height:            Review of Systems  Review of Systems   Constitutional:  Positive for malaise/fatigue. Negative for chills, diaphoresis and fever.   Respiratory:  Negative for shortness of breath.    Cardiovascular:  Negative for chest pain.   Gastrointestinal:  Negative for abdominal pain, nausea and vomiting.   Musculoskeletal:  Negative for falls.   Skin:  Negative for rash.   Neurological:  Negative for dizziness, focal weakness, seizures, loss of consciousness and headaches.       Brief Physical Exam/Reassessment   Physical Exam  Vitals and nursing note reviewed.   Constitutional:       General: She is not in acute distress.     Appearance: Normal appearance. She is not ill-appearing, toxic-appearing or diaphoretic.   HENT:      Head: Normocephalic.      Nose: Nose normal.      Mouth/Throat:      Mouth: Mucous membranes are moist.   Eyes:      Conjunctiva/sclera: Conjunctivae normal.   Cardiovascular:      Rate and Rhythm: Normal rate.   Pulmonary:      Effort: Pulmonary effort is normal. No respiratory  distress.   Abdominal:      Tenderness: There is no abdominal tenderness.   Musculoskeletal:         General: No tenderness or signs of injury. Normal range of motion.      Cervical back: Neck supple.   Skin:     General: Skin is warm and dry.   Neurological:      General: No focal deficit present.      Mental Status: She is alert and oriented to person, place, and time.   Psychiatric:         Mood and Affect: Mood normal.         Behavior: Behavior normal.         Labs/Imaging   Labs Reviewed   CBC WITHOUT DIFFERENTIAL - Abnormal; Notable for the following components:       Result Value    Hemoglobin 11.1 (*)     Hematocrit 35.1 (*)     MCH 26.7 (*)     MCHC 31.6 (*)     All other components within normal limits   COMPREHENSIVE METABOLIC PANEL - Abnormal; Notable for the following components:    CO2 20 (*)     Glucose 128 (*)     BUN 48 (*)     Albumin 3.0 (*)     eGFR 46.8 (*)     All other components within normal limits   URINALYSIS, REFLEX TO URINE CULTURE - Abnormal; Notable for the following components:    Protein, UA Trace (*)     All other components within normal limits    Narrative:     Specimen Source->Urine   HEMOGLOBIN A1C - Abnormal; Notable for the following components:    Hemoglobin A1C 7.4 (*)     Estimated Avg Glucose 166 (*)     All other components within normal limits    Narrative:     add on Hemoglobin A1c by irais Hall    POCT GLUCOSE MONITORING CONTINUOUS - Abnormal; Notable for the following components:    POC Glucose 184 (*)     All other components within normal limits   POCT GLUCOSE - Abnormal; Notable for the following components:    POCT Glucose 182 (*)     All other components within normal limits   POCT GLUCOSE - Abnormal; Notable for the following components:    POCT Glucose 184 (*)     All other components within normal limits   B-TYPE NATRIURETIC PEPTIDE   TROPONIN I   TSH   TROPONIN I   HEMOGLOBIN A1C   TROPONIN I      Imaging Results              CTA Chest Non-Coronary (PE  Studies) (Final result)  Result time 01/15/24 21:28:13      Final result by Kong Payan MD (01/15/24 21:28:13)                   Impression:      No convincing evidence of pulmonary thromboembolism through the level of the proximal segmental arteries.    Aortic atherosclerosis.  Three-vessel coronary artery calcification.    Heterogeneous enlargement of the thyroid gland, similar to prior examination.    Additional findings as above.      Electronically signed by: Kong Payan MD  Date:    01/15/2024  Time:    21:28               Narrative:    EXAMINATION:  CTA CHEST NON CORONARY (PE STUDIES)    CLINICAL HISTORY:  Pulmonary embolism (PE) suspected, high prob;    TECHNIQUE:  Low dose axial images, sagittal and coronal reformations were obtained from the thoracic inlet to the lung bases following the IV administration of 100 mL of Omnipaque 350.  Contrast timing was optimized to evaluate the pulmonary arteries.  MIP images were performed.    COMPARISON:  CTA chest 06/27/2023    FINDINGS:  Soft tissue structures at the base of the neck demonstrate heterogeneous enlargement of the thyroid gland, left more so than right, similar to prior examination.    The thoracic aorta maintains normal caliber, contour, and course with moderate atherosclerotic calcification within its course.  There is no evidence of aneurysmal dilation or dissection. The heart is not enlarged and there is no evidence of pericardial effusion.  There is 3 vessel coronary artery calcification.The esophagus maintains a normal course and caliber.There is no bulky axillary mediastinal lymph node enlargement.    The trachea is midline and proximal airways are patent. Detailed evaluation of the lung parenchyma is limited by respiratory motion..  There is no pneumothorax.  There is no confluent airspace consolidation.  There are a few scattered bandlike opacities suggesting atelectasis or scarring.  No significant pleural fluid at the lung  bases.    No convincing evidence of pulmonary thromboembolism through the level of the proximal segmental arteries.  No evidence to suggest pulmonary infarct.    Limited images of the upper abdomen obtained during the course of this dedicated thoracic CT demonstrate no acute abnormalities.    The osseous structures demonstrate mild multilevel degenerative changes of the visualized spine with multilevel anterior bridging osteophytes.                                       CT Head Without Contrast (Final result)  Result time 01/15/24 16:08:52      Final result by Thang Tadeo MD (01/15/24 16:08:52)                   Impression:      No acute intracranial abnormality.  Specifically, no intracranial hemorrhage.      Electronically signed by: Thang Tadeo MD  Date:    01/15/2024  Time:    16:08               Narrative:    EXAMINATION:  CT HEAD WITHOUT CONTRAST    CLINICAL HISTORY:  Headache, new or worsening (Age >= 50y);    TECHNIQUE:  Low dose axial CT images obtained throughout the head without intravenous contrast. Sagittal and coronal reconstructions were performed.    COMPARISON:  Head CT 09/07/2018    FINDINGS:  Intracranial compartment:    Ventricles and sulci are normal in size for age without evidence of hydrocephalus. No extra-axial blood or fluid collections.    Grossly similar mild periventricular white matter hypoattenuation likely sequela of chronic microvascular ischemic change.  No definite new focal areas of abnormal parenchymal attenuation.  No parenchymal mass, hemorrhage, edema or major vascular distribution infarct.  Skull base atherosclerotic vascular calcifications noted.    Skull/extracranial contents (limited evaluation): No fracture. Mastoid air cells and paranasal sinuses are essentially clear.  Remote operative change of the left ocular lens.                                       X-Ray Chest AP Portable (Final result)  Result time 01/15/24 13:03:53      Final result by Ami  "MD Destiny (01/15/24 13:03:53)                   Impression:      No significant change from the prior study 06/27/2023.      Electronically signed by: Destiny Mueller MD  Date:    01/15/2024  Time:    13:03               Narrative:    EXAMINATION:  XR CHEST AP PORTABLE    CLINICAL HISTORY:  Unspecified diastolic (congestive) heart failure    TECHNIQUE:  Single frontal view of the chest was performed.    COMPARISON:  06/27/2023    FINDINGS:  The cardiac silhouette is slightly prominent possibly accentuated by the AP technique.  The pulmonary vascularity is mildly increased centrally.    No focal airspace disease.    No pneumothorax.  No pleural effusion.    The osseous structures appear normal.                                       I reviewed all labs, imaging, related encounter notes, and EKG.     Plan   SOB, STOUT, and chest pain:  In the ED, VSS. ECG with NSR, no ST changes, normal intervals, and no STEMI. No leukocytosis. Hgb at 11.1, above baseline. Plt wnl. BUN/Cr at 48/1.3. Na, K, Cl, and Ca wnl. BNP and 1st trop wnl. UA without signs of infection. CXR clear.  -Reports chronic symptoms. Worse and daily in the past 7 days.   -Trend total of 3 trops  -Tele  -CTA PE study shows: No convincing evidence of PE through the level of the proximal segmental arteries. Aortic atherosclerosis. Three-vessel coronary artery calcification. Heterogeneous enlargement of the thyroid gland.  -Stress Echo tomorrow  -NPO at midnight  -Follows up with Vincent Ho MD.     HA, blurred vision, dizziness:  -Worked up by NSGY at . Thought to have IIH. Per last NSGY note, patient with possible IIH since MRI findings and opening pressure of 26 and they are "discussing endovascular treatment with patient but this would require long-term DAPT (at least 6 months). She states that her back pain is worse than her headaches and she would like to address that first if possible since DAPT would preclude any surgical " intervention.  -She states that her HA has been worse than her chronic back pain lately so is now interested in intervention.   -CT head without acute process  -Continue to follow up with NSGY, Dr. Wiggins on 3/12/2024 to discuss management.  -Pain improved here after acetaminophen  -PRN medications  -Established with Ophthalmology already. Continue to follow up with Dr. Aguilar on 2/27.     DM2  -On Toujeo daily and Trulicity weekly at home  -Insulin for eating patients here  -Diabetic diet  -NPO at midnight     Chronic back pain:  Neuropathy:  Radiculopathy:  -Resume gabapentin bid prn  -other PRN meds     I have discussed this case with Nimisha Erazo PA-C.

## 2024-01-16 NOTE — PROGRESS NOTES
"ED Observation Unit  Progress Note      HPI   "61 yo female with hx of HTN, HLD, asthma, HFpEF, chronic low back pain, anxiety/depression, and DM2 c/b neuropathy who presents to [facility] ED from home with generalized weakness and gait instability.      Pt is here with her daughter who assists in hx taking. Per the patient and daughter, her sxs have been present over the past couple of years though she has noticed a progressive decline over the past several months. During this time, she complains of bilateral lower and upper extremity weakness and loss of sensation, tremors, headaches, visual disturbances, and issues with balance coordination. She has followed up with NSGY at  and had MRIs and LP (results below). Pt states that she woke up this morning and as she got out of bed she became very lightheaded and felt as though she was about to fall. She braced herself and got back in bed without ever falling or losing consciousness. Pt had no prodromes, nor confusion during the episode. She states she drinks plenty of water and hydrates very well. She called her daughter who lives nearby and she took her to the emergency room. The patient lives at home independently and ambulates without the assistance of walking aids. She endorses headaches, blurry vision, neck soreness, chronic right sided chest pain, shortness of breath, non-productive cough, generalized abdominal pain, nausea, constipation, BL weakness and sensory deficits. She denies fever, recent illnesses, vomiting, diarrhea, urinary sxs, abdominal tenderness, vertigo-like sxs, focal neurological deficits or issues with fine motor coordination. Patient placed in the EDOU for Stress ECHO.    Interval History     Troponin negative x3.  Stress ECHO positive for ischemia: "The study is consistent with ischemia. Wall motion abnormalities in the apex at stress consistent with ischemia.  Specifcity reduced in the setting of hypertensive stress response."       PMHx   Past " Medical History:   Diagnosis Date    Asthma     Back pain     Diabetes mellitus     Grade II diastolic dysfunction     Hyperlipidemia     Hypertension     Neuropathy     Obesity     Pulmonary embolism 1998    Pulmonary hypertension       Past Surgical History:   Procedure Laterality Date    CYST REMOVAL      HERNIA REPAIR      HYSTERECTOMY      TONSILLECTOMY      TUBAL LIGATION          Family Hx   Family History   Problem Relation Age of Onset    Kidney disease Mother     Kidney disease Father     Stroke Maternal Uncle         Social Hx   Social History     Socioeconomic History    Marital status: Single   Tobacco Use    Smoking status: Never    Smokeless tobacco: Never   Substance and Sexual Activity    Alcohol use: No    Drug use: No    Sexual activity: Not Currently     Partners: Male   Social History Narrative    ** Merged History Encounter **          Social Determinants of Health     Financial Resource Strain: Low Risk  (1/16/2024)    Overall Financial Resource Strain (CARDIA)     Difficulty of Paying Living Expenses: Not very hard   Food Insecurity: Food Insecurity Present (1/16/2024)    Hunger Vital Sign     Worried About Running Out of Food in the Last Year: Sometimes true     Ran Out of Food in the Last Year: Sometimes true   Transportation Needs: No Transportation Needs (1/16/2024)    PRAPARE - Transportation     Lack of Transportation (Medical): No     Lack of Transportation (Non-Medical): No   Physical Activity: Inactive (1/16/2024)    Exercise Vital Sign     Days of Exercise per Week: 0 days     Minutes of Exercise per Session: 0 min   Stress: No Stress Concern Present (1/16/2024)    [employer] [place] [employer] - Occupational Stress Questionnaire     Feeling of Stress : Only a little   Social Connections: Moderately Isolated (1/16/2024)    Social Connection and Isolation Panel [NHANES]     Frequency of Communication with Friends and Family: More than three times a week     Frequency of  "Social Gatherings with Friends and Family: More than three times a week     Attends Bahai Services: More than 4 times per year     Active Member of Clubs or Organizations: No     Attends Club or Organization Meetings: Never     Marital Status:    Housing Stability: Unknown (1/16/2024)    Housing Stability Vital Sign     Unable to Pay for Housing in the Last Year: No     Unstable Housing in the Last Year: No        Vital Signs   Vitals:    01/16/24 0613 01/16/24 0725 01/16/24 1255 01/16/24 1347   BP: (!) 128/54 (!) 151/64 (!) 185/112 (!) 168/72   BP Location:    Left arm   Patient Position:    Lying   Pulse: 66 69 72 64  Comment: baseline dse   Resp: 18  18 16   Temp: 98 °F (36.7 °C) 98.4 °F (36.9 °C) 98 °F (36.7 °C)    TempSrc: Oral Oral Oral    SpO2: 96% 96% 100%    Weight:    104.3 kg (230 lb)   Height:    5' 8" (1.727 m)        Review of Systems  Review of Systems   Constitutional:  Negative for fever and malaise/fatigue.   Respiratory:  Positive for shortness of breath.    Cardiovascular:  Positive for chest pain.   Gastrointestinal:  Negative for abdominal pain.   Genitourinary:  Negative for dysuria.   Musculoskeletal:  Negative for myalgias.   Neurological:  Positive for headaches. Negative for focal weakness.   Endo/Heme/Allergies:  Does not bruise/bleed easily.       Brief Physical Exam/Reassessment   Physical Exam  Vitals and nursing note reviewed.   Constitutional:       General: She is not in acute distress.     Appearance: She is not diaphoretic.   HENT:      Head: Normocephalic and atraumatic.   Eyes:      Conjunctiva/sclera: Conjunctivae normal.      Pupils: Pupils are equal, round, and reactive to light.   Cardiovascular:      Rate and Rhythm: Normal rate and regular rhythm.      Heart sounds: Normal heart sounds. No murmur heard.     No friction rub. No gallop.   Pulmonary:      Effort: Pulmonary effort is normal. No respiratory distress.      Breath sounds: Normal breath sounds. No " wheezing or rales.      Comments: Chest tenderness right pectoral muscle  Chest:      Chest wall: Tenderness present.   Musculoskeletal:         General: Normal range of motion.      Cervical back: Normal range of motion and neck supple.   Skin:     General: Skin is warm and dry.   Neurological:      General: No focal deficit present.      Mental Status: She is alert and oriented to person, place, and time.      Cranial Nerves: No cranial nerve deficit.   Psychiatric:         Mood and Affect: Affect normal.         Labs/Imaging   Labs Reviewed   CBC WITHOUT DIFFERENTIAL - Abnormal; Notable for the following components:       Result Value    Hemoglobin 11.1 (*)     Hematocrit 35.1 (*)     MCH 26.7 (*)     MCHC 31.6 (*)     All other components within normal limits   COMPREHENSIVE METABOLIC PANEL - Abnormal; Notable for the following components:    CO2 20 (*)     Glucose 128 (*)     BUN 48 (*)     Albumin 3.0 (*)     eGFR 46.8 (*)     All other components within normal limits   URINALYSIS, REFLEX TO URINE CULTURE - Abnormal; Notable for the following components:    Protein, UA Trace (*)     All other components within normal limits    Narrative:     Specimen Source->Urine   HEMOGLOBIN A1C - Abnormal; Notable for the following components:    Hemoglobin A1C 7.4 (*)     Estimated Avg Glucose 166 (*)     All other components within normal limits    Narrative:     add on Hemoglobin A1c by irais Hall    POCT GLUCOSE MONITORING CONTINUOUS - Abnormal; Notable for the following components:    POC Glucose 184 (*)     All other components within normal limits   POCT GLUCOSE - Abnormal; Notable for the following components:    POCT Glucose 182 (*)     All other components within normal limits   POCT GLUCOSE - Abnormal; Notable for the following components:    POCT Glucose 184 (*)     All other components within normal limits   B-TYPE NATRIURETIC PEPTIDE   TROPONIN I   TSH   TROPONIN I   HEMOGLOBIN A1C   TROPONIN I       Imaging Results              CTA Chest Non-Coronary (PE Studies) (Final result)  Result time 01/15/24 21:28:13      Final result by Kong Payan MD (01/15/24 21:28:13)                   Impression:      No convincing evidence of pulmonary thromboembolism through the level of the proximal segmental arteries.    Aortic atherosclerosis.  Three-vessel coronary artery calcification.    Heterogeneous enlargement of the thyroid gland, similar to prior examination.    Additional findings as above.      Electronically signed by: Kong Payan MD  Date:    01/15/2024  Time:    21:28               Narrative:    EXAMINATION:  CTA CHEST NON CORONARY (PE STUDIES)    CLINICAL HISTORY:  Pulmonary embolism (PE) suspected, high prob;    TECHNIQUE:  Low dose axial images, sagittal and coronal reformations were obtained from the thoracic inlet to the lung bases following the IV administration of 100 mL of Omnipaque 350.  Contrast timing was optimized to evaluate the pulmonary arteries.  MIP images were performed.    COMPARISON:  CTA chest 06/27/2023    FINDINGS:  Soft tissue structures at the base of the neck demonstrate heterogeneous enlargement of the thyroid gland, left more so than right, similar to prior examination.    The thoracic aorta maintains normal caliber, contour, and course with moderate atherosclerotic calcification within its course.  There is no evidence of aneurysmal dilation or dissection. The heart is not enlarged and there is no evidence of pericardial effusion.  There is 3 vessel coronary artery calcification.The esophagus maintains a normal course and caliber.There is no bulky axillary mediastinal lymph node enlargement.    The trachea is midline and proximal airways are patent. Detailed evaluation of the lung parenchyma is limited by respiratory motion..  There is no pneumothorax.  There is no confluent airspace consolidation.  There are a few scattered bandlike opacities suggesting atelectasis or  scarring.  No significant pleural fluid at the lung bases.    No convincing evidence of pulmonary thromboembolism through the level of the proximal segmental arteries.  No evidence to suggest pulmonary infarct.    Limited images of the upper abdomen obtained during the course of this dedicated thoracic CT demonstrate no acute abnormalities.    The osseous structures demonstrate mild multilevel degenerative changes of the visualized spine with multilevel anterior bridging osteophytes.                                       CT Head Without Contrast (Final result)  Result time 01/15/24 16:08:52      Final result by Thang Tadeo MD (01/15/24 16:08:52)                   Impression:      No acute intracranial abnormality.  Specifically, no intracranial hemorrhage.      Electronically signed by: Thang Tadeo MD  Date:    01/15/2024  Time:    16:08               Narrative:    EXAMINATION:  CT HEAD WITHOUT CONTRAST    CLINICAL HISTORY:  Headache, new or worsening (Age >= 50y);    TECHNIQUE:  Low dose axial CT images obtained throughout the head without intravenous contrast. Sagittal and coronal reconstructions were performed.    COMPARISON:  Head CT 09/07/2018    FINDINGS:  Intracranial compartment:    Ventricles and sulci are normal in size for age without evidence of hydrocephalus. No extra-axial blood or fluid collections.    Grossly similar mild periventricular white matter hypoattenuation likely sequela of chronic microvascular ischemic change.  No definite new focal areas of abnormal parenchymal attenuation.  No parenchymal mass, hemorrhage, edema or major vascular distribution infarct.  Skull base atherosclerotic vascular calcifications noted.    Skull/extracranial contents (limited evaluation): No fracture. Mastoid air cells and paranasal sinuses are essentially clear.  Remote operative change of the left ocular lens.                                       X-Ray Chest AP Portable (Final result)  Result time  01/15/24 13:03:53      Final result by Destiny Mueller MD (01/15/24 13:03:53)                   Impression:      No significant change from the prior study 06/27/2023.      Electronically signed by: Destiny Mueller MD  Date:    01/15/2024  Time:    13:03               Narrative:    EXAMINATION:  XR CHEST AP PORTABLE    CLINICAL HISTORY:  Unspecified diastolic (congestive) heart failure    TECHNIQUE:  Single frontal view of the chest was performed.    COMPARISON:  06/27/2023    FINDINGS:  The cardiac silhouette is slightly prominent possibly accentuated by the AP technique.  The pulmonary vascularity is mildly increased centrally.    No focal airspace disease.    No pneumothorax.  No pleural effusion.    The osseous structures appear normal.                                       I reviewed all labs, imaging, EKGs.     Plan   Will consult Cardiology, dispo pending their recommendations.  Anticipate upgrade to Hospital Medicine.     I have discussed this case with ED OU BEATRIZ.

## 2024-01-16 NOTE — ED NOTES
Patient identifiers for Devika Bustillo 63 y.o. female checked and correct.  Chief Complaint   Patient presents with    Gait Problem     2 days more trouble standing, chills, been progressing for few years     Past Medical History:   Diagnosis Date    Asthma     Back pain     Diabetes mellitus     Grade II diastolic dysfunction     Hyperlipidemia     Hypertension     Neuropathy     Obesity     Pulmonary embolism 1998    Pulmonary hypertension      Allergies reported: Review of patient's allergies indicates:  No Known Allergies      LOC: Patient is awake, alert, and aware of environment with an appropriate affect. Patient is oriented x 4 and speaking appropriately.  APPEARANCE: Patient resting comfortably and in no acute distress. Patient is clean and well groomed, patient's clothing is properly fastened.  SKIN: The skin is warm and dry. Patient has normal skin turgor and moist mucus membranes.   MUSKULOSKELETAL: Patient is moving all extremities well, no obvious deformities noted. Pulses intact.   RESPIRATORY: Airway is open and patent. Respirations are spontaneous and non-labored with normal effort and rate.  CARDIAC: Patient has a normal rate and rhythm. Normal sinus on cardiac monitor. No peripheral edema noted.   ABDOMEN: No distention noted. Soft and non-tender upon palpation.  NEUROLOGICAL: PERRL. Facial expression is symmetrical. Hand grasps are equal bilaterally. Normal sensation in all extremities when touched with finger.

## 2024-01-16 NOTE — SUBJECTIVE & OBJECTIVE
Past Medical History:   Diagnosis Date    Asthma     Back pain     Diabetes mellitus     Grade II diastolic dysfunction     Hyperlipidemia     Hypertension     Neuropathy     Obesity     Pulmonary embolism 1998    Pulmonary hypertension        Past Surgical History:   Procedure Laterality Date    CYST REMOVAL      HERNIA REPAIR      HYSTERECTOMY      TONSILLECTOMY      TUBAL LIGATION         Review of patient's allergies indicates:  No Known Allergies    No current facility-administered medications on file prior to encounter.     Current Outpatient Medications on File Prior to Encounter   Medication Sig    acetaminophen (TYLENOL) 500 MG tablet Take 1 tablet (500 mg total) by mouth every 4 (four) hours as needed for Pain or Temperature greater than (100.5 or greater).    albuterol (PROVENTIL/VENTOLIN HFA) 90 mcg/actuation inhaler 2 puffs every 6 (six) hours as needed.    aspirin (ECOTRIN) 81 MG EC tablet Take 81 mg by mouth once daily.    atorvastatin (LIPITOR) 40 MG tablet Take 40 mg by mouth every evening.    blood sugar diagnostic Strp To check BG 4 times daily, to use with insurance preferred meter    blood-glucose meter kit To check BG 4 times daily, to use with insurance preferred meter    buPROPion (WELLBUTRIN XL) 150 MG TB24 tablet Take 2 tablets (300 mg total) by mouth once daily.    clobetasol 0.05% (TEMOVATE) 0.05 % Oint Apply topically 2 (two) times daily.    DUPIXENT  mg/2 mL PnIj INJECT 300MG UNDER THE SKIN EVERY OTHER WEEK AS DIRECTED    gabapentin (NEURONTIN) 300 MG capsule Take 300 mg by mouth 2 (two) times daily as needed.    lancets Misc To check BG 4 times daily, to use with insurance preferred meter    LASIX 20 mg tablet Take 20 mg by mouth once daily.    lisinopriL (PRINIVIL,ZESTRIL) 20 MG tablet Take 1 tablet (20 mg total) by mouth once daily.    meclizine (ANTIVERT) 25 mg tablet Take 25 mg by mouth once daily.    pantoprazole (PROTONIX) 20 MG tablet Take 1 tablet (20 mg total) by mouth  "once daily.    pen needle, diabetic 31 gauge x 3/16" Ndle 1 each.    pen needle, diabetic 32 gauge x 5/32" Ndle Use to inject insulin four times daily    potassium chloride (KLOR-CON) 10 MEQ TbSR Take 10 mEq by mouth once daily.    prednisoLONE acetate (PRED FORTE) 1 % DrpS INSTILL 1 DROP 4 TIMES DAILY INTO LEFT EYE FOR 2 WEEKS, THEN 1 DROP 3 TIMES DAILY FOR 2 WEEKS, THEN 1 DROP TWICE DAILY FOR 2 WEEKS    sertraline (ZOLOFT) 100 MG tablet Take 2 tablets (200 mg total) by mouth once daily.    SYMBICORT 160-4.5 mcg/actuation HFAA 2 puffs 2 (two) times daily.    traZODone (DESYREL) 50 MG tablet Take 1 tablet (50 mg total) by mouth every evening.    traZODone (DESYREL) 50 MG tablet Take 1 tablet (50 mg total) by mouth every evening.    TRULICITY 3 mg/0.5 mL pen injector Inject 3 mg into the skin every 7 days. Fridays     Family History       Problem Relation (Age of Onset)    Kidney disease Mother, Father    Stroke Maternal Uncle          Tobacco Use    Smoking status: Never    Smokeless tobacco: Never   Substance and Sexual Activity    Alcohol use: No    Drug use: No    Sexual activity: Not Currently     Partners: Male     Review of Systems   Constitutional:  Positive for fatigue. Negative for chills and fever.   HENT:  Negative for sore throat and trouble swallowing.    Eyes:  Negative for photophobia and visual disturbance.   Respiratory:  Positive for shortness of breath. Negative for cough and wheezing.    Cardiovascular:  Positive for leg swelling. Negative for chest pain and palpitations.   Gastrointestinal:  Negative for abdominal distention, abdominal pain, diarrhea, nausea and vomiting.   Genitourinary:  Negative for dysuria and hematuria.   Musculoskeletal:  Positive for arthralgias. Negative for neck pain and neck stiffness.   Skin:  Negative for rash and wound.   Neurological:  Negative for seizures, syncope, weakness, numbness and headaches.        Paresthesias     Psychiatric/Behavioral:  Negative for " confusion and decreased concentration.      Objective:     Vital Signs (Most Recent):  Temp: 98 °F (36.7 °C) (01/16/24 1255)  Pulse: 62 (01/16/24 1733)  Resp: 18 (01/16/24 1733)  BP: (!) 159/65 (01/16/24 1733)  SpO2: 100 % (01/16/24 1733) Vital Signs (24h Range):  Temp:  [97.5 °F (36.4 °C)-98.4 °F (36.9 °C)] 98 °F (36.7 °C)  Pulse:  [62-72] 62  Resp:  [15-18] 18  SpO2:  [96 %-100 %] 100 %  BP: (128-185)/() 159/65     Weight: 104.3 kg (230 lb)  Body mass index is 34.97 kg/m².     Physical Exam  Constitutional:       General: She is not in acute distress.     Appearance: She is not toxic-appearing or diaphoretic.   HENT:      Head: Normocephalic and atraumatic.      Nose: Nose normal.   Eyes:      General: No scleral icterus.     Extraocular Movements: Extraocular movements intact.      Pupils: Pupils are equal, round, and reactive to light.   Cardiovascular:      Rate and Rhythm: Normal rate and regular rhythm.   Pulmonary:      Effort: Pulmonary effort is normal. No respiratory distress.      Breath sounds: No wheezing or rales.   Abdominal:      General: Abdomen is flat. There is no distension.      Palpations: Abdomen is soft.      Tenderness: There is no abdominal tenderness. There is no guarding.   Musculoskeletal:         General: Normal range of motion.      Cervical back: Normal range of motion and neck supple. No rigidity.      Right lower leg: No edema.      Left lower leg: No edema.   Skin:     General: Skin is warm and dry.      Coloration: Skin is not jaundiced.   Neurological:      General: No focal deficit present.      Mental Status: She is alert and oriented to person, place, and time.      Cranial Nerves: No cranial nerve deficit.   Psychiatric:         Mood and Affect: Mood normal.         Behavior: Behavior normal.              CRANIAL NERVES     CN III, IV, VI   Pupils are equal, round, and reactive to light.       Significant Labs: All pertinent labs within the past 24 hours have been  reviewed.  CBC:   Recent Labs   Lab 01/15/24  1229   WBC 9.38   HGB 11.1*   HCT 35.1*        CMP:   Recent Labs   Lab 01/15/24  1229      K 4.5      CO2 20*   *   BUN 48*   CREATININE 1.3   CALCIUM 9.4   PROT 7.5   ALBUMIN 3.0*   BILITOT 0.3   ALKPHOS 134   AST 23   ALT 40   ANIONGAP 9     Cardiac Markers:   Recent Labs   Lab 01/15/24  1229   BNP 44     Troponin:   Recent Labs   Lab 01/15/24  1229 01/15/24  2054 01/15/24  2356   TROPONINI 0.007 <0.006 <0.006     Urine Studies:   Recent Labs   Lab 01/15/24  1534   COLORU Yellow   APPEARANCEUA Clear   PHUR 7.0   SPECGRAV 1.015   PROTEINUA Trace*   GLUCUA Negative   KETONESU Negative   BILIRUBINUA Negative   OCCULTUA Negative   NITRITE Negative   LEUKOCYTESUR Negative       Significant Imaging: I have reviewed all pertinent imaging results/findings within the past 24 hours.

## 2024-01-16 NOTE — H&P
ED Observation Unit  History and Physical      I assumed care of this patient from the Main ED at onset of observation time, 2038 on 01/15/2024.       History of Present Illness:  61 yo female with hx of HTN, HLD, asthma, HFpEF, chronic low back pain, anxiety/depression, and DM2 c/b neuropathy who presents to [facility] ED from home with generalized weakness and gait instability.     Pt is here with her daughter who assists in hx taking. Per the patient and daughter, her sxs have been present over the past couple of years though she has noticed a progressive decline over the past several months. During this time, she complains of bilateral lower and upper extremity weakness and loss of sensation, tremors, headaches, visual disturbances, and issues with balance coordination. She has followed up with NSGY at  and had MRIs and LP (results below). Pt states that she woke up this morning and as she got out of bed she became very lightheaded and felt as though she was about to fall. She braced herself and got back in bed without ever falling or losing consciousness. Pt had no prodromes, nor confusion during the episode. She states she drinks plenty of water and hydrates very well. She called her daughter who lives nearby and she took her to the emergency room. The patient lives at home independently and ambulates without the assistance of walking aids. She endorses headaches, blurry vision, neck soreness, chronic right sided chest pain, shortness of breath, non-productive cough, generalized abdominal pain, nausea, constipation, BL weakness and sensory deficits. She denies fever, recent illnesses, vomiting, diarrhea, urinary sxs, abdominal tenderness, vertigo-like sxs, focal neurological deficits or issues with fine motor coordination.    Per chart review,   Echo 08/14/23:  1. Blood Pressure: 114/66 mmHg.  2. Left Ventricle: LV size, wall thickness and overall systolic function are normal, with an EF >  55%.  3. Left Ventricle:  Grade II Diastolic Dysfunction is present with elevated LA pressure.  4. Left Ventricle: Mean global longitudinal strain is -16 %, which is normal.  5. Left Atrium: Mildly dilated left atrium.  6. Right Ventricle: Normal RV systolic function.  7. Pulmonary Artery: There is mild pulmonary hypertension with a pulmonary artery systolic  pressure calculated to be 36 mmHg.  8. Systemic Veins: Normal central venous pressure.     MRI brain w wo contrast on 9/21/2023 showed:  1.   Bilateral distal transverse sinus stenoses, as well as mild expansion of the optic nerve sheaths, raise concern for underlying idiopathic intracranial hypertension in the setting of headaches. No other imaging sequelae of elevated intracranial pressures. If high clinical suspicion, consider dedicated lumbar puncture for opening pressures.   2.   No acute intracranial abnormality.   3.   Mild to moderate chronic microvascular ischemic changes.     LP on 10/18/2023 showed:  1.  Technically successful fluoroscopic guided lumbar puncture without complication.  Samples were sent to the lab for analysis.   2. Opening pressure 26 cm water and closing pressure 18 cm water.     MRI C spine on 10/27/2023 showed:  there is relatively minor to mild degenerative findings    In the ED, VSS. ECG with NSR, no ST changes, normal intervals, and no STEMI. No leukocytosis. Hgb at 11.1, above baseline. Plt wnl. BUN/Cr at 48/1.3. Na, K, Cl, and Ca wnl. BNP and 1st trop wnl. UA without signs of infection. CXR clear. CT head without acute processes.     I reviewed the ED Provider Note dated 1/15/2023 prior to my evaluation of this patient.  I reviewed all labs and imaging performed in the Main ED, prior to patient being placed in Observation. Patient was placed in the ED Observation Unit for further work up of STOUT. She is pending her CTA, although suspicion is low give normal vitals, neg cardiac markers, ECG without right heart straining, and plethera of symptoms. Plan  to trend trops and obtain Stress Echo.     PMHx   Past Medical History:   Diagnosis Date    Asthma     Diabetes mellitus     Diabetes mellitus     Hyperlipidemia     Hypertension     Neuropathy     Pulmonary embolism 1998      Past Surgical History:   Procedure Laterality Date    CYST REMOVAL      HERNIA REPAIR      HYSTERECTOMY      TONSILLECTOMY      TUBAL LIGATION          Family Hx   Family History   Problem Relation Age of Onset    Kidney disease Mother     Kidney disease Father     Stroke Maternal Uncle         Social Hx   Social History     Socioeconomic History    Marital status: Single   Tobacco Use    Smoking status: Never    Smokeless tobacco: Never   Substance and Sexual Activity    Alcohol use: No    Drug use: No    Sexual activity: Not Currently     Partners: Male   Social History Narrative    ** Merged History Encounter **          Social Determinants of Health     Financial Resource Strain: Medium Risk (8/24/2021)    Overall Financial Resource Strain (CARDIA)     Difficulty of Paying Living Expenses: Somewhat hard   Food Insecurity: No Food Insecurity (8/24/2021)    Hunger Vital Sign     Worried About Running Out of Food in the Last Year: Never true     Ran Out of Food in the Last Year: Never true   Transportation Needs: No Transportation Needs (8/24/2021)    PRAPARE - Transportation     Lack of Transportation (Medical): No     Lack of Transportation (Non-Medical): No   Physical Activity: Insufficiently Active (8/24/2021)    Exercise Vital Sign     Days of Exercise per Week: 2 days     Minutes of Exercise per Session: 30 min   Stress: Stress Concern Present (8/24/2021)    [employer] - Occupational Stress Questionnaire     Feeling of Stress : To some extent   Social Connections: Moderately Isolated (8/24/2021)    Social Connection and Isolation Panel [NHANES]     Frequency of Communication with Friends and Family: More than three times a week     Frequency of Social  Gatherings with Friends and Family: More than three times a week     Attends Hinduism Services: 1 to 4 times per year     Active Member of Clubs or Organizations: No     Attends Club or Organization Meetings: Never     Marital Status: Never    Housing Stability: Low Risk  (8/24/2021)    Housing Stability Vital Sign     Unable to Pay for Housing in the Last Year: No     Number of Places Lived in the Last Year: 1     Unstable Housing in the Last Year: No        Vital Signs   Vitals:    01/15/24 1620 01/15/24 1859 01/15/24 2151 01/15/24 2153   BP: (!) 160/70 (!) 170/94 (!) 159/66    Pulse: 65 72 68 62   Resp: 19 15 18    Temp: 98 °F (36.7 °C) 97.5 °F (36.4 °C) 98 °F (36.7 °C)    TempSrc:   Oral    SpO2: 99% 98% 98% 99%   Weight:       Height:            Review of Systems  Review of Systems   Constitutional:  Positive for malaise/fatigue.   Eyes:  Positive for blurred vision.   Respiratory:  Positive for shortness of breath.    Cardiovascular:  Positive for chest pain. Negative for leg swelling.   Gastrointestinal:  Positive for nausea. Negative for diarrhea and vomiting.   Genitourinary:  Negative for dysuria.   Musculoskeletal:  Positive for back pain and myalgias.   Neurological:  Positive for dizziness, tremors, weakness and headaches. Negative for speech change, focal weakness, seizures and loss of consciousness.       Physical Exam  Physical Exam  Constitutional:       General: She is not in acute distress.     Appearance: She is obese. She is not ill-appearing, toxic-appearing or diaphoretic.   HENT:      Head: Atraumatic. No raccoon eyes, Babcock's sign or contusion.      Jaw: No trismus.      Right Ear: External ear normal.      Left Ear: External ear normal.      Nose: Nose normal.   Eyes:      General: No scleral icterus.     Extraocular Movements: Extraocular movements intact.      Right eye: No nystagmus.      Left eye: No nystagmus.      Conjunctiva/sclera:      Right eye: Right conjunctiva is not  injected.      Left eye: Left conjunctiva is not injected.   Cardiovascular:      Rate and Rhythm: Normal rate and regular rhythm.   Pulmonary:      Effort: No tachypnea, accessory muscle usage or respiratory distress.      Breath sounds: No decreased air movement. No decreased breath sounds, wheezing or rales.   Chest:      Chest wall: No tenderness.   Abdominal:      General: There is no distension.   Musculoskeletal:         General: Normal range of motion.      Cervical back: Normal range of motion. No rigidity. Normal range of motion.      Right lower leg: No edema.      Left lower leg: No edema.      Comments: Patient able to bear weight and ambulate to bathroom with some assistance.    Skin:     Coloration: Skin is not jaundiced.      Findings: No rash.   Neurological:      General: No focal deficit present.      Mental Status: She is alert. Mental status is at baseline.      Comments: No facial asymmetry. Clear speech. Provides full history.   Good finger . FROM and strength throughout. Normal finger to nose.    Psychiatric:         Behavior: Behavior is cooperative.         Medications:   Scheduled Meds:  Continuous Infusions:  PRN Meds:.      Assessment/Plan:    SOB, STOUT, and chest pain:  In the ED, VSS. ECG with NSR, no ST changes, normal intervals, and no STEMI. No leukocytosis. Hgb at 11.1, above baseline. Plt wnl. BUN/Cr at 48/1.3. Na, K, Cl, and Ca wnl. BNP and 1st trop wnl. UA without signs of infection. CXR clear.  -Reports chronic symptoms. Worse and daily in the past 7 days.   -Trend total of 3 trops  -Tele  -CTA PE study shows: No convincing evidence of PE through the level of the proximal segmental arteries. Aortic atherosclerosis. Three-vessel coronary artery calcification. Heterogeneous enlargement of the thyroid gland.  -Stress Echo tomorrow  -NPO at midnight  -Follows up with Vincent Ho MD.    HA, blurred vision, dizziness:  -Worked up by NSGY at . Thought to have IIH. Per last  "NSGY note, patient with possible IIH since MRI findings and opening pressure of 26 and they are "discussing endovascular treatment with patient but this would require long-term DAPT (at least 6 months). She states that her back pain is worse than her headaches and she would like to address that first if possible since DAPT would preclude any surgical intervention.  -She states that her HA has been worse than her chronic back pain lately so is now interested in intervention.   -CT head without acute process  -Continue to follow up with NSGY, Dr. Wiggins on 3/12/2024 to discuss management.  -Pain improved here after acetaminophen  -PRN medications  -Established with Ophthalmology already. Continue to follow up with Dr. Aguilar on 2/27.    DM2  -On Toujeo daily and Trulicity weekly at home  -Insulin for eating patients here  -Diabetic diet  -NPO at midnight    Chronic back pain:  Neuropathy:  Radiculopathy:  -Resume gabapentin bid prn  -other PRN meds        Case was discussed with the ED provider, Dr. Acevedo. (any consultants).           9:51 PM  Nimisha Erazo PA-C  Emergent Department  [facility]  Spectralink #[id number] or #[phone]    "

## 2024-01-16 NOTE — PLAN OF CARE
[facility] - Emergency Dept  Initial Discharge Assessment       Primary Care Provider: Gwendolyn, Primary Doctor    Admission Diagnosis: STOUT (dyspnea on exertion) [R06.09]    Admission Date: 1/15/2024  Expected Discharge Date:     Pt stated she has been independent with her ambulation and ADL's and has not used equipment or required assistance.    At this time pt to d/c home with no needs.  Post acute services may be required     Transition of Care Barriers: (P) None    Payor: MEDICAID / Plan: [facility] COMMUNITY PLAN [facility] ([place] MEDICAID) / Product Type: Managed Medicaid /     Extended Emergency Contact Information  Primary Emergency Contact: [place] [address] [facility]  Home Phone: [phone]  Relation: Daughter    Discharge Plan A: (P) Home, Other  Discharge Plan B: (P) Home, Other      [facility] [address] [place], [place] - [address]  [address] [place] [address]  Phone: [phone] Fax: [phone]    [facility] [address] [place], [place] - [address] [facility]  [address] [facility] [address]  Phone: [phone] Fax: [phone]    [facility] [place], [place] - [address]  [address] [facility] [address]  Phone: [phone] Fax: [phone]      Initial Assessment (most recent)       Adult Discharge Assessment - 01/16/24 0859          Discharge Assessment    Assessment Type Discharge Planning Assessment (P)      Confirmed/corrected address, phone number and insurance Yes (P)      Confirmed Demographics Correct on Facesheet (P)      Source of Information patient (P)      People in Home alone (P)      Facility Arrived From: home (P)      Do you expect to return to your current living situation? Yes (P)      Do you have help at home or someone to help you manage your care at home? No (P)      Prior to hospitilization cognitive status: Alert/Oriented;No Deficits (P)      Current cognitive status: No  Deficits;Alert/Oriented (P)      Walking or Climbing Stairs Difficulty no (P)      Dressing/Bathing Difficulty no (P)      Home Accessibility not wheelchair accessible;stairs to enter home (P)      Number of Stairs, Main Entrance five (P)      Home Layout Able to live on 1st floor (P)      Equipment Currently Used at Home none (P)      Patient currently being followed by outpatient case management? No (P)      Do you currently have service(s) that help you manage your care at home? No (P)      Do you have any problems affording any of your prescribed medications? No (P)      Is the patient taking medications as prescribed? yes (P)      Who is going to help you get home at discharge? family/friends (P)      How do you get to doctors appointments? car, drives self (P)      Are you on dialysis? No (P)      Do you take coumadin? No (P)      Discharge Plan A Home;Other (P)      Discharge Plan B Home;Other (P)      DME Needed Upon Discharge  none (P)      Discharge Plan discussed with: Patient (P)      Transition of Care Barriers None (P)         Physical Activity    On average, how many days per week do you engage in moderate to strenuous exercise (like a brisk walk)? 0 days (P)      On average, how many minutes do you engage in exercise at this level? 0 min (P)         Financial Resource Strain    How hard is it for you to pay for the very basics like food, housing, medical care, and heating? Not very hard (P)         Housing Stability    In the last 12 months, was there a time when you were not able to pay the mortgage or rent on time? No (P)      In the last 12 months, was there a time when you did not have a steady place to sleep or slept in a shelter (including now)? No (P)         Transportation Needs    In the past 12 months, has lack of transportation kept you from medical appointments or from getting medications? No (P)      In the past 12 months, has lack of transportation kept you from meetings, work, or from  getting things needed for daily living? No (P)         Food Insecurity    Within the past 12 months, you worried that your food would run out before you got the money to buy more. Sometimes true (P)      Within the past 12 months, the food you bought just didn't last and you didn't have money to get more. Sometimes true (P)         Stress    Do you feel stress - tense, restless, nervous, or anxious, or unable to sleep at night because your mind is troubled all the time - these days? Only a little (P)         Social Connections    In a typical week, how many times do you talk on the phone with family, friends, or neighbors? More than three times a week (P)      How often do you get together with friends or relatives? More than three times a week (P)      How often do you attend Mandaeism or Yazdanism services? More than 4 times per year (P)      Do you belong to any clubs or organizations such as Mandaeism groups, unions, fraternal or athletic groups, or school groups? No (P)      How often do you attend meetings of the clubs or organizations you belong to? Never (P)      Are you , , , , never , or living with a partner?  (P)         Alcohol Use    Q1: How often do you have a drink containing alcohol? Never (P)      Q2: How many drinks containing alcohol do you have on a typical day when you are drinking? Patient does not drink (P)      Q3: How often do you have six or more drinks on one occasion? Never (P)                    Discharge Plan A and Plan B have been determined by review of patient's clinical status, future medical and therapeutic needs, and coverage/benefits for post-acute care in coordination with multidisciplinary team members.    Amanda Schafer, SHAUN, MSW, LMSW, RSW   Case Management  [facility]  Email: [email or web address]

## 2024-01-17 LAB
ABO + RH BLD: NORMAL
ALBUMIN SERPL BCP-MCNC: 3 G/DL (ref 3.5–5.2)
ALP SERPL-CCNC: 145 U/L (ref 55–135)
ALT SERPL W/O P-5'-P-CCNC: 30 U/L (ref 10–44)
ANION GAP SERPL CALC-SCNC: 11 MMOL/L (ref 8–16)
AST SERPL-CCNC: 23 U/L (ref 10–40)
BILIRUB SERPL-MCNC: 0.3 MG/DL (ref 0.1–1)
BLD GP AB SCN CELLS X3 SERPL QL: NORMAL
BUN SERPL-MCNC: 35 MG/DL (ref 8–23)
CALCIUM SERPL-MCNC: 9.7 MG/DL (ref 8.7–10.5)
CHLORIDE SERPL-SCNC: 108 MMOL/L (ref 95–110)
CO2 SERPL-SCNC: 21 MMOL/L (ref 23–29)
CREAT SERPL-MCNC: 1.3 MG/DL (ref 0.5–1.4)
CREAT SERPL-MCNC: 1.5 MG/DL (ref 0.5–1.4)
EST. GFR  (NO RACE VARIABLE): 46.8 ML/MIN/1.73 M^2
GLUCOSE SERPL-MCNC: 144 MG/DL (ref 70–110)
POCT GLUCOSE: 124 MG/DL (ref 70–110)
POCT GLUCOSE: 165 MG/DL (ref 70–110)
POCT GLUCOSE: 181 MG/DL (ref 70–110)
POTASSIUM SERPL-SCNC: 4.4 MMOL/L (ref 3.5–5.1)
PROT SERPL-MCNC: 7.9 G/DL (ref 6–8.4)
SAMPLE: ABNORMAL
SODIUM SERPL-SCNC: 140 MMOL/L (ref 136–145)
SPECIMEN OUTDATE: NORMAL

## 2024-01-17 PROCEDURE — 93458 L HRT ARTERY/VENTRICLE ANGIO: CPT | Performed by: INTERNAL MEDICINE

## 2024-01-17 PROCEDURE — 25000003 PHARM REV CODE 250: Performed by: STUDENT IN AN ORGANIZED HEALTH CARE EDUCATION/TRAINING PROGRAM

## 2024-01-17 PROCEDURE — 4A023N7 MEASUREMENT OF CARDIAC SAMPLING AND PRESSURE, LEFT HEART, PERCUTANEOUS APPROACH: ICD-10-PCS | Performed by: INTERNAL MEDICINE

## 2024-01-17 PROCEDURE — 93458 L HRT ARTERY/VENTRICLE ANGIO: CPT | Mod: 26,,, | Performed by: INTERNAL MEDICINE

## 2024-01-17 PROCEDURE — 99152 MOD SED SAME PHYS/QHP 5/>YRS: CPT | Mod: ,,, | Performed by: INTERNAL MEDICINE

## 2024-01-17 PROCEDURE — 63600175 PHARM REV CODE 636 W HCPCS: Performed by: PHYSICIAN ASSISTANT

## 2024-01-17 PROCEDURE — 86850 RBC ANTIBODY SCREEN: CPT | Performed by: STUDENT IN AN ORGANIZED HEALTH CARE EDUCATION/TRAINING PROGRAM

## 2024-01-17 PROCEDURE — 99233 SBSQ HOSP IP/OBS HIGH 50: CPT | Mod: ,,, | Performed by: INTERNAL MEDICINE

## 2024-01-17 PROCEDURE — 94761 N-INVAS EAR/PLS OXIMETRY MLT: CPT

## 2024-01-17 PROCEDURE — 99152 MOD SED SAME PHYS/QHP 5/>YRS: CPT | Performed by: INTERNAL MEDICINE

## 2024-01-17 PROCEDURE — 80053 COMPREHEN METABOLIC PANEL: CPT | Performed by: STUDENT IN AN ORGANIZED HEALTH CARE EDUCATION/TRAINING PROGRAM

## 2024-01-17 PROCEDURE — 25500020 PHARM REV CODE 255: Performed by: INTERNAL MEDICINE

## 2024-01-17 PROCEDURE — C1769 GUIDE WIRE: HCPCS | Performed by: INTERNAL MEDICINE

## 2024-01-17 PROCEDURE — C1894 INTRO/SHEATH, NON-LASER: HCPCS | Performed by: INTERNAL MEDICINE

## 2024-01-17 PROCEDURE — 36415 COLL VENOUS BLD VENIPUNCTURE: CPT | Mod: XB | Performed by: STUDENT IN AN ORGANIZED HEALTH CARE EDUCATION/TRAINING PROGRAM

## 2024-01-17 PROCEDURE — 63600175 PHARM REV CODE 636 W HCPCS: Performed by: INTERNAL MEDICINE

## 2024-01-17 PROCEDURE — C1760 CLOSURE DEV, VASC: HCPCS | Performed by: INTERNAL MEDICINE

## 2024-01-17 PROCEDURE — 25000003 PHARM REV CODE 250: Performed by: FAMILY MEDICINE

## 2024-01-17 PROCEDURE — 25000003 PHARM REV CODE 250: Performed by: INTERNAL MEDICINE

## 2024-01-17 PROCEDURE — 25000003 PHARM REV CODE 250: Performed by: PHYSICIAN ASSISTANT

## 2024-01-17 PROCEDURE — B2111ZZ FLUOROSCOPY OF MULTIPLE CORONARY ARTERIES USING LOW OSMOLAR CONTRAST: ICD-10-PCS | Performed by: INTERNAL MEDICINE

## 2024-01-17 PROCEDURE — 20600001 HC STEP DOWN PRIVATE ROOM

## 2024-01-17 RX ORDER — SODIUM CHLORIDE 9 MG/ML
INJECTION, SOLUTION INTRAVENOUS ONCE
Status: COMPLETED | OUTPATIENT
Start: 2024-01-17 | End: 2024-01-17

## 2024-01-17 RX ORDER — ONDANSETRON HYDROCHLORIDE 2 MG/ML
4 INJECTION, SOLUTION INTRAVENOUS EVERY 6 HOURS PRN
Status: DISCONTINUED | OUTPATIENT
Start: 2024-01-17 | End: 2024-01-18 | Stop reason: HOSPADM

## 2024-01-17 RX ORDER — HYDRALAZINE HYDROCHLORIDE 20 MG/ML
10 INJECTION INTRAMUSCULAR; INTRAVENOUS EVERY 6 HOURS PRN
Status: DISCONTINUED | OUTPATIENT
Start: 2024-01-17 | End: 2024-01-18 | Stop reason: HOSPADM

## 2024-01-17 RX ORDER — DIPHENHYDRAMINE HCL 50 MG
50 CAPSULE ORAL
Status: DISCONTINUED | OUTPATIENT
Start: 2024-01-17 | End: 2024-01-18

## 2024-01-17 RX ORDER — LIDOCAINE HYDROCHLORIDE 20 MG/ML
INJECTION, SOLUTION EPIDURAL; INFILTRATION; INTRACAUDAL; PERINEURAL
Status: DISCONTINUED | OUTPATIENT
Start: 2024-01-17 | End: 2024-01-18

## 2024-01-17 RX ORDER — CEFAZOLIN SODIUM 1 G/3ML
INJECTION, POWDER, FOR SOLUTION INTRAMUSCULAR; INTRAVENOUS
Status: DISCONTINUED | OUTPATIENT
Start: 2024-01-17 | End: 2024-01-18

## 2024-01-17 RX ORDER — FENTANYL CITRATE 50 UG/ML
INJECTION, SOLUTION INTRAMUSCULAR; INTRAVENOUS
Status: DISCONTINUED | OUTPATIENT
Start: 2024-01-17 | End: 2024-01-18

## 2024-01-17 RX ORDER — SODIUM CHLORIDE 0.9 % (FLUSH) 0.9 %
10 SYRINGE (ML) INJECTION
Status: DISCONTINUED | OUTPATIENT
Start: 2024-01-17 | End: 2024-01-18

## 2024-01-17 RX ORDER — HEPARIN SOD,PORCINE/0.9 % NACL 1000/500ML
INTRAVENOUS SOLUTION INTRAVENOUS
Status: DISCONTINUED | OUTPATIENT
Start: 2024-01-17 | End: 2024-01-18

## 2024-01-17 RX ORDER — MIDAZOLAM HYDROCHLORIDE 1 MG/ML
INJECTION, SOLUTION INTRAMUSCULAR; INTRAVENOUS
Status: DISCONTINUED | OUTPATIENT
Start: 2024-01-17 | End: 2024-01-18

## 2024-01-17 RX ORDER — SODIUM CHLORIDE 9 MG/ML
INJECTION, SOLUTION INTRAVENOUS CONTINUOUS
Status: ACTIVE | OUTPATIENT
Start: 2024-01-17 | End: 2024-01-18

## 2024-01-17 RX ADMIN — BUPROPION HYDROCHLORIDE 300 MG: 150 TABLET, FILM COATED, EXTENDED RELEASE ORAL at 08:01

## 2024-01-17 RX ADMIN — PANTOPRAZOLE SODIUM 20 MG: 20 TABLET, DELAYED RELEASE ORAL at 08:01

## 2024-01-17 RX ADMIN — ATORVASTATIN CALCIUM 40 MG: 40 TABLET, FILM COATED ORAL at 08:01

## 2024-01-17 RX ADMIN — ASPIRIN 81 MG: 81 TABLET, COATED ORAL at 08:01

## 2024-01-17 RX ADMIN — METHOCARBAMOL 750 MG: 750 TABLET ORAL at 12:01

## 2024-01-17 RX ADMIN — ONDANSETRON 4 MG: 2 INJECTION INTRAMUSCULAR; INTRAVENOUS at 10:01

## 2024-01-17 RX ADMIN — SERTRALINE 200 MG: 100 TABLET, FILM COATED ORAL at 08:01

## 2024-01-17 RX ADMIN — TRAZODONE HYDROCHLORIDE 50 MG: 50 TABLET ORAL at 08:01

## 2024-01-17 RX ADMIN — SODIUM CHLORIDE: 9 INJECTION, SOLUTION INTRAVENOUS at 06:01

## 2024-01-17 RX ADMIN — SODIUM CHLORIDE: 9 INJECTION, SOLUTION INTRAVENOUS at 02:01

## 2024-01-17 RX ADMIN — FUROSEMIDE 40 MG: 40 TABLET ORAL at 08:01

## 2024-01-17 RX ADMIN — DIPHENHYDRAMINE HYDROCHLORIDE 50 MG: 50 CAPSULE ORAL at 02:01

## 2024-01-17 RX ADMIN — LISINOPRIL 20 MG: 20 TABLET ORAL at 08:01

## 2024-01-17 RX ADMIN — SODIUM CHLORIDE: 9 INJECTION, SOLUTION INTRAVENOUS at 08:01

## 2024-01-17 NOTE — PROGRESS NOTES
[facility] - Cardiology [facility] Medicine  Progress Note    Patient Name: Devika Bustillo  MRN: [id number]  Patient Class: IP- Inpatient   Admission Date: 1/15/2024  Length of Stay: 0 days  Attending Physician: Cesar Lovett MD  Primary Care Provider: Gwendolyn, Primary Doctor        Subjective:     Principal Problem:Abnormal stress echo        HPI:  61 yo female with hx of HTN, HLD, asthma, HFpEF, chronic low back pain, anxiety/depression, and DM2 c/b neuropathy who presents to [facility] ED from home with generalized weakness and gait instability. Pt is here with her daughter who assists in hx taking. Per the patient and daughter, her sxs have been present over the past couple of years though she has noticed a progressive decline over the past several months. During this time, she complains of bilateral lower and upper extremity weakness and loss of sensation, tremors, headaches, visual disturbances, and issues with balance coordination. She has followed up with NSGY at  and had MRIs and LP (results below). Pt states that she woke up this morning and as she got out of bed she became very lightheaded and felt as though she was about to fall. She braced herself and got back in bed without ever falling or losing consciousness. Pt had no prodromes, nor confusion during the episode. She states she drinks plenty of water and hydrates very well. She called her daughter who lives nearby and she took her to the emergency room. The patient lives at home independently and ambulates without the assistance of walking aids. She endorses headaches, blurry vision, neck soreness, chronic right sided chest pain, shortness of breath, non-productive cough, generalized abdominal pain, nausea, constipation, BL weakness and sensory deficits. She denies fever, recent illnesses, vomiting, diarrhea, urinary sxs, abdominal tenderness, vertigo-like sxs, focal neurological deficits or issues with fine motor coordination. Patient was placed in the  "EDOU for Stress ECHO. Stress ECHO completed 01/16 and positive for ischemia: "The study is consistent with ischemia. Wall motion abnormalities in the apex at stress consistent with ischemia.  Specifcity reduced in the setting of hypertensive stress response." Case Discussed with Cardiology who recommended admission to  and formal IP Cardiology evaluation. Patient admitted to the care of medicine for further evaluation and management.     Overview/Hospital Course:  Pt admitted to [facility] and remained stable overnight and into 1/17/2024. Given stress test findings, interventional cardiology consulted with plans for an inpatient LHC.    Interval History: Pt seen and examined this morning on rounds. NETTIE. Grossly asymptomatic, awaiting LHC with IC. Daughter updated via speakerphone at bedside. Care plan reviewed. Otherwise, doing well and with no further complaints at this time.      Objective:     Vital Signs (Most Recent):  Temp: 97.8 °F (36.6 °C) (01/17/24 1142)  Pulse: 73 (01/17/24 1142)  Resp: 18 (01/17/24 1142)  BP: (!) 134/58 (01/17/24 1142)  SpO2: (!) 94 % (01/17/24 1142) Vital Signs (24h Range):  Temp:  [97.8 °F (36.6 °C)-98.9 °F (37.2 °C)] 97.8 °F (36.6 °C)  Pulse:  [62-77] 73  Resp:  [16-19] 18  SpO2:  [94 %-100 %] 94 %  BP: (104-185)/() 134/58     Weight: 104.9 kg (231 lb 4.2 oz)  Body mass index is 35.16 kg/m².    Intake/Output Summary (Last 24 hours) at 1/17/2024 1226  Last data filed at 1/17/2024 0910  Gross per 24 hour   Intake --   Output 1300 ml   Net -1300 ml         Physical Exam  Gen: in NAD, appears stated age; pt is obese  Neuro: AAOx4, CN2-12 grossly intact BL; motor, sensory, and strength grossly intact BL  HEENT: NTNC, EOMI, PERRLA, MMM; no thyromegaly or lymphadenopathy; no JVD appreciated  CVS: RRR, no m/r/g; S1/S2 auscultated with no S3 or S4; capillary refill < 2 sec  Resp: lungs CTAB, no w/r/r; no belabored breathing or accessory muscle use appreciated   Abd: BS+ in all 4 quadrants; " "NTND, soft to palpation; no organomegaly appreciated   Extrem: pulses full, equal, and regular over all 4 extremities; no UE or LE edema BL      Significant Labs: All pertinent labs within the past 24 hours have been reviewed.    Significant Imaging: I have reviewed all pertinent imaging results/findings within the past 24 hours.    Assessment/Plan:      * Abnormal stress echo  - Stress ECHO 01/16 positive for ischemia: "The study is consistent with ischemia. Wall motion abnormalities in the apex at stress consistent with ischemia.  Specifcity reduced in the setting of hypertensive stress response."  - patient currently chest pain free. Does report symptoms of stable angina on further discussion. Trop neg x3.   - continue home ASA  - continue lisinopril, metoprolol, and statin  - IC following: plans for [facility] today  - Continuing to monitor on tele    Anxiety  - continue home zoloft, wellbutrin, and trazadone      Chronic diastolic heart failure  - stable  - continue home po lasix  - strict I/Os  - daily weights  - low sodium diet      Asthma  - albuterol prn    Dyslipidemia  - continue home ASA and statin      Hypertension  - continue home lisinopril     Diabetes mellitus type II  - A1C 7.4  - SSI  - hypoglycemic protocol      VTE Risk Mitigation (From admission, onward)      None            Discharge Planning   FRANK:      Code Status: Full Code   Is the patient medically ready for discharge?:     Reason for patient still in hospital (select all that apply): Patient trending condition  Discharge Plan A: Home, Other          Cesar Lovett MD  Attending Physician  Medical Director - [facility] Observation Unit  Department of Hospital Medicine  1/17/2024    "

## 2024-01-17 NOTE — ASSESSMENT & PLAN NOTE
- Proceed with coronary angiogram +/- intervention  - Anti-platelet Therapy: ASA + Plavix  - Access: Right radial  - Creatinine/CrCl: 1.3  - Allergies: No shellfish/Iodine allergy  - Pre-Hydration: 3 cc/kg/hr IV, continuous, for 1 hour, Pre-Procedure  - Pre-Op Med: Diphenhydramine (Benadryl) 50 mg, Oral, Once, Pre-Procedure   - The risks, benefits & alternatives of the procedure were explained to the patient.  The risks of coronary angiography include but are not limited to: Bleeding, infection, heart rhythm abnormalities, allergic reactions, kidney injury requiring dilaysis, limb loss, stroke and death. Should stenting be indicated, the patient has agreed to dual anti-platelet therapy for 1-consecutive year with a drug-eluting stent and a minimum of 1-month with the use of a bare metal stent. Additionally, patient is aware that non-compliance is likely to result in stent clotting with heart attack, heart failure, and/or death. The risks of moderate sedation include hypotension, respiratory depression, arrhythmias, bronchospasm, & death. Informed consent was obtained & the patient is agreeable to proceed with the procedure. All patient's questions were answered.     The risks (including death, heart attack, limb loss, paralysis, emergency surgery, bleeding, renal failure, and stroke), the potential benefits of the procedure, and the alternatives to the procedure, were discussed in detail and accepted by the patient. All questions were answered. Patient agrees to proceed.

## 2024-01-17 NOTE — HOSPITAL COURSE
Pt admitted to [facility] [place] and remained stable overnight and into 1/17/2024. Given stress test findings, interventional cardiology consulted with plans for an inpatient [facility]. C 1/17 w/ Non-obstructive Coronary Artery Disease. Pt to continue ASA, statin, and RF modification. Positive orthostatics during admission (Orthostatics: 123/58 Map 83 in bed; 108/54 Map 75 sitting EOB; 100/54 Map 74 standing); BP meds adjusted. CKD3 at baseline. Pt w/ c/o issues w/ chronic constipation and reflux; advised lifestyle modifications and laxatives prn. Patient without further hospitalization needs.  Patient medically stable for discharge. Close follow-up w/ PCP. Amb PT.  Return precautions advised; patient's questions answered.  Patient in agreement with discharge plan.    Vitals:    01/18/24 0840 01/18/24 0855 01/18/24 1125 01/18/24 1201   BP: (!) 100/54   131/60   BP Location:    Left arm   Patient Position: Standing   Sitting   Pulse:  65 70 66   Resp:  18  18   Temp:  97.7 °F (36.5 °C)  98 °F (36.7 °C)   TempSrc:    Tympanic   SpO2:  97%  (!) 91%   Weight:       Height:           Physical Exam  Gen: in NAD, appears stated age; pt is obese  Neuro: AAOx4, CN2-12 grossly intact BL; motor, sensory, and strength grossly intact BL  HEENT: no thyromegaly or lymphadenopathy; no JVD appreciated  CVS: RRR, no m/r/g; S1/S2 auscultated with no S3 or S4; capillary refill < 2 sec  Resp: lungs CTAB, no w/r/r; no belabored breathing or accessory muscle use appreciated   Abd: BS+ in all 4 quadrants; NTND, soft to palpation; no organomegaly appreciated   Extrem: pulses full, equal, and regular over all 4 extremities; no UE or LE edema BL   normal

## 2024-01-17 NOTE — BRIEF OP NOTE
Brief Operative Note:    : Joe Marr MD     Referring Physician: Self,Aaareferral     All Operators: Surgeon(s):  Pritesh Perales MD Lakhdar, Sofia, MD Patel, Rajan Amish G, MD     Preoperative Diagnosis: Abnormal stress echo [R94.39]     Postop Diagnosis: Non-obstructive CAD    Treatments/Procedures: Procedure(s) (LRB):  Angiogram, Coronary, with Left Heart Cath (Left)    Access: Right CFA    Findings: Non-obstructive Coronary Artery Disease      See catheterization report for full details.    Intervention: None    See catheterization report for full details.    Closure device: Perclose       Plan:  - Post cath protocol   - IVF @ 150 cc//hr x 6 hours  - Bed rest x 4 hours   - Continue aspirin 81 mg daily indefinitely  - Continue high intensity statin therapy (LDL goal < 70)  - Risk factor reduction (BP <130/80 mmHg, glycemic control, etc)  - Follow up with outpatient cardiologist    Estimated Blood loss: 20 cc    Specimens removed: No    Kathryn Ojeda MD  Interventional Cardiology PGY-4

## 2024-01-17 NOTE — ASSESSMENT & PLAN NOTE
"- Stress ECHO 01/16 positive for ischemia: "The study is consistent with ischemia. Wall motion abnormalities in the apex at stress consistent with ischemia.  Specifcity reduced in the setting of hypertensive stress response."  - patient currently chest pain free. Does report symptoms of stable angina on further discussion. Trop neg x3.   - continue home ASA  - continue home lisinopril  - will start metoprolol 12.5mg BID  - continue home statin  - monitor tele  - npo midnight  - Cardiology consulted  ;   appreciate recs  - further management pending clinical course and future study review    "

## 2024-01-17 NOTE — HPI
"63 year old female with Hx of 63 yo female with hx of HTN, HLD, asthma, HFpEF, chronic low back pain, anxiety/depression, and DM2 c/b neuropathy who [place] [facility] [place] with generalized weakness and gait instability. This has been ongoing for the past several months but per daughter has worsened. On Tuesday morning, she got up to go to the bathroom when she started experiencing shakiness, tremors like and was unable to move any further. On further evaluation in the ED, patient also endorsed several months of right sided non-radiating chest pain which she describes as severe heartburn. Nothing appears to aggravate the pain and it would self-resolve. Lasts several hours and has occurred daily for months.This prompted her daughter to bring her to the ED, where she was was admitted to EDOU initially on 1/15 pending stress echo. Of note patient, has been followed outpatient with last clinic visit in 10/2023 for "chronic" chest pain. She has had a cath done in the past but unable to obtain records. Patient denies any active chest pain at this time, palpitations, shortness of breath, orthopnea, PND or lower extremity edema. She occasionally experiences LE swelling.     Labs:  CBC: WBC/HGB/HCT/PLT: 9.38/11.1/35.1/314  BMP: Na/K/Cl/CO2/BUN/Cr: 137/4.5/108/20/48/1.3  Trroponin : Negative x 2  BNP: 44     Stress Echo (1/16/2024):    Left Ventricle: The left ventricle is normal in size. Normal wall thickness. Normal wall motion at rest. There is normal systolic function with a visually estimated ejection fraction of 55 - 60%. There is diastolic dysfunction.    Right Ventricle: Normal right ventricular cavity size. Systolic function is normal.    Left Atrium: Left atrium is mildly dilated.    Right Atrium: Right atrium is mildly dilated.    Mitral Valve: There is moderate posterior mitral annular calcification present.    Pulmonary Artery: The estimated pulmonary artery systolic pressure is 43 mmHg.    IVC/SVC: Normal " venous pressure at 3 mmHg.    Baseline ECG: The Baseline ECG reveals sinus rhythm. The axis is normal. The ST segments are normal.    Stress ECG: There are no ST segment deviation identified during the protocol. There are no arrhythmias during stress.    ECG Conclusion: The ECG portion of the study is negative for ischemia.    Post-stress Echo Impression: The study is consistent with ischemia. Wall motion abnormalities in the apex at stress consistent with ischemia.  Specifcity reduced in the setting of hypertensive stress response.

## 2024-01-17 NOTE — H&P
[facility] - Emergency Dept  [facility] Medicine  History & Physical    Patient Name: Devika Bustillo  MRN: [id number]  Patient Class: OP- Observation  Admission Date: 1/15/2024  Attending Physician: Cesar Lovett MD   Primary Care Provider: Gwendolyn Primary Doctor         Patient information was obtained from patient, past medical records, and ER records.     Subjective:     Principal Problem:Abnormal stress echo    Chief Complaint:   Chief Complaint   Patient presents with    Gait Problem     2 days more trouble standing, chills, been progressing for few years        HPI: 63 yo female with hx of HTN, HLD, asthma, HFpEF, chronic low back pain, anxiety/depression, and DM2 c/b neuropathy who [place] [facility] [place] with generalized weakness and gait instability. Pt is here with her daughter who assists in hx taking. Per the patient and daughter, her sxs have been present over the past couple of years though she has noticed a progressive decline over the past several months. During this time, she complains of bilateral lower and upper extremity weakness and loss of sensation, tremors, headaches, visual disturbances, and issues with balance coordination. She has followed up with NSGY at  and had MRIs and LP (results below). Pt states that she woke up this morning and as she got out of bed she became very lightheaded and felt as though she was about to fall. She braced herself and got back in bed without ever falling or losing consciousness. Pt had no prodromes, nor confusion during the episode. She states she drinks plenty of water and hydrates very well. She called her daughter who lives nearby and she took her to the emergency room. The patient lives at home independently and ambulates without the assistance of walking aids. She endorses headaches, blurry vision, neck soreness, chronic right sided chest pain, shortness of breath, non-productive cough, generalized abdominal pain, nausea, constipation, BL weakness and  "sensory deficits. She denies fever, recent illnesses, vomiting, diarrhea, urinary sxs, abdominal tenderness, vertigo-like sxs, focal neurological deficits or issues with fine motor coordination. Patient was placed in the EDOU for Stress ECHO. Stress ECHO completed 01/16 and positive for ischemia: "The study is consistent with ischemia. Wall motion abnormalities in the apex at stress consistent with ischemia.  Specifcity reduced in the setting of hypertensive stress response." Case Discussed with Cardiology who recommended admission to  and formal IP Cardiology evaluation. Patient admitted to the [facility] of medicine for further evaluation and management.     Past Medical History:   Diagnosis Date    Asthma     Back pain     Diabetes mellitus     Grade II diastolic dysfunction     Hyperlipidemia     Hypertension     Neuropathy     Obesity     Pulmonary embolism 1998    Pulmonary hypertension        Past Surgical History:   Procedure Laterality Date    CYST REMOVAL      HERNIA REPAIR      HYSTERECTOMY      TONSILLECTOMY      TUBAL LIGATION         Review of patient's allergies indicates:  No Known Allergies    No current facility-administered medications on file prior to encounter.     Current Outpatient Medications on File Prior to Encounter   Medication Sig    acetaminophen (TYLENOL) 500 MG tablet Take 1 tablet (500 mg total) by mouth every 4 (four) hours as needed for Pain or Temperature greater than (100.5 or greater).    albuterol (PROVENTIL/VENTOLIN HFA) 90 mcg/actuation inhaler 2 puffs every 6 (six) hours as needed.    aspirin (ECOTRIN) 81 MG EC tablet Take 81 mg by mouth once daily.    atorvastatin (LIPITOR) 40 MG tablet Take 40 mg by mouth every evening.    blood sugar diagnostic Strp To check BG 4 times daily, to use with insurance preferred meter    blood-glucose meter kit To check BG 4 times daily, to use with insurance preferred meter    buPROPion (WELLBUTRIN XL) 150 MG TB24 tablet Take 2 tablets (300 mg " "total) by mouth once daily.    clobetasol 0.05% (TEMOVATE) 0.05 % Oint Apply topically 2 (two) times daily.    DUPIXENT  mg/2 mL PnIj INJECT 300MG UNDER THE SKIN EVERY OTHER WEEK AS DIRECTED    gabapentin (NEURONTIN) 300 MG capsule Take 300 mg by mouth 2 (two) times daily as needed.    lancets Misc To check BG 4 times daily, to use with insurance preferred meter    LASIX 20 mg tablet Take 20 mg by mouth once daily.    lisinopriL (PRINIVIL,ZESTRIL) 20 MG tablet Take 1 tablet (20 mg total) by mouth once daily.    meclizine (ANTIVERT) 25 mg tablet Take 25 mg by mouth once daily.    pantoprazole (PROTONIX) 20 MG tablet Take 1 tablet (20 mg total) by mouth once daily.    pen needle, diabetic 31 gauge x 3/16" Ndle 1 each.    pen needle, diabetic 32 gauge x 5/32" Ndle Use to inject insulin four times daily    potassium chloride (KLOR-CON) 10 MEQ TbSR Take 10 mEq by mouth once daily.    prednisoLONE acetate (PRED FORTE) 1 % DrpS INSTILL 1 DROP 4 TIMES DAILY INTO LEFT EYE FOR 2 WEEKS, THEN 1 DROP 3 TIMES DAILY FOR 2 WEEKS, THEN 1 DROP TWICE DAILY FOR 2 WEEKS    sertraline (ZOLOFT) 100 MG tablet Take 2 tablets (200 mg total) by mouth once daily.    SYMBICORT 160-4.5 mcg/actuation HFAA 2 puffs 2 (two) times daily.    traZODone (DESYREL) 50 MG tablet Take 1 tablet (50 mg total) by mouth every evening.    traZODone (DESYREL) 50 MG tablet Take 1 tablet (50 mg total) by mouth every evening.    TRULICITY 3 mg/0.5 mL pen injector Inject 3 mg into the skin every 7 days. Fridays     Family History       Problem Relation (Age of Onset)    Kidney disease Mother, Father    Stroke Maternal Uncle          Tobacco Use    Smoking status: Never    Smokeless tobacco: Never   Substance and Sexual Activity    Alcohol use: No    Drug use: No    Sexual activity: Not Currently     Partners: Male     Review of Systems   Constitutional:  Positive for fatigue. Negative for chills and fever.   HENT:  Negative for sore throat and trouble " swallowing.    Eyes:  Negative for photophobia and visual disturbance.   Respiratory:  Positive for shortness of breath. Negative for cough and wheezing.    Cardiovascular:  Positive for leg swelling. Negative for chest pain and palpitations.   Gastrointestinal:  Negative for abdominal distention, abdominal pain, diarrhea, nausea and vomiting.   Genitourinary:  Negative for dysuria and hematuria.   Musculoskeletal:  Positive for arthralgias. Negative for neck pain and neck stiffness.   Skin:  Negative for rash and wound.   Neurological:  Negative for seizures, syncope, weakness, numbness and headaches.        Paresthesias     Psychiatric/Behavioral:  Negative for confusion and decreased concentration.      Objective:     Vital Signs (Most Recent):  Temp: 98 °F (36.7 °C) (01/16/24 1255)  Pulse: 62 (01/16/24 1733)  Resp: 18 (01/16/24 1733)  BP: (!) 159/65 (01/16/24 1733)  SpO2: 100 % (01/16/24 1733) Vital Signs (24h Range):  Temp:  [97.5 °F (36.4 °C)-98.4 °F (36.9 °C)] 98 °F (36.7 °C)  Pulse:  [62-72] 62  Resp:  [15-18] 18  SpO2:  [96 %-100 %] 100 %  BP: (128-185)/() 159/65     Weight: 104.3 kg (230 lb)  Body mass index is 34.97 kg/m².     Physical Exam  Constitutional:       General: She is not in acute distress.     Appearance: She is not toxic-appearing or diaphoretic.   HENT:      Head: Normocephalic and atraumatic.      Nose: Nose normal.   Eyes:      General: No scleral icterus.     Extraocular Movements: Extraocular movements intact.      Pupils: Pupils are equal, round, and reactive to light.   Cardiovascular:      Rate and Rhythm: Normal rate and regular rhythm.   Pulmonary:      Effort: Pulmonary effort is normal. No respiratory distress.      Breath sounds: No wheezing or rales.   Abdominal:      General: Abdomen is flat. There is no distension.      Palpations: Abdomen is soft.      Tenderness: There is no abdominal tenderness. There is no guarding.   Musculoskeletal:         General: Normal range of  "motion.      Cervical back: Normal range of motion and neck supple. No rigidity.      Right lower leg: No edema.      Left lower leg: No edema.   Skin:     General: Skin is warm and dry.      Coloration: Skin is not jaundiced.   Neurological:      General: No focal deficit present.      Mental Status: She is alert and oriented to person, place, and time.      Cranial Nerves: No cranial nerve deficit.   Psychiatric:         Mood and Affect: Mood normal.         Behavior: Behavior normal.              CRANIAL NERVES     CN III, IV, VI   Pupils are equal, round, and reactive to light.       Significant Labs: All pertinent labs within the past 24 hours have been reviewed.  CBC:   Recent Labs   Lab 01/15/24  1229   WBC 9.38   HGB 11.1*   HCT 35.1*        CMP:   Recent Labs   Lab 01/15/24  1229      K 4.5      CO2 20*   *   BUN 48*   CREATININE 1.3   CALCIUM 9.4   PROT 7.5   ALBUMIN 3.0*   BILITOT 0.3   ALKPHOS 134   AST 23   ALT 40   ANIONGAP 9     Cardiac Markers:   Recent Labs   Lab 01/15/24  1229   BNP 44     Troponin:   Recent Labs   Lab 01/15/24  1229 01/15/24  2054 01/15/24  2356   TROPONINI 0.007 <0.006 <0.006     Urine Studies:   Recent Labs   Lab 01/15/24  1534   COLORU Yellow   APPEARANCEUA Clear   PHUR 7.0   SPECGRAV 1.015   PROTEINUA Trace*   GLUCUA Negative   KETONESU Negative   BILIRUBINUA Negative   OCCULTUA Negative   NITRITE Negative   LEUKOCYTESUR Negative       Significant Imaging: I have reviewed all pertinent imaging results/findings within the past 24 hours.  Assessment/Plan:     * Abnormal stress echo  - Stress ECHO 01/16 positive for ischemia: "The study is consistent with ischemia. Wall motion abnormalities in the apex at stress consistent with ischemia.  Specifcity reduced in the setting of hypertensive stress response."  - patient currently chest pain free. Does report symptoms of stable angina on further discussion. Trop neg x3.   - continue home ASA  - continue home " lisinopril  - will start metoprolol 12.5mg BID  - continue home statin  - monitor tele  - npo midnight  - Cardiology consulted  ;   appreciate recs  - further management pending clinical course and future study review      Anxiety  - continue home zoloft, wellbutrin, and trazadone      Chronic diastolic heart failure  - stable  - continue home po lasix  - strict I/Os  - daily weights  - low sodium diet      Asthma  - albuterol prn    Dyslipidemia  - continue home ASA and statin      Hypertension  - continue home lisinopril     Diabetes mellitus type II  - A1C 7.4  - SSI  - hypoglycemic protocol      VTE Risk Mitigation (From admission, onward)      None               On 01/16/2024, patient should be placed in hospital observation services under my care.        Case discussed with cardiology fellow Dr. Gong.  Cardiology will evaluate the patient while inpatient.  She will be upgraded to Hospital Medicine for further management.       Thang Holt MD  Department of Hospital Medicine  [facility] - Emergency Dept

## 2024-01-17 NOTE — ASSESSMENT & PLAN NOTE
"- Stress ECHO 01/16 positive for ischemia: "The study is consistent with ischemia. Wall motion abnormalities in the apex at stress consistent with ischemia.  Specifcity reduced in the setting of hypertensive stress response."  - patient currently chest pain free. Does report symptoms of stable angina on further discussion. Trop neg x3.   - continue home ASA  - continue lisinopril, metoprolol, and statin  - IC following: plans for [facility] today  - Continuing to monitor on tele  "

## 2024-01-17 NOTE — SUBJECTIVE & OBJECTIVE
Interval History: Pt seen and examined this morning on [facility]is [place]. Grossly asymptomatic, awaiting C with IC. Daughter updated via speakerphone at bedside. Care plan reviewed. Otherwise, doing well and with no further complaints at this time.      Objective:     Vital Signs (Most Recent):  Temp: 97.8 °F (36.6 °C) (01/17/24 1142)  Pulse: 73 (01/17/24 1142)  Resp: 18 (01/17/24 1142)  BP: (!) 134/58 (01/17/24 1142)  SpO2: (!) 94 % (01/17/24 1142) Vital Signs (24h Range):  Temp:  [97.8 °F (36.6 °C)-98.9 °F (37.2 °C)] 97.8 °F (36.6 °C)  Pulse:  [62-77] 73  Resp:  [16-19] 18  SpO2:  [94 %-100 %] 94 %  BP: (104-185)/() 134/58     Weight: 104.9 kg (231 lb 4.2 oz)  Body mass index is 35.16 kg/m².    Intake/Output Summary (Last 24 hours) at 1/17/2024 1226  Last data filed at 1/17/2024 0910  Gross per 24 hour   Intake --   Output 1300 ml   Net -1300 ml         Physical Exam  Gen: in NAD, appears stated age; pt is obese  Neuro: AAOx4, CN2-12 grossly intact BL; motor, sensory, and strength grossly intact BL  HEENT: NTNC, EOMI, PERRLA, MMM; no thyromegaly or lymphadenopathy; no JVD appreciated  CVS: RRR, no m/r/g; S1/S2 auscultated with no S3 or S4; capillary refill < 2 sec  Resp: lungs CTAB, no w/r/r; no belabored breathing or accessory muscle use appreciated   Abd: BS+ in all 4 quadrants; NTND, soft to palpation; no organomegaly appreciated   Extrem: pulses full, equal, and regular over all 4 extremities; no UE or LE edema BL      Significant Labs: All pertinent labs within the past 24 hours have been reviewed.    Significant Imaging: I have reviewed all pertinent imaging results/findings within the past 24 hours.

## 2024-01-17 NOTE — NURSING
Pt BP elevated 168/76 Map 109, 199/68 Map 123. MD Lovett notified. Hydralazine ordered by MD. Will continue to monitor.

## 2024-01-17 NOTE — SUBJECTIVE & OBJECTIVE
"Past Medical History:   Diagnosis Date    Asthma     Back pain     Diabetes mellitus     Grade II diastolic dysfunction     Hyperlipidemia     Hypertension     Neuropathy     Obesity     Pulmonary embolism 1998    Pulmonary hypertension        Past Surgical History:   Procedure Laterality Date    CYST REMOVAL      HERNIA REPAIR      HYSTERECTOMY      TONSILLECTOMY      TUBAL LIGATION         Review of patient's allergies indicates:  No Known Allergies    PTA Medications   Medication Sig    acetaminophen (TYLENOL) 500 MG tablet Take 1 tablet (500 mg total) by mouth every 4 (four) hours as needed for Pain or Temperature greater than (100.5 or greater).    albuterol (PROVENTIL/VENTOLIN HFA) 90 mcg/actuation inhaler 2 puffs every 6 (six) hours as needed.    aspirin (ECOTRIN) 81 MG EC tablet Take 81 mg by mouth once daily.    atorvastatin (LIPITOR) 40 MG tablet Take 40 mg by mouth every evening.    blood sugar diagnostic Strp To check BG 4 times daily, to use with insurance preferred meter    blood-glucose meter kit To check BG 4 times daily, to use with insurance preferred meter    buPROPion (WELLBUTRIN XL) 150 MG TB24 tablet Take 2 tablets (300 mg total) by mouth once daily.    clobetasol 0.05% (TEMOVATE) 0.05 % Oint Apply topically 2 (two) times daily.    DUPIXENT  mg/2 mL PnIj INJECT 300MG UNDER THE SKIN EVERY OTHER WEEK AS DIRECTED    gabapentin (NEURONTIN) 300 MG capsule Take 300 mg by mouth 2 (two) times daily as needed.    lancets Misc To check BG 4 times daily, to use with insurance preferred meter    LASIX 20 mg tablet Take 20 mg by mouth once daily.    lisinopriL (PRINIVIL,ZESTRIL) 20 MG tablet Take 1 tablet (20 mg total) by mouth once daily.    meclizine (ANTIVERT) 25 mg tablet Take 25 mg by mouth once daily.    pantoprazole (PROTONIX) 20 MG tablet Take 1 tablet (20 mg total) by mouth once daily.    pen needle, diabetic 31 gauge x 3/16" Ndle 1 each.    pen needle, diabetic 32 gauge x 5/32" Ndle Use to " inject insulin four times daily    potassium chloride (KLOR-CON) 10 MEQ TbSR Take 10 mEq by mouth once daily.    prednisoLONE acetate (PRED FORTE) 1 % DrpS INSTILL 1 DROP 4 TIMES DAILY INTO LEFT EYE FOR 2 WEEKS, THEN 1 DROP 3 TIMES DAILY FOR 2 WEEKS, THEN 1 DROP TWICE DAILY FOR 2 WEEKS    sertraline (ZOLOFT) 100 MG tablet Take 2 tablets (200 mg total) by mouth once daily.    SYMBICORT 160-4.5 mcg/actuation HFAA 2 puffs 2 (two) times daily.    traZODone (DESYREL) 50 MG tablet Take 1 tablet (50 mg total) by mouth every evening.    traZODone (DESYREL) 50 MG tablet Take 1 tablet (50 mg total) by mouth every evening.    TRULICITY 3 mg/0.5 mL pen injector Inject 3 mg into the skin every 7 days. Fridays     Family History       Problem Relation (Age of Onset)    Kidney disease Mother, Father    Stroke Maternal Uncle          Tobacco Use    Smoking status: Never    Smokeless tobacco: Never   Substance and Sexual Activity    Alcohol use: No    Drug use: No    Sexual activity: Not Currently     Partners: Male       Objective:     Vital Signs (Most Recent):  Temp: 98.2 °F (36.8 °C) (01/17/24 0729)  Pulse: 72 (01/17/24 1111)  Resp: 19 (01/17/24 0729)  BP: 113/60 (01/17/24 0729)  SpO2: 98 % (01/17/24 0729) Vital Signs (24h Range):  Temp:  [98 °F (36.7 °C)-98.9 °F (37.2 °C)] 98.2 °F (36.8 °C)  Pulse:  [62-77] 72  Resp:  [16-19] 19  SpO2:  [95 %-100 %] 98 %  BP: (104-185)/() 113/60     Weight: 104.9 kg (231 lb 4.2 oz)  Body mass index is 35.16 kg/m².    SpO2: 98 %         Intake/Output Summary (Last 24 hours) at 1/17/2024 1134  Last data filed at 1/17/2024 0910  Gross per 24 hour   Intake --   Output 1300 ml   Net -1300 ml       Lines/Drains/Airways       Peripheral Intravenous Line  Duration                  Peripheral IV - Single Lumen 01/15/24 1302 20 G Anterior;Right Forearm 1 day                     Physical Exam  Vitals and nursing note reviewed.   Constitutional:       Appearance: She is obese.   HENT:      Head:  Atraumatic.   Eyes:      Conjunctiva/sclera: Conjunctivae normal.   Cardiovascular:      Rate and Rhythm: Normal rate and regular rhythm.      Heart sounds: No murmur heard.  Pulmonary:      Effort: Pulmonary effort is normal.      Breath sounds: Normal breath sounds. No wheezing.   Abdominal:      General: There is no distension.      Palpations: Abdomen is soft.      Tenderness: There is no abdominal tenderness.   Musculoskeletal:      Right lower leg: No edema.      Left lower leg: No edema.   Skin:     General: Skin is warm.   Neurological:      General: No focal deficit present.   Psychiatric:         Mood and Affect: Mood normal.            Significant Labs: All pertinent lab results from the last 24 hours have been reviewed.

## 2024-01-17 NOTE — CONSULTS
"[facility] Step[facility]  Interventional Cardiology  Consult Note    Patient Name: Devika Bustillo  MRN: [id number]  Admission Date: 1/15/2024  Hospital Length of Stay: 0 days  Code Status: Full Code   Attending Provider: Cesar Lovett MD  Consulting Provider: Kathryn Ojeda MD  Primary Care Physician: Gwendolyn, Primary Doctor  Principal Problem:Abnormal stress echo    Patient information was obtained from patient and ER records.     Inpatient consult to Interventional Cardiology  Consult performed by: Kathryn Ojeda MD  Consult ordered by: Cesar Lovett MD        Subjective:     HPI:  63 year old female with Hx of 63 yo female with hx of HTN, HLD, asthma, HFpEF, chronic low back pain, anxiety/depression, and DM2 c/b neuropathy who presents to [facility] ED from home with generalized weakness and gait instability. This has been ongoing for the past several months but per daughter has worsened. On Tuesday morning, she got up to go to the bathroom when she started experiencing shakiness, tremors like and was unable to move any further. On further evaluation in the ED, patient also endorsed several months of right sided non-radiating chest pain which she describes as severe heartburn. Nothing appears to aggravate the pain and it would self-resolve. Lasts several hours and has occurred daily for months.This prompted her daughter to bring her to the ED, where she was was admitted to [facility] initially on 1/15 pending stress echo. Of note patient, has been followed outpatient with last clinic visit in 10/2023 for "chronic" chest pain. She has had a cath done in the past but unable to obtain records. Patient denies any active chest pain at this time, palpitations, shortness of breath, orthopnea, PND or lower extremity edema. She occasionally experiences LE swelling.     Labs:  CBC: WBC/HGB/HCT/PLT: 9.38/11.1/35.1/314  BMP: Na/K/Cl/CO2/BUN/Cr: 137/4.5/108/20/48/1.3  Trroponin : Negative x 2  BNP: 44     Stress Echo (1/16/2024):    " Left Ventricle: The left ventricle is normal in size. Normal wall thickness. Normal wall motion at rest. There is normal systolic function with a visually estimated ejection fraction of 55 - 60%. There is diastolic dysfunction.    Right Ventricle: Normal right ventricular cavity size. Systolic function is normal.    Left Atrium: Left atrium is mildly dilated.    Right Atrium: Right atrium is mildly dilated.    Mitral Valve: There is moderate posterior mitral annular calcification present.    Pulmonary Artery: The estimated pulmonary artery systolic pressure is 43 mmHg.    IVC/SVC: Normal venous pressure at 3 mmHg.    Baseline ECG: The Baseline ECG reveals sinus rhythm. The axis is normal. The ST segments are normal.    Stress ECG: There are no ST segment deviation identified during the protocol. There are no arrhythmias during stress.    ECG Conclusion: The ECG portion of the study is negative for ischemia.    Post-stress Echo Impression: The study is consistent with ischemia. Wall motion abnormalities in the apex at stress consistent with ischemia.  Specifcity reduced in the setting of hypertensive stress response.    Past Medical History:   Diagnosis Date    Asthma     Back pain     Diabetes mellitus     Grade II diastolic dysfunction     Hyperlipidemia     Hypertension     Neuropathy     Obesity     Pulmonary embolism 1998    Pulmonary hypertension        Past Surgical History:   Procedure Laterality Date    CYST REMOVAL      HERNIA REPAIR      HYSTERECTOMY      TONSILLECTOMY      TUBAL LIGATION         Review of patient's allergies indicates:  No Known Allergies    PTA Medications   Medication Sig    acetaminophen (TYLENOL) 500 MG tablet Take 1 tablet (500 mg total) by mouth every 4 (four) hours as needed for Pain or Temperature greater than (100.5 or greater).    albuterol (PROVENTIL/VENTOLIN HFA) 90 mcg/actuation inhaler 2 puffs every 6 (six) hours as needed.    aspirin (ECOTRIN) 81 MG EC tablet Take 81 mg by  "mouth once daily.    atorvastatin (LIPITOR) 40 MG tablet Take 40 mg by mouth every evening.    blood sugar diagnostic Strp To check BG 4 times daily, to use with insurance preferred meter    blood-glucose meter kit To check BG 4 times daily, to use with insurance preferred meter    buPROPion (WELLBUTRIN XL) 150 MG TB24 tablet Take 2 tablets (300 mg total) by mouth once daily.    clobetasol 0.05% (TEMOVATE) 0.05 % Oint Apply topically 2 (two) times daily.    DUPIXENT  mg/2 mL PnIj INJECT 300MG UNDER THE SKIN EVERY OTHER WEEK AS DIRECTED    gabapentin (NEURONTIN) 300 MG capsule Take 300 mg by mouth 2 (two) times daily as needed.    lancets Misc To check BG 4 times daily, to use with insurance preferred meter    LASIX 20 mg tablet Take 20 mg by mouth once daily.    lisinopriL (PRINIVIL,ZESTRIL) 20 MG tablet Take 1 tablet (20 mg total) by mouth once daily.    meclizine (ANTIVERT) 25 mg tablet Take 25 mg by mouth once daily.    pantoprazole (PROTONIX) 20 MG tablet Take 1 tablet (20 mg total) by mouth once daily.    pen needle, diabetic 31 gauge x 3/16" Ndle 1 each.    pen needle, diabetic 32 gauge x 5/32" Ndle Use to inject insulin four times daily    potassium chloride (KLOR-CON) 10 MEQ TbSR Take 10 mEq by mouth once daily.    prednisoLONE acetate (PRED FORTE) 1 % DrpS INSTILL 1 DROP 4 TIMES DAILY INTO LEFT EYE FOR 2 WEEKS, THEN 1 DROP 3 TIMES DAILY FOR 2 WEEKS, THEN 1 DROP TWICE DAILY FOR 2 WEEKS    sertraline (ZOLOFT) 100 MG tablet Take 2 tablets (200 mg total) by mouth once daily.    SYMBICORT 160-4.5 mcg/actuation HFAA 2 puffs 2 (two) times daily.    traZODone (DESYREL) 50 MG tablet Take 1 tablet (50 mg total) by mouth every evening.    traZODone (DESYREL) 50 MG tablet Take 1 tablet (50 mg total) by mouth every evening.    TRULICITY 3 mg/0.5 mL pen injector Inject 3 mg into the skin every 7 days. Fridays     Family History       Problem Relation (Age of Onset)    Kidney disease Mother, Father    Stroke " Maternal Uncle          Tobacco Use    Smoking status: Never    Smokeless tobacco: Never   Substance and Sexual Activity    Alcohol use: No    Drug use: No    Sexual activity: Not Currently     Partners: Male       Objective:     Vital Signs (Most Recent):  Temp: 98.2 °F (36.8 °C) (01/17/24 0729)  Pulse: 72 (01/17/24 1111)  Resp: 19 (01/17/24 0729)  BP: 113/60 (01/17/24 0729)  SpO2: 98 % (01/17/24 0729) Vital Signs (24h Range):  Temp:  [98 °F (36.7 °C)-98.9 °F (37.2 °C)] 98.2 °F (36.8 °C)  Pulse:  [62-77] 72  Resp:  [16-19] 19  SpO2:  [95 %-100 %] 98 %  BP: (104-185)/() 113/60     Weight: 104.9 kg (231 lb 4.2 oz)  Body mass index is 35.16 kg/m².    SpO2: 98 %         Intake/Output Summary (Last 24 hours) at 1/17/2024 1134  Last data filed at 1/17/2024 0910  Gross per 24 hour   Intake --   Output 1300 ml   Net -1300 ml       Lines/Drains/Airways       Peripheral Intravenous Line  Duration                  Peripheral IV - Single Lumen 01/15/24 1302 20 G Anterior;Right Forearm 1 day                     Physical Exam  Vitals and nursing note reviewed.   Constitutional:       Appearance: She is obese.   HENT:      Head: Atraumatic.   Eyes:      Conjunctiva/sclera: Conjunctivae normal.   Cardiovascular:      Rate and Rhythm: Normal rate and regular rhythm.      Heart sounds: No murmur heard.  Pulmonary:      Effort: Pulmonary effort is normal.      Breath sounds: Normal breath sounds. No wheezing.   Abdominal:      General: There is no distension.      Palpations: Abdomen is soft.      Tenderness: There is no abdominal tenderness.   Musculoskeletal:      Right lower leg: No edema.      Left lower leg: No edema.   Skin:     General: Skin is warm.   Neurological:      General: No focal deficit present.   Psychiatric:         Mood and Affect: Mood normal.            Significant Labs: All pertinent lab results from the last 24 hours have been reviewed.    Assessment and Plan:     Cardiac/Vascular  * Abnormal stress  echo  - Proceed with coronary angiogram +/- intervention  - Anti-platelet Therapy: ASA + Plavix  - Access: Right radial  - Creatinine/CrCl: 1.3  - Allergies: No shellfish/Iodine allergy  - Pre-Hydration: 3 cc/kg/hr IV, continuous, for 1 hour, Pre-Procedure  - Pre-Op Med: Diphenhydramine (Benadryl) 50 mg, Oral, Once, Pre-Procedure   - The risks, benefits & alternatives of the procedure were explained to the patient.  The risks of coronary angiography include but are not limited to: Bleeding, infection, heart rhythm abnormalities, allergic reactions, kidney injury requiring dilaysis, limb loss, stroke and death. Should stenting be indicated, the patient has agreed to dual anti-platelet therapy for 1-consecutive year with a drug-eluting stent and a minimum of 1-month with the use of a bare metal stent. Additionally, patient is aware that non-compliance is likely to result in stent clotting with heart attack, heart failure, and/or death. The risks of moderate sedation include hypotension, respiratory depression, arrhythmias, bronchospasm, & death. Informed consent was obtained & the patient is agreeable to proceed with the procedure. All patient's questions were answered.     The risks (including death, heart attack, limb loss, paralysis, emergency surgery, bleeding, renal failure, and stroke), the potential benefits of the procedure, and the alternatives to the procedure, were discussed in detail and accepted by the patient. All questions were answered. Patient agrees to proceed.        Thank you for this consult.Please follow up Attending Attestation for FINAL recommendations. Please call if any questions.       Kathryn Ojeda MD  Interventional Cardiology   [facility]

## 2024-01-17 NOTE — PLAN OF CARE
Problem: Diabetes Comorbidity  Goal: Blood Glucose Level Within Targeted Range  Outcome: Ongoing, Progressing     Problem: Fall Injury Risk  Goal: Absence of Fall and Fall-Related Injury  Outcome: Ongoing, Progressing     Problem: Adult Inpatient Plan of Care  Goal: Readiness for Transition of Care  Outcome: Ongoing, Progressing     Problem: Adult Inpatient Plan of Care  Goal: Absence of Hospital-Acquired Illness or Injury  Outcome: Ongoing, Progressing     Problem: Adult Inpatient Plan of Care  Goal: Plan of Care Review  Outcome: Ongoing, Progressing    Plan of care discussed with patient.  Patient on bed rest, flat until 8:40 p.m. NS at 150ml/hr for next 6 hours. Patient has no complaints of pain. Discussed medications and care. Patient has no questions at this time. Will continue to monitor.

## 2024-01-17 NOTE — NURSING
Pt back from cath lab.VSS. R groin site dressing clean ,dry intact. Pedal pulses palpable.Will continue to monitor.

## 2024-01-18 VITALS
HEART RATE: 70 BPM | HEIGHT: 68 IN | RESPIRATION RATE: 18 BRPM | DIASTOLIC BLOOD PRESSURE: 59 MMHG | BODY MASS INDEX: 35.11 KG/M2 | WEIGHT: 231.69 LBS | SYSTOLIC BLOOD PRESSURE: 131 MMHG | OXYGEN SATURATION: 97 % | TEMPERATURE: 98 F

## 2024-01-18 PROBLEM — N18.30 STAGE 3 CHRONIC KIDNEY DISEASE: Status: ACTIVE | Noted: 2024-01-18

## 2024-01-18 LAB
ALBUMIN SERPL BCP-MCNC: 2.4 G/DL (ref 3.5–5.2)
ALP SERPL-CCNC: 110 U/L (ref 55–135)
ALT SERPL W/O P-5'-P-CCNC: 22 U/L (ref 10–44)
ANION GAP SERPL CALC-SCNC: 11 MMOL/L (ref 8–16)
AST SERPL-CCNC: 17 U/L (ref 10–40)
BASOPHILS # BLD AUTO: 0.03 K/UL (ref 0–0.2)
BASOPHILS NFR BLD: 0.4 % (ref 0–1.9)
BILIRUB SERPL-MCNC: 0.2 MG/DL (ref 0.1–1)
BUN SERPL-MCNC: 33 MG/DL (ref 8–23)
CALCIUM SERPL-MCNC: 8.8 MG/DL (ref 8.7–10.5)
CHLORIDE SERPL-SCNC: 111 MMOL/L (ref 95–110)
CO2 SERPL-SCNC: 19 MMOL/L (ref 23–29)
CREAT SERPL-MCNC: 1.5 MG/DL (ref 0.5–1.4)
DIFFERENTIAL METHOD BLD: ABNORMAL
EOSINOPHIL # BLD AUTO: 0.1 K/UL (ref 0–0.5)
EOSINOPHIL NFR BLD: 1.4 % (ref 0–8)
ERYTHROCYTE [DISTWIDTH] IN BLOOD BY AUTOMATED COUNT: 13.6 % (ref 11.5–14.5)
EST. GFR  (NO RACE VARIABLE): 39.4 ML/MIN/1.73 M^2
GLUCOSE SERPL-MCNC: 179 MG/DL (ref 70–110)
HCT VFR BLD AUTO: 30.2 % (ref 37–48.5)
HGB BLD-MCNC: 9.4 G/DL (ref 12–16)
IMM GRANULOCYTES # BLD AUTO: 0.02 K/UL (ref 0–0.04)
IMM GRANULOCYTES NFR BLD AUTO: 0.3 % (ref 0–0.5)
LYMPHOCYTES # BLD AUTO: 2 K/UL (ref 1–4.8)
LYMPHOCYTES NFR BLD: 27.8 % (ref 18–48)
MAGNESIUM SERPL-MCNC: 1.8 MG/DL (ref 1.6–2.6)
MCH RBC QN AUTO: 26.7 PG (ref 27–31)
MCHC RBC AUTO-ENTMCNC: 31.1 G/DL (ref 32–36)
MCV RBC AUTO: 86 FL (ref 82–98)
MONOCYTES # BLD AUTO: 0.6 K/UL (ref 0.3–1)
MONOCYTES NFR BLD: 8.4 % (ref 4–15)
NEUTROPHILS # BLD AUTO: 4.5 K/UL (ref 1.8–7.7)
NEUTROPHILS NFR BLD: 61.7 % (ref 38–73)
NRBC BLD-RTO: 0 /100 WBC
PHOSPHATE SERPL-MCNC: 4.4 MG/DL (ref 2.7–4.5)
PLATELET # BLD AUTO: 269 K/UL (ref 150–450)
PMV BLD AUTO: 9.8 FL (ref 9.2–12.9)
POCT GLUCOSE: 172 MG/DL (ref 70–110)
POCT GLUCOSE: 217 MG/DL (ref 70–110)
POCT GLUCOSE: 223 MG/DL (ref 70–110)
POTASSIUM SERPL-SCNC: 4.8 MMOL/L (ref 3.5–5.1)
PROT SERPL-MCNC: 6.3 G/DL (ref 6–8.4)
RBC # BLD AUTO: 3.52 M/UL (ref 4–5.4)
SODIUM SERPL-SCNC: 141 MMOL/L (ref 136–145)
WBC # BLD AUTO: 7.23 K/UL (ref 3.9–12.7)

## 2024-01-18 PROCEDURE — 97116 GAIT TRAINING THERAPY: CPT

## 2024-01-18 PROCEDURE — 97530 THERAPEUTIC ACTIVITIES: CPT

## 2024-01-18 PROCEDURE — 63600175 PHARM REV CODE 636 W HCPCS: Performed by: PHYSICIAN ASSISTANT

## 2024-01-18 PROCEDURE — 80053 COMPREHEN METABOLIC PANEL: CPT | Performed by: STUDENT IN AN ORGANIZED HEALTH CARE EDUCATION/TRAINING PROGRAM

## 2024-01-18 PROCEDURE — 25000003 PHARM REV CODE 250: Performed by: FAMILY MEDICINE

## 2024-01-18 PROCEDURE — 25000003 PHARM REV CODE 250: Performed by: STUDENT IN AN ORGANIZED HEALTH CARE EDUCATION/TRAINING PROGRAM

## 2024-01-18 PROCEDURE — 83735 ASSAY OF MAGNESIUM: CPT | Performed by: STUDENT IN AN ORGANIZED HEALTH CARE EDUCATION/TRAINING PROGRAM

## 2024-01-18 PROCEDURE — 97165 OT EVAL LOW COMPLEX 30 MIN: CPT

## 2024-01-18 PROCEDURE — 97161 PT EVAL LOW COMPLEX 20 MIN: CPT

## 2024-01-18 PROCEDURE — 84100 ASSAY OF PHOSPHORUS: CPT | Performed by: STUDENT IN AN ORGANIZED HEALTH CARE EDUCATION/TRAINING PROGRAM

## 2024-01-18 PROCEDURE — 97535 SELF CARE MNGMENT TRAINING: CPT

## 2024-01-18 PROCEDURE — 36415 COLL VENOUS BLD VENIPUNCTURE: CPT | Performed by: STUDENT IN AN ORGANIZED HEALTH CARE EDUCATION/TRAINING PROGRAM

## 2024-01-18 PROCEDURE — 85025 COMPLETE CBC W/AUTO DIFF WBC: CPT | Performed by: STUDENT IN AN ORGANIZED HEALTH CARE EDUCATION/TRAINING PROGRAM

## 2024-01-18 RX ORDER — PANTOPRAZOLE SODIUM 40 MG/1
40 TABLET, DELAYED RELEASE ORAL
Qty: 60 TABLET | Refills: 1 | Status: SHIPPED | OUTPATIENT
Start: 2024-01-18

## 2024-01-18 RX ORDER — AMOXICILLIN 250 MG
1 CAPSULE ORAL ONCE
Status: COMPLETED | OUTPATIENT
Start: 2024-01-18 | End: 2024-01-18

## 2024-01-18 RX ORDER — NAPROXEN 500 MG/1
1 TABLET ORAL 2 TIMES DAILY WITH MEALS
Status: ON HOLD | COMMUNITY
End: 2024-01-18 | Stop reason: HOSPADM

## 2024-01-18 RX ORDER — METOPROLOL SUCCINATE 25 MG/1
12.5 TABLET, EXTENDED RELEASE ORAL DAILY
Start: 2024-01-18

## 2024-01-18 RX ORDER — ACETAMINOPHEN 500 MG
1000 TABLET ORAL EVERY 8 HOURS PRN
Qty: 30 TABLET | Refills: 0
Start: 2024-01-18

## 2024-01-18 RX ORDER — FUROSEMIDE 40 MG/1
40 TABLET ORAL DAILY
Status: DISCONTINUED | OUTPATIENT
Start: 2024-01-19 | End: 2024-01-18 | Stop reason: HOSPADM

## 2024-01-18 RX ORDER — INSULIN GLARGINE 300 U/ML
50 INJECTION, SOLUTION SUBCUTANEOUS DAILY
COMMUNITY
Start: 2023-11-08

## 2024-01-18 RX ORDER — POLYETHYLENE GLYCOL 3350 17 G/17G
17 POWDER, FOR SOLUTION ORAL 2 TIMES DAILY PRN
Qty: 238 G | Refills: 1 | Status: SHIPPED | OUTPATIENT
Start: 2024-01-18

## 2024-01-18 RX ORDER — ALBUTEROL SULFATE 90 UG/1
1-2 AEROSOL, METERED RESPIRATORY (INHALATION) EVERY 6 HOURS PRN
Qty: 18 G | Refills: 0 | Status: SHIPPED | OUTPATIENT
Start: 2024-01-18 | End: 2025-01-17

## 2024-01-18 RX ORDER — FUROSEMIDE 20 MG/1
20 TABLET ORAL
Start: 2024-01-18

## 2024-01-18 RX ORDER — LISINOPRIL 20 MG/1
20 TABLET ORAL DAILY
Status: DISCONTINUED | OUTPATIENT
Start: 2024-01-19 | End: 2024-01-18 | Stop reason: HOSPADM

## 2024-01-18 RX ORDER — AMOXICILLIN 250 MG
1 CAPSULE ORAL 2 TIMES DAILY PRN
Qty: 30 TABLET | Refills: 1 | Status: SHIPPED | OUTPATIENT
Start: 2024-01-18

## 2024-01-18 RX ORDER — CHLORTHALIDONE 25 MG/1
25 TABLET ORAL DAILY
Status: ON HOLD | COMMUNITY
Start: 2023-02-22 | End: 2024-01-18 | Stop reason: HOSPADM

## 2024-01-18 RX ORDER — LORATADINE 10 MG/1
10 TABLET ORAL DAILY PRN
COMMUNITY
Start: 2023-11-11

## 2024-01-18 RX ORDER — PANTOPRAZOLE SODIUM 40 MG/1
40 TABLET, DELAYED RELEASE ORAL
Qty: 60 TABLET | Refills: 1 | Status: SHIPPED | OUTPATIENT
Start: 2024-01-18 | End: 2024-01-18

## 2024-01-18 RX ORDER — POLYETHYLENE GLYCOL 3350 17 G/17G
17 POWDER, FOR SOLUTION ORAL DAILY
Status: DISCONTINUED | OUTPATIENT
Start: 2024-01-18 | End: 2024-01-18 | Stop reason: HOSPADM

## 2024-01-18 RX ORDER — FAMOTIDINE 20 MG/1
20 TABLET, FILM COATED ORAL 2 TIMES DAILY PRN
Status: ON HOLD | COMMUNITY
Start: 2023-01-20 | End: 2024-01-18 | Stop reason: HOSPADM

## 2024-01-18 RX ORDER — METOPROLOL SUCCINATE 25 MG/1
25 TABLET, EXTENDED RELEASE ORAL DAILY
Status: ON HOLD | COMMUNITY
Start: 2023-11-30 | End: 2024-01-18

## 2024-01-18 RX ORDER — POTASSIUM CHLORIDE 750 MG/1
10 TABLET, EXTENDED RELEASE ORAL
Start: 2024-01-18

## 2024-01-18 RX ORDER — LISINOPRIL 5 MG/1
5 TABLET ORAL DAILY
Qty: 30 TABLET | Refills: 1 | Status: SHIPPED | OUTPATIENT
Start: 2024-01-18

## 2024-01-18 RX ORDER — BUDESONIDE AND FORMOTEROL FUMARATE DIHYDRATE 160; 4.5 UG/1; UG/1
2 AEROSOL RESPIRATORY (INHALATION) 2 TIMES DAILY
Refills: 0
Start: 2024-01-18

## 2024-01-18 RX ADMIN — BUPROPION HYDROCHLORIDE 300 MG: 150 TABLET, FILM COATED, EXTENDED RELEASE ORAL at 08:01

## 2024-01-18 RX ADMIN — SERTRALINE 200 MG: 100 TABLET, FILM COATED ORAL at 08:01

## 2024-01-18 RX ADMIN — PANTOPRAZOLE SODIUM 20 MG: 20 TABLET, DELAYED RELEASE ORAL at 08:01

## 2024-01-18 RX ADMIN — ASPIRIN 81 MG: 81 TABLET, COATED ORAL at 08:01

## 2024-01-18 RX ADMIN — POLYETHYLENE GLYCOL 3350 17 G: 17 POWDER, FOR SOLUTION ORAL at 12:01

## 2024-01-18 RX ADMIN — SENNOSIDES AND DOCUSATE SODIUM 1 TABLET: 8.6; 5 TABLET ORAL at 12:01

## 2024-01-18 RX ADMIN — INSULIN ASPART 2 UNITS: 100 INJECTION, SOLUTION INTRAVENOUS; SUBCUTANEOUS at 11:01

## 2024-01-18 NOTE — DISCHARGE SUMMARY
Danial Guy - Cardiology [facility] Medicine  Discharge Summary      Patient Name: Devika Bustillo  MRN: [id number]  TAURUS: [id number]  Patient Class: IP- Inpatient  Admission Date: 1/15/2024  Hospital Length of Stay: 1 days  Discharge Date and Time:  01/18/2024 1:36 PM  Attending Physician: Ara Noguera MD   Discharging Provider: Ara Noguera MD  Primary Care Provider: Gwendolyn Primary Doctor  [facility] Medicine Team: [facility] Ara Noguera MD  Primary Care Team: [facility]    HPI:   63 yo female with hx of HTN, HLD, asthma, HFpEF, chronic low back pain, anxiety/depression, and DM2 c/b neuropathy who [place] [facility] [place] with generalized weakness and gait instability. Pt is here with her daughter who assists in hx taking. Per the patient and daughter, her sxs have been present over the past couple of years though she has noticed a progressive decline over the past several months. During this time, she complains of bilateral lower and upper extremity weakness and loss of sensation, tremors, headaches, visual disturbances, and issues with balance coordination. She has followed up with NSGY at  and had MRIs and LP (results below). Pt states that she woke up this morning and as she got out of bed she became very lightheaded and felt as though she was about to fall. She braced herself and got back in bed without ever falling or losing consciousness. Pt had no prodromes, nor confusion during the episode. She states she drinks plenty of water and hydrates very well. She called her daughter who lives nearby and she took her to the emergency room. The patient lives at home independently and ambulates without the assistance of walking aids. She endorses headaches, blurry vision, neck soreness, chronic right sided chest pain, shortness of breath, non-productive cough, generalized abdominal pain, nausea, constipation, BL weakness and sensory deficits. She denies fever, recent illnesses, vomiting, diarrhea,  "urinary sxs, abdominal tenderness, vertigo-like sxs, focal neurological deficits or issues with fine motor coordination. Patient was placed in the EDOU for Stress ECHO. Stress ECHO completed 01/16 and positive for ischemia: "The study is consistent with ischemia. Wall motion abnormalities in the apex at stress consistent with ischemia.  Specifcity reduced in the setting of hypertensive stress response." Case Discussed with Cardiology who recommended admission to  and formal  Cardiology evaluation. Patient admitted to the care of medicine for further evaluation and management.     Procedure(s) (LRB):  Angiogram, Coronary, with Left Heart Cath (Left)      Hospital Course:   [place] [facility] [place] remained stable overnight and into 1/17/2024. Given stress test findings, interventional cardiology consulted with plans for an inpatient [facility]. C 1/17 w/ Non-obstructive Coronary Artery Disease. Pt to continue ASA, statin, and RF modification. Positive orthostatics during admission (Orthostatics: 123/58 Map 83 in bed; 108/54 Map 75 sitting EOB; 100/54 Map 74 standing); BP meds adjusted. CKD3 at baseline. Pt w/ c/o issues w/ chronic constipation and reflux; advised lifestyle modifications and laxatives prn. Patient without further hospitalization needs.  Patient medically stable for discharge. Close follow-up w/ PCP. Amb PT.  Return precautions advised; patient's questions answered.  Patient in agreement with discharge plan.    Vitals:    01/18/24 0840 01/18/24 0855 01/18/24 1125 01/18/24 1201   BP: (!) 100/54   131/60   BP Location:    Left arm   Patient Position: Standing   Sitting   Pulse:  65 70 66   Resp:  18  18   Temp:  97.7 °F (36.5 °C)  98 °F (36.7 °C)   TempSrc:    Tympanic   SpO2:  97%  (!) 91%   Weight:       Height:           Physical Exam  Gen: in NAD, appears stated age; pt is obese  Neuro: AAOx4, CN2-12 grossly intact BL; motor, sensory, and strength grossly intact BL  HEENT: no thyromegaly or " lymphadenopathy; no JVD appreciated  CVS: RRR, no m/r/g; S1/S2 auscultated with no S3 or S4; capillary refill < 2 sec  Resp: lungs CTAB, no w/r/r; no belabored breathing or accessory muscle use appreciated   Abd: BS+ in all 4 quadrants; NTND, soft to palpation; no organomegaly appreciated   Extrem: pulses full, equal, and regular over all 4 extremities; no UE or LE edema BL     Goals of Care Treatment Preferences:  Code Status: Full Code      Consults:   Consults (From admission, onward)          Status Ordering Provider     Inpatient consult to Interventional Cardiology  Once        Provider:  (Not yet assigned)    Completed BRITTANY MIN            No new Assessment & Plan notes have been filed under this hospital service since the last note was generated.  Service: Hospital Medicine    Final Active Diagnoses:    Diagnosis Date Noted POA    PRINCIPAL PROBLEM:  Abnormal stress echo [R94.39] 01/16/2024 Yes    Stage 3 chronic kidney disease [N18.30] 01/18/2024 Yes    Anxiety [F41.9] 01/16/2024 Yes    Chronic diastolic heart failure [I50.32] 06/27/2023 Yes    Asthma [J45.909] 11/04/2021 Yes    Dyslipidemia [E78.5] 09/12/2014 Yes    Diabetes mellitus type II [E11.9] 02/28/2013 Yes    Hypertension [I10] 02/28/2013 Yes      Problems Resolved During this Admission:       Discharged Condition: stable    Disposition: Home or Self Care    Follow Up:   Follow-up Information       [facility]. Call .    Contact information:  [facility]   [address] [place] [address]   [address]   [place] [address] [phone]             Garcia Wiggins MD. Call .    Contact information:  Neurosurgery  [address] [place] [address]. &&  Maria Fernanda WE[place] [address]             Vincent Delatorre MD. Call .    Contact information:  Cardiology  [address] [facility].   [facility]   [place], [place] [id number] [phone] (Work)   [phone] (Fax)             Shelley Caldera,  NP. Call in 1 week(s).    Specialty: Family Medicine  Why: patient needs to call and schedule hospital f/u with PCP  Contact information:  [address] [facility] [address]  [phone]                           Patient Instructions:      Ambulatory referral/consult to Physical/Occupational Therapy   Standing Status: Future   Referral Priority: Urgent Referral Type: Physical Medicine   Referral Reason: Specialty Services Required   Number of Visits Requested: 1     Diet diabetic     Diet Cardiac     Notify your health care provider if you experience any of the following:  persistent nausea and vomiting or diarrhea     Notify your health care provider if you experience any of the following:  severe uncontrolled pain     Notify your health care provider if you experience any of the following:  redness, tenderness, or signs of infection (pain, swelling, redness, odor or green/yellow discharge around incision site)     Notify your health care provider if you experience any of the following:  difficulty breathing or increased cough     Notify your health care provider if you experience any of the following:  severe persistent headache     Notify your health care provider if you experience any of the following:  persistent dizziness, light-headedness, or visual disturbances     Notify your health care provider if you experience any of the following:  increased confusion or weakness     Notify your health care provider if you experience any of the following:  temperature >100.4     Activity as tolerated       Significant Diagnostic Studies: Labs: CMP   Recent Labs   Lab 01/17/24  1549 01/18/24 0318    141   K 4.4 4.8    111*   CO2 21* 19*   * 179*   BUN 35* 33*   CREATININE 1.3 1.5*   CALCIUM 9.7 8.8   PROT 7.9 6.3   ALBUMIN 3.0* 2.4*   BILITOT 0.3 0.2   ALKPHOS 145* 110   AST 23 17   ALT 30 22   ANIONGAP 11 11    and CBC   Recent Labs   Lab 01/18/24 0318   WBC 7.23   HGB 9.4*   HCT 30.2*   PLT  269       Pending Diagnostic Studies:       None           Medications:  Reconciled Home Medications:      Medication List        START taking these medications      polyethylene glycol 17 gram Pwpk  Commonly known as: GLYCOLAX  Take 17 g by mouth 2 (two) times daily as needed for Constipation.     senna-docusate 8.6-50 mg 8.6-50 mg per tablet  Commonly known as: SENNA WITH DOCUSATE SODIUM  Take 1 tablet by mouth 2 (two) times daily as needed for Constipation.            CHANGE how you take these medications      acetaminophen 500 MG tablet  Commonly known as: TYLENOL  Take 2 tablets (1,000 mg total) by mouth every 8 (eight) hours as needed for Pain.  What changed:   how much to take  when to take this  reasons to take this     albuterol 90 mcg/actuation inhaler  Commonly known as: VENTOLIN HFA  Inhale 1-2 puffs into the lungs every 6 (six) hours as needed for Wheezing or Shortness of Breath. Rescue  What changed:   how much to take  how to take this  reasons to take this  additional instructions     LASIX 20 MG tablet  Generic drug: furosemide  Take 1 tablet (20 mg total) by mouth as needed (lower extremity swelling).  What changed:   when to take this  reasons to take this     lisinopriL 5 MG tablet  Commonly known as: PRINIVIL,ZESTRIL  Take 1 tablet (5 mg total) by mouth once daily.  What changed:   medication strength  how much to take     metoprolol succinate 25 MG 24 hr tablet  Commonly known as: TOPROL-XL  Take 0.5 tablets (12.5 mg total) by mouth once daily.  What changed: how much to take     pantoprazole 40 MG tablet  Commonly known as: PROTONIX  Take 1 tablet (40 mg total) by mouth 2 (two) times daily before meals.  What changed:   medication strength  how much to take  when to take this     potassium chloride 10 MEQ Tbsr  Commonly known as: KLOR-CON  Take 1 tablet (10 mEq total) by mouth as needed (take 1 tablet when you take lasix (furosemide)).  What changed:   when to take this  reasons to take this    "  SYMBICORT 160-4.5 mcg/actuation Hfaa  Generic drug: budesonide-formoterol 160-4.5 mcg  Inhale 2 puffs into the lungs 2 (two) times daily.  What changed: how to take this     traZODone 50 MG tablet  Commonly known as: DESYREL  Take 1 tablet (50 mg total) by mouth every evening.  What changed: Another medication with the same name was removed. Continue taking this medication, and follow the directions you see here.            CONTINUE taking these medications      aspirin 81 MG EC tablet  Commonly known as: ECOTRIN  Take 81 mg by mouth once daily.     atorvastatin 40 MG tablet  Commonly known as: LIPITOR  Take 40 mg by mouth every evening.     blood sugar diagnostic Strp  To check BG 4 times daily, to use with insurance preferred meter     blood-glucose meter kit  To check BG 4 times daily, to use with insurance preferred meter     buPROPion 150 MG TB24 tablet  Commonly known as: WELLBUTRIN XL  Take 2 tablets (300 mg total) by mouth once daily.     clobetasol 0.05% 0.05 % Oint  Commonly known as: TEMOVATE  Apply topically 2 (two) times daily.     DUPIXENT  mg/2 mL Pnij  Generic drug: dupilumab  INJECT 300MG UNDER THE SKIN EVERY OTHER WEEK AS DIRECTED     gabapentin 300 MG capsule  Commonly known as: NEURONTIN  Take 300 mg by mouth 2 (two) times daily as needed.     lancets Misc  To check BG 4 times daily, to use with insurance preferred meter     loratadine 10 mg tablet  Commonly known as: CLARITIN  Take 10 mg by mouth daily as needed for Allergies.     * pen needle, diabetic 31 gauge x 3/16" Ndle  1 each.     * BD ULTRA-FINE MARY PEN NEEDLE 32 gauge x 5/32" Ndle  Generic drug: pen needle, diabetic  Use to inject insulin four times daily     prednisoLONE acetate 1 % Drps  Commonly known as: PRED FORTE  INSTILL 1 DROP 4 TIMES DAILY INTO LEFT EYE FOR 2 WEEKS, THEN 1 DROP 3 TIMES DAILY FOR 2 WEEKS, THEN 1 DROP TWICE DAILY FOR 2 WEEKS     sertraline 100 MG tablet  Commonly known as: ZOLOFT  Take 2 tablets (200 mg " total) by mouth once daily.     TOUJEO SOLOSTAR U-300 INSULIN 300 unit/mL (1.5 mL) Inpn pen  Generic drug: insulin glargine (TOUJEO)  Inject 50 Units into the skin once daily.     TRULICITY 3 mg/0.5 mL pen injector  Generic drug: dulaglutide  Inject 3 mg into the skin every 7 days. Fridays           * This list has 2 medication(s) that are the same as other medications prescribed for you. Read the directions carefully, and ask your doctor or other care provider to review them with you.                STOP taking these medications      chlorthalidone 25 MG Tab  Commonly known as: HYGROTEN     famotidine 20 MG tablet  Commonly known as: PEPCID     meclizine 25 mg tablet  Commonly known as: ANTIVERT     naproxen 500 MG tablet  Commonly known as: NAPROSYN              Indwelling Lines/Drains at time of discharge:   Lines/Drains/Airways       None                   Time spent on the discharge of patient: 45 minutes         Ara Noguera MD  Department of Hospital Medicine  [facility] - Cardiology Stepdown

## 2024-01-18 NOTE — PLAN OF CARE
OT eval complete. OT POC and goals established.   Problem: Occupational Therapy  Goal: Occupational Therapy Goal  Description: Goals to be met by: 2/17/24     Patient will increase functional independence with ADLs by performing:    UE Dressing with Modified Norton.  LE Dressing with Modified Norton.  Grooming while standing at sink with Modified Norton.  Toileting from toilet/bedside commode with Modified Norton for hygiene and clothing management.   Toilet transfer to toilet/bedside commode with Modified Norton.    Outcome: Ongoing, Progressing

## 2024-01-18 NOTE — PLAN OF CARE
"   01/18/24 1244   Final Note   Assessment Type Final Discharge Note   Anticipated Discharge Disposition Home   What phone number can be called within the next 1-3 days to see how you are doing after discharge? [phone]   Hospital Resources/Appts/Education Provided Provided patient/caregiver with written discharge plan information;Provided education on problems/symptoms using teachback   Post-Acute Status   Discharge Delays None known at this time     Patient to discharge home / self care . Per CHW she stated," she was informed the  patient needs to call to schedule own appointment for PCP follow up".     Diamond Banuelos RN    [phone]    Future Appointments   Date Time Provider Department Center   1/30/2024 11:00 AM Nimisha Barclay DNP [facility]       "

## 2024-01-18 NOTE — PLAN OF CARE
CHW attempted to make patient's hospital f/u with PCP. PCP office states patient must call to schedule appointment. Above information added to KENNY Benítez CHW  Case Management  [phone]

## 2024-01-18 NOTE — NURSING
Patient being discharged home, AVS given with all questions/concerns answered, IV/heart monitor removed, dc meds sent to [facility] and were delivered bedside, patient waiting for transport, will cont to [facility] for now.

## 2024-01-18 NOTE — PT/OT/SLP EVAL
Physical Therapy Evaluation    Patient Name:  Devika Bustillo   MRN:  [id number]    Recommendations:     Discharge Recommendations: Low Intensity Therapy   Discharge Equipment Recommendations: shower chair, rollator   Barriers to discharge: Decreased caregiver support    Assessment:     Devika Bustillo is a 63 y.o. female admitted with a medical diagnosis of Abnormal stress echo.  She presents with the following impairments/functional limitations: weakness, impaired endurance, impaired self care skills, impaired sensation, impaired functional mobility, gait instability, impaired balance, decreased safety awareness, impaired cardiopulmonary response to activity. Pt w/ good strength but w/ functional mobility limited 2/2 SOB and fatigue. She would greatly benefit from use of a rollator at home to allow her to continue with her daily tasks while being able to take seated rest breaks as needed. Orthostatics were taken (see below for numbers), she was symptomatic in standing but it improved with time. Pt is currently mobilizing well enough to safely return home w/ family upon d/c but will benefit from continued treatment here and upon d/c to address the above listed impairments in order to progress back to PLOF.     Orthostatics:  Supine- 123/58 Map 83  Sitting- 108/54 Map 75  Standing- 100/54 Map 74    Rehab Prognosis: Good; patient would benefit from acute skilled PT services to address these deficits and reach maximum level of function.    Recent Surgery: Procedure(s) (LRB):  Angiogram, Coronary, with Left Heart Cath (Left) 1 Day Post-Op    Plan:     During this hospitalization, patient to be seen 3 x/week to address the identified rehab impairments via gait training, therapeutic activities, therapeutic exercises and progress toward the following goals:    Plan of Care Expires:  02/18/24    Subjective     Chief Complaint: back pain, and SOB/fatigue w/ activity  Patient/Family Comments/goals: pt tearful over how difficult it is  for her to go about her day as she needs to constantly stop to lie down and rest 2/2 fatigue  Pain/Comfort:  Pain Rating 1: 7/10  Location - Orientation 1: generalized  Location 1: back  Pain Addressed 1: Reposition, Distraction  Pain Rating Post-Intervention 1: 7/10    Patients cultural, spiritual, Shinto conflicts given the current situation: no    Living Environment:  Pt lives alone in a H w/ 5 HONEY and RHR.  Prior to admission, patients level of function was independent w/ need for frequent rest breaks 2/2 SOB and fatigue.  Equipment used at home: cane, straight, walker, rolling.  DME owned (not currently used): none.  Upon discharge, patient will have assistance from grandson (he is 14yo so in school but able to come over and help occassionally).    Objective:     Communicated with RN prior to session.  Patient found HOB elevated with telemetry  upon PT entry to room.    General Precautions: Standard, fall  Orthopedic Precautions:N/A   Braces: N/A  Respiratory Status: Room air    Exams:  Cognitive Exam:  Patient is oriented to Person, Place, Time, and Situation  Sensation:    -       Impaired  light/touch BLE w/ dec sensation most prominently in RLL 2/2 knee replacement  RLE ROM: WFL  RLE Strength: WFL  LLE ROM: WFL  LLE Strength: WFL    Functional Mobility:  Bed Mobility:     Supine to Sit: independence  Transfers:     Sit to Stand:  stand by assistance with rolling walker  Toilet Transfer: stand by assistance with  rolling walker  using  Step Transfer  Gait: 96' w/ RW and SBA w/ forward flexed posture, dec step length, and dec bhavna; Gait training w/ verbal cueing for proper use of AD, step length, safety awareness, width of AMANDA, and proximity to AD   Balance: dynamic standing balance in bathroom supervision      AM-PAC 6 CLICK MOBILITY  Total Score:20       Treatment & Education:  Pt educated on PT POC/goals, d/c recs, and continued treatment. All questions answered and pt in agreement w/ POC.  Pt  assisted w/ performing sink bath in standing w/ seated rest breaks as needed    Patient left  in bathroom w/ OT  with all lines intact, RN notified, and OT present.    GOALS:   Multidisciplinary Problems       Physical Therapy Goals          Problem: Physical Therapy    Goal Priority Disciplines Outcome Goal Variances Interventions   Physical Therapy Goal     PT, PT/OT Ongoing, Progressing     Description: Goals to be completed by: 2/18/24    Pt will be able to stand up from EOB w/ modified independence using LRAD  Pt will ambulate 200 feet w/ modified independence using LRAD  Pt will be independent w/ HEP therex on BLE w/ good form and ROM   Pt will ascend/descend 5 stairs w/ R railing with modified independence                       History:     Past Medical History:   Diagnosis Date    Asthma     Back pain     Diabetes mellitus     Grade II diastolic dysfunction     Hyperlipidemia     Hypertension     Neuropathy     Obesity     Pulmonary embolism 1998    Pulmonary hypertension        Past Surgical History:   Procedure Laterality Date    CYST REMOVAL      HERNIA REPAIR      HYSTERECTOMY      TONSILLECTOMY      TUBAL LIGATION         Time Tracking:     PT Received On: 01/18/24  PT Start Time: 0750     PT Stop Time: 0837  PT Total Time (min): 47 min     Billable Minutes: Evaluation 9, Gait Training 15, and Therapeutic Activity 23 01/18/2024

## 2024-01-18 NOTE — PT/OT/SLP EVAL
Occupational Therapy   Partial Co-Evaluation  Partial Co-treatment performed due to patient's multiple deficits requiring two skilled therapists to appropriately and safely assess patient's strength and endurance while facilitating functional tasks in addition to accommodating for patient's activity tolerance.      Name: Devika Bustillo  MRN: [id number]  Admitting Diagnosis: Abnormal stress echo  Recent Surgery: Procedure(s) (LRB):  Angiogram, Coronary, with Left Heart Cath (Left) 1 Day Post-Op    Recommendations:     Discharge Recommendations: Low Intensity Therapy  Discharge Equipment Recommendations:  rollator, bath bench  Barriers to discharge:  None    Assessment:     Devika Bustillo is a 63 y.o. female with a medical diagnosis of Abnormal stress echo.  She presents with the following performance deficits affecting function: weakness, impaired endurance, impaired self care skills, impaired functional mobility, gait instability, impaired balance, pain, impaired cardiopulmonary response to activity, decreased safety awareness. Pt met sitting on toilet while completing grooming/hygiene with PT present. She was most limited by fatigue and SOB requiring frequent sitting rest breaks. Pt would continue to benefit from skilled OT services to maximize functional independence with ADLs and functional mobility, reduce caregiver burden, and facilitate safe discharge in the least restrictive environment.  Patient continues to demonstrate the need for low intensity therapy on a scheduled basis exhibited by decreased independence with self-care and functional mobility     Rehab Prognosis: Good; patient would benefit from acute skilled OT services to address these deficits and reach maximum level of function.       Plan:     Patient to be seen 3 x/week to address the above listed problems via self-care/home management, therapeutic activities, therapeutic exercises, neuromuscular re-education  Plan of Care Expires: 02/17/24  Plan of Care  Reviewed with: patient    Subjective     Chief Complaint: SOB  Patient/Family Comments/goals: to get better    Occupational Profile:  Living Environment: Pt lives alone in a H with 5 HONEY and (R) HR. Tub/shower combo  Previous level of function: Mod (I) using no DME for functional mobility. Pt requires increased time to complete all functional activities and functional mobility d/t SOB  Roles and Routines: enjoys spending time with family   Equipment Used at Home: cane, straight, walker, rolling  Assistance upon Discharge: daughter and 15 y/o son live nearby    Pain/Comfort:  Pain Rating 1: 7/10  Location - Orientation 1: generalized  Location 1: back  Pain Addressed 1: Reposition, Distraction  Pain Rating Post-Intervention 1: 7/10    Patients cultural, spiritual, Congregation conflicts given the current situation: no    Objective:     Communicated with: RN prior to session.  Patient found  sitting on toilet with PT present  with telemetry upon OT entry to room.    General Precautions: Standard, fall  Orthopedic Precautions: N/A  Braces: N/A  Respiratory Status: Room air    Occupational Performance:    Bed Mobility:    Not assessed-pt met sitting on toilet and left on bedside chair end of session    Functional Mobility/Transfers:  Patient completed Sit <> Stand Transfer with stand by assistance  with  rolling walker   Functional Mobility: Pt engaged in functional mobility to simulate household/community distances 14 ft with close SBA and RW in order to maximize functional endurance and standing balance required for engagement in occupations of choice   No LOB  SOB observed    Activities of Daily Living:  Grooming: stand by assistance to brush teeth and wash face in both standing at sink and sitting on toilet. Pt required 3 seated rest breaks d/t SOB  Upper Body Dressing: stand by assistance to don hospital gown in standing    Cognitive/Visual Perceptual:  Cognitive/Psychosocial Skills:     -       Oriented to:  Person, Place, Time, and Situation   -       Follows Commands/attention:Follows multistep  commands  -       Safety awareness/insight to disability: impaired   -       Mood/Affect/Coping skills/emotional control: Cooperative and Pleasant    Physical Exam:  Sensation:    -       Intact  Upper Extremity Range of Motion:     -       Right Upper Extremity: WFL  -       Left Upper Extremity: WFL  Upper Extremity Strength:    -       Right Upper Extremity: WFL  -       Left Upper Extremity: WFL    AMPAC 6 Click ADL:  AMPAC Total Score: 19    Treatment & Education:  Provided education regarding role of OT, POC, & discharge recommendations with pt verbalizing understanding.  Pt had no further questions & when asked whether there were any concerns pt reported none.     Patient left up in chair with all lines intact, call button in reach, and RN present    GOALS:   Multidisciplinary Problems       Occupational Therapy Goals          Problem: Occupational Therapy    Goal Priority Disciplines Outcome Interventions   Occupational Therapy Goal     OT, PT/OT Ongoing, Progressing    Description: Goals to be met by: 2/17/24     Patient will increase functional independence with ADLs by performing:    UE Dressing with Modified [place].  LE Dressing with Modified [place].  Grooming while standing at sink with Modified [place].  Toileting from toilet/bedside commode with Modified [place] for hygiene and clothing management.   Toilet transfer to toilet/bedside commode with Modified [place].                         History:     Past Medical History:   Diagnosis Date    Asthma     Back pain     Diabetes mellitus     Grade II diastolic dysfunction     Hyperlipidemia     Hypertension     Neuropathy     Obesity     Pulmonary embolism 1998    Pulmonary hypertension          Past Surgical History:   Procedure Laterality Date    ANGIOGRAM, CORONARY, WITH LEFT HEART CATHETERIZATION Left 1/17/2024    Procedure: Angiogram,  Coronary, with Left Heart Cath;  Surgeon: Joe Marr MD;  Location: [facility] CATH LAB;  Service: Cardiology;  Laterality: Left;    CYST REMOVAL      HERNIA REPAIR      HYSTERECTOMY      TONSILLECTOMY      TUBAL LIGATION         Time Tracking:     OT Date of Treatment: 01/18/24  OT Start Time: 0829  OT Stop Time: 0847  OT Total Time (min): 18 min    Billable Minutes:Evaluation 8  Self Care/Home Management 10    1/18/2024

## 2024-01-18 NOTE — PLAN OF CARE
PT Eval complete, appropriate goals created    Problem: Physical Therapy  Goal: Physical Therapy Goal  Description: Goals to be completed by: 2/18/24    Pt will be able to stand up from EOB w/ modified independence using LRAD  Pt will ambulate 200 feet w/ modified independence using LRAD  Pt will be independent w/ HEP therex on BLE w/ good form and ROM   Pt will ascend/descend 5 stairs w/ R railing with modified independence  Outcome: Ongoing, Progressing

## 2024-01-30 ENCOUNTER — PATIENT MESSAGE (OUTPATIENT)
Dept: PSYCHOLOGY | Facility: CLINIC | Age: 64
End: 2024-01-30
Payer: MEDICAID

## 2024-01-30 ENCOUNTER — OFFICE VISIT (OUTPATIENT)
Dept: PSYCHOLOGY | Facility: CLINIC | Age: 64
End: 2024-01-30
Payer: MEDICAID

## 2024-01-30 VITALS — TEMPERATURE: 98 F | OXYGEN SATURATION: 100 % | SYSTOLIC BLOOD PRESSURE: 137 MMHG | DIASTOLIC BLOOD PRESSURE: 67 MMHG

## 2024-01-30 DIAGNOSIS — G47.00 INSOMNIA, UNSPECIFIED TYPE: ICD-10-CM

## 2024-01-30 DIAGNOSIS — F33.2 SEVERE EPISODE OF RECURRENT MAJOR DEPRESSIVE DISORDER, WITHOUT PSYCHOTIC FEATURES: ICD-10-CM

## 2024-01-30 DIAGNOSIS — F41.1 GENERALIZED ANXIETY DISORDER: ICD-10-CM

## 2024-01-30 PROCEDURE — 99213 OFFICE O/P EST LOW 20 MIN: CPT | Mod: PBBFAC,PN | Performed by: NURSE PRACTITIONER

## 2024-01-30 PROCEDURE — 3078F DIAST BP <80 MM HG: CPT | Mod: SA,HB,CPTII, | Performed by: NURSE PRACTITIONER

## 2024-01-30 PROCEDURE — 3075F SYST BP GE 130 - 139MM HG: CPT | Mod: SA,HB,CPTII, | Performed by: NURSE PRACTITIONER

## 2024-01-30 PROCEDURE — 3051F HG A1C>EQUAL 7.0%<8.0%: CPT | Mod: SA,HB,CPTII, | Performed by: NURSE PRACTITIONER

## 2024-01-30 PROCEDURE — 1159F MED LIST DOCD IN RCRD: CPT | Mod: SA,HB,CPTII, | Performed by: NURSE PRACTITIONER

## 2024-01-30 PROCEDURE — 1111F DSCHRG MED/CURRENT MED MERGE: CPT | Mod: SA,HB,CPTII, | Performed by: NURSE PRACTITIONER

## 2024-01-30 PROCEDURE — 4010F ACE/ARB THERAPY RXD/TAKEN: CPT | Mod: SA,HB,CPTII, | Performed by: NURSE PRACTITIONER

## 2024-01-30 PROCEDURE — 99999 PR PBB SHADOW E&M-EST. PATIENT-LVL III: CPT | Mod: PBBFAC,SA,HB, | Performed by: NURSE PRACTITIONER

## 2024-01-30 PROCEDURE — 99215 OFFICE O/P EST HI 40 MIN: CPT | Mod: S$PBB,SA,HB, | Performed by: NURSE PRACTITIONER

## 2024-01-30 RX ORDER — SERTRALINE HYDROCHLORIDE 100 MG/1
200 TABLET, FILM COATED ORAL DAILY
Qty: 60 TABLET | Refills: 2 | Status: SHIPPED | OUTPATIENT
Start: 2024-01-30 | End: 2024-04-18 | Stop reason: SDUPTHER

## 2024-01-30 RX ORDER — BUPROPION HYDROCHLORIDE 150 MG/1
150 TABLET ORAL DAILY
Qty: 30 TABLET | Refills: 2 | Status: SHIPPED | OUTPATIENT
Start: 2024-01-30 | End: 2024-04-18 | Stop reason: SDUPTHER

## 2024-01-30 RX ORDER — TRAZODONE HYDROCHLORIDE 100 MG/1
100 TABLET ORAL NIGHTLY
Qty: 30 TABLET | Refills: 2 | Status: SHIPPED | OUTPATIENT
Start: 2024-01-30 | End: 2024-04-18 | Stop reason: SDUPTHER

## 2024-01-30 RX ORDER — ARIPIPRAZOLE 2 MG/1
2 TABLET ORAL DAILY
Qty: 30 TABLET | Refills: 1 | Status: SHIPPED | OUTPATIENT
Start: 2024-01-30 | End: 2024-04-18 | Stop reason: SDUPTHER

## 2024-01-30 NOTE — PROGRESS NOTES
"Outpatient Psychiatry Follow-Up Visit ([facility])    01/30/2024    Clinical Status of Patient:  Outpatient (Ambulatory)    Chief Complaint:  Devika Bustillo is a 63 y.o. female who presents today for follow-up of depression and anxiety.  Met with patient.     Current Medications:   Zoloft 200 mg Daily   Wellbutrin 300 mg Daily  Trazodone 50 mg Daily  Hydroxyzine 50 mg TID for itching     Past Medication Trials:  unable to remember medications  Seroquel- did not like the way it made her feel    Interval History and Content of Current Session:  Patient seen and chart reviewed. Last seen on 01/2/24    Patient reports she had some moments of sadness and anxiety, with aggravation over the past month. she feels this comes dealing with medical professionals having different opinions on her care, she reports being in and out the hospital this past month due to cardiac issues. She reports falling again in her home. She was now provided with a walker that has a seat and a shower chair. She is now only working 2 days / week this month but reports next month she plans to stop completely.     She reports she's been able to clean her home but not making up her bed due to fatigue.    At times she reports forgetting to take her the trazodone night dose, when she does remember she is able to sleep 4-5 hours at a time. Reports feeling almost back to her normal self but knows she is not fully there. Reports this might be mostly because of her ongoing health concerns. She continues to pray and have carlita in God.     Pt appears:  Appropriate attire and affect    Mood:  Depressed, down, tearful    Sleep:  "Way better" reports 4-5 hrs hrs/night    Appetite:  normal    Self Rates Depression: 3/10  Self Rated Anxiety:  1-8 /10      Psychotherapy:  Target symptoms: depression, recurrent depression, anxiety , grief  Why chosen therapy is appropriate versus another modality: relevant to diagnosis  Outcome monitoring methods: self-report, " observation  Therapeutic intervention type: supportive psychotherapy  Topics discussed/themes: parenting issues, illness/death of a loved one, symptom recognition, financial stressors  The patient's response to the intervention is motivated. The patient's progress toward treatment goals is poor.   Duration of intervention: 20 minutes.    Review of Systems   PSYCHIATRIC: Pertinant items are noted in the narrative.        Past Medical, Family and Social History: The patient's past medical, family and social history have been reviewed and updated as appropriate within the electronic medical record - see encounter notes.    Compliance: yes    Side effects: None    Risk Parameters:  Patient reports no suicidal ideation  Patient reports no homicidal ideation  Patient reports no self-injurious behavior  Patient reports no violent behavior    Exam (detailed: at least 9 elements; comprehensive: all 15 elements)   Constitutional  Vitals:  Most recent vital signs, dated less than 90 days prior to this appointment, were reviewed.   Vitals:    01/30/24 1059   BP: 137/67   Temp: 98.2 °F (36.8 °C)   SpO2: 100%          General:  unremarkable, age appropriate     Musculoskeletal  Muscle Strength/Tone:  no spasicity, no rigidity, no cogwheeling, no flaccidity, no paratonia, no dyskinesia, no dystonia, no tremor, no tic, no choreoathetosis, no atrophy   Gait & Station:  unsteady, slow     Psychiatric  Speech:  no latency; no press   Mood & Affect:  depressed, sad  congruent and appropriate, sad, tearful   Thought Process:  normal and logical   Associations:  intact   Thought Content:  normal, no suicidality, no homicidality, delusions, or paranoia   Insight:  intact, has awareness of illness   Judgement: behavior is adequate to circumstances, age appropriate   Orientation:  grossly intact, person, place, situation, time/date, day of week, month of year, year   Memory: intact for content of interview, grossly intact   Language:  grossly intact, able to name, able to repeat   Attention Span & Concentration:  able to focus, completed tasks   Fund of Knowledge:  intact and appropriate to age and level of education, familiar with aspects of current personal life     Assessment and Diagnosis   Status/Progress: Based on the examination today, the patient's problem(s) is/are improved and inadequately controlled.  New problems have not been presented today.   Co-morbidities, Diagnostic uncertainty, and Lack of compliance are not complicating management of the primary condition.  There are no active rule-out diagnoses for this patient at this time.     General Impression: Devika Bustillo, a 63 y.o. female, for follow up of MDD and Anxiety with 2 previous suicide attempts.  Presents 9/20/23- Patient was started on Zoloft 50 mg 2 weeks ago by PCP, has notived some improvement in symptoms. Will increase to 100 mg in 2 more weeks. Start Trazodone 50 mg for sleep.  Presents 11/22/23- Increase Zoloft to 150 mg, Start Wellbutrin 150 mg.   Presents 1/2/23 - Some improvement in Anxiety and Depression, increase Zoloft to 200 mg Daily increase Wellbutrin to 300 mg Daily.  Presents 1/30/24- Patients renal function worsening as seen by recent labs, decrease wellbutrin to 150 mg Daily, start Abilify 2 mg for adjunct therapy.       ICD-10-CM ICD-9-CM    1. Severe episode of recurrent major depressive disorder, without psychotic features  F33.2 296.33 sertraline (ZOLOFT) 100 MG tablet      buPROPion (WELLBUTRIN XL) 150 MG TB24 tablet      ARIPiprazole (ABILIFY) 2 MG Tab      2. Generalized anxiety disorder  F41.1 300.02 sertraline (ZOLOFT) 100 MG tablet      3. Insomnia, unspecified type  G47.00 780.52 traZODone (DESYREL) 100 MG tablet            Intervention/Counseling/Treatment Plan   Medication Management: The risks and benefits of medication were discussed with the patient.  Increase Zoloft to 200 mg for depression and anxiety  decrease Wellbutrin to 150 mg for  depression  Increase taking Trazodone 100 mg at bedtime   Start Abilify 2 mg Daily   Discussed diagnosis, risk and benefits of proposed treatment above vs alternative treatment vs no treatment, and potential side effects of these treatments, and the inherent unpredictability of individual responses to these treatments. The patient expresses understanding and gives informed consent to pursue treatment at this time, believing that the potential benefits outweigh the potential risks. Patient has no other questions. Risks/adverse effects at this time include but are not limited to: GI side effects, sexual dysfunction, activation vs sedation, triggering of suicidal ideation, and serotonin syndrome.   Patient voices understanding and agreement with this plan  Provided crisis numbers  Encouraged patient to keep future appointments  Instruct patient to call or message with questions  In the event of an emergency, including suicidal ideation, patient was advised to go to the emergency room      Return to Clinic: 1 month    Total time: 40 Minutes       This includes face to face time and non-face to face time preparing to see the patient (eg, review of tests), obtaining and/or reviewing separately obtained history, documenting clinical information in the electronic or other health record, independently interpreting results and communicating results to the patient/family/caregiver, or care coordinator.        Nimisha Barclay DNP, PMHNP, FNP

## 2024-03-21 ENCOUNTER — HOSPITAL ENCOUNTER (OUTPATIENT)
Dept: RADIOLOGY | Facility: HOSPITAL | Age: 64
Discharge: HOME | End: 2024-03-21
Attending: NURSE PRACTITIONER
Payer: MEDICAID

## 2024-03-21 DIAGNOSIS — Z12.31 BREAST CANCER SCREENING BY MAMMOGRAM: ICD-10-CM

## 2024-03-21 PROCEDURE — 77063 BREAST TOMOSYNTHESIS BI: CPT | Mod: 26,,, | Performed by: RADIOLOGY

## 2024-03-21 PROCEDURE — 77067 SCR MAMMO BI INCL CAD: CPT | Mod: 26,,, | Performed by: RADIOLOGY

## 2024-03-21 PROCEDURE — 77067 SCR MAMMO BI INCL CAD: CPT | Mod: TC

## 2024-04-18 ENCOUNTER — TELEPHONE (OUTPATIENT)
Dept: PSYCHIATRY | Facility: CLINIC | Age: 64
End: 2024-04-18
Payer: MEDICAID

## 2024-04-18 ENCOUNTER — OFFICE VISIT (OUTPATIENT)
Dept: PSYCHIATRY | Facility: CLINIC | Age: 64
End: 2024-04-18
Payer: MEDICAID

## 2024-04-18 ENCOUNTER — PATIENT MESSAGE (OUTPATIENT)
Dept: PSYCHIATRY | Facility: CLINIC | Age: 64
End: 2024-04-18
Payer: MEDICAID

## 2024-04-18 VITALS
SYSTOLIC BLOOD PRESSURE: 131 MMHG | BODY MASS INDEX: 36.37 KG/M2 | HEART RATE: 66 BPM | WEIGHT: 239.19 LBS | DIASTOLIC BLOOD PRESSURE: 59 MMHG

## 2024-04-18 DIAGNOSIS — F33.2 SEVERE EPISODE OF RECURRENT MAJOR DEPRESSIVE DISORDER, WITHOUT PSYCHOTIC FEATURES: Primary | ICD-10-CM

## 2024-04-18 DIAGNOSIS — F41.1 GENERALIZED ANXIETY DISORDER: ICD-10-CM

## 2024-04-18 DIAGNOSIS — G47.00 INSOMNIA, UNSPECIFIED TYPE: ICD-10-CM

## 2024-04-18 PROCEDURE — 3008F BODY MASS INDEX DOCD: CPT | Mod: SA,HB,CPTII, | Performed by: NURSE PRACTITIONER

## 2024-04-18 PROCEDURE — 99215 OFFICE O/P EST HI 40 MIN: CPT | Mod: S$PBB,SA,HB, | Performed by: NURSE PRACTITIONER

## 2024-04-18 PROCEDURE — 99211 OFF/OP EST MAY X REQ PHY/QHP: CPT | Mod: PBBFAC | Performed by: NURSE PRACTITIONER

## 2024-04-18 PROCEDURE — 3051F HG A1C>EQUAL 7.0%<8.0%: CPT | Mod: SA,HB,CPTII, | Performed by: NURSE PRACTITIONER

## 2024-04-18 PROCEDURE — 90833 PSYTX W PT W E/M 30 MIN: CPT | Mod: S$PBB,SA,HB, | Performed by: NURSE PRACTITIONER

## 2024-04-18 PROCEDURE — 4010F ACE/ARB THERAPY RXD/TAKEN: CPT | Mod: SA,HB,CPTII, | Performed by: NURSE PRACTITIONER

## 2024-04-18 PROCEDURE — 3075F SYST BP GE 130 - 139MM HG: CPT | Mod: SA,HB,CPTII, | Performed by: NURSE PRACTITIONER

## 2024-04-18 PROCEDURE — 99999 PR PBB SHADOW E&M-EST. PATIENT-LVL I: CPT | Mod: PBBFAC,SA,HB, | Performed by: NURSE PRACTITIONER

## 2024-04-18 PROCEDURE — 3078F DIAST BP <80 MM HG: CPT | Mod: SA,HB,CPTII, | Performed by: NURSE PRACTITIONER

## 2024-04-18 RX ORDER — BUPROPION HYDROCHLORIDE 150 MG/1
150 TABLET ORAL DAILY
Qty: 30 TABLET | Refills: 1 | Status: SHIPPED | OUTPATIENT
Start: 2024-04-18 | End: 2024-05-31

## 2024-04-18 RX ORDER — ARIPIPRAZOLE 5 MG/1
5 TABLET ORAL DAILY
Qty: 30 TABLET | Refills: 1 | Status: SHIPPED | OUTPATIENT
Start: 2024-04-18 | End: 2024-06-17

## 2024-04-18 RX ORDER — TOPIRAMATE 100 MG/1
50 TABLET, FILM COATED ORAL 2 TIMES DAILY
COMMUNITY

## 2024-04-18 RX ORDER — SERTRALINE HYDROCHLORIDE 100 MG/1
200 TABLET, FILM COATED ORAL DAILY
Qty: 60 TABLET | Refills: 1 | Status: SHIPPED | OUTPATIENT
Start: 2024-04-18 | End: 2024-06-17

## 2024-04-18 RX ORDER — TRAZODONE HYDROCHLORIDE 100 MG/1
100 TABLET ORAL NIGHTLY
Qty: 30 TABLET | Refills: 1 | Status: SHIPPED | OUTPATIENT
Start: 2024-04-18 | End: 2024-06-17

## 2024-04-18 NOTE — PROGRESS NOTES
Outpatient Psychiatry Follow-Up Visit ([facility])    04/18/2024    Clinical Status of Patient:  Outpatient (Ambulatory)    Chief Complaint:  Devika Bustillo is a 63 y.o. female who presents today for follow-up of depression and anxiety.  Met with patient.     Current Medications:   Zoloft 200 mg Daily   Wellbutrin 150 mg Daily  Trazodone 100 mg Daily  Hydroxyzine 50 mg TID for itching   Abilify 2 mg Daily  Topamax 50 mg BID PRN for headaches by neurologist  Gabapentin 300 mg BID prescribed by PCP Shelley Caldera      Past Medication Trials:  unable to remember medications  Seroquel- did not like the way it made her feel    Interval History and Content of Current Session:  Patient seen and chart reviewed. Last seen on 01/30/24    Changes at last visit:  Increase Zoloft to 200 mg for depression and anxiety  Decrease Wellbutrin to 150 mg for depression due to GFR and CR level  Increase taking Trazodone 100 mg at bedtime   Start Abilify 2 mg Daily     Patient reports getting an eye surgey 2 weeks ago, she presents with an eye patch on her left eye and had to have transportation to today's visit.    Reports feeling severely depressed and getting worse since even before having her eye surgery. Reports decreased appetite, only eating one meal/day, usually bread or canned goods, reports lower energy, and has been unable to cook. Reports not bathing for 2 days at a time, not being able to clean her home, feeling exhausted, having chronic pain in her back and fatigued by her doctors appointments. Admits to having thoughts of SI. Patient reports having a dream of committing suicide but insistas that she is trying to convince herself that she loves her life and her family and would never want to hurt them. Reports her sister is also going through a difficult time as well and talking about committing suicide which makes her not want to discuss her own depression with family members she is still not wanting to tell her daughter or son  "that she is struggling. Patient was spoken to in depth regarding the importance of speaking to her family for support, discussed the possibility of hospitalization if symptoms continue to worsen. She stated she would be okay with this provider calling her family to inform them how she is feeling.      Pt appears:  Appropriate attire and affect    Mood:  Worsening Depressed, down, tearful    Sleep:  "Much worse" reports 7 hrs hrs/night, wakes 1-2 in the middle of the night from 3-4   AM, has not icreased trazedone yet.     Appetite:  Diminished, 1 meal/day    Self Rates Depression: 10/10  Self Rated Anxiety:  8/10      Psychotherapy:  Target symptoms: depression, recurrent depression, anxiety , grief  Why chosen therapy is appropriate versus another modality: relevant to diagnosis  Outcome monitoring methods: self-report, observation  Therapeutic intervention type: supportive psychotherapy  Topics discussed/themes: parenting issues, illness/death of a loved one, symptom recognition, financial stressors  The patient's response to the intervention is motivated. The patient's progress toward treatment goals is poor.   Duration of intervention: 40 minutes.    Review of Systems   PSYCHIATRIC: Pertinant items are noted in the narrative.        Past Medical, Family and Social History: The patient's past medical, family and social history have been reviewed and updated as appropriate within the electronic medical record - see encounter notes.    Compliance: yes    Side effects: None    Risk Parameters:  Patient reports no suicidal ideation  Patient reports no homicidal ideation  Patient reports no self-injurious behavior  Patient reports no violent behavior    Exam (detailed: at least 9 elements; comprehensive: all 15 elements)   Constitutional  Vitals:  Most recent vital signs, dated less than 90 days prior to this appointment, were reviewed.   Vitals:    04/18/24 1507   BP: (!) 131/59   Pulse: 66   Weight: 108.5 kg (239 lb " 3.2 oz)            General:  unremarkable, age appropriate     Musculoskeletal  Muscle Strength/Tone:  no spasicity, no rigidity, no cogwheeling, no flaccidity, no paratonia, no dyskinesia, no dystonia, no tremor, no tic, no choreoathetosis, no atrophy   Gait & Station:  unsteady, slow     Psychiatric  Speech:  no latency; no press   Mood & Affect:  depressed, sad  congruent and appropriate, sad, tearful   Thought Process:  normal and logical   Associations:  intact   Thought Content:  normal, no suicidality, no homicidality, delusions, or paranoia   Insight:  intact, has awareness of illness   Judgement: behavior is adequate to circumstances, age appropriate   Orientation:  grossly intact, person, place, situation, time/date, day of week, month of year, year   Memory: intact for content of interview, grossly intact   Language: grossly intact, able to name, able to repeat   Attention Span & Concentration:  able to focus, completed tasks   Fund of Knowledge:  intact and appropriate to age and level of education, familiar with aspects of current personal life     Assessment and Diagnosis   Status/Progress: Based on the examination today, the patient's problem(s) is/are improved and inadequately controlled.  New problems have not been presented today.   Co-morbidities, Diagnostic uncertainty, and Lack of compliance are not complicating management of the primary condition.  There are no active rule-out diagnoses for this patient at this time.     General Impression: Devika Bustillo, a 63 y.o. female, for follow up of MDD and Anxiety with 2 previous suicide attempts.  Presents 9/20/23- Patient was started on Zoloft 50 mg 2 weeks ago by PCP, has notived some improvement in symptoms. Will increase to 100 mg in 2 more weeks. Start Trazodone 50 mg for sleep.  Presents 11/22/23- Increase Zoloft to 150 mg, Start Wellbutrin 150 mg.   Presents 1/2/23 - Some improvement in Anxiety and Depression, increase Zoloft to 200 mg Daily  increase Wellbutrin to 300 mg Daily.  Presents 1/30/24- Patients renal function worsening as seen by recent labs, decrease wellbutrin to 150 mg Daily, start Abilify 2 mg for adjunct therapy.   Presents 4/18/24- Increase Abilify 5 mg Daily, Speak with patients family members regarding mental health. At next visit, consider switching to different SSRI.       ICD-10-CM ICD-9-CM    1. Insomnia, unspecified type  G47.00 780.52       2. Severe episode of recurrent major depressive disorder, without psychotic features  F33.2 296.33       3. Generalized anxiety disorder  F41.1 300.02         Intervention/Counseling/Treatment Plan   Medication Management: The risks and benefits of medication were discussed with the patient.  Continue Zoloft to 200 mg (take two 100 mg tablets daily) for depression and anxiety  Continue Wellbutrin to 150 mg for depression  Increase taking Trazodone 100 mg at bedtime ( two 50 mg tablets OR, one 100 mg Tablet)  Increase Abilify to 5 mg Daily (one 5 mg tablet)  Discussed diagnosis, risk and benefits of proposed treatment above vs alternative treatment vs no treatment, and potential side effects of these treatments, and the inherent unpredictability of individual responses to these treatments. The patient expresses understanding and gives informed consent to pursue treatment at this time, believing that the potential benefits outweigh the potential risks. Patient has no other questions. Risks/adverse effects at this time include but are not limited to: GI side effects, sexual dysfunction, activation vs sedation, triggering of suicidal ideation, and serotonin syndrome.   Patient voices understanding and agreement with this plan  Provided crisis numbers  Encouraged patient to keep future appointments  Instruct patient to call or message with questions  In the event of an emergency, including suicidal ideation, patient was advised to go to the emergency room      Return to Clinic: 1 month    Total  time: 40 Minutes       This includes face to face time and non-face to face time preparing to see the patient (eg, review of tests), obtaining and/or reviewing separately obtained history, documenting clinical information in the electronic or other health record, independently interpreting results and communicating results to the patient/family/caregiver, or care coordinator.        Nimisha Barclay DNP, PMHNP, FNP

## 2024-05-06 NOTE — ED NOTES
Covid & flu test in process.    History Of Present Illness  Sly is accompanied his mother. He is currently 15 lbs and gaining weight well. He is tolerating g-tube feeds. He is also receiving feeding therapy. No PO foods have been attempted. No ENT complains. he is gaining weight well. no other complaints            2022:   This is a 6 month old male, accompanied by their mother, who is presenting to [facility] for a follow up of dysphagia and aspirations. Since last visit, mom reports he has been tolerating his NG tube well, but is not gaining weight. He has a strict NPO right now due to aspiration. Patient is scheduled for a CT scan on 2022 for cranisynostosis      2022:  SLY is a 5 month old male, accompanied by his mother, who presents for [facility] for a follow up for aspiration and dysphagia. Patient was recently seen in the ER on 2022 for a transient cyanotic episode. He was then also seen in the ER on 2022 for difficulties breathing. He was diagnosed with pneumonia yesterday due to congestion. Patient was prescribed amoxicillin. Mom reports he is not doing well with the NG-tube. He keeps pulling it out. Patient is still having signs of aspirations when taking liquids by mouth. He continues to see feeding therapy without significant improvement. She is interested in moving forward with the G-tube. He recently saw craniofacial who recommended a CT stand given concerns for craniostenosis.      2022:  SLY is a 4 month old male, accompanied by his parents, presenting as a new patient in [facility] today for aspiration and dysphagia. Mother was induced and the patient was born at 36 weeks. He presented with feeding difficulties at birth, along with noisy breathing. A swallow study was conducted on 2022. He receives feedings strictly via NG tube. Mom reports he is supposed to be getting 5 mL, 1-2x a day, by mouth but she is not doing this as she is worried with has silent aspirations. He passed  his hearing screening at birth. Mom reports he was developmentally delayed as he was missing mile stones, but most recently he has started showing signs of progression. He just started holding his head up, grasping items, smiling, giggling, and gaining weight. Patient had covid in 2022. Mom notes this is the first and only time he has ever cried since birth. He will be evaluated by the neurologist on 2022. He was referred to genetics, but an appointment has not been scheduled yet.      Past Medical History  He has a past medical history of Acute conjunctivitis (2023), Acute upper respiratory infection, unspecified (2022), Adenovirus infection (2023), Aspiration of liquid (2023), Chest congestion (2023), Dehydration (2023), Diarrhea in pediatric patient (2023), Failure to gain weight in  (2023), Fever (2023), Gastroenteritis, acute (2023), Gastroesophageal reflux disease without esophagitis (2022), Head lag in the  (2023), Influenza A (2023), Other conditions influencing health status (2022), Personal history of other (corrected) conditions arising in the  period (2022), Personal history of other specified conditions (2022), Poor feeding of  (2023), Poor weight gain in infant (2023), and Toxic erythema (2022).    Surgical History  He has a past surgical history that includes Other surgical history (2022) and Gastrostomy tube placement.     Social History  He has no history on file for tobacco use, alcohol use, and drug use.    Family History  Family History   Problem Relation Name Age of Onset    Other (esotropia accommodative) Mother      No Known Problems Father          Allergies  Patient has no known allergies.    Review of Systems   All other systems reviewed and are negative.       Physical Exam  General Appearance: Well appearing infant, no dysmorphic  features.     Ears: Right ear: Pinna is normal without scars or lesions. External auditory canal is normal without erythema or obstruction. Tympanic membrane mobile per pneumatic otoscopy, pearly grey, with clear landmarks. Clear fluid. Left ear: Pinna is normal without scars or lesions. External auditory canal is normal without erythema or obstruction. Tympanic membrane mobile per pneumatic otoscopy, pearly grey, with clear landmarks. Infected fluid.     Nose: External appearance is normal. Septum is midline. Nose congested. Inferior turbinates are normal. NG tube in left nare.     Oral Cavity/Oropharynx: Lips and gums are normal. Oral mucosa is normal. Tonsils are 1+.      Airway: mild stridor, no stertor. Strong cry.      Head and Face: Skin over the face is normal with no scars or lesions. plagiocephaly     Neck: Symmetrical, trachea midline. Thyroid: Symmetrical, no enlargement, no tenderness, no nodules.      Lymphatic: No palpable lymph node enlargement, no submandibular adenopathy, no anterior cervical adenopathy, no supraclavicular adenopathy.      Eyes are normal appearing.      Neuro: Facial strength: Normal strength and symmetry, no synkinesis or facial tic.      Last Recorded Vitals  There were no vitals taken for this visit.    Relevant Results        Scheduled medications    Continuous medications    PRN medications       Assessment/Plan   Principal Problem:    Recurrent acute serous otitis media of both ears      Plan is for BMT. The risks/benefits were discussed and parents wish to proceed.          Pranay Brooks DO

## 2024-05-31 DIAGNOSIS — F33.2 SEVERE EPISODE OF RECURRENT MAJOR DEPRESSIVE DISORDER, WITHOUT PSYCHOTIC FEATURES: ICD-10-CM

## 2024-05-31 RX ORDER — BUPROPION HYDROCHLORIDE 300 MG/1
300 TABLET ORAL DAILY
Qty: 90 TABLET | Refills: 0 | Status: SHIPPED | OUTPATIENT
Start: 2024-05-31 | End: 2024-08-29

## 2024-08-15 ENCOUNTER — OFFICE VISIT (OUTPATIENT)
Dept: PSYCHIATRY | Facility: CLINIC | Age: 64
End: 2024-08-15
Payer: MEDICAID

## 2024-08-15 ENCOUNTER — PATIENT MESSAGE (OUTPATIENT)
Dept: PSYCHIATRY | Facility: CLINIC | Age: 64
End: 2024-08-15
Payer: MEDICAID

## 2024-08-15 VITALS
WEIGHT: 233.44 LBS | DIASTOLIC BLOOD PRESSURE: 56 MMHG | HEART RATE: 58 BPM | BODY MASS INDEX: 35.5 KG/M2 | SYSTOLIC BLOOD PRESSURE: 121 MMHG

## 2024-08-15 DIAGNOSIS — G47.00 INSOMNIA, UNSPECIFIED TYPE: Primary | ICD-10-CM

## 2024-08-15 DIAGNOSIS — F41.1 GENERALIZED ANXIETY DISORDER: ICD-10-CM

## 2024-08-15 DIAGNOSIS — F33.2 SEVERE EPISODE OF RECURRENT MAJOR DEPRESSIVE DISORDER, WITHOUT PSYCHOTIC FEATURES: ICD-10-CM

## 2024-08-15 PROCEDURE — 99211 OFF/OP EST MAY X REQ PHY/QHP: CPT | Mod: PBBFAC | Performed by: NURSE PRACTITIONER

## 2024-08-15 PROCEDURE — 99999 PR PBB SHADOW E&M-EST. PATIENT-LVL I: CPT | Mod: PBBFAC,SA,HB, | Performed by: NURSE PRACTITIONER

## 2024-08-15 RX ORDER — BUPROPION HYDROCHLORIDE 150 MG/1
150 TABLET ORAL DAILY
Qty: 30 TABLET | Refills: 1 | Status: SHIPPED | OUTPATIENT
Start: 2024-08-15 | End: 2024-10-14

## 2024-08-15 RX ORDER — ARIPIPRAZOLE 10 MG/1
10 TABLET ORAL DAILY
Qty: 30 TABLET | Refills: 1 | Status: SHIPPED | OUTPATIENT
Start: 2024-08-15 | End: 2024-10-14

## 2024-08-15 RX ORDER — ESCITALOPRAM OXALATE 10 MG/1
10 TABLET ORAL DAILY
Qty: 30 TABLET | Refills: 1 | Status: SHIPPED | OUTPATIENT
Start: 2024-08-15 | End: 2024-10-14

## 2024-08-15 RX ORDER — TRAZODONE HYDROCHLORIDE 100 MG/1
100 TABLET ORAL NIGHTLY
Qty: 90 TABLET | Refills: 0 | Status: SHIPPED | OUTPATIENT
Start: 2024-08-15 | End: 2024-11-13

## 2024-08-15 NOTE — PROGRESS NOTES
"Outpatient Psychiatry Follow-Up Visit ([facility])    08/15/2024    Clinical Status of Patient:  Outpatient (Ambulatory)    Chief Complaint:  Devika Bustillo is a 64 y.o. female who presents today for follow-up of depression and anxiety.  Met with patient.     Current Medications:   Zoloft 200 mg Daily   Wellbutrin 150 mg Daily  Trazodone 100 mg Daily  Hydroxyzine 50 mg TID for itching   Abilify 2 mg Daily  Topamax 50 mg BID PRN for headaches by neurologist  Gabapentin 300 mg BID prescribed by PCP Shelley Caldera    Past Medication Trials:  unable to remember medications  Seroquel- did not like the way it made her feel    Interval History and Content of Current Session:  Patient seen and chart reviewed. Last seen on 04/18/24    Changes at last visit:  Continue Zoloft to 200 mg (take two 100 mg tablets daily) for depression and anxiety  Continue Wellbutrin to 150 mg for depression  Increase taking Trazodone 100 mg at bedtime ( two 50 mg tablets OR, one 100 mg Tablet)  Increase Abilify to 5 mg Daily (one 5 mg tablet)    She admits to missing/forgetting her medication multiple times each week.  Patient reports feeling more mellow and stable with the increase in Abilify to 5 mg. She had begun sleeping better then regressed. Reports after the last visit telling her children she was suicidal and severely depressed however they did not answer the phone with this provider to discuss a plan.  Pt found of her daughter has stomach cancer about 1 month ago. "I'm feeling stable and keeping it all in, even though my daughter is going through this I'm focusing on her". States she's tired, and its hard to see her daughter this way but it has motivated her to try and feel better in order to take care of her. Reports his son has been off drugs and is now helping her with money. She stays in the hospital with her daughter 6 times/ week, sleep is interrupted due to nurses coming in the room.     Pt appears:  Appropriate attire and " affect    Mood:  Depressed, motivated    Sleep:  Worse with sleeping in the hospital with her daughter    Appetite:  Diminished, 2 meals/day    Self Rates Depression: 7/10  Self Rated Anxiety:  8/10      Psychotherapy:  Target symptoms: depression, recurrent depression, anxiety , grief  Why chosen therapy is appropriate versus another modality: relevant to diagnosis  Outcome monitoring methods: self-report, observation  Therapeutic intervention type: supportive psychotherapy  Topics discussed/themes: parenting issues, illness/death of a loved one, symptom recognition, financial stressors  The patient's response to the intervention is motivated. The patient's progress toward treatment goals is poor.   Duration of intervention: 40 minutes.    Review of Systems   PSYCHIATRIC: Pertinant items are noted in the narrative.        Past Medical, Family and Social History: The patient's past medical, family and social history have been reviewed and updated as appropriate within the electronic medical record - see encounter notes.    Compliance: yes    Side effects: None    Risk Parameters:  Patient reports no suicidal ideation  Patient reports no homicidal ideation  Patient reports no self-injurious behavior  Patient reports no violent behavior    Exam (detailed: at least 9 elements; comprehensive: all 15 elements)   Constitutional  Vitals:  Most recent vital signs, dated less than 90 days prior to this appointment, were reviewed.   Vitals:    08/15/24 0805   BP: (!) 121/56   Pulse: (!) 58   Weight: 105.9 kg (233 lb 7.5 oz)            General:  unremarkable, age appropriate     Musculoskeletal  Muscle Strength/Tone:  no spasicity, no rigidity, no cogwheeling, no flaccidity, no paratonia, no dyskinesia, no dystonia, no tremor, no tic, no choreoathetosis, no atrophy   Gait & Station:  unsteady, slow     Psychiatric  Speech:  no latency; no press   Mood & Affect:  depressed, sad  congruent and appropriate, sad, tearful   Thought  Process:  normal and logical   Associations:  intact   Thought Content:  normal, no suicidality, no homicidality, delusions, or paranoia   Insight:  intact, has awareness of illness   Judgement: behavior is adequate to circumstances, age appropriate   Orientation:  grossly intact, person, place, situation, time/date, day of week, month of year, year   Memory: intact for content of interview, grossly intact   Language: grossly intact, able to name, able to repeat   Attention Span & Concentration:  able to focus, completed tasks   Fund of Knowledge:  intact and appropriate to age and level of education, familiar with aspects of current personal life     Assessment and Diagnosis   Status/Progress: Based on the examination today, the patient's problem(s) is/are improved and inadequately controlled.  New problems have not been presented today.   Co-morbidities, Diagnostic uncertainty, and Lack of compliance are not complicating management of the primary condition.  There are no active rule-out diagnoses for this patient at this time.     General Impression: Devika Bustillo, a 64 y.o. female, for follow up of MDD and Anxiety with 2 previous suicide attempts.  Presents 9/20/23- Patient was started on Zoloft 50 mg 2 weeks ago by PCP, has notived some improvement in symptoms. Will increase to 100 mg in 2 more weeks. Start Trazodone 50 mg for sleep.  Presents 11/22/23- Increase Zoloft to 150 mg, Start Wellbutrin 150 mg.   Presents 1/2/23 - Some improvement in Anxiety and Depression, increase Zoloft to 200 mg Daily increase Wellbutrin to 300 mg Daily.  Presents 1/30/24- Patients renal function worsening as seen by recent labs, decrease wellbutrin to 150 mg Daily, start Abilify 2 mg for adjunct therapy.   Presents 4/18/24- Increase Abilify 5 mg Daily, Speak with patients family members regarding mental health. At next visit, consider switching to different SSRI.   Presents 8/15/24- STOP Zoloft, Start Lexapro (Escitalopram) 10 mg  Daily for depression and anxiety, Continue Wellbutrin 150 mg daily, Trazodone 100 mg Nighty, Increase Abilify to 10 mg Daily for Depression      ICD-10-CM ICD-9-CM    1. Insomnia, unspecified type  G47.00 780.52 traZODone (DESYREL) 100 MG tablet      2. Severe episode of recurrent major depressive disorder, without psychotic features  F33.2 296.33 buPROPion (WELLBUTRIN XL) 150 MG TB24 tablet      ARIPiprazole (ABILIFY) 10 MG Tab      EScitalopram oxalate (LEXAPRO) 10 MG tablet      3. Generalized anxiety disorder  F41.1 300.02 EScitalopram oxalate (LEXAPRO) 10 MG tablet          Intervention/Counseling/Treatment Plan   Medication Management: The risks and benefits of medication were discussed with the patient.  STOP Zoloft  Start Lexapro 10 mg Daily for depression and anxiety  Continue Wellbutrin 150 mg daily for depression  Continue taking Trazodone 100 mg at bedtime for sleep  Increase Abilify to 10 mg Daily for Depression  Discussed diagnosis, risk and benefits of proposed treatment above vs alternative treatment vs no treatment, and potential side effects of these treatments, and the inherent unpredictability of individual responses to these treatments. The patient expresses understanding and gives informed consent to pursue treatment at this time, believing that the potential benefits outweigh the potential risks. Patient has no other questions. Risks/adverse effects at this time include but are not limited to: GI side effects, sexual dysfunction, activation vs sedation, triggering of suicidal ideation, and serotonin syndrome.   Patient voices understanding and agreement with this plan  Provided crisis numbers  Encouraged patient to keep future appointments  Instruct patient to call or message with questions  In the event of an emergency, including suicidal ideation, patient was advised to go to the emergency room      Return to Clinic: 1 month    Total time: 45 Minutes       This includes face to face time and  non-face to face time preparing to see the patient (eg, review of tests), obtaining and/or reviewing separately obtained history, documenting clinical information in the electronic or other health record, independently interpreting results and communicating results to the patient/family/caregiver, or care coordinator.        Nimisha Barclay DNP, PMHNP, FNP

## 2024-10-09 ENCOUNTER — HOSPITAL ENCOUNTER (OUTPATIENT)
Facility: HOSPITAL | Age: 64
Discharge: HOME | End: 2024-10-11
Attending: EMERGENCY MEDICINE | Admitting: STUDENT IN AN ORGANIZED HEALTH CARE EDUCATION/TRAINING PROGRAM
Payer: MEDICAID

## 2024-10-09 DIAGNOSIS — R06.02 SOB (SHORTNESS OF BREATH): ICD-10-CM

## 2024-10-09 DIAGNOSIS — J96.01 ACUTE RESPIRATORY FAILURE WITH HYPOXIA: ICD-10-CM

## 2024-10-09 DIAGNOSIS — N30.00 ACUTE CYSTITIS WITHOUT HEMATURIA: Primary | ICD-10-CM

## 2024-10-09 DIAGNOSIS — R06.09 DOE (DYSPNEA ON EXERTION): ICD-10-CM

## 2024-10-09 DIAGNOSIS — J96.01 ACUTE HYPOXEMIC RESPIRATORY FAILURE: ICD-10-CM

## 2024-10-09 DIAGNOSIS — R07.9 CHEST PAIN: ICD-10-CM

## 2024-10-09 DIAGNOSIS — J45.21 MILD INTERMITTENT ASTHMA WITH ACUTE EXACERBATION: ICD-10-CM

## 2024-10-09 LAB
ALBUMIN SERPL BCP-MCNC: 3.4 G/DL (ref 3.5–5.2)
ALP SERPL-CCNC: 165 U/L (ref 55–135)
ALT SERPL W/O P-5'-P-CCNC: 44 U/L (ref 10–44)
ANION GAP SERPL CALC-SCNC: 9 MMOL/L (ref 8–16)
AST SERPL-CCNC: 21 U/L (ref 10–40)
BACTERIA #/AREA URNS AUTO: ABNORMAL /HPF
BASOPHILS # BLD AUTO: 0.05 K/UL (ref 0–0.2)
BASOPHILS NFR BLD: 0.5 % (ref 0–1.9)
BILIRUB SERPL-MCNC: 0.3 MG/DL (ref 0.1–1)
BILIRUB UR QL STRIP: NEGATIVE
BNP SERPL-MCNC: 50 PG/ML (ref 0–99)
BUN SERPL-MCNC: 29 MG/DL (ref 8–23)
CALCIUM SERPL-MCNC: 9.7 MG/DL (ref 8.7–10.5)
CHLORIDE SERPL-SCNC: 107 MMOL/L (ref 95–110)
CLARITY UR REFRACT.AUTO: ABNORMAL
CO2 SERPL-SCNC: 21 MMOL/L (ref 23–29)
COLOR UR AUTO: YELLOW
CREAT SERPL-MCNC: 1.2 MG/DL (ref 0.5–1.4)
DIFFERENTIAL METHOD BLD: NORMAL
EOSINOPHIL # BLD AUTO: 0.2 K/UL (ref 0–0.5)
EOSINOPHIL NFR BLD: 2.1 % (ref 0–8)
ERYTHROCYTE [DISTWIDTH] IN BLOOD BY AUTOMATED COUNT: 13.7 % (ref 11.5–14.5)
EST. GFR  (NO RACE VARIABLE): 51.2 ML/MIN/1.73 M^2
GLUCOSE SERPL-MCNC: 100 MG/DL (ref 70–110)
GLUCOSE UR QL STRIP: NEGATIVE
HCT VFR BLD AUTO: 37.7 % (ref 37–48.5)
HGB BLD-MCNC: 12.2 G/DL (ref 12–16)
HGB UR QL STRIP: ABNORMAL
HYALINE CASTS UR QL AUTO: 9 /LPF
IMM GRANULOCYTES # BLD AUTO: 0.03 K/UL (ref 0–0.04)
IMM GRANULOCYTES NFR BLD AUTO: 0.3 % (ref 0–0.5)
KETONES UR QL STRIP: NEGATIVE
LEUKOCYTE ESTERASE UR QL STRIP: ABNORMAL
LYMPHOCYTES # BLD AUTO: 3.9 K/UL (ref 1–4.8)
LYMPHOCYTES NFR BLD: 37.7 % (ref 18–48)
MCH RBC QN AUTO: 27.1 PG (ref 27–31)
MCHC RBC AUTO-ENTMCNC: 32.4 G/DL (ref 32–36)
MCV RBC AUTO: 84 FL (ref 82–98)
MICROSCOPIC COMMENT: ABNORMAL
MONOCYTES # BLD AUTO: 0.6 K/UL (ref 0.3–1)
MONOCYTES NFR BLD: 5.9 % (ref 4–15)
NEUTROPHILS # BLD AUTO: 5.5 K/UL (ref 1.8–7.7)
NEUTROPHILS NFR BLD: 53.5 % (ref 38–73)
NITRITE UR QL STRIP: NEGATIVE
NRBC BLD-RTO: 0 /100 WBC
OHS QRS DURATION: 70 MS
OHS QTC CALCULATION: 437 MS
PH UR STRIP: 6 [PH] (ref 5–8)
PLATELET # BLD AUTO: 348 K/UL (ref 150–450)
PMV BLD AUTO: 10 FL (ref 9.2–12.9)
POTASSIUM SERPL-SCNC: 3.7 MMOL/L (ref 3.5–5.1)
PROT SERPL-MCNC: 8.4 G/DL (ref 6–8.4)
PROT UR QL STRIP: ABNORMAL
RBC # BLD AUTO: 4.5 M/UL (ref 4–5.4)
RBC #/AREA URNS AUTO: 3 /HPF (ref 0–4)
SODIUM SERPL-SCNC: 137 MMOL/L (ref 136–145)
SP GR UR STRIP: 1.02 (ref 1–1.03)
SQUAMOUS #/AREA URNS AUTO: 3 /HPF
TROPONIN I SERPL DL<=0.01 NG/ML-MCNC: 0.01 NG/ML (ref 0–0.03)
URN SPEC COLLECT METH UR: ABNORMAL
WBC # BLD AUTO: 10.35 K/UL (ref 3.9–12.7)
WBC #/AREA URNS AUTO: 64 /HPF (ref 0–5)

## 2024-10-09 PROCEDURE — 63600175 PHARM REV CODE 636 W HCPCS: Performed by: EMERGENCY MEDICINE

## 2024-10-09 PROCEDURE — 84484 ASSAY OF TROPONIN QUANT: CPT | Performed by: EMERGENCY MEDICINE

## 2024-10-09 PROCEDURE — 85025 COMPLETE CBC W/AUTO DIFF WBC: CPT | Performed by: EMERGENCY MEDICINE

## 2024-10-09 PROCEDURE — 25500020 PHARM REV CODE 255: Performed by: EMERGENCY MEDICINE

## 2024-10-09 PROCEDURE — 87088 URINE BACTERIA CULTURE: CPT | Performed by: EMERGENCY MEDICINE

## 2024-10-09 PROCEDURE — 25000003 PHARM REV CODE 250: Performed by: EMERGENCY MEDICINE

## 2024-10-09 PROCEDURE — 83880 ASSAY OF NATRIURETIC PEPTIDE: CPT | Performed by: EMERGENCY MEDICINE

## 2024-10-09 PROCEDURE — 25000242 PHARM REV CODE 250 ALT 637 W/ HCPCS: Performed by: EMERGENCY MEDICINE

## 2024-10-09 PROCEDURE — 80053 COMPREHEN METABOLIC PANEL: CPT | Performed by: EMERGENCY MEDICINE

## 2024-10-09 PROCEDURE — 96365 THER/PROPH/DIAG IV INF INIT: CPT

## 2024-10-09 PROCEDURE — 27000221 HC OXYGEN, UP TO 24 HOURS

## 2024-10-09 PROCEDURE — 96375 TX/PRO/DX INJ NEW DRUG ADDON: CPT

## 2024-10-09 PROCEDURE — 93010 ELECTROCARDIOGRAM REPORT: CPT | Mod: ,,, | Performed by: INTERNAL MEDICINE

## 2024-10-09 PROCEDURE — 93005 ELECTROCARDIOGRAM TRACING: CPT

## 2024-10-09 PROCEDURE — 87186 SC STD MICRODIL/AGAR DIL: CPT | Performed by: EMERGENCY MEDICINE

## 2024-10-09 PROCEDURE — 81001 URINALYSIS AUTO W/SCOPE: CPT | Performed by: EMERGENCY MEDICINE

## 2024-10-09 PROCEDURE — 87086 URINE CULTURE/COLONY COUNT: CPT | Performed by: EMERGENCY MEDICINE

## 2024-10-09 PROCEDURE — 94761 N-INVAS EAR/PLS OXIMETRY MLT: CPT

## 2024-10-09 PROCEDURE — 99285 EMERGENCY DEPT VISIT HI MDM: CPT | Mod: 25

## 2024-10-09 PROCEDURE — 99900035 HC TECH TIME PER 15 MIN (STAT)

## 2024-10-09 PROCEDURE — 94640 AIRWAY INHALATION TREATMENT: CPT

## 2024-10-09 RX ORDER — IPRATROPIUM BROMIDE AND ALBUTEROL SULFATE 2.5; .5 MG/3ML; MG/3ML
3 SOLUTION RESPIRATORY (INHALATION)
Status: COMPLETED | OUTPATIENT
Start: 2024-10-09 | End: 2024-10-09

## 2024-10-09 RX ORDER — METHYLPREDNISOLONE SOD SUCC 125 MG
125 VIAL (EA) INJECTION
Status: COMPLETED | OUTPATIENT
Start: 2024-10-10 | End: 2024-10-10

## 2024-10-09 RX ADMIN — IPRATROPIUM BROMIDE AND ALBUTEROL SULFATE 3 ML: 2.5; .5 SOLUTION RESPIRATORY (INHALATION) at 08:10

## 2024-10-09 RX ADMIN — IOHEXOL 100 ML: 350 INJECTION, SOLUTION INTRAVENOUS at 09:10

## 2024-10-09 RX ADMIN — CEFTRIAXONE 1 G: 1 INJECTION, POWDER, FOR SOLUTION INTRAMUSCULAR; INTRAVENOUS at 09:10

## 2024-10-09 NOTE — ED TRIAGE NOTES
Devika Bustillo, a 64 y.o. female presents to the ED w/ complaint of SOB. States she's also been falling a lot. Last fall was last week. Endorses anxiety. Pt states she's feeling depressed related to daughter's passing.     Triage note:  Chief Complaint   Patient presents with    Shortness of Breath     X1 week. Hx.of PE and pulmonary hypertension. Also reporting daughter passed away recently and has been experiencing anxiety.      Review of patient's allergies indicates:  No Known Allergies  Past Medical History:   Diagnosis Date    Asthma     Back pain     Diabetes mellitus     Grade II diastolic dysfunction     Hyperlipidemia     Hypertension     Neuropathy     Obesity     Pulmonary embolism 1998    Pulmonary hypertension     LOC/ APPEARANCE: The patient is AAOx4. Pt is speaking appropriately, no slurred speech. Pt changed into hospital gown. Continuous cardiac monitor, cont pulse ox, and auto BP cuff applied to patient.  Pt reports pain level of 0. Pt updated on POC. Bed low and locked with side rails up x2, call bell in pt reach.  SKIN: Skin is warm dry and intact, and color is consistent with ethnicity. No breakdown or brusing visible.   RESPIRATORY: Airway is open and patent. Endorses SOB  CARDIAC: Patient has regular heart rate.  +CP  ABDOMEN: Soft and non-tender to palpation with no distention noted. Pt has no complaints of abnormal bowel movements, denies blood in stool. Pt reports normal appetite.   NEUROLOGIC: Eyes open spontaneously and facial expression symmetrical. Pt behavior appropriate to situation, and pt follows commands. Pt reports sensation present in all extremities when touched with a finger, denies any numbness or tingling. PERRLA  MUSCULOSKELETAL: Spontaneous movement noted to all extremities. Hand  equal and leg strength strong +5 bilaterally.   : No complaints of frequency, burning, urgency or blood in the urine. No complaints of incontinence.

## 2024-10-09 NOTE — ED PROVIDER NOTES
Encounter Date: 10/9/2024       History     Chief Complaint   Patient presents with    Shortness of Breath     X1 week. Hx.of PE and pulmonary hypertension. Also reporting daughter passed away recently and has been experiencing anxiety.      64-year-old female with multiple comorbidities including HTN, DM, previous PE, pulmonary hypertension/CHF, chronic low back pain, IH s/p venous sinus stent placement on Eliquis, sent by clinic for evaluation of SOB and hypoxia.  Patient states she has had increased SOB for the past week, usually has it only with activity but now occurring at rest as well.  She has noticed some occasional wheezing but tried her albuterol inhaler with little improvement.  She denies any associated leg swelling, chest pain, productive cough, fevers, or other new complaints.  She has been compliant with all medications including her diuretics and blood thinners.  She is grieving from the recent loss of her daughter but denies any salty food intake or other clear precipitant.  She also complains of bad smelling urine and some increased frequency over the past week.      Review of patient's allergies indicates:  No Known Allergies  Past Medical History:   Diagnosis Date    Asthma     Back pain     Diabetes mellitus     Grade II diastolic dysfunction     Hyperlipidemia     Hypertension     Neuropathy     Obesity     Pulmonary embolism 1998    Pulmonary hypertension      Past Surgical History:   Procedure Laterality Date    ANGIOGRAM, CORONARY, WITH LEFT HEART CATHETERIZATION Left 1/17/2024    Procedure: Angiogram, Coronary, with Left Heart Cath;  Surgeon: Joe Marr MD;  Location: [facility] CATH LAB;  Service: Cardiology;  Laterality: Left;    CYST REMOVAL      HERNIA REPAIR      HYSTERECTOMY      TONSILLECTOMY      TUBAL LIGATION       Family History   Problem Relation Name Age of Onset    Kidney disease Mother      Kidney disease Father      Stroke Maternal Uncle       Social History      Tobacco Use    Smoking status: Never    Smokeless tobacco: Never   Substance Use Topics    Alcohol use: No    Drug use: No     Review of Systems   Constitutional:  Negative for fever.   HENT:  Negative for congestion.    Eyes:  Negative for redness.   Respiratory:  Positive for shortness of breath and wheezing.    Cardiovascular:  Negative for chest pain.   Gastrointestinal:  Negative for abdominal pain.   Genitourinary:  Positive for frequency.   Skin:  Negative for rash.   Neurological:  Negative for headaches.   Psychiatric/Behavioral:  Negative for confusion.        Physical Exam     Initial Vitals [10/09/24 1516]   BP Pulse Resp Temp SpO2   (!) 143/82 62 18 98.5 °F (36.9 °C) 98 %      MAP       --         Physical Exam    Constitutional: She appears well-nourished. She is not diaphoretic. No distress.   HENT:   Head: Normocephalic and atraumatic.   Eyes: Conjunctivae are normal.   Neck: Neck supple.   Cardiovascular:  Normal rate, regular rhythm, S1 normal, S2 normal, normal heart sounds and intact distal pulses.           No murmur heard.  Pulmonary/Chest: No respiratory distress. She has no wheezes. She has no rhonchi. She has no rales.   Decreased air entry diffusely, no active wheezing or rales   Abdominal: Abdomen is soft. There is no abdominal tenderness. There is no rebound and no guarding.   Musculoskeletal:      Cervical back: Neck supple.      Comments: Trace pedal edema bilaterally     Neurological: She is alert and oriented to person, place, and time.   Skin: Skin is warm and dry.   Psychiatric:   Anxious affect         ED Course   Procedures  Labs Reviewed   COMPREHENSIVE METABOLIC PANEL - Abnormal       Result Value    Sodium 137      Potassium 3.7      Chloride 107      CO2 21 (*)     Glucose 100      BUN 29 (*)     Creatinine 1.2      Calcium 9.7      Total Protein 8.4      Albumin 3.4 (*)     Total Bilirubin 0.3      Alkaline Phosphatase 165 (*)     AST 21      ALT 44      eGFR 51.2 (*)      Anion Gap 9     URINALYSIS, REFLEX TO URINE CULTURE - Abnormal    Specimen UA Urine, Clean Catch      Color, UA Yellow      Appearance, UA Hazy (*)     pH, UA 6.0      Specific Gravity, UA 1.020      Protein, UA 1+ (*)     Glucose, UA Negative      Ketones, UA Negative      Bilirubin (UA) Negative      Occult Blood UA 1+ (*)     Nitrite, UA Negative      Leukocytes, UA 3+ (*)     Narrative:     Specimen Source->Urine   URINALYSIS MICROSCOPIC - Abnormal    RBC, UA 3      WBC, UA 64 (*)     Bacteria Moderate (*)     Squam Epithel, UA 3      Hyaline Casts, UA 9 (*)     Microscopic Comment SEE COMMENT      Narrative:     Specimen Source->Urine   CULTURE, URINE   CBC W/ AUTO DIFFERENTIAL    WBC 10.35      RBC 4.50      Hemoglobin 12.2      Hematocrit 37.7      MCV 84      MCH 27.1      MCHC 32.4      RDW 13.7      Platelets 348      MPV 10.0      Immature Granulocytes 0.3      Gran # (ANC) 5.5      Immature Grans (Abs) 0.03      Lymph # 3.9      Mono # 0.6      Eos # 0.2      Baso # 0.05      nRBC 0      Gran % 53.5      Lymph % 37.7      Mono % 5.9      Eosinophil % 2.1      Basophil % 0.5      Differential Method Automated     B-TYPE NATRIURETIC PEPTIDE    BNP 50     TROPONIN I    Troponin I 0.006       EKG Readings: (Independently Interpreted)   Normal sinus rhythm at rate 66, no acute ischemia or arrhythmia, no significant change from previous     ECG Results              EKG 12-lead (Final result)        Collection Time Result Time QRS Duration [facility]HS QTC Calculation    10/09/24 16:55:56 10/10/24 08:22:57 74 436                     Final result by Interface, Lab In [facility] (10/10/24 08:23:07)                   Narrative:    Test Reason :     Vent. Rate : 066 BPM     Atrial Rate : 066 BPM     P-R Int : 156 ms          QRS Dur : 074 ms      QT Int : 416 ms       P-R-T Axes : 073 058 054 degrees     QTc Int : 436 ms    Normal sinus rhythm  Normal ECG  When compared with ECG of 09-OCT-2024 15:14,  No significant change was  found  Confirmed by BRITTANY CARVALHO MD ([id number]) on 10/10/2024 8:22:56 AM    Referred By: [email or web address]   SELF           Confirmed By:BRITTANY CARVALHO MD                                     EKG 12-lead (Final result)        Collection Time Result Time QRS Duration OHS QTC Calculation    10/09/24 15:14:32 10/09/24 15:32:00 70 437                     Final result by Interface, Lab In [facility] (10/09/24 15:32:08)                   Narrative:    Test Reason : R06.02,    Vent. Rate : 063 BPM     Atrial Rate : 063 BPM     P-R Int : 156 ms          QRS Dur : 070 ms      QT Int : 428 ms       P-R-T Axes : 072 057 052 degrees     QTc Int : 437 ms    Normal sinus rhythm  Normal ECG  When compared with ECG of 16-JAN-2024 13:57,  No significant change was found    Confirmed by Max HUERTA MD ([id number]) on 10/9/2024 3:31:56 PM    Referred By:             Confirmed By:Max HUERTA MD                                  Imaging Results              CTA Chest Non-Coronary (PE Studies) (Final result)  Result time 10/09/24 22:21:18      Final result by Erick Pinedo MD (10/09/24 22:21:18)                   Impression:      No evidence of pulmonary thromboembolism to the segmental level.    Minimal bilateral ground-glass densities, potentially related to interstitial edema or pneumonitis.    Enlarged and heterogeneous appearance of the thyroid gland, similar in appearance to prior studies.  Suggest correlation with thyroid ultrasound and thyroid biopsy in 2020.    Electronically signed by resident: Roxy Cheung  Date:    10/09/2024  Time:    21:37    Electronically signed by: Erick Pinedo MD  Date:    10/09/2024  Time:    22:21               Narrative:    EXAMINATION:  CTA CHEST NON CORONARY (PE STUDIES)    CLINICAL HISTORY:  Pulmonary embolism (PE) suspected, high prob;    TECHNIQUE:  Low dose axial images, sagittal and coronal reformations were obtained from the thoracic inlet to the lung bases following the IV administration of 100 mL of  Omnipaque 350.  MIP images were performed.    COMPARISON:  CTA chest 01/15/2024.    FINDINGS:  Base of Neck: Enlarged, heterogeneous thyroid gland, similar to prior.    Thoracic soft tissues: Mild body wall edema.    Aorta/vasculature: Left-sided aortic arch.  No aneurysm.  Mild calcific atherosclerosis.    Heart: Normal size. No effusion.  Coronary artery and mitral annular calcifications.  No abnormal bowing of the interventricular septum to indicate right heart strain.    Pulmonary vasculature: This study is satisfactory for the evaluation of the pulmonary arteries. There is no filling defect of the pulmonary arteries to the segmental level to suggest pulmonary thromboembolism.  Pulmonary artery is not enlarged in this patient with reported history of pulmonary artery hypertension.    Crissy/Mediastinum: No pathologic eliazar enlargement.    Airways: Patent.    Lungs/Pleura: Minimal bilateral ground-glass densities, new from prior, potentially related to hypoventilatory changes versus minimal interstitial edema or pneumonitis.  No focal consolidation, pleural effusion, or pneumothorax.    Esophagus: Normal.    Upper Abdomen: No abnormality of the partially imaged upper abdomen.  Small splenule noted.    Bones: No acute fracture. No suspicious lytic or sclerotic lesions.  Degenerative changes of the spine.                                       X-Ray Chest AP Portable (Final result)  Result time 10/09/24 18:08:33      Final result by Preston Hitchcock MD (10/09/24 18:08:33)                   Impression:      1. Interstitial findings are accentuated by habitus, no large focal consolidation.      Electronically signed by: Preston Hitchcock MD  Date:    10/09/2024  Time:    18:08               Narrative:    EXAMINATION:  XR CHEST AP PORTABLE    CLINICAL HISTORY:  Other forms of dyspnea    TECHNIQUE:  Single frontal view of the chest was performed.    COMPARISON:  01/15/2024    FINDINGS:  The cardiomediastinal silhouette  is not enlarged noting calcification of the aorta..  There is no pleural effusion.  The trachea is midline.  The lungs are symmetrically expanded bilaterally with coarse interstitial attenuation accentuated by habitus..  No large focal consolidation seen.  There is no pneumothorax.  The osseous structures are remarkable for degenerative change..                                       Medications   sodium chloride 0.9% flush 5 mL (has no administration in time range)   albuterol-ipratropium 2.5 mg-0.5 mg/3 mL nebulizer solution 3 mL (has no administration in time range)   melatonin tablet 6 mg (has no administration in time range)   ondansetron disintegrating tablet 8 mg (has no administration in time range)   polyethylene glycol packet 17 g (has no administration in time range)   bisacodyL suppository 10 mg (has no administration in time range)   simethicone chewable tablet 80 mg (has no administration in time range)   aluminum-magnesium hydroxide-simethicone 200-200-20 mg/5 mL suspension 30 mL (has no administration in time range)   acetaminophen tablet 650 mg (has no administration in time range)   acetaminophen tablet 1,000 mg (has no administration in time range)   naloxone 0.4 mg/mL injection 0.02 mg (has no administration in time range)   glucose chewable tablet 16 g (has no administration in time range)   glucose chewable tablet 24 g (has no administration in time range)   glucagon (human recombinant) injection 1 mg (has no administration in time range)   insulin aspart U-100 pen 0-5 Units (5 Units Subcutaneous Given 10/10/24 1255)   dextrose 10% bolus 125 mL 125 mL (has no administration in time range)   dextrose 10% bolus 250 mL 250 mL (has no administration in time range)   ARIPiprazole tablet 10 mg (10 mg Oral Given 10/10/24 0908)   aspirin EC tablet 81 mg (81 mg Oral Given 10/10/24 0908)   atorvastatin tablet 40 mg (has no administration in time range)   buPROPion TB24 tablet 150 mg (150 mg Oral Given  10/10/24 0908)   EScitalopram oxalate tablet 10 mg (10 mg Oral Given 10/10/24 0908)   traZODone tablet 100 mg (100 mg Oral Given 10/10/24 0139)   fluticasone furoate-vilanteroL 100-25 mcg/dose diskus inhaler 1 puff (1 puff Inhalation Given 10/10/24 0837)   pantoprazole EC tablet 40 mg (40 mg Oral Given 10/10/24 0607)   metoprolol succinate (TOPROL-XL) 24 hr split tablet 12.5 mg (12.5 mg Oral Given 10/10/24 0908)   lisinopriL tablet 5 mg (5 mg Oral Given 10/10/24 0908)   albuterol-ipratropium 2.5 mg-0.5 mg/3 mL nebulizer solution 3 mL (3 mLs Nebulization Given 10/10/24 0830)   predniSONE tablet 40 mg (40 mg Oral Given 10/10/24 0908)   furosemide tablet 20 mg (20 mg Oral Given 10/10/24 0908)   cefTRIAXone (Rocephin) 1 g in D5W 100 mL IVPB (MB+) (has no administration in time range)   insulin glargine U-100 (Lantus) pen 20 Units (has no administration in time range)   ketorolac injection 15 mg (has no administration in time range)   albuterol-ipratropium 2.5 mg-0.5 mg/3 mL nebulizer solution 3 mL (3 mLs Nebulization Given 10/9/24 2011)   cefTRIAXone (Rocephin) 1 g in D5W 100 mL IVPB (MB+) (0 g Intravenous Stopped 10/9/24 2212)   iohexoL (OMNIPAQUE 350) injection 100 mL (100 mLs Intravenous Given 10/9/24 2113)   methylPREDNISolone sodium succinate injection 125 mg (125 mg Intravenous Given 10/10/24 0101)   ketorolac injection 15 mg (15 mg Intravenous Given 10/10/24 1258)     Medical Decision Making      64-year-old female with multiple comorbidities including HTN, DM, previous PE, pulmonary hypertension/CHF, chronic low back pain, [facility] by clinic for evaluation of SOB and hypoxia.  Patient has been compliant with medications, still suffering from recent death of her daughter, but has noticed worsening SOB over the past week, previously only with activity but now at rest as well.  She has had occasional wheezing but denies any productive cough or fevers, no lower extremity  edema or chest pain.  Patient was seen at PCP clinic today and noted to have O2 sat 80% on room air, they also mentioned patient has been having unsteady gait and falls though she did not mention this to me.She did mention foul-smelling urine with increased frequency and concern for UTI.  On exam patient with diminished air entry diffusely but no rales or wheezing, no significant lower extremity edema, no other concerning exam findings.  Differential diagnosis includes pulmonary hypertension exacerbation, recurrent PE, reactive airway disease, anemia.      Initial labs reassuring with no anemia or leukocytosis, CR 1.2 which is consistent with previous baseline, no elevation of troponin/BNP.  Chest x-ray with no evidence for definite pulmonary edema, pneumonia, or pleural effusion per my interpretation.  Patient treated with DuoNeb with some improvement, but O2 on room air still slightly low at 94%.  Given previous PE will need CTA to rule this out now, patient will be signed out to overnight MD pending reassessment after CTA.       Per chart review, UA was concerning for UTI, patient treated with Rocephin.  CTA with no sign of pneumonia, but it does show minimal bilateral ground-glass densities which could be interstitial edema or pneumonitis.  Patient subsequently admitted to Hospital Medicine due to persistent SOB for further workup.    Amount and/or Complexity of Data Reviewed  Labs: ordered.  Radiology: ordered.    Risk  Prescription drug management.                                      Clinical Impression:  Final diagnoses:  [R06.02] SOB (shortness of breath)  [R06.09] STOUT (dyspnea on exertion)  [J96.01] Acute hypoxemic respiratory failure          ED Disposition Condition    Observation Stable                Jcarlos Fatima MD  10/10/24 0535

## 2024-10-10 PROBLEM — N30.00 ACUTE CYSTITIS WITHOUT HEMATURIA: Status: ACTIVE | Noted: 2024-10-10

## 2024-10-10 PROBLEM — J45.901 ASTHMA WITH ACUTE EXACERBATION: Status: ACTIVE | Noted: 2021-11-04

## 2024-10-10 PROBLEM — J96.01 ACUTE HYPOXEMIC RESPIRATORY FAILURE: Status: ACTIVE | Noted: 2024-10-10

## 2024-10-10 LAB
ALBUMIN SERPL BCP-MCNC: 3.1 G/DL (ref 3.5–5.2)
ALP SERPL-CCNC: 154 U/L (ref 55–135)
ALT SERPL W/O P-5'-P-CCNC: 33 U/L (ref 10–44)
ANION GAP SERPL CALC-SCNC: 10 MMOL/L (ref 8–16)
ASCENDING AORTA: 2.21 CM
AST SERPL-CCNC: 16 U/L (ref 10–40)
AV AREA BY CONTINUOUS VTI: 2.8 CM2
AV INDEX (PROSTH): 0.92
AV LVOT MEAN GRADIENT: 5 MMHG
AV LVOT PEAK GRADIENT: 8 MMHG
AV MEAN GRADIENT: 5.2 MMHG
AV PEAK GRADIENT: 9 MMHG
AV VALVE AREA BY VELOCITY RATIO: 2.9 CM²
AV VALVE AREA: 2.9 CM2
AV VELOCITY RATIO: 0.93
BASOPHILS # BLD AUTO: 0.04 K/UL (ref 0–0.2)
BASOPHILS NFR BLD: 0.6 % (ref 0–1.9)
BILIRUB SERPL-MCNC: 0.2 MG/DL (ref 0.1–1)
BSA FOR ECHO PROCEDURE: 2.19 M2
BUN SERPL-MCNC: 33 MG/DL (ref 8–23)
CALCIUM SERPL-MCNC: 9.2 MG/DL (ref 8.7–10.5)
CHLORIDE SERPL-SCNC: 109 MMOL/L (ref 95–110)
CO2 SERPL-SCNC: 17 MMOL/L (ref 23–29)
CREAT SERPL-MCNC: 1.1 MG/DL (ref 0.5–1.4)
CV ECHO LV RWT: 0.36 CM
DIFFERENTIAL METHOD BLD: ABNORMAL
DOP CALC AO PEAK VEL: 1.5 M/S
DOP CALC AO VTI: 32 CM
DOP CALC LVOT AREA: 3.1 CM2
DOP CALC LVOT DIAMETER: 2 CM
DOP CALC LVOT PEAK VEL: 1.4 M/S
DOP CALC LVOT STROKE VOLUME: 92.6 CM3
DOP CALCLVOT PEAK VEL VTI: 29.5 CM
E WAVE DECELERATION TIME: 247.66 MS
E/A RATIO: 0.52
E/E' RATIO: 12.57 M/S
ECHO EF ESTIMATED: 75 %
ECHO LV POSTERIOR WALL: 0.8 CM (ref 0.6–1.1)
EOSINOPHIL # BLD AUTO: 0.1 K/UL (ref 0–0.5)
EOSINOPHIL NFR BLD: 0.7 % (ref 0–8)
ERYTHROCYTE [DISTWIDTH] IN BLOOD BY AUTOMATED COUNT: 13.3 % (ref 11.5–14.5)
EST. GFR  (NO RACE VARIABLE): 56.8 ML/MIN/1.73 M^2
ESTIMATED AVG GLUCOSE: 171 MG/DL (ref 68–131)
FRACTIONAL SHORTENING: 43.2 % (ref 28–44)
GLUCOSE SERPL-MCNC: 154 MG/DL (ref 70–110)
HBA1C MFR BLD: 7.6 % (ref 4–5.6)
HCT VFR BLD AUTO: 35.5 % (ref 37–48.5)
HGB BLD-MCNC: 11.3 G/DL (ref 12–16)
IMM GRANULOCYTES # BLD AUTO: 0.01 K/UL (ref 0–0.04)
IMM GRANULOCYTES NFR BLD AUTO: 0.1 % (ref 0–0.5)
INFLUENZA A, MOLECULAR: NOT DETECTED
INFLUENZA B, MOLECULAR: NOT DETECTED
INTERVENTRICULAR SEPTUM: 0.8 CM (ref 0.6–1.1)
IVC DIAMETER: 0.9 CM
LA MAJOR: 5.62 CM
LA MINOR: 5.38 CM
LA WIDTH: 3.64 CM
LEFT ATRIUM SIZE: 3.99 CM
LEFT ATRIUM VOLUME INDEX MOD: 43.7 ML/M2
LEFT ATRIUM VOLUME INDEX: 31.9 ML/M2
LEFT ATRIUM VOLUME MOD: 93.08 ML
LEFT ATRIUM VOLUME: 67.87 CM3
LEFT INTERNAL DIMENSION IN SYSTOLE: 2.5 CM (ref 2.1–4)
LEFT VENTRICLE DIASTOLIC VOLUME INDEX: 41.63 ML/M2
LEFT VENTRICLE DIASTOLIC VOLUME: 88.68 ML
LEFT VENTRICLE MASS INDEX: 51.4 G/M2
LEFT VENTRICLE SYSTOLIC VOLUME INDEX: 10.3 ML/M2
LEFT VENTRICLE SYSTOLIC VOLUME: 21.95 ML
LEFT VENTRICULAR INTERNAL DIMENSION IN DIASTOLE: 4.4 CM (ref 3.5–6)
LEFT VENTRICULAR MASS: 109.4 G
LV LATERAL E/E' RATIO: 11
LV SEPTAL E/E' RATIO: 14.67
LYMPHOCYTES # BLD AUTO: 1.2 K/UL (ref 1–4.8)
LYMPHOCYTES NFR BLD: 17 % (ref 18–48)
MAGNESIUM SERPL-MCNC: 2 MG/DL (ref 1.6–2.6)
MCH RBC QN AUTO: 27.1 PG (ref 27–31)
MCHC RBC AUTO-ENTMCNC: 31.8 G/DL (ref 32–36)
MCV RBC AUTO: 85 FL (ref 82–98)
MONOCYTES # BLD AUTO: 0.2 K/UL (ref 0.3–1)
MONOCYTES NFR BLD: 3.2 % (ref 4–15)
MV PEAK A VEL: 1.69 M/S
MV PEAK E VEL: 0.88 M/S
MV PEAK GRADIENT: 4 MMHG
MV VALVE AREA BY PLANIMETRY: 3.13 CM2
NEUTROPHILS # BLD AUTO: 5.6 K/UL (ref 1.8–7.7)
NEUTROPHILS NFR BLD: 78.4 % (ref 38–73)
NRBC BLD-RTO: 0 /100 WBC
OHS CV RV/LV RATIO: 0.66 CM
OHS QRS DURATION: 74 MS
OHS QTC CALCULATION: 436 MS
PHOSPHATE SERPL-MCNC: 4.4 MG/DL (ref 2.7–4.5)
PLATELET # BLD AUTO: 278 K/UL (ref 150–450)
PMV BLD AUTO: 9.8 FL (ref 9.2–12.9)
POCT GLUCOSE: 233 MG/DL (ref 70–110)
POCT GLUCOSE: 325 MG/DL (ref 70–110)
POCT GLUCOSE: 339 MG/DL (ref 70–110)
POCT GLUCOSE: 351 MG/DL (ref 70–110)
POTASSIUM SERPL-SCNC: 4.1 MMOL/L (ref 3.5–5.1)
PROCALCITONIN SERPL IA-MCNC: 0.04 NG/ML
PROT SERPL-MCNC: 7.5 G/DL (ref 6–8.4)
RA MAJOR: 4.28 CM
RA PRESSURE ESTIMATED: 3 MMHG
RA WIDTH: 2.77 CM
RBC # BLD AUTO: 4.17 M/UL (ref 4–5.4)
RIGHT ATRIAL AREA: 8.1 CM2
RIGHT VENTRICLE DIASTOLIC BASEL DIMENSION: 2.9 CM
RSV AG BY MOLECULAR METHOD: NOT DETECTED
RV TISSUE DOPPLER FREE WALL SYSTOLIC VELOCITY 1 (APICAL 4 CHAMBER VIEW): 16.95 CM/S
SARS-COV-2 RNA RESP QL NAA+PROBE: NOT DETECTED
SINUS: 2.4 CM
SODIUM SERPL-SCNC: 136 MMOL/L (ref 136–145)
STJ: 2.06 CM
TDI LATERAL: 0.08 M/S
TDI SEPTAL: 0.06 M/S
TDI: 0.07 M/S
TRICUSPID ANNULAR PLANE SYSTOLIC EXCURSION: 2.51 CM
WBC # BLD AUTO: 7.11 K/UL (ref 3.9–12.7)
Z-SCORE OF LEFT VENTRICULAR DIMENSION IN END DIASTOLE: -4.34
Z-SCORE OF LEFT VENTRICULAR DIMENSION IN END SYSTOLE: -3.99

## 2024-10-10 PROCEDURE — 27000221 HC OXYGEN, UP TO 24 HOURS

## 2024-10-10 PROCEDURE — 94640 AIRWAY INHALATION TREATMENT: CPT | Mod: XB

## 2024-10-10 PROCEDURE — 83036 HEMOGLOBIN GLYCOSYLATED A1C: CPT

## 2024-10-10 PROCEDURE — 83735 ASSAY OF MAGNESIUM: CPT

## 2024-10-10 PROCEDURE — 80053 COMPREHEN METABOLIC PANEL: CPT

## 2024-10-10 PROCEDURE — 63600175 PHARM REV CODE 636 W HCPCS: Performed by: EMERGENCY MEDICINE

## 2024-10-10 PROCEDURE — 85025 COMPLETE CBC W/AUTO DIFF WBC: CPT

## 2024-10-10 PROCEDURE — 94761 N-INVAS EAR/PLS OXIMETRY MLT: CPT

## 2024-10-10 PROCEDURE — 84100 ASSAY OF PHOSPHORUS: CPT

## 2024-10-10 PROCEDURE — 36415 COLL VENOUS BLD VENIPUNCTURE: CPT

## 2024-10-10 PROCEDURE — 25000003 PHARM REV CODE 250

## 2024-10-10 PROCEDURE — 84145 PROCALCITONIN (PCT): CPT

## 2024-10-10 PROCEDURE — G0378 HOSPITAL OBSERVATION PER HR: HCPCS

## 2024-10-10 PROCEDURE — 25000003 PHARM REV CODE 250: Performed by: NURSE PRACTITIONER

## 2024-10-10 PROCEDURE — 63600175 PHARM REV CODE 636 W HCPCS: Performed by: PHYSICIAN ASSISTANT

## 2024-10-10 PROCEDURE — 96366 THER/PROPH/DIAG IV INF ADDON: CPT

## 2024-10-10 PROCEDURE — 96372 THER/PROPH/DIAG INJ SC/IM: CPT

## 2024-10-10 PROCEDURE — 0241U SARS-COV2 (COVID) WITH FLU/RSV BY PCR: CPT

## 2024-10-10 PROCEDURE — 96375 TX/PRO/DX INJ NEW DRUG ADDON: CPT

## 2024-10-10 PROCEDURE — 96372 THER/PROPH/DIAG INJ SC/IM: CPT | Performed by: PHYSICIAN ASSISTANT

## 2024-10-10 PROCEDURE — 25000242 PHARM REV CODE 250 ALT 637 W/ HCPCS

## 2024-10-10 PROCEDURE — 63600175 PHARM REV CODE 636 W HCPCS

## 2024-10-10 RX ORDER — BISACODYL 10 MG/1
10 SUPPOSITORY RECTAL DAILY PRN
Status: DISCONTINUED | OUTPATIENT
Start: 2024-10-10 | End: 2024-10-11 | Stop reason: HOSPADM

## 2024-10-10 RX ORDER — ESCITALOPRAM OXALATE 10 MG/1
10 TABLET ORAL DAILY
Status: DISCONTINUED | OUTPATIENT
Start: 2024-10-10 | End: 2024-10-11 | Stop reason: HOSPADM

## 2024-10-10 RX ORDER — SODIUM CHLORIDE 0.9 % (FLUSH) 0.9 %
5 SYRINGE (ML) INJECTION
Status: DISCONTINUED | OUTPATIENT
Start: 2024-10-10 | End: 2024-10-11 | Stop reason: HOSPADM

## 2024-10-10 RX ORDER — LISINOPRIL 2.5 MG/1
5 TABLET ORAL DAILY
Status: DISCONTINUED | OUTPATIENT
Start: 2024-10-10 | End: 2024-10-11 | Stop reason: HOSPADM

## 2024-10-10 RX ORDER — SIMETHICONE 80 MG
1 TABLET,CHEWABLE ORAL 4 TIMES DAILY PRN
Status: DISCONTINUED | OUTPATIENT
Start: 2024-10-10 | End: 2024-10-11 | Stop reason: HOSPADM

## 2024-10-10 RX ORDER — INSULIN ASPART 100 [IU]/ML
0-5 INJECTION, SOLUTION INTRAVENOUS; SUBCUTANEOUS
Status: DISCONTINUED | OUTPATIENT
Start: 2024-10-10 | End: 2024-10-11 | Stop reason: HOSPADM

## 2024-10-10 RX ORDER — ONDANSETRON 8 MG/1
8 TABLET, ORALLY DISINTEGRATING ORAL EVERY 8 HOURS PRN
Status: DISCONTINUED | OUTPATIENT
Start: 2024-10-10 | End: 2024-10-11 | Stop reason: HOSPADM

## 2024-10-10 RX ORDER — NALOXONE HCL 0.4 MG/ML
0.02 VIAL (ML) INJECTION
Status: DISCONTINUED | OUTPATIENT
Start: 2024-10-10 | End: 2024-10-11 | Stop reason: HOSPADM

## 2024-10-10 RX ORDER — ALUMINUM HYDROXIDE, MAGNESIUM HYDROXIDE, AND SIMETHICONE 1200; 120; 1200 MG/30ML; MG/30ML; MG/30ML
30 SUSPENSION ORAL 4 TIMES DAILY PRN
Status: DISCONTINUED | OUTPATIENT
Start: 2024-10-10 | End: 2024-10-11 | Stop reason: HOSPADM

## 2024-10-10 RX ORDER — IPRATROPIUM BROMIDE AND ALBUTEROL SULFATE 2.5; .5 MG/3ML; MG/3ML
3 SOLUTION RESPIRATORY (INHALATION)
Status: DISCONTINUED | OUTPATIENT
Start: 2024-10-10 | End: 2024-10-11 | Stop reason: HOSPADM

## 2024-10-10 RX ORDER — BUPROPION HYDROCHLORIDE 150 MG/1
150 TABLET ORAL DAILY
Status: DISCONTINUED | OUTPATIENT
Start: 2024-10-10 | End: 2024-10-11 | Stop reason: HOSPADM

## 2024-10-10 RX ORDER — AMOXICILLIN 250 MG
1 CAPSULE ORAL DAILY
Status: DISCONTINUED | OUTPATIENT
Start: 2024-10-11 | End: 2024-10-11 | Stop reason: HOSPADM

## 2024-10-10 RX ORDER — IPRATROPIUM BROMIDE AND ALBUTEROL SULFATE 2.5; .5 MG/3ML; MG/3ML
3 SOLUTION RESPIRATORY (INHALATION) EVERY 4 HOURS PRN
Status: DISCONTINUED | OUTPATIENT
Start: 2024-10-10 | End: 2024-10-11 | Stop reason: HOSPADM

## 2024-10-10 RX ORDER — IBUPROFEN 200 MG
16 TABLET ORAL
Status: DISCONTINUED | OUTPATIENT
Start: 2024-10-10 | End: 2024-10-11 | Stop reason: HOSPADM

## 2024-10-10 RX ORDER — PANTOPRAZOLE SODIUM 40 MG/1
40 TABLET, DELAYED RELEASE ORAL
Status: DISCONTINUED | OUTPATIENT
Start: 2024-10-10 | End: 2024-10-11 | Stop reason: HOSPADM

## 2024-10-10 RX ORDER — FUROSEMIDE 20 MG/1
20 TABLET ORAL DAILY
Status: DISCONTINUED | OUTPATIENT
Start: 2024-10-10 | End: 2024-10-11 | Stop reason: HOSPADM

## 2024-10-10 RX ORDER — IBUPROFEN 200 MG
24 TABLET ORAL
Status: DISCONTINUED | OUTPATIENT
Start: 2024-10-10 | End: 2024-10-11 | Stop reason: HOSPADM

## 2024-10-10 RX ORDER — POLYETHYLENE GLYCOL 3350 17 G/17G
17 POWDER, FOR SOLUTION ORAL 2 TIMES DAILY PRN
Status: DISCONTINUED | OUTPATIENT
Start: 2024-10-10 | End: 2024-10-10

## 2024-10-10 RX ORDER — TALC
6 POWDER (GRAM) TOPICAL NIGHTLY PRN
Status: DISCONTINUED | OUTPATIENT
Start: 2024-10-10 | End: 2024-10-11 | Stop reason: HOSPADM

## 2024-10-10 RX ORDER — ATORVASTATIN CALCIUM 40 MG/1
40 TABLET, FILM COATED ORAL NIGHTLY
Status: DISCONTINUED | OUTPATIENT
Start: 2024-10-10 | End: 2024-10-11 | Stop reason: HOSPADM

## 2024-10-10 RX ORDER — KETOROLAC TROMETHAMINE 15 MG/ML
15 INJECTION, SOLUTION INTRAMUSCULAR; INTRAVENOUS ONCE
Status: COMPLETED | OUTPATIENT
Start: 2024-10-10 | End: 2024-10-10

## 2024-10-10 RX ORDER — POLYETHYLENE GLYCOL 3350 17 G/17G
17 POWDER, FOR SOLUTION ORAL DAILY
Status: DISCONTINUED | OUTPATIENT
Start: 2024-10-11 | End: 2024-10-11 | Stop reason: HOSPADM

## 2024-10-10 RX ORDER — PREDNISONE 20 MG/1
40 TABLET ORAL DAILY
Status: DISCONTINUED | OUTPATIENT
Start: 2024-10-10 | End: 2024-10-11 | Stop reason: HOSPADM

## 2024-10-10 RX ORDER — ARIPIPRAZOLE 10 MG/1
10 TABLET ORAL DAILY
Status: DISCONTINUED | OUTPATIENT
Start: 2024-10-10 | End: 2024-10-11 | Stop reason: HOSPADM

## 2024-10-10 RX ORDER — INSULIN GLARGINE 100 [IU]/ML
20 INJECTION, SOLUTION SUBCUTANEOUS NIGHTLY
Status: DISCONTINUED | OUTPATIENT
Start: 2024-10-10 | End: 2024-10-11 | Stop reason: HOSPADM

## 2024-10-10 RX ORDER — ASPIRIN 81 MG/1
81 TABLET ORAL DAILY
Status: DISCONTINUED | OUTPATIENT
Start: 2024-10-10 | End: 2024-10-11 | Stop reason: HOSPADM

## 2024-10-10 RX ORDER — FLUTICASONE FUROATE AND VILANTEROL 100; 25 UG/1; UG/1
1 POWDER RESPIRATORY (INHALATION) DAILY
Status: DISCONTINUED | OUTPATIENT
Start: 2024-10-10 | End: 2024-10-11 | Stop reason: HOSPADM

## 2024-10-10 RX ORDER — TRAZODONE HYDROCHLORIDE 100 MG/1
100 TABLET ORAL NIGHTLY
Status: DISCONTINUED | OUTPATIENT
Start: 2024-10-10 | End: 2024-10-11 | Stop reason: HOSPADM

## 2024-10-10 RX ORDER — KETOROLAC TROMETHAMINE 15 MG/ML
15 INJECTION, SOLUTION INTRAMUSCULAR; INTRAVENOUS EVERY 6 HOURS PRN
Status: DISCONTINUED | OUTPATIENT
Start: 2024-10-10 | End: 2024-10-11 | Stop reason: HOSPADM

## 2024-10-10 RX ORDER — ACETAMINOPHEN 500 MG
1000 TABLET ORAL EVERY 8 HOURS PRN
Status: DISCONTINUED | OUTPATIENT
Start: 2024-10-10 | End: 2024-10-11 | Stop reason: HOSPADM

## 2024-10-10 RX ORDER — GLUCAGON 1 MG
1 KIT INJECTION
Status: DISCONTINUED | OUTPATIENT
Start: 2024-10-10 | End: 2024-10-11 | Stop reason: HOSPADM

## 2024-10-10 RX ORDER — BENZONATATE 100 MG/1
100 CAPSULE ORAL 3 TIMES DAILY PRN
Status: DISCONTINUED | OUTPATIENT
Start: 2024-10-10 | End: 2024-10-11 | Stop reason: HOSPADM

## 2024-10-10 RX ORDER — ACETAMINOPHEN 325 MG/1
650 TABLET ORAL EVERY 4 HOURS PRN
Status: DISCONTINUED | OUTPATIENT
Start: 2024-10-10 | End: 2024-10-11 | Stop reason: HOSPADM

## 2024-10-10 RX ADMIN — IPRATROPIUM BROMIDE AND ALBUTEROL SULFATE 3 ML: 2.5; .5 SOLUTION RESPIRATORY (INHALATION) at 07:10

## 2024-10-10 RX ADMIN — KETOROLAC TROMETHAMINE 15 MG: 15 INJECTION, SOLUTION INTRAMUSCULAR; INTRAVENOUS at 12:10

## 2024-10-10 RX ADMIN — INSULIN ASPART 5 UNITS: 100 INJECTION, SOLUTION INTRAVENOUS; SUBCUTANEOUS at 12:10

## 2024-10-10 RX ADMIN — INSULIN GLARGINE 20 UNITS: 100 INJECTION, SOLUTION SUBCUTANEOUS at 10:10

## 2024-10-10 RX ADMIN — LISINOPRIL 5 MG: 2.5 TABLET ORAL at 09:10

## 2024-10-10 RX ADMIN — IPRATROPIUM BROMIDE AND ALBUTEROL SULFATE 3 ML: 2.5; .5 SOLUTION RESPIRATORY (INHALATION) at 03:10

## 2024-10-10 RX ADMIN — BENZONATATE 100 MG: 100 CAPSULE ORAL at 10:10

## 2024-10-10 RX ADMIN — INSULIN ASPART 4 UNITS: 100 INJECTION, SOLUTION INTRAVENOUS; SUBCUTANEOUS at 04:10

## 2024-10-10 RX ADMIN — ASPIRIN 81 MG: 81 TABLET, COATED ORAL at 09:10

## 2024-10-10 RX ADMIN — INSULIN ASPART 2 UNITS: 100 INJECTION, SOLUTION INTRAVENOUS; SUBCUTANEOUS at 09:10

## 2024-10-10 RX ADMIN — PANTOPRAZOLE SODIUM 40 MG: 40 TABLET, DELAYED RELEASE ORAL at 06:10

## 2024-10-10 RX ADMIN — BUPROPION HYDROCHLORIDE 150 MG: 150 TABLET, EXTENDED RELEASE ORAL at 09:10

## 2024-10-10 RX ADMIN — METOPROLOL SUCCINATE 12.5 MG: 25 TABLET, EXTENDED RELEASE ORAL at 09:10

## 2024-10-10 RX ADMIN — ATORVASTATIN CALCIUM 40 MG: 40 TABLET, FILM COATED ORAL at 10:10

## 2024-10-10 RX ADMIN — FLUTICASONE FUROATE AND VILANTEROL TRIFENATATE 1 PUFF: 100; 25 POWDER RESPIRATORY (INHALATION) at 08:10

## 2024-10-10 RX ADMIN — ESCITALOPRAM OXALATE 10 MG: 10 TABLET ORAL at 09:10

## 2024-10-10 RX ADMIN — TRAZODONE HYDROCHLORIDE 100 MG: 50 TABLET ORAL at 01:10

## 2024-10-10 RX ADMIN — PANTOPRAZOLE SODIUM 40 MG: 40 TABLET, DELAYED RELEASE ORAL at 04:10

## 2024-10-10 RX ADMIN — INSULIN ASPART 2 UNITS: 100 INJECTION, SOLUTION INTRAVENOUS; SUBCUTANEOUS at 10:10

## 2024-10-10 RX ADMIN — ARIPIPRAZOLE 10 MG: 10 TABLET ORAL at 09:10

## 2024-10-10 RX ADMIN — IPRATROPIUM BROMIDE AND ALBUTEROL SULFATE 3 ML: 2.5; .5 SOLUTION RESPIRATORY (INHALATION) at 08:10

## 2024-10-10 RX ADMIN — FUROSEMIDE 20 MG: 20 TABLET ORAL at 09:10

## 2024-10-10 RX ADMIN — PREDNISONE 40 MG: 20 TABLET ORAL at 09:10

## 2024-10-10 RX ADMIN — TRAZODONE HYDROCHLORIDE 100 MG: 50 TABLET ORAL at 10:10

## 2024-10-10 RX ADMIN — CEFTRIAXONE SODIUM 1 G: 1 INJECTION, POWDER, FOR SOLUTION INTRAMUSCULAR; INTRAVENOUS at 10:10

## 2024-10-10 RX ADMIN — METHYLPREDNISOLONE SODIUM SUCCINATE 125 MG: 125 INJECTION, POWDER, FOR SOLUTION INTRAMUSCULAR; INTRAVENOUS at 01:10

## 2024-10-10 NOTE — PLAN OF CARE
Problem: Adult Inpatient Plan of Care  Goal: Plan of Care Review  Outcome: Progressing  Goal: Patient-Specific Goal (Individualized)  Outcome: Progressing  Goal: Absence of Hospital-Acquired Illness or Injury  Outcome: Progressing  Goal: Optimal Comfort and Wellbeing  Outcome: Progressing  Goal: Readiness for Transition of Care  Outcome: Progressing     Problem: Infection  Goal: Absence of Infection Signs and Symptoms  Outcome: Progressing     Problem: Diabetes Comorbidity  Goal: Blood Glucose Level Within Targeted Range  Outcome: Progressing     Problem: Skin Injury Risk Increased  Goal: Skin Health and Integrity  Outcome: Progressing

## 2024-10-10 NOTE — ASSESSMENT & PLAN NOTE
Creatine stable for now. BMP reviewed- noted Estimated Creatinine Clearance: 61.9 mL/min (based on SCr of 1.2 mg/dL). according to latest data. Based on current GFR, CKD stage is stage 3 - GFR 30-59.  Monitor UOP and serial BMP and adjust therapy as needed. Renally dose meds. Avoid nephrotoxic medications and procedures.

## 2024-10-10 NOTE — ED NOTES
Received report from Zane RYAN. Assumed care of patient. Patient is alert and resting comfortably in bed in NAD. BP, cardiac, and O2 monitoring continued. Family member at bedside. Patient updated on plan of care, pt denies any needs or complaints at this time. Bed locked in lowest position, side rails up x2, call light within reach. VSS

## 2024-10-10 NOTE — ASSESSMENT & PLAN NOTE
"CTA chest showed: "Enlarged and heterogeneous appearance of the thyroid gland, similar in appearance to prior studies. Suggest correlation with thyroid ultrasound and thyroid biopsy in 2020"   - Biopsy 06/2020 shows benign follicular nodules   "

## 2024-10-10 NOTE — ASSESSMENT & PLAN NOTE
- See acute hypoxemic respiratory failure  - PRN and scheduled duo nebs  - prednisone 40 mg daily   - supplemental oxygen as needed

## 2024-10-10 NOTE — PLAN OF CARE
[facility] - Observation 11H  Discharge Assessment    Primary Care Provider: No, Primary Doctor     Discharge Assessment (most recent)       BRIEF DISCHARGE ASSESSMENT - 10/10/24 1140          Discharge Planning    Assessment Type Discharge Planning Brief Assessment     Resource/Environmental Concerns none     Support Systems Family members     Equipment Currently Used at Home rollator;shower chair     Current Living Arrangements home     Patient/Family Anticipates Transition to home     Patient/Family Anticipated Services at Transition none     DME Needed Upon Discharge  none     Discharge Plan A Home     Discharge Plan B Home with family                     Pt is a 64 y.o. female admitted with acute hypoxemic respiratory failure and has a PMH of PE and Pulmonary HTN. She lives alone in a single story house. She is able top perform her self care and she drives. She will have transportation home. [facility] Discharge Packet given to patient and/or family with understanding verbalized.   name and number and estimated discharge date written on white board in patient's room with request to call for any questions or concerns.  Will continue to follow for needs.  Discharge Plan A and Plan B have been determined by review of patient's clinical status, future medical and therapeutic needs, and coverage/benefits for post-acute care in coordination with multidisciplinary team members.  Robert White RN,BSN

## 2024-10-10 NOTE — ASSESSMENT & PLAN NOTE
- Does not appear hypervolemic   - Based on home medication list will order lasix 20 mg daily, lisinopril 5 mg daily, metoprolol 12.5 daily (will need to verify her home medications as she is not actually sure what she is taking and did not bring them with her)  Results for orders placed during the hospital encounter of 11/03/21    Echo    Interpretation Summary  · The left ventricle is normal in size with normal systolic function.  · The estimated ejection fraction is 65%.  · Grade I left ventricular diastolic dysfunction.  · Normal right ventricular size with normal right ventricular systolic function.  · Mild left atrial enlargement.  · Normal central venous pressure (3 mmHg).  · The estimated PA systolic pressure is 50 mmHg.  · There is pulmonary hypertension.

## 2024-10-10 NOTE — ASSESSMENT & PLAN NOTE
Patient with Hypoxic Respiratory failure which is Acute.  she is not on home oxygen. Supplemental oxygen was provided and noted- Oxygen Concentration (%):  [32] 32  Signs/symptoms of respiratory failure include- increased work of breathing, SOB. Contributing diagnoses includes -  asthma, pulmonary HTN, obesity  Labs and images were reviewed. Patient Has not had a recent ABG. Will treat underlying causes and adjust management of respiratory failure as follows  - Saturating 82% on room air when ambulating. Stable >94% on room air at rest.  - CTA without acute PE. Possible pneumonitis or interstitial edema  - BNP and troponin normal. EKG NSR  - Possible asthma exacerbation  - Check flu/covid/RSV  - s/p duo nebs and steroids in ED  - Continue scheduled and PRN duo nebs as well as prednisone 40mg daily  - 6 minute walk test   - Supplemental oxygen, wean as tolerated

## 2024-10-10 NOTE — CARE UPDATE
No acute events were reported overnight. VSS. She was seen and examined at bedside. Patient has been weaned off of supplement O2 but still reporting SOB with exertion  I.e using the restroom this  morning. Pulmonary exam unremarkable for wheezing or rales. Noted trace bilateral lower extremity edema. Echo pending to assess volume status. Will attempt 6 minute walk test to determine if SpO2 is stable off O2. Continue Albuterol/Ipratropium, Prednisone, and Fluticasone furoate-vilanterol inhaler for symptom management. Due to elevated glucose and steroid use will order 20 units of insulin nightly for glucose control. Continue Ceftriaxone for UTI-- pending urine culture.

## 2024-10-10 NOTE — HPI
Devika Bustillo is a 64 y.o. female with PMHx of asthma, DM, HTN, HLD, PE, Pulmonary HTN, obesity, HFpEF who [place] [facility] [place]. She saw her PCP today and was noted to be saturating 88% on room air per chart review. She reports feeling increasingly SOB both with exertion and now at rest for the past one week. She also reports feeling tremulous on position change with some minor falls, no head or other injury or loss of consciousness. She reports similar symptoms previously but that it had resolved on its own. Denies fever, chills, cough, chest pain, palpitations, LE swelling, nausea, vomiting. She reports urinary frequency. Denies dysuria, hematuria, urgency. She reports feeling grief regarding the passing of her daughter from gastric cancer recently at this hospital. She reports having social support and is also seeing a psychiatrist. She is not sure what medications she takes daily. She left them at home.      ED: Saturating % on room air at rest. O2 sats dropped to 82% on room air with ambulation. Otherwise HDS and afebrile. BNP 50. Troponin 0.006. CMP and CBC unremarkable. UA with 3+ leukocytes, 64 WBC and moderate bacteria. EKG NSR, rate 63. CTA without PE, but with min bilateral ground glass densities potentially related to interstitial edema or pneumonitis. She was  given duonebs, IV steroids, and  IV ceftriaxone. Admitted to .

## 2024-10-10 NOTE — ASSESSMENT & PLAN NOTE
- Reports urinary frequency only  - Afebrile, no leukocytosis   - S/p IV ceftriaxone 1g in ED  - Continue IV ceftriaxone for now  - Follow urine culture

## 2024-10-10 NOTE — ASSESSMENT & PLAN NOTE
Body mass index is 35.53 kg/m². Morbid obesity complicates all aspects of disease management from diagnostic modalities to treatment. Weight loss encouraged and health benefits explained to patient.

## 2024-10-10 NOTE — SUBJECTIVE & OBJECTIVE
Past Medical History:   Diagnosis Date    Asthma     Back pain     Diabetes mellitus     Grade II diastolic dysfunction     Hyperlipidemia     Hypertension     Neuropathy     Obesity     Pulmonary embolism 1998    Pulmonary hypertension        Past Surgical History:   Procedure Laterality Date    ANGIOGRAM, CORONARY, WITH LEFT HEART CATHETERIZATION Left 1/17/2024    Procedure: Angiogram, Coronary, with Left Heart Cath;  Surgeon: Joe Marr MD;  Location: [facility] CATH LAB;  Service: Cardiology;  Laterality: Left;    CYST REMOVAL      HERNIA REPAIR      HYSTERECTOMY      TONSILLECTOMY      TUBAL LIGATION         Review of patient's allergies indicates:  No Known Allergies    No current facility-administered medications on file prior to encounter.     Current Outpatient Medications on File Prior to Encounter   Medication Sig    acetaminophen (TYLENOL) 500 MG tablet Take 2 tablets (1,000 mg total) by mouth every 8 (eight) hours as needed for Pain.    albuterol (VENTOLIN HFA) 90 mcg/actuation inhaler Inhale 1-2 puffs into the lungs every 6 (six) hours as needed for Wheezing or Shortness of Breath. Rescue    ARIPiprazole (ABILIFY) 10 MG Tab Take 1 tablet (10 mg total) by mouth once daily.    aspirin (ECOTRIN) 81 MG EC tablet Take 81 mg by mouth once daily.    atorvastatin (LIPITOR) 40 MG tablet Take 40 mg by mouth every evening.    blood sugar diagnostic Strp To check BG 4 times daily, to use with insurance preferred meter    blood-glucose meter kit To check BG 4 times daily, to use with insurance preferred meter    buPROPion (WELLBUTRIN XL) 150 MG TB24 tablet Take 1 tablet (150 mg total) by mouth once daily.    clobetasol 0.05% (TEMOVATE) 0.05 % Oint Apply topically 2 (two) times daily.    DUPIXENT  mg/2 mL PnIj INJECT 300MG UNDER THE SKIN EVERY OTHER WEEK AS DIRECTED    EScitalopram oxalate (LEXAPRO) 10 MG tablet Take 1 tablet (10 mg total) by mouth once daily.    gabapentin (NEURONTIN) 300 MG capsule  "Take 300 mg by mouth 2 (two) times daily as needed.    insulin glargine, TOUJEO, (TOUJEO SOLOSTAR U-300 INSULIN) 300 unit/mL (1.5 mL) InPn pen Inject 50 Units into the skin once daily.    lancets Misc To check BG 4 times daily, to use with insurance preferred meter    LASIX 20 mg tablet Take 1 tablet (20 mg total) by mouth as needed (lower extremity swelling).    lisinopriL (PRINIVIL,ZESTRIL) 5 MG tablet Take 1 tablet (5 mg total) by mouth once daily.    loratadine (CLARITIN) 10 mg tablet Take 10 mg by mouth daily as needed for Allergies.    metoprolol succinate (TOPROL-XL) 25 MG 24 hr tablet Take 0.5 tablets (12.5 mg total) by mouth once daily.    pantoprazole (PROTONIX) 40 MG tablet Take 1 tablet (40 mg total) by mouth 2 (two) times daily before meals.    pen needle, diabetic 31 gauge x 3/16" Ndle 1 each.    pen needle, diabetic 32 gauge x 5/32" Ndle Use to inject insulin four times daily    polyethylene glycol (GLYCOLAX) 17 gram/dose powder Use cap to measure 17 grams, mix in liquid and take by mouth 2 (two) times daily as needed for Constipation.    potassium chloride (KLOR-CON) 10 MEQ TbSR Take 1 tablet (10 mEq total) by mouth as needed (take 1 tablet when you take lasix (furosemide)).    prednisoLONE acetate (PRED FORTE) 1 % DrpS INSTILL 1 DROP 4 TIMES DAILY INTO LEFT EYE FOR 2 WEEKS, THEN 1 DROP 3 TIMES DAILY FOR 2 WEEKS, THEN 1 DROP TWICE DAILY FOR 2 WEEKS    senna-docusate 8.6-50 mg (SENNA WITH DOCUSATE SODIUM) 8.6-50 mg per tablet Take 1 tablet by mouth 2 (two) times daily as needed for Constipation.    SYMBICORT 160-4.5 mcg/actuation HFAA Inhale 2 puffs into the lungs 2 (two) times daily.    topiramate (TOPAMAX) 100 MG tablet Take 50 mg by mouth 2 (two) times daily.    traZODone (DESYREL) 100 MG tablet Take 1 tablet (100 mg total) by mouth every evening.    TRULICITY 3 mg/0.5 mL pen injector Inject 3 mg into the skin every 7 days. Fridays     Family History       Problem Relation (Age of Onset)    Kidney " disease Mother, Father    Stroke Maternal Uncle          Tobacco Use    Smoking status: Never    Smokeless tobacco: Never   Substance and Sexual Activity    Alcohol use: No    Drug use: No    Sexual activity: Not Currently     Partners: Male     Review of Systems   Constitutional:  Negative for chills and fever.   HENT:  Negative for congestion and trouble swallowing.    Eyes:  Negative for visual disturbance.   Respiratory:  Positive for shortness of breath. Negative for cough, chest tightness and wheezing.    Cardiovascular:  Negative for chest pain, palpitations and leg swelling.   Gastrointestinal:  Negative for abdominal pain, nausea and vomiting.   Genitourinary:  Positive for frequency. Negative for dysuria, hematuria and urgency.   Musculoskeletal:  Negative for arthralgias and joint swelling. Gait problem: falls.  Skin:  Negative for rash and wound.   Neurological:  Negative for syncope, weakness, light-headedness, numbness and headaches.   Psychiatric/Behavioral:  Positive for dysphoric mood and sleep disturbance. Negative for confusion, hallucinations and suicidal ideas. The patient is nervous/anxious.      Objective:     Vital Signs (Most Recent):  Temp: 98.1 °F (36.7 °C) (10/10/24 0000)  Pulse: 64 (10/10/24 0032)  Resp: 18 (10/10/24 0032)  BP: (!) 139/56 (10/10/24 0032)  SpO2: 96 % (10/10/24 0032) Vital Signs (24h Range):  Temp:  [98.1 °F (36.7 °C)-98.5 °F (36.9 °C)] 98.1 °F (36.7 °C)  Pulse:  [60-71] 64  Resp:  [15-20] 18  SpO2:  [82 %-100 %] 96 %  BP: (128-149)/(56-82) 139/56     Weight: 106 kg (233 lb 11.2 oz)  Body mass index is 35.53 kg/m².     Physical Exam  Vitals and nursing note reviewed.   Constitutional:       General: She is not in acute distress.     Appearance: She is obese.   HENT:      Head: Normocephalic and atraumatic.      Nose: Nose normal.      Mouth/Throat:      Pharynx: No oropharyngeal exudate.   Eyes:      Extraocular Movements: Extraocular movements intact.       Conjunctiva/sclera: Conjunctivae normal.   Cardiovascular:      Rate and Rhythm: Normal rate and regular rhythm.      Heart sounds: Normal heart sounds.   Pulmonary:      Effort: Pulmonary effort is normal. No respiratory distress.      Breath sounds: Normal breath sounds.      Comments: Saturating % on bedside monitor at rest on room air.  Abdominal:      General: Bowel sounds are normal. There is no distension.      Palpations: Abdomen is soft.      Tenderness: There is no abdominal tenderness.   Musculoskeletal:      Cervical back: Normal range of motion.      Right lower leg: No edema.      Left lower leg: No edema.   Skin:     General: Skin is warm and dry.   Neurological:      General: No focal deficit present.      Mental Status: She is alert and oriented to person, place, and time.   Psychiatric:         Speech: Speech normal.         Behavior: Behavior normal.         Thought Content: Thought content normal.                Significant Labs: All pertinent labs within the past 24 hours have been reviewed.  CBC:   Recent Labs   Lab 10/09/24  1855   WBC 10.35   HGB 12.2   HCT 37.7        CMP:   Recent Labs   Lab 10/09/24  1855      K 3.7      CO2 21*      BUN 29*   CREATININE 1.2   CALCIUM 9.7   PROT 8.4   ALBUMIN 3.4*   BILITOT 0.3   ALKPHOS 165*   AST 21   ALT 44   ANIONGAP 9     Cardiac Markers:   Recent Labs   Lab 10/09/24  1855   BNP 50     Troponin:   Recent Labs   Lab 10/09/24  1855   TROPONINI 0.006     Urine Studies:   Recent Labs   Lab 10/09/24  2005   COLORU Yellow   APPEARANCEUA Hazy*   PHUR 6.0   SPECGRAV 1.020   PROTEINUA 1+*   GLUCUA Negative   KETONESU Negative   BILIRUBINUA Negative   OCCULTUA 1+*   NITRITE Negative   LEUKOCYTESUR 3+*   RBCUA 3   WBCUA 64*   BACTERIA Moderate*   SQUAMEPITHEL 3   HYALINECASTS 9*       Significant Imaging: I have reviewed all pertinent imaging results/findings within the past 24 hours.  CTA Chest Non-Coronary (PE  Studies)  Narrative: EXAMINATION:  CTA CHEST NON CORONARY (PE STUDIES)    CLINICAL HISTORY:  Pulmonary embolism (PE) suspected, high prob;    TECHNIQUE:  Low dose axial images, sagittal and coronal reformations were obtained from the thoracic inlet to the lung bases following the IV administration of 100 mL of Omnipaque 350.  MIP images were performed.    COMPARISON:  CTA chest 01/15/2024.    FINDINGS:  Base of Neck: Enlarged, heterogeneous thyroid gland, similar to prior.    Thoracic soft tissues: Mild body wall edema.    Aorta/vasculature: Left-sided aortic arch.  No aneurysm.  Mild calcific atherosclerosis.    Heart: Normal size. No effusion.  Coronary artery and mitral annular calcifications.  No abnormal bowing of the interventricular septum to indicate right heart strain.    Pulmonary vasculature: This study is satisfactory for the evaluation of the pulmonary arteries. There is no filling defect of the pulmonary arteries to the segmental level to suggest pulmonary thromboembolism.  Pulmonary artery is not enlarged in this patient with reported history of pulmonary artery hypertension.    Crissy/Mediastinum: No pathologic eliazar enlargement.    Airways: Patent.    Lungs/Pleura: Minimal bilateral ground-glass densities, new from prior, potentially related to hypoventilatory changes versus minimal interstitial edema or pneumonitis.  No focal consolidation, pleural effusion, or pneumothorax.    Esophagus: Normal.    Upper Abdomen: No abnormality of the partially imaged upper abdomen.  Small splenule noted.    Bones: No acute fracture. No suspicious lytic or sclerotic lesions.  Degenerative changes of the spine.  Impression: No evidence of pulmonary thromboembolism to the segmental level.    Minimal bilateral ground-glass densities, potentially related to interstitial edema or pneumonitis.    Enlarged and heterogeneous appearance of the thyroid gland, similar in appearance to prior studies.  Suggest correlation with  thyroid ultrasound and thyroid biopsy in 2020.    Electronically signed by resident: Roxy Cheung  Date:    10/09/2024  Time:    21:37    Electronically signed by: Erick Pinedo MD  Date:    10/09/2024  Time:    22:21  X-Ray Chest AP Portable  Narrative: EXAMINATION:  XR CHEST AP PORTABLE    CLINICAL HISTORY:  Other forms of dyspnea    TECHNIQUE:  Single frontal view of the chest was performed.    COMPARISON:  01/15/2024    FINDINGS:  The cardiomediastinal silhouette is not enlarged noting calcification of the aorta..  There is no pleural effusion.  The trachea is midline.  The lungs are symmetrically expanded bilaterally with coarse interstitial attenuation accentuated by habitus..  No large focal consolidation seen.  There is no pneumothorax.  The osseous structures are remarkable for degenerative change..  Impression: 1. Interstitial findings are accentuated by habitus, no large focal consolidation.    Electronically signed by: Preston Hitchcock MD  Date:    10/09/2024  Time:    18:08

## 2024-10-10 NOTE — ASSESSMENT & PLAN NOTE
Has anxiety, depression, and grief, following with psychiatry  - Continue home Abilify 10 mg daily, Lexapro 10 mg daily, Wellbutrin 150 mg daily, and trazodone 100 mg QHS

## 2024-10-10 NOTE — ASSESSMENT & PLAN NOTE
Patients blood pressure range in the last 24 hours was: BP  Min: 128/79  Max: 149/67.The patient's inpatient anti-hypertensive regimen is listed below:  Current Antihypertensives  metoprolol succinate (TOPROL-XL) 24 hr split tablet 12.5 mg, Daily, Oral  lisinopriL tablet 5 mg, Daily, Oral  furosemide tablet 20 mg, Daily, Oral  Plan  - BP is controlled, no changes needed to their regimen

## 2024-10-10 NOTE — ASSESSMENT & PLAN NOTE
Reports occasionally getting up to stand then getting shaky/weak throughout her entire body, at times resulting in minor falls. Denies syncope/LOC. Reports this happened years ago and resolved on its own. She is not sure what the cause was or is. No focal weakness/deficit.   - Check orthostatic BP  - Accuchecks AC/HS  - Telemetry   - Fall precautions  - IV abx for UTI  - Consider need for PT/OT/DME recommendations  - PCP follow up

## 2024-10-10 NOTE — H&P
Danial sarita - Emergency Dept  [facility] Medicine  History & Physical    Patient Name: Devika Bustillo  MRN: [id number]  Patient Class: OP- Observation  Admission Date: 10/9/2024  Attending Physician: Cesar Lovett MD   Primary Care Provider: Gwendolyn, Primary Doctor         Patient information was obtained from patient, past medical records, and ER records.     Subjective:     Principal Problem:Acute hypoxemic respiratory failure    Chief Complaint:   Chief Complaint   Patient presents with    Shortness of Breath     X1 week. Hx.of PE and pulmonary hypertension. Also reporting daughter passed away recently and has been experiencing anxiety.         HPI: Devika Bustillo is a 64 y.o. female with PMHx of asthma, DM, HTN, HLD, PE, Pulmonary HTN, obesity, HFpEF who presents to [facility] [place], [place] ED for SOB. She saw her PCP today and was noted to be saturating 88% on room air per chart review. She reports feeling increasingly SOB both with exertion and now at rest for the past one week. She also reports feeling tremulous on position change with some minor falls, no head or other injury or loss of consciousness. She reports similar symptoms previously but that it had resolved on its own. Denies fever, chills, cough, chest pain, palpitations, LE swelling, nausea, vomiting. She reports urinary frequency. Denies dysuria, hematuria, urgency. She reports feeling grief regarding the passing of her daughter from gastric cancer recently at this hospital. She reports having social support and is also seeing a psychiatrist. She is not sure what medications she takes daily. She left them at home.      ED: Saturating % on room air at rest. O2 sats dropped to 82% on room air with ambulation. Otherwise HDS and afebrile. BNP 50. Troponin 0.006. CMP and CBC unremarkable. UA with 3+ leukocytes, 64 WBC and moderate bacteria. EKG NSR, rate 63. CTA without PE, but with min bilateral ground glass densities potentially related to interstitial edema or pneumonitis. She  was  given duonebs, IV steroids, and  IV ceftriaxone. Admitted to .    Past Medical History:   Diagnosis Date    Asthma     Back pain     Diabetes mellitus     Grade II diastolic dysfunction     Hyperlipidemia     Hypertension     Neuropathy     Obesity     Pulmonary embolism 1998    Pulmonary hypertension        Past Surgical History:   Procedure Laterality Date    ANGIOGRAM, CORONARY, WITH LEFT HEART CATHETERIZATION Left 1/17/2024    Procedure: Angiogram, Coronary, with Left Heart Cath;  Surgeon: Joe Marr MD;  Location: [facility] CATH LAB;  Service: Cardiology;  Laterality: Left;    CYST REMOVAL      HERNIA REPAIR      HYSTERECTOMY      TONSILLECTOMY      TUBAL LIGATION         Review of patient's allergies indicates:  No Known Allergies    No current facility-administered medications on file prior to encounter.     Current Outpatient Medications on File Prior to Encounter   Medication Sig    acetaminophen (TYLENOL) 500 MG tablet Take 2 tablets (1,000 mg total) by mouth every 8 (eight) hours as needed for Pain.    albuterol (VENTOLIN HFA) 90 mcg/actuation inhaler Inhale 1-2 puffs into the lungs every 6 (six) hours as needed for Wheezing or Shortness of Breath. Rescue    ARIPiprazole (ABILIFY) 10 MG Tab Take 1 tablet (10 mg total) by mouth once daily.    aspirin (ECOTRIN) 81 MG EC tablet Take 81 mg by mouth once daily.    atorvastatin (LIPITOR) 40 MG tablet Take 40 mg by mouth every evening.    blood sugar diagnostic Strp To check BG 4 times daily, to use with insurance preferred meter    blood-glucose meter kit To check BG 4 times daily, to use with insurance preferred meter    buPROPion (WELLBUTRIN XL) 150 MG TB24 tablet Take 1 tablet (150 mg total) by mouth once daily.    clobetasol 0.05% (TEMOVATE) 0.05 % Oint Apply topically 2 (two) times daily.    DUPIXENT  mg/2 mL PnIj INJECT 300MG UNDER THE SKIN EVERY OTHER WEEK AS DIRECTED    EScitalopram oxalate (LEXAPRO) 10 MG tablet Take 1 tablet (10 mg  "total) by mouth once daily.    gabapentin (NEURONTIN) 300 MG capsule Take 300 mg by mouth 2 (two) times daily as needed.    insulin glargine, TOUJEO, (TOUJEO SOLOSTAR U-300 INSULIN) 300 unit/mL (1.5 mL) InPn pen Inject 50 Units into the skin once daily.    lancets Misc To check BG 4 times daily, to use with insurance preferred meter    LASIX 20 mg tablet Take 1 tablet (20 mg total) by mouth as needed (lower extremity swelling).    lisinopriL (PRINIVIL,ZESTRIL) 5 MG tablet Take 1 tablet (5 mg total) by mouth once daily.    loratadine (CLARITIN) 10 mg tablet Take 10 mg by mouth daily as needed for Allergies.    metoprolol succinate (TOPROL-XL) 25 MG 24 hr tablet Take 0.5 tablets (12.5 mg total) by mouth once daily.    pantoprazole (PROTONIX) 40 MG tablet Take 1 tablet (40 mg total) by mouth 2 (two) times daily before meals.    pen needle, diabetic 31 gauge x 3/16" Ndle 1 each.    pen needle, diabetic 32 gauge x 5/32" Ndle Use to inject insulin four times daily    polyethylene glycol (GLYCOLAX) 17 gram/dose powder Use cap to measure 17 grams, mix in liquid and take by mouth 2 (two) times daily as needed for Constipation.    potassium chloride (KLOR-CON) 10 MEQ TbSR Take 1 tablet (10 mEq total) by mouth as needed (take 1 tablet when you take lasix (furosemide)).    prednisoLONE acetate (PRED FORTE) 1 % DrpS INSTILL 1 DROP 4 TIMES DAILY INTO LEFT EYE FOR 2 WEEKS, THEN 1 DROP 3 TIMES DAILY FOR 2 WEEKS, THEN 1 DROP TWICE DAILY FOR 2 WEEKS    senna-docusate 8.6-50 mg (SENNA WITH DOCUSATE SODIUM) 8.6-50 mg per tablet Take 1 tablet by mouth 2 (two) times daily as needed for Constipation.    SYMBICORT 160-4.5 mcg/actuation HFAA Inhale 2 puffs into the lungs 2 (two) times daily.    topiramate (TOPAMAX) 100 MG tablet Take 50 mg by mouth 2 (two) times daily.    traZODone (DESYREL) 100 MG tablet Take 1 tablet (100 mg total) by mouth every evening.    TRULICITY 3 mg/0.5 mL pen injector Inject 3 mg into the skin every 7 days. " Fridays     Family History       Problem Relation (Age of Onset)    Kidney disease Mother, Father    Stroke Maternal Uncle          Tobacco Use    Smoking status: Never    Smokeless tobacco: Never   Substance and Sexual Activity    Alcohol use: No    Drug use: No    Sexual activity: Not Currently     Partners: Male     Review of Systems   Constitutional:  Negative for chills and fever.   HENT:  Negative for congestion and trouble swallowing.    Eyes:  Negative for visual disturbance.   Respiratory:  Positive for shortness of breath. Negative for cough, chest tightness and wheezing.    Cardiovascular:  Negative for chest pain, palpitations and leg swelling.   Gastrointestinal:  Negative for abdominal pain, nausea and vomiting.   Genitourinary:  Positive for frequency. Negative for dysuria, hematuria and urgency.   Musculoskeletal:  Negative for arthralgias and joint swelling. Gait problem: falls.  Skin:  Negative for rash and wound.   Neurological:  Negative for syncope, weakness, light-headedness, numbness and headaches.   Psychiatric/Behavioral:  Positive for dysphoric mood and sleep disturbance. Negative for confusion, hallucinations and suicidal ideas. The patient is nervous/anxious.      Objective:     Vital Signs (Most Recent):  Temp: 98.1 °F (36.7 °C) (10/10/24 0000)  Pulse: 64 (10/10/24 0032)  Resp: 18 (10/10/24 0032)  BP: (!) 139/56 (10/10/24 0032)  SpO2: 96 % (10/10/24 0032) Vital Signs (24h Range):  Temp:  [98.1 °F (36.7 °C)-98.5 °F (36.9 °C)] 98.1 °F (36.7 °C)  Pulse:  [60-71] 64  Resp:  [15-20] 18  SpO2:  [82 %-100 %] 96 %  BP: (128-149)/(56-82) 139/56     Weight: 106 kg (233 lb 11.2 oz)  Body mass index is 35.53 kg/m².     Physical Exam  Vitals and nursing note reviewed.   Constitutional:       General: She is not in acute distress.     Appearance: She is obese.   HENT:      Head: Normocephalic and atraumatic.      Nose: Nose normal.      Mouth/Throat:      Pharynx: No oropharyngeal exudate.   Eyes:       Extraocular Movements: Extraocular movements intact.      Conjunctiva/sclera: Conjunctivae normal.   Cardiovascular:      Rate and Rhythm: Normal rate and regular rhythm.      Heart sounds: Normal heart sounds.   Pulmonary:      Effort: Pulmonary effort is normal. No respiratory distress.      Breath sounds: Normal breath sounds.      Comments: Saturating % on bedside monitor at rest on room air.  Abdominal:      General: Bowel sounds are normal. There is no distension.      Palpations: Abdomen is soft.      Tenderness: There is no abdominal tenderness.   Musculoskeletal:      Cervical back: Normal range of motion.      Right lower leg: No edema.      Left lower leg: No edema.   Skin:     General: Skin is warm and dry.   Neurological:      General: No focal deficit present.      Mental Status: She is alert and oriented to person, place, and time.   Psychiatric:         Speech: Speech normal.         Behavior: Behavior normal.         Thought Content: Thought content normal.                Significant Labs: All pertinent labs within the past 24 hours have been reviewed.  CBC:   Recent Labs   Lab 10/09/24  1855   WBC 10.35   HGB 12.2   HCT 37.7        CMP:   Recent Labs   Lab 10/09/24  1855      K 3.7      CO2 21*      BUN 29*   CREATININE 1.2   CALCIUM 9.7   PROT 8.4   ALBUMIN 3.4*   BILITOT 0.3   ALKPHOS 165*   AST 21   ALT 44   ANIONGAP 9     Cardiac Markers:   Recent Labs   Lab 10/09/24  1855   BNP 50     Troponin:   Recent Labs   Lab 10/09/24  1855   TROPONINI 0.006     Urine Studies:   Recent Labs   Lab 10/09/24  2005   COLORU Yellow   APPEARANCEUA Hazy*   PHUR 6.0   SPECGRAV 1.020   PROTEINUA 1+*   GLUCUA Negative   KETONESU Negative   BILIRUBINUA Negative   OCCULTUA 1+*   NITRITE Negative   LEUKOCYTESUR 3+*   RBCUA 3   WBCUA 64*   BACTERIA Moderate*   SQUAMEPITHEL 3   HYALINECASTS 9*       Significant Imaging: I have reviewed all pertinent imaging results/findings within the  past 24 hours.  CTA Chest Non-Coronary (PE Studies)  Narrative: EXAMINATION:  CTA CHEST NON CORONARY (PE STUDIES)    CLINICAL HISTORY:  Pulmonary embolism (PE) suspected, high prob;    TECHNIQUE:  Low dose axial images, sagittal and coronal reformations were obtained from the thoracic inlet to the lung bases following the IV administration of 100 mL of Omnipaque 350.  MIP images were performed.    COMPARISON:  CTA chest 01/15/2024.    FINDINGS:  Base of Neck: Enlarged, heterogeneous thyroid gland, similar to prior.    Thoracic soft tissues: Mild body wall edema.    Aorta/vasculature: Left-sided aortic arch.  No aneurysm.  Mild calcific atherosclerosis.    Heart: Normal size. No effusion.  Coronary artery and mitral annular calcifications.  No abnormal bowing of the interventricular septum to indicate right heart strain.    Pulmonary vasculature: This study is satisfactory for the evaluation of the pulmonary arteries. There is no filling defect of the pulmonary arteries to the segmental level to suggest pulmonary thromboembolism.  Pulmonary artery is not enlarged in this patient with reported history of pulmonary artery hypertension.    Crissy/Mediastinum: No pathologic eliazar enlargement.    Airways: Patent.    Lungs/Pleura: Minimal bilateral ground-glass densities, new from prior, potentially related to hypoventilatory changes versus minimal interstitial edema or pneumonitis.  No focal consolidation, pleural effusion, or pneumothorax.    Esophagus: Normal.    Upper Abdomen: No abnormality of the partially imaged upper abdomen.  Small splenule noted.    Bones: No acute fracture. No suspicious lytic or sclerotic lesions.  Degenerative changes of the spine.  Impression: No evidence of pulmonary thromboembolism to the segmental level.    Minimal bilateral ground-glass densities, potentially related to interstitial edema or pneumonitis.    Enlarged and heterogeneous appearance of the thyroid gland, similar in appearance to  prior studies.  Suggest correlation with thyroid ultrasound and thyroid biopsy in 2020.    Electronically signed by resident: Roxy Cheung  Date:    10/09/2024  Time:    21:37    Electronically signed by: Erick Pinedo MD  Date:    10/09/2024  Time:    22:21  X-Ray Chest AP Portable  Narrative: EXAMINATION:  XR CHEST AP PORTABLE    CLINICAL HISTORY:  Other forms of dyspnea    TECHNIQUE:  Single frontal view of the chest was performed.    COMPARISON:  01/15/2024    FINDINGS:  The cardiomediastinal silhouette is not enlarged noting calcification of the aorta..  There is no pleural effusion.  The trachea is midline.  The lungs are symmetrically expanded bilaterally with coarse interstitial attenuation accentuated by habitus..  No large focal consolidation seen.  There is no pneumothorax.  The osseous structures are remarkable for degenerative change..  Impression: 1. Interstitial findings are accentuated by habitus, no large focal consolidation.    Electronically signed by: Preston Hitchcock MD  Date:    10/09/2024  Time:    18:08     Assessment/Plan:     * Acute hypoxemic respiratory failure  Patient with Hypoxic Respiratory failure which is Acute.  she is not on home oxygen. Supplemental oxygen was provided and noted- Oxygen Concentration (%):  [32] 32  Signs/symptoms of respiratory failure include- increased work of breathing, SOB. Contributing diagnoses includes -  asthma, pulmonary HTN, obesity  Labs and images were reviewed. Patient Has not had a recent ABG. Will treat underlying causes and adjust management of respiratory failure as follows  - Saturating 82% on room air when ambulating. Stable >94% on room air at rest.  - CTA without acute PE. Possible pneumonitis or interstitial edema  - BNP and troponin normal. EKG NSR  - Possible asthma exacerbation  - Check flu/covid/RSV  - s/p duo nebs and steroids in ED  - Continue scheduled and PRN duo nebs as well as prednisone 40mg daily  - 6 minute walk test   -  "Supplemental oxygen, wean as tolerated     Acute cystitis without hematuria  - Reports urinary frequency only  - Afebrile, no leukocytosis   - S/p IV ceftriaxone 1g in ED  - Continue IV ceftriaxone for now  - Follow urine culture     Stage 3 chronic kidney disease  Creatine stable for now. BMP reviewed- noted Estimated Creatinine Clearance: 61.9 mL/min (based on SCr of 1.2 mg/dL). according to latest data. Based on current GFR, CKD stage is stage 3 - GFR 30-59.  Monitor UOP and serial BMP and adjust therapy as needed. Renally dose meds. Avoid nephrotoxic medications and procedures.    Anxiety  Has anxiety, depression, and grief, following with psychiatry  - Continue home Abilify 10 mg daily, Lexapro 10 mg daily, Wellbutrin 150 mg daily, and trazodone 100 mg QHS    Chronic diastolic heart failure  - Does not appear hypervolemic   - Based on home medication list will order lasix 20 mg daily, lisinopril 5 mg daily, metoprolol 12.5 daily (will need to verify her home medications as she is not actually sure what she is taking and did not bring them with her)  Results for orders placed during the hospital encounter of 11/03/21    Echo    Interpretation Summary  · The left ventricle is normal in size with normal systolic function.  · The estimated ejection fraction is 65%.  · Grade I left ventricular diastolic dysfunction.  · Normal right ventricular size with normal right ventricular systolic function.  · Mild left atrial enlargement.  · Normal central venous pressure (3 mmHg).  · The estimated PA systolic pressure is 50 mmHg.  · There is pulmonary hypertension.       Asthma with acute exacerbation  - See acute hypoxemic respiratory failure  - PRN and scheduled duo nebs  - prednisone 40 mg daily   - supplemental oxygen as needed    Multinodular goiter  CTA chest showed: "Enlarged and heterogeneous appearance of the thyroid gland, similar in appearance to prior studies. Suggest correlation with thyroid ultrasound and " "thyroid biopsy in 2020"   - Biopsy 06/2020 shows benign follicular nodules     Dyslipidemia  - Continue statin     Gait abnormality  Reports occasionally getting up to stand then getting shaky/weak throughout her entire body, at times resulting in minor falls. Denies syncope/LOC. Reports this happened years ago and resolved on its own. She is not sure what the cause was or is. No focal weakness/deficit.   - Check orthostatic BP  - Accuchecks AC/HS  - Telemetry   - Fall precautions  - IV abx for UTI  - Consider need for PT/OT/DME recommendations  - PCP follow up    Obesity (BMI 30.0-34.9)  Body mass index is 35.53 kg/m². Morbid obesity complicates all aspects of disease management from diagnostic modalities to treatment. Weight loss encouraged and health benefits explained to patient.    Hypertension  Patients blood pressure range in the last 24 hours was: BP  Min: 128/79  Max: 149/67.The patient's inpatient anti-hypertensive regimen is listed below:  Current Antihypertensives  metoprolol succinate (TOPROL-XL) 24 hr split tablet 12.5 mg, Daily, Oral  lisinopriL tablet 5 mg, Daily, Oral  furosemide tablet 20 mg, Daily, Oral  Plan  - BP is controlled, no changes needed to their regimen      Diabetes mellitus type II  Patient's FSGs are controlled on current medication regimen.  Last A1c reviewed-   Lab Results   Component Value Date    HGBA1C 7.4 (H) 01/15/2024   Most recent fingerstick glucose reviewed- No results for input(s): "POCTGLUCOSE" in the last 24 hours.  Current correctional scale  Low  Maintain anti-hyperglycemic dose as follows-   Antihyperglycemics (From admission, onward)      Start     Stop Route Frequency Ordered    10/10/24 0141  insulin aspart U-100 pen 0-5 Units         -- SubQ Before meals & nightly PRN 10/10/24 0041        Hold Oral hypoglycemics while patient is in the hospital.  DM diet. Accuchecks AC/HS      VTE Risk Mitigation (From admission, onward)           Ordered     IP VTE HIGH RISK " PATIENT  Once         10/10/24 0041     Place sequential compression device  Until discontinued         10/10/24 0041     Reason for No Pharmacological VTE Prophylaxis  Once        Question:  Reasons:  Answer:  Already adequately anticoagulated on oral Anticoagulants    10/10/24 0041                         On 10/10/2024, patient should be placed in hospital observation services under my care in collaboration with Dallas Crespo MD.           Roxanne Goodwin PA-C  Department of Hospital Medicine  [facility] - Emergency Dept

## 2024-10-10 NOTE — ASSESSMENT & PLAN NOTE
"Patient's FSGs are controlled on current medication regimen.  Last A1c reviewed-   Lab Results   Component Value Date    HGBA1C 7.4 (H) 01/15/2024   Most recent fingerstick glucose reviewed- No results for input(s): "POCTGLUCOSE" in the last 24 hours.  Current correctional scale  Low  Maintain anti-hyperglycemic dose as follows-   Antihyperglycemics (From admission, onward)      Start     Stop Route Frequency Ordered    10/10/24 0141  insulin aspart U-100 pen 0-5 Units         -- SubQ Before meals & nightly PRN 10/10/24 0041        Hold Oral hypoglycemics while patient is in the hospital.  DM diet. Accuchecks AC/HS  "

## 2024-10-11 VITALS
RESPIRATION RATE: 16 BRPM | BODY MASS INDEX: 33.31 KG/M2 | HEIGHT: 68 IN | OXYGEN SATURATION: 95 % | TEMPERATURE: 97 F | HEART RATE: 79 BPM | SYSTOLIC BLOOD PRESSURE: 176 MMHG | WEIGHT: 219.81 LBS | DIASTOLIC BLOOD PRESSURE: 72 MMHG

## 2024-10-11 LAB
ALBUMIN SERPL BCP-MCNC: 2.7 G/DL (ref 3.5–5.2)
ALP SERPL-CCNC: 126 U/L (ref 55–135)
ALT SERPL W/O P-5'-P-CCNC: 27 U/L (ref 10–44)
ANION GAP SERPL CALC-SCNC: 7 MMOL/L (ref 8–16)
AST SERPL-CCNC: 10 U/L (ref 10–40)
BASOPHILS # BLD AUTO: 0.03 K/UL (ref 0–0.2)
BASOPHILS NFR BLD: 0.3 % (ref 0–1.9)
BILIRUB SERPL-MCNC: 0.2 MG/DL (ref 0.1–1)
BUN SERPL-MCNC: 44 MG/DL (ref 8–23)
CALCIUM SERPL-MCNC: 8.8 MG/DL (ref 8.7–10.5)
CHLORIDE SERPL-SCNC: 109 MMOL/L (ref 95–110)
CO2 SERPL-SCNC: 21 MMOL/L (ref 23–29)
CREAT SERPL-MCNC: 1.4 MG/DL (ref 0.5–1.4)
DIFFERENTIAL METHOD BLD: ABNORMAL
EOSINOPHIL # BLD AUTO: 0 K/UL (ref 0–0.5)
EOSINOPHIL NFR BLD: 0.1 % (ref 0–8)
ERYTHROCYTE [DISTWIDTH] IN BLOOD BY AUTOMATED COUNT: 13.5 % (ref 11.5–14.5)
EST. GFR  (NO RACE VARIABLE): 42.5 ML/MIN/1.73 M^2
GLUCOSE SERPL-MCNC: 215 MG/DL (ref 70–110)
HCT VFR BLD AUTO: 29.9 % (ref 37–48.5)
HGB BLD-MCNC: 9.7 G/DL (ref 12–16)
IMM GRANULOCYTES # BLD AUTO: 0.04 K/UL (ref 0–0.04)
IMM GRANULOCYTES NFR BLD AUTO: 0.4 % (ref 0–0.5)
LYMPHOCYTES # BLD AUTO: 2.1 K/UL (ref 1–4.8)
LYMPHOCYTES NFR BLD: 19.1 % (ref 18–48)
MAGNESIUM SERPL-MCNC: 2.1 MG/DL (ref 1.6–2.6)
MCH RBC QN AUTO: 27.8 PG (ref 27–31)
MCHC RBC AUTO-ENTMCNC: 32.4 G/DL (ref 32–36)
MCV RBC AUTO: 86 FL (ref 82–98)
MONOCYTES # BLD AUTO: 0.8 K/UL (ref 0.3–1)
MONOCYTES NFR BLD: 7.5 % (ref 4–15)
NEUTROPHILS # BLD AUTO: 7.9 K/UL (ref 1.8–7.7)
NEUTROPHILS NFR BLD: 72.6 % (ref 38–73)
NRBC BLD-RTO: 0 /100 WBC
PHOSPHATE SERPL-MCNC: 4.4 MG/DL (ref 2.7–4.5)
PLATELET # BLD AUTO: 263 K/UL (ref 150–450)
PMV BLD AUTO: 10 FL (ref 9.2–12.9)
POCT GLUCOSE: 192 MG/DL (ref 70–110)
POCT GLUCOSE: 242 MG/DL (ref 70–110)
POTASSIUM SERPL-SCNC: 3.9 MMOL/L (ref 3.5–5.1)
PROT SERPL-MCNC: 6.6 G/DL (ref 6–8.4)
RBC # BLD AUTO: 3.49 M/UL (ref 4–5.4)
SODIUM SERPL-SCNC: 137 MMOL/L (ref 136–145)
WBC # BLD AUTO: 10.9 K/UL (ref 3.9–12.7)

## 2024-10-11 PROCEDURE — 94640 AIRWAY INHALATION TREATMENT: CPT | Mod: XB

## 2024-10-11 PROCEDURE — 84100 ASSAY OF PHOSPHORUS: CPT

## 2024-10-11 PROCEDURE — 63600175 PHARM REV CODE 636 W HCPCS

## 2024-10-11 PROCEDURE — G0378 HOSPITAL OBSERVATION PER HR: HCPCS

## 2024-10-11 PROCEDURE — 85025 COMPLETE CBC W/AUTO DIFF WBC: CPT

## 2024-10-11 PROCEDURE — 80053 COMPREHEN METABOLIC PANEL: CPT

## 2024-10-11 PROCEDURE — 25000242 PHARM REV CODE 250 ALT 637 W/ HCPCS

## 2024-10-11 PROCEDURE — 36415 COLL VENOUS BLD VENIPUNCTURE: CPT

## 2024-10-11 PROCEDURE — 83735 ASSAY OF MAGNESIUM: CPT

## 2024-10-11 PROCEDURE — 25000003 PHARM REV CODE 250: Performed by: PHYSICIAN ASSISTANT

## 2024-10-11 PROCEDURE — 99900035 HC TECH TIME PER 15 MIN (STAT)

## 2024-10-11 PROCEDURE — 25000003 PHARM REV CODE 250

## 2024-10-11 PROCEDURE — 94761 N-INVAS EAR/PLS OXIMETRY MLT: CPT

## 2024-10-11 RX ORDER — IPRATROPIUM BROMIDE AND ALBUTEROL SULFATE 2.5; .5 MG/3ML; MG/3ML
3 SOLUTION RESPIRATORY (INHALATION) EVERY 6 HOURS PRN
Qty: 90 ML | Refills: 2 | Status: SHIPPED | OUTPATIENT
Start: 2024-10-11 | End: 2024-11-10

## 2024-10-11 RX ORDER — IPRATROPIUM BROMIDE AND ALBUTEROL SULFATE 2.5; .5 MG/3ML; MG/3ML
3 SOLUTION RESPIRATORY (INHALATION) EVERY 6 HOURS PRN
Qty: 75 ML | Refills: 2 | Status: SHIPPED | OUTPATIENT
Start: 2024-10-11 | End: 2024-10-11

## 2024-10-11 RX ORDER — CEFDINIR 300 MG/1
300 CAPSULE ORAL 2 TIMES DAILY
Qty: 16 CAPSULE | Refills: 0 | Status: SHIPPED | OUTPATIENT
Start: 2024-10-11 | End: 2024-10-11

## 2024-10-11 RX ORDER — CEFDINIR 300 MG/1
300 CAPSULE ORAL 2 TIMES DAILY
Qty: 16 CAPSULE | Refills: 0 | Status: SHIPPED | OUTPATIENT
Start: 2024-10-11 | End: 2024-10-19

## 2024-10-11 RX ADMIN — PREDNISONE 40 MG: 20 TABLET ORAL at 08:10

## 2024-10-11 RX ADMIN — FLUTICASONE FUROATE AND VILANTEROL TRIFENATATE 1 PUFF: 100; 25 POWDER RESPIRATORY (INHALATION) at 09:10

## 2024-10-11 RX ADMIN — IPRATROPIUM BROMIDE AND ALBUTEROL SULFATE 3 ML: 2.5; .5 SOLUTION RESPIRATORY (INHALATION) at 09:10

## 2024-10-11 RX ADMIN — ESCITALOPRAM OXALATE 10 MG: 10 TABLET ORAL at 08:10

## 2024-10-11 RX ADMIN — ARIPIPRAZOLE 10 MG: 10 TABLET ORAL at 08:10

## 2024-10-11 RX ADMIN — ASPIRIN 81 MG: 81 TABLET, COATED ORAL at 08:10

## 2024-10-11 RX ADMIN — SENNOSIDES AND DOCUSATE SODIUM 1 TABLET: 50; 8.6 TABLET ORAL at 08:10

## 2024-10-11 RX ADMIN — POLYETHYLENE GLYCOL 3350 17 G: 17 POWDER, FOR SOLUTION ORAL at 08:10

## 2024-10-11 RX ADMIN — LISINOPRIL 5 MG: 2.5 TABLET ORAL at 08:10

## 2024-10-11 RX ADMIN — METOPROLOL SUCCINATE 12.5 MG: 25 TABLET, EXTENDED RELEASE ORAL at 08:10

## 2024-10-11 RX ADMIN — BUPROPION HYDROCHLORIDE 150 MG: 150 TABLET, EXTENDED RELEASE ORAL at 08:10

## 2024-10-11 RX ADMIN — PANTOPRAZOLE SODIUM 40 MG: 40 TABLET, DELAYED RELEASE ORAL at 06:10

## 2024-10-11 RX ADMIN — FUROSEMIDE 20 MG: 20 TABLET ORAL at 08:10

## 2024-10-11 NOTE — ASSESSMENT & PLAN NOTE
- Reports urinary frequency only  - Afebrile, no leukocytosis   - CTX x2 in hospital, continue cefdinir at discharge for remaining course  - Ucx with gram negative rods, no hx of complicated UTIs

## 2024-10-11 NOTE — ASSESSMENT & PLAN NOTE
Patient's FSGs are controlled on current medication regimen.  Last A1c reviewed-   Lab Results   Component Value Date    HGBA1C 7.6 (H) 10/10/2024   Most recent fingerstick glucose reviewed-   Recent Labs   Lab 10/10/24  1617 10/10/24  2031 10/11/24  0815 10/11/24  1113   POCTGLUCOSE 325* 339* 192* 242*     Current correctional scale  Low  Maintain anti-hyperglycemic dose as follows-   Antihyperglycemics (From admission, onward)      Start     Stop Route Frequency Ordered    10/10/24 2100  insulin glargine U-100 (Lantus) pen 20 Units         -- SubQ Nightly 10/10/24 1125    10/10/24 0141  insulin aspart U-100 pen 0-5 Units         -- SubQ Before meals & nightly PRN 10/10/24 0041        Restart home meds

## 2024-10-11 NOTE — HOSPITAL COURSE
Patient admitted to hospital medicine for respiratory failure 2/2 asthma exacerbation. Started on duonebs and steroids with significant improvement of symptoms. Weaned off supplemental oxygen. Patient also found to have a UTI, started on CTX. Ucx with gram negative rods. Patient stable for discharge home with Cefdinir 300mg PO BID to remainder of Abx course and nebulizer/duonebs. Discussed care plan with patient, verbalized understanding. All questions answered.

## 2024-10-11 NOTE — ASSESSMENT & PLAN NOTE
Reports occasionally getting up to stand then getting shaky/weak throughout her entire body, at times resulting in minor falls. Denies syncope/LOC. Reports this happened years ago and resolved on its own. She is not sure what the cause was or is. No focal weakness/deficit.   - orthostatic BP negative  - IV abx for UTI  - patient uses walker at baseline  - outpatient PT  - PCP follow up

## 2024-10-11 NOTE — ASSESSMENT & PLAN NOTE
- See acute hypoxemic respiratory failure  - PRN duo nebs  - prednisone 40 mg daily; not continued at discharge

## 2024-10-11 NOTE — DISCHARGE SUMMARY
[facility] [address] [place] [address] Medicine  Discharge Summary      Patient Name: Devika Bustillo  MRN: [id number]  TAURUS: [id number]  Patient Class: OP- Observation  Admission Date: 10/9/2024  Hospital Length of Stay: 0 days  Discharge Date and Time: 10/11/2024  2:10 PM  Attending Physician: Gwendolyn att. providers found   Discharging Provider: Zabrina Hess PA-C  Primary Care Provider: Gwendolyn Primary Doctor  [facility] Medicine Team: [facility] MED F Zabrina Hess PA-C  Primary Care Team: [facility] MED F    HPI:   Devika Bustillo is a 64 y.o. female with PMHx of asthma, DM, HTN, HLD, PE, Pulmonary HTN, obesity, HFpEF who presents to [facility] ED for SOB. She saw her PCP today and was noted to be saturating 88% on room air per chart review. She reports feeling increasingly SOB both with exertion and now at rest for the past one week. She also reports feeling tremulous on position change with some minor falls, no head or other injury or loss of consciousness. She reports similar symptoms previously but that it had resolved on its own. Denies fever, chills, cough, chest pain, palpitations, LE swelling, nausea, vomiting. She reports urinary frequency. Denies dysuria, hematuria, urgency. She reports feeling grief regarding the passing of her daughter from gastric cancer recently at this hospital. She reports having social support and is also seeing a psychiatrist. She is not sure what medications she takes daily. She left them at home.      ED: Saturating % on room air at rest. O2 sats dropped to 82% on room air with ambulation. Otherwise HDS and afebrile. BNP 50. Troponin 0.006. CMP and CBC unremarkable. UA with 3+ leukocytes, 64 WBC and moderate bacteria. EKG NSR, rate 63. CTA without PE, but with min bilateral ground glass densities potentially related to interstitial edema or pneumonitis. She was  given duonebs, IV steroids, and  IV ceftriaxone. Admitted to .    * No surgery found *      Hospital Course:   Patient admitted to  hospital medicine for respiratory failure 2/2 asthma exacerbation. Started on duonebs and steroids with significant improvement of symptoms. Weaned off supplemental oxygen. Patient also found to have a UTI, started on CTX. Ucx with gram negative rods. Patient stable for discharge home with Cefdinir 300mg PO BID to remainder of Abx course and nebulizer/duonebs. Discussed care plan with patient, verbalized understanding. All questions answered.        Goals of Care Treatment Preferences:  Code Status: Full Code         Consults:     Pulmonary  * Acute hypoxemic respiratory failure  Patient with Hypoxic Respiratory failure which is Acute.  she is not on home oxygen. Supplemental oxygen was provided and noted-    Signs/symptoms of respiratory failure include- increased work of breathing, SOB. Contributing diagnoses includes -  asthma, pulmonary HTN, obesity  Labs and images were reviewed. Patient Has not had a recent ABG. Will treat underlying causes and adjust management of respiratory failure as follows  - Saturating 82% on room air when ambulating. Stable >94% on room air at rest.  - CTA without acute PE. Possible pneumonitis or interstitial edema  - BNP and troponin normal. EKG NSR  - Possible asthma exacerbation  - flu/covid/RSV negative  - s/p duo nebs and steroids in ED  - Continue scheduled and PRN duo nebs as well as prednisone 40mg daily while in hospital with improvement of symptoms  - stable for discharge home with nebulizer/duonebs prn    Asthma with acute exacerbation  - See acute hypoxemic respiratory failure  - PRN duo nebs  - prednisone 40 mg daily; not continued at discharge    Cardiac/Vascular  Chronic diastolic heart failure  - Does not appear hypervolemic   - continue home medication list will order lasix 20 mg daily, lisinopril 5 mg daily, metoprolol 12.5 daily    Results for orders placed during the hospital encounter of 10/09/24    Echo    Interpretation Summary    Left Ventricle: The left  ventricle is normal in size. Normal wall thickness. Normal wall motion. There is hyperdynamic systolic function with a visually estimated ejection fraction of greater than 70%. There is normal diastolic function.    Right Ventricle: Normal right ventricular cavity size. Wall thickness is normal. Systolic function is normal.    Left Atrium: Left atrium is moderately dilated.    Mitral Valve: There is moderate mitral annular calcification present.    Tricuspid Valve: There is mild regurgitation.    IVC/SVC: Normal venous pressure at 3 mmHg.      Renal/  Acute cystitis without hematuria  - Reports urinary frequency only  - Afebrile, no leukocytosis   - CTX x2 in hospital, continue cefdinir at discharge for remaining course  - Ucx with gram negative rods, no hx of complicated UTIs    Endocrine  Diabetes mellitus type II  Patient's FSGs are controlled on current medication regimen.  Last A1c reviewed-   Lab Results   Component Value Date    HGBA1C 7.6 (H) 10/10/2024   Most recent fingerstick glucose reviewed-   Recent Labs   Lab 10/10/24  1617 10/10/24  2031 10/11/24  0815 10/11/24  1113   POCTGLUCOSE 325* 339* 192* 242*     Current correctional scale  Low  Maintain anti-hyperglycemic dose as follows-   Antihyperglycemics (From admission, onward)      Start     Stop Route Frequency Ordered    10/10/24 2100  insulin glargine U-100 (Lantus) pen 20 Units         -- SubQ Nightly 10/10/24 1125    10/10/24 0141  insulin aspart U-100 pen 0-5 Units         -- SubQ Before meals & nightly PRN 10/10/24 0041        Restart home meds    Other  Gait abnormality  Reports occasionally getting up to stand then getting shaky/weak throughout her entire body, at times resulting in minor falls. Denies syncope/LOC. Reports this happened years ago and resolved on its own. She is not sure what the cause was or is. No focal weakness/deficit.   - orthostatic BP negative  - IV abx for UTI  - patient uses walker at baseline  - outpatient PT  -  "PCP follow up      Final Active Diagnoses:    Diagnosis Date Noted POA    PRINCIPAL PROBLEM:  Acute hypoxemic respiratory failure [J96.01] 10/10/2024 Yes    Acute cystitis without hematuria [N30.00] 10/10/2024 Yes    Stage 3 chronic kidney disease [N18.30] 01/18/2024 Yes    Anxiety [F41.9] 01/16/2024 Yes    Chronic diastolic heart failure [I50.32] 06/27/2023 Yes    Asthma with acute exacerbation [J45.901] 11/04/2021 Yes    Multinodular goiter [E04.2] 05/21/2020 Yes    Gait abnormality [R26.9] 11/08/2019 Yes    Dyslipidemia [E78.5] 09/12/2014 Yes    Obesity (BMI 30.0-34.9) [E66.811] 07/08/2013 Yes    Diabetes mellitus type II [E11.9] 02/28/2013 Yes    Hypertension [I10] 02/28/2013 Yes      Problems Resolved During this Admission:       Discharged Condition: fair    Disposition: Home or Self Care    Follow Up:   Follow-up Information       [facility] Follow up.    Why: Call for free DME. You need to arrange for pickup.  Contact information:  [address] [place] [id number] [phone]  Open W-Sat 9 AM to 1 PM                         Patient Instructions:      NEBULIZER FOR HOME USE     Order Specific Question Answer Comments   Height: 5' 8" (1.727 m)    Weight: 99.7 kg (219 lb 12.8 oz)    Does patient have medical equipment at home? rollator    Does patient have medical equipment at home? shower chair    Length of need (1-99 months): 99      NEBULIZER KIT (SUPPLIES) FOR HOME USE     Order Specific Question Answer Comments   Height: 5' 8" (1.727 m)    Weight: 99.7 kg (219 lb 12.8 oz)    Does patient have medical equipment at home? rollator    Does patient have medical equipment at home? shower chair    Length of need (1-99 months): 99    Mask or Mouthpiece? Mask      Ambulatory referral/consult to Family Practice   Standing Status: Future   Referral Priority: Routine Referral Type: Consultation   Referral Reason: Specialty Services Required   Requested Specialty: Family Medicine   Number of " Visits Requested: 1     Ambulatory referral/consult to Physical/Occupational Therapy   Standing Status: Future   Referral Priority: Routine Referral Type: Physical Medicine   Referral Reason: Specialty Services Required   Number of Visits Requested: 1     Diet diabetic     Notify your health care provider if you experience any of the following:  severe uncontrolled pain     Notify your health care provider if you experience any of the following:  persistent nausea and vomiting or diarrhea     Notify your health care provider if you experience any of the following:  persistent dizziness, light-headedness, or visual disturbances     Activity as tolerated       Significant Diagnostic Studies: Labs: BMP:   Recent Labs   Lab 10/09/24  1855 10/10/24  0444 10/11/24  0659    154* 215*    136 137   K 3.7 4.1 3.9    109 109   CO2 21* 17* 21*   BUN 29* 33* 44*   CREATININE 1.2 1.1 1.4   CALCIUM 9.7 9.2 8.8   MG  --  2.0 2.1   , CMP   Recent Labs   Lab 10/09/24  1855 10/10/24  0444 10/11/24  0659    136 137   K 3.7 4.1 3.9    109 109   CO2 21* 17* 21*    154* 215*   BUN 29* 33* 44*   CREATININE 1.2 1.1 1.4   CALCIUM 9.7 9.2 8.8   PROT 8.4 7.5 6.6   ALBUMIN 3.4* 3.1* 2.7*   BILITOT 0.3 0.2 0.2   ALKPHOS 165* 154* 126   AST 21 16 10   ALT 44 33 27   ANIONGAP 9 10 7*   , and CBC   Recent Labs   Lab 10/09/24  1855 10/10/24  0444 10/11/24  0659   WBC 10.35 7.11 10.90   HGB 12.2 11.3* 9.7*   HCT 37.7 35.5* 29.9*    278 263       Pending Diagnostic Studies:       None           Medications:  Reconciled Home Medications:      Medication List        START taking these medications      albuterol-ipratropium 2.5 mg-0.5 mg/3 mL nebulizer solution  Commonly known as: DUO-NEB  Take 3 mLs by nebulization every 6 (six) hours as needed for Wheezing or Shortness of Breath. Rescue     cefdinir 300 MG capsule  Commonly known as: OMNICEF  Take 1 capsule (300 mg total) by mouth 2 (two) times daily for 8  days            CONTINUE taking these medications      acetaminophen 500 MG tablet  Commonly known as: TYLENOL  Take 2 tablets (1,000 mg total) by mouth every 8 (eight) hours as needed for Pain.     ARIPiprazole 10 MG Tab  Commonly known as: ABILIFY  Take 1 tablet (10 mg total) by mouth once daily.     aspirin 81 MG EC tablet  Commonly known as: ECOTRIN  Take 81 mg by mouth once daily.     atorvastatin 40 MG tablet  Commonly known as: LIPITOR  Take 40 mg by mouth every evening.     blood sugar diagnostic Strp  To check BG 4 times daily, to use with insurance preferred meter     blood-glucose meter kit  To check BG 4 times daily, to use with insurance preferred meter     buPROPion 150 MG TB24 tablet  Commonly known as: WELLBUTRIN XL  Take 1 tablet (150 mg total) by mouth once daily.     clobetasol 0.05% 0.05 % Oint  Commonly known as: TEMOVATE  Apply topically 2 (two) times daily.     DUPIXENT  mg/2 mL Pnij  Generic drug: dupilumab  INJECT 300MG UNDER THE SKIN EVERY OTHER WEEK AS DIRECTED     EScitalopram oxalate 10 MG tablet  Commonly known as: LEXAPRO  Take 1 tablet (10 mg total) by mouth once daily.     gabapentin 300 MG capsule  Commonly known as: NEURONTIN  Take 300 mg by mouth 2 (two) times daily as needed.     lancets Misc  To check BG 4 times daily, to use with insurance preferred meter     LASIX 20 mg tablet  Generic drug: furosemide  Take 1 tablet (20 mg total) by mouth as needed (lower extremity swelling).     lisinopriL 5 MG tablet  Commonly known as: PRINIVIL,ZESTRIL  Take 1 tablet (5 mg total) by mouth once daily.     loratadine 10 mg tablet  Commonly known as: CLARITIN  Take 10 mg by mouth daily as needed for Allergies.     metoprolol succinate 25 MG 24 hr tablet  Commonly known as: TOPROL-XL  Take 0.5 tablets (12.5 mg total) by mouth once daily.     pantoprazole 40 MG tablet  Commonly known as: PROTONIX  Take 1 tablet (40 mg total) by mouth 2 (two) times daily before meals.     * pen needle,  "diabetic 31 gauge x 3/16" Ndle  1 each.     * BD ULTRA-FINE MARY PEN NEEDLE 32 gauge x 5/32" Ndle  Generic drug: pen needle, diabetic  Use to inject insulin four times daily     polyethylene glycol 17 gram/dose powder  Commonly known as: GLYCOLAX  Use cap to measure 17 grams, mix in liquid and take by mouth 2 (two) times daily as needed for Constipation.     potassium chloride 10 MEQ Tbsr  Commonly known as: KLOR-CON  Take 1 tablet (10 mEq total) by mouth as needed (take 1 tablet when you take lasix (furosemide)).     prednisoLONE acetate 1 % Drps  Commonly known as: PRED FORTE  INSTILL 1 DROP 4 TIMES DAILY INTO LEFT EYE FOR 2 WEEKS, THEN 1 DROP 3 TIMES DAILY FOR 2 WEEKS, THEN 1 DROP TWICE DAILY FOR 2 WEEKS     STOOL SOFTENER-LAXATIVE 8.6-50 mg per tablet  Generic drug: senna-docusate 8.6-50 mg  Take 1 tablet by mouth 2 (two) times daily as needed for Constipation.     SYMBICORT 160-4.5 mcg/actuation Hfaa  Generic drug: budesonide-formoterol 160-4.5 mcg  Inhale 2 puffs into the lungs 2 (two) times daily.     topiramate 100 MG tablet  Commonly known as: TOPAMAX  Take 50 mg by mouth 2 (two) times daily.     TOUJEO SOLOSTAR U-300 INSULIN 300 unit/mL (1.5 mL) Inpn pen  Generic drug: insulin glargine (TOUJEO)  Inject 50 Units into the skin once daily.     traZODone 100 MG tablet  Commonly known as: DESYREL  Take 1 tablet (100 mg total) by mouth every evening.     TRULICITY 3 mg/0.5 mL pen injector  Generic drug: dulaglutide  Inject 3 mg into the skin every 7 days. Fridays     VENTOLIN HFA 90 mcg/actuation inhaler  Generic drug: albuterol  Inhale 1-2 puffs into the lungs every 6 (six) hours as needed for Wheezing or Shortness of Breath. Rescue           * This list has 2 medication(s) that are the same as other medications prescribed for you. Read the directions carefully, and ask your doctor or other care provider to review them with you.                  Indwelling Lines/Drains at time of discharge: "   Lines/Drains/Airways       None                   Time spent on the discharge of patient: 35 minutes of the time sent on discharge, including examining the patient, providing discharge instructions, arranging follow up, and documentation           Zabrina Hess PA-C  Department of Hospital Medicine  [facility]

## 2024-10-11 NOTE — PLAN OF CARE
[facility]  Discharge Final Note    Primary Care Provider: No, Primary Doctor    Expected Discharge Date: 10/11/2024    Future Appointments   Date Time Provider Department Center   10/23/2024  8:30 AM Catracho Kahn, NP [facility]     Pt discharged home with no services. Pt nebulizer delivered to bedside.   Robert White, RN,BSN        Final Discharge Note (most recent)       Final Note - 10/11/24 1312          Final Note    Assessment Type Final Discharge Note     Anticipated Discharge Disposition Home or Self Care     Hospital Resources/Appts/Education Provided Provided patient/caregiver with written discharge plan information;Provided education on problems/symptoms using teachback;Appointments scheduled and added to AVS        Post-Acute Status    Post-Acute Authorization E     E Status Set-up Complete/Auth obtained     Discharge Delays None known at this time                     Important Message from Medicare             Contact Info       [facility]    [address] [place] [address]  [phone]  Open W-Sat 9 AM to 1 PM       Next Steps: Follow up    Instructions: Call for free DME. You need to arrange for pickup.

## 2024-10-11 NOTE — ASSESSMENT & PLAN NOTE
Patient with Hypoxic Respiratory failure which is Acute.  she is not on home oxygen. Supplemental oxygen was provided and noted-    Signs/symptoms of respiratory failure include- increased work of breathing, SOB. Contributing diagnoses includes -  asthma, pulmonary HTN, obesity  Labs and images were reviewed. Patient Has not had a recent ABG. Will treat underlying causes and adjust management of respiratory failure as follows  - Saturating 82% on room air when ambulating. Stable >94% on room air at rest.  - CTA without acute PE. Possible pneumonitis or interstitial edema  - BNP and troponin normal. EKG NSR  - Possible asthma exacerbation  - flu/covid/RSV negative  - s/p duo nebs and steroids in ED  - Continue scheduled and PRN duo nebs as well as prednisone 40mg daily while in hospital with improvement of symptoms  - stable for discharge home with nebulizer/duonebs prn

## 2024-10-12 LAB — BACTERIA UR CULT: ABNORMAL

## 2024-10-22 ENCOUNTER — CLINICAL SUPPORT (OUTPATIENT)
Dept: REHABILITATION | Facility: HOSPITAL | Age: 64
End: 2024-10-22
Payer: MEDICAID

## 2024-10-22 DIAGNOSIS — J96.01 ACUTE HYPOXEMIC RESPIRATORY FAILURE: ICD-10-CM

## 2024-10-22 DIAGNOSIS — N30.00 ACUTE CYSTITIS WITHOUT HEMATURIA: ICD-10-CM

## 2024-10-22 DIAGNOSIS — Z74.09 IMPAIRED FUNCTIONAL MOBILITY, BALANCE, GAIT, AND ENDURANCE: Primary | ICD-10-CM

## 2024-10-22 PROCEDURE — 97162 PT EVAL MOD COMPLEX 30 MIN: CPT | Mod: PN

## 2024-10-23 ENCOUNTER — OFFICE VISIT (OUTPATIENT)
Facility: CLINIC | Age: 64
End: 2024-10-23
Payer: MEDICAID

## 2024-10-23 ENCOUNTER — LAB VISIT (OUTPATIENT)
Dept: LAB | Facility: HOSPITAL | Age: 64
End: 2024-10-23
Payer: MEDICAID

## 2024-10-23 VITALS
WEIGHT: 226.5 LBS | RESPIRATION RATE: 18 BRPM | SYSTOLIC BLOOD PRESSURE: 136 MMHG | BODY MASS INDEX: 34.33 KG/M2 | HEIGHT: 68 IN | HEART RATE: 86 BPM | TEMPERATURE: 97 F | DIASTOLIC BLOOD PRESSURE: 82 MMHG | OXYGEN SATURATION: 95 %

## 2024-10-23 DIAGNOSIS — Z76.89 ENCOUNTER TO ESTABLISH CARE: Primary | ICD-10-CM

## 2024-10-23 DIAGNOSIS — Z79.4 TYPE 2 DIABETES MELLITUS WITH STAGE 3A CHRONIC KIDNEY DISEASE, WITH LONG-TERM CURRENT USE OF INSULIN: ICD-10-CM

## 2024-10-23 DIAGNOSIS — Z23 ENCOUNTER FOR ADMINISTRATION OF VACCINE: ICD-10-CM

## 2024-10-23 DIAGNOSIS — N30.00 ACUTE CYSTITIS WITHOUT HEMATURIA: ICD-10-CM

## 2024-10-23 DIAGNOSIS — E11.22 TYPE 2 DIABETES MELLITUS WITH STAGE 3A CHRONIC KIDNEY DISEASE, WITH LONG-TERM CURRENT USE OF INSULIN: ICD-10-CM

## 2024-10-23 DIAGNOSIS — N18.31 TYPE 2 DIABETES MELLITUS WITH STAGE 3A CHRONIC KIDNEY DISEASE, WITH LONG-TERM CURRENT USE OF INSULIN: ICD-10-CM

## 2024-10-23 DIAGNOSIS — J96.01 ACUTE HYPOXEMIC RESPIRATORY FAILURE: ICD-10-CM

## 2024-10-23 PROCEDURE — 90750 HZV VACC RECOMBINANT IM: CPT | Mod: PBBFAC,PN

## 2024-10-23 PROCEDURE — 99215 OFFICE O/P EST HI 40 MIN: CPT | Mod: PBBFAC,PN

## 2024-10-23 PROCEDURE — 99999PBSHW PR PBB SHADOW TECHNICAL ONLY FILED TO HB: Mod: PBBFAC,,,

## 2024-10-23 PROCEDURE — 99999 PR PBB SHADOW E&M-EST. PATIENT-LVL V: CPT | Mod: PBBFAC,,,

## 2024-10-23 PROCEDURE — 90471 IMMUNIZATION ADMIN: CPT | Mod: PBBFAC,PN

## 2024-10-23 RX ADMIN — Medication 0.5 ML: at 09:10

## 2024-10-30 ENCOUNTER — CLINICAL SUPPORT (OUTPATIENT)
Dept: REHABILITATION | Facility: HOSPITAL | Age: 64
End: 2024-10-30
Payer: MEDICAID

## 2024-10-30 DIAGNOSIS — Z74.09 IMPAIRED FUNCTIONAL MOBILITY, BALANCE, GAIT, AND ENDURANCE: Primary | ICD-10-CM

## 2024-10-30 PROCEDURE — 97110 THERAPEUTIC EXERCISES: CPT | Mod: PN

## 2024-11-17 PROBLEM — Z74.09 IMPAIRED FUNCTIONAL MOBILITY, BALANCE, GAIT, AND ENDURANCE: Status: ACTIVE | Noted: 2024-11-17

## 2024-11-18 NOTE — PLAN OF CARE
[facility]  Physical Therapy Neurological Rehabilitation Initial Evaluation     Name: Devika Bustillo  Clinic Number: [id number]    Therapy Diagnosis:   Encounter Diagnoses   Name Primary?    Acute hypoxemic respiratory failure     Acute cystitis without hematuria     Impaired functional mobility, balance, gait, and endurance Yes     Physician: Zabrina Hess, NELI    Physician Orders: PT Eval and Treat   Medical Diagnosis from Referral: J96.01 (ICD-10-CM) - Acute hypoxemic respiratory failure; N30.00 (ICD-10-CM) - Acute cystitis without hematuria  Evaluation Date: 10/22/2024  Authorization Period Expiration: 10/11/2025  Plan of Care Expiration: 2/11/2025  Progress Note Due: 1/2/2025  Visit # / Visits authorized: 1/ 1    Precautions: Fall; Asthma     Time In: 1:45  Time Out: 2:43  Total Billable Time: 58 minutes    Subjective      History of current condition - Devika reports that she's had about 6 falls in the past 6 months. Prior to that she had had occasional falls over the past couple years. Upon inquiry, she reports that all of her falls have occurred with lightheadedness. She reports SOB has also been a problem. With recent hospitalization, one of her blood-pressure medications was discontinued. She was also started on medicine for her asthma. Current difficulties include managing greater than brief distances, including grocery store, and managing stairs.     Prior Therapy: No prior therapy for this condition   Social History: lives alone   Falls: Described above    DME: Rollator, [employer]   Home Environment: 5 HONEY single-story home, single rail available    Family Present at time of Eval: None present   Occupation: retired due to deconditioning and falling   Prior Level of Function: Independent   Current Level of Function: Relies on rollator outside home; Relies on walls/ furniture inside house (didn't feel safe with [employer], but also never had training with it)    Pain:  Current 8/10 to low  "back     Patient's goals: "to get my balance better, and to be able to do more for myself; and to maybe not need that rollator anymore, and to do things"    Medical History:   Past Medical History:   Diagnosis Date    Asthma     Back pain     Diabetes mellitus     Grade II diastolic dysfunction     Hyperlipidemia     Hypertension     Neuropathy     Obesity     Pulmonary embolism 1998    Pulmonary hypertension        Surgical History:   Devika Bustillo  has a past surgical history that includes Tonsillectomy; Hernia repair; Hysterectomy; Tubal ligation; Cyst Removal; and ANGIOGRAM, CORONARY, WITH LEFT HEART CATHETERIZATION (Left, 1/17/2024).    Medications:   Devika has a current medication list which includes the following prescription(s): acetaminophen, albuterol, albuterol-ipratropium, aripiprazole, aspirin, atorvastatin, blood sugar diagnostic, blood-glucose meter, bupropion, clobetasol 0.05%, dupixent pen, escitalopram oxalate, gabapentin, toujeo solostar u-300 insulin, lancets, lasix, lisinopril, loratadine, metoprolol succinate, pantoprazole, pen needle, diabetic, pen needle, diabetic, polyethylene glycol, potassium chloride, prednisolone acetate, senna-docusate 8.6-50 mg, symbicort, topiramate, trazodone, and trulicity.    Allergies:   Review of patient's allergies indicates:  No Known Allergies     Objective      Mental status: AAO x 4    Posture Alignment: WFL     Sensation: light touch in-tact and equal bilaterally     Strength: manual muscle test grades below         MMT     Left        Right   Hip flexion 4/5 4+/5   Knee extension  4+/5 4+/5   Knee flexion  4/5 4/5   Ankle dorsiflexion 4+/5 4+/5   Ankle plantarflexion 4-/5 4-/5   Hip abduction  2+/5 gravity-eliminated  2+/5 gravity-eliminated         Evaluation   Timed Up and Go 17 seconds  < 20 sec safe for independent transfers,     < 30 sec assist required for transfers   10-Meter Walk Test NT m/sec (SSWS)   6-Minute Walk Test TBA ft        Evaluation "   Single Limb Stance L LE NT  (<10 sec = HIGH FALL RISK)   Single Limb Stance R LE NT  (<10 sec = HIGH FALL RISK)   5-Times Sit-to-Stand 27 seconds with B UEs pushing from lap   >12 sec= fall risk for general elderly  >16 sec= fall risk for Parkinson's disease  >10 sec= balance/vestibular dysfunction (<61 y/o)  >14.2 sec= balance/vestibular dysfunction (>61 y/o)  >12 sec= fall risk for CVA   Liao/ FGA/ Tinetti NT       Romberg: Negative     Gait Assessment:   - AD used: rollator  - Assistance: Mod I   - Distance: brief distances     -Trial without AD: forefoot initial contact, short stride, slowed pace, sway; close SBA    - Stairs: Mod I with single rail; Min A without UE support         Treatment     Evaluation today     Patient Education and Home Exercises     Education provided:   -ankle pumps and start of HEP  -discussion of PT Plan of Care    Written Home Exercises Provided: to be provided within treatment.  Exercises were reviewed and Devika was able to demonstrate them prior to the end of the session.  Devika demonstrated good  understanding of the education provided.     Assessment     Ms. Bustillo is a 64 y.o. female referred to outpatient Physical Therapy with a medical diagnosis of J96.01 (ICD-10-CM) - Acute hypoxemic respiratory failure; N30.00 (ICD-10-CM) - Acute cystitis without hematuria. Patient presents with report of an increase in falls and declined functional status beginning ~6 months ago. See Subjective section above for more detail. With examination today, patient demonstrates left hip-flexion weakness, impaired functional strength/ capacity with 5-Times Sit-to-Stand, impaired gait stability with Timed Up and Go, decreased gait independence-level, impaired gait endurance, impaired dynamic gait, and impaired stair/curb management. Patient will benefit from skilled physical therapy to address the stated deficits and to restore daily functional ability.     Patient prognosis is Good.   Patient will  benefit from skilled outpatient Physical Therapy to address the deficits stated above and in the chart below, provide patient /family education, and to maximize patient's level of independence.     Plan of care discussed with patient: Yes  Patient's spiritual, cultural and educational needs considered and patient is agreeable to the plan of care and goals as stated below:     Anticipated Barriers for therapy: chronic respiratory conditions; lumbar radiculopathy     Medical Necessity is demonstrated by the following  History  Co-morbidities and personal factors that may impact the plan of care [] LOW: no personal factors / co-morbidities  [x] MODERATE: 1-2 personal factors / co-morbidities  [] HIGH: 3+ personal factors / co-morbidities    Moderate / High Support Documentation:   Co-morbidities affecting plan of care: lumbar radiculopathy; grief at recent loss of daughter; asthma     Personal Factors:   coping style  social background     Examination  Body Structures and Functions, activity limitations and participation restrictions that may impact the plan of care [] LOW: addressing 1-2 elements  [x] MODERATE: 3+ elements  [] HIGH: 4+ elements (please support below)    Moderate / High Support Documentation: Exam revealed patient to be outside of normal limits in elements of: functional strength, endurance, gait stability, dynamic gait, and curb/stair management      Clinical Presentation [] LOW: stable  [x] MODERATE: Evolving  [] HIGH: Unstable     Decision Making/ Complexity Score: moderate       Goals:  Short Term Goals: 5 weeks   -Patient will increase left hip-flexion strength to 4+/5 to demonstrate improved functional strength.  -Patient will complete 5-Times Sit-to- </= 18 seconds to demonstrate improved functional capacity.   -Patient will ascend/descend curb, using LRAD, Mod I, to demonstrate improved ability to safely access community environments.     Long Term Goals: 10 weeks   -Patient will  achieve Timed Up and Go of 13 seconds to demonstrate improved gait stability and reduced fall risk.   -Patient will ambulate without AD x 100 feet, with consistent heel initial-contact, with standard stride, and without sway, Mod I, to demonstrate improved gait quality and safety.  -Patient will ambulate with LRAD x 500 feet, Mod I, to demonstrate ability to manage community distances and outings.     Plan     Plan of care Certification: 10/22/2024 to 2/11/2025        Delayed start of treatment due to scheduling    Outpatient Physical Therapy 2 times weekly for 10 weeks to include the following interventions: Gait Training, Neuromuscular Re-ed, Self Care, Therapeutic Activities, and Therapeutic Exercise.       Sandra Del Toro, PT, DPT        Physician's Signature: _________________________________________ Date: ________________

## 2024-11-19 ENCOUNTER — HOSPITAL ENCOUNTER (EMERGENCY)
Facility: HOSPITAL | Age: 64
Discharge: HOME | End: 2024-11-19
Attending: EMERGENCY MEDICINE
Payer: MEDICAID

## 2024-11-19 VITALS
TEMPERATURE: 99 F | RESPIRATION RATE: 12 BRPM | OXYGEN SATURATION: 97 % | HEART RATE: 63 BPM | DIASTOLIC BLOOD PRESSURE: 68 MMHG | HEIGHT: 68 IN | BODY MASS INDEX: 34.56 KG/M2 | SYSTOLIC BLOOD PRESSURE: 156 MMHG | WEIGHT: 228 LBS

## 2024-11-19 DIAGNOSIS — I73.9 PVD (PERIPHERAL VASCULAR DISEASE): ICD-10-CM

## 2024-11-19 DIAGNOSIS — L97.509 TOE ULCER DUE TO DM: ICD-10-CM

## 2024-11-19 DIAGNOSIS — M79.669 CALF PAIN: ICD-10-CM

## 2024-11-19 DIAGNOSIS — E11.621 TOE ULCER DUE TO DM: ICD-10-CM

## 2024-11-19 DIAGNOSIS — I50.32 CHRONIC DIASTOLIC HEART FAILURE: Primary | ICD-10-CM

## 2024-11-19 DIAGNOSIS — R06.02 SOB (SHORTNESS OF BREATH): ICD-10-CM

## 2024-11-19 LAB
ALBUMIN SERPL BCP-MCNC: 3.1 G/DL (ref 3.5–5.2)
ALP SERPL-CCNC: 130 U/L (ref 40–150)
ALT SERPL W/O P-5'-P-CCNC: 65 U/L (ref 10–44)
ANION GAP SERPL CALC-SCNC: 10 MMOL/L (ref 8–16)
AST SERPL-CCNC: 40 U/L (ref 10–40)
BASOPHILS # BLD AUTO: 0.04 K/UL (ref 0–0.2)
BASOPHILS NFR BLD: 0.6 % (ref 0–1.9)
BILIRUB SERPL-MCNC: 0.2 MG/DL (ref 0.1–1)
BNP SERPL-MCNC: 31 PG/ML (ref 0–99)
BUN SERPL-MCNC: 40 MG/DL (ref 8–23)
CALCIUM SERPL-MCNC: 9.1 MG/DL (ref 8.7–10.5)
CHLORIDE SERPL-SCNC: 104 MMOL/L (ref 95–110)
CK SERPL-CCNC: 50 U/L (ref 20–180)
CO2 SERPL-SCNC: 24 MMOL/L (ref 23–29)
CREAT SERPL-MCNC: 1.5 MG/DL (ref 0.5–1.4)
DIFFERENTIAL METHOD BLD: ABNORMAL
EOSINOPHIL # BLD AUTO: 0.1 K/UL (ref 0–0.5)
EOSINOPHIL NFR BLD: 1.9 % (ref 0–8)
ERYTHROCYTE [DISTWIDTH] IN BLOOD BY AUTOMATED COUNT: 13.1 % (ref 11.5–14.5)
EST. GFR  (NO RACE VARIABLE): 39 ML/MIN/1.73 M^2
GLUCOSE SERPL-MCNC: 153 MG/DL (ref 70–110)
HCT VFR BLD AUTO: 35.6 % (ref 37–48.5)
HGB BLD-MCNC: 11.3 G/DL (ref 12–16)
IMM GRANULOCYTES # BLD AUTO: 0.02 K/UL (ref 0–0.04)
IMM GRANULOCYTES NFR BLD AUTO: 0.3 % (ref 0–0.5)
INR PPP: 0.9 (ref 0.8–1.2)
LACTATE SERPL-SCNC: 0.9 MMOL/L (ref 0.5–2.2)
LYMPHOCYTES # BLD AUTO: 2.4 K/UL (ref 1–4.8)
LYMPHOCYTES NFR BLD: 34.5 % (ref 18–48)
MCH RBC QN AUTO: 27.2 PG (ref 27–31)
MCHC RBC AUTO-ENTMCNC: 31.7 G/DL (ref 32–36)
MCV RBC AUTO: 86 FL (ref 82–98)
MONOCYTES # BLD AUTO: 0.6 K/UL (ref 0.3–1)
MONOCYTES NFR BLD: 7.9 % (ref 4–15)
NEUTROPHILS # BLD AUTO: 3.8 K/UL (ref 1.8–7.7)
NEUTROPHILS NFR BLD: 54.8 % (ref 38–73)
NRBC BLD-RTO: 0 /100 WBC
PLATELET # BLD AUTO: 273 K/UL (ref 150–450)
PMV BLD AUTO: 9.4 FL (ref 9.2–12.9)
POTASSIUM SERPL-SCNC: 4.2 MMOL/L (ref 3.5–5.1)
PROT SERPL-MCNC: 7.3 G/DL (ref 6–8.4)
PROTHROMBIN TIME: 10.3 SEC (ref 9–12.5)
RBC # BLD AUTO: 4.15 M/UL (ref 4–5.4)
SODIUM SERPL-SCNC: 138 MMOL/L (ref 136–145)
TROPONIN I SERPL DL<=0.01 NG/ML-MCNC: <0.006 NG/ML (ref 0–0.03)
WBC # BLD AUTO: 6.93 K/UL (ref 3.9–12.7)

## 2024-11-19 PROCEDURE — 99285 EMERGENCY DEPT VISIT HI MDM: CPT | Mod: 25,27

## 2024-11-19 PROCEDURE — 83605 ASSAY OF LACTIC ACID: CPT | Performed by: EMERGENCY MEDICINE

## 2024-11-19 PROCEDURE — 83880 ASSAY OF NATRIURETIC PEPTIDE: CPT | Performed by: EMERGENCY MEDICINE

## 2024-11-19 PROCEDURE — 80053 COMPREHEN METABOLIC PANEL: CPT | Performed by: EMERGENCY MEDICINE

## 2024-11-19 PROCEDURE — 85610 PROTHROMBIN TIME: CPT | Performed by: EMERGENCY MEDICINE

## 2024-11-19 PROCEDURE — 84484 ASSAY OF TROPONIN QUANT: CPT | Performed by: EMERGENCY MEDICINE

## 2024-11-19 PROCEDURE — 82550 ASSAY OF CK (CPK): CPT | Performed by: EMERGENCY MEDICINE

## 2024-11-19 PROCEDURE — 93010 ELECTROCARDIOGRAM REPORT: CPT | Mod: ,,, | Performed by: INTERNAL MEDICINE

## 2024-11-19 PROCEDURE — 93005 ELECTROCARDIOGRAM TRACING: CPT

## 2024-11-19 PROCEDURE — 85025 COMPLETE CBC W/AUTO DIFF WBC: CPT | Performed by: EMERGENCY MEDICINE

## 2024-11-19 PROCEDURE — 99285 EMERGENCY DEPT VISIT HI MDM: CPT | Mod: 25

## 2024-11-19 RX ORDER — IPRATROPIUM BROMIDE AND ALBUTEROL SULFATE 2.5; .5 MG/3ML; MG/3ML
3 SOLUTION RESPIRATORY (INHALATION) EVERY 6 HOURS PRN
Qty: 90 ML | Refills: 2 | Status: SHIPPED | OUTPATIENT
Start: 2024-11-19 | End: 2024-12-19

## 2024-11-19 RX ORDER — ALBUTEROL SULFATE 90 UG/1
1-2 INHALANT RESPIRATORY (INHALATION) EVERY 6 HOURS PRN
Qty: 18 G | Refills: 0 | Status: SHIPPED | OUTPATIENT
Start: 2024-11-19 | End: 2025-11-19

## 2024-11-19 RX ORDER — MUPIROCIN 20 MG/G
OINTMENT TOPICAL 3 TIMES DAILY
Qty: 22 G | Refills: 0 | Status: SHIPPED | OUTPATIENT
Start: 2024-11-19 | End: 2024-11-26

## 2024-11-19 RX ORDER — TRAMADOL HYDROCHLORIDE 50 MG/1
50 TABLET ORAL EVERY 6 HOURS PRN
Qty: 20 TABLET | Refills: 0 | Status: SHIPPED | OUTPATIENT
Start: 2024-11-19

## 2024-11-19 NOTE — ED PROVIDER NOTES
Encounter Date: 11/19/2024    SCRIBE #1 NOTE: I Zannakul Cervantes, am scribing for, and in the presence of,  Gregorio Keys MD.       History     Chief Complaint   Patient presents with    Shortness of Breath     Per [facility] ot was at her Cardiologist where she reported SOB upon exertion and R calf pain. Pts cardiologist sent her to the ED to r/o blood clot.      64 y.o. female with PMHx of asthma, DM, grade 2 diastolic dysfunction, HLD, HTN, neuropathy, PE, presents to the ED for evaluation of multiple complaints. She reports of worsening chronic STOUT x 1-2 months, wound to the R 5th toe x 2 months, and chronic RLE pain. She reports that the RLE pain worsened last night, with the pain being severe in the posterior calf. States that she was referred by cardiology to r/o DVT. She has not been evaluated before or has seen podiatry in the past for the wound to the R 5th toe. She is not currently on anticoagulant. No medications taken for symptoms. Denies cough, congestion, rhinorrhea or any associated symptoms.     The history is provided by the patient. No  was used.     Review of patient's allergies indicates:  No Known Allergies  Past Medical History:   Diagnosis Date    Asthma     Back pain     Diabetes mellitus     Grade II diastolic dysfunction     Hyperlipidemia     Hypertension     Neuropathy     Obesity     Pulmonary embolism 1998    Pulmonary hypertension      Past Surgical History:   Procedure Laterality Date    ANGIOGRAM, CORONARY, WITH LEFT HEART CATHETERIZATION Left 1/17/2024    Procedure: Angiogram, Coronary, with Left Heart Cath;  Surgeon: Joe Marr MD;  Location: [facility] CATH LAB;  Service: Cardiology;  Laterality: Left;    CYST REMOVAL      HERNIA REPAIR      HYSTERECTOMY      TONSILLECTOMY      TUBAL LIGATION       Family History   Problem Relation Name Age of Onset    Kidney disease Mother      Kidney disease Father      Stroke Maternal Uncle       Social  History     Tobacco Use    Smoking status: Never    Smokeless tobacco: Never   Substance Use Topics    Alcohol use: No    Drug use: No     Review of Systems   Constitutional:  Negative for fever.   HENT:  Negative for congestion, rhinorrhea and sore throat.    Eyes:  Negative for visual disturbance.   Respiratory:  Positive for shortness of breath. Negative for cough.    Cardiovascular:  Negative for chest pain.   Gastrointestinal:  Negative for abdominal pain.   Genitourinary:  Negative for difficulty urinating.   Musculoskeletal:  Positive for myalgias. Negative for back pain.   Skin:  Positive for wound. Negative for rash.   Neurological:  Negative for headaches.       Physical Exam     Initial Vitals [11/19/24 1335]   BP Pulse Resp Temp SpO2   (!) 154/69 69 20 98.5 °F (36.9 °C) 100 %      MAP       --         Physical Exam    Nursing note and vitals reviewed.  Constitutional: She appears well-developed and well-nourished.   Eyes: EOM are normal. Pupils are equal, round, and reactive to light.   Neck: Neck supple. No thyromegaly present. No JVD present.   Normal range of motion.  Cardiovascular:  Normal rate, regular rhythm and normal heart sounds.     Exam reveals no gallop and no friction rub.       No murmur heard.  Pulmonary/Chest: No respiratory distress.   Slight end expiratory wheezes to the R lower lung field.   Abdominal: Abdomen is soft. Bowel sounds are normal.   Musculoskeletal:         General: Normal range of motion.      Cervical back: Normal range of motion and neck supple.      Comments: Decreased right DP pulses. Dry gangrene with ulcer to the distal and lateral aspect of the right 5th toe. Trace lower extremity edema. Tenderness to the right calf.      Neurological: She is alert and oriented to person, place, and time. She has normal strength.   Skin: Skin is warm and dry.         ED Course   Procedures  Labs Reviewed   CBC W/ AUTO DIFFERENTIAL - Abnormal       Result Value    WBC 6.93      RBC  4.15      Hemoglobin 11.3 (*)     Hematocrit 35.6 (*)     MCV 86      MCH 27.2      MCHC 31.7 (*)     RDW 13.1      Platelets 273      MPV 9.4      Immature Granulocytes 0.3      Gran # (ANC) 3.8      Immature Grans (Abs) 0.02      Lymph # 2.4      Mono # 0.6      Eos # 0.1      Baso # 0.04      nRBC 0      Gran % 54.8      Lymph % 34.5      Mono % 7.9      Eosinophil % 1.9      Basophil % 0.6      Differential Method Automated     COMPREHENSIVE METABOLIC PANEL - Abnormal    Sodium 138      Potassium 4.2      Chloride 104      CO2 24      Glucose 153 (*)     BUN 40 (*)     Creatinine 1.5 (*)     Calcium 9.1      Total Protein 7.3      Albumin 3.1 (*)     Total Bilirubin 0.2      Alkaline Phosphatase 130      AST 40      ALT 65 (*)     eGFR 39 (*)     Anion Gap 10     TROPONIN I    Troponin I <0.006     B-TYPE NATRIURETIC PEPTIDE    BNP 31     PROTIME-INR    Prothrombin Time 10.3      INR 0.9     LACTIC ACID, PLASMA    Lactate (Lactic Acid) 0.9     CK    CPK 50       EKG Readings: (Independently Interpreted)   Initial Reading: No STEMI. Rhythm: Normal Sinus Rhythm. Heart Rate: 68. Ectopy: No Ectopy. Conduction: Normal. ST Segments: Normal ST Segments.     ECG Results              EKG 12-lead (Final result)        Collection Time Result Time QRS Duration [facility] QTC Calculation    11/19/24 13:51:47 11/20/24 10:50:49 72 416                     Final result by Interface, Lab In [facility] (11/20/24 10:50:56)                   Narrative:    Test Reason : R06.02,    Vent. Rate :  68 BPM     Atrial Rate :  68 BPM     P-R Int : 152 ms          QRS Dur :  72 ms      QT Int : 392 ms       P-R-T Axes :  59  29  32 degrees    QTcB Int : 416 ms    Normal sinus rhythm  Normal ECG  When compared with ECG of  10/9/24  No significant change was found    Confirmed by Jeffrey Raygoza ([id number]) on 11/20/2024 10:50:49 AM    Referred By: AAAREFERRAL SELF           Confirmed By: Jeffrey Raygoza                                     EKG 12-lead  (Final result)  Result time 11/20/24 10:54:10      Final result by Unknown User (11/20/24 10:54:10)                                      Imaging Results              US Lower Extremity Veins Right (Final result)  Result time 11/19/24 16:13:09      Final result by Preston Hitchcock MD (11/19/24 16:13:09)                   Impression:      No evidence of deep venous thrombosis in the right lower extremity.      Electronically signed by: Preston Hitchcock MD  Date:    11/19/2024  Time:    16:13               Narrative:    EXAMINATION:  US LOWER EXTREMITY VEINS RIGHT    CLINICAL HISTORY:  Pain in unspecified lower leg    TECHNIQUE:  Duplex and color flow Doppler evaluation and graded compression of the right lower extremity veins was performed.    COMPARISON:  06/27/2023    FINDINGS:  Duplex and color flow Doppler evaluation does not reveal any evidence of acute venous thrombosis in the common femoral, superficial femoral, greater saphenous, popliteal, peroneal, anterior tibial and posterior tibial veins of the right lower extremity.  There is no reflux to suggest valvular incompetence.                                       X-Ray Chest AP Portable (Final result)  Result time 11/19/24 15:55:14      Final result by Dylan Roy MD (11/19/24 15:55:14)                   Impression:      No acute intrathoracic process.      Electronically signed by: Dylan Roy MD  Date:    11/19/2024  Time:    15:55               Narrative:    EXAMINATION:  XR CHEST AP PORTABLE    CLINICAL HISTORY:  SOB;    TECHNIQUE:  Single frontal view of the chest was performed.    COMPARISON:  10/09/2024.    FINDINGS:  Monitoring EKG leads are present.  The trachea is unremarkable.  The cardiomediastinal silhouette is within normal limits.  The hilar structures are unremarkable.  There is no evidence of free air beneath the hemidiaphragms.  There are no pleural effusions.  There is no evidence of a pneumothorax.  There is no evidence of  pneumomediastinum.  No airspace opacity is present.  There are minimal degenerative changes in the osseous structures    There are calcifications in the neck vessels.                                       X-Ray Foot Complete Right (Final result)  Result time 11/19/24 15:57:55      Final result by Preston Hitchcock MD (11/19/24 15:57:55)                   Impression:      1. No acute displaced fracture or dislocation of the foot.      Electronically signed by: Preston Hitchcock MD  Date:    11/19/2024  Time:    15:57               Narrative:    EXAMINATION:  XR FOOT COMPLETE 3 VIEW RIGHT    CLINICAL HISTORY:  . Type 2 diabetes mellitus with foot ulcer    TECHNIQUE:  AP, lateral, and oblique views of the right foot were performed.    COMPARISON:  10/26/2022    FINDINGS:  Three views right foot.    There is osteopenia.  There is vascular calcification.  No acute displaced fracture or dislocation of the foot.  No radiopaque foreign body.  There are degenerative changes of the calcaneus.                                       Medications - No data to display  Medical Decision Making  This is an emergent evaluation of a 64 y.o. female who presents with worsening chronic STOUT x 1-2 months, wound to the R 5th toe x 2 months, and chronic RLE pain.. The patient was seen and examined. The history and physical exam was obtained. The nursing notes and vital signs were reviewed. Secondary to symptoms and examination findings, I ordered labs, imaging and EKG.     Amount and/or Complexity of Data Reviewed  Labs: ordered. Decision-making details documented in ED Course.  Radiology: ordered. Decision-making details documented in ED Course.  ECG/medicine tests: ordered and independent interpretation performed. Decision-making details documented in ED Course.    Sent in for shortness breath and calf pain.  Worked up for DVT and negative.  Patient does not have the chest pain at this time.  She was not tachycardic.  He was not tachypneic.   She was not hypoxic.  No pain with breathing at this time.  Unlikely to be pulmonary embolism.  No CT of the chest required at this time.  There was no anemia.  No evidence of infection.  Patient does have a BUN of 42 creatinine of 1.5.  Recommend encouraging p.o. fluids.  Possible reactive airway/asthma/whether change related.  Will treat with bronchodilators.  Also refer to podiatry for toe wound.  Vascular surgery for peripheral vascular disease.        Scribe Attestation:   Scribe #1: I performed the above scribed service and the documentation accurately describes the services I performed. I attest to the accuracy of the note.                             I, Gregorio Keys, personally performed the services described in this documentation. All medical record entries made by the scribe were at my direction and in my presence. I have reviewed the chart and agree that the record reflects my personal performance and is accurate and complete.      DISCLAIMER: This note was prepared with [employer] voice recognition transcription software. Garbled syntax, mangled pronouns, and other bizarre constructions may be attributed to that software system.      Clinical Impression:  Final diagnoses:  [R06.02] SOB (shortness of breath)  [E11.621, L97.509] Toe ulcer due to DM  [M79.669] Calf pain  [E11.621, L97.509] Toe ulcer due to DM - right 5th  [I50.32] Chronic diastolic heart failure (Primary)  [I73.9] PVD (peripheral vascular disease)          ED Disposition Condition    Discharge Stable          ED Prescriptions       Medication Sig Dispense Start Date End Date Auth. Provider    albuterol-ipratropium (DUO-NEB) 2.5 mg-0.5 mg/3 mL nebulizer solution Take 3 mLs by nebulization every 6 (six) hours as needed for Wheezing or Shortness of Breath. Rescue 90 mL 11/19/2024 12/19/2024 Gregorio Keys MD    albuterol (VENTOLIN HFA) 90 mcg/actuation inhaler Inhale 1-2 puffs into the lungs every 6 (six) hours as needed for Wheezing  or Shortness of Breath. Rescue 18 g 2024 Gregorio Keys MD    mupirocin (BACTROBAN) 2 % ointment () Apply topically 3 (three) times daily. for 7 days 22 g 2024 Gregorio Keys MD    traMADoL (ULTRAM) 50 mg tablet Take 1 tablet (50 mg total) by mouth every 6 (six) hours as needed for Pain. 20 tablet 2024 -- Gregorio Keys MD          Follow-up Information       Follow up With Specialties Details Why Contact Info    Chapin Green MD Vascular Surgery, Surgery Schedule an appointment as soon as possible for a visit  for peripheral vascular disease [address]  [address]  [facility] [address]  [phone]      Vernell Zamora, DPM Podiatry, Wound Care Schedule an appointment as soon as possible for a visit  for toe ulceration. [address] [facility] [address]  [phone]          follow up with your primary care doctor as well and your cardiologsit.    [facility] -  Schedule an appointment as soon as possible for a visit  [facility] or [facility]ologist follow up with this clinic. [address] [place] [address]  [phone]               Gregorio Keys MD  [phone]

## 2024-11-20 LAB
OHS QRS DURATION: 72 MS
OHS QTC CALCULATION: 416 MS

## 2024-11-21 ENCOUNTER — TELEPHONE (OUTPATIENT)
Dept: PODIATRY | Facility: CLINIC | Age: 64
End: 2024-11-21
Payer: MEDICAID

## 2024-11-21 NOTE — TELEPHONE ENCOUNTER
----- Message from Modesto Olson sent at 11/21/2024  7:49 AM CST -----  This pt is a new pt and has medicaid if their PCP is not a [facility] doctor they can not be seen at this location,  ----- Message -----  From: Eveline Zaman MA  Sent: 11/20/2024   4:20 PM CST  To: [facility] Podiatry Clinical Support Staff    Type:  Sooner Appointment Request    Patient is requesting a sooner appointment.  Patient declined first available appointment listed as well as another facility and provider .  Patient will not accept being placed on the waitlist and is requesting a message be sent to doctor.    Name of Caller:  Pt   When is the first available appointment?  None , denied booking   Symptoms:   E11.621,L97.509 (ICD-10-CM) - Toe ulcer due to DM  Would the patient rather a call back or a response via [facility]?    Best Call Back Number:  Telephone Information:  Mobile          [phone]     Additional Information:

## 2024-11-21 NOTE — TELEPHONE ENCOUNTER
Pt notified [facility] is not accepting new medicaid pt. Pt recommended to contact different clinics to get an appt

## 2024-11-25 NOTE — ASSESSMENT & PLAN NOTE
- Does not appear hypervolemic   - continue home medication list will order lasix 20 mg daily, lisinopril 5 mg daily, metoprolol 12.5 daily    Results for orders placed during the hospital encounter of 10/09/24    Echo    Interpretation Summary    Left Ventricle: The left ventricle is normal in size. Normal wall thickness. Normal wall motion. There is hyperdynamic systolic function with a visually estimated ejection fraction of greater than 70%. There is normal diastolic function.    Right Ventricle: Normal right ventricular cavity size. Wall thickness is normal. Systolic function is normal.    Left Atrium: Left atrium is moderately dilated.    Mitral Valve: There is moderate mitral annular calcification present.    Tricuspid Valve: There is mild regurgitation.    IVC/SVC: Normal venous pressure at 3 mmHg.     25-Nov-2024 15:11

## 2024-11-25 NOTE — ASSESSMENT & PLAN NOTE
· Body mass index is 33.45 kg/m².  · Encouraged diet, weight loss, exercise        Name And Contact Information For Health Care Proxy: Chayo Wolfe [phone] Quality 47: Advance Care Plan: Advance Care Planning discussed and documented; advance care plan or surrogate decision maker documented in the medical record. Quality 226: Preventive Care And Screening: Tobacco Use: Screening And Cessation Intervention: Patient screened for tobacco use and is an ex/non-smoker Detail Level: Detailed

## 2024-12-02 ENCOUNTER — HOSPITAL ENCOUNTER (OUTPATIENT)
Dept: VASCULAR SURGERY | Facility: CLINIC | Age: 64
Discharge: HOME | End: 2024-12-02
Attending: SURGERY
Payer: MEDICAID

## 2024-12-02 ENCOUNTER — INITIAL CONSULT (OUTPATIENT)
Dept: VASCULAR SURGERY | Facility: CLINIC | Age: 64
End: 2024-12-02
Attending: SURGERY
Payer: MEDICAID

## 2024-12-02 VITALS
HEART RATE: 54 BPM | HEIGHT: 68 IN | TEMPERATURE: 99 F | SYSTOLIC BLOOD PRESSURE: 146 MMHG | BODY MASS INDEX: 34.75 KG/M2 | WEIGHT: 229.25 LBS | DIASTOLIC BLOOD PRESSURE: 65 MMHG

## 2024-12-02 DIAGNOSIS — I73.9 PAD (PERIPHERAL ARTERY DISEASE): Primary | ICD-10-CM

## 2024-12-02 DIAGNOSIS — I73.9 PVD (PERIPHERAL VASCULAR DISEASE): Primary | ICD-10-CM

## 2024-12-02 DIAGNOSIS — E11.65 TYPE 2 DIABETES MELLITUS WITH HYPERGLYCEMIA, WITH LONG-TERM CURRENT USE OF INSULIN: ICD-10-CM

## 2024-12-02 DIAGNOSIS — S91.109A OPEN WOUND OF TOE, INITIAL ENCOUNTER: Primary | ICD-10-CM

## 2024-12-02 DIAGNOSIS — I73.9 PVD (PERIPHERAL VASCULAR DISEASE): ICD-10-CM

## 2024-12-02 DIAGNOSIS — Z79.4 TYPE 2 DIABETES MELLITUS WITH HYPERGLYCEMIA, WITH LONG-TERM CURRENT USE OF INSULIN: ICD-10-CM

## 2024-12-02 PROCEDURE — 93923 UPR/LXTR ART STDY 3+ LVLS: CPT | Mod: PBBFAC | Performed by: SURGERY

## 2024-12-02 PROCEDURE — 93923 UPR/LXTR ART STDY 3+ LVLS: CPT | Mod: 26,S$PBB,, | Performed by: SURGERY

## 2024-12-02 PROCEDURE — 99999 PR PBB SHADOW E&M-EST. PATIENT-LVL V: CPT | Mod: PBBFAC,,, | Performed by: SURGERY

## 2024-12-02 PROCEDURE — 99203 OFFICE O/P NEW LOW 30 MIN: CPT | Mod: S$PBB,,, | Performed by: SURGERY

## 2024-12-02 PROCEDURE — 99215 OFFICE O/P EST HI 40 MIN: CPT | Mod: PBBFAC,25 | Performed by: SURGERY

## 2024-12-02 RX ORDER — DAPAGLIFLOZIN 10 MG/1
1 TABLET, FILM COATED ORAL DAILY
COMMUNITY
Start: 2024-11-26

## 2024-12-02 RX ORDER — TRAZODONE HYDROCHLORIDE 150 MG/1
1 TABLET ORAL NIGHTLY
COMMUNITY
Start: 2024-11-26 | End: 2024-12-26

## 2024-12-02 RX ORDER — SACUBITRIL AND VALSARTAN 97; 103 MG/1; MG/1
1 TABLET, FILM COATED ORAL 2 TIMES DAILY
COMMUNITY
Start: 2024-11-26

## 2024-12-02 RX ORDER — ATROPINE SULFATE 10 MG/ML
1 SOLUTION/ DROPS OPHTHALMIC 2 TIMES DAILY
COMMUNITY
Start: 2024-09-09

## 2024-12-02 RX ORDER — FUROSEMIDE 40 MG/1
1 TABLET ORAL 2 TIMES DAILY
COMMUNITY
Start: 2024-10-28

## 2024-12-02 RX ORDER — METOPROLOL SUCCINATE 50 MG/1
1 TABLET, EXTENDED RELEASE ORAL DAILY
COMMUNITY
Start: 2024-11-26

## 2024-12-02 RX ORDER — NEPAFENAC 3 MG/ML
1 SUSPENSION/ DROPS OPHTHALMIC
COMMUNITY

## 2024-12-02 RX ORDER — SERTRALINE HYDROCHLORIDE 100 MG/1
1 TABLET, FILM COATED ORAL DAILY
COMMUNITY

## 2024-12-02 NOTE — PROGRESS NOTES
VASCULAR SURGERY NOTE    Patient ID: Devika Bustillo is a 64 y.o. female.    I. HISTORY     Chief Complaint: R leg pain    HPI: Devika Bustillo is a 64 y.o. female with a history of diabetes, HTN, HLD, and chronic back pain who is here today for new patient initial appointment. Patient reports right leg pain for many years, which has worsened in the last couple of months. Reports pain in her buttock/hip that radiates down her leg. The pain is present all the time, even at rest, however the pain is worse when she walks. She uses a walker to take weight off her leg, which helps slightly. She describes the pain as a burning sensation and also endorses numbness in her foot. Denies any muscle cramps. She also reports similar pain in the left leg, however she reports this is not as severe or persistent as her pain on the right. She does have a history of back pain and lumbosacral radiculopathy with plans to undergo ablation in January. She was referred to us for concern for PAD following evaluation in the ED of leg pain and a chronic, non-healing wound of the right fifth toe.    She currently takes aspirin 81 and atorvastatin. Was previously taking eliquis s/p cerebral venous stenting, however this was discontinued last month.     ALLERGIES: None    SOCIAL HISTORY: Never smoker, no alcohol, no recreational drugs     FAMILY HISTORY:   Family History   Problem Relation Name Age of Onset    Kidney disease Mother      Kidney disease Father      Stroke Maternal Uncle           MEDICATIONS:     Current Outpatient Medications:     acetaminophen (TYLENOL) 500 MG tablet, Take 2 tablets (1,000 mg total) by mouth every 8 (eight) hours as needed for Pain., Disp: 30 tablet, Rfl: 0    albuterol (VENTOLIN HFA) 90 mcg/actuation inhaler, Inhale 1-2 puffs into the lungs every 6 (six) hours as needed for Wheezing or Shortness of Breath. Rescue, Disp: 18 g, Rfl: 0    albuterol-ipratropium (DUO-NEB) 2.5 mg-0.5 mg/3 mL nebulizer solution, Take 3 mLs  by nebulization every 6 (six) hours as needed for Wheezing or Shortness of Breath. Rescue, Disp: 90 mL, Rfl: 2    aspirin (ECOTRIN) 81 MG EC tablet, Take 81 mg by mouth once daily., Disp: , Rfl:     atorvastatin (LIPITOR) 40 MG tablet, Take 40 mg by mouth every evening., Disp: , Rfl:     atropine 1% (ISOPTO ATROPINE) 1 % Drop, Place 1 drop into the left eye 2 (two) times daily., Disp: , Rfl:     blood sugar diagnostic Strp, To check BG 4 times daily, to use with insurance preferred meter, Disp: 200 each, Rfl: 11    blood-glucose meter kit, To check BG 4 times daily, to use with insurance preferred meter, Disp: 1 each, Rfl: 0    clobetasol 0.05% (TEMOVATE) 0.05 % Oint, Apply topically 2 (two) times daily., Disp: , Rfl:     dapagliflozin propanediol (FARXIGA) 10 mg tablet, Take 1 tablet by mouth once daily., Disp: , Rfl:     DUPIXENT  mg/2 mL PnIj, INJECT 300MG UNDER THE SKIN EVERY OTHER WEEK AS DIRECTED, Disp: , Rfl:     ENTRESTO  mg per tablet, Take 1 tablet by mouth 2 (two) times daily., Disp: , Rfl:     furosemide (LASIX) 40 MG tablet, Take 1 tablet by mouth 2 (two) times daily., Disp: , Rfl:     gabapentin (NEURONTIN) 300 MG capsule, Take 300 mg by mouth 2 (two) times daily as needed., Disp: , Rfl:     ILEVRO 0.3 % DrpS, Place 1 drop into the left eye., Disp: , Rfl:     insulin glargine, TOUJEO, (TOUJEO SOLOSTAR U-300 INSULIN) 300 unit/mL (1.5 mL) InPn pen, Inject 50 Units into the skin once daily., Disp: , Rfl:     lancets Misc, To check BG 4 times daily, to use with insurance preferred meter, Disp: 200 each, Rfl: 11    lisinopriL (PRINIVIL,ZESTRIL) 5 MG tablet, Take 1 tablet (5 mg total) by mouth once daily., Disp: 30 tablet, Rfl: 1    loratadine (CLARITIN) 10 mg tablet, Take 10 mg by mouth daily as needed for Allergies., Disp: , Rfl:     metoprolol succinate (TOPROL-XL) 50 MG 24 hr tablet, Take 1 tablet by mouth once daily., Disp: , Rfl:     pantoprazole (PROTONIX) 40 MG tablet, Take 1 tablet (40  "mg total) by mouth 2 (two) times daily before meals., Disp: 60 tablet, Rfl: 1    pen needle, diabetic 31 gauge x 3/16" Ndle, 1 each., Disp: , Rfl:     pen needle, diabetic 32 gauge x 5/32" Ndle, Use to inject insulin four times daily, Disp: 100 each, Rfl: 1    polyethylene glycol (GLYCOLAX) 17 gram/dose powder, Use cap to measure 17 grams, mix in liquid and take by mouth 2 (two) times daily as needed for Constipation., Disp: 238 g, Rfl: 1    potassium chloride (KLOR-CON) 10 MEQ TbSR, Take 1 tablet (10 mEq total) by mouth as needed (take 1 tablet when you take lasix (furosemide))., Disp: , Rfl:     prednisoLONE acetate (PRED FORTE) 1 % DrpS, INSTILL 1 DROP 4 TIMES DAILY INTO LEFT EYE FOR 2 WEEKS, THEN 1 DROP 3 TIMES DAILY FOR 2 WEEKS, THEN 1 DROP TWICE DAILY FOR 2 WEEKS, Disp: , Rfl:     senna-docusate 8.6-50 mg (SENNA WITH DOCUSATE SODIUM) 8.6-50 mg per tablet, Take 1 tablet by mouth 2 (two) times daily as needed for Constipation., Disp: 30 tablet, Rfl: 1    sertraline (ZOLOFT) 100 MG tablet, Take 1 tablet by mouth once daily., Disp: , Rfl:     SYMBICORT 160-4.5 mcg/actuation HFAA, Inhale 2 puffs into the lungs 2 (two) times daily., Disp: , Rfl: 0    topiramate (TOPAMAX) 100 MG tablet, Take 50 mg by mouth 2 (two) times daily., Disp: , Rfl:     traMADoL (ULTRAM) 50 mg tablet, Take 1 tablet (50 mg total) by mouth every 6 (six) hours as needed for Pain., Disp: 20 tablet, Rfl: 0    traZODone (DESYREL) 150 MG tablet, Take 1 tablet by mouth every evening., Disp: , Rfl:     TRULICITY 3 mg/0.5 mL pen injector, Inject 3 mg into the skin every 7 days. Fridays, Disp: , Rfl:     ARIPiprazole (ABILIFY) 10 MG Tab, Take 1 tablet (10 mg total) by mouth once daily., Disp: 30 tablet, Rfl: 1    buPROPion (WELLBUTRIN XL) 150 MG TB24 tablet, Take 1 tablet (150 mg total) by mouth once daily., Disp: 30 tablet, Rfl: 1     Past Medical History:   Diagnosis Date    Asthma     Back pain     Diabetes mellitus     Grade II diastolic dysfunction "     Hyperlipidemia     Hypertension     Neuropathy     Obesity     Pulmonary embolism 1998    Pulmonary hypertension         Past Surgical History:   Procedure Laterality Date    ANGIOGRAM, CORONARY, WITH LEFT HEART CATHETERIZATION Left 1/17/2024    Procedure: Angiogram, Coronary, with Left Heart Cath;  Surgeon: Joe Marr MD;  Location: [facility] CATH LAB;  Service: Cardiology;  Laterality: Left;    CYST REMOVAL      HERNIA REPAIR      HYSTERECTOMY      TONSILLECTOMY      TUBAL LIGATION         Social History     Occupational History    Not on file   Tobacco Use    Smoking status: Never    Smokeless tobacco: Never   Substance and Sexual Activity    Alcohol use: No    Drug use: No    Sexual activity: Not Currently     Partners: Male         Review of Systems   Constitutional: Negative for weight loss.   HENT:  Negative for ear pain and nosebleeds.    Eyes:  Negative for discharge and pain.   Cardiovascular:  Negative for chest pain and palpitations.   Respiratory:  Negative for cough, shortness of breath and wheezing.    Endocrine: Negative for cold intolerance, heat intolerance and polyphagia.   Hematologic/Lymphatic: Negative for adenopathy. Does not bruise/bleed easily.   Skin:  Negative for itching and rash.   Musculoskeletal:  Negative for joint swelling and muscle cramps.   Gastrointestinal:  Negative for abdominal pain, diarrhea, nausea and vomiting.   Genitourinary:  Negative for dysuria and flank pain.   Neurological:  Negative for numbness and seizures.         II. PHYSICAL EXAM     Physical Exam  Constitutional:       General: She is not in acute distress.     Appearance: Normal appearance. She is not ill-appearing or diaphoretic.   HENT:      Head: Normocephalic and atraumatic.   Eyes:      General: No scleral icterus.        Right eye: No discharge.         Left eye: No discharge.      Extraocular Movements: Extraocular movements intact.      Conjunctiva/sclera: Conjunctivae normal.    Cardiovascular:      Rate and Rhythm: Normal rate and regular rhythm.      Pulses: Normal pulses.   Pulmonary:      Effort: Pulmonary effort is normal. No respiratory distress.   Abdominal:      General: There is no distension.   Musculoskeletal:         General: Normal range of motion.      Right lower leg: No edema.      Left lower leg: No edema.   Skin:     General: Skin is warm and dry.      Coloration: Skin is not jaundiced or pale.      Findings: No erythema or rash.      Comments: Cyanotic skin on right 5th toe with shallow ulceration on lateral aspect of toe, no purulence, no erythema, no odor   Neurological:      General: No focal deficit present.      Mental Status: She is alert and oriented to person, place, and time.      Sensory: No sensory deficit.   Psychiatric:         Mood and Affect: Mood normal.         Behavior: Behavior normal.     VASC:  RLE:  monophasic PT pulse, triphasic DP signal, 1+ palpable pulse  LLE: monophasic PT pulse, triphasic DP signal, 1+ palpable pulse    III. ASSESSMENT & PLAN (MEDICAL DECISION MAKING)       Imaging Results: (I have personally reviewed all images and provided interpretation below)  XR FOOT COMPLETE 3 VIEW RIGHT: calcification of the PT and DP arteries of the right foot     DEON/Toe pressures 12/2/24:  RIGHT: DEON 1.18         Toe Pressure 98mmHg  LEFT:    DEON 1.18        Toe Pressure 105mmHg    Assessment/Diagnosis and Plan:    1. Open wound of toe, initial encounter    2. Type 2 diabetes mellitus with hyperglycemia, with long-term current use of insulin        64 y.o. female with a history of right leg pain, which has worsened in the last couple of months. Her pain, which is burning in nature and does not resolve at rest, along with palpable DP pulses is reassuring against any PVD that would preclude wound healing. Her pain is more likely related to sciatica, and is more likely to improve with her upcoming ablation procedure.  I do not think her pain has  anything to do with peripheral arterial disease since she has palpable pedal pulses and normal ABIs. Recommend following up with podiatry for care of her toe wound.    -Secondary prevention of atherosclerotic risk factors: Goal BP <140/90, goal LDL <100, goal HbA1C <7.0  -ASA 81mg daily for prevention of MI, stroke, and acute limb ischemia in setting of known DM  -Continue High intensity statin (atorvastatin 40mg or rosuvastatin 20mg)-- for all patients with PAD-equivalent risk factors (DM)  - Continue taking baby aspirin -- recommended for all patients with PAD-equivalent risk factors (Diabetes)  - Continue to follow up with podiatry for toe care-- adequate perfusion for wound healing   - RTC STEVEN Camarillo II, MD, VI  Vascular Surgery  [facility]

## 2024-12-09 ENCOUNTER — OFFICE VISIT (OUTPATIENT)
Dept: OPHTHALMOLOGY | Facility: CLINIC | Age: 64
End: 2024-12-09
Payer: MEDICAID

## 2024-12-09 ENCOUNTER — TELEPHONE (OUTPATIENT)
Dept: OPHTHALMOLOGY | Facility: CLINIC | Age: 64
End: 2024-12-09
Payer: MEDICAID

## 2024-12-09 ENCOUNTER — CLINICAL SUPPORT (OUTPATIENT)
Dept: OPHTHALMOLOGY | Facility: CLINIC | Age: 64
End: 2024-12-09
Payer: MEDICAID

## 2024-12-09 ENCOUNTER — HOSPITAL ENCOUNTER (EMERGENCY)
Facility: HOSPITAL | Age: 64
Discharge: HOME | End: 2024-12-09
Attending: EMERGENCY MEDICINE
Payer: MEDICAID

## 2024-12-09 VITALS
TEMPERATURE: 99 F | OXYGEN SATURATION: 100 % | RESPIRATION RATE: 18 BRPM | HEIGHT: 68 IN | DIASTOLIC BLOOD PRESSURE: 69 MMHG | SYSTOLIC BLOOD PRESSURE: 150 MMHG | BODY MASS INDEX: 34.71 KG/M2 | WEIGHT: 229 LBS | HEART RATE: 64 BPM

## 2024-12-09 DIAGNOSIS — H35.372 PRERETINAL FIBROSIS, LEFT: ICD-10-CM

## 2024-12-09 DIAGNOSIS — E11.3593 TYPE 2 DIABETES MELLITUS WITH BOTH EYES AFFECTED BY PROLIFERATIVE RETINOPATHY WITHOUT MACULAR EDEMA, WITH LONG-TERM CURRENT USE OF INSULIN: ICD-10-CM

## 2024-12-09 DIAGNOSIS — E11.36 DIABETIC CATARACT OF RIGHT EYE: ICD-10-CM

## 2024-12-09 DIAGNOSIS — H20.12 CHRONIC ANTERIOR UVEITIS, LEFT: ICD-10-CM

## 2024-12-09 DIAGNOSIS — Z79.4 TYPE 2 DIABETES MELLITUS WITH BOTH EYES AFFECTED BY PROLIFERATIVE RETINOPATHY WITHOUT MACULAR EDEMA, WITH LONG-TERM CURRENT USE OF INSULIN: ICD-10-CM

## 2024-12-09 DIAGNOSIS — H35.342 MACULAR HOLE OF LEFT EYE: Primary | ICD-10-CM

## 2024-12-09 DIAGNOSIS — H53.8 BLURRY VISION, RIGHT EYE: Primary | ICD-10-CM

## 2024-12-09 PROCEDURE — 99212 OFFICE O/P EST SF 10 MIN: CPT | Mod: PBBFAC | Performed by: OPHTHALMOLOGY

## 2024-12-09 PROCEDURE — 3066F NEPHROPATHY DOC TX: CPT | Mod: CPTII,,, | Performed by: OPHTHALMOLOGY

## 2024-12-09 PROCEDURE — 92134 CPTRZ OPH DX IMG PST SGM RTA: CPT | Mod: PBBFAC | Performed by: OPHTHALMOLOGY

## 2024-12-09 PROCEDURE — 25000003 PHARM REV CODE 250

## 2024-12-09 PROCEDURE — 99283 EMERGENCY DEPT VISIT LOW MDM: CPT | Mod: 25,27

## 2024-12-09 PROCEDURE — 3060F POS MICROALBUMINURIA REV: CPT | Mod: CPTII,,, | Performed by: OPHTHALMOLOGY

## 2024-12-09 PROCEDURE — G2211 COMPLEX E/M VISIT ADD ON: HCPCS | Mod: S$PBB,,, | Performed by: OPHTHALMOLOGY

## 2024-12-09 PROCEDURE — 1159F MED LIST DOCD IN RCRD: CPT | Mod: CPTII,,, | Performed by: OPHTHALMOLOGY

## 2024-12-09 PROCEDURE — 3051F HG A1C>EQUAL 7.0%<8.0%: CPT | Mod: CPTII,,, | Performed by: OPHTHALMOLOGY

## 2024-12-09 PROCEDURE — 99999 PR PBB SHADOW E&M-EST. PATIENT-LVL II: CPT | Mod: PBBFAC,,, | Performed by: OPHTHALMOLOGY

## 2024-12-09 PROCEDURE — 4010F ACE/ARB THERAPY RXD/TAKEN: CPT | Mod: CPTII,,, | Performed by: OPHTHALMOLOGY

## 2024-12-09 PROCEDURE — 2022F DILAT RTA XM EVC RTNOPTHY: CPT | Mod: CPTII,,, | Performed by: OPHTHALMOLOGY

## 2024-12-09 PROCEDURE — 1160F RVW MEDS BY RX/DR IN RCRD: CPT | Mod: CPTII,,, | Performed by: OPHTHALMOLOGY

## 2024-12-09 PROCEDURE — 99204 OFFICE O/P NEW MOD 45 MIN: CPT | Mod: S$PBB,,, | Performed by: OPHTHALMOLOGY

## 2024-12-09 RX ORDER — TETRACAINE HYDROCHLORIDE 5 MG/ML
2 SOLUTION OPHTHALMIC
Status: COMPLETED | OUTPATIENT
Start: 2024-12-09 | End: 2024-12-09

## 2024-12-09 RX ORDER — CYCLOPENTOLATE HYDROCHLORIDE 20 MG/ML
2 SOLUTION/ DROPS OPHTHALMIC ONCE
COMMUNITY

## 2024-12-09 RX ADMIN — TETRACAINE HYDROCHLORIDE 2 DROP: 5 SOLUTION OPHTHALMIC at 11:12

## 2024-12-09 NOTE — ED PROVIDER NOTES
Encounter Date: 12/9/2024    SCRIBE #1 NOTE: I, Marta Miranda, am scribing for, and in the presence of,  dAina Chase PA-C. I have scribed the following portions of the note - Other sections scribed: HPI, ROS, PE.       History     Chief Complaint   Patient presents with    Eye Problem     Pt to ED from home with c/o bilateral blurred vision. Pt states she normally has blurred vision to her left eye but has been experiencing intermittent blurred vision to right eye x 1 week - (no blurred vision to right eye at this time). Pt denies cp, sob, v/d, slurred speech, facial droop or weakness.     Devika Bustillo is a 64 y.o. female, with a PMHx of asthma, DM with proliferative retinopathy, PAD, HTN, HLD, and PE, who presents to the ED with intermittent blurry vision to the right eye that began 1 week ago (currently resolved). Patient states the blurry vision lasts approximately 5 minutes and occurred 4-5 times a day. Patient also reports headaches, itching, tearing, and intermittent redness to the right eye. Attempted treatment with prednisone, cyclopentolate, and nepafenac eye drops without relief. Patient reports chronic blurry vision to the left eye (diagnosed with macular hole) requiring surgery, but was postponed due to death of a family member. Patient saw her ophthalmologist (Dr. Stock) last week and was told she is close to losing her eye sight in the left eye due to postponing the surgery. Patient states she was not told this prior to postponing the surgery and would like a referral to a different ophthalmologist. No other exacerbating or alleviating factors. Denies eye discharge, eye pain, unilateral weakness, facial asymmetry, or other associated symptoms. Patient endorses intermittent compliance with daily medications, took BP medications today.       The history is provided by the patient. No  was used.     Review of patient's allergies indicates:  No Known Allergies  Past Medical  History:   Diagnosis Date    Asthma     Back pain     Diabetes mellitus     Grade II diastolic dysfunction     Hyperlipidemia     Hypertension     Neuropathy     Obesity     Pulmonary embolism 1998    Pulmonary hypertension      Past Surgical History:   Procedure Laterality Date    ANGIOGRAM, CORONARY, WITH LEFT HEART CATHETERIZATION Left 1/17/2024    Procedure: Angiogram, Coronary, with Left Heart Cath;  Surgeon: Joe Marr MD;  Location: [facility] CATH LAB;  Service: Cardiology;  Laterality: Left;    CYST REMOVAL      HERNIA REPAIR      HYSTERECTOMY      TONSILLECTOMY      TUBAL LIGATION       Family History   Problem Relation Name Age of Onset    Kidney disease Mother      Kidney disease Father      Stroke Maternal Uncle       Social History     Tobacco Use    Smoking status: Never    Smokeless tobacco: Never   Substance Use Topics    Alcohol use: No    Drug use: No     Review of Systems   Constitutional:  Negative for fever.   Eyes:  Positive for redness (intermittent, right), itching (right) and visual disturbance (blurry vision, bilaterally). Negative for pain and discharge.        (+) tearing, right     Respiratory:  Positive for shortness of breath (chronic). Negative for cough.    Cardiovascular:  Negative for chest pain.   Gastrointestinal:  Negative for abdominal pain, nausea and vomiting.   Neurological:  Positive for headaches. Negative for dizziness, facial asymmetry and weakness.       Physical Exam     Initial Vitals [12/09/24 0943]   BP Pulse Resp Temp SpO2   135/63 69 19 98.8 °F (37.1 °C) 100 %      MAP       --         Physical Exam    Nursing note and vitals reviewed.  Constitutional: She appears well-developed and well-nourished. She is not diaphoretic. No distress.   HENT:   Head: Normocephalic and atraumatic.   Right Ear: External ear normal.   Left Ear: External ear normal.   Eyes: Conjunctivae and EOM are normal. Right conjunctiva is not injected. Right conjunctiva has no hemorrhage.  Left conjunctiva is not injected. Left conjunctiva has no hemorrhage. Right eye exhibits normal extraocular motion and no nystagmus. Left eye exhibits normal extraocular motion and no nystagmus. Right pupil is round and reactive. Left pupil is not reactive. Pupils are unequal.   Fixed left pupil. Right pupil round and reactive to light, EOM intact. No discharge, tearing, redness, or swelling to bilateral eyes. No surrounding erythema to eyelids.    Neck: Neck supple.   Normal range of motion.  Pulmonary/Chest: No stridor. No respiratory distress.   Musculoskeletal:         General: Normal range of motion.      Cervical back: Normal range of motion and neck supple.     Neurological: She is alert and oriented to person, place, and time.   Skin: No rash noted.   Psychiatric: She has a normal mood and affect. Thought content normal.         ED Course   Procedures  Labs Reviewed - No data to display       Imaging Results    None          Medications   TETRAcaine HCl (PF) 0.5 % Drop 2 drop (2 drops Right Eye Given 12/9/24 1115)     Medical Decision Making  Devika Bustillo is a 64 y.o. female, with a PMHx of asthma, DM with proliferative retinopathy, PAD, HTN, HLD, and PE, who presents to the ED with intermittent blurry vision to the right eye that began 1 week ago (currently resolved).     Differential includes but not limited to progression of diabetics/hypertensive proliferative retinopathy, acute angle glaucoma however less likely due to lack of current eye pain or blurry vision, central artery occlusion however less likely due to lack of current blurry vision, conjunctivitis however less likely due to lack of conjunctival erythema or discharge.    Patient is alert and afebrile.  Patient is nontoxic appearing, not in distress.  Physical exam Fixed left pupil. Right pupil round and reactive to light, EOM intact. No discharge, tearing, redness, or swelling to bilateral eyes. No surrounding erythema to eyelids.   Moy pen  pressure: right eye 22, left eye 27.    Contacted Ophthalmology triage who is able to schedule her appointment with Ophthalmology in 2 days.  Consulted Ophthalmology who stated pt needs to see a retinal specialist and would contact pt with time and provider to follow up with. Patient contacted by retinal specialist clinic while in ED, who informed her to go to [facility] 10th floor optometry clinic today for further evaluation and management. Patient expresses she has a ride to get there and will be going straight after discharge. Patient still does not endorse blurry vision or eye pain at this time. Recommended patient to follow up with her PCP in 2 days. Strict return precautions given for new or worsening symptoms. Patient expresses understanding of return precautions and follow up plan.      Risk  Prescription drug management.            Scribe Attestation:   Scribe #1: I performed the above scribed service and the documentation accurately describes the services I performed. I attest to the accuracy of the note.        ED Course as of 12/09/24 1955   Mon Dec 09, 2024   1119 Patient with chronic vision changes to left eye now with several days of blurring vision to the right eye.  Unable to see her ophthalmologist in a timely manner and would like referral into our system. [MH]   1120 Visual acuity 20/200 on the left and 20/ 30 in the right [MH]      ED Course User Index  [MH] Tracy Dave MD                           I, Adina Chase PA-C, personally performed the services described in this documentation. All medical record entries made by the scribe were at my direction and in my presence. I have reviewed the chart and agree that the record reflects my personal performance and is accurate and complete.      DISCLAIMER: This note was prepared with FAGUO voice recognition transcription software. Garbled syntax, mangled pronouns, and other bizarre constructions may be attributed to that  software system.    Clinical Impression:  Final diagnoses:  [H53.8] Blurry vision, right eye (Primary)          ED Disposition Condition    Discharge Stable          ED Prescriptions    None       Follow-up Information       Follow up With Specialties Details Why Contact Info    [facility] Emergency Dept Emergency Medicine Go to  If new symptoms develop or symptoms worsen [address]  [facility] [place] [id number] [phone]    Ophthalmology   You have been contacted by retinal specialist to follow up today for further evaluation and definitive management. [address]  [place], [place]    10th floor clinic.    Your Primary Care Provider  Schedule an appointment as soon as possible for a visit in 2 days For follow up              Adina Chase PA-C  12/09/24 4145

## 2024-12-09 NOTE — ED TRIAGE NOTES
Pt to ED c/o bilateral blurred vision. Reports chronic blurred vision to L eye and needed a surgery to the left eye but the surgery was put off due to personal issues. Reports new blurred vision to the R eye. Is following Dr. Stock at [facility] and was seen last week and was told that she is about to lose her eye sight to the L eye since surgery was put off. Unsure of the name of the condition to the L eye.

## 2024-12-09 NOTE — TELEPHONE ENCOUNTER
according to the pts dr at [facility] pt is sched for sx on wednesday with Dayami but pt is interested in seeing a provider here at [facility]. Will cxl appt with sorin on thurs. Pari CHOWDARY is calling the patient to discuss having her scheduled to be seen here asap for mac hole OS

## 2024-12-09 NOTE — DISCHARGE INSTRUCTIONS
You have been contacted by phone by a retinal specialist to follow up today for further evaluation and definitive management    Thank you for coming to our Emergency Department today. It is important to remember that some problems or medical conditions are difficult to diagnose and may not be found or addressed during your Emergency Department visit.  These conditions often start with non-specific symptoms and can only be diagnosed on follow up visits with your primary care physician or specialist when the symptoms continue or change. Please remember that all medical conditions can change, and we cannot predict how you will be feeling tomorrow or the next day. Return to the ER with any questions/concerns, new/concerning symptoms, worsening or failure to improve.       Be sure to follow up with your primary care doctor and review all labs/imaging/tests that were performed during your ER visit with them. It is very common for us to identify non-emergent incidental findings which must be followed up with your primary care physician.  Some labs/imaging/tests may be outside of the normal range, and require non-emergent follow-up and/or further investigation/treatment/procedures/testing to help diagnose/exclude/prevent complications or other potentially serious medical conditions. Some abnormalities may not have been discussed or addressed during your ER visit.     An ER visit does not replace a primary care visit, and many screening tests or follow-up tests cannot be ordered by an ER doctor or performed by the ER. Some tests may even require pre-approval.    If you do not have a primary care doctor, you may contact the one listed on your discharge paperwork or you may also call the [facility] Appointment Desk at [phone] , or [address] [place] [address] at  [phone] to schedule an appointment, or establish care with a primary care doctor or even a specialist and to obtain information about local resources. It is important  to your health that you have a primary care doctor.    Please take all medications as directed. We have done our best to select a medication for you that will treat your condition however, all medications may potentially have side-effects and it is impossible to predict which medications may give you side-effects or what those side-effects (if any) those medications may give you.  If you feel that you are having a negative effect or side-effect of any medication you should stop taking those medications immediately and seek medical attention. If you feel that you are having a life-threatening reaction call 911.        Do not drive, swim, climb to height, take a bath, operate heavy machinery, drink alcohol or take potentially sedating medications, sign any legal documents or make any important decisions for 24 hours if you have received any pain medications, sedatives or mood altering drugs during your ER visit or within 24 hours of taking them if they have been prescribed to you.     You can find additional resources for Dentists, hearing aids, durable medical equipment, low cost pharmacies and other resources at https://Counts include 234 beds at the [email or web address]

## 2024-12-09 NOTE — PROGRESS NOTES
"HPI    Patient here for second opinion. Pt sts she has blurry VA OS cause by a   macular hole that occurred about 3 months go she sts she was willing to   have the sx but had to cxl due to a close family death. She sts it was not   explained to her that if she waited to have the sx that she may not get   her VA back. She would like to proceed with surgery but is adament that it   is performed by someone other than Dr. Stock.     Copied from Dr. Stock last note:   "Mac Hole OS  -S/P PPV/EL/ILM peel/FGx left eye 4/11/24; S/P PPV/EL/partial FAx/LEORA left   eye 6/12/24  - S/P RUDOLPH (last 9/16/22), LEORA x 3 (last 6/12/24 - intra-op; prior: 5/14/24   @ [facility])   - VMT resolved, MH improved (smaller) without SRF, however still open    PDR OU W/ Mac Edema OD  -S/P PRP both eyes x 2 (last right eye: 09/09/22; left eye 10/3/23)    S/p uncomplicated CEIOL Roy 04/11/23"    Hemoglobin A1C       Date                     Value               Ref Range             Status                10/10/2024               7.6 (H)             4.0 - 5.6 %           Final                      01/15/2024               7.4 (H)             4.0 - 5.6 %           Final                      06/27/2023               6.6 (H)             4.0 - 5.6 %           Final                Last edited by Ora Wright on 12/9/2024  2:46 PM.            Assessment /Plan     For exam results, see Encounter Report.    There are no diagnoses linked to this encounter.                     "

## 2024-12-09 NOTE — PROGRESS NOTES
"Subjective:       Patient ID: Devika Bustillo is a 64 y.o. female      Chief Complaint   Patient presents with    Eye Problem     History of Present Illness  HPI    Patient here for second opinion. Pt sts she has blurry VA OS cause by a   macular hole that occurred about 3 months ago  She sts she was willing to have the sx but had to cxl due to a close   family death. She sts it was not explained to her that if she waited to   have the sx that she may not get her VA back. She would like to proceed   with surgery but is adament that it is performed by someone other than Dr. Stock.   Va OD is good.  No f/f/pain OU    Gtts:  Atropine BID OS  Ilevro Qday OS  PF BID OS  Copied from Dr. Stock last note:   "Mac Hole OS  -S/P PPV/EL/ILM peel/FGx left eye 4/11/24; S/P PPV/EL/partial FAx/LEORA left   eye 6/12/24  - S/P RUDOLPH (last 9/16/22), LEORA x 3 (last 6/12/24 - intra-op; prior: 5/14/24   @ [facility])   - VMT resolved, MH improved (smaller) without SRF, however still open    PDR OU W/ Mac Edema OD  -S/P PRP both eyes x 2 (last right eye: 09/09/22; left eye 10/3/23)    S/p uncomplicated CEIOL Roy 04/11/23"    Hemoglobin A1C       Date                     Value               Ref Range             Status                10/10/2024               7.6 (H)             4.0 - 5.6 %           Final                      01/15/2024               7.4 (H)             4.0 - 5.6 %           Final            Last edited by Horacio Dudley MD on 12/10/2024 11:04 AM.        Imaging:    See report    Assessment/Plan:     1. Macular hole of left eye (Primary)  Review of prior notes from [facility] reveal MH present since at least Jan 2024  Had PPV/ attempt at repair 4/2024, then had repeat PPV 6/2024  Hole with flat edges, no IRH.  Given chronicity and configuration and multiple attempts at closure, would only expect modest vis imprv if closes and decreased chance of closure.  RBA surgery d/w pt.  Visual prognosis with and without surgery " discussed  Recommend observation  Pt agrees    - Posterior Segment OCT Retina-Both eyes    2. Type 2 diabetes mellitus with both eyes affected by proliferative retinopathy without macular edema, with long-term current use of insulin  Recommend FA to eval for ischemia and prolif  May need more PRP    Diabetic Retinopathy discussed in detail, all questions answered  Stressed importance of good BS/BP/Chol Control  RTC immediately PRN any vision changes, veda blurry vision, missing vision, floaters, distortions, etc      3. Preretinal fibrosis, left  Will assess from activity with FA  No traction    4. Chronic anterior uveitis, left  Quiet today  Review of notes shows activity after CE/IOL and PPV and did not get w/u yet.  Will need if cannot taper off PF  Try to taper PF 0/1 x 2 wks.  Reeval next visit  CPM Ilevro 0/1 and Atropine 0/2    5. Diabetic cataract, right  Good Va  Observe      Recommend pt to f/u with Dr Stock, has been receiving good care.    Pt wishes to f/u here        Visit today included increased complexity associated with the care of the episodic problem PDR, macular hole, cataract, uveitis addressed and managing the longitudinal care of the patient due to the serious and/or complex managed problem(s) PDR, macular hole, cataract, uveitis .    Follow up in about 4 weeks (around 1/6/2025), or if symptoms worsen or fail to improve, for Dilated examination, OCT Mac, IVFA Left Transit.

## 2024-12-09 NOTE — TELEPHONE ENCOUNTER
----- Message from Juan sent at 12/9/2024 11:37 AM CST -----  Regarding: Urgent Same day visit  Contact: [phone]  Scheduling Request           Appt Type: Np       Date/Time Preference: Same day      Treating Provider: Any      Caller Name: Ciara - [facility]      Contact Preference:  [phone] Adina  or  [phone] pt     Comment: Pt is having intermitting blurry vision in right eye with no pain reported, Pt has history of diabetic hypertension and  macular hole in right eye with up coming procedure Adina is requesting pt be seen today  Nothing available in epic

## 2024-12-10 ENCOUNTER — TELEPHONE (OUTPATIENT)
Dept: PODIATRY | Facility: CLINIC | Age: 64
End: 2024-12-10
Payer: MEDICAID

## 2024-12-10 ENCOUNTER — PATIENT MESSAGE (OUTPATIENT)
Dept: PSYCHIATRY | Facility: CLINIC | Age: 64
End: 2024-12-10
Payer: MEDICAID

## 2024-12-10 ENCOUNTER — TELEPHONE (OUTPATIENT)
Dept: PSYCHIATRY | Facility: CLINIC | Age: 64
End: 2024-12-10
Payer: MEDICAID

## 2024-12-10 NOTE — PROGRESS NOTES
[facility]   Physical Therapy Treatment Note      Name: Devika Bustillo  Clinic Number: [id number]    Therapy Diagnosis:   Encounter Diagnosis   Name Primary?    Impaired functional mobility, balance, gait, and endurance Yes     Physician: Zabrina Hess PA-C    Visit Date: 10/30/2024    Physician Orders: PT Eval and Treat   Medical Diagnosis from Referral: J96.01 (ICD-10-CM) - Acute hypoxemic respiratory failure; N30.00 (ICD-10-CM) - Acute cystitis without hematuria  Evaluation Date: 10/22/2024  Authorization Period Expiration: 12/31/2024  Plan of Care Expiration: 2/11/2025  Progress Note Due: 1/2/2025  Visit # / Visits authorized: 1/20     Precautions: Fall; Asthma      Time In: 1:15  Time Out: 2:00  Total Billable Time: 45 minutes (40 billable)      Subjective     Devika reports same status as evaluation last visit.    She was compliant with home exercise program.      Pain: 0/10    Objective      Objective Measures updated at progress report unless specified.     Treatment     Devika received the treatments listed below:          Only TE to be charged due to Medicaid     therapeutic exercises to develop strength and endurance for 17 minutes, including:    -Nu-step with B LEs and B UEs, Level 2.3- x 8 minutes    -standing heel-raises- x 10    -transverse abdominis draw-ins:   -teaching isolation technique    -3-second hold x 5      *rest breaks provided throughout as needed      therapeutic activities to improve functional performance for 23 minutes, including:    -standing endurance with UE support:   - x 15 seconds   - x 2 minutes   - x 1:35    -gait without AD:     -with CGA- Min A   - x 42 ft   - x 50 ft   - x 37 ft    -gait endurance with rollator- x 425 ft      *rest breaks provided throughout as needed    gait training to improve functional mobility and safety for 0 minutes, including:  neuromuscular re-education activities to improve: Balance, Kinesthetic, and Posture for 0 minutes,  including:       Patient Education and Home Exercises       Education provided:   -ankle pumps and start of HEP  -discussion of PT Plan of Care     Written Home Exercises Provided: to be provided within treatment.  Exercises were reviewed and Devika was able to demonstrate them prior to the end of the session.  Devika demonstrated good  understanding of the education provided.     Assessment     Ms. Bustillo tolerated first session well. She responded well to standing and gait endurance work initiated today. Gait stability training, without AD, was completed fairly well considering patient's status. Good response to lower extremity and core strengthening. Patient remains appropriate for skilled physical therapy.      Devika Is progressing well towards her goals.   Patient prognosis is Good.     Patient will continue to benefit from skilled outpatient physical therapy to address the deficits listed in the problem list box on initial evaluation, provide pt/family education and to maximize pt's level of independence in the home and community environment.     Patient's spiritual, cultural and educational needs considered and pt agreeable to plan of care and goals.     Anticipated barriers to physical therapy: chronic respiratory conditions; lumbar radiculopathy    Goals:   Short Term Goals: 5 weeks   -Patient will increase left hip-flexion strength to 4+/5 to demonstrate improved functional strength.  -Patient will complete 5-Times Sit-to- </= 18 seconds to demonstrate improved functional capacity.   -Patient will ascend/descend curb, using LRAD, Mod I, to demonstrate improved ability to safely access community environments.      Long Term Goals: 10 weeks   -Patient will achieve Timed Up and Go of 13 seconds to demonstrate improved gait stability and reduced fall risk.   -Patient will ambulate without AD x 100 feet, with consistent heel initial-contact, with standard stride, and without sway, Mod I, to demonstrate  improved gait quality and safety.  -Patient will ambulate with LRAD x 500 feet, Mod I, to demonstrate ability to manage community distances and outings.     Plan     Plan of care Certification: 10/22/2024 to 2/11/2025                                                                          Delayed start of treatment due to scheduling     Outpatient Physical Therapy 2 times weekly for 10 weeks to include the following interventions: Gait Training, Neuromuscular Re-ed, Self Care, Therapeutic Activities, and Therapeutic Exercise.       Sandra Del Toro, PT, DPT    10/30/2024

## 2024-12-10 NOTE — TELEPHONE ENCOUNTER
Appointment scheduled for 12/17 at 10a.    Patient verbalized understanding.      ----- Message from Irineo sent at 12/10/2024  2:05 PM CST -----  Type:  Sooner Appointment Request     Patient is requesting a sooner appointment.  Patient declined first available appointment listed as well as another facility and provider .  Patient will not accept being placed on the waitlist and is requesting a message be sent to doctor.     Name of Caller:   pt  When is the first available appointment?   Denial   Symptoms:   E11.621,L97.509 (ICD-10-CM) - Toe ulcer due to DM  Would the patient rather a call back or a response via [facility]?   Call back  Best Call Back Number:   Telephone Information:  Mobile          [phone]    Additional Information:

## 2024-12-17 ENCOUNTER — OFFICE VISIT (OUTPATIENT)
Dept: PODIATRY | Facility: CLINIC | Age: 64
End: 2024-12-17
Payer: MEDICAID

## 2024-12-17 ENCOUNTER — APPOINTMENT (OUTPATIENT)
Dept: RADIOLOGY | Facility: OTHER | Age: 64
End: 2024-12-17
Attending: PODIATRIST
Payer: MEDICAID

## 2024-12-17 VITALS
SYSTOLIC BLOOD PRESSURE: 139 MMHG | BODY MASS INDEX: 34.72 KG/M2 | DIASTOLIC BLOOD PRESSURE: 54 MMHG | HEIGHT: 68 IN | HEART RATE: 57 BPM | WEIGHT: 229.06 LBS

## 2024-12-17 DIAGNOSIS — E11.621 TOE ULCER DUE TO DM: ICD-10-CM

## 2024-12-17 DIAGNOSIS — L97.509 TOE ULCER DUE TO DM: ICD-10-CM

## 2024-12-17 DIAGNOSIS — M86.179 OTHER ACUTE OSTEOMYELITIS, UNSPECIFIED ANKLE AND FOOT: Primary | ICD-10-CM

## 2024-12-17 DIAGNOSIS — M86.179 OTHER ACUTE OSTEOMYELITIS, UNSPECIFIED ANKLE AND FOOT: ICD-10-CM

## 2024-12-17 DIAGNOSIS — L97.513 ULCER OF GREAT TOE, RIGHT, WITH NECROSIS OF MUSCLE: ICD-10-CM

## 2024-12-17 DIAGNOSIS — E11.42 TYPE 2 DIABETES MELLITUS WITH DIABETIC POLYNEUROPATHY, UNSPECIFIED WHETHER LONG TERM INSULIN USE: ICD-10-CM

## 2024-12-17 PROCEDURE — 3075F SYST BP GE 130 - 139MM HG: CPT | Mod: CPTII,,, | Performed by: PODIATRIST

## 2024-12-17 PROCEDURE — 11043 DBRDMT MUSC&/FSCA 1ST 20/<: CPT | Mod: PBBFAC,PN | Performed by: PODIATRIST

## 2024-12-17 PROCEDURE — 99215 OFFICE O/P EST HI 40 MIN: CPT | Mod: PBBFAC,PN,25 | Performed by: PODIATRIST

## 2024-12-17 PROCEDURE — 73630 X-RAY EXAM OF FOOT: CPT | Mod: 26,RT,, | Performed by: RADIOLOGY

## 2024-12-17 PROCEDURE — 99204 OFFICE O/P NEW MOD 45 MIN: CPT | Mod: 25,S$PBB,, | Performed by: PODIATRIST

## 2024-12-17 PROCEDURE — 87077 CULTURE AEROBIC IDENTIFY: CPT | Performed by: PODIATRIST

## 2024-12-17 PROCEDURE — 3008F BODY MASS INDEX DOCD: CPT | Mod: CPTII,,, | Performed by: PODIATRIST

## 2024-12-17 PROCEDURE — 3078F DIAST BP <80 MM HG: CPT | Mod: CPTII,,, | Performed by: PODIATRIST

## 2024-12-17 PROCEDURE — 4010F ACE/ARB THERAPY RXD/TAKEN: CPT | Mod: CPTII,,, | Performed by: PODIATRIST

## 2024-12-17 PROCEDURE — 3051F HG A1C>EQUAL 7.0%<8.0%: CPT | Mod: CPTII,,, | Performed by: PODIATRIST

## 2024-12-17 PROCEDURE — 99999 PR PBB SHADOW E&M-EST. PATIENT-LVL V: CPT | Mod: PBBFAC,,, | Performed by: PODIATRIST

## 2024-12-17 PROCEDURE — 87070 CULTURE OTHR SPECIMN AEROBIC: CPT | Performed by: PODIATRIST

## 2024-12-17 PROCEDURE — 3060F POS MICROALBUMINURIA REV: CPT | Mod: CPTII,,, | Performed by: PODIATRIST

## 2024-12-17 PROCEDURE — 73630 X-RAY EXAM OF FOOT: CPT | Mod: TC,PN,RT

## 2024-12-17 PROCEDURE — 3066F NEPHROPATHY DOC TX: CPT | Mod: CPTII,,, | Performed by: PODIATRIST

## 2024-12-17 PROCEDURE — 87075 CULTR BACTERIA EXCEPT BLOOD: CPT | Performed by: PODIATRIST

## 2024-12-17 PROCEDURE — 1159F MED LIST DOCD IN RCRD: CPT | Mod: CPTII,,, | Performed by: PODIATRIST

## 2024-12-17 PROCEDURE — 87186 SC STD MICRODIL/AGAR DIL: CPT | Mod: 59 | Performed by: PODIATRIST

## 2024-12-17 RX ORDER — CLINDAMYCIN HYDROCHLORIDE 300 MG/1
300 CAPSULE ORAL 3 TIMES DAILY
Qty: 30 CAPSULE | Refills: 0 | Status: SHIPPED | OUTPATIENT
Start: 2024-12-17 | End: 2024-12-27

## 2024-12-17 RX ORDER — LIDOCAINE AND PRILOCAINE 25; 25 MG/G; MG/G
CREAM TOPICAL
Qty: 30 G | Refills: 3 | Status: SHIPPED | OUTPATIENT
Start: 2024-12-17

## 2024-12-17 NOTE — PROGRESS NOTES
Subjective:      Patient ID: Devika Bustillo is a 64 y.o. female.    Chief Complaint: Foot Ulcer    Open sore 5th toe right foot.  Gradual onset, worsening over the past 4-6 weeks.  Aggravated with time spent in shoes.  No prior medical treatment.  No self-treatment.  Denies trauma and surgery both feet.    Diabetes, increased risk amputation needing evaluation/management/optomization of foot care.    Burning pain all over both feet stinging tingling burning.  Gradual onset, worsening over the past year or more.  Aggravated by no particular factor.  No prior medical treatment.  No self-treatment.  Denies trauma and surgery both feet.    Review of Systems   Constitutional: Negative for chills, diaphoresis, fever, malaise/fatigue and night sweats.   Cardiovascular:  Negative for claudication, cyanosis, leg swelling and syncope.   Skin:  Positive for poor wound healing. Negative for color change, dry skin, nail changes, rash, suspicious lesions and unusual hair distribution.   Musculoskeletal:  Negative for falls, joint pain, joint swelling, muscle cramps, muscle weakness and stiffness.   Gastrointestinal:  Negative for constipation, diarrhea, nausea and vomiting.   Neurological:  Positive for numbness, paresthesias and sensory change. Negative for brief paralysis, disturbances in coordination, focal weakness and tremors.         Objective:      Physical Exam  Constitutional:       General: She is not in acute distress.     Appearance: She is well-developed. She is not diaphoretic.   Cardiovascular:      Pulses:           Popliteal pulses are 2+ on the right side and 2+ on the left side.        Dorsalis pedis pulses are 1+ on the right side and 1+ on the left side.        Posterior tibial pulses are 1+ on the right side and 1+ on the left side.      Comments: Capillary refill 3 seconds all toes/distal feet, all toes/both feet warm to touch.      Negative lymphadenopathy bilateral popliteal fossa and tarsal tunnel.     Less  than 2+ lower extremity edema bilateral.    Musculoskeletal:      Right ankle: No swelling, deformity, ecchymosis or lacerations. Normal range of motion. Normal pulse.      Right Achilles Tendon: Normal. No defects. Nelson's test negative.      Comments: Mild hammertoe deformity with adductovarus position 5th toes right more than left.    Otherwise, Normal angle, base, station of gait. All ten toes without clubbing, cyanosis, or signs of ischemia.  No pain to palpation bilateral lower extremities.  Range of motion, stability, muscle strength, and muscle tone normal bilateral feet and legs.    Lymphadenopathy:      Lower Body: No right inguinal adenopathy. No left inguinal adenopathy.      Comments: Negative lymphadenopathy bilateral popliteal fossa and tarsal tunnel.    Negative lymphangitic streaking bilateral feet/ankles/legs.   Skin:     General: Skin is warm and dry.      Capillary Refill: Capillary refill takes 2 to 3 seconds.      Coloration: Skin is not pale.      Findings: No abrasion, bruising, burn, ecchymosis, erythema, laceration, lesion or rash.      Nails: There is no clubbing.      Comments:   Wound:  Dorsal lateral 5th PIPJ right    Size:    Pre:  4 by 3 x 1 mm  Post:  12 x 10 by 3 mm    Base:  Granular, pink with moderate serous/serosanaguinous drainage only.  No pus, tracking, fluctuance, malodor, or cardinal signs infection.    Borders:  Hyperkeratotic, debriding to flat, pink, blanchable skin edges without undermining.    Otherwise, Skin thin, shiny, atrophic, with decreased density and distribution of pedal hair bilateral, but without hyperpigmentation, jessy discoloration,  ulcers, masses, nodules or cords palpated bilateral feet and legs.    Toenails 1st, 2nd, 3rd, 4th, 5th  bilateral are hypertrophic thickened 2-3 mm, dystrophic, discolored tanish brown with tan, gray crumbly subungual debris.  Tender to distal nail plate pressure, without periungual skin abnormality of each.     Neurological:      Mental Status: She is alert and oriented to person, place, and time.      Sensory: Sensory deficit present.      Motor: No tremor, atrophy or abnormal muscle tone.      Gait: Gait normal.      Deep Tendon Reflexes:      Reflex Scores:       Patellar reflexes are 2+ on the right side and 2+ on the left side.       Achilles reflexes are 2+ on the right side and 2+ on the left side.     Comments: Paresthesias, and burning bilateral feet with no clearly identified trigger or source.    Negative tinel sign to percussion sural, superficial peroneal, deep peroneal, saphenous, and posterior tibial nerves right and left ankles and feet.    Diminished/loss of protective sensation all toes bilateral to 10 gram monofilament.  0/10 detected     Psychiatric:         Behavior: Behavior is cooperative.           Assessment:       Encounter Diagnoses   Name Primary?    Toe ulcer due to DM     Other acute osteomyelitis, unspecified ankle and foot Yes    Type 2 diabetes mellitus with diabetic polyneuropathy, unspecified whether long term insulin use          Plan:       Devika was seen today for foot ulcer.    Diagnoses and all orders for this visit:    Other acute osteomyelitis, unspecified ankle and foot  -     X-Ray Foot Complete Right; Future  -     Aerobic culture  -     Culture, Anaerobic  -     Basic Metabolic Panel; Future  -     CBC Auto Differential; Future  -     C-Reactive Protein; Future  -     Sedimentation rate; Future  -     MRI Foot Toes W WO Contrast Right; Future    Toe ulcer due to DM  Comments:  right 5th  Orders:  -     Ambulatory referral/consult to Podiatry  -     X-Ray Foot Complete Right; Future  -     Aerobic culture  -     Culture, Anaerobic  -     Basic Metabolic Panel; Future  -     CBC Auto Differential; Future  -     C-Reactive Protein; Future  -     Sedimentation rate; Future    Type 2 diabetes mellitus with diabetic polyneuropathy, unspecified whether long term insulin use  -      X-Ray Foot Complete Right; Future  -     Aerobic culture  -     Culture, Anaerobic  -     Basic Metabolic Panel; Future  -     CBC Auto Differential; Future  -     C-Reactive Protein; Future  -     Sedimentation rate; Future    Other orders  -     clindamycin (CLEOCIN) 300 MG capsule; Take 1 capsule (300 mg total) by mouth 3 (three) times daily. for 10 days      I counseled the patient on her conditions, their implications and medical management.    Patient remain very focused on excellent hyperglycemic management to facilitate healing.    Blood work, x-rays, MRI with and without right toes.      Deep cultures tissue 5th toe right.      Clindamycin.      Debride wound.      Dressed wound with Luz, wound foam, football do not change or wet.      Dispense surgical shoe ambulate minimally surgical shoe right, casual shoe left.      Adequate vitamin supplementation, protein intake, and hydration - discussed with patient.    Long-term plan: Heel wound, offload diabetic shoes appropriate monitoring.      We will consult pain management when adequately worked up and ulcer is managed.      Topical EMLA for remaining portions of the feet relieve pain until appropriate consultation can be made.    No follow-ups on file.

## 2024-12-17 NOTE — PROCEDURES
"Wound Debridement    Date/Time: 12/17/2024 10:12 AM    Performed by: David Garcia DPM  Authorized by: David Garcia DPM    Time out: Immediately prior to procedure a "time out" was called to verify the correct patient, procedure, equipment, support staff and site/side marked as required.    Consent Done?:  Yes (Verbal)  Local anesthesia used?: No      Wound Details:    Location:  Right foot    Location:  Right 5th Toe    Type of Debridement:  Excisional       Length (cm):  1.2       Width (cm):  1       Depth (cm):  0.3       Area (sq cm):  1.2       Percent Debrided (%):  100       Total Area Debrided (sq cm):  1.2    Depth of debridement:  Muscle/fascia/tendon    Tissue debrided:  Dermis, Epidermis and Subcutaneous    Devitalized tissue debrided:  Callus, Necrotic/Eschar, Slough and Exudate    Instruments:  Nippers  Bleeding:  Minimal  Hemostasis Achieved: Yes  Method Used:  Pressure  Patient tolerance:  Patient tolerated the procedure well with no immediate complications  1st Wound Pain Assessment: 0  Specimen Collected: Specimen sent to microbiology    "

## 2024-12-18 NOTE — ED TRIAGE NOTES
Presented to the ER with loose stools for 7 days states patient.  Dr. Nolasco at bedside.  C/o lower abdominal region, and stomach pain.     no

## 2024-12-20 ENCOUNTER — LAB VISIT (OUTPATIENT)
Dept: LAB | Facility: HOSPITAL | Age: 64
End: 2024-12-20
Attending: PODIATRIST
Payer: MEDICAID

## 2024-12-20 DIAGNOSIS — L97.509 TOE ULCER DUE TO DM: ICD-10-CM

## 2024-12-20 DIAGNOSIS — E11.42 TYPE 2 DIABETES MELLITUS WITH DIABETIC POLYNEUROPATHY, UNSPECIFIED WHETHER LONG TERM INSULIN USE: ICD-10-CM

## 2024-12-20 DIAGNOSIS — M86.179 OTHER ACUTE OSTEOMYELITIS, UNSPECIFIED ANKLE AND FOOT: ICD-10-CM

## 2024-12-20 DIAGNOSIS — E11.621 TOE ULCER DUE TO DM: ICD-10-CM

## 2024-12-20 LAB
ANION GAP SERPL CALC-SCNC: 9 MMOL/L (ref 8–16)
BACTERIA SPEC AEROBE CULT: ABNORMAL
BACTERIA SPEC AEROBE CULT: ABNORMAL
BACTERIA SPEC ANAEROBE CULT: NORMAL
BASOPHILS # BLD AUTO: 0.04 K/UL (ref 0–0.2)
BASOPHILS NFR BLD: 0.6 % (ref 0–1.9)
BUN SERPL-MCNC: 37 MG/DL (ref 8–23)
CALCIUM SERPL-MCNC: 9 MG/DL (ref 8.7–10.5)
CHLORIDE SERPL-SCNC: 109 MMOL/L (ref 95–110)
CO2 SERPL-SCNC: 21 MMOL/L (ref 23–29)
CREAT SERPL-MCNC: 1.5 MG/DL (ref 0.5–1.4)
CRP SERPL-MCNC: 2.2 MG/L (ref 0–8.2)
DIFFERENTIAL METHOD BLD: ABNORMAL
EOSINOPHIL # BLD AUTO: 0.2 K/UL (ref 0–0.5)
EOSINOPHIL NFR BLD: 2.2 % (ref 0–8)
ERYTHROCYTE [DISTWIDTH] IN BLOOD BY AUTOMATED COUNT: 14.1 % (ref 11.5–14.5)
ERYTHROCYTE [SEDIMENTATION RATE] IN BLOOD BY PHOTOMETRIC METHOD: 90 MM/HR (ref 0–36)
EST. GFR  (NO RACE VARIABLE): 39 ML/MIN/1.73 M^2
GLUCOSE SERPL-MCNC: 115 MG/DL (ref 70–110)
HCT VFR BLD AUTO: 35.1 % (ref 37–48.5)
HGB BLD-MCNC: 10.5 G/DL (ref 12–16)
IMM GRANULOCYTES # BLD AUTO: 0.02 K/UL (ref 0–0.04)
IMM GRANULOCYTES NFR BLD AUTO: 0.3 % (ref 0–0.5)
LYMPHOCYTES # BLD AUTO: 2.3 K/UL (ref 1–4.8)
LYMPHOCYTES NFR BLD: 33.1 % (ref 18–48)
MCH RBC QN AUTO: 26.1 PG (ref 27–31)
MCHC RBC AUTO-ENTMCNC: 29.9 G/DL (ref 32–36)
MCV RBC AUTO: 87 FL (ref 82–98)
MONOCYTES # BLD AUTO: 0.6 K/UL (ref 0.3–1)
MONOCYTES NFR BLD: 8.1 % (ref 4–15)
NEUTROPHILS # BLD AUTO: 3.8 K/UL (ref 1.8–7.7)
NEUTROPHILS NFR BLD: 55.7 % (ref 38–73)
NRBC BLD-RTO: 0 /100 WBC
PLATELET # BLD AUTO: 290 K/UL (ref 150–450)
PMV BLD AUTO: 10 FL (ref 9.2–12.9)
POTASSIUM SERPL-SCNC: 4.9 MMOL/L (ref 3.5–5.1)
RBC # BLD AUTO: 4.03 M/UL (ref 4–5.4)
SODIUM SERPL-SCNC: 139 MMOL/L (ref 136–145)
WBC # BLD AUTO: 6.82 K/UL (ref 3.9–12.7)

## 2024-12-20 PROCEDURE — 85025 COMPLETE CBC W/AUTO DIFF WBC: CPT | Performed by: PODIATRIST

## 2024-12-20 PROCEDURE — 85652 RBC SED RATE AUTOMATED: CPT | Performed by: PODIATRIST

## 2024-12-20 PROCEDURE — 36415 COLL VENOUS BLD VENIPUNCTURE: CPT | Mod: PO | Performed by: PODIATRIST

## 2024-12-20 PROCEDURE — 86140 C-REACTIVE PROTEIN: CPT | Performed by: PODIATRIST

## 2024-12-20 PROCEDURE — 80048 BASIC METABOLIC PNL TOTAL CA: CPT | Performed by: PODIATRIST

## 2024-12-23 ENCOUNTER — E-CONSULT (OUTPATIENT)
Dept: INFECTIOUS DISEASES | Facility: CLINIC | Age: 64
End: 2024-12-23
Payer: MEDICAID

## 2024-12-23 DIAGNOSIS — T14.8XXA WOUND INFECTION: Primary | ICD-10-CM

## 2024-12-23 DIAGNOSIS — L08.9 WOUND INFECTION: Primary | ICD-10-CM

## 2024-12-23 NOTE — CONSULTS
[facility] - Infectious Disease [facility]  Response for E-Consult     Patient Name: Devika Bustillo  MRN: [id number]  Primary Care Provider: Gwendolyn, Primary Doctor   Requesting Provider: David Garcia DPM  E-Consult to Infectious Disease  Consult performed by: Bentley Covington MD  Consult ordered by: David Garcia DPM        Ms. Bustillo is a 64 year old female with a history of diabetic foot wound.  X-ray of her foot was negative for osteomyelitis.  MRI has been ordered and scheduled.  Cultures obtained from the wound and it was positive for Citrobacter koseri and MSSA.  ID consulted for antibiotics.    As there is no evidence of osteomyelitis at this time, oral antibiotics would likely be appropriate.  Based on available sensitivities, cefpodoxime 400 mg po bid for 2 weeks should be sufficient    Recommendation:     Cefpodoxime 400 mg po q 12 hours for 14 days.    Contingency that warrants a repeat eConsult or referral: Notify if wound is not responding.    Total time of Consultation: 20 minute    I did speak to the requesting provider verbally about this.     *This eConsult is based on the clinical data available to me and is furnished without benefit of a physical examination. The eConsult will need to be interpreted in light of any clinical issues or changes in patient status not available to me at the time of filing this eConsults. Significant changes in patient condition or level of acuity should result in immediate formal consultation and reevaluation. Please alert me if you have further questions.    Thank you for this eConsult referral.     MD John Amaya - Infectious Disease [facility]

## 2024-12-24 ENCOUNTER — OFFICE VISIT (OUTPATIENT)
Dept: PODIATRY | Facility: CLINIC | Age: 64
End: 2024-12-24
Payer: MEDICAID

## 2024-12-24 ENCOUNTER — TELEPHONE (OUTPATIENT)
Dept: PODIATRY | Facility: CLINIC | Age: 64
End: 2024-12-24

## 2024-12-24 VITALS
BODY MASS INDEX: 34.72 KG/M2 | DIASTOLIC BLOOD PRESSURE: 72 MMHG | HEART RATE: 66 BPM | WEIGHT: 229.06 LBS | HEIGHT: 68 IN | SYSTOLIC BLOOD PRESSURE: 139 MMHG

## 2024-12-24 DIAGNOSIS — E11.621 DIABETIC ULCER OF TOE OF RIGHT FOOT ASSOCIATED WITH TYPE 2 DIABETES MELLITUS, WITH MUSCLE INVOLVEMENT WITHOUT EVIDENCE OF NECROSIS: Primary | ICD-10-CM

## 2024-12-24 DIAGNOSIS — L97.515 DIABETIC ULCER OF TOE OF RIGHT FOOT ASSOCIATED WITH TYPE 2 DIABETES MELLITUS, WITH MUSCLE INVOLVEMENT WITHOUT EVIDENCE OF NECROSIS: Primary | ICD-10-CM

## 2024-12-24 DIAGNOSIS — M86.179 OTHER ACUTE OSTEOMYELITIS, UNSPECIFIED ANKLE AND FOOT: ICD-10-CM

## 2024-12-24 DIAGNOSIS — E11.42 TYPE 2 DIABETES MELLITUS WITH DIABETIC POLYNEUROPATHY, UNSPECIFIED WHETHER LONG TERM INSULIN USE: ICD-10-CM

## 2024-12-24 PROCEDURE — 99214 OFFICE O/P EST MOD 30 MIN: CPT | Mod: PBBFAC,PN | Performed by: PODIATRIST

## 2024-12-24 PROCEDURE — 3051F HG A1C>EQUAL 7.0%<8.0%: CPT | Mod: CPTII,,, | Performed by: PODIATRIST

## 2024-12-24 PROCEDURE — 4010F ACE/ARB THERAPY RXD/TAKEN: CPT | Mod: CPTII,,, | Performed by: PODIATRIST

## 2024-12-24 PROCEDURE — 3060F POS MICROALBUMINURIA REV: CPT | Mod: CPTII,,, | Performed by: PODIATRIST

## 2024-12-24 PROCEDURE — 99213 OFFICE O/P EST LOW 20 MIN: CPT | Mod: S$PBB,,, | Performed by: PODIATRIST

## 2024-12-24 PROCEDURE — 3008F BODY MASS INDEX DOCD: CPT | Mod: CPTII,,, | Performed by: PODIATRIST

## 2024-12-24 PROCEDURE — 99999 PR PBB SHADOW E&M-EST. PATIENT-LVL IV: CPT | Mod: PBBFAC,,, | Performed by: PODIATRIST

## 2024-12-24 PROCEDURE — 3078F DIAST BP <80 MM HG: CPT | Mod: CPTII,,, | Performed by: PODIATRIST

## 2024-12-24 PROCEDURE — 3066F NEPHROPATHY DOC TX: CPT | Mod: CPTII,,, | Performed by: PODIATRIST

## 2024-12-24 PROCEDURE — 1159F MED LIST DOCD IN RCRD: CPT | Mod: CPTII,,, | Performed by: PODIATRIST

## 2024-12-24 PROCEDURE — 3075F SYST BP GE 130 - 139MM HG: CPT | Mod: CPTII,,, | Performed by: PODIATRIST

## 2024-12-24 RX ORDER — CEFPODOXIME PROXETIL 200 MG/1
400 TABLET, FILM COATED ORAL EVERY 12 HOURS
Qty: 56 TABLET | Refills: 0 | Status: SHIPPED | OUTPATIENT
Start: 2024-12-24

## 2024-12-24 NOTE — TELEPHONE ENCOUNTER
Staff informed patient Dr. Garcia prescribed antibiotic for the her base of the recommendations of the Infectious Disease doctor.  Please fill and begin taking them as soon as possible.    Per Dr. Garcia    Patient verbalized understanding.    ----- Message from David Garcia DPM sent at 12/24/2024 10:06 AM CST -----  Prescribed antibiotic for the patient base of the recommendations of the Infectious Disease doctor.  Please have her fill begin taking them as soon as possible.

## 2024-12-24 NOTE — TELEPHONE ENCOUNTER
Staff informed patient staff will talk with Dr. Garcia and give a callback.    Patient verbalized understanding.      ----- Message from Wanderlust sent at 12/24/2024 11:01 AM CST -----  Name of Who is Calling: TAMMIE BARAHONA [[id number]]          What is the request in detail: Pt is requesting a call back regarding a medication cefpodoxime (VANTIN) 200 MG tablet. Pt advised she cannot find a pharmacy that have the medicine in stock. Pt advised she may need an alternative. Pt advised she is suppose to start the medication today. Please assist.           Can the clinic reply by [facility]: No          What Number to Call Back if not in [facility]:  [phone]

## 2024-12-24 NOTE — PROGRESS NOTES
Subjective:      Patient ID: Devika Bustillo is a 64 y.o. female.    Chief Complaint: Foot Ulcer    Ulcer 5th toe right with diabetes, neuropathy, peripheral arterial disease.  MRI scheduled for January 7th.  Infectious Disease visit yesterday-managing for wound infection with IV meds tapered to cultures.  X-rays negative jessy osseous destruction.  Following with vascular surgery for peripheral arterial disease-ABIs show mild-to-moderate disease.  Review of Systems   Constitutional: Negative for chills, diaphoresis, fever, malaise/fatigue and night sweats.   Cardiovascular:  Negative for claudication, cyanosis, leg swelling and syncope.   Skin:  Positive for poor wound healing. Negative for color change, dry skin, nail changes, rash, suspicious lesions and unusual hair distribution.   Musculoskeletal:  Negative for falls, joint pain, joint swelling, muscle cramps, muscle weakness and stiffness.   Gastrointestinal:  Negative for constipation, diarrhea, nausea and vomiting.   Neurological:  Positive for numbness, paresthesias and sensory change. Negative for brief paralysis, disturbances in coordination, focal weakness and tremors.           Objective:      Physical Exam  Constitutional:       General: She is not in acute distress.     Appearance: She is well-developed. She is not diaphoretic.   Cardiovascular:      Pulses:           Popliteal pulses are 2+ on the right side and 2+ on the left side.        Dorsalis pedis pulses are 1+ on the right side and 1+ on the left side.        Posterior tibial pulses are 1+ on the right side and 1+ on the left side.      Comments: Capillary refill 3 seconds all toes/distal feet, all toes/both feet warm to touch.      Negative lymphadenopathy bilateral popliteal fossa and tarsal tunnel.     Less than 2+ lower extremity edema bilateral.    Musculoskeletal:      Right ankle: No swelling, deformity, ecchymosis or lacerations. Normal range of motion. Normal pulse.      Right Achilles  Tendon: Normal. No defects. Nelson's test negative.      Comments: Mild hammertoe deformity with adductovarus position 5th toes right more than left.    Otherwise, Normal angle, base, station of gait. All ten toes without clubbing, cyanosis, or signs of ischemia.  No pain to palpation bilateral lower extremities.  Range of motion, stability, muscle strength, and muscle tone normal bilateral feet and legs.    Lymphadenopathy:      Lower Body: No right inguinal adenopathy. No left inguinal adenopathy.      Comments: Negative lymphadenopathy bilateral popliteal fossa and tarsal tunnel.    Negative lymphangitic streaking bilateral feet/ankles/legs.   Skin:     General: Skin is warm and dry.      Capillary Refill: Capillary refill takes 2 to 3 seconds.      Coloration: Skin is not pale.      Findings: No abrasion, bruising, burn, ecchymosis, erythema, laceration, lesion or rash.      Nails: There is no clubbing.      Comments:   Wound:  Dorsal lateral 5th PIPJ right    Size:    Pre:  6x6 x 1 mm  Post:  9x8 x2 mm    Base:  Granular, pink with moderate serous/serosanaguinous drainage only.  No pus, tracking, fluctuance, malodor, or cardinal signs infection.    Borders:  Hyperkeratotic, debriding to flat, pink, blanchable skin edges without undermining.    Otherwise, Skin thin, shiny, atrophic, with decreased density and distribution of pedal hair bilateral, but without hyperpigmentation, jessy discoloration,  ulcers, masses, nodules or cords palpated bilateral feet and legs.    Toenails 1st, 2nd, 3rd, 4th, 5th  bilateral are hypertrophic thickened 2-3 mm, dystrophic, discolored tanish brown with tan, gray crumbly subungual debris.  Tender to distal nail plate pressure, without periungual skin abnormality of each.    Neurological:      Mental Status: She is alert and oriented to person, place, and time.      Sensory: Sensory deficit present.      Motor: No tremor, atrophy or abnormal muscle tone.      Gait: Gait normal.       Deep Tendon Reflexes:      Reflex Scores:       Patellar reflexes are 2+ on the right side and 2+ on the left side.       Achilles reflexes are 2+ on the right side and 2+ on the left side.     Comments: Paresthesias, and burning bilateral feet with no clearly identified trigger or source.    Negative tinel sign to percussion sural, superficial peroneal, deep peroneal, saphenous, and posterior tibial nerves right and left ankles and feet.    Diminished/loss of protective sensation all toes bilateral to 10 gram monofilament.  0/10 detected     Psychiatric:         Behavior: Behavior is cooperative.             Assessment:       Encounter Diagnoses   Name Primary?    Diabetic ulcer of toe of right foot associated with type 2 diabetes mellitus, with muscle involvement without evidence of necrosis Yes    Other acute osteomyelitis, unspecified ankle and foot     Type 2 diabetes mellitus with diabetic polyneuropathy, unspecified whether long term insulin use            Plan:       Devika was seen today for foot ulcer.    Diagnoses and all orders for this visit:    Diabetic ulcer of toe of right foot associated with type 2 diabetes mellitus, with muscle involvement without evidence of necrosis    Other acute osteomyelitis, unspecified ankle and foot    Type 2 diabetes mellitus with diabetic polyneuropathy, unspecified whether long term insulin use        I counseled the patient on her conditions, their implications and medical management.    Patient remain very focused on excellent hyperglycemic management to facilitate healing.    Follow all antibiotic regimens and follow-up with Infectious Disease from visit yesterday.      Keep appointment for MRI to help with potential bone infection diagnosis.      Debride wound.      Dressed wound with Hydrofera blue, football do not change or wet.      Continue surgical shoe ambulate minimally surgical shoe right, casual shoe left.      Adequate vitamin supplementation, protein  intake, and hydration - discussed with patient.    Long-term plan: Heel wound, offload diabetic shoes appropriate monitoring.      Continue topical EMLA p.r.n. for pain  No follow-ups on file.

## 2024-12-30 ENCOUNTER — OFFICE VISIT (OUTPATIENT)
Dept: OPHTHALMOLOGY | Facility: CLINIC | Age: 64
End: 2024-12-30
Payer: MEDICAID

## 2024-12-30 ENCOUNTER — CLINICAL SUPPORT (OUTPATIENT)
Dept: OPHTHALMOLOGY | Facility: CLINIC | Age: 64
End: 2024-12-30
Payer: MEDICAID

## 2024-12-30 DIAGNOSIS — E11.3593 TYPE 2 DIABETES MELLITUS WITH BOTH EYES AFFECTED BY PROLIFERATIVE RETINOPATHY WITHOUT MACULAR EDEMA, WITH LONG-TERM CURRENT USE OF INSULIN: ICD-10-CM

## 2024-12-30 DIAGNOSIS — E11.3593 TYPE 2 DIABETES MELLITUS WITH BOTH EYES AFFECTED BY PROLIFERATIVE RETINOPATHY WITHOUT MACULAR EDEMA, WITH LONG-TERM CURRENT USE OF INSULIN: Primary | ICD-10-CM

## 2024-12-30 DIAGNOSIS — H35.342 MACULAR HOLE OF LEFT EYE: ICD-10-CM

## 2024-12-30 DIAGNOSIS — H35.372 PRERETINAL FIBROSIS, LEFT: ICD-10-CM

## 2024-12-30 DIAGNOSIS — H20.12 CHRONIC ANTERIOR UVEITIS, LEFT: ICD-10-CM

## 2024-12-30 DIAGNOSIS — Z79.4 TYPE 2 DIABETES MELLITUS WITH BOTH EYES AFFECTED BY PROLIFERATIVE RETINOPATHY WITHOUT MACULAR EDEMA, WITH LONG-TERM CURRENT USE OF INSULIN: Primary | ICD-10-CM

## 2024-12-30 DIAGNOSIS — E11.36 DIABETIC CATARACT OF RIGHT EYE: ICD-10-CM

## 2024-12-30 DIAGNOSIS — Z79.4 TYPE 2 DIABETES MELLITUS WITH BOTH EYES AFFECTED BY PROLIFERATIVE RETINOPATHY WITHOUT MACULAR EDEMA, WITH LONG-TERM CURRENT USE OF INSULIN: ICD-10-CM

## 2024-12-30 PROCEDURE — 92250 FUNDUS PHOTOGRAPHY W/I&R: CPT | Mod: PBBFAC | Performed by: OPHTHALMOLOGY

## 2024-12-30 PROCEDURE — 99214 OFFICE O/P EST MOD 30 MIN: CPT | Mod: PBBFAC | Performed by: OPHTHALMOLOGY

## 2024-12-30 PROCEDURE — 99214 OFFICE O/P EST MOD 30 MIN: CPT | Mod: S$PBB,,, | Performed by: OPHTHALMOLOGY

## 2024-12-30 PROCEDURE — 99999 PR PBB SHADOW E&M-EST. PATIENT-LVL IV: CPT | Mod: PBBFAC,,, | Performed by: OPHTHALMOLOGY

## 2024-12-30 RX ORDER — FLUORESCEIN 500 MG/ML
5 INJECTION INTRAVENOUS ONCE
Status: SHIPPED | OUTPATIENT
Start: 2024-12-30

## 2024-12-30 RX ORDER — TIZANIDINE 4 MG/1
TABLET ORAL
COMMUNITY

## 2024-12-30 NOTE — PROGRESS NOTES
Subjective:       Patient ID: Devika Bustillo is a 64 y.o. female      Chief Complaint   Patient presents with    Follow-up     History of Present Illness  HPI    Patient here for 3 wk DFE/OCT/IVFA OS.    Patient states was confused about what drops to stop after her last exam.   Stopped Ilevro instead of PF.    PF QD OS  Atropine BID OS      Last edited by Horacio Dudley MD on 12/30/2024 11:45 AM.        Imaging:    See report    Assessment/Plan:     1. Macular hole of left eye (Primary)  Review of prior notes from [facility] reveal MH present since at least Jan 2024  Had PPV/ attempt at repair 4/2024, then had repeat PPV 6/2024  Hole with flat edges.  Given chronicity and configuration and multiple attempts at closure, would only expect modest vis imprv if closes and decreased chance of closure.  RBA surgery d/w pt again in great detail since patient was asking for surgery today.  Visual prognosis with and without surgery discussed  Recommend observation  Pt agrees    - Posterior Segment OCT Retina-Both eyes    2. Type 2 diabetes mellitus with both eyes affected by proliferative retinopathy without macular edema, with long-term current use of insulin  Tried to get FA today to eval for ischemia and prolif but could not get venous access  Will have to follow clinically    Observe for now.  No jessy NVE/D    Diabetic Retinopathy discussed in detail, all questions answered  Stressed importance of good BS/BP/Chol Control  RTC immediately PRN any vision changes, veda blurry vision, missing vision, floaters, distortions, etc      3. Preretinal fibrosis, left  No traction  Observe    4. Chronic anterior uveitis, left  Quiet today  Review of notes shows activity after CE/IOL and PPV and did not get w/u yet.  Will need if cannot taper off PF  Try to taper PF 0/1.    Atropine 0/1  Reeval next visit    5. Diabetic cataract, right  Good Va  Observe  Refer for refraction/polycarbs    Recommend pt to f/u with Dr Stock, has been  receiving good care.    Pt wishes to f/u here      Visit today included increased complexity associated with the care of the episodic problem PDR, macular hole, cataract, uveitis addressed and managing the longitudinal care of the patient due to the serious and/or complex managed problem(s) PDR, macular hole, cataract, uveitis .    Follow up in about 2 months (around 2/28/2025) for OCT Mac, Dilated examination.

## 2024-12-30 NOTE — PROGRESS NOTES
Assessment /Plan     For exam results, see Encounter Report.    Type 2 diabetes mellitus with both eyes affected by proliferative retinopathy without macular edema, with long-term current use of insulin  -     Flourescein Angiography - OU - Both Eyes      Oct OU done-CC

## 2024-12-31 ENCOUNTER — OFFICE VISIT (OUTPATIENT)
Dept: PODIATRY | Facility: CLINIC | Age: 64
End: 2024-12-31
Payer: MEDICAID

## 2024-12-31 VITALS
HEART RATE: 68 BPM | BODY MASS INDEX: 34.72 KG/M2 | SYSTOLIC BLOOD PRESSURE: 137 MMHG | DIASTOLIC BLOOD PRESSURE: 64 MMHG | WEIGHT: 229.06 LBS | HEIGHT: 68 IN

## 2024-12-31 DIAGNOSIS — L97.515 DIABETIC ULCER OF TOE OF RIGHT FOOT ASSOCIATED WITH TYPE 2 DIABETES MELLITUS, WITH MUSCLE INVOLVEMENT WITHOUT EVIDENCE OF NECROSIS: Primary | ICD-10-CM

## 2024-12-31 DIAGNOSIS — M86.179 OTHER ACUTE OSTEOMYELITIS, UNSPECIFIED ANKLE AND FOOT: ICD-10-CM

## 2024-12-31 DIAGNOSIS — E11.621 DIABETIC ULCER OF TOE OF RIGHT FOOT ASSOCIATED WITH TYPE 2 DIABETES MELLITUS, WITH MUSCLE INVOLVEMENT WITHOUT EVIDENCE OF NECROSIS: Primary | ICD-10-CM

## 2024-12-31 DIAGNOSIS — E11.42 TYPE 2 DIABETES MELLITUS WITH DIABETIC POLYNEUROPATHY, UNSPECIFIED WHETHER LONG TERM INSULIN USE: ICD-10-CM

## 2024-12-31 PROCEDURE — 1159F MED LIST DOCD IN RCRD: CPT | Mod: CPTII,,, | Performed by: PODIATRIST

## 2024-12-31 PROCEDURE — 3051F HG A1C>EQUAL 7.0%<8.0%: CPT | Mod: CPTII,,, | Performed by: PODIATRIST

## 2024-12-31 PROCEDURE — 3060F POS MICROALBUMINURIA REV: CPT | Mod: CPTII,,, | Performed by: PODIATRIST

## 2024-12-31 PROCEDURE — 99214 OFFICE O/P EST MOD 30 MIN: CPT | Mod: PBBFAC,PN | Performed by: PODIATRIST

## 2024-12-31 PROCEDURE — 3075F SYST BP GE 130 - 139MM HG: CPT | Mod: CPTII,,, | Performed by: PODIATRIST

## 2024-12-31 PROCEDURE — 3066F NEPHROPATHY DOC TX: CPT | Mod: CPTII,,, | Performed by: PODIATRIST

## 2024-12-31 PROCEDURE — 4010F ACE/ARB THERAPY RXD/TAKEN: CPT | Mod: CPTII,,, | Performed by: PODIATRIST

## 2024-12-31 PROCEDURE — 3008F BODY MASS INDEX DOCD: CPT | Mod: CPTII,,, | Performed by: PODIATRIST

## 2024-12-31 PROCEDURE — 99999 PR PBB SHADOW E&M-EST. PATIENT-LVL IV: CPT | Mod: PBBFAC,,, | Performed by: PODIATRIST

## 2024-12-31 PROCEDURE — 1160F RVW MEDS BY RX/DR IN RCRD: CPT | Mod: CPTII,,, | Performed by: PODIATRIST

## 2024-12-31 PROCEDURE — 3078F DIAST BP <80 MM HG: CPT | Mod: CPTII,,, | Performed by: PODIATRIST

## 2024-12-31 PROCEDURE — 99213 OFFICE O/P EST LOW 20 MIN: CPT | Mod: S$PBB,,, | Performed by: PODIATRIST

## 2024-12-31 NOTE — PROGRESS NOTES
Subjective:      Patient ID: Devika Bustillo is a 64 y.o. female.    Chief Complaint: Foot Ulcer    Ulcer 5th toe right with diabetes, neuropathy, peripheral arterial disease.  MRI scheduled for January 7th.  I wrote for the cefpodoxime 400 mg twice daily.  Patient is taking.  Appreciate recommendations Infectious Disease.  X-rays negative jessy osseous destruction.  Following with vascular surgery for peripheral arterial disease-ABIs show mild-to-moderate disease.  Review of Systems   Constitutional: Negative for chills, diaphoresis, fever, malaise/fatigue and night sweats.   Cardiovascular:  Negative for claudication, cyanosis, leg swelling and syncope.   Skin:  Positive for poor wound healing. Negative for color change, dry skin, nail changes, rash, suspicious lesions and unusual hair distribution.   Musculoskeletal:  Negative for falls, joint pain, joint swelling, muscle cramps, muscle weakness and stiffness.   Gastrointestinal:  Negative for constipation, diarrhea, nausea and vomiting.   Neurological:  Positive for numbness, paresthesias and sensory change. Negative for brief paralysis, disturbances in coordination, focal weakness and tremors.           Objective:      Physical Exam  Constitutional:       General: She is not in acute distress.     Appearance: She is well-developed. She is not diaphoretic.   Cardiovascular:      Pulses:           Popliteal pulses are 2+ on the right side and 2+ on the left side.        Dorsalis pedis pulses are 1+ on the right side and 1+ on the left side.        Posterior tibial pulses are 1+ on the right side and 1+ on the left side.      Comments: Capillary refill 3 seconds all toes/distal feet, all toes/both feet warm to touch.      Negative lymphadenopathy bilateral popliteal fossa and tarsal tunnel.     Less than 2+ lower extremity edema bilateral.    Musculoskeletal:      Right ankle: No swelling, deformity, ecchymosis or lacerations. Normal range of motion. Normal pulse.       Right Achilles Tendon: Normal. No defects. Nelson's test negative.      Comments: Mild hammertoe deformity with adductovarus position 5th toes right more than left.    Otherwise, Normal angle, base, station of gait. All ten toes without clubbing, cyanosis, or signs of ischemia.  No pain to palpation bilateral lower extremities.  Range of motion, stability, muscle strength, and muscle tone normal bilateral feet and legs.    Lymphadenopathy:      Lower Body: No right inguinal adenopathy. No left inguinal adenopathy.      Comments: Negative lymphadenopathy bilateral popliteal fossa and tarsal tunnel.    Negative lymphangitic streaking bilateral feet/ankles/legs.   Skin:     General: Skin is warm and dry.      Capillary Refill: Capillary refill takes 2 to 3 seconds.      Coloration: Skin is not pale.      Findings: No abrasion, bruising, burn, ecchymosis, erythema, laceration, lesion or rash.      Nails: There is no clubbing.      Comments:   Wound:  Dorsal lateral 5th PIPJ right    Size:    Pre:  8x8x1 mm    Base:  Granular, pink with moderate serous/serosanaguinous drainage only.  No pus, tracking, fluctuance, malodor, or cardinal signs infection.    Borders:  Hyperkeratotic, debriding to flat, pink, blanchable skin edges without undermining.    Otherwise, Skin thin, shiny, atrophic, with decreased density and distribution of pedal hair bilateral, but without hyperpigmentation, jessy discoloration,  ulcers, masses, nodules or cords palpated bilateral feet and legs.       Neurological:      Mental Status: She is alert and oriented to person, place, and time.      Sensory: Sensory deficit present.      Motor: No tremor, atrophy or abnormal muscle tone.      Gait: Gait normal.      Deep Tendon Reflexes:      Reflex Scores:       Patellar reflexes are 2+ on the right side and 2+ on the left side.       Achilles reflexes are 2+ on the right side and 2+ on the left side.     Comments: Paresthesias, and burning bilateral  feet with no clearly identified trigger or source.    Negative tinel sign to percussion sural, superficial peroneal, deep peroneal, saphenous, and posterior tibial nerves right and left ankles and feet.    Diminished/loss of protective sensation all toes bilateral to 10 gram monofilament.  0/10 detected     Psychiatric:         Behavior: Behavior is cooperative.             Assessment:       Encounter Diagnoses   Name Primary?    Diabetic ulcer of toe of right foot associated with type 2 diabetes mellitus, with muscle involvement without evidence of necrosis Yes    Other acute osteomyelitis, unspecified ankle and foot     Type 2 diabetes mellitus with diabetic polyneuropathy, unspecified whether long term insulin use              Plan:       Devika was seen today for foot ulcer.    Diagnoses and all orders for this visit:    Diabetic ulcer of toe of right foot associated with type 2 diabetes mellitus, with muscle involvement without evidence of necrosis    Other acute osteomyelitis, unspecified ankle and foot    Type 2 diabetes mellitus with diabetic polyneuropathy, unspecified whether long term insulin use          I counseled the patient on her conditions, their implications and medical management.    Patient remain very focused on excellent hyperglycemic management to facilitate healing.    Finish cefpodoxim.    Keep appointment for MRI to help with potential bone infection diagnosis.      Dressed wound with Hydrofera blue, football do not change or wet.      Continue surgical shoe ambulate minimally surgical shoe right, casual shoe left.      Adequate vitamin supplementation, protein intake, and hydration - discussed with patient.    Long-term plan: Heel wound, offload diabetic shoes appropriate monitoring.      Continue topical EMLA p.r.n. for pain  No follow-ups on file.

## 2025-01-02 ENCOUNTER — PATIENT MESSAGE (OUTPATIENT)
Dept: PSYCHIATRY | Facility: CLINIC | Age: 65
End: 2025-01-02
Payer: MEDICAID

## 2025-01-02 ENCOUNTER — OFFICE VISIT (OUTPATIENT)
Dept: PSYCHIATRY | Facility: CLINIC | Age: 65
End: 2025-01-02
Payer: MEDICAID

## 2025-01-02 VITALS
SYSTOLIC BLOOD PRESSURE: 121 MMHG | BODY MASS INDEX: 34.97 KG/M2 | DIASTOLIC BLOOD PRESSURE: 65 MMHG | HEART RATE: 67 BPM | WEIGHT: 230 LBS

## 2025-01-02 DIAGNOSIS — F41.1 GENERALIZED ANXIETY DISORDER: Primary | ICD-10-CM

## 2025-01-02 DIAGNOSIS — Z79.4 TYPE 2 DIABETES MELLITUS WITH BOTH EYES AFFECTED BY PROLIFERATIVE RETINOPATHY WITHOUT MACULAR EDEMA, WITH LONG-TERM CURRENT USE OF INSULIN: Primary | ICD-10-CM

## 2025-01-02 DIAGNOSIS — G47.00 INSOMNIA, UNSPECIFIED TYPE: ICD-10-CM

## 2025-01-02 DIAGNOSIS — F33.2 SEVERE EPISODE OF RECURRENT MAJOR DEPRESSIVE DISORDER, WITHOUT PSYCHOTIC FEATURES: ICD-10-CM

## 2025-01-02 DIAGNOSIS — E11.3593 TYPE 2 DIABETES MELLITUS WITH BOTH EYES AFFECTED BY PROLIFERATIVE RETINOPATHY WITHOUT MACULAR EDEMA, WITH LONG-TERM CURRENT USE OF INSULIN: Primary | ICD-10-CM

## 2025-01-02 PROCEDURE — 3078F DIAST BP <80 MM HG: CPT | Mod: SA,HB,CPTII, | Performed by: NURSE PRACTITIONER

## 2025-01-02 PROCEDURE — 99999 PR PBB SHADOW E&M-EST. PATIENT-LVL I: CPT | Mod: PBBFAC,SA,HB, | Performed by: NURSE PRACTITIONER

## 2025-01-02 PROCEDURE — 99211 OFF/OP EST MAY X REQ PHY/QHP: CPT | Mod: PBBFAC | Performed by: NURSE PRACTITIONER

## 2025-01-02 PROCEDURE — 90833 PSYTX W PT W E/M 30 MIN: CPT | Mod: SA,HB,, | Performed by: NURSE PRACTITIONER

## 2025-01-02 PROCEDURE — G2211 COMPLEX E/M VISIT ADD ON: HCPCS | Mod: S$PBB,SA,HB, | Performed by: NURSE PRACTITIONER

## 2025-01-02 PROCEDURE — 3074F SYST BP LT 130 MM HG: CPT | Mod: SA,HB,CPTII, | Performed by: NURSE PRACTITIONER

## 2025-01-02 PROCEDURE — 99214 OFFICE O/P EST MOD 30 MIN: CPT | Mod: S$PBB,SA,HB, | Performed by: NURSE PRACTITIONER

## 2025-01-02 PROCEDURE — 3008F BODY MASS INDEX DOCD: CPT | Mod: SA,HB,CPTII, | Performed by: NURSE PRACTITIONER

## 2025-01-02 RX ORDER — HYDROXYZINE HYDROCHLORIDE 50 MG/1
50 TABLET, FILM COATED ORAL NIGHTLY
Qty: 30 TABLET | Refills: 1 | Status: SHIPPED | OUTPATIENT
Start: 2025-01-02 | End: 2025-03-03

## 2025-01-02 RX ORDER — TRAZODONE HYDROCHLORIDE 100 MG/1
200 TABLET ORAL NIGHTLY
Qty: 180 TABLET | Refills: 0 | Status: SHIPPED | OUTPATIENT
Start: 2025-01-02 | End: 2025-04-02

## 2025-01-02 RX ORDER — FLUORESCEIN 500 MG/ML
5 INJECTION INTRAVENOUS ONCE
Status: SHIPPED | OUTPATIENT
Start: 2025-01-02

## 2025-01-02 RX ORDER — ARIPIPRAZOLE 10 MG/1
10 TABLET ORAL DAILY
Qty: 30 TABLET | Refills: 1 | Status: SHIPPED | OUTPATIENT
Start: 2025-01-02 | End: 2025-03-03

## 2025-01-02 RX ORDER — BUPROPION HYDROCHLORIDE 150 MG/1
150 TABLET ORAL DAILY
Qty: 30 TABLET | Refills: 1 | Status: SHIPPED | OUTPATIENT
Start: 2025-01-02 | End: 2025-03-03

## 2025-01-02 RX ORDER — ESCITALOPRAM OXALATE 10 MG/1
10 TABLET ORAL DAILY
Qty: 30 TABLET | Refills: 1 | Status: SHIPPED | OUTPATIENT
Start: 2025-01-02 | End: 2025-03-03

## 2025-01-02 NOTE — PROGRESS NOTES
Outpatient Psychiatry Follow-Up Visit ([facility])    01/02/2025    Clinical Status of Patient:  Outpatient (Ambulatory)    Chief Complaint:  Devika Bustillo is a 64 y.o. female who presents today for follow-up of depression and anxiety.  Met with patient.     Current Medications:   Wellbutrin 150 mg Daily  Trazodone 100 mg Daily  Hydroxyzine 50 mg TID for itching   Abilify 10 mg Daily  Lexapro 10 mg Daily  Topamax 50 mg BID PRN for headaches by neurologist  Gabapentin 300 mg BID prescribed by PCP    Past Medication Trials:  unable to remember medications  Seroquel- did not like the way it made her feel    Interval History and Content of Current Session:  Patient seen and chart reviewed. Last seen on 8/15/24    Changes at last visit:  STOP Zoloft  Start Lexapro 10 mg Daily for depression and anxiety  Continue Wellbutrin 150 mg daily for depression  Continue taking Trazodone 100 mg at bedtime for sleep  Increase Abilify to 10 mg Daily for Depression    Patient reports her health has been declining this past yea.  that she officially retired from working due to visual changes in her left eye., and is about to get another stent placed in her head, and 2 more procedures on her back.  Patient lost her daughter to cancer in September.Reports that her son has been helping her a lot with her home and to get to doctors appointments. States that she was mad with God for a while but reports she has been working on her carlita and truly wants to do everything in her power to live. States she has been taking medications but is unsure if she d/c Zoloft as directed at last visit. Reports her sleep pattern is poor, only get 3-4 hours/night.     Pt appears:  Appropriate attire and affect    Mood:  Depressed, motivated, tearful    Sleep:  Worse since daughter passed 3-4 hrs .night with trazodone    Appetite:  Diminished, 2 meals/day    Self Rates Depression: 7/10  Self Rated Anxiety:  8/10      Psychotherapy:  Target symptoms: depression,  recurrent depression, anxiety , grief  Why chosen therapy is appropriate versus another modality: relevant to diagnosis  Outcome monitoring methods: self-report, observation  Therapeutic intervention type: supportive psychotherapy  Topics discussed/themes: parenting issues, illness/death of a loved one, symptom recognition, financial stressors  The patient's response to the intervention is motivated. The patient's progress toward treatment goals is poor.   Duration of intervention: 16 minutes.    Review of Systems   PSYCHIATRIC: Pertinant items are noted in the narrative.        Past Medical, Family and Social History: The patient's past medical, family and social history have been reviewed and updated as appropriate within the electronic medical record - see encounter notes.    Compliance: yes    Side effects: None    Risk Parameters:  Patient reports no suicidal ideation  Patient reports no homicidal ideation  Patient reports no self-injurious behavior  Patient reports no violent behavior    Exam (detailed: at least 9 elements; comprehensive: all 15 elements)   Constitutional  Vitals:  Most recent vital signs, dated less than 90 days prior to this appointment, were reviewed.   Vitals:    01/02/25 1105   BP: 121/65   Pulse: 67   Weight: 104.3 kg (230 lb)              General:  unremarkable, age appropriate     Musculoskeletal  Muscle Strength/Tone:  no spasicity, no rigidity, no cogwheeling, no flaccidity, no paratonia, no dyskinesia, no dystonia, no tremor, no tic, no choreoathetosis, no atrophy   Gait & Station:  unsteady, slow     Psychiatric  Speech:  no latency; no press   Mood & Affect:  depressed, sad  congruent and appropriate, sad, tearful   Thought Process:  normal and logical   Associations:  intact   Thought Content:  normal, no suicidality, no homicidality, delusions, or paranoia   Insight:  intact, has awareness of illness   Judgement: behavior is adequate to circumstances, age appropriate    Orientation:  grossly intact, person, place, situation, time/date, day of week, month of year, year   Memory: intact for content of interview, grossly intact   Language: grossly intact, able to name, able to repeat   Attention Span & Concentration:  able to focus, completed tasks   Fund of Knowledge:  intact and appropriate to age and level of education, familiar with aspects of current personal life     Assessment and Diagnosis   Status/Progress: Based on the examination today, the patient's problem(s) is/are improved and inadequately controlled.  New problems have not been presented today.   Co-morbidities, Diagnostic uncertainty, and Lack of compliance are not complicating management of the primary condition.  There are no active rule-out diagnoses for this patient at this time.     General Impression: Devika Bustillo, a 64 y.o. female, for follow up of MDD and Anxiety with 2 previous suicide attempts.  Presents 9/20/23- Patient was started on Zoloft 50 mg 2 weeks ago by PCP, has notived some improvement in symptoms. Will increase to 100 mg in 2 more weeks. Start Trazodone 50 mg for sleep.  Presents 11/22/23- Increase Zoloft to 150 mg, Start Wellbutrin 150 mg.   Presents 1/2/23 - Some improvement in Anxiety and Depression, increase Zoloft to 200 mg Daily increase Wellbutrin to 300 mg Daily.  Presents 1/30/24- Patients renal function worsening as seen by recent labs, decrease wellbutrin to 150 mg Daily, start Abilify 2 mg for adjunct therapy.   Presents 4/18/24- Increase Abilify 5 mg Daily, Speak with patients family members regarding mental health. At next visit, consider switching to different SSRI.   Presents 8/15/24- STOP Zoloft, Start Lexapro (Escitalopram) 10 mg Daily for depression and anxiety, Continue Wellbutrin 150 mg daily, Trazodone 100 mg Nighty, Increase Abilify to 10 mg Daily for Depression  Presents 9/19/24- STOP Zoloft, Start Lexapro (Escitalopram) 10 mg Daily for depression and anxiety, Continue  Wellbutrin 150 mg daily, Trazodone 100 mg Nighty, Increase Abilify to 10 mg Daily for Depression, Start Hydroxyzine 50 mg at bedtime      ICD-10-CM ICD-9-CM    1. Generalized anxiety disorder  F41.1 300.02 EScitalopram oxalate (LEXAPRO) 10 MG tablet      2. Severe episode of recurrent major depressive disorder, without psychotic features  F33.2 296.33 buPROPion (WELLBUTRIN XL) 150 MG TB24 tablet      ARIPiprazole (ABILIFY) 10 MG Tab      EScitalopram oxalate (LEXAPRO) 10 MG tablet      3. Insomnia, unspecified type  G47.00 780.52 traZODone (DESYREL) 100 MG tablet      hydrOXYzine (ATARAX) 50 MG tablet          Intervention/Counseling/Treatment Plan   Medication Management: The risks and benefits of medication were discussed with the patient.  STOP Zoloft  Start Lexapro 10 mg Daily for depression and anxiety  Start Hydroxyzine 50 mg nightly for sleep   Continue Wellbutrin 150 mg daily for depression  Continue taking Trazodone 200 mg at bedtime for sleep  Continue Abilify to 10 mg Daily for Depression  Discussed diagnosis, risk and benefits of proposed treatment above vs alternative treatment vs no treatment, and potential side effects of these treatments, and the inherent unpredictability of individual responses to these treatments. The patient expresses understanding and gives informed consent to pursue treatment at this time, believing that the potential benefits outweigh the potential risks. Patient has no other questions. Risks/adverse effects at this time include but are not limited to: GI side effects, sexual dysfunction, activation vs sedation, triggering of suicidal ideation, and serotonin syndrome.   Patient voices understanding and agreement with this plan  Provided crisis numbers  Encouraged patient to keep future appointments  Instruct patient to call or message with questions  In the event of an emergency, including suicidal ideation, patient was advised to go to the emergency room      Return to Clinic:  1 month        This includes face to face time and non-face to face time preparing to see the patient (eg, review of tests), obtaining and/or reviewing separately obtained history, documenting clinical information in the electronic or other health record, independently interpreting results and communicating results to the patient/family/caregiver, or care coordinator.        Nimisha Barclay DNP, PMHNP, FNP

## 2025-01-07 ENCOUNTER — HOSPITAL ENCOUNTER (OUTPATIENT)
Dept: RADIOLOGY | Facility: HOSPITAL | Age: 65
Discharge: HOME | End: 2025-01-07
Attending: PODIATRIST
Payer: MEDICAID

## 2025-01-07 DIAGNOSIS — M86.179 OTHER ACUTE OSTEOMYELITIS, UNSPECIFIED ANKLE AND FOOT: ICD-10-CM

## 2025-01-07 PROCEDURE — 73718 MRI LOWER EXTREMITY W/O DYE: CPT | Mod: 26,RT,, | Performed by: RADIOLOGY

## 2025-01-07 PROCEDURE — 73718 MRI LOWER EXTREMITY W/O DYE: CPT | Mod: TC,RT

## 2025-01-07 RX ORDER — GADOBUTROL 604.72 MG/ML
10 INJECTION INTRAVENOUS
Status: DISCONTINUED | OUTPATIENT
Start: 2025-01-07 | End: 2025-01-08 | Stop reason: HOSPADM

## 2025-01-08 ENCOUNTER — OFFICE VISIT (OUTPATIENT)
Dept: PODIATRY | Facility: CLINIC | Age: 65
End: 2025-01-08
Payer: MEDICAID

## 2025-01-08 VITALS — WEIGHT: 229.94 LBS | HEIGHT: 68 IN | BODY MASS INDEX: 34.85 KG/M2

## 2025-01-08 DIAGNOSIS — L97.515 DIABETIC ULCER OF TOE OF RIGHT FOOT ASSOCIATED WITH TYPE 2 DIABETES MELLITUS, WITH MUSCLE INVOLVEMENT WITHOUT EVIDENCE OF NECROSIS: Primary | ICD-10-CM

## 2025-01-08 DIAGNOSIS — E11.621 DIABETIC ULCER OF TOE OF RIGHT FOOT ASSOCIATED WITH TYPE 2 DIABETES MELLITUS, WITH MUSCLE INVOLVEMENT WITHOUT EVIDENCE OF NECROSIS: Primary | ICD-10-CM

## 2025-01-08 DIAGNOSIS — M86.179 OTHER ACUTE OSTEOMYELITIS, UNSPECIFIED ANKLE AND FOOT: ICD-10-CM

## 2025-01-08 DIAGNOSIS — E11.42 TYPE 2 DIABETES MELLITUS WITH DIABETIC POLYNEUROPATHY, UNSPECIFIED WHETHER LONG TERM INSULIN USE: ICD-10-CM

## 2025-01-08 PROCEDURE — 99214 OFFICE O/P EST MOD 30 MIN: CPT | Mod: PBBFAC,PN | Performed by: PODIATRIST

## 2025-01-08 PROCEDURE — 1160F RVW MEDS BY RX/DR IN RCRD: CPT | Mod: CPTII,,, | Performed by: PODIATRIST

## 2025-01-08 PROCEDURE — 1159F MED LIST DOCD IN RCRD: CPT | Mod: CPTII,,, | Performed by: PODIATRIST

## 2025-01-08 PROCEDURE — 87176 TISSUE HOMOGENIZATION CULTR: CPT | Performed by: PODIATRIST

## 2025-01-08 PROCEDURE — 3008F BODY MASS INDEX DOCD: CPT | Mod: CPTII,,, | Performed by: PODIATRIST

## 2025-01-08 PROCEDURE — 87077 CULTURE AEROBIC IDENTIFY: CPT | Performed by: PODIATRIST

## 2025-01-08 PROCEDURE — 87070 CULTURE OTHR SPECIMN AEROBIC: CPT | Performed by: PODIATRIST

## 2025-01-08 PROCEDURE — 99999 PR PBB SHADOW E&M-EST. PATIENT-LVL IV: CPT | Mod: PBBFAC,,, | Performed by: PODIATRIST

## 2025-01-08 PROCEDURE — 87075 CULTR BACTERIA EXCEPT BLOOD: CPT | Performed by: PODIATRIST

## 2025-01-08 PROCEDURE — 99214 OFFICE O/P EST MOD 30 MIN: CPT | Mod: 25,S$PBB,, | Performed by: PODIATRIST

## 2025-01-08 PROCEDURE — 87186 SC STD MICRODIL/AGAR DIL: CPT | Performed by: PODIATRIST

## 2025-01-08 RX ORDER — CEFPODOXIME PROXETIL 200 MG/1
400 TABLET, FILM COATED ORAL EVERY 12 HOURS
Qty: 56 TABLET | Refills: 0 | Status: SHIPPED | OUTPATIENT
Start: 2025-01-08

## 2025-01-08 NOTE — PROGRESS NOTES
Subjective:      Patient ID: Devika Bustillo is a 64 y.o. female.    Chief Complaint: Diabetic Foot Ulcer    Deepening not improving wound 5th toe right foot.  MRI very suspicious but not confirmatory for osteomyelitis 5th digit due to lack of IV access for contrast dye.  Patient relates minimal pain in that area.  Football dressing clean dry intact.  Ambulating surgical shoe right, casual shoe left.  Has finished cefpodoxime recommended from E consult with Infectious Disease..    Review of Systems   Constitutional: Negative for chills, diaphoresis, fever, malaise/fatigue and night sweats.   Cardiovascular:  Negative for claudication, cyanosis, leg swelling and syncope.   Skin:  Positive for poor wound healing. Negative for color change, dry skin, nail changes, rash, suspicious lesions and unusual hair distribution.   Musculoskeletal:  Negative for falls, joint pain, joint swelling, muscle cramps, muscle weakness and stiffness.   Gastrointestinal:  Negative for constipation, diarrhea, nausea and vomiting.   Neurological:  Negative for brief paralysis, disturbances in coordination, focal weakness, numbness, paresthesias, sensory change and tremors.         Objective:      Physical Exam  Cardiovascular:      Comments: 1/4 DP and PT pulses and warm toes both feet  Skin:     Comments: Wound:  Dorsal lateral right 5th PIPJ    Size:    Pre:  3 x 3 by 3 mm  Post:  10 x 9 by 5 mm    Base:  Granular, pink with moderate serous/serosanaguinous drainage only.  Bone encountered during and removed during debridement.  No pus, tracking, fluctuance, malodor, or cardinal signs infection.    Borders:  Hyperkeratotic, debriding to flat, pink, blanchable skin edges without undermining.       Neurological:      Sensory: Sensory deficit present.      Comments: Diminished/loss of protective sensation all toes bilateral to 10 gram monofilament.    Negative tinel sign to percussion sural, superficial peroneal, deep peroneal, saphenous, and  posterior tibial nerves right and left ankles and feet.             Assessment:       Encounter Diagnoses   Name Primary?    Diabetic ulcer of toe of right foot associated with type 2 diabetes mellitus, with muscle involvement without evidence of necrosis Yes    Other acute osteomyelitis, unspecified ankle and foot     Type 2 diabetes mellitus with diabetic polyneuropathy, unspecified whether long term insulin use          Plan:       Devika was seen today for diabetic foot ulcer.    Diagnoses and all orders for this visit:    Diabetic ulcer of toe of right foot associated with type 2 diabetes mellitus, with muscle involvement without evidence of necrosis  -     Aerobic culture  -     Culture, Anaerobic  -     Specimen to Pathology Orthopedics  -     Ambulatory referral/consult to Infectious Disease; Future    Other acute osteomyelitis, unspecified ankle and foot  -     Aerobic culture  -     Culture, Anaerobic  -     Specimen to Pathology Orthopedics  -     Ambulatory referral/consult to Infectious Disease; Future  -     C-Reactive Protein; Future  -     Sedimentation rate; Future  -     Basic Metabolic Panel; Future    Type 2 diabetes mellitus with diabetic polyneuropathy, unspecified whether long term insulin use  -     Aerobic culture  -     Culture, Anaerobic  -     Specimen to Pathology Orthopedics  -     Ambulatory referral/consult to Infectious Disease; Future    Other orders  -     cefpodoxime (VANTIN) 200 MG tablet; Take 2 tablets (400 mg total) by mouth every 12 (twelve) hours.      I counseled the patient on her conditions, their implications and medical management.        Debrided wound white 5th toe.  To and including bone.      Bone sent for pathology and culture.      Refill cefpodoxime 40 mg twice daily.      Consult Infectious Disease.      Dressed wound with [employer] football.      Continue minimal ambulation surgical shoe right, casual shoe left.      One-week, sooner p.r.n..      Adequate  vitamin supplementation, protein intake, and hydration - discussed with patient.           No follow-ups on file.

## 2025-01-10 LAB — BACTERIA SPEC ANAEROBE CULT: NORMAL

## 2025-01-11 LAB — BACTERIA SPEC AEROBE CULT: ABNORMAL

## 2025-01-15 ENCOUNTER — TELEPHONE (OUTPATIENT)
Dept: PODIATRY | Facility: CLINIC | Age: 65
End: 2025-01-15
Payer: MEDICAID

## 2025-01-15 NOTE — TELEPHONE ENCOUNTER
Appointment scheduled for 01/21 at 11:05 for labs at [facility].    Appointment rescheduled for 01/22 at 10:15a with Dr. Garcia.    Patient verbalized understanding.    ----- Message from Gisell sent at 1/15/2025 10:01 AM CST -----  Regarding: Urgent  Type: Patient call    Who called: Patient    Does the patient know what this is regarding? Requesting an urgent call back in regards to missing appt ; needing to speak to provider/nurse as soon as possible ; please advise    Would the patient rather a call back or response via [facility]? Call    Best call back number: [phone]      Additional information:

## 2025-01-20 ENCOUNTER — NURSE TRIAGE (OUTPATIENT)
Dept: ADMINISTRATIVE | Facility: CLINIC | Age: 65
End: 2025-01-20
Payer: MEDICAID
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
## 2025-01-21 ENCOUNTER — TELEPHONE (OUTPATIENT)
Dept: PODIATRY | Facility: CLINIC | Age: 65
End: 2025-01-21
Payer: MEDICAID

## 2025-01-21 NOTE — TELEPHONE ENCOUNTER
Spoke with pt informing her clinics will be closed on tues and wed. Pt is complaining of pain in wound 5th toe right foot that is not getting better. Informed pt to keep toe bandaged for protection, elevate and ice as needed to reduce pain. PT VU

## 2025-01-27 ENCOUNTER — TELEPHONE (OUTPATIENT)
Dept: OPHTHALMOLOGY | Facility: CLINIC | Age: 65
End: 2025-01-27
Payer: MEDICAID

## 2025-01-27 NOTE — TELEPHONE ENCOUNTER
Pt called in stating she was having eye pain rated it at a 10. I offered her several appt slots this week and next. Pt stated she needed at least 3-4 days ahead of time for transportation. Pt currently has an optom appt she wanted to keep that appt and let them see her.     Pt was offered other appt slots with other Dr's but she stated she has other appts on those days. Pt was advised to keep her sunshine with dr. Pacheco and call us back after and let us know what she wanted to do since the ER was not an option for her.

## 2025-01-28 ENCOUNTER — TELEPHONE (OUTPATIENT)
Dept: PODIATRY | Facility: CLINIC | Age: 65
End: 2025-01-28
Payer: MEDICAID
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
## 2025-01-30 ENCOUNTER — OFFICE VISIT (OUTPATIENT)
Dept: OPTOMETRY | Facility: CLINIC | Age: 65
End: 2025-01-30
Payer: MEDICAID

## 2025-01-30 DIAGNOSIS — H04.123 DRY EYES, BILATERAL: ICD-10-CM

## 2025-01-30 DIAGNOSIS — H52.4 PRESBYOPIA: Primary | ICD-10-CM

## 2025-01-30 PROCEDURE — 99214 OFFICE O/P EST MOD 30 MIN: CPT | Mod: PBBFAC,PO | Performed by: OPTOMETRIST

## 2025-01-30 PROCEDURE — 99203 OFFICE O/P NEW LOW 30 MIN: CPT | Mod: S$PBB,,, | Performed by: OPTOMETRIST

## 2025-01-30 PROCEDURE — 92015 DETERMINE REFRACTIVE STATE: CPT | Mod: ,,, | Performed by: OPTOMETRIST

## 2025-01-30 PROCEDURE — 4010F ACE/ARB THERAPY RXD/TAKEN: CPT | Mod: CPTII,,, | Performed by: OPTOMETRIST

## 2025-01-30 PROCEDURE — 1159F MED LIST DOCD IN RCRD: CPT | Mod: CPTII,,, | Performed by: OPTOMETRIST

## 2025-01-30 PROCEDURE — 1160F RVW MEDS BY RX/DR IN RCRD: CPT | Mod: CPTII,,, | Performed by: OPTOMETRIST

## 2025-01-30 PROCEDURE — 99999 PR PBB SHADOW E&M-EST. PATIENT-LVL IV: CPT | Mod: PBBFAC,,, | Performed by: OPTOMETRIST

## 2025-02-06 ENCOUNTER — OFFICE VISIT (OUTPATIENT)
Dept: PSYCHIATRY | Facility: CLINIC | Age: 65
End: 2025-02-06
Payer: MEDICAID

## 2025-02-06 VITALS
BODY MASS INDEX: 34.97 KG/M2 | SYSTOLIC BLOOD PRESSURE: 133 MMHG | DIASTOLIC BLOOD PRESSURE: 60 MMHG | HEART RATE: 72 BPM | WEIGHT: 230 LBS

## 2025-02-06 DIAGNOSIS — F41.1 GENERALIZED ANXIETY DISORDER: ICD-10-CM

## 2025-02-06 DIAGNOSIS — F33.2 SEVERE EPISODE OF RECURRENT MAJOR DEPRESSIVE DISORDER, WITHOUT PSYCHOTIC FEATURES: ICD-10-CM

## 2025-02-06 DIAGNOSIS — G47.00 INSOMNIA, UNSPECIFIED TYPE: ICD-10-CM

## 2025-02-06 PROCEDURE — 3078F DIAST BP <80 MM HG: CPT | Mod: SA,HB,CPTII, | Performed by: NURSE PRACTITIONER

## 2025-02-06 PROCEDURE — G2211 COMPLEX E/M VISIT ADD ON: HCPCS | Mod: S$PBB,SA,HB, | Performed by: NURSE PRACTITIONER

## 2025-02-06 PROCEDURE — 99214 OFFICE O/P EST MOD 30 MIN: CPT | Mod: S$PBB,SA,HB, | Performed by: NURSE PRACTITIONER

## 2025-02-06 PROCEDURE — 3075F SYST BP GE 130 - 139MM HG: CPT | Mod: SA,HB,CPTII, | Performed by: NURSE PRACTITIONER

## 2025-02-06 PROCEDURE — 99999 PR PBB SHADOW E&M-EST. PATIENT-LVL I: CPT | Mod: PBBFAC,SA,HB, | Performed by: NURSE PRACTITIONER

## 2025-02-06 PROCEDURE — 4010F ACE/ARB THERAPY RXD/TAKEN: CPT | Mod: SA,HB,CPTII, | Performed by: NURSE PRACTITIONER

## 2025-02-06 PROCEDURE — 3008F BODY MASS INDEX DOCD: CPT | Mod: SA,HB,CPTII, | Performed by: NURSE PRACTITIONER

## 2025-02-06 PROCEDURE — 99211 OFF/OP EST MAY X REQ PHY/QHP: CPT | Mod: PBBFAC | Performed by: NURSE PRACTITIONER

## 2025-02-06 PROCEDURE — 90833 PSYTX W PT W E/M 30 MIN: CPT | Mod: SA,HB,, | Performed by: NURSE PRACTITIONER

## 2025-02-06 RX ORDER — CARIPRAZINE 1.5 MG/1
1.5 CAPSULE, GELATIN COATED ORAL DAILY
Qty: 30 CAPSULE | Refills: 1 | Status: SHIPPED | OUTPATIENT
Start: 2025-02-06 | End: 2025-04-07

## 2025-02-06 RX ORDER — HYDROXYZINE HYDROCHLORIDE 50 MG/1
50 TABLET, FILM COATED ORAL NIGHTLY
Qty: 30 TABLET | Refills: 1 | Status: SHIPPED | OUTPATIENT
Start: 2025-02-06 | End: 2025-04-07

## 2025-02-06 RX ORDER — TRAZODONE HYDROCHLORIDE 100 MG/1
200 TABLET ORAL NIGHTLY
Qty: 180 TABLET | Refills: 0 | Status: SHIPPED | OUTPATIENT
Start: 2025-02-06 | End: 2025-05-07

## 2025-02-06 RX ORDER — BUPROPION HYDROCHLORIDE 150 MG/1
150 TABLET ORAL DAILY
Qty: 30 TABLET | Refills: 1 | Status: SHIPPED | OUTPATIENT
Start: 2025-02-06 | End: 2025-04-07

## 2025-02-06 RX ORDER — ESCITALOPRAM OXALATE 20 MG/1
20 TABLET ORAL DAILY
Qty: 30 TABLET | Refills: 1 | Status: SHIPPED | OUTPATIENT
Start: 2025-02-06 | End: 2025-04-07

## 2025-02-06 NOTE — PROGRESS NOTES
"Outpatient Psychiatry Follow-Up Visit ([facility])    02/13/2025    Clinical Status of Patient:  Outpatient (Ambulatory)    Chief Complaint:  Devika Bustillo is a 64 y.o. female who presents today for follow-up of depression and anxiety.  Met with patient.     Current Medications:   Wellbutrin 150 mg Daily  Trazodone 100 mg Daily  Hydroxyzine 50 mg TID for itching   Abilify 10 mg Daily  Lexapro 10 mg Daily  Topamax 50 mg BID PRN for headaches by neurologist  Gabapentin 300 mg BID prescribed by PCP    Past Medication Trials:  unable to remember medications  Seroquel- did not like the way it made her feel    Interval History and Content of Current Session:  Patient seen and chart reviewed. Last seen on 8/15/24    Changes at last visit:  STOP Zoloft  Start Lexapro 10 mg Daily for depression and anxiety  Start Hydroxyzine 50 mg nightly for sleep   Continue Wellbutrin 150 mg daily for depression  Continue taking Trazodone 200 mg at bedtime for sleep  Continue Abilify to 10 mg Daily for Depression    Reports "The depression and anxiety have been horrible." I've been able to sleep right though. Patient in tearful and hanging her head. She was approved for disability and should be receiving her first check this month. Would like to buy a car and start volunteering at the homeless shelters or feeding others. She reports feeling physically tired and weak. At times she feels too weak and tired to bathe. Continues to have issues with breathing and shortness of breath. Has a surgery scheduled soon.     She report not being able to cook for herself anymore, and is looking for long term care to come out to her home, she's waiting to see if she is approved for that. She reports having a doctors appointment 3-4 times/week ans is taken by the medicaid bus, but continues to state how tired she is.     Reports her family checks in on her often     Pt appears:  Appropriate attire and affect    Mood:  Depressed, motivated, tearful    Sleep: "  4-5 hrs/night with trazodone 200 mg Nightly     Appetite:  Diminished, 2 meals/day    Self Rates Depression: 9/10  Self Rated Anxiety:  8/10      Psychotherapy:  Target symptoms: depression, recurrent depression, anxiety , grief  Why chosen therapy is appropriate versus another modality: relevant to diagnosis  Outcome monitoring methods: self-report, observation  Therapeutic intervention type: supportive psychotherapy  Topics discussed/themes: parenting issues, illness/death of a loved one, symptom recognition, financial stressors  The patient's response to the intervention is motivated. The patient's progress toward treatment goals is poor.   Duration of intervention: 16 minutes.    Review of Systems   PSYCHIATRIC: Pertinant items are noted in the narrative.        Past Medical, Family and Social History: The patient's past medical, family and social history have been reviewed and updated as appropriate within the electronic medical record - see encounter notes.    Compliance: yes    Side effects: None    Risk Parameters:  Patient reports no suicidal ideation  Patient reports no homicidal ideation  Patient reports no self-injurious behavior  Patient reports no violent behavior    Exam (detailed: at least 9 elements; comprehensive: all 15 elements)   Constitutional  Vitals:  Most recent vital signs, dated less than 90 days prior to this appointment, were reviewed.   Vitals:    02/06/25 1044   BP: 133/60   Pulse: 72   Weight: 104.3 kg (230 lb)              General:  unremarkable, age appropriate     Musculoskeletal  Muscle Strength/Tone:  no spasicity, no rigidity, no cogwheeling, no flaccidity, no paratonia, no dyskinesia, no dystonia, no tremor, no tic, no choreoathetosis, no atrophy   Gait & Station:  unsteady, slow     Psychiatric  Speech:  no latency; no press   Mood & Affect:  depressed, sad  congruent and appropriate, sad, tearful   Thought Process:  normal and logical   Associations:  intact   Thought  Content:  normal, no suicidality, no homicidality, delusions, or paranoia   Insight:  intact, has awareness of illness   Judgement: behavior is adequate to circumstances, age appropriate   Orientation:  grossly intact, person, place, situation, time/date, day of week, month of year, year   Memory: intact for content of interview, grossly intact   Language: grossly intact, able to name, able to repeat   Attention Span & Concentration:  able to focus, completed tasks   Fund of Knowledge:  intact and appropriate to age and level of education, familiar with aspects of current personal life     Assessment and Diagnosis   Status/Progress: Based on the examination today, the patient's problem(s) is/are improved and inadequately controlled.  New problems have not been presented today.   Co-morbidities, Diagnostic uncertainty, and Lack of compliance are not complicating management of the primary condition.  There are no active rule-out diagnoses for this patient at this time.     General Impression: Devika Bustillo, a 64 y.o. female, for follow up of MDD and Anxiety with 2 previous suicide attempts.  Presents 9/20/23- Patient was started on Zoloft 50 mg 2 weeks ago by PCP, has notived some improvement in symptoms. Will increase to 100 mg in 2 more weeks. Start Trazodone 50 mg for sleep.  Presents 11/22/23- Increase Zoloft to 150 mg, Start Wellbutrin 150 mg.   Presents 1/2/23 - Some improvement in Anxiety and Depression, increase Zoloft to 200 mg Daily increase Wellbutrin to 300 mg Daily.  Presents 1/30/24- Patients renal function worsening as seen by recent labs, decrease wellbutrin to 150 mg Daily, start Abilify 2 mg for adjunct therapy.   Presents 4/18/24- Increase Abilify 5 mg Daily, Speak with patients family members regarding mental health. At next visit, consider switching to different SSRI.   Presents 8/15/24- STOP Zoloft, Start Lexapro (Escitalopram) 10 mg Daily for depression and anxiety, Continue Wellbutrin 150 mg  daily, Trazodone 100 mg Nighty, Increase Abilify to 10 mg Daily for Depression  Presents 9/19/24- STOP Zoloft, Start Lexapro (Escitalopram) 10 mg Daily for depression and anxiety, Continue Wellbutrin 150 mg daily, Trazodone 100 mg Nighty, Increase Abilify to 10 mg Daily for Depression, Start Hydroxyzine 50 mg at bedtime  Presents 2/6/25 Increase Lexapro to 20 mg Daily      ICD-10-CM ICD-9-CM    1. Severe episode of recurrent major depressive disorder, without psychotic features  F33.2 296.33 EScitalopram oxalate (LEXAPRO) 20 MG tablet      buPROPion (WELLBUTRIN XL) 150 MG TB24 tablet      cariprazine (VRAYLAR) 1.5 mg Cap      2. Generalized anxiety disorder  F41.1 300.02 EScitalopram oxalate (LEXAPRO) 20 MG tablet      cariprazine (VRAYLAR) 1.5 mg Cap      3. Insomnia, unspecified type  G47.00 780.52 hydrOXYzine (ATARAX) 50 MG tablet      traZODone (DESYREL) 100 MG tablet            Intervention/Counseling/Treatment Plan   Medication Management: The risks and benefits of medication were discussed with the patient.  Increase Lexapro to 20 mg Daily for depression and anxiety  Continue Hydroxyzine 50 mg nightly for sleep   Continue Wellbutrin 150 mg daily for depression  Continue taking Trazodone 200 mg at bedtime for sleep  Continue Abilify to 10 mg Daily for Depression  Discussed diagnosis, risk and benefits of proposed treatment above vs alternative treatment vs no treatment, and potential side effects of these treatments, and the inherent unpredictability of individual responses to these treatments. The patient expresses understanding and gives informed consent to pursue treatment at this time, believing that the potential benefits outweigh the potential risks. Patient has no other questions. Risks/adverse effects at this time include but are not limited to: GI side effects, sexual dysfunction, activation vs sedation, triggering of suicidal ideation, and serotonin syndrome.   Patient voices understanding and  agreement with this plan  Provided crisis numbers  Encouraged patient to keep future appointments  Instruct patient to call or message with questions  In the event of an emergency, including suicidal ideation, patient was advised to go to the emergency room      Return to Clinic: 1 month        This includes face to face time and non-face to face time preparing to see the patient (eg, review of tests), obtaining and/or reviewing separately obtained history, documenting clinical information in the electronic or other health record, independently interpreting results and communicating results to the patient/family/caregiver, or care coordinator.        Nimisha Barclay DNP, PMHNP, FNP

## 2025-02-10 ENCOUNTER — OFFICE VISIT (OUTPATIENT)
Dept: PODIATRY | Facility: CLINIC | Age: 65
End: 2025-02-10
Payer: MEDICAID

## 2025-02-10 ENCOUNTER — LAB VISIT (OUTPATIENT)
Dept: LAB | Facility: HOSPITAL | Age: 65
End: 2025-02-10
Attending: PODIATRIST
Payer: MEDICAID

## 2025-02-10 VITALS — BODY MASS INDEX: 34.85 KG/M2 | HEIGHT: 68 IN | WEIGHT: 229.94 LBS

## 2025-02-10 DIAGNOSIS — L97.515 DIABETIC ULCER OF TOE OF RIGHT FOOT ASSOCIATED WITH TYPE 2 DIABETES MELLITUS, WITH MUSCLE INVOLVEMENT WITHOUT EVIDENCE OF NECROSIS: Primary | ICD-10-CM

## 2025-02-10 DIAGNOSIS — E11.621 DIABETIC ULCER OF TOE OF RIGHT FOOT ASSOCIATED WITH TYPE 2 DIABETES MELLITUS, WITH MUSCLE INVOLVEMENT WITHOUT EVIDENCE OF NECROSIS: Primary | ICD-10-CM

## 2025-02-10 DIAGNOSIS — M86.179 OTHER ACUTE OSTEOMYELITIS, UNSPECIFIED ANKLE AND FOOT: ICD-10-CM

## 2025-02-10 LAB
ANION GAP SERPL CALC-SCNC: 13 MMOL/L (ref 8–16)
BUN SERPL-MCNC: 44 MG/DL (ref 8–23)
CALCIUM SERPL-MCNC: 9 MG/DL (ref 8.7–10.5)
CHLORIDE SERPL-SCNC: 106 MMOL/L (ref 95–110)
CO2 SERPL-SCNC: 20 MMOL/L (ref 23–29)
CREAT SERPL-MCNC: 2.5 MG/DL (ref 0.5–1.4)
CRP SERPL-MCNC: 3.5 MG/L (ref 0–8.2)
ERYTHROCYTE [SEDIMENTATION RATE] IN BLOOD BY PHOTOMETRIC METHOD: 75 MM/HR (ref 0–36)
EST. GFR  (NO RACE VARIABLE): 21.2 ML/MIN/1.73 M^2
GLUCOSE SERPL-MCNC: 153 MG/DL (ref 70–110)
POTASSIUM SERPL-SCNC: 4.4 MMOL/L (ref 3.5–5.1)
SODIUM SERPL-SCNC: 139 MMOL/L (ref 136–145)

## 2025-02-10 PROCEDURE — 36415 COLL VENOUS BLD VENIPUNCTURE: CPT | Mod: PO | Performed by: PODIATRIST

## 2025-02-10 PROCEDURE — 99214 OFFICE O/P EST MOD 30 MIN: CPT | Mod: S$PBB,,, | Performed by: PODIATRIST

## 2025-02-10 PROCEDURE — 1160F RVW MEDS BY RX/DR IN RCRD: CPT | Mod: CPTII,,, | Performed by: PODIATRIST

## 2025-02-10 PROCEDURE — 4010F ACE/ARB THERAPY RXD/TAKEN: CPT | Mod: CPTII,,, | Performed by: PODIATRIST

## 2025-02-10 PROCEDURE — 85652 RBC SED RATE AUTOMATED: CPT | Performed by: PODIATRIST

## 2025-02-10 PROCEDURE — 1159F MED LIST DOCD IN RCRD: CPT | Mod: CPTII,,, | Performed by: PODIATRIST

## 2025-02-10 PROCEDURE — 99999 PR PBB SHADOW E&M-EST. PATIENT-LVL IV: CPT | Mod: PBBFAC,,, | Performed by: PODIATRIST

## 2025-02-10 PROCEDURE — 99214 OFFICE O/P EST MOD 30 MIN: CPT | Mod: PBBFAC,PO | Performed by: PODIATRIST

## 2025-02-10 PROCEDURE — 86140 C-REACTIVE PROTEIN: CPT | Performed by: PODIATRIST

## 2025-02-10 PROCEDURE — 3008F BODY MASS INDEX DOCD: CPT | Mod: CPTII,,, | Performed by: PODIATRIST

## 2025-02-10 PROCEDURE — 80048 BASIC METABOLIC PNL TOTAL CA: CPT | Performed by: PODIATRIST

## 2025-02-10 RX ORDER — CEFPODOXIME PROXETIL 200 MG/1
400 TABLET, FILM COATED ORAL EVERY 12 HOURS
Qty: 56 TABLET | Refills: 0 | Status: SHIPPED | OUTPATIENT
Start: 2025-02-10
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
## 2025-02-11 ENCOUNTER — TELEPHONE (OUTPATIENT)
Dept: PODIATRY | Facility: CLINIC | Age: 65
End: 2025-02-11
Payer: MEDICAID

## 2025-02-11 ENCOUNTER — HOSPITAL ENCOUNTER (EMERGENCY)
Facility: HOSPITAL | Age: 65
Discharge: HOME | End: 2025-02-11
Attending: EMERGENCY MEDICINE
Payer: MEDICAID

## 2025-02-11 VITALS
TEMPERATURE: 98 F | WEIGHT: 229.94 LBS | DIASTOLIC BLOOD PRESSURE: 87 MMHG | SYSTOLIC BLOOD PRESSURE: 201 MMHG | BODY MASS INDEX: 34.85 KG/M2 | OXYGEN SATURATION: 100 % | HEART RATE: 71 BPM | RESPIRATION RATE: 15 BRPM | HEIGHT: 68 IN

## 2025-02-11 DIAGNOSIS — N17.9 ACUTE KIDNEY INJURY: Primary | ICD-10-CM

## 2025-02-11 LAB
ALBUMIN SERPL BCP-MCNC: 3.2 G/DL (ref 3.5–5.2)
ALP SERPL-CCNC: 111 U/L (ref 40–150)
ALT SERPL W/O P-5'-P-CCNC: 33 U/L (ref 10–44)
ANION GAP SERPL CALC-SCNC: 11 MMOL/L (ref 8–16)
AST SERPL-CCNC: 23 U/L (ref 10–40)
BACTERIA #/AREA URNS HPF: ABNORMAL /HPF
BASOPHILS # BLD AUTO: 0.04 K/UL (ref 0–0.2)
BASOPHILS NFR BLD: 0.5 % (ref 0–1.9)
BILIRUB SERPL-MCNC: 0.2 MG/DL (ref 0.1–1)
BILIRUB UR QL STRIP: NEGATIVE
BUN SERPL-MCNC: 41 MG/DL (ref 8–23)
CALCIUM SERPL-MCNC: 8.7 MG/DL (ref 8.7–10.5)
CHLORIDE SERPL-SCNC: 109 MMOL/L (ref 95–110)
CK SERPL-CCNC: 51 U/L (ref 20–180)
CLARITY UR: CLEAR
CO2 SERPL-SCNC: 19 MMOL/L (ref 23–29)
COLOR UR: YELLOW
CREAT SERPL-MCNC: 1.8 MG/DL (ref 0.5–1.4)
CREAT UR-MCNC: 87.9 MG/DL (ref 15–325)
DIFFERENTIAL METHOD BLD: ABNORMAL
EOSINOPHIL # BLD AUTO: 0.1 K/UL (ref 0–0.5)
EOSINOPHIL NFR BLD: 0.7 % (ref 0–8)
ERYTHROCYTE [DISTWIDTH] IN BLOOD BY AUTOMATED COUNT: 12.7 % (ref 11.5–14.5)
EST. GFR  (NO RACE VARIABLE): 31 ML/MIN/1.73 M^2
GLUCOSE SERPL-MCNC: 181 MG/DL (ref 70–110)
GLUCOSE UR QL STRIP: ABNORMAL
HCT VFR BLD AUTO: 34.5 % (ref 37–48.5)
HGB BLD-MCNC: 11.3 G/DL (ref 12–16)
HGB UR QL STRIP: NEGATIVE
HYALINE CASTS #/AREA URNS LPF: 4 /LPF
IMM GRANULOCYTES # BLD AUTO: 0.02 K/UL (ref 0–0.04)
IMM GRANULOCYTES NFR BLD AUTO: 0.3 % (ref 0–0.5)
KETONES UR QL STRIP: NEGATIVE
LEUKOCYTE ESTERASE UR QL STRIP: NEGATIVE
LYMPHOCYTES # BLD AUTO: 2 K/UL (ref 1–4.8)
LYMPHOCYTES NFR BLD: 26.9 % (ref 18–48)
MAGNESIUM SERPL-MCNC: 1.7 MG/DL (ref 1.6–2.6)
MCH RBC QN AUTO: 27.2 PG (ref 27–31)
MCHC RBC AUTO-ENTMCNC: 32.8 G/DL (ref 32–36)
MCV RBC AUTO: 83 FL (ref 82–98)
MICROSCOPIC COMMENT: ABNORMAL
MONOCYTES # BLD AUTO: 0.5 K/UL (ref 0.3–1)
MONOCYTES NFR BLD: 6.9 % (ref 4–15)
NEUTROPHILS # BLD AUTO: 4.8 K/UL (ref 1.8–7.7)
NEUTROPHILS NFR BLD: 64.7 % (ref 38–73)
NITRITE UR QL STRIP: NEGATIVE
NRBC BLD-RTO: 0 /100 WBC
PH UR STRIP: 5 [PH] (ref 5–8)
PHOSPHATE SERPL-MCNC: 3.9 MG/DL (ref 2.7–4.5)
PLATELET # BLD AUTO: 289 K/UL (ref 150–450)
PMV BLD AUTO: 9.4 FL (ref 9.2–12.9)
POTASSIUM SERPL-SCNC: 3.8 MMOL/L (ref 3.5–5.1)
PROT SERPL-MCNC: 7.2 G/DL (ref 6–8.4)
PROT UR QL STRIP: NEGATIVE
PROT UR-MCNC: 13 MG/DL
PROT/CREAT UR: 0.15 MG/G{CREAT} (ref 0–0.2)
RBC # BLD AUTO: 4.16 M/UL (ref 4–5.4)
SODIUM SERPL-SCNC: 139 MMOL/L (ref 136–145)
SODIUM SERPL-SCNC: 139 MMOL/L (ref 136–145)
SODIUM UR-SCNC: 75 MMOL/L (ref 20–250)
SP GR UR STRIP: 1.01 (ref 1–1.03)
SQUAMOUS #/AREA URNS HPF: 0 /HPF
URN SPEC COLLECT METH UR: ABNORMAL
UROBILINOGEN UR STRIP-ACNC: NEGATIVE EU/DL
WBC # BLD AUTO: 7.43 K/UL (ref 3.9–12.7)
WBC #/AREA URNS HPF: 0 /HPF (ref 0–5)
YEAST URNS QL MICRO: ABNORMAL

## 2025-02-11 PROCEDURE — 25000003 PHARM REV CODE 250: Performed by: EMERGENCY MEDICINE

## 2025-02-11 PROCEDURE — 80053 COMPREHEN METABOLIC PANEL: CPT | Performed by: EMERGENCY MEDICINE

## 2025-02-11 PROCEDURE — 83735 ASSAY OF MAGNESIUM: CPT | Performed by: EMERGENCY MEDICINE

## 2025-02-11 PROCEDURE — 84100 ASSAY OF PHOSPHORUS: CPT | Performed by: EMERGENCY MEDICINE

## 2025-02-11 PROCEDURE — 84156 ASSAY OF PROTEIN URINE: CPT | Performed by: EMERGENCY MEDICINE

## 2025-02-11 PROCEDURE — 84300 ASSAY OF URINE SODIUM: CPT | Performed by: EMERGENCY MEDICINE

## 2025-02-11 PROCEDURE — 82550 ASSAY OF CK (CPK): CPT | Performed by: EMERGENCY MEDICINE

## 2025-02-11 PROCEDURE — 96360 HYDRATION IV INFUSION INIT: CPT | Mod: 59

## 2025-02-11 PROCEDURE — 36556 INSERT NON-TUNNEL CV CATH: CPT

## 2025-02-11 PROCEDURE — 99285 EMERGENCY DEPT VISIT HI MDM: CPT | Mod: 25

## 2025-02-11 PROCEDURE — 81000 URINALYSIS NONAUTO W/SCOPE: CPT | Performed by: EMERGENCY MEDICINE

## 2025-02-11 PROCEDURE — 85025 COMPLETE CBC W/AUTO DIFF WBC: CPT | Performed by: EMERGENCY MEDICINE

## 2025-02-11 RX ADMIN — SODIUM CHLORIDE 500 ML: 9 INJECTION, SOLUTION INTRAVENOUS at 11:02

## 2025-02-11 NOTE — ED NOTES
Devika Bustillo, a 64 y.o. female presents to the ED following receiving a call this morning from her PCP advising her to immediately come to the ER for further evaluation following abnormal kidney lab results. Patient is AAOx3, VSS, NAD. Denies N/V/D/C, cough, fever, numbness, or tingling. Bed locked, in lowest position, bed rails up x2, call light in reach, all monitoring attached.    Triage note:  Chief Complaint   Patient presents with    abnormal labs     Pt BIB EMS from home for abnormal labs. Pt stated she was instructed to be evaluated in ED due to labs showing kidney failure.     Review of patient's allergies indicates:  No Known Allergies  Past Medical History:   Diagnosis Date    Asthma     Back pain     Diabetes mellitus     Grade II diastolic dysfunction     Hyperlipidemia     Hypertension     Neuropathy     Obesity     Pulmonary embolism 1998    Pulmonary hypertension

## 2025-02-11 NOTE — DISCHARGE INSTRUCTIONS
I discussed the plan for your decreasing kidney function with Dr. Curiel, who is the kidney specialist on-call today.  She recommends not taking your dapagliflozin (Farxiga) and getting an appointment as soon as possible with your kidney specialist and primary care physician for consideration of further outpatient workup and repeat labs.  If you are unable to do this or if you feel worse or new symptoms develop, please return to the emergency department immediately for re-evaluation.

## 2025-02-11 NOTE — ED PROVIDER NOTES
Encounter Date: 2/11/2025    SCRIBE #1 NOTE: I, Marta Miranda, am scribing for, and in the presence of,  Hermelindo Jolly Jr., MD. I have scribed the following portions of the note - Other sections scribed: HPI, ROS.       History     Chief Complaint   Patient presents with    abnormal labs     Pt BIB EMS from home for abnormal labs. Pt stated she was instructed to be evaluated in ED due to labs showing kidney failure.     CC: Abnormal labs    HPI:  Devika Bustillo is a 64 y.o. female, with a past medical history of asthma, DM, HLD, HTN, and PE, who presents to the ED via EMS with abnormal labs from her podiatry () appointment yesterday. Patient states she was called this morning and told her kidneys were failing and to report to the ED for evaluation. Patient reports she is still making urine. Patient also reports generalized weakness, chronic headache, chronic shortness of breath, bilateral feet swelling, and RLQ abdominal pain for the past 3 months. Denies taking any new medications. Patient denies diarrhea, constipation, or other associated symptoms. Patient is on Lasix.     The history is provided by the patient and the EMS personnel. No  was used.     Review of patient's allergies indicates:  No Known Allergies  Past Medical History:   Diagnosis Date    Asthma     Back pain     Diabetes mellitus     Grade II diastolic dysfunction     Hyperlipidemia     Hypertension     Neuropathy     Obesity     Pulmonary embolism 1998    Pulmonary hypertension      Past Surgical History:   Procedure Laterality Date    ANGIOGRAM, CORONARY, WITH LEFT HEART CATHETERIZATION Left 1/17/2024    Procedure: Angiogram, Coronary, with Left Heart Cath;  Surgeon: Joe Marr MD;  Location: [facility] CATH LAB;  Service: Cardiology;  Laterality: Left;    CYST REMOVAL      HERNIA REPAIR      HYSTERECTOMY      TONSILLECTOMY      TUBAL LIGATION       Family History   Problem Relation Name Age of Onset     Kidney disease Mother      Kidney disease Father      Stroke Maternal Uncle       Social History     Tobacco Use    Smoking status: Never    Smokeless tobacco: Never   Substance Use Topics    Alcohol use: No    Drug use: No     Review of Systems   Constitutional:  Negative for fever.   HENT:  Negative for sore throat.    Eyes:  Negative for visual disturbance.   Respiratory:  Positive for shortness of breath. Negative for cough.    Cardiovascular:  Negative for chest pain and leg swelling.   Gastrointestinal:  Positive for abdominal pain. Negative for constipation, diarrhea, nausea and vomiting.   Genitourinary:  Negative for dysuria and hematuria.   Musculoskeletal:  Negative for back pain and neck pain.        (+) bilateral feet swelling    Skin:  Negative for rash.   Neurological:  Positive for weakness and headaches. Negative for syncope.   All other systems reviewed and are negative.      Physical Exam     Initial Vitals [02/11/25 0920]   BP Pulse Resp Temp SpO2   (!) 134/52 63 15 97.9 °F (36.6 °C) 99 %      MAP       --         Physical Exam    Nursing note and vitals reviewed.  Constitutional: She appears well-developed and well-nourished. She is not diaphoretic. No distress.   HENT:   Head: Normocephalic and atraumatic.   Right Ear: External ear normal.   Left Ear: External ear normal.   Nose: Nose normal. Mouth/Throat: Oropharynx is clear and moist.   Eyes: Conjunctivae and EOM are normal. Pupils are equal, round, and reactive to light. Right eye exhibits no discharge. Left eye exhibits no discharge. No scleral icterus.   Neck: Neck supple.   Normal range of motion.  Cardiovascular:  Normal rate, regular rhythm, normal heart sounds and intact distal pulses.           No murmur heard.  Pulmonary/Chest: Breath sounds normal. No respiratory distress. She has no wheezes. She has no rhonchi. She has no rales.   Abdominal: Abdomen is soft. Bowel sounds are normal. She exhibits no distension and no mass. There is  no abdominal tenderness.   Mild discomfort in bilateral lower abdominal quadrants. There is no rebound and no guarding.   Musculoskeletal:         General: No tenderness or edema. Normal range of motion.      Cervical back: Normal range of motion and neck supple.     Neurological: She is alert and oriented to person, place, and time. She has normal strength. No cranial nerve deficit or sensory deficit.   Skin: Skin is warm and dry. No rash noted. No erythema. No pallor.   Psychiatric: She has a normal mood and affect. Her behavior is normal. Judgment and thought content normal.         ED Course   External Jugular IV    Date/Time: 2/11/2025 11:04 AM    Performed by: Hermelindo Jolly Jr., MD  Authorized by: Hermelindo Jolly Jr., MD  Consent Done: Not Needed  Location (Ext Jugular): Left.  Area Prepped With: Chlorohexidine.  Catheter Size: 20 ga.  Catheter Type: Jelco.  Number of attempts: 4  Fixation/Dressing: Taped in place and Tegaderm.  Patient tolerance: Patient tolerated the procedure well with no immediate complications        Labs Reviewed   CBC W/ AUTO DIFFERENTIAL - Abnormal       Result Value    WBC 7.43      RBC 4.16      Hemoglobin 11.3 (*)     Hematocrit 34.5 (*)     MCV 83      MCH 27.2      MCHC 32.8      RDW 12.7      Platelets 289      MPV 9.4      Immature Granulocytes 0.3      Gran # (ANC) 4.8      Immature Grans (Abs) 0.02      Lymph # 2.0      Mono # 0.5      Eos # 0.1      Baso # 0.04      nRBC 0      Gran % 64.7      Lymph % 26.9      Mono % 6.9      Eosinophil % 0.7      Basophil % 0.5      Differential Method Automated     COMPREHENSIVE METABOLIC PANEL - Abnormal    Sodium 139      Potassium 3.8      Chloride 109      CO2 19 (*)     Glucose 181 (*)     BUN 41 (*)     Creatinine 1.8 (*)     Calcium 8.7      Total Protein 7.2      Albumin 3.2 (*)     Total Bilirubin 0.2      Alkaline Phosphatase 111      AST 23      ALT 33      eGFR 31 (*)     Anion Gap 11     URINALYSIS, REFLEX TO URINE  CULTURE - Abnormal    Specimen UA Urine, Clean Catch      Color, UA Yellow      Appearance, UA Clear      pH, UA 5.0      Specific Gravity, UA 1.015      Protein, UA Negative      Glucose, UA 4+ (*)     Ketones, UA Negative      Bilirubin (UA) Negative      Occult Blood UA Negative      Nitrite, UA Negative      Urobilinogen, UA Negative      Leukocytes, UA Negative      Narrative:     Specimen Source->Urine   URINALYSIS MICROSCOPIC - Abnormal    WBC, UA 0      Bacteria None      Yeast, UA None      Squam Epithel, UA 0      Hyaline Casts, UA 4 (*)     Microscopic Comment SEE COMMENT      Narrative:     Specimen Source->Urine   MAGNESIUM    Magnesium 1.7     PHOSPHORUS    Phosphorus 3.9     SODIUM    Sodium 139     SODIUM, URINE, RANDOM    Sodium, Urine 75      Narrative:     Specimen Source->Urine   PROTEIN / CREATININE RATIO, URINE    Protein, Urine Random 13      Creatinine, Urine 87.9      Prot/Creat Ratio, Urine 0.15      Narrative:     Specimen Source->Urine   CK   CK    CPK 51            Imaging Results    None          Medications   sodium chloride 0.9% bolus 500 mL 500 mL (500 mLs Intravenous Not Given 2/11/25 1230)   sodium chloride 0.9% bolus 500 mL 500 mL (0 mLs Intravenous Stopped 2/11/25 1229)     Medical Decision Making  This is the emergent evaluation of a 64 year old female who presents emergency department after she was told to come for evaluation due to increasing BUN and creatinine.  Differential diagnosis at the time of initial evaluation included, but was not limited to:  Prerenal acute kidney injury due to dehydration, acute tubular necrosis, medication induced acute kidney injury.       Patient's creatinine yesterday was 2.5 with a calculated GFR of 21.  Today, of creatinine is 1.8.  BUN is 41.  Bicarbonate is 19.  Normal potassium.  Magnesium 1.7.  Phosphorus 3.9.  CPK within normal limits.    I discussed this patient's care with Dr. Curiel, who was on-call for Nephrology.  Recommendations  appreciated.  We discussed inpatient versus outpatient evaluation and management of the patient's acute kidney injury, given that her creatinine has a improved.  She recommends having the patient is seen as soon as possible with the primary care physician and with Dr. Muñoz.  She recommends having the patient not take her SGLT2 inhibitor.  I have communicated this with the patient.  I also advised taking plenty of fluids by mouth and ensuring regular urination.  I advised follow up plan as above but to return to the emergency department immediately for new or worsening symptoms of concern or if she is unable to get a timely appointment.    Amount and/or Complexity of Data Reviewed  Independent Historian: EMS  External Data Reviewed: labs and notes.  Labs: ordered. Decision-making details documented in ED Course.     Details: There is no urinary tract infection.  Urine sodium 75.  No leukocytosis.  Normal platelet count.  Stable normocytic anemia.  creatinine is 1.8.  BUN is 41.  Bicarbonate is 19.  Normal potassium.  Magnesium 1.7.  Phosphorus 3.9.  ECG/medicine tests: ordered and independent interpretation performed. Decision-making details documented in ED Course.            Scribe Attestation:   Scribe #1: I performed the above scribed service and the documentation accurately describes the services I performed. I attest to the accuracy of the note.                             I, Hermelindo Jolly MD, personally performed the services described in this documentation. All medical record entries made by the scribe were at my direction and in my presence. I have reviewed the chart and agree that the record reflects my personal performance and is accurate and complete.      DISCLAIMER: This note was prepared with [email or web address] voice recognition transcription software. Garbled syntax, mangled pronouns, and other bizarre constructions may be attributed to that software system.    Clinical Impression:  Final diagnoses:  [N17.9]  Acute kidney injury (Primary)          ED Disposition Condition    Discharge Stable          ED Prescriptions    None       Follow-up Information       Follow up With Specialties Details Why Contact Info    Devika Muñoz MD Internal Medicine Schedule an appointment as soon as possible for a visit in 3 days  [address] [facility] [address]  [place] [id number]  [phone]               Hermelindo Jolly Jr., MD  02/11/25 1233       Hermelindo Jloly Jr., MD  02/11/25 1257

## 2025-02-11 NOTE — TELEPHONE ENCOUNTER
Staff inform patient kidneys are failing.  She should present to an emergency room for evaluation and treatment.  She needs to stop the antibiotic and not take the antibiotic I wrote her yesterday.  Drink plenty of water and get to an ER please.    Per Dr. Garcia    Patient verbalized understanding.    ----- Message from David Garcia DPM sent at 2/11/2025  7:02 AM CST -----  Please inform patient or kidneys are failing.  She should present to an emergency room for evaluation and treatment.  She needs to stop the antibiotic and not take the antibiotic I wrote her yesterday.  Drink plenty of water and get to an ER please.    Thank you.

## 2025-02-12 ENCOUNTER — TELEPHONE (OUTPATIENT)
Dept: PODIATRY | Facility: CLINIC | Age: 65
End: 2025-02-12
Payer: MEDICAID

## 2025-02-12 NOTE — TELEPHONE ENCOUNTER
Staff informed patient to keep appointment with DR. Garcia on 02/17.    Patient verbalized understanding.    ----- Message from Dora sent at 2/12/2025  8:16 AM CST -----  Regarding: TAMMIE BARAHONA [[id number]]  Type : Patient Call        Who Called :patient         Does the patient know what this is regarding?:patient went to the emergency room yesterday for her  kidneys and would like a call back on the next steps.        Would the patient rather a call back or a response via [facility]?callback         Best Call Back Number:[phone]          Additional Information:

## 2025-02-20 ENCOUNTER — TELEPHONE (OUTPATIENT)
Dept: PODIATRY | Facility: CLINIC | Age: 65
End: 2025-02-20
Payer: MEDICAID

## 2025-02-20 NOTE — TELEPHONE ENCOUNTER
Appointment rescheduled for 02/25 at 9:15a.    Patient verbalized understanding.    ----- Message from Med Assistant Renteria sent at 2/20/2025  8:01 AM CST -----  Type: Patient Call BackWho called: SelfWhat is the request in detail: states she needs assistance with rescheduling her appt. Please.. Can the clinic reply by [facility]?NOWould the patient rather a call back or a response via [facility]? Yes, call Best call back number: [phone] (home)

## 2025-02-25 ENCOUNTER — TELEPHONE (OUTPATIENT)
Dept: PODIATRY | Facility: CLINIC | Age: 65
End: 2025-02-25
Payer: MEDICAID

## 2025-02-25 NOTE — TELEPHONE ENCOUNTER
Appointment rescheduled for 03/05 at 3p.    Patient verbalized understanding.    ----- Message from Fahad sent at 2/25/2025  7:46 AM CST -----  Regarding: Urgent  Name of Who is Calling:  PatientWhat is the request in detail:  Patient stated her ride did not pick her up and stated Dr. Garcia has to call and say her appointment is an emergency to have someone pick her upCan the clinic reply by [facility]: NoWhat Number to Call Back if not in [facility]: [phone]

## 2025-03-03 ENCOUNTER — TELEPHONE (OUTPATIENT)
Dept: OPHTHALMOLOGY | Facility: CLINIC | Age: 65
End: 2025-03-03
Payer: MEDICAID

## 2025-03-03 NOTE — TELEPHONE ENCOUNTER
Attempted to reach pt via phone to get her rescheduled. Pt did not answer. Left a detailed message on her phone.

## 2025-03-03 NOTE — TELEPHONE ENCOUNTER
----- Message from Sofia sent at 3/3/2025  9:36 AM CST -----  Regarding: Appt  Reschedule Existing Appointment Current appt date:3/3 Type of appt :EP Physician: Horacio Dudley MD Reason for rescheduling:Patient called to r/s appt to 3/7 if available.  Caller:Devika Bustillo Contact Preference: [phone]

## 2025-03-05 ENCOUNTER — OFFICE VISIT (OUTPATIENT)
Dept: PODIATRY | Facility: CLINIC | Age: 65
End: 2025-03-05
Payer: MEDICAID

## 2025-03-05 ENCOUNTER — TELEPHONE (OUTPATIENT)
Dept: PODIATRY | Facility: CLINIC | Age: 65
End: 2025-03-05
Payer: MEDICAID

## 2025-03-05 VITALS — HEIGHT: 68 IN | BODY MASS INDEX: 34.85 KG/M2 | WEIGHT: 229.94 LBS

## 2025-03-05 DIAGNOSIS — M86.179 OTHER ACUTE OSTEOMYELITIS, UNSPECIFIED ANKLE AND FOOT: Primary | ICD-10-CM

## 2025-03-05 DIAGNOSIS — Z87.898 HISTORY OF ULCERATION: ICD-10-CM

## 2025-03-05 DIAGNOSIS — E11.42 TYPE 2 DIABETES MELLITUS WITH DIABETIC POLYNEUROPATHY, UNSPECIFIED WHETHER LONG TERM INSULIN USE: ICD-10-CM

## 2025-03-05 PROCEDURE — 99214 OFFICE O/P EST MOD 30 MIN: CPT | Mod: PBBFAC,PN | Performed by: PODIATRIST

## 2025-03-05 PROCEDURE — 11721 DEBRIDE NAIL 6 OR MORE: CPT | Mod: Q9,PBBFAC,PN | Performed by: PODIATRIST

## 2025-03-05 PROCEDURE — 99999 PR PBB SHADOW E&M-EST. PATIENT-LVL IV: CPT | Mod: PBBFAC,,, | Performed by: PODIATRIST

## 2025-03-05 NOTE — TELEPHONE ENCOUNTER
----- Message from Rockerbox sent at 3/5/2025  8:18 AM CST -----  Name of Who is Calling: TAMMIE BARAHONA [[id number]]What is the request in detail: opt is requesting a call back to see if she can come in early for her appointment that is scheduled for today at 3pm. [facility] is not populating a schedule. Please assist. Can the clinic reply by [facility]: NoWhat Number to Call Back [facility]: [phone]   Patient report given to JAIR Guzman

## 2025-03-05 NOTE — TELEPHONE ENCOUNTER
Staff contacted and spoke with patient informing her that there is no earlier appointment today and offered patient to reschedule appointment.  Patient denied rescheduling appointment and stated that she will try to make her appointment today 3/5/25 at 3:00 pm., if not then she will call the office to reschedule appointment.

## 2025-03-05 NOTE — PROGRESS NOTES
Subjective:      Patient ID: Devika Bustillo is a 64 y.o. female.    Chief Complaint: Foot Ulcer    Ulcer and osteomyelitis right 5th toe. Football CDI.  Awaiting consult with ID - scheduled for march 10, 2025.    Ambulatingi n football right, sxshoe right.  Patient has acute renal injury for which she was hospitalized-she is not sure of her antibiotic status/dosing since discharge.  Her renal function has improved on labs but she still has a GFR of about 30.  She is scheduled for surgery on her vascular system and back March 17th.  I will defer to Infectious Disease for antibiotic management osteomyelitis and to help coordinate surgical risk infection with the surgical team.    Cc2  thick discolored misshapen toenails all 10 toes.  Gradual onset, worsening over the past several months.  Aggravated with socks shoes pressure ambulation.  No prior medical treatment.  No self-treatment.  Denies trauma surgery both feet.    Chief Complaint   Patient presents with    Foot Ulcer       Casual shoes both feet    Review of Systems   Constitutional: Negative for chills, diaphoresis, fever, malaise/fatigue and night sweats.   Cardiovascular:  Negative for claudication, cyanosis, leg swelling and syncope.   Skin:  Positive for nail changes and poor wound healing. Negative for color change, dry skin, rash, suspicious lesions and unusual hair distribution.   Musculoskeletal:  Negative for falls, joint pain, joint swelling, muscle cramps, muscle weakness and stiffness.   Gastrointestinal:  Negative for constipation, diarrhea, nausea and vomiting.   Neurological:  Positive for numbness, paresthesias and sensory change. Negative for brief paralysis, disturbances in coordination, focal weakness and tremors.           Objective:      Physical Exam  Cardiovascular:      Comments: 1/4 DP and PT pulses and warm toes both feet    Edema bilateral lower extremities  Skin:     Comments: Wound dorsal right 5th pipj remains closed today,  epithelialized  without ulceration, drainage, pus, tracking, fluctuance, malodor, or cardinal signs infection.    Otherwise, Skin thin, shiny, atrophic, with decreased density and distribution of pedal hair bilateral, but without hyperpigmentation, jessy discoloration,  ulcers, masses, nodules or cords palpated bilateral feet and legs.    Toenails 1st, 2nd, 3rd, 4th, 5th  bilateral are hypertrophic thickened 2-3 mm, dystrophic, discolored tanish brown with tan, gray crumbly subungual debris.  Tender to distal nail plate pressure, without periungual skin abnormality of each.     Neurological:      Sensory: Sensory deficit present.      Comments: Diminished/loss of protective sensation all toes bilateral to 10 gram monofilament.    Negative tinel sign to percussion sural, superficial peroneal, deep peroneal, saphenous, and posterior tibial nerves right and left ankles and feet.    Paresthesias, and burning bilateral feet with no clearly identified trigger or source.                 Assessment:       Encounter Diagnoses   Name Primary?    Other acute osteomyelitis, unspecified ankle and foot Yes    Type 2 diabetes mellitus with diabetic polyneuropathy, unspecified whether long term insulin use     History of ulceration          Plan:       Devika was seen today for foot ulcer.    Diagnoses and all orders for this visit:    Other acute osteomyelitis, unspecified ankle and foot    Type 2 diabetes mellitus with diabetic polyneuropathy, unspecified whether long term insulin use    History of ulceration      I counseled the patient on her conditions, their implications and medical management.    The patient has received literature on basic diabetic foot care.  Patient will inspect feet daily, wear protective shoe gear when ambulatory, and apply moisturizer to skin as needed to maintain elasticity and help prevent ulceration.    Inspect feet multiple times daily for signs of occurrence/recurrence ulceration.    With the  patient's permission, I debrided all ten toenails with a sterile nipper and curette, removing all offending nail and debris.  Patient tolerated the procedure well and related significant relief.      Consult infectious disease-keep this consult for March 10th-she understands this is a vital importance to treating the infection in bone.  Complicating the treatment of the bone infection since we began is her need for back surgery and vascular procedures.  Also, acute renal injury foot which she was hospitalized.  I will defer entirely to Infectious Disease for continued antibiotic management osteomyelitis 5th toe right due to this patient's complex medical and surgical and circulatory status.      Football right foot - ambulate as tolerated in sx shoe right, casual shoe left.  She may remove the dressing when she gets home, today's dressing shoes to protect it in the surgical shoe between here and home.      Patient may transition back to diabetic shoes/inserts/best fitting shoes if she desires.  She verbally acknowledges that the risk of doing so as the shoe may have recurrence of her ulcer.  She will inspect her feet daily and present here immediately for evaluation if she notices any skin abnormality of the toes or feet.    Rx DM Shoes, inserts.  Reprinted prescription with list of places to fill it.              No follow-ups on file.

## 2025-03-10 ENCOUNTER — OFFICE VISIT (OUTPATIENT)
Dept: INFECTIOUS DISEASES | Facility: CLINIC | Age: 65
End: 2025-03-10
Payer: MEDICAID

## 2025-03-10 VITALS
BODY MASS INDEX: 35.92 KG/M2 | SYSTOLIC BLOOD PRESSURE: 183 MMHG | HEIGHT: 68 IN | HEART RATE: 63 BPM | WEIGHT: 237 LBS | DIASTOLIC BLOOD PRESSURE: 79 MMHG

## 2025-03-10 DIAGNOSIS — E11.621 DIABETIC ULCER OF TOE OF RIGHT FOOT ASSOCIATED WITH TYPE 2 DIABETES MELLITUS, WITH MUSCLE INVOLVEMENT WITHOUT EVIDENCE OF NECROSIS: ICD-10-CM

## 2025-03-10 DIAGNOSIS — E11.42 TYPE 2 DIABETES MELLITUS WITH DIABETIC POLYNEUROPATHY, UNSPECIFIED WHETHER LONG TERM INSULIN USE: ICD-10-CM

## 2025-03-10 DIAGNOSIS — M86.179 OTHER ACUTE OSTEOMYELITIS, UNSPECIFIED ANKLE AND FOOT: ICD-10-CM

## 2025-03-10 DIAGNOSIS — L97.515 DIABETIC ULCER OF TOE OF RIGHT FOOT ASSOCIATED WITH TYPE 2 DIABETES MELLITUS, WITH MUSCLE INVOLVEMENT WITHOUT EVIDENCE OF NECROSIS: ICD-10-CM

## 2025-03-10 PROCEDURE — 99215 OFFICE O/P EST HI 40 MIN: CPT | Mod: PBBFAC,PN | Performed by: INTERNAL MEDICINE

## 2025-03-10 PROCEDURE — 99999 PR PBB SHADOW E&M-EST. PATIENT-LVL V: CPT | Mod: PBBFAC,,, | Performed by: INTERNAL MEDICINE

## 2025-03-10 RX ORDER — ONDANSETRON 4 MG/1
4 TABLET, FILM COATED ORAL EVERY 8 HOURS PRN
COMMUNITY
Start: 2025-02-14

## 2025-03-10 RX ORDER — CLINDAMYCIN HYDROCHLORIDE 300 MG/1
300 CAPSULE ORAL 3 TIMES DAILY
COMMUNITY
Start: 2024-12-17

## 2025-03-10 RX ORDER — MOMETASONE FUROATE AND FORMOTEROL FUMARATE DIHYDRATE 200; 5 UG/1; UG/1
2 AEROSOL RESPIRATORY (INHALATION) 2 TIMES DAILY
COMMUNITY

## 2025-03-10 NOTE — PROGRESS NOTES
[facility]  Clinic note    Patient Name: Devika Bustillo  YOB: 1960    PRESENTING HISTORY       History of Present Illness:  Ms. Devika Bustillo is a 64 y.o. female w/ significant PMHx of DM, HTN, and HL who presents due to possible osteo of right 5th metatarsal. She had a wound of this toe that has since healed. MRI showed possible early osteo roughly 2 months ago. She was interviewed by ID via telemedicine and placed on 14 days of cefpodoxime. Not currently on antibiotics    Review of Systems:  Constitutional: no fever or chills  Eyes: no visual changes  ENT: no nasal congestion or sore throat  Respiratory: no cough or shortness of breath  Cardiovascular: no chest pain  Gastrointestinal: no nausea or vomiting, no abdominal pain, no constipation, no diarrhea  Genitourinary: no hematuria or dysuria  Musculoskeletal: no arthralgias or myalgias + back pain  Skin: no rash  Neurological: no headaches, numbness, or paresthesias    PAST HISTORY:     Past Medical History:   Diagnosis Date    Asthma     Back pain     Diabetes mellitus     Grade II diastolic dysfunction     Hyperlipidemia     Hypertension     Neuropathy     Obesity     Pulmonary embolism 1998    Pulmonary hypertension        Past Surgical History:   Procedure Laterality Date    ANGIOGRAM, CORONARY, WITH LEFT HEART CATHETERIZATION Left 1/17/2024    Procedure: Angiogram, Coronary, with Left Heart Cath;  Surgeon: Joe Marr MD;  Location: [facility] CATH LAB;  Service: Cardiology;  Laterality: Left;    CYST REMOVAL      HERNIA REPAIR      HYSTERECTOMY      TONSILLECTOMY      TUBAL LIGATION         Family History   Problem Relation Name Age of Onset    Kidney disease Mother      Kidney disease Father      Stroke Maternal Uncle         Social History     Socioeconomic History    Marital status: Single   Tobacco Use    Smoking status: Never    Smokeless tobacco: Never   Substance and Sexual Activity    Alcohol use: No    Drug use: No     Sexual activity: Not Currently     Partners: Male   Social History Narrative    ** Merged History Encounter **          Social Drivers of Health     Financial Resource Strain: Low Risk  (1/16/2024)    Overall Financial Resource Strain (CARDIA)     Difficulty of Paying Living Expenses: Not very hard   Food Insecurity: Food Insecurity Present (1/16/2024)    Hunger Vital Sign     Worried About Running Out of Food in the Last Year: Sometimes true     Ran Out of Food in the Last Year: Sometimes true   Transportation Needs: No Transportation Needs (1/16/2024)    PRAPARE - Transportation     Lack of Transportation (Medical): No     Lack of Transportation (Non-Medical): No   Physical Activity: Inactive (1/16/2024)    Exercise Vital Sign     Days of Exercise per Week: 0 days     Minutes of Exercise per Session: 0 min   Stress: No Stress Concern Present (1/16/2024)    [employer] - Occupational Stress Questionnaire     Feeling of Stress : Only a little   Housing Stability: Unknown (1/16/2024)    Housing Stability Vital Sign     Unable to Pay for Housing in the Last Year: No     Unstable Housing in the Last Year: No       MEDICATIONS & ALLERGIES:     Current Outpatient Medications on File Prior to Visit   Medication Sig    acetaminophen (TYLENOL) 500 MG tablet Take 2 tablets (1,000 mg total) by mouth every 8 (eight) hours as needed for Pain.    albuterol (VENTOLIN HFA) 90 mcg/actuation inhaler Inhale 1-2 puffs into the lungs every 6 (six) hours as needed for Wheezing or Shortness of Breath. Rescue    aspirin (ECOTRIN) 81 MG EC tablet Take 81 mg by mouth once daily.    atorvastatin (LIPITOR) 40 MG tablet Take 80 mg by mouth every evening.    atropine 1% (ISOPTO ATROPINE) 1 % Drop Place 1 drop into the left eye 2 (two) times daily.    blood sugar diagnostic Strp To check BG 4 times daily, to use with insurance preferred meter    blood-glucose meter kit To check BG 4 times daily, to use with insurance  "preferred meter    buPROPion (WELLBUTRIN XL) 150 MG TB24 tablet Take 1 tablet (150 mg total) by mouth once daily.    cariprazine (VRAYLAR) 1.5 mg Cap Take 1 capsule (1.5 mg total) by mouth once daily.    cefpodoxime (VANTIN) 200 MG tablet Take 2 tablets (400 mg total) by mouth every 12 (twelve) hours.    clindamycin (CLEOCIN) 300 MG capsule Take 300 mg by mouth 3 (three) times daily.    clobetasol 0.05% (TEMOVATE) 0.05 % Oint Apply topically 2 (two) times daily.    dapagliflozin propanediol (FARXIGA) 10 mg tablet Take 1 tablet by mouth once daily.    DUPIXENT  mg/2 mL PnIj INJECT 300MG UNDER THE SKIN EVERY OTHER WEEK AS DIRECTED    ENTRESTO  mg per tablet Take 1 tablet by mouth 2 (two) times daily.    EScitalopram oxalate (LEXAPRO) 20 MG tablet Take 1 tablet (20 mg total) by mouth once daily.    furosemide (LASIX) 40 MG tablet Take 1 tablet by mouth 2 (two) times daily.    gabapentin (NEURONTIN) 300 MG capsule Take 300 mg by mouth 2 (two) times daily as needed.    hydrOXYzine (ATARAX) 50 MG tablet Take 1 tablet (50 mg total) by mouth every evening.    ILEVRO 0.3 % DrpS Place 1 drop into the left eye.    insulin glargine, TOUJEO, (TOUJEO SOLOSTAR U-300 INSULIN) 300 unit/mL (1.5 mL) InPn pen Inject 50 Units into the skin once daily.    lancets Misc To check BG 4 times daily, to use with insurance preferred meter    LIDOcaine-prilocaine (EMLA) cream Apply topically as needed.    lisinopriL (PRINIVIL,ZESTRIL) 5 MG tablet Take 1 tablet (5 mg total) by mouth once daily.    loratadine (CLARITIN) 10 mg tablet Take 10 mg by mouth daily as needed for Allergies.    metoprolol succinate (TOPROL-XL) 50 MG 24 hr tablet Take 1 tablet by mouth once daily.    ondansetron (ZOFRAN) 4 MG tablet Take 4 mg by mouth every 8 (eight) hours as needed.    pantoprazole (PROTONIX) 40 MG tablet Take 1 tablet (40 mg total) by mouth 2 (two) times daily before meals.    pen needle, diabetic 31 gauge x 3/16" Ndle 1 each.    pen needle, " "diabetic 32 gauge x 5/32" Ndle Use to inject insulin four times daily    polyethylene glycol (GLYCOLAX) 17 gram/dose powder Use cap to measure 17 grams, mix in liquid and take by mouth 2 (two) times daily as needed for Constipation.    potassium chloride (KLOR-CON) 10 MEQ TbSR Take 1 tablet (10 mEq total) by mouth as needed (take 1 tablet when you take lasix (furosemide)).    senna-docusate 8.6-50 mg (SENNA WITH DOCUSATE SODIUM) 8.6-50 mg per tablet Take 1 tablet by mouth 2 (two) times daily as needed for Constipation.    SYMBICORT 160-4.5 mcg/actuation HFAA Inhale 2 puffs into the lungs 2 (two) times daily.    tiZANidine (ZANAFLEX) 4 MG tablet Take by mouth.    topiramate (TOPAMAX) 100 MG tablet Take 50 mg by mouth 2 (two) times daily.    traMADoL (ULTRAM) 50 mg tablet Take 1 tablet (50 mg total) by mouth every 6 (six) hours as needed for Pain.    traZODone (DESYREL) 100 MG tablet Take 2 tablets (200 mg total) by mouth every evening.    TRULICITY 3 mg/0.5 mL pen injector Inject 3 mg into the skin every 7 days. Fridays    albuterol-ipratropium (DUO-NEB) 2.5 mg-0.5 mg/3 mL nebulizer solution Take 3 mLs by nebulization every 6 (six) hours as needed for Wheezing or Shortness of Breath. Rescue    DULERA 200-5 mcg/actuation inhaler Inhale 2 puffs into the lungs 2 (two) times daily.     Current Facility-Administered Medications on File Prior to Visit   Medication    fluorescein 500 mg/5 mL (10 %) injection 500 mg    fluorescein 500 mg/5 mL (10 %) injection 500 mg       Review of patient's allergies indicates:  No Known Allergies    OBJECTIVE:   Vital Signs:  Vitals:    03/10/25 1023   BP: (!) 183/79   Pulse: 63   Weight: 107.5 kg (236 lb 15.9 oz)   Height: 5' 8" (1.727 m)       No results found for this or any previous visit (from the past 24 hours).      Physical Exam:   General:  Well developed, well nourished, no acute distress  HEENT:  Normocephalic, atraumatic, EOMI, clear sclera, throat clear without erythema or " exudates  CVS:  RRR, S1 and S2 normal, no murmurs, rubs, gallops  Resp:  Lungs clear to auscultation, no wheezes, rales, rhonchi  GI:  Abdomen soft, non-tender, non-distended, normoactive bowel sounds, no masses  MSK:  No muscle atrophy, peripheral edema, full range of motion  Skin:  No rashes, ulcers, erythema  Neuro:  CNII-XII grossly intact  Psych:  Alert and oriented to person, place, and time    ASSESSMENT:     1. Diabetic ulcer of toe of right foot associated with type 2 diabetes mellitus  64 y.o. female w/ significant PMHx of DM, HTN, and HL who presents due to possible osteo of right 5th metatarsal. Her wound has healed after antibiotic treatment       PLAN:     -given that wound of right 5th toe has healed infection may be resolved  -hold antibiotics for now  -will repeat MRI  -if infection still present on MRI will need admission for IV antibitoics  RTC 2 months

## 2025-03-26 ENCOUNTER — CLINICAL SUPPORT (OUTPATIENT)
Dept: OPHTHALMOLOGY | Facility: CLINIC | Age: 65
End: 2025-03-26
Payer: MEDICAID

## 2025-03-26 ENCOUNTER — OFFICE VISIT (OUTPATIENT)
Dept: OPHTHALMOLOGY | Facility: CLINIC | Age: 65
End: 2025-03-26
Attending: OPHTHALMOLOGY
Payer: MEDICAID

## 2025-03-26 DIAGNOSIS — H20.12 CHRONIC ANTERIOR UVEITIS, LEFT: ICD-10-CM

## 2025-03-26 DIAGNOSIS — H35.342 MACULAR HOLE OF LEFT EYE: ICD-10-CM

## 2025-03-26 DIAGNOSIS — H35.372 PRERETINAL FIBROSIS, LEFT: ICD-10-CM

## 2025-03-26 DIAGNOSIS — E11.36 DIABETIC CATARACT OF RIGHT EYE: ICD-10-CM

## 2025-03-26 DIAGNOSIS — Z79.4 TYPE 2 DIABETES MELLITUS WITH BOTH EYES AFFECTED BY PROLIFERATIVE RETINOPATHY WITHOUT MACULAR EDEMA, WITH LONG-TERM CURRENT USE OF INSULIN: Primary | ICD-10-CM

## 2025-03-26 DIAGNOSIS — E11.3593 TYPE 2 DIABETES MELLITUS WITH BOTH EYES AFFECTED BY PROLIFERATIVE RETINOPATHY WITHOUT MACULAR EDEMA, WITH LONG-TERM CURRENT USE OF INSULIN: Primary | ICD-10-CM

## 2025-03-26 PROCEDURE — 99214 OFFICE O/P EST MOD 30 MIN: CPT | Mod: PBBFAC,PO | Performed by: OPHTHALMOLOGY

## 2025-03-26 PROCEDURE — 1159F MED LIST DOCD IN RCRD: CPT | Mod: CPTII,,, | Performed by: OPHTHALMOLOGY

## 2025-03-26 PROCEDURE — 1160F RVW MEDS BY RX/DR IN RCRD: CPT | Mod: CPTII,,, | Performed by: OPHTHALMOLOGY

## 2025-03-26 PROCEDURE — 92014 COMPRE OPH EXAM EST PT 1/>: CPT | Mod: S$PBB,,, | Performed by: OPHTHALMOLOGY

## 2025-03-26 PROCEDURE — 99999 PR PBB SHADOW E&M-EST. PATIENT-LVL IV: CPT | Mod: PBBFAC,,, | Performed by: OPHTHALMOLOGY

## 2025-03-26 PROCEDURE — 92134 CPTRZ OPH DX IMG PST SGM RTA: CPT | Mod: PBBFAC,PO | Performed by: OPHTHALMOLOGY

## 2025-03-26 PROCEDURE — 4010F ACE/ARB THERAPY RXD/TAKEN: CPT | Mod: CPTII,,, | Performed by: OPHTHALMOLOGY

## 2025-03-26 PROCEDURE — 2022F DILAT RTA XM EVC RTNOPTHY: CPT | Mod: CPTII,,, | Performed by: OPHTHALMOLOGY

## 2025-03-26 NOTE — PROGRESS NOTES
Subjective:       Patient ID: Devika Bustillo is a 64 y.o. female      Chief Complaint   Patient presents with    Diabetic Eye Exam     History of Present Illness  HPI    Oct/Dfe     Pt states her vision has been blurry OD.  OS is stable and poor  Pt reports her bs has been fluctuating and her higest bs was 180    Was not able to get glasses Rx filled yet.     -Flashes   -Floaters   -Pain   Last edited by Horacio Dudley MD on 3/26/2025  3:11 PM.        Imaging:    See report    Assessment/Plan:     1. Macular hole of left eye (Primary)  Review of prior notes from [facility] reveal MH present since at least Jan 2024  Had PPV/ attempt at repair 4/2024, then had repeat PPV 6/2024  Hole with flat edges.  Given chronicity and configuration and multiple attempts at closure, would only expect modest vis imprv if closes and decreased chance of closure.  RBA surgery d/w pt again in great detail since patient was asking for surgery today.  Visual prognosis with and without surgery discussed  Recommend observation  Pt agrees    - Posterior Segment OCT Retina-Both eyes    2. Type 2 diabetes mellitus with both eyes affected by proliferative retinopathy without macular edema, with long-term current use of insulin  Tried to get FA previously to eval for ischemia and prolif but could not get venous access  Will have to follow clinically    Observe for now.  No jessy NVE/D  OD has superior tangle of vessels, stable compared to photo    Diabetic Retinopathy discussed in detail, all questions answered  Stressed importance of good BS/BP/Chol Control  RTC immediately PRN any vision changes, veda blurry vision, missing vision, floaters, distortions, etc      3. Preretinal fibrosis, left  No traction  Observe    4. Chronic anterior uveitis, left  Quiet today off gtts.  Pt d/c'd gtts on her own when she saw me last  Can hold gtts now    5. Diabetic cataract, right  Good Va overall  Fill glasses Rx  Observe        Recommend pt to f/u with   [facility], has been receiving good care.    Pt wishes to f/u here      Visit today included increased complexity associated with the care of the episodic problem PDR, macular hole, cataract, uveitis addressed and managing the longitudinal care of the patient due to the serious and/or complex managed problem(s) PDR, macular hole, cataract, uveitis .    Follow up in about 3 months (around 6/26/2025), or if symptoms worsen or fail to improve, for Comprehensive Examination (DILATE OU), OCT Mac.

## 2025-04-01 ENCOUNTER — HOSPITAL ENCOUNTER (EMERGENCY)
Facility: HOSPITAL | Age: 65
Discharge: HOME | End: 2025-04-01
Attending: EMERGENCY MEDICINE
Payer: MEDICAID

## 2025-04-01 VITALS
WEIGHT: 237 LBS | BODY MASS INDEX: 35.92 KG/M2 | DIASTOLIC BLOOD PRESSURE: 79 MMHG | HEIGHT: 68 IN | OXYGEN SATURATION: 100 % | RESPIRATION RATE: 16 BRPM | HEART RATE: 54 BPM | TEMPERATURE: 98 F | SYSTOLIC BLOOD PRESSURE: 185 MMHG

## 2025-04-01 DIAGNOSIS — I10 HYPERTENSION, UNSPECIFIED TYPE: ICD-10-CM

## 2025-04-01 DIAGNOSIS — Z91.148 NONCOMPLIANCE WITH MEDICATION REGIMEN: ICD-10-CM

## 2025-04-01 DIAGNOSIS — R06.02 SHORTNESS OF BREATH: Primary | ICD-10-CM

## 2025-04-01 DIAGNOSIS — R53.1 WEAKNESS: ICD-10-CM

## 2025-04-01 DIAGNOSIS — I50.9 CONGESTIVE HEART FAILURE, UNSPECIFIED HF CHRONICITY, UNSPECIFIED HEART FAILURE TYPE: ICD-10-CM

## 2025-04-01 LAB
ABSOLUTE EOSINOPHIL (OHS): 0.11 K/UL
ABSOLUTE MONOCYTE (OHS): 0.5 K/UL (ref 0.3–1)
ABSOLUTE NEUTROPHIL COUNT (OHS): 4.08 K/UL (ref 1.8–7.7)
ALBUMIN SERPL BCP-MCNC: 3.2 G/DL (ref 3.5–5.2)
ALP SERPL-CCNC: 125 UNIT/L (ref 40–150)
ALT SERPL W/O P-5'-P-CCNC: 68 UNIT/L (ref 10–44)
ANION GAP (OHS): 9 MMOL/L (ref 8–16)
AST SERPL-CCNC: 25 UNIT/L (ref 11–45)
BACTERIA #/AREA URNS AUTO: NORMAL /HPF
BASOPHILS # BLD AUTO: 0.05 K/UL
BASOPHILS NFR BLD AUTO: 0.7 %
BILIRUB SERPL-MCNC: 0.2 MG/DL (ref 0.1–1)
BILIRUB UR QL STRIP.AUTO: NEGATIVE
BNP SERPL-MCNC: 114 PG/ML (ref 0–99)
BUN SERPL-MCNC: 30 MG/DL (ref 8–23)
CALCIUM SERPL-MCNC: 9.2 MG/DL (ref 8.7–10.5)
CHLORIDE SERPL-SCNC: 114 MMOL/L (ref 95–110)
CLARITY UR: CLEAR
CO2 SERPL-SCNC: 15 MMOL/L (ref 23–29)
COLOR UR AUTO: YELLOW
CREAT SERPL-MCNC: 0.9 MG/DL (ref 0.5–1.4)
ERYTHROCYTE [DISTWIDTH] IN BLOOD BY AUTOMATED COUNT: 14.4 % (ref 11.5–14.5)
GFR SERPLBLD CREATININE-BSD FMLA CKD-EPI: >60 ML/MIN/1.73/M2
GLUCOSE SERPL-MCNC: 78 MG/DL (ref 70–110)
GLUCOSE UR QL STRIP: ABNORMAL
HCT VFR BLD AUTO: 35.6 % (ref 37–48.5)
HCV AB SERPL QL IA: NORMAL
HGB BLD-MCNC: 11.3 GM/DL (ref 12–16)
HGB UR QL STRIP: NEGATIVE
HIV 1+2 AB+HIV1 P24 AG SERPL QL IA: NORMAL
IMM GRANULOCYTES # BLD AUTO: 0.01 K/UL (ref 0–0.04)
IMM GRANULOCYTES NFR BLD AUTO: 0.1 % (ref 0–0.5)
KETONES UR QL STRIP: NEGATIVE
LEUKOCYTE ESTERASE UR QL STRIP: NEGATIVE
LYMPHOCYTES # BLD AUTO: 2.55 K/UL (ref 1–4.8)
MAGNESIUM SERPL-MCNC: 1.8 MG/DL (ref 1.6–2.6)
MCH RBC QN AUTO: 27 PG (ref 27–31)
MCHC RBC AUTO-ENTMCNC: 31.7 G/DL (ref 32–36)
MCV RBC AUTO: 85 FL (ref 82–98)
MICROSCOPIC COMMENT: NORMAL
NITRITE UR QL STRIP: NEGATIVE
NUCLEATED RBC (/100WBC) (OHS): 0 /100 WBC
OHS QRS DURATION: 72 MS
OHS QTC CALCULATION: 426 MS
PH UR STRIP: 7 [PH]
PHOSPHATE SERPL-MCNC: 3.9 MG/DL (ref 2.7–4.5)
PLATELET # BLD AUTO: 295 K/UL (ref 150–450)
PMV BLD AUTO: 9.6 FL (ref 9.2–12.9)
POTASSIUM SERPL-SCNC: 4.4 MMOL/L (ref 3.5–5.1)
PROT SERPL-MCNC: 7.3 GM/DL (ref 6–8.4)
PROT UR QL STRIP: ABNORMAL
RBC # BLD AUTO: 4.19 M/UL (ref 4–5.4)
RBC #/AREA URNS AUTO: 2 /HPF (ref 0–4)
RELATIVE EOSINOPHIL (OHS): 1.5 %
RELATIVE LYMPHOCYTE (OHS): 34.9 % (ref 18–48)
RELATIVE MONOCYTE (OHS): 6.8 % (ref 4–15)
RELATIVE NEUTROPHIL (OHS): 56 % (ref 38–73)
SODIUM SERPL-SCNC: 138 MMOL/L (ref 136–145)
SP GR UR STRIP: 1.01
SQUAMOUS #/AREA URNS AUTO: <1 /HPF
TROPONIN I SERPL HS-MCNC: <3 NG/L
TSH SERPL-ACNC: 0.73 UIU/ML (ref 0.4–4)
UROBILINOGEN UR STRIP-ACNC: NEGATIVE EU/DL
WBC # BLD AUTO: 7.3 K/UL (ref 3.9–12.7)
WBC #/AREA URNS AUTO: <1 /HPF (ref 0–5)
YEAST UR QL AUTO: NORMAL /HPF

## 2025-04-01 PROCEDURE — 93010 ELECTROCARDIOGRAM REPORT: CPT | Mod: ,,, | Performed by: INTERNAL MEDICINE

## 2025-04-01 PROCEDURE — 84443 ASSAY THYROID STIM HORMONE: CPT

## 2025-04-01 PROCEDURE — 84100 ASSAY OF PHOSPHORUS: CPT

## 2025-04-01 PROCEDURE — 99285 EMERGENCY DEPT VISIT HI MDM: CPT | Mod: 25

## 2025-04-01 PROCEDURE — 81003 URINALYSIS AUTO W/O SCOPE: CPT

## 2025-04-01 PROCEDURE — 84450 TRANSFERASE (AST) (SGOT): CPT | Performed by: EMERGENCY MEDICINE

## 2025-04-01 PROCEDURE — 84484 ASSAY OF TROPONIN QUANT: CPT | Performed by: EMERGENCY MEDICINE

## 2025-04-01 PROCEDURE — 36415 COLL VENOUS BLD VENIPUNCTURE: CPT | Performed by: EMERGENCY MEDICINE

## 2025-04-01 PROCEDURE — 93005 ELECTROCARDIOGRAM TRACING: CPT

## 2025-04-01 PROCEDURE — 83880 ASSAY OF NATRIURETIC PEPTIDE: CPT | Performed by: EMERGENCY MEDICINE

## 2025-04-01 PROCEDURE — 82565 ASSAY OF CREATININE: CPT | Performed by: EMERGENCY MEDICINE

## 2025-04-01 PROCEDURE — 85025 COMPLETE CBC W/AUTO DIFF WBC: CPT | Performed by: EMERGENCY MEDICINE

## 2025-04-01 PROCEDURE — 83735 ASSAY OF MAGNESIUM: CPT

## 2025-04-01 PROCEDURE — 25000003 PHARM REV CODE 250: Performed by: EMERGENCY MEDICINE

## 2025-04-01 PROCEDURE — 87389 HIV-1 AG W/HIV-1&-2 AB AG IA: CPT | Performed by: PHYSICIAN ASSISTANT

## 2025-04-01 PROCEDURE — 86803 HEPATITIS C AB TEST: CPT | Performed by: PHYSICIAN ASSISTANT

## 2025-04-01 PROCEDURE — 63600175 PHARM REV CODE 636 W HCPCS: Performed by: EMERGENCY MEDICINE

## 2025-04-01 PROCEDURE — 96374 THER/PROPH/DIAG INJ IV PUSH: CPT

## 2025-04-01 RX ORDER — FUROSEMIDE 10 MG/ML
80 INJECTION INTRAMUSCULAR; INTRAVENOUS
Status: COMPLETED | OUTPATIENT
Start: 2025-04-01 | End: 2025-04-01

## 2025-04-01 RX ORDER — FUROSEMIDE 40 MG/1
40 TABLET ORAL 2 TIMES DAILY
Qty: 60 TABLET | Refills: 0 | Status: SHIPPED | OUTPATIENT
Start: 2025-04-01 | End: 2025-05-01

## 2025-04-01 RX ADMIN — NITROGLYCERIN 1 INCH: 20 OINTMENT TOPICAL at 02:04

## 2025-04-01 RX ADMIN — FUROSEMIDE 80 MG: 10 INJECTION, SOLUTION INTRAVENOUS at 02:04

## 2025-04-01 NOTE — PLAN OF CARE
[facility] - Emergency Dept  Initial Discharge Assessment       Primary Care Provider: Gwendolyn, Primary Doctor    Admission Diagnosis: No admission diagnoses are documented for this encounter.    Admission Date: 4/1/2025  Expected Discharge Date:     Transition of Care Barriers: (P) None    Payor: MEDICAID / Plan: [facility] COMMUNITY PLAN [employer] ([place] MEDICAID) / Product Type: Managed Medicaid /     Extended Emergency Contact Information  Primary Emergency Contact: Narcisa Bustillo  Mobile Phone: [phone]  Relation: Son   needed? No    Discharge Plan A: (P) Home Health  Discharge Plan B: (P) Home      [facility] - [place], [place] - [address]  [address]  [facility] [address]  Phone: [phone] Fax: [phone]    [id number] - [place], [place] - [address]  [address]  [facility] [address]  Phone: [phone] Fax: [phone]      Initial Assessment (most recent)       Adult Discharge Assessment - 04/01/25 1812          Discharge Assessment    Assessment Type Discharge Planning Assessment (P)      Confirmed/corrected address, phone number and insurance Yes (P)      Confirmed Demographics Correct on Facesheet (P)      Source of Information patient;health record (P)      Communicated FRANK with patient/caregiver Yes (P)      People in Home alone (P)      Prior to hospitilization cognitive status: Alert/Oriented (P)      Current cognitive status: Alert/Oriented (P)      Equipment Currently Used at Home rollator (P)      Do you currently have service(s) that help you manage your care at home? No (P)      Do you take prescription medications? Yes (P)      Do you have prescription coverage? Yes (P)      Do you have any problems affording any of your prescribed medications? No (P)      Is the patient taking medications as prescribed? yes (P)      How do you get to doctors appointments? family or friend will  provide;health plan transportation (P)      Are you on dialysis? No (P)      Discharge Plan A Home Health (P)      Discharge Plan B Home (P)      DME Needed Upon Discharge  none (P)      Discharge Plan discussed with: Patient (P)      Transition of Care Barriers None (P)         Physical Activity    On average, how many days per week do you engage in moderate to strenuous exercise (like a brisk walk)? 0 days (P)         Financial Resource Strain    How hard is it for you to pay for the very basics like food, housing, medical care, and heating? Somewhat hard (P)         Housing Stability    In the last 12 months, was there a time when you were not able to pay the mortgage or rent on time? No (P)      At any time in the past 12 months, were you homeless or living in a shelter (including now)? No (P)         Transportation Needs    In the past 12 months, has lack of transportation kept you from medical appointments or from getting medications? No (P)      In the past 12 months, has lack of transportation kept you from meetings, work, or from getting things needed for daily living? No (P)         Food Insecurity    Within the past 12 months, you worried that your food would run out before you got the money to buy more. Never true (P)         Stress    Do you feel stress - tense, restless, nervous, or anxious, or unable to sleep at night because your mind is troubled all the time - these days? Only a little (P)         Alcohol Use    Q1: How often do you have a drink containing alcohol? Patient declined (P)         Utilities    In the past 12 months has the electric, gas, oil, or water company threatened to shut off services in your home? No (P)         Health Literacy    How often do you need to have someone help you when you read instructions, pamphlets, or other written material from your doctor or pharmacy? Sometimes (P)

## 2025-04-01 NOTE — ED PROVIDER NOTES
Encounter Date: 4/1/2025       History     Chief Complaint   Patient presents with    Fatigue     Per EMS, pt from home. Pt reports generalized malaise x 2 weeks, SOB w activity, peripheral edema. AAOx4.      HPI  Devika Bustillo is a 64 y.o. female with a hx of asthma, DM, HLD, HTN, and PE presenting to the ED for SOB. Patient says the SOB has been going on for months and has recently worsened to the point that she has to hold on to objects and stop often to catch her breath. Says that she lives alone and the SOB has prevented her from being able to cook for herself like before. Has run out of her lasix medications as of 3 days ago and feels like her not taking her lasix has added to the worsening of her shortness of breath.    Review of patient's allergies indicates:  No Known Allergies  Past Medical History:   Diagnosis Date    Asthma     Back pain     CHF (congestive heart failure)     Diabetes mellitus     Grade II diastolic dysfunction     Hyperlipidemia     Hypertension     Neuropathy     Obesity     Pulmonary embolism 1998    Pulmonary hypertension      Past Surgical History:   Procedure Laterality Date    ANGIOGRAM, CORONARY, WITH LEFT HEART CATHETERIZATION Left 1/17/2024    Procedure: Angiogram, Coronary, with Left Heart Cath;  Surgeon: Joe Marr MD;  Location: [facility] CATH LAB;  Service: Cardiology;  Laterality: Left;    CYST REMOVAL      HERNIA REPAIR      HYSTERECTOMY      TONSILLECTOMY      TUBAL LIGATION       Family History   Problem Relation Name Age of Onset    Kidney disease Mother      Kidney disease Father      Stroke Maternal Uncle       Social History[1]  Review of Systems   Constitutional:  Positive for fatigue. Negative for chills, diaphoresis and fever.   HENT: Negative.  Negative for congestion, ear pain, hearing loss, rhinorrhea and sore throat.    Eyes: Negative.    Respiratory:  Positive for cough and shortness of breath. Negative for chest tightness.    Cardiovascular:  Positive  "for palpitations. Negative for chest pain.   Gastrointestinal:  Positive for nausea. Negative for abdominal distention, abdominal pain, blood in stool, constipation, diarrhea and vomiting.   Genitourinary:  Positive for frequency. Negative for dysuria and hematuria.   Musculoskeletal: Negative.  Negative for back pain, joint swelling and neck pain.   Skin: Negative.    Neurological: Negative.  Negative for dizziness, seizures, syncope, weakness, light-headedness and headaches.   Psychiatric/Behavioral: Negative.         Physical Exam     Initial Vitals [04/01/25 1111]   BP Pulse Resp Temp SpO2   (!) 150/90 (!) 57 20 98.7 °F (37.1 °C) 100 %      MAP       --         Vitals:    04/01/25 1111 04/01/25 1523 04/01/25 1524 04/01/25 1831   BP: (!) 150/90 (!) 187/74 (!) 187/74 (!) 185/79   BP Location:  Left arm  Left arm   Patient Position:  Lying  Lying   Pulse: (!) 57 (!) 57 (!) 56 (!) 54   Resp: 20 16  16   Temp: 98.7 °F (37.1 °C) 98.2 °F (36.8 °C)  97.9 °F (36.6 °C)   TempSrc: Oral Oral  Oral   SpO2: 100% 100% 100% 100%   Weight: 107.5 kg (236 lb 15.9 oz)      Height: 5' 8" (1.727 m)         Physical Exam    Constitutional: She appears well-developed and well-nourished.   HENT:   Head: Normocephalic.   Cardiovascular:  Normal rate, regular rhythm and normal pulses.     Exam reveals no gallop and no friction rub.       No murmur heard.  Trace pitting edema bilateral lower extremities   Pulmonary/Chest: Breath sounds normal. No respiratory distress. She has no wheezes.   Abdominal: Abdomen is soft. Bowel sounds are normal. She exhibits no distension. There is no abdominal tenderness.   Musculoskeletal:         General: No edema. Normal range of motion.     Neurological: She is alert and oriented to person, place, and time.   Skin: Skin is warm.   Psychiatric: She has a normal mood and affect. Her speech is normal.         ED Course   Procedures  Labs Reviewed   COMPREHENSIVE METABOLIC PANEL - Abnormal       Result Value "    Sodium 138      Potassium 4.4      Chloride 114 (*)     CO2 15 (*)     Glucose 78      BUN 30 (*)     Creatinine 0.9      Calcium 9.2      Protein Total 7.3      Albumin 3.2 (*)     Bilirubin Total 0.2            AST 25      ALT 68 (*)     Anion Gap 9      eGFR >60     B-TYPE NATRIURETIC PEPTIDE - Abnormal     (*)    CBC WITH DIFFERENTIAL - Abnormal    WBC 7.30      RBC 4.19      HGB 11.3 (*)     HCT 35.6 (*)     MCV 85      MCH 27.0      MCHC 31.7 (*)     RDW 14.4      Platelet Count 295      MPV 9.6      Nucleated RBC 0      Neut % 56.0      Lymph % 34.9      Mono % 6.8      Eos % 1.5      Basophil % 0.7      Imm Grans % 0.1      Neut # 4.08      Lymph # 2.55      Mono # 0.50      Eos # 0.11      Baso # 0.05      Imm Grans # 0.01     URINALYSIS, REFLEX TO URINE CULTURE - Abnormal    Color, UA Yellow      Appearance, UA Clear      pH, UA 7.0      Spec Grav UA 1.015      Protein, UA Trace (*)     Glucose, UA 4+ (*)     Ketones, UA Negative      Bilirubin, UA Negative      Blood, UA Negative      Nitrites, UA Negative      Urobilinogen, UA Negative      Leukocyte Esterase, UA Negative     TROPONIN I HIGH SENSITIVITY - Normal    Troponin High Sensitive <3     HEPATITIS C ANTIBODY - Normal    Hep C Ab Interp Non-Reactive     HIV 1 / 2 ANTIBODY - Normal    HIV 1/2 Ag/Ab Non-Reactive     MAGNESIUM - Normal    Magnesium  1.8     PHOSPHORUS - Normal    Phosphorus Level 3.9     TSH - Normal    TSH 0.726     CBC W/ AUTO DIFFERENTIAL    Narrative:     The following orders were created for panel order CBC auto differential.  Procedure                               Abnormality         Status                     ---------                               -----------         ------                     CBC with Differential[[id number]]       Abnormal            Final result                 Please view results for these tests on the individual orders.   URINALYSIS MICROSCOPIC    RBC, UA 2      WBC, UA <1       Bacteria, UA None      Yeast, UA None      Squamous Epithelial Cells, UA <1      Microscopic Comment         EKG Readings: (Independently Interpreted)   Initial Reading: No STEMI. Heart Rate: 60. Other Impression: Normal sinus at 60.  Poor R-wave progression.  Nonspecific ST-T wave abnormalities.  No STEMI     ECG Results              EKG 12-lead (Final result)        Collection Time Result Time QRS Duration OHS QTC Calculation    04/01/25 11:30:54 04/01/25 12:21:09 72 426                     Final result by Interface, Lab In [facility] (04/01/25 12:21:12)                   Narrative:    Test Reason : R06.02,    Vent. Rate :  60 BPM     Atrial Rate :  60 BPM     P-R Int : 132 ms          QRS Dur :  72 ms      QT Int : 426 ms       P-R-T Axes :  54  18  27 degrees    QTcB Int : 426 ms    Normal sinus rhythm  Abnormal R wave progression in the precordial leads  Abnormal ECG  When compared with ECG of 19-Nov-2024 13:51,  No significant change was found  Confirmed by Max Garza ([id number]) on 4/1/2025 12:21:04 PM    Referred By:            Confirmed By: Max Garza                                  Imaging Results              X-Ray Chest AP Portable (Final result)  Result time 04/01/25 13:47:34      Final result by Davey Ricci MD (04/01/25 13:47:34)                   Impression:      As above.      Electronically signed by: Davey Ricci  Date:    04/01/2025  Time:    13:47               Narrative:    EXAMINATION:  XR CHEST AP PORTABLE    CLINICAL HISTORY:  CHF;    TECHNIQUE:  Single frontal view of the chest was performed.    COMPARISON:  Chest radiograph 11/19/2024    FINDINGS:  Lines and tubes: None.    Heart and mediastinum: Apical lordotic positioning and slight rightward rotation.  Heart and mediastinum are unremarkable accounting for patient positioning.    Pleura: No pleural effusion or pneumothorax.    Lungs: Lungs are well inflated. Hazy opacity throughout the left lung, likely secondary to superimposed soft tissue.   Pulmonary edema and atypical infection are less likely but difficult to exclude.  No focal consolidation.    Soft tissue/bone: Unremarkable.                                       Medications   furosemide injection 80 mg (80 mg Intravenous Given 4/1/25 0634)   nitroGLYCERIN 2% TD oint ointment 1 inch (1 inch Topical (Top) Given 4/1/25 1954)     Medical Decision Making  Devika Bustillo is a 64 y.o. female with a hx of asthma, DM, HLD, HTN, and PE presenting to the ED for SOB. Patient says the SOB has been going on for months and has recently worsened. Ddx includes, but not limited to: CHF, COPD, and pneumonia. CXR is WNL with no signs of opacifications. No evidence of COPD. Likely to be CHF exacerbation. Improvement noted after administration of lasix. Patient stable for discharge. Should see PCP for f/u and ensure her prescription for lasix is refilled.       Amount and/or Complexity of Data Reviewed  Independent Historian:      Details: Information given by patient.  External Data Reviewed: labs, radiology, ECG and notes.     Details: I reviewed old echocardiogram results from 10/10/2024:  ·  Left Ventricle: The left ventricle is normal in size. Normal wall thickness. Normal wall motion. There is hyperdynamic systolic function with a visually estimated ejection fraction of greater than 70%. There is normal diastolic function.  ·  Right Ventricle: Normal right ventricular cavity size. Wall thickness is normal. Systolic function is normal.  ·  Left Atrium: Left atrium is moderately dilated.  ·  Mitral Valve: There is moderate mitral annular calcification present.  ·  Tricuspid Valve: There is mild regurgitation.  ·  IVC/SVC: Normal venous pressure at 3 mmHg.    Labs: ordered. Decision-making details documented in ED Course.  Radiology: ordered and independent interpretation performed. Decision-making details documented in ED Course.     Details:   Mild pulmonary edema  ECG/medicine tests: ordered and independent  interpretation performed.     Details:   No STEMI    Risk  Prescription drug management.      Additional MDM:     Heart Failure Score:   Systolic BP = 150-159  Heart Rate = <79  Sodium = 138  COPD = No  Black = Yes  BUN = 30-39  Age = 60-69  Patient has a GWTG HF Risk Score for In-Hospital Mortality of 30 which translates into a probability of death of <1% (Low Risk).             Attending Attestation:   Physician Attestation Statement for Resident:  As the supervising MD   Physician Attestation Statement: I have personally seen and examined this patient.   I agree with the above history.  -:   As the supervising MD I agree with the above PE.     As the supervising MD I agree with the above treatment, course, plan, and disposition.   -: Patient diuresed appropriately in the emergency department is eager for discharge home.  She feels very comfortable with the treatment plan of restarting her diuretics and follow up with the primary care physician.  She will return emergency department immediately for any worsening signs or symptoms.   I have reviewed and agree with the residents interpretation of the following: lab data, x-rays and EKG.  I have reviewed the following: old records at this facility.                ED Course as of 04/02/25 1922 Tue Apr 01, 2025   1343 BNP(!): 114 [DARRON]   1343 WBC: 7.30 [DARRON]   1343 Hemoglobin(!): 11.3 [DARRON]   1343 Hematocrit(!): 35.6 [DARRON]   1416 IV is still pending for Lasix/additional lab [DARRON]   1550 X-Ray Chest AP Portable [DARRON]   1551 Mild pulmonary edema [DARRON]   1629 Troponin I High Sensitivity: <3 [DARRON]      ED Course User Index  [DARRON] Reuben Rose MD                           Clinical Impression:  Final diagnoses:  [R06.02] Shortness of breath (Primary)  [R53.1] Weakness  [I50.9] Congestive heart failure, unspecified HF chronicity, unspecified heart failure type  [Z91.148] Noncompliance with medication regimen  [I10] Hypertension, unspecified type          ED Disposition Condition     Discharge Stable          ED Prescriptions       Medication Sig Dispense Start Date End Date Auth. Provider    furosemide (LASIX) 40 MG tablet Take 1 tablet (40 mg total) by mouth 2 (two) times daily. 60 tablet 4/1/2025 5/1/2025 Reuben Rose MD          Follow-up Information       Follow up With Specialties Details Why Contact Info    Pamela Mclaughlin, PA Internal Medicine Schedule an appointment as soon as possible for a visit in 3 days  [address] [place] [address]  [phone]               Mallory Bourne MD  Resident  04/01/25 1716         [1]   Social History  Tobacco Use    Smoking status: Never    Smokeless tobacco: Never   Substance Use Topics    Alcohol use: No    Drug use: No        Reuben Rose MD  04/02/25 3626

## 2025-04-01 NOTE — PLAN OF CARE
Discharge planning    Chart reviewed today  Care plan discussed at bedside  Disposition- discharge [facility]  Home health orders noted  Ms. Bustillo has [facility] plan  Await home health acceptance  Self reports she has a ride home  Discuss primary care and home health process in detail  Eager to discharge

## 2025-04-01 NOTE — PLAN OF CARE
[facility] - Emergency Dept      HOME HEALTH ORDERS  FACE TO FACE ENCOUNTER    Patient Name: Devika Bustillo  YOB: 1960    PCP: Gwendolyn, Primary Doctor   PCP Address: None  PCP Phone Number: None  PCP Fax: None    Encounter Date: 4/1/25    Admit to Home Health    Diagnoses:  There are no hospital problems to display for this patient.      Follow Up Appointments:  Future Appointments   Date Time Provider Department Center   5/12/2025 10:00 AM [facility]   6/25/2025  1:45 PM Horacio Dudley MD [facility]       Allergies:Review of patient's allergies indicates:  No Known Allergies    Medications: Review discharge medications with patient and family and provide education.    Current Medications[1]     Medication List        CHANGE how you take these medications      * furosemide 40 MG tablet  Commonly known as: LASIX  Take 1 tablet by mouth 2 (two) times daily.  What changed: Another medication with the same name was added. Make sure you understand how and when to take each.     * furosemide 40 MG tablet  Commonly known as: LASIX  Take 1 tablet (40 mg total) by mouth 2 (two) times daily.  What changed: You were already taking a medication with the same name, and this prescription was added. Make sure you understand how and when to take each.           * This list has 2 medication(s) that are the same as other medications prescribed for you. Read the directions carefully, and ask your doctor or other care provider to review them with you.                ASK your doctor about these medications      acetaminophen 500 MG tablet  Commonly known as: TYLENOL  Take 2 tablets (1,000 mg total) by mouth every 8 (eight) hours as needed for Pain.     albuterol 90 mcg/actuation inhaler  Commonly known as: VENTOLIN HFA  Inhale 1-2 puffs into the lungs every 6 (six) hours as needed for Wheezing or Shortness of Breath. Rescue     albuterol-ipratropium 2.5 mg-0.5 mg/3 mL nebulizer  solution  Commonly known as: DUO-NEB  Take 3 mLs by nebulization every 6 (six) hours as needed for Wheezing or Shortness of Breath. Rescue     aspirin 81 MG EC tablet  Commonly known as: ECOTRIN  Take 81 mg by mouth once daily.     atorvastatin 40 MG tablet  Commonly known as: LIPITOR  Take 80 mg by mouth every evening.     atropine 1% 1 % Drop  Commonly known as: ISOPTO ATROPINE  Place 1 drop into the left eye 2 (two) times daily.     blood sugar diagnostic Strp  To check BG 4 times daily, to use with insurance preferred meter     blood-glucose meter kit  To check BG 4 times daily, to use with insurance preferred meter     buPROPion 150 MG TB24 tablet  Commonly known as: WELLBUTRIN XL  Take 1 tablet (150 mg total) by mouth once daily.     cefpodoxime 200 MG tablet  Commonly known as: VANTIN  Take 2 tablets (400 mg total) by mouth every 12 (twelve) hours.     clindamycin 300 MG capsule  Commonly known as: CLEOCIN  Take 300 mg by mouth 3 (three) times daily.     clobetasol 0.05% 0.05 % Oint  Commonly known as: TEMOVATE  Apply topically 2 (two) times daily.     dapagliflozin propanediol 10 mg tablet  Commonly known as: Farxiga  Take 1 tablet by mouth once daily.     DULERA 200-5 mcg/actuation inhaler  Generic drug: mometasone-formoterol  Inhale 2 puffs into the lungs 2 (two) times daily.     DUPIXENT  mg/2 mL Pnij  Generic drug: dupilumab  INJECT 300MG UNDER THE SKIN EVERY OTHER WEEK AS DIRECTED     ENTRESTO  mg per tablet  Generic drug: sacubitriL-valsartan  Take 1 tablet by mouth 2 (two) times daily.     EScitalopram oxalate 20 MG tablet  Commonly known as: LEXAPRO  Take 1 tablet (20 mg total) by mouth once daily.     gabapentin 300 MG capsule  Commonly known as: NEURONTIN  Take 300 mg by mouth 2 (two) times daily as needed.     hydrOXYzine 50 MG tablet  Commonly known as: ATARAX  Take 1 tablet (50 mg total) by mouth every evening.     ILEVRO 0.3 % Drps  Generic drug: nepafenac  Place 1 drop into the  "left eye.     lancets Misc  To check BG 4 times daily, to use with insurance preferred meter     LIDOcaine-prilocaine cream  Commonly known as: EMLA  Apply topically as needed.     lisinopriL 5 MG tablet  Commonly known as: PRINIVIL,ZESTRIL  Take 1 tablet (5 mg total) by mouth once daily.     loratadine 10 mg tablet  Commonly known as: CLARITIN  Take 10 mg by mouth daily as needed for Allergies.     metoprolol succinate 50 MG 24 hr tablet  Commonly known as: TOPROL-XL  Take 1 tablet by mouth once daily.     ondansetron 4 MG tablet  Commonly known as: ZOFRAN  Take 4 mg by mouth every 8 (eight) hours as needed.     pantoprazole 40 MG tablet  Commonly known as: PROTONIX  Take 1 tablet (40 mg total) by mouth 2 (two) times daily before meals.     * pen needle, diabetic 31 gauge x 3/16" Ndle  1 each.     * BD ULTRA-FINE MARY PEN NEEDLE 32 gauge x 5/32" Ndle  Generic drug: pen needle, diabetic  Use to inject insulin four times daily     polyethylene glycol 17 gram/dose powder  Commonly known as: GLYCOLAX  Use cap to measure 17 grams, mix in liquid and take by mouth 2 (two) times daily as needed for Constipation.     potassium chloride 10 MEQ Tbsr  Commonly known as: KLOR-CON  Take 1 tablet (10 mEq total) by mouth as needed (take 1 tablet when you take lasix (furosemide)).     STOOL SOFTENER-LAXATIVE 8.6-50 mg per tablet  Generic drug: senna-docusate  Take 1 tablet by mouth 2 (two) times daily as needed for Constipation.     SYMBICORT 160-4.5 mcg/actuation Hfaa  Generic drug: budesonide-formoterol 160-4.5 mcg  Inhale 2 puffs into the lungs 2 (two) times daily.     tiZANidine 4 MG tablet  Commonly known as: ZANAFLEX  Take by mouth.     topiramate 100 MG tablet  Commonly known as: TOPAMAX  Take 50 mg by mouth 2 (two) times daily.     TOUJEO SOLOSTAR U-300 INSULIN 300 unit/mL (1.5 mL) Inpn pen  Generic drug: insulin glargine (TOUJEO)  Inject 50 Units into the skin once daily.     traMADoL 50 mg tablet  Commonly known as: " ULTRAM  Take 1 tablet (50 mg total) by mouth every 6 (six) hours as needed for Pain.     traZODone 100 MG tablet  Commonly known as: DESYREL  Take 2 tablets (200 mg total) by mouth every evening.     TRULICITY 3 mg/0.5 mL pen injector  Generic drug: dulaglutide  Inject 3 mg into the skin every 7 days. Fridays     VRAYLAR 1.5 mg Cap  Generic drug: cariprazine  Take 1 capsule (1.5 mg total) by mouth once daily.           * This list has 2 medication(s) that are the same as other medications prescribed for you. Read the directions carefully, and ask your doctor or other care provider to review them with you.                    I have seen and examined this patient within the last 30 days. My clinical findings that support the need for the home health skilled services and home bound status are the following:no   Weakness/numbness causing balance and gait disturbance due to Heart Failure and Weakness/Debility making it taxing to leave home.  Requiring assistive device to leave home due to unsteady gait caused by  Heart Failure and Weakness/Debility.     Diet:   cardiac diet and 2 gram sodium diet    Labs:     Referrals/ Consults  Physical Therapy to evaluate and treat. Evaluate for home safety and equipment needs; Establish/upgrade home exercise program. Perform / instruct on therapeutic exercises, gait training, transfer training, and Range of Motion.  Aide to provide assistance with personal care, ADLs, and vital signs.    Activities:   activity as tolerated    Nursing:   Agency to admit patient within 24 hours of hospital discharge unless specified on physician order or at patient request    SN to complete comprehensive assessment including routine vital signs. Instruct on disease process and s/s of complications to report to MD. Review/verify medication list sent home with the patient at time of discharge  and instruct patient/caregiver as needed. Frequency may be adjusted depending on start of care date.     Skilled  nurse to perform up to 3 visits PRN for symptoms related to diagnosis    Notify MD if SBP > 160 or < 90; DBP > 90 or < 50; HR > 120 or < 50; Temp > 101; O2 < 88%; Other:       Ok to schedule additional visits based on staff availability and patient request on consecutive days within the home health episode.    When multiple disciplines ordered:    Start of Care occurs on Sunday - Wednesday schedule remaining discipline evaluations as ordered on separate consecutive days following the start of care.    Thursday SOC -schedule subsequent evaluations Friday and Monday the following week.     Friday - Saturday SOC - schedule subsequent discipline evaluations on consecutive days starting Monday of the following week.    For all post-discharge communication and subsequent orders please contact patient's primary care physician. If unable to reach primary care physician or do not receive response within 30 minutes, please contact [facility] for clinical staff order clarification    Miscellaneous   Heart Failure:      SN to instruct on the following:    Instruct on the definition of CHF.   Instruct on the signs/sympoms of CHF to be reported.   Instruct on and monitor daily weights.   Instruct on factors that cause exacerbation.   Instruct on action, dose, schedule, and side effects of medications.   Instruct on diet as prescribed.   Instruct on activity allowed.   Instruct on life-style modifications for life long management of CHF   SN to assess compliance with daily weights, diet, medications, fluid retention,    safety precautions, activities permitted and life-style modifications.   Additional 1-2 SN visits per week as needed for signs and symptoms     of CHF exacerbation.      For Weight Gain > 2-3 lbs in 1 day or 4-6 lbs over 1 week notify PCP:    Home Health Aide:  Nursing Three times weekly, Physical Therapy Three times weekly, and Home Health Aide Three times weekly    Wound Care Orders      I certify that this patient is  confined to her home and needs intermittent skilled nursing care and physical therapy.               [1]   Current Facility-Administered Medications   Medication Dose Route Frequency Provider Last Rate Last Admin    fluorescein 500 mg/5 mL (10 %) injection 500 mg  5 mL Intravenous Once Horacio Dudley MD        fluorescein 500 mg/5 mL (10 %) injection 500 mg  5 mL Intravenous Once Horacio Dudley MD         Current Outpatient Medications   Medication Sig Dispense Refill    acetaminophen (TYLENOL) 500 MG tablet Take 2 tablets (1,000 mg total) by mouth every 8 (eight) hours as needed for Pain. 30 tablet 0    albuterol (VENTOLIN HFA) 90 mcg/actuation inhaler Inhale 1-2 puffs into the lungs every 6 (six) hours as needed for Wheezing or Shortness of Breath. Rescue 18 g 0    albuterol-ipratropium (DUO-NEB) 2.5 mg-0.5 mg/3 mL nebulizer solution Take 3 mLs by nebulization every 6 (six) hours as needed for Wheezing or Shortness of Breath. Rescue 90 mL 2    aspirin (ECOTRIN) 81 MG EC tablet Take 81 mg by mouth once daily.      atorvastatin (LIPITOR) 40 MG tablet Take 80 mg by mouth every evening.      atropine 1% (ISOPTO ATROPINE) 1 % Drop Place 1 drop into the left eye 2 (two) times daily.      blood sugar diagnostic Strp To check BG 4 times daily, to use with insurance preferred meter 200 each 11    blood-glucose meter kit To check BG 4 times daily, to use with insurance preferred meter 1 each 0    buPROPion (WELLBUTRIN XL) 150 MG TB24 tablet Take 1 tablet (150 mg total) by mouth once daily. 30 tablet 1    cariprazine (VRAYLAR) 1.5 mg Cap Take 1 capsule (1.5 mg total) by mouth once daily. 30 capsule 1    cefpodoxime (VANTIN) 200 MG tablet Take 2 tablets (400 mg total) by mouth every 12 (twelve) hours. 56 tablet 0    clindamycin (CLEOCIN) 300 MG capsule Take 300 mg by mouth 3 (three) times daily.      clobetasol 0.05% (TEMOVATE) 0.05 % Oint Apply topically 2 (two) times daily.      dapagliflozin propanediol  "(FARXIGA) 10 mg tablet Take 1 tablet by mouth once daily.      DULERA 200-5 mcg/actuation inhaler Inhale 2 puffs into the lungs 2 (two) times daily.      DUPIXENT  mg/2 mL PnIj INJECT 300MG UNDER THE SKIN EVERY OTHER WEEK AS DIRECTED      ENTRESTO  mg per tablet Take 1 tablet by mouth 2 (two) times daily.      EScitalopram oxalate (LEXAPRO) 20 MG tablet Take 1 tablet (20 mg total) by mouth once daily. 30 tablet 1    furosemide (LASIX) 40 MG tablet Take 1 tablet by mouth 2 (two) times daily.      furosemide (LASIX) 40 MG tablet Take 1 tablet (40 mg total) by mouth 2 (two) times daily. 60 tablet 0    gabapentin (NEURONTIN) 300 MG capsule Take 300 mg by mouth 2 (two) times daily as needed.      hydrOXYzine (ATARAX) 50 MG tablet Take 1 tablet (50 mg total) by mouth every evening. 30 tablet 1    ILEVRO 0.3 % DrpS Place 1 drop into the left eye.      insulin glargine, TOUJEO, (TOUJEO SOLOSTAR U-300 INSULIN) 300 unit/mL (1.5 mL) InPn pen Inject 50 Units into the skin once daily.      lancets Misc To check BG 4 times daily, to use with insurance preferred meter 200 each 11    LIDOcaine-prilocaine (EMLA) cream Apply topically as needed. 30 g 3    lisinopriL (PRINIVIL,ZESTRIL) 5 MG tablet Take 1 tablet (5 mg total) by mouth once daily. 30 tablet 1    loratadine (CLARITIN) 10 mg tablet Take 10 mg by mouth daily as needed for Allergies.      metoprolol succinate (TOPROL-XL) 50 MG 24 hr tablet Take 1 tablet by mouth once daily.      ondansetron (ZOFRAN) 4 MG tablet Take 4 mg by mouth every 8 (eight) hours as needed.      pantoprazole (PROTONIX) 40 MG tablet Take 1 tablet (40 mg total) by mouth 2 (two) times daily before meals. 60 tablet 1    pen needle, diabetic 31 gauge x 3/16" Ndle 1 each.      pen needle, diabetic 32 gauge x 5/32" Ndle Use to inject insulin four times daily 100 each 1    polyethylene glycol (GLYCOLAX) 17 gram/dose powder Use cap to measure 17 grams, mix in liquid and take by mouth 2 (two) times " daily as needed for Constipation. 238 g 1    potassium chloride (KLOR-CON) 10 MEQ TbSR Take 1 tablet (10 mEq total) by mouth as needed (take 1 tablet when you take lasix (furosemide)).      senna-docusate 8.6-50 mg (SENNA WITH DOCUSATE SODIUM) 8.6-50 mg per tablet Take 1 tablet by mouth 2 (two) times daily as needed for Constipation. 30 tablet 1    SYMBICORT 160-4.5 mcg/actuation HFAA Inhale 2 puffs into the lungs 2 (two) times daily.  0    tiZANidine (ZANAFLEX) 4 MG tablet Take by mouth.      topiramate (TOPAMAX) 100 MG tablet Take 50 mg by mouth 2 (two) times daily.      traMADoL (ULTRAM) 50 mg tablet Take 1 tablet (50 mg total) by mouth every 6 (six) hours as needed for Pain. 20 tablet 0    traZODone (DESYREL) 100 MG tablet Take 2 tablets (200 mg total) by mouth every evening. 180 tablet 0    TRULICITY 3 mg/0.5 mL pen injector Inject 3 mg into the skin every 7 days. Fridays

## 2025-04-01 NOTE — ED TRIAGE NOTES
C/o general malaise and STOUT for the last 2 weeks. Reports worsening since onset. Hx of CHF. Mild swelling noted to bilateral lower extremities. Aaox3. Resp even unlabored. Skin warm and dry.

## 2025-05-15 ENCOUNTER — HOSPITAL ENCOUNTER (INPATIENT)
Facility: HOSPITAL | Age: 65
LOS: 3 days | Discharge: HOME | DRG: 315 | End: 2025-05-18
Attending: EMERGENCY MEDICINE | Admitting: HOSPITALIST
Payer: MEDICAID

## 2025-05-15 DIAGNOSIS — I50.9 CHF (CONGESTIVE HEART FAILURE): ICD-10-CM

## 2025-05-15 DIAGNOSIS — R07.9 CHEST PAIN: ICD-10-CM

## 2025-05-15 DIAGNOSIS — I50.9 ACUTE ON CHRONIC CONGESTIVE HEART FAILURE, UNSPECIFIED HEART FAILURE TYPE: Primary | ICD-10-CM

## 2025-05-15 PROBLEM — E66.812 CLASS 2 SEVERE OBESITY DUE TO EXCESS CALORIES WITH SERIOUS COMORBIDITY IN ADULT: Status: ACTIVE | Noted: 2025-05-15

## 2025-05-15 PROBLEM — G93.2 IIH (IDIOPATHIC INTRACRANIAL HYPERTENSION): Status: ACTIVE | Noted: 2024-03-04

## 2025-05-15 PROBLEM — Z95.828: Status: ACTIVE | Noted: 2025-05-15

## 2025-05-15 PROBLEM — I25.10 CORONARY ARTERY DISEASE INVOLVING NATIVE CORONARY ARTERY OF NATIVE HEART WITHOUT ANGINA PECTORIS: Status: ACTIVE | Noted: 2024-02-14

## 2025-05-15 PROBLEM — G44.209 TENSION TYPE HEADACHE: Status: ACTIVE | Noted: 2024-01-29

## 2025-05-15 PROBLEM — E66.01 CLASS 2 SEVERE OBESITY DUE TO EXCESS CALORIES WITH SERIOUS COMORBIDITY IN ADULT: Status: ACTIVE | Noted: 2025-05-15

## 2025-05-15 PROBLEM — F32.A ANXIETY AND DEPRESSION: Status: ACTIVE | Noted: 2024-01-16

## 2025-05-15 LAB
ABSOLUTE EOSINOPHIL (OHS): 0.08 K/UL
ABSOLUTE MONOCYTE (OHS): 0.55 K/UL (ref 0.3–1)
ABSOLUTE NEUTROPHIL COUNT (OHS): 3.32 K/UL (ref 1.8–7.7)
ALBUMIN SERPL BCP-MCNC: 2.9 G/DL (ref 3.5–5.2)
ALP SERPL-CCNC: 145 UNIT/L (ref 40–150)
ALT SERPL W/O P-5'-P-CCNC: 58 UNIT/L (ref 10–44)
ANION GAP (OHS): 6 MMOL/L (ref 8–16)
AST SERPL-CCNC: 30 UNIT/L (ref 11–45)
BASOPHILS # BLD AUTO: 0.03 K/UL
BASOPHILS NFR BLD AUTO: 0.5 %
BILIRUB SERPL-MCNC: 0.3 MG/DL (ref 0.1–1)
BNP SERPL-MCNC: 306 PG/ML (ref 0–99)
BUN SERPL-MCNC: 30 MG/DL (ref 8–23)
CALCIUM SERPL-MCNC: 9.1 MG/DL (ref 8.7–10.5)
CHLORIDE SERPL-SCNC: 104 MMOL/L (ref 95–110)
CO2 SERPL-SCNC: 29 MMOL/L (ref 23–29)
CREAT SERPL-MCNC: 1 MG/DL (ref 0.5–1.4)
EAG (OHS): 263 MG/DL (ref 68–131)
ERYTHROCYTE [DISTWIDTH] IN BLOOD BY AUTOMATED COUNT: 12.7 % (ref 11.5–14.5)
GFR SERPLBLD CREATININE-BSD FMLA CKD-EPI: >60 ML/MIN/1.73/M2
GLUCOSE SERPL-MCNC: 233 MG/DL (ref 70–110)
HBA1C MFR BLD: 10.8 % (ref 4–5.6)
HCT VFR BLD AUTO: 34.9 % (ref 37–48.5)
HGB BLD-MCNC: 11 GM/DL (ref 12–16)
IMM GRANULOCYTES # BLD AUTO: 0.02 K/UL (ref 0–0.04)
IMM GRANULOCYTES NFR BLD AUTO: 0.3 % (ref 0–0.5)
INFLUENZA A MOLECULAR (OHS): NEGATIVE
INFLUENZA B MOLECULAR (OHS): NEGATIVE
INR PPP: 1 (ref 0.8–1.2)
LYMPHOCYTES # BLD AUTO: 2.4 K/UL (ref 1–4.8)
MCH RBC QN AUTO: 26.6 PG (ref 27–31)
MCHC RBC AUTO-ENTMCNC: 31.5 G/DL (ref 32–36)
MCV RBC AUTO: 84 FL (ref 82–98)
NUCLEATED RBC (/100WBC) (OHS): 0 /100 WBC
OHS QRS DURATION: 76 MS
OHS QTC CALCULATION: 435 MS
PLATELET # BLD AUTO: 267 K/UL (ref 150–450)
PMV BLD AUTO: 10.2 FL (ref 9.2–12.9)
POCT GLUCOSE: 187 MG/DL (ref 70–110)
POCT GLUCOSE: 200 MG/DL (ref 70–110)
POCT GLUCOSE: 205 MG/DL (ref 70–110)
POTASSIUM SERPL-SCNC: 4.7 MMOL/L (ref 3.5–5.1)
PROT SERPL-MCNC: 7.1 GM/DL (ref 6–8.4)
PROTHROMBIN TIME: 10.7 SECONDS (ref 9–12.5)
RBC # BLD AUTO: 4.14 M/UL (ref 4–5.4)
RELATIVE EOSINOPHIL (OHS): 1.3 %
RELATIVE LYMPHOCYTE (OHS): 37.5 % (ref 18–48)
RELATIVE MONOCYTE (OHS): 8.6 % (ref 4–15)
RELATIVE NEUTROPHIL (OHS): 51.8 % (ref 38–73)
SARS-COV-2 RDRP RESP QL NAA+PROBE: NEGATIVE
SODIUM SERPL-SCNC: 139 MMOL/L (ref 136–145)
TROPONIN I SERPL HS-MCNC: 4 NG/L
TROPONIN I SERPL HS-MCNC: 4 NG/L
WBC # BLD AUTO: 6.4 K/UL (ref 3.9–12.7)

## 2025-05-15 PROCEDURE — G0378 HOSPITAL OBSERVATION PER HR: HCPCS

## 2025-05-15 PROCEDURE — 82962 GLUCOSE BLOOD TEST: CPT

## 2025-05-15 PROCEDURE — 96372 THER/PROPH/DIAG INJ SC/IM: CPT | Performed by: HOSPITALIST

## 2025-05-15 PROCEDURE — 93005 ELECTROCARDIOGRAM TRACING: CPT

## 2025-05-15 PROCEDURE — 25000003 PHARM REV CODE 250: Performed by: HOSPITALIST

## 2025-05-15 PROCEDURE — 96374 THER/PROPH/DIAG INJ IV PUSH: CPT

## 2025-05-15 PROCEDURE — 63600175 PHARM REV CODE 636 W HCPCS

## 2025-05-15 PROCEDURE — 83880 ASSAY OF NATRIURETIC PEPTIDE: CPT | Performed by: STUDENT IN AN ORGANIZED HEALTH CARE EDUCATION/TRAINING PROGRAM

## 2025-05-15 PROCEDURE — 93010 ELECTROCARDIOGRAM REPORT: CPT | Mod: ,,, | Performed by: INTERNAL MEDICINE

## 2025-05-15 PROCEDURE — 63600175 PHARM REV CODE 636 W HCPCS: Performed by: HOSPITALIST

## 2025-05-15 PROCEDURE — 25000003 PHARM REV CODE 250

## 2025-05-15 PROCEDURE — 99285 EMERGENCY DEPT VISIT HI MDM: CPT | Mod: 25

## 2025-05-15 PROCEDURE — 84484 ASSAY OF TROPONIN QUANT: CPT | Mod: 91

## 2025-05-15 PROCEDURE — 83036 HEMOGLOBIN GLYCOSYLATED A1C: CPT | Performed by: HOSPITALIST

## 2025-05-15 PROCEDURE — 85025 COMPLETE CBC W/AUTO DIFF WBC: CPT | Performed by: STUDENT IN AN ORGANIZED HEALTH CARE EDUCATION/TRAINING PROGRAM

## 2025-05-15 PROCEDURE — 84484 ASSAY OF TROPONIN QUANT: CPT | Performed by: STUDENT IN AN ORGANIZED HEALTH CARE EDUCATION/TRAINING PROGRAM

## 2025-05-15 PROCEDURE — 11000001 HC ACUTE MED/SURG PRIVATE ROOM

## 2025-05-15 PROCEDURE — 80053 COMPREHEN METABOLIC PANEL: CPT | Performed by: STUDENT IN AN ORGANIZED HEALTH CARE EDUCATION/TRAINING PROGRAM

## 2025-05-15 PROCEDURE — 85610 PROTHROMBIN TIME: CPT | Performed by: STUDENT IN AN ORGANIZED HEALTH CARE EDUCATION/TRAINING PROGRAM

## 2025-05-15 PROCEDURE — U0002 COVID-19 LAB TEST NON-CDC: HCPCS | Performed by: EMERGENCY MEDICINE

## 2025-05-15 PROCEDURE — 87502 INFLUENZA DNA AMP PROBE: CPT | Performed by: EMERGENCY MEDICINE

## 2025-05-15 PROCEDURE — 96376 TX/PRO/DX INJ SAME DRUG ADON: CPT

## 2025-05-15 RX ORDER — GLUCAGON 1 MG
1 KIT INJECTION
Status: DISCONTINUED | OUTPATIENT
Start: 2025-05-15 | End: 2025-05-18 | Stop reason: HOSPADM

## 2025-05-15 RX ORDER — ATORVASTATIN CALCIUM 40 MG/1
80 TABLET, FILM COATED ORAL NIGHTLY
Status: DISCONTINUED | OUTPATIENT
Start: 2025-05-15 | End: 2025-05-18 | Stop reason: HOSPADM

## 2025-05-15 RX ORDER — ESCITALOPRAM OXALATE 20 MG/1
20 TABLET ORAL DAILY
Status: DISCONTINUED | OUTPATIENT
Start: 2025-05-16 | End: 2025-05-18 | Stop reason: HOSPADM

## 2025-05-15 RX ORDER — ARIPIPRAZOLE 5 MG/1
10 TABLET ORAL DAILY
Status: DISCONTINUED | OUTPATIENT
Start: 2025-05-16 | End: 2025-05-16

## 2025-05-15 RX ORDER — TALC
6 POWDER (GRAM) TOPICAL NIGHTLY PRN
Status: DISCONTINUED | OUTPATIENT
Start: 2025-05-15 | End: 2025-05-18 | Stop reason: HOSPADM

## 2025-05-15 RX ORDER — BUPROPION HYDROCHLORIDE 150 MG/1
150 TABLET ORAL DAILY
Status: DISCONTINUED | OUTPATIENT
Start: 2025-05-16 | End: 2025-05-18 | Stop reason: HOSPADM

## 2025-05-15 RX ORDER — INSULIN ASPART 100 [IU]/ML
0-5 INJECTION, SOLUTION INTRAVENOUS; SUBCUTANEOUS
Status: DISCONTINUED | OUTPATIENT
Start: 2025-05-15 | End: 2025-05-18 | Stop reason: HOSPADM

## 2025-05-15 RX ORDER — FUROSEMIDE 10 MG/ML
40 INJECTION INTRAMUSCULAR; INTRAVENOUS
Status: COMPLETED | OUTPATIENT
Start: 2025-05-15 | End: 2025-05-15

## 2025-05-15 RX ORDER — ASPIRIN 81 MG/1
81 TABLET ORAL DAILY
Status: DISCONTINUED | OUTPATIENT
Start: 2025-05-16 | End: 2025-05-18 | Stop reason: HOSPADM

## 2025-05-15 RX ORDER — SODIUM CHLORIDE 0.9 % (FLUSH) 0.9 %
10 SYRINGE (ML) INJECTION
Status: DISCONTINUED | OUTPATIENT
Start: 2025-05-15 | End: 2025-05-18 | Stop reason: HOSPADM

## 2025-05-15 RX ORDER — METOPROLOL SUCCINATE 50 MG/1
50 TABLET, EXTENDED RELEASE ORAL DAILY
Status: DISCONTINUED | OUTPATIENT
Start: 2025-05-16 | End: 2025-05-17

## 2025-05-15 RX ORDER — IBUPROFEN 200 MG
16 TABLET ORAL
Status: DISCONTINUED | OUTPATIENT
Start: 2025-05-15 | End: 2025-05-18 | Stop reason: HOSPADM

## 2025-05-15 RX ORDER — CLOPIDOGREL BISULFATE 75 MG/1
75 TABLET ORAL DAILY
COMMUNITY

## 2025-05-15 RX ORDER — POLYETHYLENE GLYCOL 3350 17 G/17G
17 POWDER, FOR SOLUTION ORAL DAILY
Status: DISCONTINUED | OUTPATIENT
Start: 2025-05-16 | End: 2025-05-17

## 2025-05-15 RX ORDER — CLOPIDOGREL BISULFATE 75 MG/1
75 TABLET ORAL DAILY
Status: DISCONTINUED | OUTPATIENT
Start: 2025-05-16 | End: 2025-05-18 | Stop reason: HOSPADM

## 2025-05-15 RX ORDER — FUROSEMIDE 10 MG/ML
40 INJECTION INTRAMUSCULAR; INTRAVENOUS 2 TIMES DAILY
Status: DISCONTINUED | OUTPATIENT
Start: 2025-05-15 | End: 2025-05-15

## 2025-05-15 RX ORDER — IBUPROFEN 200 MG
600 TABLET ORAL EVERY 6 HOURS PRN
Status: DISCONTINUED | OUTPATIENT
Start: 2025-05-15 | End: 2025-05-18 | Stop reason: HOSPADM

## 2025-05-15 RX ORDER — ACETAMINOPHEN 500 MG
1000 TABLET ORAL EVERY 8 HOURS PRN
Status: DISCONTINUED | OUTPATIENT
Start: 2025-05-15 | End: 2025-05-18 | Stop reason: HOSPADM

## 2025-05-15 RX ORDER — ONDANSETRON HYDROCHLORIDE 2 MG/ML
4 INJECTION, SOLUTION INTRAVENOUS EVERY 8 HOURS PRN
Status: DISCONTINUED | OUTPATIENT
Start: 2025-05-15 | End: 2025-05-18 | Stop reason: HOSPADM

## 2025-05-15 RX ORDER — INSULIN GLARGINE 100 [IU]/ML
25 INJECTION, SOLUTION SUBCUTANEOUS NIGHTLY
Status: DISCONTINUED | OUTPATIENT
Start: 2025-05-15 | End: 2025-05-18 | Stop reason: HOSPADM

## 2025-05-15 RX ORDER — NALOXONE HCL 0.4 MG/ML
0.02 VIAL (ML) INJECTION
Status: DISCONTINUED | OUTPATIENT
Start: 2025-05-15 | End: 2025-05-18 | Stop reason: HOSPADM

## 2025-05-15 RX ORDER — IBUPROFEN 200 MG
24 TABLET ORAL
Status: DISCONTINUED | OUTPATIENT
Start: 2025-05-15 | End: 2025-05-18 | Stop reason: HOSPADM

## 2025-05-15 RX ORDER — FUROSEMIDE 10 MG/ML
60 INJECTION INTRAMUSCULAR; INTRAVENOUS 2 TIMES DAILY
Status: DISCONTINUED | OUTPATIENT
Start: 2025-05-15 | End: 2025-05-16

## 2025-05-15 RX ORDER — ACETAMINOPHEN 325 MG/1
650 TABLET ORAL
Status: COMPLETED | OUTPATIENT
Start: 2025-05-15 | End: 2025-05-15

## 2025-05-15 RX ORDER — ENOXAPARIN SODIUM 100 MG/ML
40 INJECTION SUBCUTANEOUS EVERY 24 HOURS
Status: DISCONTINUED | OUTPATIENT
Start: 2025-05-15 | End: 2025-05-18 | Stop reason: HOSPADM

## 2025-05-15 RX ADMIN — INSULIN ASPART 2 UNITS: 100 INJECTION, SOLUTION INTRAVENOUS; SUBCUTANEOUS at 06:05

## 2025-05-15 RX ADMIN — FUROSEMIDE 40 MG: 10 INJECTION, SOLUTION INTRAVENOUS at 03:05

## 2025-05-15 RX ADMIN — FUROSEMIDE 60 MG: 10 INJECTION, SOLUTION INTRAMUSCULAR; INTRAVENOUS at 09:05

## 2025-05-15 RX ADMIN — SACUBITRIL AND VALSARTAN 1 TABLET: 97; 103 TABLET, FILM COATED ORAL at 09:05

## 2025-05-15 RX ADMIN — INSULIN GLARGINE 25 UNITS: 100 INJECTION, SOLUTION SUBCUTANEOUS at 09:05

## 2025-05-15 RX ADMIN — ACETAMINOPHEN 1000 MG: 500 TABLET ORAL at 07:05

## 2025-05-15 RX ADMIN — ENOXAPARIN SODIUM 40 MG: 40 INJECTION SUBCUTANEOUS at 06:05

## 2025-05-15 RX ADMIN — ATORVASTATIN CALCIUM 80 MG: 40 TABLET, FILM COATED ORAL at 09:05

## 2025-05-15 RX ADMIN — ACETAMINOPHEN 650 MG: 325 TABLET ORAL at 04:05

## 2025-05-15 NOTE — ASSESSMENT & PLAN NOTE
Patient's blood pressure range in the last 24 hours was: BP  Min: 127/57  Max: 162/87.The patient's inpatient anti-hypertensive regimen is listed below:  Current Antihypertensives  furosemide injection 60 mg, 2 times daily, Intravenous  metoprolol succinate (TOPROL-XL) 24 hr tablet 50 mg, Daily, Oral    Plan  - BP is controlled, no changes needed to their regimen  - Continue home meds

## 2025-05-15 NOTE — SUBJECTIVE & OBJECTIVE
Past Medical History:   Diagnosis Date    Asthma     Back pain     CHF (congestive heart failure)     Diabetes mellitus     Grade II diastolic dysfunction     Hyperlipidemia     Hypertension     Neuropathy     Obesity     Pulmonary embolism 1998    Pulmonary hypertension        Past Surgical History:   Procedure Laterality Date    ANGIOGRAM, CORONARY, WITH LEFT HEART CATHETERIZATION Left 1/17/2024    Procedure: Angiogram, Coronary, with Left Heart Cath;  Surgeon: Joe Marr MD;  Location: [facility] CATH LAB;  Service: Cardiology;  Laterality: Left;    CYST REMOVAL      HERNIA REPAIR      HYSTERECTOMY      TONSILLECTOMY      TUBAL LIGATION         Review of patient's allergies indicates:  No Known Allergies    Current Facility-Administered Medications on File Prior to Encounter   Medication    [DISCONTINUED] fluorescein 500 mg/5 mL (10 %) injection 500 mg    [DISCONTINUED] fluorescein 500 mg/5 mL (10 %) injection 500 mg     Current Outpatient Medications on File Prior to Encounter   Medication Sig    aspirin (ECOTRIN) 81 MG EC tablet Take 81 mg by mouth once daily.    atorvastatin (LIPITOR) 40 MG tablet Take 80 mg by mouth every evening.    clopidogreL (PLAVIX) 75 mg tablet Take 75 mg by mouth once daily.    ENTRESTO  mg per tablet Take 1 tablet by mouth 2 (two) times daily.    insulin glargine, TOUJEO, (TOUJEO SOLOSTAR U-300 INSULIN) 300 unit/mL (1.5 mL) InPn pen Inject 50 Units into the skin once daily.    metoprolol succinate (TOPROL-XL) 50 MG 24 hr tablet Take 1 tablet by mouth once daily.    acetaminophen (TYLENOL) 500 MG tablet Take 2 tablets (1,000 mg total) by mouth every 8 (eight) hours as needed for Pain.    albuterol (VENTOLIN HFA) 90 mcg/actuation inhaler Inhale 1-2 puffs into the lungs every 6 (six) hours as needed for Wheezing or Shortness of Breath. Rescue    albuterol-ipratropium (DUO-NEB) 2.5 mg-0.5 mg/3 mL nebulizer solution Take 3 mLs by nebulization every 6 (six) hours as needed for  "Wheezing or Shortness of Breath. Rescue    atropine 1% (ISOPTO ATROPINE) 1 % Drop Place 1 drop into the left eye 2 (two) times daily.    blood sugar diagnostic Strp To check BG 4 times daily, to use with insurance preferred meter    blood-glucose meter kit To check BG 4 times daily, to use with insurance preferred meter    buPROPion (WELLBUTRIN XL) 150 MG TB24 tablet Take 1 tablet (150 mg total) by mouth once daily.    clobetasol 0.05% (TEMOVATE) 0.05 % Oint Apply topically 2 (two) times daily.    dapagliflozin propanediol (FARXIGA) 10 mg tablet Take 1 tablet by mouth once daily.    DULERA 200-5 mcg/actuation inhaler Inhale 2 puffs into the lungs 2 (two) times daily.    DUPIXENT  mg/2 mL PnIj INJECT 300MG UNDER THE SKIN EVERY OTHER WEEK AS DIRECTED    EScitalopram oxalate (LEXAPRO) 20 MG tablet Take 1 tablet (20 mg total) by mouth once daily.    furosemide (LASIX) 40 MG tablet Take 1 tablet by mouth 2 (two) times daily.    gabapentin (NEURONTIN) 300 MG capsule Take 300 mg by mouth 2 (two) times daily as needed.    ILEVRO 0.3 % DrpS Place 1 drop into the left eye.    lancets Misc To check BG 4 times daily, to use with insurance preferred meter    LIDOcaine-prilocaine (EMLA) cream Apply topically as needed.    loratadine (CLARITIN) 10 mg tablet Take 10 mg by mouth daily as needed for Allergies.    ondansetron (ZOFRAN) 4 MG tablet Take 4 mg by mouth every 8 (eight) hours as needed.    pantoprazole (PROTONIX) 40 MG tablet Take 1 tablet (40 mg total) by mouth 2 (two) times daily before meals.    pen needle, diabetic 31 gauge x 3/16" Ndle 1 each.    pen needle, diabetic 32 gauge x 5/32" Ndle Use to inject insulin four times daily    polyethylene glycol (GLYCOLAX) 17 gram/dose powder Use cap to measure 17 grams, mix in liquid and take by mouth 2 (two) times daily as needed for Constipation.    potassium chloride (KLOR-CON) 10 MEQ TbSR Take 1 tablet (10 mEq total) by mouth as needed (take 1 tablet when you take lasix " (furosemide)).    senna-docusate 8.6-50 mg (SENNA WITH DOCUSATE SODIUM) 8.6-50 mg per tablet Take 1 tablet by mouth 2 (two) times daily as needed for Constipation.    SYMBICORT 160-4.5 mcg/actuation HFAA Inhale 2 puffs into the lungs 2 (two) times daily.    tiZANidine (ZANAFLEX) 4 MG tablet Take by mouth.    topiramate (TOPAMAX) 100 MG tablet Take 50 mg by mouth 2 (two) times daily.    traMADoL (ULTRAM) 50 mg tablet Take 1 tablet (50 mg total) by mouth every 6 (six) hours as needed for Pain.    traZODone (DESYREL) 100 MG tablet Take 2 tablets (200 mg total) by mouth every evening.    TRULICITY 3 mg/0.5 mL pen injector Inject 3 mg into the skin every 7 days. Fridays    [DISCONTINUED] cefpodoxime (VANTIN) 200 MG tablet Take 2 tablets (400 mg total) by mouth every 12 (twelve) hours.    [DISCONTINUED] clindamycin (CLEOCIN) 300 MG capsule Take 300 mg by mouth 3 (three) times daily.    [DISCONTINUED] lisinopriL (PRINIVIL,ZESTRIL) 5 MG tablet Take 1 tablet (5 mg total) by mouth once daily.     Family History       Problem Relation (Age of Onset)    Kidney disease Mother, Father    Stroke Maternal Uncle          Tobacco Use    Smoking status: Never    Smokeless tobacco: Never   Substance and Sexual Activity    Alcohol use: No    Drug use: No    Sexual activity: Not Currently     Partners: Male     Review of Systems   Constitutional:  Positive for activity change. Negative for chills, fatigue and fever.   HENT:  Negative for sore throat and trouble swallowing.    Eyes:  Negative for photophobia and visual disturbance.   Respiratory:  Negative for cough and shortness of breath.    Cardiovascular:  Positive for chest pain and leg swelling. Negative for palpitations.   Gastrointestinal:  Positive for abdominal pain. Negative for constipation, diarrhea, nausea and vomiting.   Endocrine: Negative for cold intolerance and heat intolerance.   Genitourinary:  Negative for dysuria and frequency.   Musculoskeletal:  Negative for  arthralgias and myalgias.   Skin:  Negative for rash and wound.   Neurological:  Positive for headaches. Negative for dizziness, syncope, weakness and light-headedness.   Psychiatric/Behavioral:  Negative for confusion and hallucinations.    All other systems reviewed and are negative.    Objective:     Vital Signs (Most Recent):  Temp: 98.2 °F (36.8 °C) (05/15/25 1450)  Pulse: (!) 52 (05/15/25 1450)  Resp: 18 (05/15/25 1450)  BP: (!) 127/57 (05/15/25 1450)  SpO2: 99 % (05/15/25 1450) Vital Signs (24h Range):  Temp:  [98.2 °F (36.8 °C)-98.6 °F (37 °C)] 98.2 °F (36.8 °C)  Pulse:  [52-58] 52  Resp:  [18-20] 18  SpO2:  [99 %-100 %] 99 %  BP: (127-162)/(57-87) 127/57     Weight: 108.9 kg (240 lb)  Body mass index is 36.49 kg/m².     Physical Exam  Vitals and nursing note reviewed.   Constitutional:       Appearance: Normal appearance. She is obese.   HENT:      Head: Normocephalic and atraumatic.   Eyes:      General: No scleral icterus.  Cardiovascular:      Rate and Rhythm: Normal rate and regular rhythm.      Heart sounds: No murmur heard.  Pulmonary:      Effort: Pulmonary effort is normal. No respiratory distress.      Breath sounds: Rales (Bilateral bases) present. No wheezing.   Abdominal:      General: Bowel sounds are normal. There is no distension.      Palpations: Abdomen is soft.      Tenderness: There is no abdominal tenderness.   Musculoskeletal:         General: Normal range of motion.      Cervical back: Normal range of motion and neck supple.      Right lower leg: Edema (2+) present.      Left lower leg: Edema (2+) present.   Skin:     General: Skin is warm and dry.   Neurological:      General: No focal deficit present.      Mental Status: She is alert and oriented to person, place, and time. Mental status is at baseline.                Significant Labs: All pertinent labs within the past 24 hours have been reviewed.  CBC:   Recent Labs   Lab 05/15/25  1408   WBC 6.40   HGB 11.0*   HCT 34.9*         CMP:   Recent Labs   Lab 05/15/25  1408      K 4.7      CO2 29   *   BUN 30*   CREATININE 1.0   CALCIUM 9.1   PROT 7.1   ALBUMIN 2.9*   BILITOT 0.3   ALKPHOS 145   AST 30   ALT 58*   ANIONGAP 6*       Significant Imaging: I have reviewed all pertinent imaging results/findings within the past 24 hours.  CXR: I have reviewed all pertinent results/findings within the past 24 hours and my personal findings are:  Pulmonary edema

## 2025-05-15 NOTE — H&P
[facility] - Emergency Dept  Hospital Medicine  History & Physical    Patient Name: Devika Bustillo  MRN: [id number]  Patient Class: OP- Observation  Admission Date: 5/15/2025  Attending Physician: Katy Jeffers MD  Primary Care Provider: Gwendolyn Primary Doctor         Patient information was obtained from patient, past medical records, and ER records.     Subjective:     Principal Problem:Acute on chronic congestive heart failure    Chief Complaint:   Chief Complaint   Patient presents with    Shortness of Breath    Chest Pain     Pt arrives via EMS from home c/o Sob and chest 'tightness' - pt given 325 ASA and 3 nitro spray with EMS, pt reports relief with 1st nitro; hx CHF, HTN, CKD        HPI: Ms. Devika Bustillo is a 64 y.o. female with chronic heart failure, left transverse-sigmoid sinus stenosis, s/p stenting followed by balloon valvuloplasty of the left internal jugular vein outlet valve March 2025, T2DM on insulin, anxiety, depression and obesity who [place] [facility] [place] for evaluation of headaches and shortness of breath.  She reports that she has not been compliant with her Lasix 40mg BID.  She has been worsening STOUT and SOB at rest.  She endorses a dry weight of around 230lbs, and currently weighs closer to 245lbs. She endorses BLE edema.  She also reports worsening headaches since having her stent placed in March.  She was being seen by Neurology who started her on Zonisamide 100mg, but she didn't like how it made her feel so she stopped taking it.  She endorses compliance with her Aspirin and Plavix.  She took all of her medications this morning.    Upon arrival to the ER, vitals were temp 98.6F, HR 58, /87.  Labs showed WBC 6, Hgb 11, BUN 30, Creatinine 1, Glucose 233, .  CXR showed pulmonary congestion.  She was given Lasix 40mg IV x 1 and endorsed mild UOP.  She was admitted to Hospital Medicine for further diuresis.    Past Medical History:   Diagnosis Date    Asthma     Back pain      CHF (congestive heart failure)     Diabetes mellitus     Grade II diastolic dysfunction     Hyperlipidemia     Hypertension     Neuropathy     Obesity     Pulmonary embolism 1998    Pulmonary hypertension        Past Surgical History:   Procedure Laterality Date    ANGIOGRAM, CORONARY, WITH LEFT HEART CATHETERIZATION Left 1/17/2024    Procedure: Angiogram, Coronary, with Left Heart Cath;  Surgeon: Joe Marr MD;  Location: [facility] CATH LAB;  Service: Cardiology;  Laterality: Left;    CYST REMOVAL      HERNIA REPAIR      HYSTERECTOMY      TONSILLECTOMY      TUBAL LIGATION         Review of patient's allergies indicates:  No Known Allergies    Current Facility-Administered Medications on File Prior to Encounter   Medication    [DISCONTINUED] fluorescein 500 mg/5 mL (10 %) injection 500 mg    [DISCONTINUED] fluorescein 500 mg/5 mL (10 %) injection 500 mg     Current Outpatient Medications on File Prior to Encounter   Medication Sig    aspirin (ECOTRIN) 81 MG EC tablet Take 81 mg by mouth once daily.    atorvastatin (LIPITOR) 40 MG tablet Take 80 mg by mouth every evening.    clopidogreL (PLAVIX) 75 mg tablet Take 75 mg by mouth once daily.    ENTRESTO  mg per tablet Take 1 tablet by mouth 2 (two) times daily.    insulin glargine, TOUJEO, (TOUJEO SOLOSTAR U-300 INSULIN) 300 unit/mL (1.5 mL) InPn pen Inject 50 Units into the skin once daily.    metoprolol succinate (TOPROL-XL) 50 MG 24 hr tablet Take 1 tablet by mouth once daily.    acetaminophen (TYLENOL) 500 MG tablet Take 2 tablets (1,000 mg total) by mouth every 8 (eight) hours as needed for Pain.    albuterol (VENTOLIN HFA) 90 mcg/actuation inhaler Inhale 1-2 puffs into the lungs every 6 (six) hours as needed for Wheezing or Shortness of Breath. Rescue    albuterol-ipratropium (DUO-NEB) 2.5 mg-0.5 mg/3 mL nebulizer solution Take 3 mLs by nebulization every 6 (six) hours as needed for Wheezing or Shortness of Breath. Rescue    atropine 1% (ISOPTO  "ATROPINE) 1 % Drop Place 1 drop into the left eye 2 (two) times daily.    blood sugar diagnostic Strp To check BG 4 times daily, to use with insurance preferred meter    blood-glucose meter kit To check BG 4 times daily, to use with insurance preferred meter    buPROPion (WELLBUTRIN XL) 150 MG TB24 tablet Take 1 tablet (150 mg total) by mouth once daily.    clobetasol 0.05% (TEMOVATE) 0.05 % Oint Apply topically 2 (two) times daily.    dapagliflozin propanediol (FARXIGA) 10 mg tablet Take 1 tablet by mouth once daily.    DULERA 200-5 mcg/actuation inhaler Inhale 2 puffs into the lungs 2 (two) times daily.    DUPIXENT  mg/2 mL PnIj INJECT 300MG UNDER THE SKIN EVERY OTHER WEEK AS DIRECTED    EScitalopram oxalate (LEXAPRO) 20 MG tablet Take 1 tablet (20 mg total) by mouth once daily.    furosemide (LASIX) 40 MG tablet Take 1 tablet by mouth 2 (two) times daily.    gabapentin (NEURONTIN) 300 MG capsule Take 300 mg by mouth 2 (two) times daily as needed.    ILEVRO 0.3 % DrpS Place 1 drop into the left eye.    lancets Misc To check BG 4 times daily, to use with insurance preferred meter    LIDOcaine-prilocaine (EMLA) cream Apply topically as needed.    loratadine (CLARITIN) 10 mg tablet Take 10 mg by mouth daily as needed for Allergies.    ondansetron (ZOFRAN) 4 MG tablet Take 4 mg by mouth every 8 (eight) hours as needed.    pantoprazole (PROTONIX) 40 MG tablet Take 1 tablet (40 mg total) by mouth 2 (two) times daily before meals.    pen needle, diabetic 31 gauge x 3/16" Ndle 1 each.    pen needle, diabetic 32 gauge x 5/32" Ndle Use to inject insulin four times daily    polyethylene glycol (GLYCOLAX) 17 gram/dose powder Use cap to measure 17 grams, mix in liquid and take by mouth 2 (two) times daily as needed for Constipation.    potassium chloride (KLOR-CON) 10 MEQ TbSR Take 1 tablet (10 mEq total) by mouth as needed (take 1 tablet when you take lasix (furosemide)).    senna-docusate 8.6-50 mg (SENNA WITH " DOCUSATE SODIUM) 8.6-50 mg per tablet Take 1 tablet by mouth 2 (two) times daily as needed for Constipation.    SYMBICORT 160-4.5 mcg/actuation HFAA Inhale 2 puffs into the lungs 2 (two) times daily.    tiZANidine (ZANAFLEX) 4 MG tablet Take by mouth.    topiramate (TOPAMAX) 100 MG tablet Take 50 mg by mouth 2 (two) times daily.    traMADoL (ULTRAM) 50 mg tablet Take 1 tablet (50 mg total) by mouth every 6 (six) hours as needed for Pain.    traZODone (DESYREL) 100 MG tablet Take 2 tablets (200 mg total) by mouth every evening.    TRULICITY 3 mg/0.5 mL pen injector Inject 3 mg into the skin every 7 days. Fridays    [DISCONTINUED] cefpodoxime (VANTIN) 200 MG tablet Take 2 tablets (400 mg total) by mouth every 12 (twelve) hours.    [DISCONTINUED] clindamycin (CLEOCIN) 300 MG capsule Take 300 mg by mouth 3 (three) times daily.    [DISCONTINUED] lisinopriL (PRINIVIL,ZESTRIL) 5 MG tablet Take 1 tablet (5 mg total) by mouth once daily.     Family History       Problem Relation (Age of Onset)    Kidney disease Mother, Father    Stroke Maternal Uncle          Tobacco Use    Smoking status: Never    Smokeless tobacco: Never   Substance and Sexual Activity    Alcohol use: No    Drug use: No    Sexual activity: Not Currently     Partners: Male     Review of Systems   Constitutional:  Positive for activity change. Negative for chills, fatigue and fever.   HENT:  Negative for sore throat and trouble swallowing.    Eyes:  Negative for photophobia and visual disturbance.   Respiratory:  Negative for cough and shortness of breath.    Cardiovascular:  Positive for chest pain and leg swelling. Negative for palpitations.   Gastrointestinal:  Positive for abdominal pain. Negative for constipation, diarrhea, nausea and vomiting.   Endocrine: Negative for cold intolerance and heat intolerance.   Genitourinary:  Negative for dysuria and frequency.   Musculoskeletal:  Negative for arthralgias and myalgias.   Skin:  Negative for rash and  wound.   Neurological:  Positive for headaches. Negative for dizziness, syncope, weakness and light-headedness.   Psychiatric/Behavioral:  Negative for confusion and hallucinations.    All other systems reviewed and are negative.    Objective:     Vital Signs (Most Recent):  Temp: 98.2 °F (36.8 °C) (05/15/25 1450)  Pulse: (!) 52 (05/15/25 1450)  Resp: 18 (05/15/25 1450)  BP: (!) 127/57 (05/15/25 1450)  SpO2: 99 % (05/15/25 1450) Vital Signs (24h Range):  Temp:  [98.2 °F (36.8 °C)-98.6 °F (37 °C)] 98.2 °F (36.8 °C)  Pulse:  [52-58] 52  Resp:  [18-20] 18  SpO2:  [99 %-100 %] 99 %  BP: (127-162)/(57-87) 127/57     Weight: 108.9 kg (240 lb)  Body mass index is 36.49 kg/m².     Physical Exam  Vitals and nursing note reviewed.   Constitutional:       Appearance: Normal appearance. She is obese.   HENT:      Head: Normocephalic and atraumatic.   Eyes:      General: No scleral icterus.  Cardiovascular:      Rate and Rhythm: Normal rate and regular rhythm.      Heart sounds: No murmur heard.  Pulmonary:      Effort: Pulmonary effort is normal. No respiratory distress.      Breath sounds: Rales (Bilateral bases) present. No wheezing.   Abdominal:      General: Bowel sounds are normal. There is no distension.      Palpations: Abdomen is soft.      Tenderness: There is no abdominal tenderness.   Musculoskeletal:         General: Normal range of motion.      Cervical back: Normal range of motion and neck supple.      Right lower leg: Edema (2+) present.      Left lower leg: Edema (2+) present.   Skin:     General: Skin is warm and dry.   Neurological:      General: No focal deficit present.      Mental Status: She is alert and oriented to person, place, and time. Mental status is at baseline.                Significant Labs: All pertinent labs within the past 24 hours have been reviewed.  CBC:   Recent Labs   Lab 05/15/25  1408   WBC 6.40   HGB 11.0*   HCT 34.9*        CMP:   Recent Labs   Lab 05/15/25  1408      K  4.7      CO2 29   *   BUN 30*   CREATININE 1.0   CALCIUM 9.1   PROT 7.1   ALBUMIN 2.9*   BILITOT 0.3   ALKPHOS 145   AST 30   ALT 58*   ANIONGAP 6*       Significant Imaging: I have reviewed all pertinent imaging results/findings within the past 24 hours.  CXR: I have reviewed all pertinent results/findings within the past 24 hours and my personal findings are:  Pulmonary edema  Assessment/Plan:     Assessment & Plan  Acute on chronic congestive heart failure  Patient has acute on chronic CHF.  Previous echo reviewed and normal EF and no DD.  On presentation their CHF was decompensated. Evidence of decompensated CHF on presentation includes: edema, crackles on lung auscultation, weight gain, dyspnea on exertion (STOUT), and shortness of breath. The etiology of their decompensation is likely medication non-compliance. Most recent BNP and echo results are listed below.  Recent Labs     05/15/25  1408   *     Latest ECHO  Results for orders placed during the hospital encounter of 10/09/24    Echo    Interpretation Summary    Left Ventricle: The left ventricle is normal in size. Normal wall thickness. Normal wall motion. There is hyperdynamic systolic function with a visually estimated ejection fraction of greater than 70%. There is normal diastolic function.    Right Ventricle: Normal right ventricular cavity size. Wall thickness is normal. Systolic function is normal.    Left Atrium: Left atrium is moderately dilated.    Mitral Valve: There is moderate mitral annular calcification present.    Tricuspid Valve: There is mild regurgitation.    IVC/SVC: Normal venous pressure at 3 mmHg.    Current Heart Failure Medications  furosemide injection 60 mg, 2 times daily, Intravenous  sacubitriL-valsartan  mg per tablet 1 tablet, 2 times daily, Oral  metoprolol succinate (TOPROL-XL) 24 hr tablet 50 mg, Daily, Oral    Plan  - Monitor strict I&Os and daily weights.    - Place on telemetry  - Low sodium  diet  - Place on fluid restriction of 1.5 L.   - Cardiology has not been consulted  - The patient's volume status is worsening as indicated by edema, crackles on lung auscultation, weight gain, dyspnea on exertion (STOUT), and shortness of breath. Will continue current treatment  - Continue IV diuresis but increase to Lasix 60mg IV BID  - Home diuretic Lasix 40mg BID but not compliant    Diabetes mellitus type II  Patient's FSGs are uncontrolled due to hyperglycemia on current medication regimen.  Last A1c reviewed-   Lab Results   Component Value Date    HGBA1C 7.6 (H) 10/10/2024     Most recent fingerstick glucose reviewed-   Recent Labs   Lab 05/15/25  1702   POCTGLUCOSE 205*     Current correctional scale  Medium  Maintain anti-hyperglycemic dose as follows-   Antihyperglycemics (From admission, onward)      Start     Stop Route Frequency Ordered    05/15/25 2100  insulin glargine U-100 (Lantus) pen 25 Units         -- SubQ Nightly 05/15/25 1710    05/15/25 1751  insulin aspart U-100 pen 0-5 Units         -- SubQ Before meals & nightly PRN 05/15/25 1651          Hold Oral hypoglycemics while patient is in the hospital.  Hypertension  Patient's blood pressure range in the last 24 hours was: BP  Min: 127/57  Max: 162/87.The patient's inpatient anti-hypertensive regimen is listed below:  Current Antihypertensives  furosemide injection 60 mg, 2 times daily, Intravenous  metoprolol succinate (TOPROL-XL) 24 hr tablet 50 mg, Daily, Oral    Plan  - BP is controlled, no changes needed to their regimen  - Continue home meds  Obesity, diabetes, and hypertension syndrome  Noted    History of pulmonary embolism  History noted  Not on blood thinners    Anxiety and depression  Chronic issue for which she sees Dr. Barclay in Psych  Continue Wellbutrin  Continue Abilify  Class 2 severe obesity due to excess calories with serious comorbidity in adult  Body mass index is 36.49 kg/m². Morbid obesity complicates all aspects of disease  management from diagnostic modalities to treatment. Weight loss encouraged and health benefits explained to patient.       IIH (idiopathic intracranial hypertension)  Tension type headache  History of angioplasty of transverse sinus of brain with insertion of stent  Known transverse sinus stenosis  On 03/21/2025 she underwent left transverse-sigmoid sinus stenting followed by balloon valvuloplasty of the left internal jugular vein outlet valve  Continues to have headaches  Continue Aspirin and Plavix  Vascular Neuro consulted for further evaluation   VTE Risk Mitigation (From admission, onward)           Ordered     enoxaparin injection 40 mg  Daily         05/15/25 1653     IP VTE HIGH RISK PATIENT  Once         05/15/25 1653     Place sequential compression device  Until discontinued         05/15/25 1653     Place sequential compression device  Until discontinued         05/15/25 1651                       On 05/15/2025, patient should be placed in hospital observation services under my care.             Katy Jeffers MD  Department of Hospital Medicine  [facility] - Emergency Dept

## 2025-05-15 NOTE — ASSESSMENT & PLAN NOTE
Known transverse sinus stenosis  On 03/21/2025 she underwent left transverse-sigmoid sinus stenting followed by balloon valvuloplasty of the left internal jugular vein outlet valve  Continues to have headaches  Continue Aspirin and Plavix  Vascular Neuro consulted for further evaluation

## 2025-05-15 NOTE — ED PROVIDER NOTES
Encounter Date: 5/15/2025       History     Chief Complaint   Patient presents with    Shortness of Breath    Chest Pain     Pt arrives via EMS from home c/o Sob and chest 'tightness' - pt given 325 ASA and 3 nitro spray with EMS, pt reports relief with 1st nitro; hx CHF, HTN, CKD     HPI  Review of patient's allergies indicates:  No Known Allergies  Past Medical History:   Diagnosis Date    Asthma     Back pain     CHF (congestive heart failure)     Diabetes mellitus     Grade II diastolic dysfunction     Hyperlipidemia     Hypertension     Neuropathy     Obesity     Pulmonary embolism 1998    Pulmonary hypertension      Past Surgical History:   Procedure Laterality Date    ANGIOGRAM, CORONARY, WITH LEFT HEART CATHETERIZATION Left 1/17/2024    Procedure: Angiogram, Coronary, with Left Heart Cath;  Surgeon: Joe Marr MD;  Location: [facility] CATH LAB;  Service: Cardiology;  Laterality: Left;    CYST REMOVAL      HERNIA REPAIR      HYSTERECTOMY      TONSILLECTOMY      TUBAL LIGATION       Family History   Problem Relation Name Age of Onset    Kidney disease Mother      Kidney disease Father      Stroke Maternal Uncle       Social History[1]      Physical Exam     Initial Vitals [05/15/25 1209]   BP Pulse Resp Temp SpO2   (!) 162/87 (!) 58 20 98.6 °F (37 °C) 100 %      MAP       --         Physical Exam    ED Course   Procedures  Labs Reviewed   CBC W/ AUTO DIFFERENTIAL    Narrative:     The following orders were created for panel order CBC auto differential.  Procedure                               Abnormality         Status                     ---------                               -----------         ------                     CBC with Differential[[id number]]                                                        Please view results for these tests on the individual orders.   COMPREHENSIVE METABOLIC PANEL   B-TYPE NATRIURETIC PEPTIDE   TROPONIN I HIGH SENSITIVITY   PROTIME-INR   CBC WITH DIFFERENTIAL           Imaging Results    None          Medications - No data to display  Medical Decision Making                                    Clinical Impression:  Final diagnoses:  [R07.9] Chest pain                       [1]   Social History  Tobacco Use    Smoking status: Never    Smokeless tobacco: Never   Substance Use Topics    Alcohol use: No    Drug use: No

## 2025-05-15 NOTE — ASSESSMENT & PLAN NOTE
Patient's FSGs are uncontrolled due to hyperglycemia on current medication regimen.  Last A1c reviewed-   Lab Results   Component Value Date    HGBA1C 7.6 (H) 10/10/2024     Most recent fingerstick glucose reviewed-   Recent Labs   Lab 05/15/25  1702   POCTGLUCOSE 205*     Current correctional scale  Medium  Maintain anti-hyperglycemic dose as follows-   Antihyperglycemics (From admission, onward)      Start     Stop Route Frequency Ordered    05/15/25 2100  insulin glargine U-100 (Lantus) pen 25 Units         -- SubQ Nightly 05/15/25 1710    05/15/25 1751  insulin aspart U-100 pen 0-5 Units         -- SubQ Before meals & nightly PRN 05/15/25 1651          Hold Oral hypoglycemics while patient is in the hospital.

## 2025-05-15 NOTE — HPI
Ms. Devika Bustillo is a 64 y.o. female with chronic heart failure, left transverse-sigmoid sinus stenosis, s/p stenting followed by balloon valvuloplasty of the left internal jugular vein outlet valve March 2025, T2DM on insulin, anxiety, depression and obesity who presented to the [facility] ED on 5/15 for evaluation of headaches and shortness of breath.  She reports that she has not been compliant with her Lasix 40mg BID.  She has been worsening STOUT and SOB at rest.  She endorses a dry weight of around 230lbs, and currently weighs closer to 245lbs. She endorses BLE edema.  She also reports worsening headaches since having her stent placed in March.  She was being seen by Neurology who started her on Zonisamide 100mg, but she didn't like how it made her feel so she stopped taking it.  She endorses compliance with her Aspirin and Plavix.  She took all of her medications this morning.    Upon arrival to the ER, vitals were temp 98.6F, HR 58, /87.  Labs showed WBC 6, Hgb 11, BUN 30, Creatinine 1, Glucose 233, .  CXR showed pulmonary congestion.  She was given Lasix 40mg IV x 1 and endorsed mild UOP.  She was admitted to Hospital Medicine for further diuresis.

## 2025-05-15 NOTE — ED PROVIDER NOTES
Encounter Date: 5/15/2025       History     Chief Complaint   Patient presents with    Shortness of Breath    Chest Pain     Pt arrives via EMS from home c/o Sob and chest 'tightness' - pt given 325 ASA and 3 nitro spray with EMS, pt reports relief with 1st nitro; hx CHF, HTN, CKD     65 y/o female with CHF, DM, HTN, CAD, PAD, presenting for worsening shortness of breath x 1 week. She states she is chronically short of breathe due to her CHF but this time it is worse. Relates she was seen in the ED for similar issue 1 month ago. Takes lasix's but admits to missing doses. Admits to right sided chest tightness, nausea, and abdominal discomfort.  Reportedly received ASA and nitro sprays PTA.      The history is provided by the patient and medical records. No  was used.     Review of patient's allergies indicates:  No Known Allergies  Past Medical History:   Diagnosis Date    Asthma     Back pain     CHF (congestive heart failure)     Diabetes mellitus     Grade II diastolic dysfunction     Hyperlipidemia     Hypertension     Neuropathy     Obesity     Pulmonary embolism 1998    Pulmonary hypertension      Past Surgical History:   Procedure Laterality Date    ANGIOGRAM, CORONARY, WITH LEFT HEART CATHETERIZATION Left 1/17/2024    Procedure: Angiogram, Coronary, with Left Heart Cath;  Surgeon: Joe Marr MD;  Location: [facility] CATH LAB;  Service: Cardiology;  Laterality: Left;    CYST REMOVAL      HERNIA REPAIR      HYSTERECTOMY      TONSILLECTOMY      TUBAL LIGATION       Family History   Problem Relation Name Age of Onset    Kidney disease Mother      Kidney disease Father      Stroke Maternal Uncle       Social History[1]      Physical Exam     Initial Vitals [05/15/25 1209]   BP Pulse Resp Temp SpO2   (!) 162/87 (!) 58 20 98.6 °F (37 °C) 100 %      MAP       --         Physical Exam    Constitutional: She appears well-nourished. She is not diaphoretic. No distress.   HENT:   Head:  Normocephalic and atraumatic.   Eyes: Conjunctivae and EOM are normal. No scleral icterus.   Cardiovascular:  Normal rate, regular rhythm, normal heart sounds and intact distal pulses.           Pulmonary/Chest: Breath sounds normal. No respiratory distress. She has no wheezes. She has no rales. She exhibits tenderness.   Abdominal: Abdomen is soft. Bowel sounds are normal. She exhibits no distension. There is no abdominal tenderness. There is no rebound and no guarding.   Musculoskeletal:         General: Edema (+pretibial edema b/l) present.     Neurological: She is alert and oriented to person, place, and time. She has normal strength.   Skin: Skin is warm and dry.   Psychiatric: Thought content normal.         ED Course   Procedures  Labs Reviewed   COMPREHENSIVE METABOLIC PANEL - Abnormal       Result Value    Sodium 139      Potassium 4.7      Chloride 104      CO2 29      Glucose 233 (*)     BUN 30 (*)     Creatinine 1.0      Calcium 9.1      Protein Total 7.1      Albumin 2.9 (*)     Bilirubin Total 0.3            AST 30      ALT 58 (*)     Anion Gap 6 (*)     eGFR >60     B-TYPE NATRIURETIC PEPTIDE - Abnormal     (*)    CBC WITH DIFFERENTIAL - Abnormal    WBC 6.40      RBC 4.14      HGB 11.0 (*)     HCT 34.9 (*)     MCV 84      MCH 26.6 (*)     MCHC 31.5 (*)     RDW 12.7      Platelet Count 267      MPV 10.2      Nucleated RBC 0      Neut % 51.8      Lymph % 37.5      Mono % 8.6      Eos % 1.3      Basophil % 0.5      Imm Grans % 0.3      Neut # 3.32      Lymph # 2.40      Mono # 0.55      Eos # 0.08      Baso # 0.03      Imm Grans # 0.02     HEMOGLOBIN A1C - Abnormal    Hemoglobin A1c 10.8 (*)     Estimated Average Glucose 263 (*)    POCT GLUCOSE - Abnormal    POCT Glucose 205 (*)    INFLUENZA A & B BY MOLECULAR - Normal    INFLUENZA A MOLECULAR Negative      INFLUENZA B MOLECULAR  Negative     TROPONIN I HIGH SENSITIVITY - Normal    Troponin High Sensitive 4     PROTIME-INR - Normal    PT  10.7      INR 1.0     SARS-COV-2 RNA AMPLIFICATION, QUAL - Normal    SARS COV-2 Molecular Negative     TROPONIN I HIGH SENSITIVITY - Normal    Troponin High Sensitive 4     CBC W/ AUTO DIFFERENTIAL    Narrative:     The following orders were created for panel order CBC auto differential.  Procedure                               Abnormality         Status                     ---------                               -----------         ------                     CBC with Differential[[id number]]       Abnormal            Final result                 Please view results for these tests on the individual orders.   POCT GLUCOSE MONITORING CONTINUOUS     EKG Readings: (Independently Interpreted)   Initial Reading: No STEMI. Previous EKG: Compared with most recent EKG Previous EKG Date: 4/1/2025. Rhythm: Sinus Bradycardia. Heart Rate: 53. Clinical Impression: Sinus Bradycardia     ECG Results              EKG 12-lead (Final result)        Collection Time Result Time QRS Duration OHS QTC Calculation    05/15/25 12:17:47 05/15/25 14:00:06 76 435                     Final result by Interface, Lab In [facility] (05/15/25 14:00:10)                   Narrative:    Test Reason : R07.9,    Vent. Rate :  53 BPM     Atrial Rate :  53 BPM     P-R Int : 134 ms          QRS Dur :  76 ms      QT Int : 464 ms       P-R-T Axes :  72  46  32 degrees    QTcB Int : 435 ms    Sinus bradycardia  Otherwise normal ECG  When compared with ECG of 01-Apr-2025 11:30,  T wave amplitude has increased in Lateral leads  Confirmed by Max Garza ([id number]) on 5/15/2025 2:00:02 PM    Referred By: AAAREFERRAL SELF           Confirmed By: Max Garza                                  Imaging Results              X-Ray Chest AP Portable (In process)                      Medications   sodium chloride 0.9% flush 10 mL (has no administration in time range)   glucose chewable tablet 16 g (has no administration in time range)   glucose chewable tablet 24 g (has no  administration in time range)   dextrose 50% injection 12.5 g (has no administration in time range)   dextrose 50% injection 25 g (has no administration in time range)   glucagon (human recombinant) injection 1 mg (has no administration in time range)   insulin aspart U-100 pen 0-5 Units (2 Units Subcutaneous Given 5/15/25 1814)   melatonin tablet 6 mg (has no administration in time range)   ondansetron injection 4 mg (has no administration in time range)   polyethylene glycol packet 17 g (has no administration in time range)   ibuprofen tablet 600 mg (has no administration in time range)   acetaminophen tablet 1,000 mg (has no administration in time range)   naloxone 0.4 mg/mL injection 0.02 mg (has no administration in time range)   enoxaparin injection 40 mg (40 mg Subcutaneous Given 5/15/25 1814)   furosemide injection 60 mg (has no administration in time range)   insulin glargine U-100 (Lantus) pen 25 Units (has no administration in time range)   aspirin EC tablet 81 mg (has no administration in time range)   atorvastatin tablet 80 mg (has no administration in time range)   sacubitriL-valsartan  mg per tablet 1 tablet (has no administration in time range)   metoprolol succinate (TOPROL-XL) 24 hr tablet 50 mg (has no administration in time range)   buPROPion TB24 tablet 150 mg (has no administration in time range)   EScitalopram oxalate tablet 20 mg (has no administration in time range)   clopidogreL tablet 75 mg (has no administration in time range)   ARIPiprazole tablet 10 mg (has no administration in time range)   furosemide injection 40 mg (40 mg Intravenous Given 5/15/25 1556)   acetaminophen tablet 650 mg (650 mg Oral Given 5/15/25 1609)     Medical Decision Making  65 y/o female with pmh of CHF presenting for worsened shortness of breath x 1 week. Has shortness of breathe at baseline.     Differential including, but not limited to:  CHF exacerbation, volume overload, ACS, PE, pneumonia      Similar  ED visit 1 month ago. VSS, afebrile. Physical exam significant for pre tibial edema. Administered IV furosemide. Patient admitted for further work up and observation.    Amount and/or Complexity of Data Reviewed  External Data Reviewed: radiology.     Details: === Results for orders placed during the hospital encounter of 10/09/24 ===    Echo    - Interpretation Summary -  ·  Left Ventricle: The left ventricle is normal in size. Normal wall thickness. Normal wall motion. There is hyperdynamic systolic function with a visually estimated ejection fraction of greater than 70%. There is normal diastolic function.  ·  Right Ventricle: Normal right ventricular cavity size. Wall thickness is normal. Systolic function is normal.  ·  Left Atrium: Left atrium is moderately dilated.  ·  Mitral Valve: There is moderate mitral annular calcification present.  ·  Tricuspid Valve: There is mild regurgitation.  ·  IVC/SVC: Normal venous pressure at 3 mmHg.     Labs: ordered. Decision-making details documented in ED Course.  Radiology: ordered and independent interpretation performed.     Details: No large infiltrate   ECG/medicine tests: ordered and independent interpretation performed.    Risk  OTC drugs.  Prescription drug management.  Decision regarding hospitalization.              Attending Attestation:   Physician Attestation Statement for Resident:  As the supervising MD   Physician Attestation Statement: I have personally seen and examined this patient.   I agree with the above history.  -: Emergent evaluation of a 63 yo female presenting with chest tightness, worsening SOB, leg edema.  Tightness is right-side of chest.  Feels 'fullness' in her abdomen.   Denies fever.  Reports orthopnea.    As the supervising MD I agree with the above PE.   -: Afebrile  Bradycardia  Bibasilar diminished breath sounds  Pitting symmetric LE edema  Right chest wall TTP, no overlying vesicular rash  Abdomen soft, non-tender on my exam    As the  supervising MD I agree with the above treatment, course, plan, and disposition.   -: Favor volume overload as likely etiology of her presentation.  Initial HS trop neg, will trend.   Ordered for diuresis in the ED.  Considered PE in the ddx, more likely volume overload/CHF, but would reconsider if condition changes/does not improve with lasix.   Agree with admission to Hospital Medicine for further management.    I have reviewed and agree with the residents interpretation of the following: lab data, x-rays and EKG.  I have reviewed the following: old records at this facility and old EKGs.                ED Course as of 05/15/25 1819   Thu May 15, 2025   1456 WBC: 6.40  No leukocytosis  [AB]      ED Course User Index  [AB] Gregg Chaidez MD                           Clinical Impression:  Final diagnoses:  [R07.9] Chest pain  [I50.9] Acute on chronic congestive heart failure, unspecified heart failure type (Primary)          ED Disposition Condition    Observation                     Marilia Prince MD  Resident  05/15/25 1708       Marilia Prince MD  Resident  05/15/25 1724         [1]   Social History  Tobacco Use    Smoking status: Never    Smokeless tobacco: Never   Substance Use Topics    Alcohol use: No    Drug use: No        Gregg Chaidez MD  05/15/25 1348

## 2025-05-15 NOTE — ED NOTES
Patient comes into the emergency department by EMS with complaints of SOB. Patient reports hx of CHF, non compliant with lasix but states she took a dose today. Pt reports SOB x couple of weeks, cannot lay flat, BLE edema. Pt also endorses mid sternal CP with SOB, intermittent nausea. Patient denies V/D, fall/injury.    LOC: The patient is awake, alert and aware of environment with an appropriate affect, the patient is oriented x 3 and speaking appropriately.   APPEARANCE: Patient appears comfortable and in no acute distress, patient is clean and well groomed.  SKIN: The skin is warm and dry, color consistent with ethnicity, patient has normal skin turgor and moist mucus membranes, skin intact, no breakdown or bruising noted.   MUSCULOSKELETAL: Patient moving all extremities spontaneously, swelling noted.  RESPIRATORY: Airway is open and patent, respirations are spontaneous, patient has a normal effort and rate, no accessory muscle. Reports SOB x couple weeks, currently on RA, no labored breathing.  CARDIAC: Patient has a normal rate and regular rhythm, no edema noted, capillary refill < 3 seconds. Reports mid sternal CP.  GASTRO: Soft and non tender to palpation, no distention noted.  : Pt denies any pain or frequency with urination.  NEURO: Pt opens eyes spontaneously, behavior appropriate to situation, follows commands, facial expression symmetrical, bilateral hand grasp equal and even, purposeful motor response noted, normal sensation in all extremities when touched with a finger.

## 2025-05-15 NOTE — ASSESSMENT & PLAN NOTE
Patient has acute on chronic CHF.  Previous echo reviewed and normal EF and no DD.  On presentation their CHF was decompensated. Evidence of decompensated CHF on presentation includes: edema, crackles on lung auscultation, weight gain, dyspnea on exertion (STOUT), and shortness of breath. The etiology of their decompensation is likely medication non-compliance. Most recent BNP and echo results are listed below.  Recent Labs     05/15/25  1408   *     Latest ECHO  Results for orders placed during the hospital encounter of 10/09/24    Echo    Interpretation Summary    Left Ventricle: The left ventricle is normal in size. Normal wall thickness. Normal wall motion. There is hyperdynamic systolic function with a visually estimated ejection fraction of greater than 70%. There is normal diastolic function.    Right Ventricle: Normal right ventricular cavity size. Wall thickness is normal. Systolic function is normal.    Left Atrium: Left atrium is moderately dilated.    Mitral Valve: There is moderate mitral annular calcification present.    Tricuspid Valve: There is mild regurgitation.    IVC/SVC: Normal venous pressure at 3 mmHg.    Current Heart Failure Medications  furosemide injection 60 mg, 2 times daily, Intravenous  sacubitriL-valsartan  mg per tablet 1 tablet, 2 times daily, Oral  metoprolol succinate (TOPROL-XL) 24 hr tablet 50 mg, Daily, Oral    Plan  - Monitor strict I&Os and daily weights.    - Place on telemetry  - Low sodium diet  - Place on fluid restriction of 1.5 L.   - Cardiology has not been consulted  - The patient's volume status is worsening as indicated by edema, crackles on lung auscultation, weight gain, dyspnea on exertion (STOUT), and shortness of breath. Will continue current treatment  - Continue IV diuresis but increase to Lasix 60mg IV BID  - Home diuretic Lasix 40mg BID but not compliant

## 2025-05-16 ENCOUNTER — EPISODE CHANGES (OUTPATIENT)
Dept: CARDIOLOGY | Facility: CLINIC | Age: 65
End: 2025-05-16

## 2025-05-16 ENCOUNTER — DOCUMENTATION ONLY (OUTPATIENT)
Dept: CARDIOLOGY | Facility: CLINIC | Age: 65
End: 2025-05-16
Payer: MEDICAID

## 2025-05-16 DIAGNOSIS — R06.02 SOB (SHORTNESS OF BREATH): Primary | ICD-10-CM

## 2025-05-16 PROBLEM — D68.59 HYPERCOAGULABLE STATE: Status: ACTIVE | Noted: 2025-05-16

## 2025-05-16 PROBLEM — E11.69 OBESITY, DIABETES, AND HYPERTENSION SYNDROME: Status: RESOLVED | Noted: 2020-05-20 | Resolved: 2025-05-16

## 2025-05-16 PROBLEM — I27.20 PULMONARY HYPERTENSION: Status: ACTIVE | Noted: 2025-05-16

## 2025-05-16 PROBLEM — I15.2 OBESITY, DIABETES, AND HYPERTENSION SYNDROME: Status: RESOLVED | Noted: 2020-05-20 | Resolved: 2025-05-16

## 2025-05-16 PROBLEM — E66.9 OBESITY, DIABETES, AND HYPERTENSION SYNDROME: Status: RESOLVED | Noted: 2020-05-20 | Resolved: 2025-05-16

## 2025-05-16 PROBLEM — E11.59 OBESITY, DIABETES, AND HYPERTENSION SYNDROME: Status: RESOLVED | Noted: 2020-05-20 | Resolved: 2025-05-16

## 2025-05-16 PROBLEM — E83.42 HYPOMAGNESEMIA: Status: ACTIVE | Noted: 2025-05-16

## 2025-05-16 LAB
ABSOLUTE EOSINOPHIL (OHS): 0.1 K/UL
ABSOLUTE MONOCYTE (OHS): 0.48 K/UL (ref 0.3–1)
ABSOLUTE NEUTROPHIL COUNT (OHS): 3.36 K/UL (ref 1.8–7.7)
ALBUMIN SERPL BCP-MCNC: 2.7 G/DL (ref 3.5–5.2)
ALP SERPL-CCNC: 123 UNIT/L (ref 40–150)
ALT SERPL W/O P-5'-P-CCNC: 45 UNIT/L (ref 10–44)
ANION GAP (OHS): 10 MMOL/L (ref 8–16)
AORTIC SIZE INDEX (SOV): 1.1 CM/M2
AORTIC SIZE INDEX: 1.2 CM/M2
ASCENDING AORTA: 2.6 CM
AST SERPL-CCNC: 25 UNIT/L (ref 11–45)
AV AREA BY CONTINUOUS VTI: 2.5 CM2
AV INDEX (PROSTH): 0.77
AV LVOT MEAN GRADIENT: 3 MMHG
AV LVOT PEAK GRADIENT: 5 MMHG
AV MEAN GRADIENT: 8 MMHG
AV PEAK GRADIENT: 13 MMHG
AV VALVE AREA BY VELOCITY RATIO: 2.1 CM²
AV VALVE AREA: 2.4 CM2
AV VELOCITY RATIO: 0.67
BASOPHILS # BLD AUTO: 0.02 K/UL
BASOPHILS NFR BLD AUTO: 0.3 %
BILIRUB SERPL-MCNC: 0.3 MG/DL (ref 0.1–1)
BSA FOR ECHO PROCEDURE: 2.29 M2
BUN SERPL-MCNC: 32 MG/DL (ref 8–23)
CALCIUM SERPL-MCNC: 8.7 MG/DL (ref 8.7–10.5)
CHLORIDE SERPL-SCNC: 104 MMOL/L (ref 95–110)
CO2 SERPL-SCNC: 24 MMOL/L (ref 23–29)
CREAT SERPL-MCNC: 1.1 MG/DL (ref 0.5–1.4)
CV ECHO LV RWT: 0.43 CM
DOP CALC AO PEAK VEL: 1.8 M/S
DOP CALC AO VTI: 45.8 CM
DOP CALC LVOT AREA: 3.1 CM2
DOP CALC LVOT DIAMETER: 2 CM
DOP CALC LVOT PEAK VEL: 1.2 M/S
DOP CALC LVOT STROKE VOLUME: 110.8 CM3
DOP CALCLVOT PEAK VEL VTI: 35.3 CM
E WAVE DECELERATION TIME: 324 MS
E/A RATIO: 0.8
E/E' RATIO: 15 M/S
ECHO EF ESTIMATED: 71 %
ECHO LV POSTERIOR WALL: 0.9 CM (ref 0.6–1.1)
ERYTHROCYTE [DISTWIDTH] IN BLOOD BY AUTOMATED COUNT: 12.7 % (ref 11.5–14.5)
FRACTIONAL SHORTENING: 40.5 % (ref 28–44)
GFR SERPLBLD CREATININE-BSD FMLA CKD-EPI: 57 ML/MIN/1.73/M2
GLUCOSE SERPL-MCNC: 164 MG/DL (ref 70–110)
HCT VFR BLD AUTO: 34.1 % (ref 37–48.5)
HGB BLD-MCNC: 10.7 GM/DL (ref 12–16)
IMM GRANULOCYTES # BLD AUTO: 0.03 K/UL (ref 0–0.04)
IMM GRANULOCYTES NFR BLD AUTO: 0.5 % (ref 0–0.5)
INTERVENTRICULAR SEPTUM: 0.9 CM (ref 0.6–1.1)
IVC DIAMETER: 0.73 CM
LA MAJOR: 6.1 CM
LA MINOR: 6.3 CM
LA WIDTH: 4.2 CM
LEFT ATRIUM SIZE: 3.5 CM
LEFT ATRIUM VOLUME INDEX MOD: 48 ML/M2
LEFT ATRIUM VOLUME INDEX: 35 ML/M2
LEFT ATRIUM VOLUME MOD: 106 ML
LEFT ATRIUM VOLUME: 77 CM3
LEFT INTERNAL DIMENSION IN SYSTOLE: 2.5 CM (ref 2.1–4)
LEFT VENTRICLE DIASTOLIC VOLUME INDEX: 35.59 ML/M2
LEFT VENTRICLE DIASTOLIC VOLUME: 79 ML
LEFT VENTRICLE MASS INDEX: 53.5 G/M2
LEFT VENTRICLE SYSTOLIC VOLUME INDEX: 10.4 ML/M2
LEFT VENTRICLE SYSTOLIC VOLUME: 23 ML
LEFT VENTRICULAR INTERNAL DIMENSION IN DIASTOLE: 4.2 CM (ref 3.5–6)
LEFT VENTRICULAR MASS: 118.7 G
LV LATERAL E/E' RATIO: 12.4
LV SEPTAL E/E' RATIO: 18.7
LYMPHOCYTES # BLD AUTO: 2.13 K/UL (ref 1–4.8)
MAGNESIUM SERPL-MCNC: 1.4 MG/DL (ref 1.6–2.6)
MCH RBC QN AUTO: 26.2 PG (ref 27–31)
MCHC RBC AUTO-ENTMCNC: 31.4 G/DL (ref 32–36)
MCV RBC AUTO: 84 FL (ref 82–98)
MV PEAK A VEL: 1.4 M/S
MV PEAK E VEL: 1.12 M/S
NUCLEATED RBC (/100WBC) (OHS): 0 /100 WBC
OHS CV RV/LV RATIO: 0.71 CM
PHOSPHATE SERPL-MCNC: 4.6 MG/DL (ref 2.7–4.5)
PISA TR MAX VEL: 2.3 M/S
PLATELET # BLD AUTO: 253 K/UL (ref 150–450)
PMV BLD AUTO: 10.5 FL (ref 9.2–12.9)
POCT GLUCOSE: 136 MG/DL (ref 70–110)
POCT GLUCOSE: 223 MG/DL (ref 70–110)
POCT GLUCOSE: 224 MG/DL (ref 70–110)
POCT GLUCOSE: 228 MG/DL (ref 70–110)
POCT GLUCOSE: 240 MG/DL (ref 70–110)
POTASSIUM SERPL-SCNC: 3.8 MMOL/L (ref 3.5–5.1)
PROT SERPL-MCNC: 6.4 GM/DL (ref 6–8.4)
RA MAJOR: 4.99 CM
RA PRESSURE ESTIMATED: 3 MMHG
RA WIDTH: 4.25 CM
RBC # BLD AUTO: 4.08 M/UL (ref 4–5.4)
RELATIVE EOSINOPHIL (OHS): 1.6 %
RELATIVE LYMPHOCYTE (OHS): 34.8 % (ref 18–48)
RELATIVE MONOCYTE (OHS): 7.8 % (ref 4–15)
RELATIVE NEUTROPHIL (OHS): 55 % (ref 38–73)
RIGHT ATRIAL AREA: 21.5 CM2
RIGHT VENTRICLE DIASTOLIC BASEL DIMENSION: 3 CM
RV TB RVSP: 5 MMHG
RV TISSUE DOPPLER FREE WALL SYSTOLIC VELOCITY 1 (APICAL 4 CHAMBER VIEW): 14.7 CM/S
SINUS: 2.42 CM
SODIUM SERPL-SCNC: 138 MMOL/L (ref 136–145)
STJ: 2.2 CM
TDI LATERAL: 0.09 M/S
TDI SEPTAL: 0.06 M/S
TDI: 0.08 M/S
TRICUSPID ANNULAR PLANE SYSTOLIC EXCURSION: 2.7 CM
TV PEAK GRADIENT: 21 MMHG
TV REST PULMONARY ARTERY PRESSURE: 24 MMHG
WBC # BLD AUTO: 6.12 K/UL (ref 3.9–12.7)
Z-SCORE OF LEFT VENTRICULAR DIMENSION IN END DIASTOLE: -6.13
Z-SCORE OF LEFT VENTRICULAR DIMENSION IN END SYSTOLE: -5

## 2025-05-16 PROCEDURE — 84100 ASSAY OF PHOSPHORUS: CPT | Performed by: HOSPITALIST

## 2025-05-16 PROCEDURE — 25000003 PHARM REV CODE 250: Performed by: HOSPITALIST

## 2025-05-16 PROCEDURE — 36415 COLL VENOUS BLD VENIPUNCTURE: CPT | Performed by: HOSPITALIST

## 2025-05-16 PROCEDURE — 99223 1ST HOSP IP/OBS HIGH 75: CPT | Mod: ,,, | Performed by: STUDENT IN AN ORGANIZED HEALTH CARE EDUCATION/TRAINING PROGRAM

## 2025-05-16 PROCEDURE — 63600175 PHARM REV CODE 636 W HCPCS: Performed by: HOSPITALIST

## 2025-05-16 PROCEDURE — 83735 ASSAY OF MAGNESIUM: CPT | Performed by: HOSPITALIST

## 2025-05-16 PROCEDURE — 20600001 HC STEP DOWN PRIVATE ROOM

## 2025-05-16 PROCEDURE — 85025 COMPLETE CBC W/AUTO DIFF WBC: CPT | Performed by: HOSPITALIST

## 2025-05-16 PROCEDURE — 80053 COMPREHEN METABOLIC PANEL: CPT | Performed by: HOSPITALIST

## 2025-05-16 RX ORDER — CARIPRAZINE 1.5 MG/1
1.5 CAPSULE, GELATIN COATED ORAL DAILY
COMMUNITY
Start: 2025-04-16

## 2025-05-16 RX ORDER — FUROSEMIDE 10 MG/ML
80 INJECTION INTRAMUSCULAR; INTRAVENOUS 2 TIMES DAILY
Status: DISCONTINUED | OUTPATIENT
Start: 2025-05-16 | End: 2025-05-17

## 2025-05-16 RX ORDER — MAGNESIUM SULFATE HEPTAHYDRATE 40 MG/ML
2 INJECTION, SOLUTION INTRAVENOUS ONCE
Status: COMPLETED | OUTPATIENT
Start: 2025-05-16 | End: 2025-05-16

## 2025-05-16 RX ADMIN — INSULIN ASPART 2 UNITS: 100 INJECTION, SOLUTION INTRAVENOUS; SUBCUTANEOUS at 03:05

## 2025-05-16 RX ADMIN — FUROSEMIDE 80 MG: 10 INJECTION, SOLUTION INTRAVENOUS at 09:05

## 2025-05-16 RX ADMIN — INSULIN GLARGINE 25 UNITS: 100 INJECTION, SOLUTION SUBCUTANEOUS at 08:05

## 2025-05-16 RX ADMIN — MAGNESIUM SULFATE HEPTAHYDRATE 2 G: 40 INJECTION, SOLUTION INTRAVENOUS at 10:05

## 2025-05-16 RX ADMIN — CARIPRAZINE 1.5 MG: 1.5 CAPSULE, GELATIN COATED ORAL at 11:05

## 2025-05-16 RX ADMIN — SACUBITRIL AND VALSARTAN 1 TABLET: 97; 103 TABLET, FILM COATED ORAL at 08:05

## 2025-05-16 RX ADMIN — CLOPIDOGREL 75 MG: 75 TABLET ORAL at 09:05

## 2025-05-16 RX ADMIN — METOPROLOL SUCCINATE 50 MG: 50 TABLET, EXTENDED RELEASE ORAL at 08:05

## 2025-05-16 RX ADMIN — FUROSEMIDE 80 MG: 10 INJECTION, SOLUTION INTRAVENOUS at 07:05

## 2025-05-16 RX ADMIN — ESCITALOPRAM OXALATE 20 MG: 20 TABLET ORAL at 09:05

## 2025-05-16 RX ADMIN — ASPIRIN 81 MG: 81 TABLET, COATED ORAL at 09:05

## 2025-05-16 RX ADMIN — POLYETHYLENE GLYCOL 3350 17 G: 17 POWDER, FOR SOLUTION ORAL at 11:05

## 2025-05-16 RX ADMIN — ATORVASTATIN CALCIUM 80 MG: 40 TABLET, FILM COATED ORAL at 08:05

## 2025-05-16 RX ADMIN — INSULIN ASPART 1 UNITS: 100 INJECTION, SOLUTION INTRAVENOUS; SUBCUTANEOUS at 08:05

## 2025-05-16 RX ADMIN — BUPROPION HYDROCHLORIDE 150 MG: 150 TABLET, EXTENDED RELEASE ORAL at 09:05

## 2025-05-16 RX ADMIN — ENOXAPARIN SODIUM 40 MG: 40 INJECTION SUBCUTANEOUS at 07:05

## 2025-05-16 NOTE — HOSPITAL COURSE
Ms. Bustillo was admitted to Hospital Medicine for management of a CHF exacerbation and headaches.  She was started on Lasix IV BID and increased to TID dosing as she reported she was not having frequent enough diuresis.   Repeat echo was ordered that showed elevated PAP suggestive of PHTN.  Neuro was consulted for persistent headaches in the setting of cerebral stents who suggested low dose Keppra BID for IIH Headaches, but that they should improve with continued IV diuresis.  She was discharged home on Lasix 80mg BID, holding Metoprolol for HR 40-50s, and Keppra for headaches.    Ms. Devika Bustillo was deemed appropriate for discharge.  At the time of discharge, the plan of care was discussed with patient/family, who were agreeable and amenable.  All medications were verbally reviewed and discussed with the patient/family.  They endorsed understanding and compliance.  Informed them that these changes will be available on their discharge paperwork, as well.  Outpatient follow-ups were scheduled, or if unable to be scheduled ambulatory referrals were placed, and ER return precautions were given.  All questions were answered to the patient/family's satisfaction.  Ms. Devika Bustillo was subsequently discharged in stable condition.

## 2025-05-16 NOTE — NURSING
Spoke with AIXA Godinez DO. Notified of decrease heart rate 48-58, patient stable no distress noted. Ok for parameter to set to alarm at less than 45, tele monitor notified. Safety maintained, will continue to monitor.

## 2025-05-16 NOTE — ASSESSMENT & PLAN NOTE
Patient's FSGs are uncontrolled due to hyperglycemia on current medication regimen.  Last A1c reviewed-   Lab Results   Component Value Date    HGBA1C 10.8 (H) 05/15/2025     Most recent fingerstick glucose reviewed-   Recent Labs   Lab 05/15/25  1702 05/15/25  2104 05/15/25  2220 05/16/25  0845   POCTGLUCOSE 205* 187* 200* 136*     Current correctional scale  Medium  Maintain anti-hyperglycemic dose as follows-   Antihyperglycemics (From admission, onward)      Start     Stop Route Frequency Ordered    05/15/25 2100  insulin glargine U-100 (Lantus) pen 25 Units         -- SubQ Nightly 05/15/25 1710    05/15/25 1751  insulin aspart U-100 pen 0-5 Units         -- SubQ Before meals & nightly PRN 05/15/25 1651          Hold Oral hypoglycemics while patient is in the hospital.

## 2025-05-16 NOTE — ASSESSMENT & PLAN NOTE
Patient has acute on chronic CHF.  Previous echo reviewed and normal EF and no DD.  On presentation their CHF was decompensated. Evidence of decompensated CHF on presentation includes: edema, crackles on lung auscultation, weight gain, dyspnea on exertion (STOUT), and shortness of breath. The etiology of their decompensation is likely medication non-compliance. Most recent BNP and echo results are listed below.  Recent Labs     05/15/25  1408   *     Latest ECHO  Results for orders placed during the hospital encounter of 10/09/24    Echo    Interpretation Summary    Left Ventricle: The left ventricle is normal in size. Normal wall thickness. Normal wall motion. There is hyperdynamic systolic function with a visually estimated ejection fraction of greater than 70%. There is normal diastolic function.    Right Ventricle: Normal right ventricular cavity size. Wall thickness is normal. Systolic function is normal.    Left Atrium: Left atrium is moderately dilated.    Mitral Valve: There is moderate mitral annular calcification present.    Tricuspid Valve: There is mild regurgitation.    IVC/SVC: Normal venous pressure at 3 mmHg.    Current Heart Failure Medications  sacubitriL-valsartan  mg per tablet 1 tablet, 2 times daily, Oral  metoprolol succinate (TOPROL-XL) 24 hr tablet 50 mg, Daily, Oral  furosemide injection 80 mg, 2 times daily, Intravenous    Plan  - Monitor strict I&Os and daily weights.    - Place on telemetry  - Low sodium diet  - Place on fluid restriction of 1.5 L.   - Cardiology has not been consulted  - Still with rales and BLE pitting edema, SOB and STOUT  - Continue IV diuresis but increase to Lasix 80mg IV BID  - Home diuretic Lasix 40mg BID but not compliant  - Repeat echo

## 2025-05-16 NOTE — NURSING
Patient arrive to the room via stretcher, coherent speech, respirations even and unlabored. No S/S of distress noted, respirations even and unlabored.  Tele box noted.  Ambulatory.

## 2025-05-16 NOTE — CARE UPDATE
I have reviewed the chart of Devika Bustillo who is hospitalized for the following:    Active Hospital Problems    Diagnosis    *Acute on chronic congestive heart failure    Hypomagnesemia    Hypercoagulable state  - s/p stenting of the left internal jugular vein 3/2025  - on ASA and plavix at home     Class 2 severe obesity due to excess calories with serious comorbidity in adult    History of angioplasty of transverse sinus of brain with insertion of stent    IIH (idiopathic intracranial hypertension)    Tension type headache    Anxiety and depression    History of pulmonary embolism    Hypertension    Diabetes mellitus type II     Dx updated per 2019 IMO Load  Uncontrolled due to hyperglycemia on current medication regimen  Last A1c reviewed   Medium SSI         SHANTAL Duffy-BC  [facility]

## 2025-05-16 NOTE — HPI
Ms. Bustillo is a 64 year-old lady with a history of complex/multifactorial headache (migraine, tension-type headache, IIH and left transverse sinus stenosis s/p stenting), CHF, IDDM, depression. Presented due to ongoing dyspnea, chest pain, bilateral lower extremity edema, and headache worse than baseline. Per hospital medicine documentation, patient's weight was 245lbs, and her dry weight is ~230lbs. Patient describes her headache as being holocephalic and stabbing. She occasionally experiences nausea with it. Quality/location are similar to chronic headache, but over the last two or so weeks it has been slightly more severe. Home medications include furosemide, metoprolol, Entresto, escitalopram, Wellbutrin. Previously tolerated topiramate poorly due to cognitive fogging. Her neurologist (Dr. Caraballo) had planned for her to start zonisamide, but the patient denies having taken the medication. She does, however, take Tylenol daily at home, and has done so for quite some time. Neurology is consulted for headache.

## 2025-05-16 NOTE — PROGRESS NOTES
"[facility]) nurse navigator notified of [facility] candidate in need of education and introduction to 4-6 week program.      PT aao x 3 while lying in bed # [id number] . Introduced self to pt as [facility] nurse navigator.     Patient given "[facility] Home Care Guide Pamphlet" which includes "Daily weight and symptom tracker".  Encouraged pt to review information.      Reviewed the following key points of [facility] program with pt :              1.) Take your medications as directed. Call Ms Gely if any health Care Professional changes your Heart/Fluid medications.               2.) Weigh yourself daily. Daily Dry Weights as follows; Upon waking ,empty your bladder, weigh with as little clothes as possible before eating/drinking. Record weight in Symptom Tracker and compare these weights for fluid gain. Weight gain overnight of 3-4 lbs is Fluid, also a gain of 5 lbs in 3 days is Fluid as well. Call US!              3.) Follow low salt and limited fluid diet. Salt/Sodium Restriction 4875-6257 mg, see page 4. Sodium makes you hold onto Fluid. High Sodium Foods;Deli Meat/Cheese, Sausages/Hot Dogs, Fast Food/Restaurant Food, Anything in a box, bottle or can. Cook with Fresh or Frozen ingredients and use seasonings that are labeled NO SALT. Check Portion Sizes, Salt is reported for 1 portion. A can may contain 2-3 portions. Fluid Restriction 1.5 -2 Liters/Day, see page 6, measure all of your Fluid, the milk in your cereal, broth in your soup and ice cream because anything that melts at room temp is a liquid.              4.) Stop smoking and start exercising. Brisk walking is good, don't walk like you are going to the [employer] and DON"T WALK IN THE HEAT! Start low and increase as tolerated. Remember if you don't use it you lose it.              5.) Go to your appointments and call your team. Have your weight and BP/P ready when we call to do the Phone Check ins and call us if " you have fluid gain or questions    Pt reminded to follow Symptom tracker and to call at the onset of symptoms according to tracker.     Reviewed plan for follow up once discharged to include phone calls, in person and virtual visits to assist pt optimizing their heart failure medication regimen and encouraging healthy lifestyle modifications.  Reminded pt that program will assist them over the next 4-6 weeks and then patient will be transferred to long term care provider .  Reminded pt how to contact [facility] navigator via phone and or via [employer].     Pt instructed appointment will be printed on hospital discharge paperwork.   PT requests an afternoon appt on any Day.  Pt also reminded RN will call 48-72 hours after discharge to check on them and see if appt is a good time.     PT can verbalize read back of some of the information given.  Encouraged pt and family to read over information often and contact team with any questions or concerns.

## 2025-05-16 NOTE — SUBJECTIVE & OBJECTIVE
Interval History: No acute events overnight.  Feels like she isnt urinating enough for getting IV Lasix.  Dose increased to 80mg IV BID.  Still having headaches that are persistent.  She reports chronic visual issues but can't determine if it is worse than normal.  Neuro consulted.    Review of Systems   Constitutional:  Negative for chills, fatigue and fever.   Eyes:  Positive for visual disturbance.   Respiratory:  Positive for shortness of breath. Negative for cough.    Cardiovascular:  Positive for leg swelling. Negative for chest pain and palpitations.   Gastrointestinal:  Negative for abdominal pain, diarrhea, nausea and vomiting.   Genitourinary:  Negative for dysuria and urgency.   Neurological:  Positive for headaches. Negative for dizziness.   All other systems reviewed and are negative.    Objective:     Vital Signs (Most Recent):  Temp: 98.1 °F (36.7 °C) (05/16/25 0910)  Pulse: (!) 53 (05/16/25 0910)  Resp: 20 (05/16/25 0910)  BP: (!) 144/75 (05/16/25 0910)  SpO2: 98 % (05/16/25 0910) Vital Signs (24h Range):  Temp:  [97.7 °F (36.5 °C)-98.7 °F (37.1 °C)] 98.1 °F (36.7 °C)  Pulse:  [46-88] 53  Resp:  [18-20] 20  SpO2:  [96 %-100 %] 98 %  BP: (118-171)/(50-87) 144/75     Weight: 110.2 kg (243 lb 0.9 oz)  Body mass index is 36.96 kg/m².  No intake or output data in the 24 hours ending 05/16/25 0928      Physical Exam  Constitutional:       Appearance: She is obese.   HENT:      Head: Normocephalic and atraumatic.   Cardiovascular:      Rate and Rhythm: Regular rhythm. Bradycardia present.      Heart sounds: No murmur heard.  Pulmonary:      Effort: Pulmonary effort is normal. No respiratory distress.      Breath sounds: Rales (Bases) present. No wheezing.   Abdominal:      General: There is no distension.      Palpations: Abdomen is soft.      Tenderness: There is no abdominal tenderness.   Musculoskeletal:         General: No deformity.      Right lower leg: Edema (2+) present.      Left lower leg: Edema  (2+) present.   Neurological:      General: No focal deficit present.      Mental Status: She is alert and oriented to person, place, and time. Mental status is at baseline.               Significant Labs: All pertinent labs within the past 24 hours have been reviewed.  CBC:   Recent Labs   Lab 05/15/25  1408 05/16/25  0519   WBC 6.40 6.12   HGB 11.0* 10.7*   HCT 34.9* 34.1*    253     CMP:   Recent Labs   Lab 05/15/25  1408 05/16/25 0519    138   K 4.7 3.8    104   CO2 29 24   * 164*   BUN 30* 32*   CREATININE 1.0 1.1   CALCIUM 9.1 8.7   PROT 7.1 6.4   ALBUMIN 2.9* 2.7*   BILITOT 0.3 0.3   ALKPHOS 145 123   AST 30 25   ALT 58* 45*   ANIONGAP 6* 10       Significant Imaging: I have reviewed all pertinent imaging results/findings within the past 24 hours.

## 2025-05-16 NOTE — ASSESSMENT & PLAN NOTE
Known transverse sinus stenosis  On 03/21/2025 she underwent left transverse-sigmoid sinus stenting followed by balloon valvuloplasty of the left internal jugular vein outlet valve  Continues to have headaches  Continue Aspirin and Plavix  Neuro consulted for further evaluation

## 2025-05-16 NOTE — CONSULTS
[facility]  Neurology  Consult Note    Patient Name: Devika Bustillo  MRN: [id number]  Admission Date: 5/15/2025  Hospital Length of Stay: 0 days  Code Status: Full Code   Attending Provider: Katy Jeffers MD   Consulting Provider: Zachary Pacheco DO  Primary Care Physician: Juan Luis Carr Jr., MD  Principal Problem:Acute on chronic congestive heart failure    Inpatient consult to General Neurology  Consult performed by: Zachary Pacheco DO  Consult ordered by: Katy Jeffers MD         Subjective:     Chief Complaint:  headache     HPI:   Ms. Bustillo is a 64 year-old lady with a history of complex/multifactorial headache (migraine, tension-type headache, IIH and left transverse sinus stenosis s/p stenting), CHF, IDDM, depression. Presented due to ongoing dyspnea, chest pain, bilateral lower extremity edema, and headache worse than baseline. Per hospital medicine documentation, patient's weight was 245lbs, and her dry weight is ~230lbs. Patient describes her headache as being holocephalic and stabbing. She occasionally experiences nausea with it. Quality/location are similar to chronic headache, but over the last two or so weeks it has been slightly more severe. Home medications include furosemide, metoprolol, Entresto, escitalopram, Wellbutrin. Previously tolerated topiramate poorly due to cognitive fogging. Her neurologist (Dr. Caraballo) had planned for her to start zonisamide, but the patient denies having taken the medication. She does, however, take Tylenol daily at home, and has done so for quite some time. Neurology is consulted for headache.     Past Medical History:   Diagnosis Date    Asthma     Back pain     CHF (congestive heart failure)     Diabetes mellitus     Grade II diastolic dysfunction     Hyperlipidemia     Hypertension     Neuropathy     Obesity     Pulmonary embolism 1998    Pulmonary hypertension        Past Surgical History:   Procedure Laterality Date    ANGIOGRAM,  CORONARY, WITH LEFT HEART CATHETERIZATION Left 1/17/2024    Procedure: Angiogram, Coronary, with Left Heart Cath;  Surgeon: Joe Marr MD;  Location: [facility] CATH LAB;  Service: Cardiology;  Laterality: Left;    CYST REMOVAL      HERNIA REPAIR      HYSTERECTOMY      TONSILLECTOMY      TUBAL LIGATION         Review of patient's allergies indicates:  No Known Allergies    Current Neurological Medications: see below    No current facility-administered medications on file prior to encounter.     Current Outpatient Medications on File Prior to Encounter   Medication Sig    aspirin (ECOTRIN) 81 MG EC tablet Take 81 mg by mouth once daily.    atorvastatin (LIPITOR) 40 MG tablet Take 80 mg by mouth every evening.    clopidogreL (PLAVIX) 75 mg tablet Take 75 mg by mouth once daily.    ENTRESTO  mg per tablet Take 1 tablet by mouth 2 (two) times daily.    insulin glargine, TOUJEO, (TOUJEO SOLOSTAR U-300 INSULIN) 300 unit/mL (1.5 mL) InPn pen Inject 50 Units into the skin once daily.    metoprolol succinate (TOPROL-XL) 50 MG 24 hr tablet Take 1 tablet by mouth once daily.    VRAYLAR 1.5 mg Cap Take 1.5 mg by mouth once daily.    acetaminophen (TYLENOL) 500 MG tablet Take 2 tablets (1,000 mg total) by mouth every 8 (eight) hours as needed for Pain.    albuterol (VENTOLIN HFA) 90 mcg/actuation inhaler Inhale 1-2 puffs into the lungs every 6 (six) hours as needed for Wheezing or Shortness of Breath. Rescue    albuterol-ipratropium (DUO-NEB) 2.5 mg-0.5 mg/3 mL nebulizer solution Take 3 mLs by nebulization every 6 (six) hours as needed for Wheezing or Shortness of Breath. Rescue    atropine 1% (ISOPTO ATROPINE) 1 % Drop Place 1 drop into the left eye 2 (two) times daily.    blood sugar diagnostic Strp To check BG 4 times daily, to use with insurance preferred meter    blood-glucose meter kit To check BG 4 times daily, to use with insurance preferred meter    buPROPion (WELLBUTRIN XL) 150 MG TB24 tablet Take 1 tablet  "(150 mg total) by mouth once daily.    clobetasol 0.05% (TEMOVATE) 0.05 % Oint Apply topically 2 (two) times daily.    dapagliflozin propanediol (FARXIGA) 10 mg tablet Take 1 tablet by mouth once daily.    DULERA 200-5 mcg/actuation inhaler Inhale 2 puffs into the lungs 2 (two) times daily.    DUPIXENT  mg/2 mL PnIj INJECT 300MG UNDER THE SKIN EVERY OTHER WEEK AS DIRECTED    EScitalopram oxalate (LEXAPRO) 20 MG tablet Take 1 tablet (20 mg total) by mouth once daily.    furosemide (LASIX) 40 MG tablet Take 1 tablet by mouth 2 (two) times daily.    gabapentin (NEURONTIN) 300 MG capsule Take 300 mg by mouth 2 (two) times daily as needed.    ILEVRO 0.3 % DrpS Place 1 drop into the left eye.    lancets Misc To check BG 4 times daily, to use with insurance preferred meter    LIDOcaine-prilocaine (EMLA) cream Apply topically as needed.    loratadine (CLARITIN) 10 mg tablet Take 10 mg by mouth daily as needed for Allergies.    ondansetron (ZOFRAN) 4 MG tablet Take 4 mg by mouth every 8 (eight) hours as needed.    pantoprazole (PROTONIX) 40 MG tablet Take 1 tablet (40 mg total) by mouth 2 (two) times daily before meals.    pen needle, diabetic 31 gauge x 3/16" Ndle 1 each.    pen needle, diabetic 32 gauge x 5/32" Ndle Use to inject insulin four times daily    polyethylene glycol (GLYCOLAX) 17 gram/dose powder Use cap to measure 17 grams, mix in liquid and take by mouth 2 (two) times daily as needed for Constipation.    potassium chloride (KLOR-CON) 10 MEQ TbSR Take 1 tablet (10 mEq total) by mouth as needed (take 1 tablet when you take lasix (furosemide)).    senna-docusate 8.6-50 mg (SENNA WITH DOCUSATE SODIUM) 8.6-50 mg per tablet Take 1 tablet by mouth 2 (two) times daily as needed for Constipation.    SYMBICORT 160-4.5 mcg/actuation HFAA Inhale 2 puffs into the lungs 2 (two) times daily.    tiZANidine (ZANAFLEX) 4 MG tablet Take by mouth.    topiramate (TOPAMAX) 100 MG tablet Take 50 mg by mouth 2 (two) times " daily.    traMADoL (ULTRAM) 50 mg tablet Take 1 tablet (50 mg total) by mouth every 6 (six) hours as needed for Pain.    traZODone (DESYREL) 100 MG tablet Take 2 tablets (200 mg total) by mouth every evening.    TRULICITY 3 mg/0.5 mL pen injector Inject 3 mg into the skin every 7 days. Fridays     Family History       Problem Relation (Age of Onset)    Kidney disease Mother, Father    Stroke Maternal Uncle          Tobacco Use    Smoking status: Never    Smokeless tobacco: Never   Substance and Sexual Activity    Alcohol use: No    Drug use: No    Sexual activity: Not Currently     Partners: Male     Review of Systems   Neurological:  Positive for headaches.     Objective:     Vital Signs (Most Recent):  Temp: 98.6 °F (37 °C) (05/16/25 1536)  Pulse: (!) 50 (05/16/25 1536)  Resp: 20 (05/16/25 1536)  BP: (!) 154/68 (05/16/25 1536)  SpO2: 99 % (05/16/25 1536) Vital Signs (24h Range):  Temp:  [97.7 °F (36.5 °C)-98.7 °F (37.1 °C)] 98.6 °F (37 °C)  Pulse:  [46-88] 50  Resp:  [17-20] 20  SpO2:  [96 %-100 %] 99 %  BP: (118-171)/(50-84) 154/68     Weight: 110.2 kg (243 lb 0.9 oz)  Body mass index is 36.96 kg/m².     Physical Exam  Vitals and nursing note reviewed.   Constitutional:       Appearance: She is ill-appearing.   HENT:      Head: Normocephalic and atraumatic.   Cardiovascular:      Rate and Rhythm: Normal rate and regular rhythm.   Pulmonary:      Effort: Pulmonary effort is normal.   Neurological:      Mental Status: She is alert.     Neurological Exam:  MENTAL STATUS  Level of consciousness: alert  Orientation: oriented to person, place, time, situation  Attention: normal.   Concentration: normal.  Speech: normal    CRANIAL NERVES  CN II: visual fields full to confrontation  CN III, IV, VI: PERRL, EOMI aside from very subtle inability to bury the sclera bilaterally  CN V: facial sensation intact, muscles of mastication intact  CN VII: facial expression symmetric and full  CN XII: tongue midline, no deviation upon  protrusion    MOTOR EXAM  Muscle bulk: normal  Muscle tone: normal  Pronator drift: absent    Strength - Upper Extremities   Arm abduction Elbow flexion Elbow extension Wrist flexion Wrist extension Finger abduction   Right 5/5 5/5 5/5 5/5 5/5 5/5   Left 5/5 5/5 5/5 5/5 5/5 5/5     Strength - Lower Extremities   Hip flexion Knee flexion Knee extension Dorsiflexion Plantarflexion   Right 5/5 5/5 5/5 5/5 5/5   Left 5/5 5/5 5/5 5/5 5/5     REFLEXES   Biceps Triceps Brachioradialis Patellar Achilles   Right +2 +2 +2 +2 +2   Left +2 +2 +2 +2 +2       SENSORY EXAM  Light touch: intact in all 4 extremities  Pinprick: intact in all 4 extremities    COORDINATION  Finger to nose: normal  Tremor: none          Significant Labs: All pertinent lab results from the past 24 hours have been reviewed.    Significant Imaging: I have reviewed all pertinent imaging results/findings within the past 24 hours.  Assessment and Plan:     IIH (idiopathic intracranial hypertension)  64F. History of complex/multifactorial headache (IIH s/p L transverse sinus stent, migraine, tension-type headache), CHF, diabetes. Currently admitted to hospital medicine for acute on chronic CHF, undergoing diuresis. Headache remains multifactorial as stated above, with addition of medication-overuse headache from daily Tylenol. Ideally would treat this with reducing/stopping Tylenol and providing a short course of steroids, but steroids are not ideal given her co-morbidities. Has not tolerated topiramate well historically (cognitive fogging). Zonisamide prescribed by her outpatient neurologist, however she reports having never started this medication: while it remains an option, expect that she would experience many of the same adverse effects as she did with topiramate. From an IIH perspective, diuresis would be of benefit for headache: the patient in fact reports improvement since this has been started.    Recommendations  -continue diuresis and observe for  further improvement of headache  -if headache is still persistent/bothersome despite this, would favor starting levetiracetam for headache over zonisamide  -suggest reducing Tylenol use to not continue medication-overuse headache cycle  ->would additionally avoid opiates  -neurology will continue to follow  -contact us with questions        VTE Risk Mitigation (From admission, onward)           Ordered     enoxaparin injection 40 mg  Daily         05/15/25 1653     IP VTE HIGH RISK PATIENT  Once         05/15/25 1653     Place sequential compression device  Until discontinued         05/15/25 1653     Place sequential compression device  Until discontinued         05/15/25 1651                    Thank you for your consult. I will follow-up with patient. Please contact us if you have any additional questions.    Zachary Pacheco, DO  Neurology  [facility]

## 2025-05-16 NOTE — ASSESSMENT & PLAN NOTE
Body mass index is 36.96 kg/m². Morbid obesity complicates all aspects of disease management from diagnostic modalities to treatment. Weight loss encouraged and health benefits explained to patient.

## 2025-05-16 NOTE — PLAN OF CARE
[facility]  Initial Discharge Assessment      Discharge Plan A and Plan B have been determined by review of patient's clinical status, future medical and therapeutic needs, and coverage/benefits for post-acute care in coordination with multidisciplinary team members.      Primary Care Provider: Juan Luis Carr Jr., MD    Admission Diagnosis: CHF (congestive heart failure) [I50.9]  Chest pain [R07.9]  Acute on chronic congestive heart failure, unspecified heart failure type [I50.9]    Admission Date: 5/15/2025  Expected Discharge Date: 5/17/2025    Transition of Care Barriers: (P) None    Payor: MEDICAID / Plan: [facility] COMMUNITY PLAN [facility] ([place] MEDICA[place]) / Product Type: Managed Medicaid /     Extended Emergency Contact Information  Primary Emergency Contact: Narcisa Bustillo  Mobile Phone: [phone]  Relation: Son   needed? No    Discharge Plan A: (P) Home  Discharge Plan B: (P) Home with family      [facility] - [place], [place] - [address]  [address] [address]  Phone: [phone] Fax: [phone]    [id number] [address] [facility]  [address]  [place] [address]  Phone: [phone] Fax: [phone]    [facility] [address]  [address]  [place] [address]  Phone: [phone] Fax: [phone]      Initial Assessment (most recent)       Adult Discharge Assessment - 05/16/25 1115          Discharge Assessment    Assessment Type Discharge Planning Assessment     Confirmed/corrected address, phone number and insurance Yes     Confirmed Demographics Correct on Facesheet     Source of Information patient     When was your last doctors appointment? 01/06/25   Juan Luis Carr    Does patient/caregiver understand observation status Yes     Communicated FRANK with  patient/caregiver Yes     Reason For Admission Acute on chronic congestive heart failure (P)      People in Home alone (P)      Facility Arrived From: Home (P)      Do you expect to return to your current living situation? Yes (P)      Do you have help at home or someone to help you manage your care at home? No (P)      Prior to hospitilization cognitive status: Alert/Oriented;No Deficits (P)      Current cognitive status: Alert/Oriented;No Deficits (P)      Walking or Climbing Stairs Difficulty yes (P)      Walking or Climbing Stairs ambulation difficulty, requires equipment (P)      Mobility Management rolling walker (P)      Dressing/Bathing Difficulty no (P)      Home Accessibility wheelchair accessible (P)      Equipment Currently Used at Home glucometer;rollator (P)      Readmission within 30 days? No (P)      Patient currently being followed by outpatient case management? No (P)      Do you currently have service(s) that help you manage your care at home? No (P)      Do you take prescription medications? Yes (P)      Do you have prescription coverage? Yes (P)    MEDICAID - [facility] COMMUNITY PLAN [facility] ([place]ICA[place]) -    Do you have any problems affording any of your prescribed medications? No (P)      Is the patient taking medications as prescribed? yes (P)      Who is going to help you get home at discharge? Narcisa Bustillo (Son)  [phone] (Mobile) (P)      How do you get to doctors appointments? health plan transportation;public transportation (P)      Are you on dialysis? No (P)      Do you take coumadin? No (P)      Discharge Plan A Home (P)      Discharge Plan B Home with family (P)      DME Needed Upon Discharge  wheelchair;other (see comments) (P)    [employer]    Discharge Plan discussed with: Patient (P)      Transition of Care Barriers None (P)         Physical Activity    On average, how many days per week do you engage in moderate to strenuous exercise (like a brisk walk)? 5 days (P)      On  average, how many minutes do you engage in exercise at this level? 30 min (P)         Financial Resource Strain    How hard is it for you to pay for the very basics like food, housing, medical care, and heating? Not hard at all (P)         Housing Stability    In the last 12 months, was there a time when you were not able to pay the mortgage or rent on time? No (P)      At any time in the past 12 months, were you homeless or living in a shelter (including now)? No (P)         Transportation Needs    In the past 12 months, has lack of transportation kept you from medical appointments or from getting medications? No (P)      In the past 12 months, has lack of transportation kept you from meetings, work, or from getting things needed for daily living? No (P)         Food Insecurity    Within the past 12 months, you worried that your food would run out before you got the money to buy more. Never true (P)      Within the past 12 months, the food you bought just didn't last and you didn't have money to get more. Never true (P)         Stress    Do you feel stress - tense, restless, nervous, or anxious, or unable to sleep at night because your mind is troubled all the time - these days? Not at all (P)         Social Isolation    How often do you feel lonely or isolated from those around you?  Never (P)         Alcohol Use    Q1: How often do you have a drink containing alcohol? Never (P)      Q3: How often do you have six or more drinks on one occasion? Never (P)         Utilities    In the past 12 months has the electric, gas, oil, or water company threatened to shut off services in your home? No (P)         Health Literacy    How often do you need to have someone help you when you read instructions, pamphlets, or other written material from your doctor or pharmacy? Never (P)                           CM met with patient at bedside and son (Cherie ) (support person)  to complete discharge planning assessment.  Patient  alert and oriented xs 4.  Patient verified all demographic information on facesheet is correct.  Patient verified PCP is Dr Juan Luis Carr  Patient verified primary health insurance is  [place]-- - [facility] ([place])  Patient is agreeable with bedside medication delivery.  Patients pharmacy of choice is [facility].  Patient is not active with home health and has listed DME.   Patient with NO POA or Living Will.  Patient not on dialysis or medication coumadin.  Patient with no 30 day admission.  Patient with no financial issues at this time.  Patient  will need transportation upon discharge from facility.  Patient will have assistance from her son upon discharge Patient independent with ADLs. All questions answered regarding Case Management Discharge Planning, patient verbalized understanding.  Discharge booklet with CM's contact information given to patient.   Case Management will continue to follow and assist with discharge planning.             Reema Chino RN  Case Management  [facility]  [phone]

## 2025-05-16 NOTE — PROGRESS NOTES
[facility] - Acute Medical [facility] Medicine  Progress Note    Patient Name: Devika Bustillo  MRN: [id number]  Patient Class: OP- Observation   Admission Date: 5/15/2025  Length of Stay: 0 days  Attending Physician: Katy Jeffers MD  Primary Care Provider: Gwendolyn, Primary Doctor        Subjective     Principal Problem:Acute on chronic congestive heart failure        HPI:  Ms. Devika Bustillo is a 64 y.o. female with chronic heart failure, left transverse-sigmoid sinus stenosis, s/p stenting followed by balloon valvuloplasty of the left internal jugular vein outlet valve March 2025, T2DM on insulin, anxiety, depression and obesity who presented to the [facility] ED on 5/15 for evaluation of headaches and shortness of breath.  She reports that she has not been compliant with her Lasix 40mg BID.  She has been worsening STOUT and SOB at rest.  She endorses a dry weight of around 230lbs, and currently weighs closer to 245lbs. She endorses BLE edema.  She also reports worsening headaches since having her stent placed in March.  She was being seen by Neurology who started her on Zonisamide 100mg, but she didn't like how it made her feel so she stopped taking it.  She endorses compliance with her Aspirin and Plavix.  She took all of her medications this morning.    Upon arrival to the ER, vitals were temp 98.6F, HR 58, /87.  Labs showed WBC 6, Hgb 11, BUN 30, Creatinine 1, Glucose 233, .  CXR showed pulmonary congestion.  She was given Lasix 40mg IV x 1 and endorsed mild UOP.  She was admitted to Hospital Medicine for further diuresis.    Overview/Hospital Course:  Ms. Bustillo was admitted to Hospital Medicine for management of a CHF exacerbation and headaches.  She was started on Lasix IV BID.   Repeat echo was ordered.  Neuro was consulted for persistent headaches in the setting of cerebral stents.    Interval History: No acute events overnight.  Feels like she isnt urinating enough for getting IV Lasix.  Dose increased to  80mg IV BID.  Still having headaches that are persistent.  She reports chronic visual issues but can't determine if it is worse than normal.  Neuro consulted.    Review of Systems   Constitutional:  Negative for chills, fatigue and fever.   Eyes:  Positive for visual disturbance.   Respiratory:  Positive for shortness of breath. Negative for cough.    Cardiovascular:  Positive for leg swelling. Negative for chest pain and palpitations.   Gastrointestinal:  Negative for abdominal pain, diarrhea, nausea and vomiting.   Genitourinary:  Negative for dysuria and urgency.   Neurological:  Positive for headaches. Negative for dizziness.   All other systems reviewed and are negative.    Objective:     Vital Signs (Most Recent):  Temp: 98.1 °F (36.7 °C) (05/16/25 0910)  Pulse: (!) 53 (05/16/25 0910)  Resp: 20 (05/16/25 0910)  BP: (!) 144/75 (05/16/25 0910)  SpO2: 98 % (05/16/25 0910) Vital Signs (24h Range):  Temp:  [97.7 °F (36.5 °C)-98.7 °F (37.1 °C)] 98.1 °F (36.7 °C)  Pulse:  [46-88] 53  Resp:  [18-20] 20  SpO2:  [96 %-100 %] 98 %  BP: (118-171)/(50-87) 144/75     Weight: 110.2 kg (243 lb 0.9 oz)  Body mass index is 36.96 kg/m².  No intake or output data in the 24 hours ending 05/16/25 0928      Physical Exam  Constitutional:       Appearance: She is obese.   HENT:      Head: Normocephalic and atraumatic.   Cardiovascular:      Rate and Rhythm: Regular rhythm. Bradycardia present.      Heart sounds: No murmur heard.  Pulmonary:      Effort: Pulmonary effort is normal. No respiratory distress.      Breath sounds: Rales (Bases) present. No wheezing.   Abdominal:      General: There is no distension.      Palpations: Abdomen is soft.      Tenderness: There is no abdominal tenderness.   Musculoskeletal:         General: No deformity.      Right lower leg: Edema (2+) present.      Left lower leg: Edema (2+) present.   Neurological:      General: No focal deficit present.      Mental Status: She is alert and oriented to  person, place, and time. Mental status is at baseline.               Significant Labs: All pertinent labs within the past 24 hours have been reviewed.  CBC:   Recent Labs   Lab 05/15/25  1408 05/16/25  0519   WBC 6.40 6.12   HGB 11.0* 10.7*   HCT 34.9* 34.1*    253     CMP:   Recent Labs   Lab 05/15/25  1408 05/16/25  0519    138   K 4.7 3.8    104   CO2 29 24   * 164*   BUN 30* 32*   CREATININE 1.0 1.1   CALCIUM 9.1 8.7   PROT 7.1 6.4   ALBUMIN 2.9* 2.7*   BILITOT 0.3 0.3   ALKPHOS 145 123   AST 30 25   ALT 58* 45*   ANIONGAP 6* 10       Significant Imaging: I have reviewed all pertinent imaging results/findings within the past 24 hours.      Assessment & Plan  Acute on chronic congestive heart failure  Patient has acute on chronic CHF.  Previous echo reviewed and normal EF and no DD.  On presentation their CHF was decompensated. Evidence of decompensated CHF on presentation includes: edema, crackles on lung auscultation, weight gain, dyspnea on exertion (STOUT), and shortness of breath. The etiology of their decompensation is likely medication non-compliance. Most recent BNP and echo results are listed below.  Recent Labs     05/15/25  1408   *     Latest ECHO  Results for orders placed during the hospital encounter of 10/09/24    Echo    Interpretation Summary    Left Ventricle: The left ventricle is normal in size. Normal wall thickness. Normal wall motion. There is hyperdynamic systolic function with a visually estimated ejection fraction of greater than 70%. There is normal diastolic function.    Right Ventricle: Normal right ventricular cavity size. Wall thickness is normal. Systolic function is normal.    Left Atrium: Left atrium is moderately dilated.    Mitral Valve: There is moderate mitral annular calcification present.    Tricuspid Valve: There is mild regurgitation.    IVC/SVC: Normal venous pressure at 3 mmHg.    Current Heart Failure Medications  sacubitriL-valsartan   mg per tablet 1 tablet, 2 times daily, Oral  metoprolol succinate (TOPROL-XL) 24 hr tablet 50 mg, Daily, Oral  furosemide injection 80 mg, 2 times daily, Intravenous    Plan  - Monitor strict I&Os and daily weights.    - Place on telemetry  - Low sodium diet  - Place on fluid restriction of 1.5 L.   - Cardiology has not been consulted  - Still with rales and BLE pitting edema, SOB and STOUT  - Continue IV diuresis but increase to Lasix 80mg IV BID  - Home diuretic Lasix 40mg BID but not compliant  - Repeat echo    Diabetes mellitus type II  Patient's FSGs are uncontrolled due to hyperglycemia on current medication regimen.  Last A1c reviewed-   Lab Results   Component Value Date    HGBA1C 10.8 (H) 05/15/2025     Most recent fingerstick glucose reviewed-   Recent Labs   Lab 05/15/25  1702 05/15/25  2104 05/15/25  2220 05/16/25  0845   POCTGLUCOSE 205* 187* 200* 136*     Current correctional scale  Medium  Maintain anti-hyperglycemic dose as follows-   Antihyperglycemics (From admission, onward)      Start     Stop Route Frequency Ordered    05/15/25 2100  insulin glargine U-100 (Lantus) pen 25 Units         -- SubQ Nightly 05/15/25 1710    05/15/25 1751  insulin aspart U-100 pen 0-5 Units         -- SubQ Before meals & nightly PRN 05/15/25 1651          Hold Oral hypoglycemics while patient is in the hospital.  Hypertension  Patient's blood pressure range in the last 24 hours was: BP  Min: 118/50  Max: 171/73.The patient's inpatient anti-hypertensive regimen is listed below:  Current Antihypertensives  metoprolol succinate (TOPROL-XL) 24 hr tablet 50 mg, Daily, Oral  furosemide injection 80 mg, 2 times daily, Intravenous    Plan  - BP is controlled, no changes needed to their regimen  - Continue home meds  History of pulmonary embolism  History noted  Not on blood thinners    Anxiety and depression  Chronic issue for which she sees Dr. Barclay in Psych  Continue Wellbutrin  Continue Abilify  Class 2 severe  obesity due to excess calories with serious comorbidity in adult  Body mass index is 36.96 kg/m². Morbid obesity complicates all aspects of disease management from diagnostic modalities to treatment. Weight loss encouraged and health benefits explained to patient.       IIH (idiopathic intracranial hypertension)  Tension type headache  History of angioplasty of transverse sinus of brain with insertion of stent  Known transverse sinus stenosis  On 03/21/2025 she underwent left transverse-sigmoid sinus stenting followed by balloon valvuloplasty of the left internal jugular vein outlet valve  Continues to have headaches  Continue Aspirin and Plavix  Neuro consulted for further evaluation     Hypomagnesesmia  Mag 1.4  Replace    VTE Risk Mitigation (From admission, onward)           Ordered     enoxaparin injection 40 mg  Daily         05/15/25 1653     IP VTE HIGH RISK PATIENT  Once         05/15/25 1653     Place sequential compression device  Until discontinued         05/15/25 1653     Place sequential compression device  Until discontinued         05/15/25 1651                  Discharge Planning   FRANK: 5/17/2025     Code Status: Full Code   Medical Readiness for Discharge Date:                            Katy Jeffers MD  Department of Hospital Medicine   [facility] - Acute Medical Stepdown

## 2025-05-16 NOTE — PLAN OF CARE
Alert and oriented x 4. Speech is clear. Ambulates to bathroom with assistance. No wob or sign of distress. Safety measures in place. Bed in low position. Call light in reach.      Problem: Adult Inpatient Plan of Care  Goal: Plan of Care Review  Outcome: Progressing  Goal: Patient-Specific Goal (Individualized)  Outcome: Progressing  Goal: Absence of Hospital-Acquired Illness or Injury  Outcome: Progressing  Goal: Optimal Comfort and Wellbeing  Outcome: Progressing  Goal: Readiness for Transition of Care  Outcome: Progressing     Problem: Diabetes Comorbidity  Goal: Blood Glucose Level Within Targeted Range  Outcome: Progressing     Problem: Fall Injury Risk  Goal: Absence of Fall and Fall-Related Injury  Outcome: Progressing

## 2025-05-16 NOTE — ASSESSMENT & PLAN NOTE
Patient's blood pressure range in the last 24 hours was: BP  Min: 118/50  Max: 171/73.The patient's inpatient anti-hypertensive regimen is listed below:  Current Antihypertensives  metoprolol succinate (TOPROL-XL) 24 hr tablet 50 mg, Daily, Oral  furosemide injection 80 mg, 2 times daily, Intravenous    Plan  - BP is controlled, no changes needed to their regimen  - Continue home meds

## 2025-05-16 NOTE — PLAN OF CARE
Discharge Plan A and Plan B have been determined by review of patient's clinical status, future medical and therapeutic needs, and coverage/benefits for post-acute care in coordination with multidisciplinary team members.      05/16/25 1142   Post-Acute Status   Post-Acute Authorization HME;Home Health   HME Status Pending Qualifying Diagnosis   Home Health Status Patient declined/refused   Discharge Delays None known at this time   Discharge Plan   Discharge Plan A Home   Discharge Plan B Home with family     CM spoke with patient and patient is requesting a wheelchair for long distance ambulation and dexcom.  CM sent a secure chat to medical team.  PT ordered for appropriate assistive device: wheelchair.  Patient will need transportation home, when med ready.         Reema Chino RN  Case Management  [facility]  [phone]

## 2025-05-16 NOTE — ASSESSMENT & PLAN NOTE
64F. History of complex/multifactorial headache (IIH s/p L transverse sinus stent, migraine, tension-type headache), CHF, diabetes. Currently admitted to hospital medicine for acute on chronic CHF, undergoing diuresis. Headache remains multifactorial as stated above, with addition of medication-overuse headache from daily Tylenol. Ideally would treat this with reducing/stopping Tylenol and providing a short course of steroids, but this is not ideal given her co-morbidities. Has not tolerated topiramate well historically (cognitive fogging). Zonisamide prescribed by her outpatient neurologist, however she reports having never started this medication: while it remains an option, expect that she would experience many of the same adverse effects as she did with topiramate. From an IIH perspective, diuresis would be of benefit for headache: the patient in fact reports improvement since this has been started.    Recommendations  -continue diuresis  -if headache is still persistent/bothersome despite this, would favor starting levetiracetam for headache over zonisamide  -suggest reducing Tylenol use to not continue medication-overuse headache cycle  -would additionally avoid opiates  -neurology will continue to follow  -contact us with questions

## 2025-05-16 NOTE — SUBJECTIVE & OBJECTIVE
Past Medical History:   Diagnosis Date    Asthma     Back pain     CHF (congestive heart failure)     Diabetes mellitus     Grade II diastolic dysfunction     Hyperlipidemia     Hypertension     Neuropathy     Obesity     Pulmonary embolism 1998    Pulmonary hypertension        Past Surgical History:   Procedure Laterality Date    ANGIOGRAM, CORONARY, WITH LEFT HEART CATHETERIZATION Left 1/17/2024    Procedure: Angiogram, Coronary, with Left Heart Cath;  Surgeon: Joe Marr MD;  [facility] CATH LAB;  Service: Cardiology;  Laterality: Left;    CYST REMOVAL      HERNIA REPAIR      HYSTERECTOMY      TONSILLECTOMY      TUBAL LIGATION         Review of patient's allergies indicates:  No Known Allergies    Current Neurological Medications: see below    No current facility-administered medications on file prior to encounter.     Current Outpatient Medications on File Prior to Encounter   Medication Sig    aspirin (ECOTRIN) 81 MG EC tablet Take 81 mg by mouth once daily.    atorvastatin (LIPITOR) 40 MG tablet Take 80 mg by mouth every evening.    clopidogreL (PLAVIX) 75 mg tablet Take 75 mg by mouth once daily.    ENTRESTO  mg per tablet Take 1 tablet by mouth 2 (two) times daily.    insulin glargine, TOUJEO, (TOUJEO SOLOSTAR U-300 INSULIN) 300 unit/mL (1.5 mL) InPn pen Inject 50 Units into the skin once daily.    metoprolol succinate (TOPROL-XL) 50 MG 24 hr tablet Take 1 tablet by mouth once daily.    VRAYLAR 1.5 mg Cap Take 1.5 mg by mouth once daily.    acetaminophen (TYLENOL) 500 MG tablet Take 2 tablets (1,000 mg total) by mouth every 8 (eight) hours as needed for Pain.    albuterol (VENTOLIN HFA) 90 mcg/actuation inhaler Inhale 1-2 puffs into the lungs every 6 (six) hours as needed for Wheezing or Shortness of Breath. Rescue    albuterol-ipratropium (DUO-NEB) 2.5 mg-0.5 mg/3 mL nebulizer solution Take 3 mLs by nebulization every 6 (six) hours as needed for Wheezing or Shortness of Breath. Rescue  "   atropine 1% (ISOPTO ATROPINE) 1 % Drop Place 1 drop into the left eye 2 (two) times daily.    blood sugar diagnostic Strp To check BG 4 times daily, to use with insurance preferred meter    blood-glucose meter kit To check BG 4 times daily, to use with insurance preferred meter    buPROPion (WELLBUTRIN XL) 150 MG TB24 tablet Take 1 tablet (150 mg total) by mouth once daily.    clobetasol 0.05% (TEMOVATE) 0.05 % Oint Apply topically 2 (two) times daily.    dapagliflozin propanediol (FARXIGA) 10 mg tablet Take 1 tablet by mouth once daily.    DULERA 200-5 mcg/actuation inhaler Inhale 2 puffs into the lungs 2 (two) times daily.    DUPIXENT  mg/2 mL PnIj INJECT 300MG UNDER THE SKIN EVERY OTHER WEEK AS DIRECTED    EScitalopram oxalate (LEXAPRO) 20 MG tablet Take 1 tablet (20 mg total) by mouth once daily.    furosemide (LASIX) 40 MG tablet Take 1 tablet by mouth 2 (two) times daily.    gabapentin (NEURONTIN) 300 MG capsule Take 300 mg by mouth 2 (two) times daily as needed.    ILEVRO 0.3 % DrpS Place 1 drop into the left eye.    lancets Misc To check BG 4 times daily, to use with insurance preferred meter    LIDOcaine-prilocaine (EMLA) cream Apply topically as needed.    loratadine (CLARITIN) 10 mg tablet Take 10 mg by mouth daily as needed for Allergies.    ondansetron (ZOFRAN) 4 MG tablet Take 4 mg by mouth every 8 (eight) hours as needed.    pantoprazole (PROTONIX) 40 MG tablet Take 1 tablet (40 mg total) by mouth 2 (two) times daily before meals.    pen needle, diabetic 31 gauge x 3/16" Ndle 1 each.    pen needle, diabetic 32 gauge x 5/32" Ndle Use to inject insulin four times daily    polyethylene glycol (GLYCOLAX) 17 gram/dose powder Use cap to measure 17 grams, mix in liquid and take by mouth 2 (two) times daily as needed for Constipation.    potassium chloride (KLOR-CON) 10 MEQ TbSR Take 1 tablet (10 mEq total) by mouth as needed (take 1 tablet when you take lasix (furosemide)).    senna-docusate 8.6-50 " mg (SENNA WITH DOCUSATE SODIUM) 8.6-50 mg per tablet Take 1 tablet by mouth 2 (two) times daily as needed for Constipation.    SYMBICORT 160-4.5 mcg/actuation HFAA Inhale 2 puffs into the lungs 2 (two) times daily.    tiZANidine (ZANAFLEX) 4 MG tablet Take by mouth.    topiramate (TOPAMAX) 100 MG tablet Take 50 mg by mouth 2 (two) times daily.    traMADoL (ULTRAM) 50 mg tablet Take 1 tablet (50 mg total) by mouth every 6 (six) hours as needed for Pain.    traZODone (DESYREL) 100 MG tablet Take 2 tablets (200 mg total) by mouth every evening.    TRULICITY 3 mg/0.5 mL pen injector Inject 3 mg into the skin every 7 days. Fridays     Family History       Problem Relation (Age of Onset)    Kidney disease Mother, Father    Stroke Maternal Uncle          Tobacco Use    Smoking status: Never    Smokeless tobacco: Never   Substance and Sexual Activity    Alcohol use: No    Drug use: No    Sexual activity: Not Currently     Partners: Male     Review of Systems   Neurological:  Positive for headaches.     Objective:     Vital Signs (Most Recent):  Temp: 98.6 °F (37 °C) (05/16/25 1536)  Pulse: (!) 50 (05/16/25 1536)  Resp: 20 (05/16/25 1536)  BP: (!) 154/68 (05/16/25 1536)  SpO2: 99 % (05/16/25 1536) Vital Signs (24h Range):  Temp:  [97.7 °F (36.5 °C)-98.7 °F (37.1 °C)] 98.6 °F (37 °C)  Pulse:  [46-88] 50  Resp:  [17-20] 20  SpO2:  [96 %-100 %] 99 %  BP: (118-171)/(50-84) 154/68     Weight: 110.2 kg (243 lb 0.9 oz)  Body mass index is 36.96 kg/m².     Physical Exam  Vitals and nursing note reviewed.   Constitutional:       Appearance: She is ill-appearing.   HENT:      Head: Normocephalic and atraumatic.   Cardiovascular:      Rate and Rhythm: Normal rate and regular rhythm.   Pulmonary:      Effort: Pulmonary effort is normal.   Neurological:      Mental Status: She is alert.     Neurological Exam:  MENTAL STATUS  Level of consciousness: alert  Orientation: oriented to person, place, time, situation  Attention: normal.    Concentration: normal.  Speech: normal    CRANIAL NERVES  CN II: visual fields full to confrontation  CN III, IV, VI: PERRL, EOMI aside from very subtle inability to bury the sclera bilaterally  CN V: facial sensation intact, muscles of mastication intact  CN VII: facial expression symmetric and full  CN XII: tongue midline, no deviation upon protrusion    MOTOR EXAM  Muscle bulk: normal  Muscle tone: normal  Pronator drift: absent    Strength - Upper Extremities   Arm abduction Elbow flexion Elbow extension Wrist flexion Wrist extension Finger abduction   Right 5/5 5/5 5/5 5/5 5/5 5/5   Left 5/5 5/5 5/5 5/5 5/5 5/5     Strength - Lower Extremities   Hip flexion Knee flexion Knee extension Dorsiflexion Plantarflexion   Right 5/5 5/5 5/5 5/5 5/5   Left 5/5 5/5 5/5 5/5 5/5     REFLEXES   Biceps Triceps Brachioradialis Patellar Achilles   Right +2 +2 +2 +2 +2   Left +2 +2 +2 +2 +2       SENSORY EXAM  Light touch: intact in all 4 extremities  Pinprick: intact in all 4 extremities    COORDINATION  Finger to nose: normal  Tremor: none          Significant Labs: All pertinent lab results from the past 24 hours have been reviewed.    Significant Imaging: I have reviewed all pertinent imaging results/findings within the past 24 hours.

## 2025-05-17 LAB
ABSOLUTE EOSINOPHIL (OHS): 0.05 K/UL
ABSOLUTE MONOCYTE (OHS): 0.49 K/UL (ref 0.3–1)
ABSOLUTE NEUTROPHIL COUNT (OHS): 2.5 K/UL (ref 1.8–7.7)
ALBUMIN SERPL BCP-MCNC: 2.5 G/DL (ref 3.5–5.2)
ALP SERPL-CCNC: 118 UNIT/L (ref 40–150)
ALT SERPL W/O P-5'-P-CCNC: 44 UNIT/L (ref 10–44)
ANION GAP (OHS): 11 MMOL/L (ref 8–16)
AST SERPL-CCNC: 29 UNIT/L (ref 11–45)
BASOPHILS # BLD AUTO: 0.04 K/UL
BASOPHILS NFR BLD AUTO: 0.8 %
BILIRUB SERPL-MCNC: 0.2 MG/DL (ref 0.1–1)
BUN SERPL-MCNC: 36 MG/DL (ref 8–23)
CALCIUM SERPL-MCNC: 8.4 MG/DL (ref 8.7–10.5)
CHLORIDE SERPL-SCNC: 102 MMOL/L (ref 95–110)
CO2 SERPL-SCNC: 23 MMOL/L (ref 23–29)
CREAT SERPL-MCNC: 1.2 MG/DL (ref 0.5–1.4)
ERYTHROCYTE [DISTWIDTH] IN BLOOD BY AUTOMATED COUNT: 12.8 % (ref 11.5–14.5)
GFR SERPLBLD CREATININE-BSD FMLA CKD-EPI: 51 ML/MIN/1.73/M2
GLUCOSE SERPL-MCNC: 176 MG/DL (ref 70–110)
HCT VFR BLD AUTO: 32.8 % (ref 37–48.5)
HGB BLD-MCNC: 10.4 GM/DL (ref 12–16)
IMM GRANULOCYTES # BLD AUTO: 0.01 K/UL (ref 0–0.04)
IMM GRANULOCYTES NFR BLD AUTO: 0.2 % (ref 0–0.5)
LYMPHOCYTES # BLD AUTO: 2.12 K/UL (ref 1–4.8)
MAGNESIUM SERPL-MCNC: 1.8 MG/DL (ref 1.6–2.6)
MCH RBC QN AUTO: 27.1 PG (ref 27–31)
MCHC RBC AUTO-ENTMCNC: 31.7 G/DL (ref 32–36)
MCV RBC AUTO: 85 FL (ref 82–98)
NUCLEATED RBC (/100WBC) (OHS): 0 /100 WBC
PHOSPHATE SERPL-MCNC: 4.6 MG/DL (ref 2.7–4.5)
PLATELET # BLD AUTO: 192 K/UL (ref 150–450)
PMV BLD AUTO: 11.2 FL (ref 9.2–12.9)
POCT GLUCOSE: 199 MG/DL (ref 70–110)
POCT GLUCOSE: 242 MG/DL (ref 70–110)
POCT GLUCOSE: 255 MG/DL (ref 70–110)
POCT GLUCOSE: 271 MG/DL (ref 70–110)
POTASSIUM SERPL-SCNC: 3.8 MMOL/L (ref 3.5–5.1)
PROT SERPL-MCNC: 6.1 GM/DL (ref 6–8.4)
RBC # BLD AUTO: 3.84 M/UL (ref 4–5.4)
RELATIVE EOSINOPHIL (OHS): 1 %
RELATIVE LYMPHOCYTE (OHS): 40.7 % (ref 18–48)
RELATIVE MONOCYTE (OHS): 9.4 % (ref 4–15)
RELATIVE NEUTROPHIL (OHS): 47.9 % (ref 38–73)
SODIUM SERPL-SCNC: 136 MMOL/L (ref 136–145)
WBC # BLD AUTO: 5.21 K/UL (ref 3.9–12.7)

## 2025-05-17 PROCEDURE — 25000003 PHARM REV CODE 250: Performed by: HOSPITALIST

## 2025-05-17 PROCEDURE — 84100 ASSAY OF PHOSPHORUS: CPT | Performed by: HOSPITALIST

## 2025-05-17 PROCEDURE — 63600175 PHARM REV CODE 636 W HCPCS: Performed by: HOSPITALIST

## 2025-05-17 PROCEDURE — 85025 COMPLETE CBC W/AUTO DIFF WBC: CPT | Performed by: HOSPITALIST

## 2025-05-17 PROCEDURE — 83735 ASSAY OF MAGNESIUM: CPT | Performed by: HOSPITALIST

## 2025-05-17 PROCEDURE — 99233 SBSQ HOSP IP/OBS HIGH 50: CPT | Mod: ,,, | Performed by: STUDENT IN AN ORGANIZED HEALTH CARE EDUCATION/TRAINING PROGRAM

## 2025-05-17 PROCEDURE — 36415 COLL VENOUS BLD VENIPUNCTURE: CPT | Performed by: HOSPITALIST

## 2025-05-17 PROCEDURE — 20600001 HC STEP DOWN PRIVATE ROOM

## 2025-05-17 PROCEDURE — 82040 ASSAY OF SERUM ALBUMIN: CPT | Performed by: HOSPITALIST

## 2025-05-17 RX ORDER — LEVETIRACETAM 500 MG/1
500 TABLET ORAL 2 TIMES DAILY
Status: DISCONTINUED | OUTPATIENT
Start: 2025-05-17 | End: 2025-05-17

## 2025-05-17 RX ORDER — LOPERAMIDE HYDROCHLORIDE 2 MG/1
2 CAPSULE ORAL 4 TIMES DAILY PRN
Status: DISCONTINUED | OUTPATIENT
Start: 2025-05-17 | End: 2025-05-18 | Stop reason: HOSPADM

## 2025-05-17 RX ORDER — FUROSEMIDE 10 MG/ML
80 INJECTION INTRAMUSCULAR; INTRAVENOUS 3 TIMES DAILY
Status: DISCONTINUED | OUTPATIENT
Start: 2025-05-17 | End: 2025-05-18 | Stop reason: HOSPADM

## 2025-05-17 RX ADMIN — INSULIN GLARGINE 25 UNITS: 100 INJECTION, SOLUTION SUBCUTANEOUS at 09:05

## 2025-05-17 RX ADMIN — SACUBITRIL AND VALSARTAN 1 TABLET: 97; 103 TABLET, FILM COATED ORAL at 10:05

## 2025-05-17 RX ADMIN — ENOXAPARIN SODIUM 40 MG: 40 INJECTION SUBCUTANEOUS at 05:05

## 2025-05-17 RX ADMIN — ESCITALOPRAM OXALATE 20 MG: 20 TABLET ORAL at 10:05

## 2025-05-17 RX ADMIN — FUROSEMIDE 80 MG: 10 INJECTION, SOLUTION INTRAVENOUS at 05:05

## 2025-05-17 RX ADMIN — CLOPIDOGREL 75 MG: 75 TABLET ORAL at 10:05

## 2025-05-17 RX ADMIN — INSULIN ASPART 3 UNITS: 100 INJECTION, SOLUTION INTRAVENOUS; SUBCUTANEOUS at 05:05

## 2025-05-17 RX ADMIN — ASPIRIN 81 MG: 81 TABLET, COATED ORAL at 10:05

## 2025-05-17 RX ADMIN — FUROSEMIDE 80 MG: 10 INJECTION, SOLUTION INTRAVENOUS at 09:05

## 2025-05-17 RX ADMIN — ATORVASTATIN CALCIUM 80 MG: 40 TABLET, FILM COATED ORAL at 09:05

## 2025-05-17 RX ADMIN — FUROSEMIDE 80 MG: 10 INJECTION, SOLUTION INTRAVENOUS at 10:05

## 2025-05-17 RX ADMIN — LOPERAMIDE HYDROCHLORIDE 2 MG: 2 CAPSULE ORAL at 12:05

## 2025-05-17 RX ADMIN — SACUBITRIL AND VALSARTAN 1 TABLET: 97; 103 TABLET, FILM COATED ORAL at 09:05

## 2025-05-17 RX ADMIN — CARIPRAZINE 1.5 MG: 1.5 CAPSULE, GELATIN COATED ORAL at 10:05

## 2025-05-17 RX ADMIN — BUPROPION HYDROCHLORIDE 150 MG: 150 TABLET, EXTENDED RELEASE ORAL at 10:05

## 2025-05-17 NOTE — ASSESSMENT & PLAN NOTE
Patient's blood pressure range in the last 24 hours was: BP  Min: 115/52  Max: 185/68.The patient's inpatient anti-hypertensive regimen is listed below:  Current Antihypertensives  furosemide injection 80 mg, 3 times daily, Intravenous    Plan  - BP is controlled, no changes needed to their regimen  - Stop Metoprolol with bradycardia to the 40s

## 2025-05-17 NOTE — PLAN OF CARE
Alert and oriented x 4. Sitting in chair most of day. No wob or sign of distress. Vss. Safety measures in place. Call light in reach.      Problem: Adult Inpatient Plan of Care  Goal: Plan of Care Review  Outcome: Progressing  Goal: Patient-Specific Goal (Individualized)  Outcome: Progressing  Goal: Absence of Hospital-Acquired Illness or Injury  Outcome: Progressing  Goal: Optimal Comfort and Wellbeing  Outcome: Progressing  Goal: Readiness for Transition of Care  Outcome: Progressing     Problem: Diabetes Comorbidity  Goal: Blood Glucose Level Within Targeted Range  Outcome: Progressing     Problem: Fall Injury Risk  Goal: Absence of Fall and Fall-Related Injury  Outcome: Progressing

## 2025-05-17 NOTE — SUBJECTIVE & OBJECTIVE
Interval History: No acute events overnight.  Feels like she isnt urinating enough for getting IV Lasix.  Dose increased to 80mg IV TID.  Leg swelling improved, but still SOB.  Headaches improved with IV diuresis.  Discussed with Neuro, can consider Keppra.    Review of Systems   Constitutional:  Negative for chills, fatigue and fever.   Eyes:  Positive for visual disturbance.   Respiratory:  Positive for shortness of breath. Negative for cough.    Cardiovascular:  Positive for leg swelling. Negative for chest pain and palpitations.   Gastrointestinal:  Negative for abdominal pain, diarrhea, nausea and vomiting.   Genitourinary:  Positive for frequency. Negative for dysuria and urgency.   Neurological:  Positive for headaches. Negative for dizziness.   All other systems reviewed and are negative.    Objective:     Vital Signs (Most Recent):  Temp: 98.5 °F (36.9 °C) (05/17/25 0804)  Pulse: (!) 48 (05/17/25 0804)  Resp: 18 (05/17/25 0804)  BP: 138/80 (05/17/25 1031)  SpO2: 99 % (05/17/25 0804) Vital Signs (24h Range):  Temp:  [98.2 °F (36.8 °C)-99.2 °F (37.3 °C)] 98.5 °F (36.9 °C)  Pulse:  [48-62] 48  Resp:  [17-20] 18  SpO2:  [94 %-100 %] 99 %  BP: (115-185)/(52-80) 138/80     Weight: 109.4 kg (241 lb 2.9 oz)  Body mass index is 36.67 kg/m².    Intake/Output Summary (Last 24 hours) at 5/17/2025 1154  Last data filed at 5/16/2025 1700  Gross per 24 hour   Intake 600 ml   Output 600 ml   Net 0 ml         Physical Exam  Constitutional:       Appearance: She is obese.   HENT:      Head: Normocephalic and atraumatic.   Cardiovascular:      Rate and Rhythm: Regular rhythm. Bradycardia present.      Heart sounds: No murmur heard.  Pulmonary:      Effort: Pulmonary effort is normal. No respiratory distress.      Breath sounds: Rales (Bases) present. No wheezing.   Abdominal:      General: There is no distension.      Palpations: Abdomen is soft.      Tenderness: There is no abdominal tenderness.   Musculoskeletal:          General: No deformity.      Right lower leg: Edema (2+) present.      Left lower leg: Edema (2+) present.   Neurological:      General: No focal deficit present.      Mental Status: She is alert and oriented to person, place, and time. Mental status is at baseline.               Significant Labs: All pertinent labs within the past 24 hours have been reviewed.  CBC:   Recent Labs   Lab 05/15/25  1408 05/16/25  0519 05/17/25  0442   WBC 6.40 6.12 5.21   HGB 11.0* 10.7* 10.4*   HCT 34.9* 34.1* 32.8*    253 192     CMP:   Recent Labs   Lab 05/15/25  1408 05/16/25  0519 05/17/25  0442    138 136   K 4.7 3.8 3.8    104 102   CO2 29 24 23   * 164* 176*   BUN 30* 32* 36*   CREATININE 1.0 1.1 1.2   CALCIUM 9.1 8.7 8.4*   PROT 7.1 6.4 6.1   ALBUMIN 2.9* 2.7* 2.5*   BILITOT 0.3 0.3 0.2   ALKPHOS 145 123 118   AST 30 25 29   ALT 58* 45* 44   ANIONGAP 6* 10 11       Significant Imaging: I have reviewed all pertinent imaging results/findings within the past 24 hours.

## 2025-05-17 NOTE — SUBJECTIVE & OBJECTIVE
Review of Systems   Neurological:  Positive for headaches.     Objective:     Vital Signs (Most Recent):  Temp: 98.5 °F (36.9 °C) (05/17/25 1246)  Pulse: (!) 48 (05/17/25 1559)  Resp: 18 (05/17/25 1246)  BP: (!) 147/61 (05/17/25 1246)  SpO2: 99 % (05/17/25 1246) Vital Signs (24h Range):  Temp:  [98.2 °F (36.8 °C)-99.2 °F (37.3 °C)] 98.5 °F (36.9 °C)  Pulse:  [48-62] 48  Resp:  [18-20] 18  SpO2:  [94 %-100 %] 99 %  BP: (115-185)/(52-80) 147/61     Weight: 109.4 kg (241 lb 2.9 oz)  Body mass index is 36.67 kg/m².     Physical Exam  Vitals and nursing note reviewed.   Constitutional:       Appearance: She is ill-appearing.   HENT:      Head: Normocephalic and atraumatic.   Cardiovascular:      Rate and Rhythm: Normal rate and regular rhythm.   Pulmonary:      Effort: Pulmonary effort is normal.   Neurological:      Mental Status: She is alert.     Neurological Exam:  MENTAL STATUS  Level of consciousness: alert  Orientation: oriented to person, place, time, situation  Attention: normal.   Concentration: normal.  Speech: normal    CRANIAL NERVES  CN II: visual fields full to confrontation  CN III, IV, VI: PERRL, EOMI aside from very subtle inability to bury the sclera bilaterally  CN V: facial sensation intact, muscles of mastication intact  CN VII: facial expression symmetric and full  CN XII: tongue midline, no deviation upon protrusion    MOTOR EXAM  Muscle bulk: normal  Muscle tone: normal  Pronator drift: absent    Strength - Upper Extremities   Arm abduction Elbow flexion Elbow extension Wrist flexion Wrist extension Finger abduction   Right 5/5 5/5 5/5 5/5 5/5 5/5   Left 5/5 5/5 5/5 5/5 5/5 5/5     Strength - Lower Extremities   Hip flexion Knee flexion Knee extension Dorsiflexion Plantarflexion   Right 5/5 5/5 5/5 5/5 5/5   Left 5/5 5/5 5/5 5/5 5/5     REFLEXES   Biceps Triceps Brachioradialis Patellar Achilles   Right +2 +2 +2 +2 +2   Left +2 +2 +2 +2 +2       SENSORY EXAM  Light touch: intact in all 4  extremities  Pinprick: intact in all 4 extremities    COORDINATION  Finger to nose: normal  Tremor: none          Significant Labs: All pertinent lab results from the past 24 hours have been reviewed.    Significant Imaging: I have reviewed all pertinent imaging results/findings within the past 24 hours.

## 2025-05-17 NOTE — ASSESSMENT & PLAN NOTE
Patient has acute on chronic CHF.  Previous echo reviewed and normal EF and no DD.  On presentation their CHF was decompensated. Evidence of decompensated CHF on presentation includes: edema, crackles on lung auscultation, weight gain, dyspnea on exertion (TSOUT), and shortness of breath. The etiology of their decompensation is likely medication non-compliance. Most recent BNP and echo results are listed below.  Recent Labs     05/15/25  1408   *     Latest ECHO  Results for orders placed during the hospital encounter of 10/09/24    Echo    Interpretation Summary    Left Ventricle: The left ventricle is normal in size. Normal wall thickness. Normal wall motion. There is hyperdynamic systolic function with a visually estimated ejection fraction of greater than 70%. There is normal diastolic function.    Right Ventricle: Normal right ventricular cavity size. Wall thickness is normal. Systolic function is normal.    Left Atrium: Left atrium is moderately dilated.    Mitral Valve: There is moderate mitral annular calcification present.    Tricuspid Valve: There is mild regurgitation.    IVC/SVC: Normal venous pressure at 3 mmHg.    Current Heart Failure Medications  sacubitriL-valsartan  mg per tablet 1 tablet, 2 times daily, Oral  furosemide injection 80 mg, 3 times daily, Intravenous    Plan  - Monitor strict I&Os and daily weights.    - Place on telemetry  - Low sodium diet  - Place on fluid restriction of 1.5 L.   - Cardiology has not been consulted  - Still with rales and BLE pitting edema, SOB and STOUT  - Continue IV diuresis but increase to Lasix 80mg IV TID  - Home diuretic Lasix 40mg BID but not compliant   - Repeat echo with elevated PAP suggestive of PHTN  - Stop Metoprolol with bradycardia to the 40s

## 2025-05-17 NOTE — ASSESSMENT & PLAN NOTE
64F. History of complex/multifactorial headache (IIH s/p L transverse sinus stent, migraine, tension-type headache), CHF, diabetes. Currently admitted to hospital medicine for acute on chronic CHF, undergoing diuresis. Headache remains multifactorial as stated above, with addition of medication-overuse headache from daily Tylenol. Ideally would treat this with reducing/stopping Tylenol and providing a short course of steroids, but this is not ideal given her co-morbidities. Has not tolerated topiramate well historically (cognitive fogging). Zonisamide prescribed by her outpatient neurologist, however she reports having never started this medication: while it remains an option, expect that she would experience many of the same adverse effects as she did with topiramate. From an IIH perspective, diuresis would be of benefit for headache: the patient in fact reports improvement since this has been started.    Continues to experience benefit from diuresis: as stated above, is beneficial from an IIH perspective. If headache worsens, would favor Keppra (e.g. 500mg BID) over zonisamide, as Keppra will likely cause less cognitive fogging.    Recommendations  -continue diuresis  -favor Keppra over zonisamide if another medication is required  -suggest reducing Tylenol use to not continue medication-overuse headache cycle  ->discussed this at length with patient today  -would additionally avoid opiates  -neurology will sign off  -contact us with questions

## 2025-05-17 NOTE — ASSESSMENT & PLAN NOTE
Patient has Abnormal Magnesium: hypomagnesemia. Will continue to monitor electrolytes closely. Will replace the affected electrolytes and repeat labs to be done after interventions completed. The patient's magnesium results have been reviewed and are listed below.  Recent Labs   Lab 05/17/25  0442   MG 1.8

## 2025-05-17 NOTE — PROGRESS NOTES
[facility]  Neurology  Progress Note    Patient Name: Devika Bustillo  MRN: [id number]  Admission Date: 5/15/2025  Hospital Length of Stay: 1 days  Code Status: Full Code   Attending Provider: Katy Jeffers MD  Primary Care Physician: Juan Luis Carr Jr., MD   Principal Problem:Acute on chronic congestive heart failure    HPI:   Ms. Bustillo is a 64 year-old lady with a history of complex/multifactorial headache (migraine, tension-type headache, IIH and left transverse sinus stenosis s/p stenting), CHF, IDDM, depression. Presented due to ongoing dyspnea, chest pain, bilateral lower extremity edema, and headache worse than baseline. Per hospital medicine documentation, patient's weight was 245lbs, and her dry weight is ~230lbs. Patient describes her headache as being holocephalic and stabbing. She occasionally experiences nausea with it. Quality/location are similar to chronic headache, but over the last two or so weeks it has been slightly more severe. Home medications include furosemide, metoprolol, Entresto, escitalopram, Wellbutrin. Previously tolerated topiramate poorly due to cognitive fogging. Her neurologist (Dr. Caraballo) had planned for her to start zonisamide, but the patient denies having taken the medication. She does, however, take Tylenol daily at home, and has done so for quite some time. Neurology is consulted for headache.    Overview/Hospital Course:  -5/17: headache continues to improve with diuresis, now ~6/7-10      Review of Systems   Neurological:  Positive for headaches.     Objective:     Vital Signs (Most Recent):  Temp: 98.5 °F (36.9 °C) (05/17/25 1246)  Pulse: (!) 48 (05/17/25 1559)  Resp: 18 (05/17/25 1246)  BP: (!) 147/61 (05/17/25 1246)  SpO2: 99 % (05/17/25 1246) Vital Signs (24h Range):  Temp:  [98.2 °F (36.8 °C)-99.2 °F (37.3 °C)] 98.5 °F (36.9 °C)  Pulse:  [48-62] 48  Resp:  [18-20] 18  SpO2:  [94 %-100 %] 99 %  BP: (115-185)/(52-80) 147/61     Weight: 109.4 kg  (241 lb 2.9 oz)  Body mass index is 36.67 kg/m².     Physical Exam  Vitals and nursing note reviewed.   Constitutional:       Appearance: She is ill-appearing.   HENT:      Head: Normocephalic and atraumatic.   Cardiovascular:      Rate and Rhythm: Normal rate and regular rhythm.   Pulmonary:      Effort: Pulmonary effort is normal.   Neurological:      Mental Status: She is alert.     Neurological Exam:  MENTAL STATUS  Level of consciousness: alert  Orientation: oriented to person, place, time, situation  Attention: normal.   Concentration: normal.  Speech: normal    CRANIAL NERVES  CN II: visual fields full to confrontation  CN III, IV, VI: PERRL, EOMI aside from very subtle inability to bury the sclera bilaterally  CN V: facial sensation intact, muscles of mastication intact  CN VII: facial expression symmetric and full  CN XII: tongue midline, no deviation upon protrusion    MOTOR EXAM  Muscle bulk: normal  Muscle tone: normal  Pronator drift: absent    Strength - Upper Extremities   Arm abduction Elbow flexion Elbow extension Wrist flexion Wrist extension Finger abduction   Right 5/5 5/5 5/5 5/5 5/5 5/5   Left 5/5 5/5 5/5 5/5 5/5 5/5     Strength - Lower Extremities   Hip flexion Knee flexion Knee extension Dorsiflexion Plantarflexion   Right 5/5 5/5 5/5 5/5 5/5   Left 5/5 5/5 5/5 5/5 5/5     REFLEXES   Biceps Triceps Brachioradialis Patellar Achilles   Right +2 +2 +2 +2 +2   Left +2 +2 +2 +2 +2       SENSORY EXAM  Light touch: intact in all 4 extremities  Pinprick: intact in all 4 extremities    COORDINATION  Finger to nose: normal  Tremor: none          Significant Labs: All pertinent lab results from the past 24 hours have been reviewed.    Significant Imaging: I have reviewed all pertinent imaging results/findings within the past 24 hours.  Assessment and Plan:     IIH (idiopathic intracranial hypertension)  64F. History of complex/multifactorial headache (IIH s/p L transverse sinus stent, migraine,  tension-type headache), CHF, diabetes. Currently admitted to hospital medicine for acute on chronic CHF, undergoing diuresis. Headache remains multifactorial as stated above, with addition of medication-overuse headache from daily Tylenol. Ideally would treat this with reducing/stopping Tylenol and providing a short course of steroids, but this is not ideal given her co-morbidities. Has not tolerated topiramate well historically (cognitive fogging). Zonisamide prescribed by her outpatient neurologist, however she reports having never started this medication: while it remains an option, expect that she would experience many of the same adverse effects as she did with topiramate. From an IIH perspective, diuresis would be of benefit for headache: the patient in fact reports improvement since this has been started.    Continues to experience benefit from diuresis: as stated above, is beneficial from an IIH perspective. If headache worsens, would favor Keppra (e.g. 500mg BID) over zonisamide, as Keppra will likely cause less cognitive fogging.    Recommendations  -continue diuresis  -favor Keppra over zonisamide if another medication is required  -suggest reducing Tylenol use to not continue medication-overuse headache cycle  ->discussed this at length with patient today  -would additionally avoid opiates  -neurology will sign off  -contact us with questions        VTE Risk Mitigation (From admission, onward)           Ordered     enoxaparin injection 40 mg  Daily         05/15/25 1653     IP VTE HIGH RISK PATIENT  Once         05/15/25 1653     Place sequential compression device  Until discontinued         05/15/25 1653     Place sequential compression device  Until discontinued         05/15/25 1651                    Zachary Pacheco,   Neurology  [facility]

## 2025-05-17 NOTE — ASSESSMENT & PLAN NOTE
Patient has acute on chronic CHF.  Previous echo reviewed and normal EF and no DD.  On presentation their CHF was decompensated. Evidence of decompensated CHF on presentation includes: edema, crackles on lung auscultation, weight gain, dyspnea on exertion (STOUT), and shortness of breath. The etiology of their decompensation is likely medication non-compliance. Most recent BNP and echo results are listed below.  Recent Labs     05/15/25  1408   *     Latest ECHO  Results for orders placed during the hospital encounter of 10/09/24    Echo    Interpretation Summary    Left Ventricle: The left ventricle is normal in size. Normal wall thickness. Normal wall motion. There is hyperdynamic systolic function with a visually estimated ejection fraction of greater than 70%. There is normal diastolic function.    Right Ventricle: Normal right ventricular cavity size. Wall thickness is normal. Systolic function is normal.    Left Atrium: Left atrium is moderately dilated.    Mitral Valve: There is moderate mitral annular calcification present.    Tricuspid Valve: There is mild regurgitation.    IVC/SVC: Normal venous pressure at 3 mmHg.    Current Heart Failure Medications  sacubitriL-valsartan  mg per tablet 1 tablet, 2 times daily, Oral  furosemide injection 80 mg, 3 times daily, Intravenous    Plan  - Monitor strict I&Os and daily weights.    - Place on telemetry  - Low sodium diet  - Place on fluid restriction of 1.5 L.   - Cardiology has not been consulted  - Still with rales and BLE pitting edema, SOB and STOUT  - Continue IV diuresis but increase to Lasix 80mg IV TID  - Home diuretic Lasix 40mg BID but not compliant   - Repeat echo with elevated PAP suggestive of PHTN  - Stop Metoprolol with bradycardia to the 40s

## 2025-05-17 NOTE — PROGRESS NOTES
[facility] Acute [facility] Medicine  Progress Note    Patient Name: Devika Bustillo  MRN: [id number]  Patient Class: IP- Inpatient   Admission Date: 5/15/2025  Length of Stay: 1 days  Attending Physician: Katy Jeffers MD  Primary Care Provider: Juan Luis Carr Jr., MD        Subjective     Principal Problem:Acute on chronic congestive heart failure        HPI:  Ms. Devika Bustillo is a 64 y.o. female with chronic heart failure, left transverse-sigmoid sinus stenosis, s/p stenting followed by balloon valvuloplasty of the left internal jugular vein outlet valve March 2025, T2DM on insulin, anxiety, depression and obesity who presented to the [facility] ED on 5/15 for evaluation of headaches and shortness of breath.  She reports that she has not been compliant with her Lasix 40mg BID.  She has been worsening STOUT and SOB at rest.  She endorses a dry weight of around 230lbs, and currently weighs closer to 245lbs. She endorses BLE edema.  She also reports worsening headaches since having her stent placed in March.  She was being seen by Neurology who started her on Zonisamide 100mg, but she didn't like how it made her feel so she stopped taking it.  She endorses compliance with her Aspirin and Plavix.  She took all of her medications this morning.    Upon arrival to the ER, vitals were temp 98.6F, HR 58, /87.  Labs showed WBC 6, Hgb 11, BUN 30, Creatinine 1, Glucose 233, .  CXR showed pulmonary congestion.  She was given Lasix 40mg IV x 1 and endorsed mild UOP.  She was admitted to Hospital Medicine for further diuresis.    Overview/Hospital Course:  Ms. Bustillo was admitted to Hospital Medicine for management of a CHF exacerbation and headaches.  She was started on Lasix IV BID.   Repeat echo was ordered.  Neuro was consulted for persistent headaches in the setting of cerebral stents.    Interval History: No acute events overnight.  Feels like she isnt urinating enough for getting IV Lasix.  Dose  increased to 80mg IV TID.  Leg swelling improved, but still SOB.  Headaches improved with IV diuresis.  Discussed with Neuro, can consider Keppra.    Review of Systems   Constitutional:  Negative for chills, fatigue and fever.   Eyes:  Positive for visual disturbance.   Respiratory:  Positive for shortness of breath. Negative for cough.    Cardiovascular:  Positive for leg swelling. Negative for chest pain and palpitations.   Gastrointestinal:  Negative for abdominal pain, diarrhea, nausea and vomiting.   Genitourinary:  Positive for frequency. Negative for dysuria and urgency.   Neurological:  Positive for headaches. Negative for dizziness.   All other systems reviewed and are negative.    Objective:     Vital Signs (Most Recent):  Temp: 98.5 °F (36.9 °C) (05/17/25 0804)  Pulse: (!) 48 (05/17/25 0804)  Resp: 18 (05/17/25 0804)  BP: 138/80 (05/17/25 1031)  SpO2: 99 % (05/17/25 0804) Vital Signs (24h Range):  Temp:  [98.2 °F (36.8 °C)-99.2 °F (37.3 °C)] 98.5 °F (36.9 °C)  Pulse:  [48-62] 48  Resp:  [17-20] 18  SpO2:  [94 %-100 %] 99 %  BP: (115-185)/(52-80) 138/80     Weight: 109.4 kg (241 lb 2.9 oz)  Body mass index is 36.67 kg/m².    Intake/Output Summary (Last 24 hours) at 5/17/2025 1154  Last data filed at 5/16/2025 1700  Gross per 24 hour   Intake 600 ml   Output 600 ml   Net 0 ml         Physical Exam  Constitutional:       Appearance: She is obese.   HENT:      Head: Normocephalic and atraumatic.   Cardiovascular:      Rate and Rhythm: Regular rhythm. Bradycardia present.      Heart sounds: No murmur heard.  Pulmonary:      Effort: Pulmonary effort is normal. No respiratory distress.      Breath sounds: Rales (Bases) present. No wheezing.   Abdominal:      General: There is no distension.      Palpations: Abdomen is soft.      Tenderness: There is no abdominal tenderness.   Musculoskeletal:         General: No deformity.      Right lower leg: Edema (2+) present.      Left lower leg: Edema (2+) present.    Neurological:      General: No focal deficit present.      Mental Status: She is alert and oriented to person, place, and time. Mental status is at baseline.               Significant Labs: All pertinent labs within the past 24 hours have been reviewed.  CBC:   Recent Labs   Lab 05/15/25  1408 05/16/25  0519 05/17/25  0442   WBC 6.40 6.12 5.21   HGB 11.0* 10.7* 10.4*   HCT 34.9* 34.1* 32.8*    253 192     CMP:   Recent Labs   Lab 05/15/25  1408 05/16/25  0519 05/17/25  0442    138 136   K 4.7 3.8 3.8    104 102   CO2 29 24 23   * 164* 176*   BUN 30* 32* 36*   CREATININE 1.0 1.1 1.2   CALCIUM 9.1 8.7 8.4*   PROT 7.1 6.4 6.1   ALBUMIN 2.9* 2.7* 2.5*   BILITOT 0.3 0.3 0.2   ALKPHOS 145 123 118   AST 30 25 29   ALT 58* 45* 44   ANIONGAP 6* 10 11       Significant Imaging: I have reviewed all pertinent imaging results/findings within the past 24 hours.      Assessment & Plan  Acute on chronic congestive heart failure  Pulmonary hypertension  Patient has acute on chronic CHF.  Previous echo reviewed and normal EF and no DD.  On presentation their CHF was decompensated. Evidence of decompensated CHF on presentation includes: edema, crackles on lung auscultation, weight gain, dyspnea on exertion (STOUT), and shortness of breath. The etiology of their decompensation is likely medication non-compliance. Most recent BNP and echo results are listed below.  Recent Labs     05/15/25  1408   *     Latest ECHO  Results for orders placed during the hospital encounter of 10/09/24    Echo    Interpretation Summary    Left Ventricle: The left ventricle is normal in size. Normal wall thickness. Normal wall motion. There is hyperdynamic systolic function with a visually estimated ejection fraction of greater than 70%. There is normal diastolic function.    Right Ventricle: Normal right ventricular cavity size. Wall thickness is normal. Systolic function is normal.    Left Atrium: Left atrium is moderately  dilated.    Mitral Valve: There is moderate mitral annular calcification present.    Tricuspid Valve: There is mild regurgitation.    IVC/SVC: Normal venous pressure at 3 mmHg.    Current Heart Failure Medications  sacubitriL-valsartan  mg per tablet 1 tablet, 2 times daily, Oral  furosemide injection 80 mg, 3 times daily, Intravenous    Plan  - Monitor strict I&Os and daily weights.    - Place on telemetry  - Low sodium diet  - Place on fluid restriction of 1.5 L.   - Cardiology has not been consulted  - Still with rales and BLE pitting edema, SOB and STOUT  - Continue IV diuresis but increase to Lasix 80mg IV TID  - Home diuretic Lasix 40mg BID but not compliant   - Repeat echo with elevated PAP suggestive of PHTN  - Stop Metoprolol with bradycardia to the 40s  Diabetes mellitus type II  Patient's FSGs are uncontrolled due to hyperglycemia on current medication regimen.  Last A1c reviewed-   Lab Results   Component Value Date    HGBA1C 10.8 (H) 05/15/2025     Most recent fingerstick glucose reviewed-   Recent Labs   Lab 05/16/25  1535 05/16/25  1904 05/16/25  2054 05/17/25  0802   POCTGLUCOSE 224* 240* 223* 199*     Current correctional scale  Medium  Maintain anti-hyperglycemic dose as follows-   Antihyperglycemics (From admission, onward)      Start     Stop Route Frequency Ordered    05/15/25 2100  insulin glargine U-100 (Lantus) pen 25 Units         -- SubQ Nightly 05/15/25 1710    05/15/25 1751  insulin aspart U-100 pen 0-5 Units         -- SubQ Before meals & nightly PRN 05/15/25 1651          Hold Oral hypoglycemics while patient is in the hospital.  Hypertension  Patient's blood pressure range in the last 24 hours was: BP  Min: 115/52  Max: 185/68.The patient's inpatient anti-hypertensive regimen is listed below:  Current Antihypertensives  furosemide injection 80 mg, 3 times daily, Intravenous    Plan  - BP is controlled, no changes needed to their regimen  - Stop Metoprolol with bradycardia to the  40s  History of pulmonary embolism  History noted  Not on blood thinners    Anxiety and depression  Chronic issue for which she sees Dr. Barclay in Psych  Continue Wellbutrin  Continue Abilify  Class 2 severe obesity due to excess calories with serious comorbidity in adult  Body mass index is 36.67 kg/m². Morbid obesity complicates all aspects of disease management from diagnostic modalities to treatment. Weight loss encouraged and health benefits explained to patient.       IIH (idiopathic intracranial hypertension)  Tension type headache  History of angioplasty of transverse sinus of brain with insertion of stent  Known transverse sinus stenosis  On 03/21/2025 she underwent left transverse-sigmoid sinus stenting followed by balloon valvuloplasty of the left internal jugular vein outlet valve  Continues to have headaches  Continue Aspirin and Plavix  Neuro consulted for further evaluation   Hypomagnesemia  Patient has Abnormal Magnesium: hypomagnesemia. Will continue to monitor electrolytes closely. Will replace the affected electrolytes and repeat labs to be done after interventions completed. The patient's magnesium results have been reviewed and are listed below.  Recent Labs   Lab 05/17/25  0442   MG 1.8      Hypercoagulable state  Noted  Continue Aspirin and Plavix      VTE Risk Mitigation (From admission, onward)           Ordered     enoxaparin injection 40 mg  Daily         05/15/25 1653     IP VTE HIGH RISK PATIENT  Once         05/15/25 1653     Place sequential compression device  Until discontinued         05/15/25 1653     Place sequential compression device  Until discontinued         05/15/25 1651                    Discharge Planning   FRANK: 5/18/2025     Code Status: Full Code   Medical Readiness for Discharge Date:   Discharge Plan A: Home   Discharge Delays: None known at this time                    Katy Jeffers MD  Department of Hospital Medicine   [facility] - Acute Medical Stepdown

## 2025-05-17 NOTE — ASSESSMENT & PLAN NOTE
Patient's FSGs are uncontrolled due to hyperglycemia on current medication regimen.  Last A1c reviewed-   Lab Results   Component Value Date    HGBA1C 10.8 (H) 05/15/2025     Most recent fingerstick glucose reviewed-   Recent Labs   Lab 05/16/25  1535 05/16/25  1904 05/16/25  2054 05/17/25  0802   POCTGLUCOSE 224* 240* 223* 199*     Current correctional scale  Medium  Maintain anti-hyperglycemic dose as follows-   Antihyperglycemics (From admission, onward)      Start     Stop Route Frequency Ordered    05/15/25 2100  insulin glargine U-100 (Lantus) pen 25 Units         -- SubQ Nightly 05/15/25 1710    05/15/25 1751  insulin aspart U-100 pen 0-5 Units         -- SubQ Before meals & nightly PRN 05/15/25 1651          Hold Oral hypoglycemics while patient is in the hospital.

## 2025-05-18 VITALS
WEIGHT: 241.19 LBS | SYSTOLIC BLOOD PRESSURE: 133 MMHG | HEIGHT: 68 IN | OXYGEN SATURATION: 100 % | DIASTOLIC BLOOD PRESSURE: 57 MMHG | BODY MASS INDEX: 36.55 KG/M2 | RESPIRATION RATE: 18 BRPM | TEMPERATURE: 98 F | HEART RATE: 62 BPM

## 2025-05-18 LAB
ALBUMIN SERPL BCP-MCNC: 2.5 G/DL (ref 3.5–5.2)
ALP SERPL-CCNC: 114 UNIT/L (ref 40–150)
ALT SERPL W/O P-5'-P-CCNC: 34 UNIT/L (ref 10–44)
ANION GAP (OHS): 10 MMOL/L (ref 8–16)
AST SERPL-CCNC: 18 UNIT/L (ref 11–45)
BILIRUB SERPL-MCNC: 0.2 MG/DL (ref 0.1–1)
BUN SERPL-MCNC: 39 MG/DL (ref 8–23)
CALCIUM SERPL-MCNC: 8.1 MG/DL (ref 8.7–10.5)
CHLORIDE SERPL-SCNC: 104 MMOL/L (ref 95–110)
CO2 SERPL-SCNC: 24 MMOL/L (ref 23–29)
CREAT SERPL-MCNC: 1.1 MG/DL (ref 0.5–1.4)
GFR SERPLBLD CREATININE-BSD FMLA CKD-EPI: 57 ML/MIN/1.73/M2
GLUCOSE SERPL-MCNC: 126 MG/DL (ref 70–110)
MAGNESIUM SERPL-MCNC: 1.7 MG/DL (ref 1.6–2.6)
PHOSPHATE SERPL-MCNC: 4 MG/DL (ref 2.7–4.5)
POCT GLUCOSE: 144 MG/DL (ref 70–110)
POCT GLUCOSE: 277 MG/DL (ref 70–110)
POTASSIUM SERPL-SCNC: 3.5 MMOL/L (ref 3.5–5.1)
PROT SERPL-MCNC: 6 GM/DL (ref 6–8.4)
SODIUM SERPL-SCNC: 138 MMOL/L (ref 136–145)

## 2025-05-18 PROCEDURE — 80053 COMPREHEN METABOLIC PANEL: CPT | Performed by: HOSPITALIST

## 2025-05-18 PROCEDURE — 84100 ASSAY OF PHOSPHORUS: CPT | Performed by: HOSPITALIST

## 2025-05-18 PROCEDURE — 25000003 PHARM REV CODE 250: Performed by: HOSPITALIST

## 2025-05-18 PROCEDURE — 63600175 PHARM REV CODE 636 W HCPCS: Performed by: HOSPITALIST

## 2025-05-18 PROCEDURE — 36415 COLL VENOUS BLD VENIPUNCTURE: CPT | Performed by: HOSPITALIST

## 2025-05-18 PROCEDURE — 83735 ASSAY OF MAGNESIUM: CPT | Performed by: HOSPITALIST

## 2025-05-18 RX ORDER — LEVETIRACETAM 250 MG/1
500 TABLET ORAL 2 TIMES DAILY
Qty: 360 TABLET | Refills: 0 | Status: SHIPPED | OUTPATIENT
Start: 2025-05-18 | End: 2025-08-16

## 2025-05-18 RX ORDER — FUROSEMIDE 80 MG/1
80 TABLET ORAL 2 TIMES DAILY
Qty: 180 TABLET | Refills: 0 | Status: SHIPPED | OUTPATIENT
Start: 2025-05-18 | End: 2025-08-16

## 2025-05-18 RX ORDER — INSULIN GLARGINE 300 [IU]/ML
25 INJECTION, SOLUTION SUBCUTANEOUS DAILY
Start: 2025-05-18

## 2025-05-18 RX ADMIN — BUPROPION HYDROCHLORIDE 150 MG: 150 TABLET, EXTENDED RELEASE ORAL at 10:05

## 2025-05-18 RX ADMIN — ESCITALOPRAM OXALATE 20 MG: 20 TABLET ORAL at 10:05

## 2025-05-18 RX ADMIN — FUROSEMIDE 80 MG: 10 INJECTION, SOLUTION INTRAVENOUS at 10:05

## 2025-05-18 RX ADMIN — CLOPIDOGREL 75 MG: 75 TABLET ORAL at 10:05

## 2025-05-18 RX ADMIN — SACUBITRIL AND VALSARTAN 1 TABLET: 97; 103 TABLET, FILM COATED ORAL at 09:05

## 2025-05-18 RX ADMIN — CARIPRAZINE 1.5 MG: 1.5 CAPSULE, GELATIN COATED ORAL at 10:05

## 2025-05-18 RX ADMIN — ASPIRIN 81 MG: 81 TABLET, COATED ORAL at 10:05

## 2025-05-18 NOTE — PLAN OF CARE
[facility]  Discharge Final Note    Primary Care Provider: Juan Luis Carr Jr., MD    Expected Discharge Date: 5/18/2025    Final Discharge Note (most recent)       Final Note - 05/18/25 1108          Final Note    Assessment Type Final Discharge Note     Anticipated Discharge Disposition Home or Self Care     Hospital Resources/Appts/Education Provided Appointments scheduled and added to AVS        Post-Acute Status    Post-Acute Authorization Other     Other Status No Post-Acute Service Needs     Discharge Delays None known at this time                     Important Message from Medicare

## 2025-05-18 NOTE — DISCHARGE SUMMARY
[facility]  Discharge Summary      Patient Name: Devika Bustillo  MRN: [id number]  TAURUS: [id number]  Patient Class: IP- Inpatient  Admission Date: 5/15/2025  Hospital Length of Stay: 2 days  Discharge Date and Time: 5/18/2025  1:57 PM  Attending Physician: Gwendolyn att. providers found   Discharging Provider: Katy Jeffers MD  Primary Care Provider: Juan Luis Carr Jr., MD  Hospital Medicine Team: [facility] Katy Jeffers MD  Primary Care Team: [facility]    HPI:   Ms. Devika Bustillo is a 64 y.o. female with chronic heart failure, left transverse-sigmoid sinus stenosis, s/p stenting followed by balloon valvuloplasty of the left internal jugular vein outlet valve March 2025, T2DM on insulin, anxiety, depression and obesity who presented to the [facility] ED on 5/15 for evaluation of headaches and shortness of breath.  She reports that she has not been compliant with her Lasix 40mg BID.  She has been worsening STOUT and SOB at rest.  She endorses a dry weight of around 230lbs, and currently weighs closer to 245lbs. She endorses BLE edema.  She also reports worsening headaches since having her stent placed in March.  She was being seen by Neurology who started her on Zonisamide 100mg, but she didn't like how it made her feel so she stopped taking it.  She endorses compliance with her Aspirin and Plavix.  She took all of her medications this morning.    Upon arrival to the ER, vitals were temp 98.6F, HR 58, /87.  Labs showed WBC 6, Hgb 11, BUN 30, Creatinine 1, Glucose 233, .  CXR showed pulmonary congestion.  She was given Lasix 40mg IV x 1 and endorsed mild UOP.  She was admitted to Hospital Medicine for further diuresis.    * No surgery found *      Hospital Course:   Ms. Bustillo was admitted to Hospital Medicine for management of a CHF exacerbation and headaches.  She was started on Lasix IV BID and increased to TID dosing as she reported she was not having frequent  enough diuresis.   Repeat echo was ordered that showed elevated PAP suggestive of PHTN.  Neuro was consulted for persistent headaches in the setting of cerebral stents who suggested low dose Keppra BID for IIH Headaches, but that they should improve with continued IV diuresis.  She was discharged home on Lasix 80mg BID, holding Metoprolol for HR 40-50s, and Keppra for headaches.    Ms. Devika Bustillo was deemed appropriate for discharge.  At the time of discharge, the plan of care was discussed with patient/family, who were agreeable and amenable.  All medications were verbally reviewed and discussed with the patient/family.  They endorsed understanding and compliance.  Informed them that these changes will be available on their discharge paperwork, as well.  Outpatient follow-ups were scheduled, or if unable to be scheduled ambulatory referrals were placed, and ER return precautions were given.  All questions were answered to the patient/family's satisfaction.  Ms. Devika Bustillo was subsequently discharged in stable condition.       Goals of Care Treatment Preferences:  Code Status: Full Code      SDOH Screening:  The patient was screened for utility difficulties, food insecurity, transport difficulties, housing insecurity, and interpersonal safety and there were no concerns identified this admission.     Consults:   Consults (From admission, onward)          Status Ordering Provider     Inpatient consult to General Neurology  Once        Provider:  (Not yet assigned)    Completed FAUZIA WEINBERG     Inpatient consult to Social Work/Case Management  Once        Provider:  (Not yet assigned)    Acknowledged FAUZIA WEINBERG     Inpatient consult to Registered Dietitian/Nutritionist  Once        Provider:  (Not yet assigned)    Completed FAUZIA WEINBERG            Assessment & Plan  Acute on chronic congestive heart failure  Pulmonary hypertension  Patient has acute on chronic CHF.  Previous echo reviewed and normal EF  "and no DD.  On presentation their CHF was decompensated. Evidence of decompensated CHF on presentation includes: edema, crackles on lung auscultation, weight gain, dyspnea on exertion (STOUT), and shortness of breath. The etiology of their decompensation is likely medication non-compliance. Most recent BNP and echo results are listed below.  No results for input(s): "BNP" in the last 72 hours.    Latest ECHO  Results for orders placed during the hospital encounter of 10/09/24    Echo    Interpretation Summary    Left Ventricle: The left ventricle is normal in size. Normal wall thickness. Normal wall motion. There is hyperdynamic systolic function with a visually estimated ejection fraction of greater than 70%. There is normal diastolic function.    Right Ventricle: Normal right ventricular cavity size. Wall thickness is normal. Systolic function is normal.    Left Atrium: Left atrium is moderately dilated.    Mitral Valve: There is moderate mitral annular calcification present.    Tricuspid Valve: There is mild regurgitation.    IVC/SVC: Normal venous pressure at 3 mmHg.    Current Heart Failure Medications  sacubitriL-valsartan  mg per tablet 1 tablet, 2 times daily, Oral  furosemide injection 80 mg, 3 times daily, Intravenous  furosemide (LASIX) tablet, 2 times daily, Oral    Plan  - Monitor strict I&Os and daily weights.    - Place on telemetry  - Low sodium diet  - Place on fluid restriction of 1.5 L.   - Cardiology has not been consulted  - Still with rales and BLE pitting edema, SOB and STOUT  - Continue IV diuresis but increase to Lasix 80mg IV TID  - Home diuretic Lasix 40mg BID but not compliant   - Repeat echo with elevated PAP suggestive of PHTN  - Stop Metoprolol with bradycardia to the 40s  Diabetes mellitus type II  Patient's FSGs are uncontrolled due to hyperglycemia on current medication regimen.  Last A1c reviewed-   Lab Results   Component Value Date    HGBA1C 10.8 (H) 05/15/2025     Most recent " fingerstick glucose reviewed-   Recent Labs   Lab 05/17/25  1631 05/17/25  2104 05/18/25  0806 05/18/25  1106   POCTGLUCOSE 255* 242* 144* 277*     Current correctional scale  Medium  Maintain anti-hyperglycemic dose as follows-   Antihyperglycemics (From admission, onward)      Start     Stop Route Frequency Ordered    05/15/25 2100  insulin glargine U-100 (Lantus) pen 25 Units         -- SubQ Nightly 05/15/25 1710    05/15/25 1751  insulin aspart U-100 pen 0-5 Units         -- SubQ Before meals & nightly PRN 05/15/25 1651          Hold Oral hypoglycemics while patient is in the hospital.  Hypertension  Patient's blood pressure range in the last 24 hours was: BP  Min: 107/49  Max: 133/57.The patient's inpatient anti-hypertensive regimen is listed below:  Current Antihypertensives  furosemide injection 80 mg, 3 times daily, Intravenous  furosemide (LASIX) tablet, 2 times daily, Oral    Plan  - BP is controlled, no changes needed to their regimen  - Stop Metoprolol with bradycardia to the 40s  History of pulmonary embolism  History noted  Not on blood thinners    Anxiety and depression  Chronic issue for which she sees Dr. Barclay in Psych  Continue Wellbutrin  Continue Abilify  Class 2 severe obesity due to excess calories with serious comorbidity in adult  Body mass index is 36.67 kg/m². Morbid obesity complicates all aspects of disease management from diagnostic modalities to treatment. Weight loss encouraged and health benefits explained to patient.       IIH (idiopathic intracranial hypertension)  Tension type headache  History of angioplasty of transverse sinus of brain with insertion of stent  Known transverse sinus stenosis  On 03/21/2025 she underwent left transverse-sigmoid sinus stenting followed by balloon valvuloplasty of the left internal jugular vein outlet valve  Continues to have headaches  Continue Aspirin and Plavix  Neuro consulted for further evaluation   Hypomagnesemia  Patient has Abnormal  Magnesium: hypomagnesemia. Will continue to monitor electrolytes closely. Will replace the affected electrolytes and repeat labs to be done after interventions completed. The patient's magnesium results have been reviewed and are listed below.  Recent Labs   Lab 05/18/25  0502   MG 1.7      Hypercoagulable state  Noted  Continue Aspirin and Plavix    Final Active Diagnoses:    Diagnosis Date Noted POA    PRINCIPAL PROBLEM:  Acute on chronic congestive heart failure [I50.9] 05/15/2025 Yes    Hypomagnesemia [E83.42] 05/16/2025 No    Hypercoagulable state [D68.59] 05/16/2025 Yes    Pulmonary hypertension [I27.20] 05/16/2025 Yes    Class 2 severe obesity due to excess calories with serious comorbidity in adult [E66.812, E66.01] 05/15/2025 Yes    History of angioplasty of transverse sinus of brain with insertion of stent [Z95.828] 05/15/2025 Not Applicable    IIH (idiopathic intracranial hypertension) [G93.2] 03/04/2024 Yes    Tension type headache [G44.209] 01/29/2024 Yes    Anxiety and depression [F41.9, F32.A] 01/16/2024 Yes    History of pulmonary embolism [Z86.711] 05/20/2020 Yes     Chronic    Hypertension [I10] 02/28/2013 Yes    Diabetes mellitus type II [E11.9] 02/28/2013 Yes      Problems Resolved During this Admission:    Diagnosis Date Noted Date Resolved POA    Obesity, diabetes, and hypertension syndrome [E11.69, E66.9, E11.59, I15.2] 05/20/2020 05/16/2025 Yes       Discharged Condition: stable    Disposition: Home or Self Care    Follow Up:    Patient Instructions:      Ambulatory referral/consult to [facility]   Standing Status: Future   Referral Priority: Routine Referral Type: Consultation   Referral Reason: Specialty Services Required   Requested Specialty: Cardiology   Number of Visits Requested: 1     Call MD for:  temperature >100.4     Call MD for:  persistent nausea and vomiting or diarrhea     Call MD for:  severe uncontrolled pain     Call MD for:  redness, tenderness,  or signs of infection (pain, swelling, redness, odor or green/yellow discharge around incision site)     Call MD for:  difficulty breathing or increased cough     Call MD for:  severe persistent headache     Call MD for:  worsening rash     Call MD for:  persistent dizziness, light-headedness, or visual disturbances     Call MD for:  increased confusion or weakness       Significant Diagnostic Studies: Labs: CMP   Recent Labs   Lab 05/17/25  0442 05/18/25  0502    138   K 3.8 3.5    104   CO2 23 24   * 126*   BUN 36* 39*   CREATININE 1.2 1.1   CALCIUM 8.4* 8.1*   PROT 6.1 6.0   ALBUMIN 2.5* 2.5*   BILITOT 0.2 0.2   ALKPHOS 118 114   AST 29 18   ALT 44 34   ANIONGAP 11 10    and CBC   Recent Labs   Lab 05/17/25  0442   WBC 5.21   HGB 10.4*   HCT 32.8*          Pending Diagnostic Studies:       None           Medications:  Reconciled Home Medications:      Medication List        PAUSE taking these medications      metoprolol succinate 50 MG 24 hr tablet  Wait to take this until your doctor or other care provider tells you to start again.  Your heart rates are low.  Hold Metoprolol until you are told to restart it by your provider  Commonly known as: TOPROL-XL  Take 1 tablet by mouth once daily.            START taking these medications      levETIRAcetam 250 MG Tab  Commonly known as: KEPPRA  Take 2 tablets (500 mg total) by mouth 2 (two) times daily for headaches            CHANGE how you take these medications      furosemide 80 MG tablet  Commonly known as: LASIX  Take 1 tablet (80 mg total) by mouth 2 (two) times daily.  What changed:   medication strength  how much to take     insulin glargine (TOUJEO) 300 unit/mL (1.5 mL) Inpn pen  Commonly known as: TOUJEO SOLOSTAR U-300 INSULIN  Inject 25 Units into the skin once daily.  What changed: how much to take            CONTINUE taking these medications      acetaminophen 500 MG tablet  Commonly known as: TYLENOL  Take 2 tablets (1,000 mg  total) by mouth every 8 (eight) hours as needed for Pain.     albuterol 90 mcg/actuation inhaler  Commonly known as: VENTOLIN HFA  Inhale 1-2 puffs into the lungs every 6 (six) hours as needed for Wheezing or Shortness of Breath. Rescue     albuterol-ipratropium 2.5 mg-0.5 mg/3 mL nebulizer solution  Commonly known as: DUO-NEB  Take 3 mLs by nebulization every 6 (six) hours as needed for Wheezing or Shortness of Breath. Rescue     aspirin 81 MG EC tablet  Commonly known as: ECOTRIN  Take 81 mg by mouth once daily.     atorvastatin 40 MG tablet  Commonly known as: LIPITOR  Take 80 mg by mouth every evening.     blood sugar diagnostic Strp  To check BG 4 times daily, to use with insurance preferred meter     blood-glucose meter kit  To check BG 4 times daily, to use with insurance preferred meter     buPROPion 150 MG TB24 tablet  Commonly known as: WELLBUTRIN XL  Take 1 tablet (150 mg total) by mouth once daily.     clobetasol 0.05% 0.05 % Oint  Commonly known as: TEMOVATE  Apply topically 2 (two) times daily.     clopidogreL 75 mg tablet  Commonly known as: PLAVIX  Take 75 mg by mouth once daily.     dapagliflozin propanediol 10 mg tablet  Commonly known as: Farxiga  Take 1 tablet by mouth once daily.     DULERA 200-5 mcg/actuation inhaler  Generic drug: mometasone-formoterol  Inhale 2 puffs into the lungs 2 (two) times daily.     ENTRESTO  mg per tablet  Generic drug: sacubitriL-valsartan  Take 1 tablet by mouth 2 (two) times daily.     EScitalopram oxalate 20 MG tablet  Commonly known as: LEXAPRO  Take 1 tablet (20 mg total) by mouth once daily.     gabapentin 300 MG capsule  Commonly known as: NEURONTIN  Take 300 mg by mouth 2 (two) times daily as needed.     lancets Misc  To check BG 4 times daily, to use with insurance preferred meter     LIDOcaine-prilocaine cream  Commonly known as: EMLA  Apply topically as needed.     loratadine 10 mg tablet  Commonly known as: CLARITIN  Take 10 mg by mouth daily as  "needed for Allergies.     ondansetron 4 MG tablet  Commonly known as: ZOFRAN  Take 4 mg by mouth every 8 (eight) hours as needed.     pantoprazole 40 MG tablet  Commonly known as: PROTONIX  Take 1 tablet (40 mg total) by mouth 2 (two) times daily before meals.     * pen needle, diabetic 31 gauge x 3/16" Ndle  1 each.     * BD ULTRA-FINE MARY PEN NEEDLE 32 gauge x 5/32" Ndle  Generic drug: pen needle, diabetic  Use to inject insulin four times daily     polyethylene glycol 17 gram/dose powder  Commonly known as: GLYCOLAX  Use cap to measure 17 grams, mix in liquid and take by mouth 2 (two) times daily as needed for Constipation.     potassium chloride 10 MEQ Tbsr  Commonly known as: KLOR-CON  Take 1 tablet (10 mEq total) by mouth as needed (take 1 tablet when you take lasix (furosemide)).     STOOL SOFTENER-LAXATIVE 8.6-50 mg per tablet  Generic drug: senna-docusate  Take 1 tablet by mouth 2 (two) times daily as needed for Constipation.     SYMBICORT 160-4.5 mcg/actuation Hfaa  Generic drug: budesonide-formoterol 160-4.5 mcg  Inhale 2 puffs into the lungs 2 (two) times daily.     topiramate 100 MG tablet  Commonly known as: TOPAMAX  Take 50 mg by mouth 2 (two) times daily.     traMADoL 50 mg tablet  Commonly known as: ULTRAM  Take 1 tablet (50 mg total) by mouth every 6 (six) hours as needed for Pain.     traZODone 100 MG tablet  Commonly known as: DESYREL  Take 2 tablets (200 mg total) by mouth every evening.     TRULICITY 3 mg/0.5 mL pen injector  Generic drug: dulaglutide  Inject 3 mg into the skin every 7 days. Fridays     VRAYLAR 1.5 mg Cap  Generic drug: cariprazine  Take 1.5 mg by mouth once daily.           * This list has 2 medication(s) that are the same as other medications prescribed for you. Read the directions carefully, and ask your doctor or other care provider to review them with you.                STOP taking these medications      atropine 1% 1 % Drop  Commonly known as: ISOPTO ATROPINE   "   DUPIXENT  mg/2 mL Pnij  Generic drug: dupilumab     ILEVRO 0.3 % Drps  Generic drug: nepafenac     tiZANidine 4 MG tablet  Commonly known as: ZANAFLEX              Indwelling Lines/Drains at time of discharge:   Lines/Drains/Airways       None                   Time spent on the discharge of patient: 35 minutes         Katy Jeffers MD  Department of Hospital Medicine  [facility] - Acute Medical Stepdown

## 2025-05-18 NOTE — ASSESSMENT & PLAN NOTE
Patient's FSGs are uncontrolled due to hyperglycemia on current medication regimen.  Last A1c reviewed-   Lab Results   Component Value Date    HGBA1C 10.8 (H) 05/15/2025     Most recent fingerstick glucose reviewed-   Recent Labs   Lab 05/17/25  1631 05/17/25  2104 05/18/25  0806 05/18/25  1106   POCTGLUCOSE 255* 242* 144* 277*     Current correctional scale  Medium  Maintain anti-hyperglycemic dose as follows-   Antihyperglycemics (From admission, onward)      Start     Stop Route Frequency Ordered    05/15/25 2100  insulin glargine U-100 (Lantus) pen 25 Units         -- SubQ Nightly 05/15/25 1710    05/15/25 1751  insulin aspart U-100 pen 0-5 Units         -- SubQ Before meals & nightly PRN 05/15/25 1651          Hold Oral hypoglycemics while patient is in the hospital.

## 2025-05-18 NOTE — SUBJECTIVE & OBJECTIVE
Interval History: No acute events overnight.  Feels much better today.  Headache improved.  BLE edema much improved.  She feels comfortable going home today.  She walked the halls with some SOB, but got a very long distance before    Review of Systems   Constitutional:  Negative for chills, fatigue and fever.   Eyes:  Positive for visual disturbance.   Respiratory:  Positive for shortness of breath. Negative for cough.    Cardiovascular:  Positive for leg swelling. Negative for chest pain and palpitations.   Gastrointestinal:  Negative for abdominal pain, diarrhea, nausea and vomiting.   Genitourinary:  Positive for frequency. Negative for dysuria and urgency.   Neurological:  Positive for headaches. Negative for dizziness.   All other systems reviewed and are negative.    Objective:     Vital Signs (Most Recent):  Temp: 98.1 °F (36.7 °C) (05/18/25 1107)  Pulse: 62 (05/18/25 1107)  Resp: 18 (05/18/25 1107)  BP: (!) 133/57 (05/18/25 1107)  SpO2: 100 % (05/18/25 1107) Vital Signs (24h Range):  Temp:  [97.1 °F (36.2 °C)-98.5 °F (36.9 °C)] 98.1 °F (36.7 °C)  Pulse:  [48-62] 62  Resp:  [16-18] 18  SpO2:  [96 %-100 %] 100 %  BP: (107-147)/(46-79) 133/57     Weight: 109.4 kg (241 lb 2.9 oz)  Body mass index is 36.67 kg/m².    Intake/Output Summary (Last 24 hours) at 5/18/2025 1208  Last data filed at 5/18/2025 0000  Gross per 24 hour   Intake 500 ml   Output 903 ml   Net -403 ml         Physical Exam  Constitutional:       Appearance: She is obese.   HENT:      Head: Normocephalic and atraumatic.   Cardiovascular:      Rate and Rhythm: Regular rhythm. Bradycardia present.      Heart sounds: No murmur heard.  Pulmonary:      Effort: Pulmonary effort is normal. No respiratory distress.      Breath sounds: Rales (Bases) present. No wheezing.   Abdominal:      General: There is no distension.      Palpations: Abdomen is soft.      Tenderness: There is no abdominal tenderness.   Musculoskeletal:         General: No deformity.       Right lower leg: Edema (1+) present.      Left lower leg: Edema (1+) present.   Neurological:      General: No focal deficit present.      Mental Status: She is alert and oriented to person, place, and time. Mental status is at baseline.               Significant Labs: All pertinent labs within the past 24 hours have been reviewed.  CBC:   Recent Labs   Lab 05/17/25  0442   WBC 5.21   HGB 10.4*   HCT 32.8*        CMP:   Recent Labs   Lab 05/17/25  0442 05/18/25  0502    138   K 3.8 3.5    104   CO2 23 24   * 126*   BUN 36* 39*   CREATININE 1.2 1.1   CALCIUM 8.4* 8.1*   PROT 6.1 6.0   ALBUMIN 2.5* 2.5*   BILITOT 0.2 0.2   ALKPHOS 118 114   AST 29 18   ALT 44 34   ANIONGAP 11 10       Significant Imaging: I have reviewed all pertinent imaging results/findings within the past 24 hours.

## 2025-05-18 NOTE — ASSESSMENT & PLAN NOTE
"Patient has acute on chronic CHF.  Previous echo reviewed and normal EF and no DD.  On presentation their CHF was decompensated. Evidence of decompensated CHF on presentation includes: edema, crackles on lung auscultation, weight gain, dyspnea on exertion (STOUT), and shortness of breath. The etiology of their decompensation is likely medication non-compliance. Most recent BNP and echo results are listed below.  No results for input(s): "BNP" in the last 72 hours.    Latest ECHO  Results for orders placed during the hospital encounter of 10/09/24    Echo    Interpretation Summary    Left Ventricle: The left ventricle is normal in size. Normal wall thickness. Normal wall motion. There is hyperdynamic systolic function with a visually estimated ejection fraction of greater than 70%. There is normal diastolic function.    Right Ventricle: Normal right ventricular cavity size. Wall thickness is normal. Systolic function is normal.    Left Atrium: Left atrium is moderately dilated.    Mitral Valve: There is moderate mitral annular calcification present.    Tricuspid Valve: There is mild regurgitation.    IVC/SVC: Normal venous pressure at 3 mmHg.    Current Heart Failure Medications  sacubitriL-valsartan  mg per tablet 1 tablet, 2 times daily, Oral  furosemide injection 80 mg, 3 times daily, Intravenous  furosemide (LASIX) tablet, 2 times daily, Oral    Plan  - Monitor strict I&Os and daily weights.    - Place on telemetry  - Low sodium diet  - Place on fluid restriction of 1.5 L.   - Cardiology has not been consulted  - Still with rales and BLE pitting edema, SOB and STOUT  - Continue IV diuresis but increase to Lasix 80mg IV TID  - Home diuretic Lasix 40mg BID but not compliant   - Repeat echo with elevated PAP suggestive of PHTN  - Stop Metoprolol with bradycardia to the 40s  "

## 2025-05-18 NOTE — ASSESSMENT & PLAN NOTE
Patient has acute on chronic CHF.  Previous echo reviewed and normal EF and no DD.  On presentation their CHF was decompensated. Evidence of decompensated CHF on presentation includes: edema, crackles on lung auscultation, weight gain, dyspnea on exertion (STOUT), and shortness of breath. The etiology of their decompensation is likely medication non-compliance. Most recent BNP and echo results are listed below.  Recent Labs     05/15/25  1408   *     Latest ECHO  Results for orders placed during the hospital encounter of 10/09/24    Echo    Interpretation Summary    Left Ventricle: The left ventricle is normal in size. Normal wall thickness. Normal wall motion. There is hyperdynamic systolic function with a visually estimated ejection fraction of greater than 70%. There is normal diastolic function.    Right Ventricle: Normal right ventricular cavity size. Wall thickness is normal. Systolic function is normal.    Left Atrium: Left atrium is moderately dilated.    Mitral Valve: There is moderate mitral annular calcification present.    Tricuspid Valve: There is mild regurgitation.    IVC/SVC: Normal venous pressure at 3 mmHg.    Current Heart Failure Medications  sacubitriL-valsartan  mg per tablet 1 tablet, 2 times daily, Oral  furosemide injection 80 mg, 3 times daily, Intravenous  furosemide (LASIX) tablet, 2 times daily, Oral    Plan  - Monitor strict I&Os and daily weights.    - Place on telemetry  - Low sodium diet  - Place on fluid restriction of 1.5 L.   - Cardiology has not been consulted  - Still with rales and BLE pitting edema, SOB and STOUT  - Continue IV diuresis but increase to Lasix 80mg IV TID  - Home diuretic Lasix 40mg BID but not compliant   - Repeat echo with elevated PAP suggestive of PHTN  - Stop Metoprolol with bradycardia to the 40s

## 2025-05-18 NOTE — PLAN OF CARE
Sw updated by nursing that Pt has dc orders and will need transport assistance home once medication are delivered to bedside. Sw to be updated by nursing when Pt is ready for transport home via [employer].

## 2025-05-18 NOTE — ASSESSMENT & PLAN NOTE
Patient has Abnormal Magnesium: hypomagnesemia. Will continue to monitor electrolytes closely. Will replace the affected electrolytes and repeat labs to be done after interventions completed. The patient's magnesium results have been reviewed and are listed below.  Recent Labs   Lab 05/18/25  0502   MG 1.7

## 2025-05-18 NOTE — PROGRESS NOTES
[facility] Acute Medical [facility] Medicine  Progress Note    Patient Name: Devika Bustillo  MRN: [id number]  Patient Class: IP- Inpatient   Admission Date: 5/15/2025  Length of Stay: 2 days  Attending Physician: Katy Jeffers MD  Primary Care Provider: Juan Luis Carr Jr., MD        Subjective     Principal Problem:Acute on chronic congestive heart failure        HPI:  Ms. Devika Bustillo is a 64 y.o. female with chronic heart failure, left transverse-sigmoid sinus stenosis, s/p stenting followed by balloon valvuloplasty of the left internal jugular vein outlet valve March 2025, T2DM on insulin, anxiety, depression and obesity who presented to the [facility] ED on 5/15 for evaluation of headaches and shortness of breath.  She reports that she has not been compliant with her Lasix 40mg BID.  She has been worsening STOUT and SOB at rest.  She endorses a dry weight of around 230lbs, and currently weighs closer to 245lbs. She endorses BLE edema.  She also reports worsening headaches since having her stent placed in March.  She was being seen by Neurology who started her on Zonisamide 100mg, but she didn't like how it made her feel so she stopped taking it.  She endorses compliance with her Aspirin and Plavix.  She took all of her medications this morning.    Upon arrival to the ER, vitals were temp 98.6F, HR 58, /87.  Labs showed WBC 6, Hgb 11, BUN 30, Creatinine 1, Glucose 233, .  CXR showed pulmonary congestion.  She was given Lasix 40mg IV x 1 and endorsed mild UOP.  She was admitted to Hospital Medicine for further diuresis.    Overview/Hospital Course:  Ms. Bustillo was admitted to Hospital Medicine for management of a CHF exacerbation and headaches.  She was started on Lasix IV BID and increased to TID dosing as she reported she was not having frequent enough diuresis.   Repeat echo was ordered that showed elevated PAP suggestive of PHTN.  Neuro was consulted for persistent headaches in the setting of  cerebral stents who suggested low dose Keppra BID for IIH Headaches, but that they should improve with continued IV diuresis.    Interval History: No acute events overnight.  Feels much better today.  Headache improved.  BLE edema much improved.  She feels comfortable going home today.  She walked the halls with some SOB, but got a very long distance before    Review of Systems   Constitutional:  Negative for chills, fatigue and fever.   Eyes:  Positive for visual disturbance.   Respiratory:  Positive for shortness of breath. Negative for cough.    Cardiovascular:  Positive for leg swelling. Negative for chest pain and palpitations.   Gastrointestinal:  Negative for abdominal pain, diarrhea, nausea and vomiting.   Genitourinary:  Positive for frequency. Negative for dysuria and urgency.   Neurological:  Positive for headaches. Negative for dizziness.   All other systems reviewed and are negative.    Objective:     Vital Signs (Most Recent):  Temp: 98.1 °F (36.7 °C) (05/18/25 1107)  Pulse: 62 (05/18/25 1107)  Resp: 18 (05/18/25 1107)  BP: (!) 133/57 (05/18/25 1107)  SpO2: 100 % (05/18/25 1107) Vital Signs (24h Range):  Temp:  [97.1 °F (36.2 °C)-98.5 °F (36.9 °C)] 98.1 °F (36.7 °C)  Pulse:  [48-62] 62  Resp:  [16-18] 18  SpO2:  [96 %-100 %] 100 %  BP: (107-147)/(46-79) 133/57     Weight: 109.4 kg (241 lb 2.9 oz)  Body mass index is 36.67 kg/m².    Intake/Output Summary (Last 24 hours) at 5/18/2025 1208  Last data filed at 5/18/2025 0000  Gross per 24 hour   Intake 500 ml   Output 903 ml   Net -403 ml         Physical Exam  Constitutional:       Appearance: She is obese.   HENT:      Head: Normocephalic and atraumatic.   Cardiovascular:      Rate and Rhythm: Regular rhythm. Bradycardia present.      Heart sounds: No murmur heard.  Pulmonary:      Effort: Pulmonary effort is normal. No respiratory distress.      Breath sounds: Rales (Bases) present. No wheezing.   Abdominal:      General: There is no distension.       Palpations: Abdomen is soft.      Tenderness: There is no abdominal tenderness.   Musculoskeletal:         General: No deformity.      Right lower leg: Edema (1+) present.      Left lower leg: Edema (1+) present.   Neurological:      General: No focal deficit present.      Mental Status: She is alert and oriented to person, place, and time. Mental status is at baseline.               Significant Labs: All pertinent labs within the past 24 hours have been reviewed.  CBC:   Recent Labs   Lab 05/17/25  0442   WBC 5.21   HGB 10.4*   HCT 32.8*        CMP:   Recent Labs   Lab 05/17/25  0442 05/18/25  0502    138   K 3.8 3.5    104   CO2 23 24   * 126*   BUN 36* 39*   CREATININE 1.2 1.1   CALCIUM 8.4* 8.1*   PROT 6.1 6.0   ALBUMIN 2.5* 2.5*   BILITOT 0.2 0.2   ALKPHOS 118 114   AST 29 18   ALT 44 34   ANIONGAP 11 10       Significant Imaging: I have reviewed all pertinent imaging results/findings within the past 24 hours.      Assessment & Plan  Acute on chronic congestive heart failure  Pulmonary hypertension  Patient has acute on chronic CHF.  Previous echo reviewed and normal EF and no DD.  On presentation their CHF was decompensated. Evidence of decompensated CHF on presentation includes: edema, crackles on lung auscultation, weight gain, dyspnea on exertion (STOUT), and shortness of breath. The etiology of their decompensation is likely medication non-compliance. Most recent BNP and echo results are listed below.  Recent Labs     05/15/25  1408   *     Latest ECHO  Results for orders placed during the hospital encounter of 10/09/24    Echo    Interpretation Summary    Left Ventricle: The left ventricle is normal in size. Normal wall thickness. Normal wall motion. There is hyperdynamic systolic function with a visually estimated ejection fraction of greater than 70%. There is normal diastolic function.    Right Ventricle: Normal right ventricular cavity size. Wall thickness is normal.  Systolic function is normal.    Left Atrium: Left atrium is moderately dilated.    Mitral Valve: There is moderate mitral annular calcification present.    Tricuspid Valve: There is mild regurgitation.    IVC/SVC: Normal venous pressure at 3 mmHg.    Current Heart Failure Medications  sacubitriL-valsartan  mg per tablet 1 tablet, 2 times daily, Oral  furosemide injection 80 mg, 3 times daily, Intravenous  furosemide (LASIX) tablet, 2 times daily, Oral    Plan  - Monitor strict I&Os and daily weights.    - Place on telemetry  - Low sodium diet  - Place on fluid restriction of 1.5 L.   - Cardiology has not been consulted  - Still with rales and BLE pitting edema, SOB and STOUT  - Continue IV diuresis but increase to Lasix 80mg IV TID  - Home diuretic Lasix 40mg BID but not compliant   - Repeat echo with elevated PAP suggestive of PHTN  - Stop Metoprolol with bradycardia to the 40s  Diabetes mellitus type II  Patient's FSGs are uncontrolled due to hyperglycemia on current medication regimen.  Last A1c reviewed-   Lab Results   Component Value Date    HGBA1C 10.8 (H) 05/15/2025     Most recent fingerstick glucose reviewed-   Recent Labs   Lab 05/17/25  1631 05/17/25  2104 05/18/25  0806 05/18/25  1106   POCTGLUCOSE 255* 242* 144* 277*     Current correctional scale  Medium  Maintain anti-hyperglycemic dose as follows-   Antihyperglycemics (From admission, onward)      Start     Stop Route Frequency Ordered    05/15/25 2100  insulin glargine U-100 (Lantus) pen 25 Units         -- SubQ Nightly 05/15/25 1710    05/15/25 1751  insulin aspart U-100 pen 0-5 Units         -- SubQ Before meals & nightly PRN 05/15/25 1651          Hold Oral hypoglycemics while patient is in the hospital.  Hypertension  Patient's blood pressure range in the last 24 hours was: BP  Min: 107/49  Max: 147/61.The patient's inpatient anti-hypertensive regimen is listed below:  Current Antihypertensives  furosemide injection 80 mg, 3 times daily,  Intravenous  furosemide (LASIX) tablet, 2 times daily, Oral    Plan  - BP is controlled, no changes needed to their regimen  - Stop Metoprolol with bradycardia to the 40s  History of pulmonary embolism  History noted  Not on blood thinners    Anxiety and depression  Chronic issue for which she sees Dr. Barclay in Psych  Continue Wellbutrin  Continue Abilify  Class 2 severe obesity due to excess calories with serious comorbidity in adult  Body mass index is 36.67 kg/m². Morbid obesity complicates all aspects of disease management from diagnostic modalities to treatment. Weight loss encouraged and health benefits explained to patient.       IIH (idiopathic intracranial hypertension)  Tension type headache  History of angioplasty of transverse sinus of brain with insertion of stent  Known transverse sinus stenosis  On 03/21/2025 she underwent left transverse-sigmoid sinus stenting followed by balloon valvuloplasty of the left internal jugular vein outlet valve  Continues to have headaches  Continue Aspirin and Plavix  Neuro consulted for further evaluation   Hypomagnesemia  Patient has Abnormal Magnesium: hypomagnesemia. Will continue to monitor electrolytes closely. Will replace the affected electrolytes and repeat labs to be done after interventions completed. The patient's magnesium results have been reviewed and are listed below.  Recent Labs   Lab 05/18/25  0502   MG 1.7      Hypercoagulable state  Noted  Continue Aspirin and Plavix    VTE Risk Mitigation (From admission, onward)           Ordered     enoxaparin injection 40 mg  Daily         05/15/25 1653     IP VTE HIGH RISK PATIENT  Once         05/15/25 1653     Place sequential compression device  Until discontinued         05/15/25 1653     Place sequential compression device  Until discontinued         05/15/25 1651                    Discharge Planning   FRANK: 5/18/2025     Code Status: Full Code   Medical Readiness for Discharge Date: 5/18/2025  Discharge  Plan A: Home   Discharge Delays: None known at this time                    Kayt Jeffers MD  Department of Hospital Medicine   [facility] - Acute Medical Stepdown

## 2025-05-18 NOTE — ASSESSMENT & PLAN NOTE
Addended by: ADITYA LALA on: 3/9/2023 09:20 AM     Modules accepted: Orders     Patient has Abnormal Magnesium: hypomagnesemia. Will continue to monitor electrolytes closely. Will replace the affected electrolytes and repeat labs to be done after interventions completed. The patient's magnesium results have been reviewed and are listed below.  Recent Labs   Lab 05/18/25  0502   MG 1.7

## 2025-05-18 NOTE — NURSING
To be discharged home with family. Med to be delivered from pharmacy. DC instructions given. IV dc'd. Safety measures in place.

## 2025-05-18 NOTE — ASSESSMENT & PLAN NOTE
Patient has acute on chronic CHF.  Previous echo reviewed and normal EF and no DD.  On presentation their CHF was decompensated. Evidence of decompensated CHF on presentation includes: edema, crackles on lung auscultation, weight gain, dyspnea on exertion (STOUT), and shortness of breath. The etiology of their decompensation is likely medication non-compliance. Most recent BNP and echo results are listed below.  Recent Labs     05/15/25  1408   *     Latest ECHO  Results for orders placed during the hospital encounter of 10/09/24    Echo    Interpretation Summary    Left Ventricle: The left ventricle is normal in size. Normal wall thickness. Normal wall motion. There is hyperdynamic systolic function with a visually estimated ejection fraction of greater than 70%. There is normal diastolic function.    Right Ventricle: Normal right ventricular cavity size. Wall thickness is normal. Systolic function is normal.    Left Atrium: Left atrium is moderately dilated.    Mitral Valve: There is moderate mitral annular calcification present.    Tricuspid Valve: There is mild regurgitation.    IVC/SVC: Normal venous pressure at 3 mmHg.    Current Heart Failure Medications  sacubitriL-valsartan  mg per tablet 1 tablet, 2 times daily, Oral  furosemide injection 80 mg, 3 times daily, Intravenous  furosemide (LASIX) tablet, 2 times daily, Oral    Plan  - Monitor strict I&Os and daily weights.    - Place on telemetry  - Low sodium diet  - Place on fluid restriction of 1.5 L.   - Cardiology has not been consulted  - Still with rales and BLE pitting edema, SOB and TSOUT  - Continue IV diuresis but increase to Lasix 80mg IV TID  - Home diuretic Lasix 40mg BID but not compliant   - Repeat echo with elevated PAP suggestive of PHTN  - Stop Metoprolol with bradycardia to the 40s

## 2025-05-18 NOTE — ASSESSMENT & PLAN NOTE
Patient's blood pressure range in the last 24 hours was: BP  Min: 107/49  Max: 147/61.The patient's inpatient anti-hypertensive regimen is listed below:  Current Antihypertensives  furosemide injection 80 mg, 3 times daily, Intravenous  furosemide (LASIX) tablet, 2 times daily, Oral    Plan  - BP is controlled, no changes needed to their regimen  - Stop Metoprolol with bradycardia to the 40s

## 2025-05-18 NOTE — ASSESSMENT & PLAN NOTE
Patient's blood pressure range in the last 24 hours was: BP  Min: 107/49  Max: 133/57.The patient's inpatient anti-hypertensive regimen is listed below:  Current Antihypertensives  furosemide injection 80 mg, 3 times daily, Intravenous  furosemide (LASIX) tablet, 2 times daily, Oral    Plan  - BP is controlled, no changes needed to their regimen  - Stop Metoprolol with bradycardia to the 40s

## 2025-05-20 NOTE — PHYSICIAN QUERY
Please specify the diagnosis associated with the clinical findings.  Other (please specify): Acute Pulmonary Hypertensnion

## 2025-05-21 ENCOUNTER — DOCUMENTATION ONLY (OUTPATIENT)
Dept: CARDIOLOGY | Facility: CLINIC | Age: 65
End: 2025-05-21
Payer: MEDICAID

## 2025-05-21 ENCOUNTER — TELEPHONE (OUTPATIENT)
Dept: CARDIOLOGY | Facility: CLINIC | Age: 65
End: 2025-05-21
Payer: MEDICAID

## 2025-05-21 NOTE — PROGRESS NOTES
"[facility]) hospital discharge 48-72 hour phone follow up completed.     Most Recent Hospital Discharge Date: 5/18/2025  Last admission Diagnosis/chief complaint: SOB    [facility] LPN Navigator spoke with Ms. Devika Bustillo. Pt called back to complete this call.     Current Patient reported weight: 234lbs    Current Patient reported blood pressure and heart rate: the pt does not have a blood pressure cuff to check her vitals. The pt reports feeling dizzy when she switch positions. The pt was informed to move very slowly when switching positions. This was reported to our PA-C who recommend the pt's appt be rescheduled to a sooner date.     Pt reports the following:  [x]  Shortness of Breath with Activity  []  Shortness of Breath at rest   []  Fatigue  []  Edema   [] Chest pain or tightness  [] Weight Increase since discharge  [] None of the above  Dizziness with switching positions.    Medications:   Discharge medication reviewed with pt.  Pt reports having medication list available and has all medications at home for use per list. The pt is taking her Lasix 80mg BID, Farxiga 10mg daily, Entresto 97/103mg BID, Potassium 10mEq to take with Lasix. She has informed me she is NOT taking the Metoprolol Succinate 50mg daily.    Education:   Confirmed pt received "Home Care Guide for Heart Failure Patients" while admitted. Reviewed key points as listed below.     Recommend 2 -3 gram sodium restriction and 1500 cc-2000 cc fluid restriction.  Encourage physical activity with graded exercise program.  Requested patient to weigh themselves daily, and to notify us if their weight increases by more than 3 lbs in 1 day or 5 lbs in 3 days.   Reminded patient to use "Daily weight and symptom tracker" to record and guide patient on when and how to call [facility]. PT may also use symptom tracker if no scale available  Pt reports being in the Yellow (color) Zone. If in yellow/red, reminded that they should be " calling [facility] today or now.     Watch for these Signs and Symptoms: If any of these occur, contact [facility] immediately:   Increase in shortness of breath with movement   Increase in swelling in your legs and ankles   Weight gain of more than 3 pounds in a day or 5 pounds in 3 days.   Difficulty breathing when you are lying down   Worsening fatigue or tiredness   Stomach bloating, a full feeling or a loss of appetite   Increased coughing--especially when you are lying down      Pt was able to verbalize back to LPN in their own words correct diet/fluid restrictions, necessity for exercise, warning signs and symptoms, when and how to contact their [facility] team.      Pt educated on follow-up plan while in [facility] program to include:   Week 1 -  F/u appt with Provider and Nurse (Date) the pt is rescheduled to be seen 5/23/2025 for 10:30a with 10:00a labs.    Week 2-5 - In person/ Virtual/ phone call check ins    Week 5-7 - Pt will discharge from [facility] and transition to longterm care provider (Cardiology/PCP/ Advanced Heart Failure).      Patient active on myChart? Yes      Pt given the following contact information for ease of communication: [phone] (Mon-Fri, 8a-5p) & for urgent issues on the weekend to page the Heart Transplant MD on call.  Pt also encouraged utilize [facility] messaging as well.      Will follow up with pt at first clinic visit and Nurse navigator available for pt questions, issues or concerns.

## 2025-05-21 NOTE — TELEPHONE ENCOUNTER
"[facility]    Attempted to call pt to complete 24-72hour post discharge "check in" call. Unable to reach pt at listed phone numbers.  Was able to leave message on voicemail encouraging pt to return call with [facility] phone number and reminded of upcoming appt 5/26/2025 for 1:30p with labs for 1:00p.  "

## 2025-05-21 NOTE — PHYSICIAN QUERY
Please clarify the conflicting documentation.      Other (please specify): Acute Pulmonary Hypertension

## 2025-05-23 RX ORDER — FLASH GLUCOSE SENSOR
KIT MISCELLANEOUS
COMMUNITY
Start: 2025-05-22

## 2025-05-23 RX ORDER — BLOOD-GLUCOSE SENSOR
1 EACH MISCELLANEOUS
COMMUNITY
Start: 2025-05-22

## 2025-05-23 RX ORDER — LIDOCAINE 50 MG/G
PATCH TOPICAL
COMMUNITY
Start: 2025-03-22

## 2025-05-23 RX ORDER — ZONISAMIDE 100 MG/1
CAPSULE ORAL
COMMUNITY
Start: 2025-05-13

## 2025-05-23 RX ORDER — NAPROXEN SODIUM 220 MG/1
81 TABLET, FILM COATED ORAL
COMMUNITY
Start: 2025-04-17

## 2025-05-23 RX ORDER — CLOPIDOGREL BISULFATE 75 MG/1
75 TABLET ORAL
COMMUNITY
Start: 2025-04-17

## 2025-05-23 RX ORDER — DAPAGLIFLOZIN 5 MG/1
1 TABLET, FILM COATED ORAL DAILY
COMMUNITY
Start: 2025-01-13

## 2025-05-23 RX ORDER — CARIPRAZINE 1.5 MG/1
1.5 CAPSULE, GELATIN COATED ORAL DAILY
COMMUNITY

## 2025-05-23 RX ORDER — ESCITALOPRAM OXALATE 20 MG/1
1 TABLET ORAL DAILY
COMMUNITY
Start: 2025-02-10

## 2025-05-23 RX ORDER — NIFEDIPINE 30 MG/1
30 TABLET, EXTENDED RELEASE ORAL
COMMUNITY
Start: 2025-05-22 | End: 2026-05-22

## 2025-05-23 RX ORDER — ATORVASTATIN CALCIUM 80 MG/1
80 TABLET, FILM COATED ORAL
COMMUNITY
Start: 2025-04-16

## 2025-05-23 RX ORDER — FUROSEMIDE 80 MG/1
80 TABLET ORAL 2 TIMES DAILY
COMMUNITY
Start: 2025-05-22 | End: 2026-05-22

## 2025-05-23 RX ORDER — AMOXICILLIN 500 MG/1
500 CAPSULE ORAL EVERY 6 HOURS
COMMUNITY
Start: 2025-05-21

## 2025-05-23 RX ORDER — PEN NEEDLE, DIABETIC 30 GX3/16"
NEEDLE, DISPOSABLE MISCELLANEOUS
COMMUNITY
Start: 2025-01-13

## 2025-05-23 RX ORDER — HYDROXYZINE HYDROCHLORIDE 50 MG/1
1 TABLET, FILM COATED ORAL NIGHTLY
COMMUNITY
Start: 2025-02-27

## 2025-05-23 RX ORDER — ALBUTEROL SULFATE 90 UG/1
2 INHALANT RESPIRATORY (INHALATION) EVERY 6 HOURS PRN
COMMUNITY
Start: 2025-05-12

## 2025-05-23 RX ORDER — TOPIRAMATE 50 MG/1
TABLET, FILM COATED ORAL
COMMUNITY
Start: 2025-01-31

## 2025-05-23 RX ORDER — IBUPROFEN 800 MG/1
800 TABLET, FILM COATED ORAL EVERY 6 HOURS PRN
COMMUNITY
Start: 2025-05-21

## 2025-05-23 NOTE — PROGRESS NOTES
[facility] T[facility] Provider Note (Initial Clinic) Consult Note    Age: 64 y.o.  Gender: female  Type of Congestive Heart Failure: Diastolic   Etiology: unspecified   Enrolled in Infusion suite: no    Diagnostic Labs:   EKG - 05/15/2025  CXR - 05/15/2025  ECHO - 05/16/2025  Stress test -   Stress echo - 11/04/2021  Pharmacologic stress -   Cardiac catheterization - 01/18/2024   Cardiac MRI -     Lab Results   Component Value Date     05/18/2025     05/17/2025    K 3.5 05/18/2025    K 3.8 05/17/2025     05/18/2025     05/17/2025    CO2 24 05/18/2025    CO2 23 05/17/2025     (H) 05/18/2025     (H) 05/17/2025    BUN 39 (H) 05/18/2025    BUN 36 (H) 05/17/2025    CREATININE 1.1 05/18/2025    CREATININE 1.2 05/17/2025    CALCIUM 8.1 (L) 05/18/2025    CALCIUM 8.4 (L) 05/17/2025    PROT 6.0 05/18/2025    PROT 6.1 05/17/2025    ALBUMIN 2.5 (L) 05/18/2025    ALBUMIN 2.5 (L) 05/17/2025    BILITOT 0.2 05/18/2025    BILITOT 0.2 05/17/2025    ALKPHOS 114 05/18/2025    ALKPHOS 118 05/17/2025    AST 18 05/18/2025    AST 29 05/17/2025    ALT 34 05/18/2025    ALT 44 05/17/2025    ANIONGAP 10 05/18/2025    ANIONGAP 11 05/17/2025    ESTGFRAFRICA 47 (A) 12/11/2021    ESTGFRAFRICA >60 11/04/2021    EGFRNONAA 41 (A) 12/11/2021    EGFRNONAA 55 (A) 11/04/2021       Lab Results   Component Value Date    WBC 5.21 05/17/2025    WBC 6.12 05/16/2025    RBC 3.84 (L) 05/17/2025    RBC 4.08 05/16/2025    HGB 10.4 (L) 05/17/2025    HGB 10.7 (L) 05/16/2025    HCT 32.8 (L) 05/17/2025    HCT 34.1 (L) 05/16/2025    MCV 85 05/17/2025    MCV 84 05/16/2025    MCH 27.1 05/17/2025    MCH 26.2 (L) 05/16/2025    MCHC 31.7 (L) 05/17/2025    MCHC 31.4 (L) 05/16/2025    RDW 12.8 05/17/2025    RDW 12.7 05/16/2025     05/17/2025     05/16/2025    MPV 11.2 05/17/2025    MPV 10.5 05/16/2025    IMMGR 0.2 05/17/2025    IMMGR 0.5 05/16/2025    IGABS 0.01 05/17/2025    IGABS 0.03 05/16/2025    LYMPH 40.7 05/17/2025    LYMPH 2.12  05/17/2025    MONO 9.4 05/17/2025    MONO 0.49 05/17/2025    EOS 1.0 05/17/2025    EOS 0.05 05/17/2025    BASO 0.04 02/11/2025    BASO 0.04 12/20/2024    NRBC 0 05/17/2025    NRBC 0 05/16/2025    GRAN 4.8 02/11/2025    GRAN 64.7 02/11/2025    EOSINOPHIL 0.7 02/11/2025    EOSINOPHIL 2.2 12/20/2024    BASOPHIL 0.8 05/17/2025    BASOPHIL 0.04 05/17/2025    PLTEST Clumped (A) 08/24/2021    PLTEST Normal 07/07/2013       Lab Results   Component Value Date     (H) 05/15/2025     (H) 04/01/2025    MG 1.7 05/18/2025    MG 1.8 05/17/2025    PHOS 4.0 05/18/2025    PHOS 4.6 (H) 05/17/2025    TROPONINI <0.006 11/19/2024    TROPONINI 0.006 10/09/2024    HGBA1C 10.8 (H) 05/15/2025    HGBA1C 7.6 (H) 10/10/2024    TSH 0.726 04/01/2025    TSH 0.497 01/15/2024    FREET4 0.99 12/20/2022    FREET4 0.98 07/30/2017    B2NNXVT 9.5 03/21/2018       Lab Results   Component Value Date    IRON 58 11/04/2021    TIBC 327 11/04/2021    FERRITIN 578 (H) 11/04/2021    CHOL 107 (L) 11/04/2021    TRIG 99 11/04/2021    HDL 25 (L) 11/04/2021    LDLCALC 62.2 (L) 11/04/2021    CHOLHDL 23.4 11/04/2021    TOTALCHOLEST 4.3 11/04/2021    NONHDLCHOL 82 11/04/2021    COLORU Yellow 04/01/2025    APPEARANCEUA Clear 04/01/2025    PHUR 7.0 04/01/2025    SPECGRAV 1.015 04/01/2025    PROTEINUA Trace (A) 04/01/2025    GLUCUA 4+ (A) 04/01/2025    KETONESU Negative 02/11/2025    BILIRUBINUA Negative 04/01/2025    OCCULTUA Negative 04/01/2025    NITRITE Negative 04/01/2025    LEUKOCYTESUR Negative 04/01/2025       No implanted cardiac devices    Medications Ordered Prior to Encounter[1]      HPI:  Patient reports improvement in SOB from recent admission, though unable to quantify distance walked before SOB. Estimates can walk to lab today and pace normal before noting SOB    Patient sleeps on 3 number of pillows   Patient wakes up SOB, has to get out of bed, associated cough- reports intermittent PND symptoms. Endorses snoring history, insomnia and EDS.  Denies witnessed apneic events. Previously evaluated by Pulm at [facility] with orders for home sleep study for REBEKAH eval. Has not yet completed. Pt requesting referral to Sleep Med to discuss possible in person sleep study   Palpitations - intermittent   Dizzy, light-headed, pre-syncope or syncope- endorses chronic intermittent dizziness/lightheadedness, though denies worsening with lasix uptitration. Denies pre-syncope/syncope   Since discharge frequency of performing weights, home weight and weight change- down 6lbs on clinic scale from hospital discharge weight. Pt reports home weight stable 235lbs   Other information felt pertinent to HPI: Ms. Devika Bustillo is a 65 yo female with HFpEF, IIH with  left transverse-sigmoid sinus stenosis, s/p stenting followed by balloon valvuloplasty of the left internal jugular vein outlet valve March 2025, T2DM on insulin, anxiety, depression and obesity who presents to first HFT[facility] visit following recent admission for ADHF after presenting to ED with HA and SOB. She also reports worsening headaches since having her stent placed in March. She was being seen by Neurology who started her on Zonisamide 100mg, but she didn't like how it made her feel so she stopped taking it. In ED . CXR showed pulmonary congestion. She was started on IV diuresis. Updated TTE stable from prior. Neuro was consulted for persistent headaches in the setting of cerebral stents who suggested low dose Keppra BID for IIH Headaches and continued IV diuresis. She was discharged home on Lasix 80mg BID, holding Metoprolol for HR 40-50s, and Keppra for headaches.      PHYSICAL:   Vitals:    05/28/25 0954   BP: 138/63   Pulse: 72      @[id number](3)@    JVD: no   Heart rhythm: regular  Cardiac murmur: No    S3: no  S4: no  Lungs: clear  Hepatojugular reflux: no  Edema: no      Echo 5/16/25:    Left Ventricle: The left ventricle is normal in size. Ventricular mass is normal. Normal wall thickness.  There is concentric remodeling. Normal wall motion. There is normal systolic function with a visually estimated ejection fraction of 55 - 60%. There is normal diastolic function.    Right Ventricle: The right ventricle is normal in size Wall thickness is normal. Systolic function is normal.    Left Atrium: The left atrium is severely dilated    Mitral Valve: There is moderate mitral annular calcification.    Pulmonary Artery: The estimated pulmonary artery systolic pressure is 24 mmHg.    IVC/SVC: Normal venous pressure at 3 mmHg.       ASSESSMENT/PLAN:    1. Chronic diastolic heart failure  -NYHA Class II symptoms. No appreciable fluid volume on exam today; appears overdiuresed on labs with bump in Cr (1.1->1.7).  -Recommend holding afternoon lasix dose this afternoon, and tomorrow resuming previous lasix dosing of 40mg BID.  -Continue farxiga for HFpEF GDMT  -Recommend 2-3 gram sodium restriction and 1500cc- 2000cc fluid restriction.  -Requested patient to weigh themselves daily, and to notify us if their weight increases by more than 3 lbs in 1 day or 5 lbs in 3 days.  -Repeat labs next week for continued trending of renal function with RTC in 3 weeks or sooner prn with weekly [facility]CC phone check ins in interim. Established with [facility]    2. Coronary artery disease involving native coronary artery of native heart without angina pectoris   -[facility] 1/2024 with nonobstructive CAD   -Continue aspirin, statin    3. Primary hypertension   -Controlled, continue current antihypertensives    4. Dyslipidemia   -Continue statin    5. Stage 3a chronic kidney disease   -Cr uptrending (1.1->1.7); diuretic adjustments as above  -Nephrology referral to establish care. Discussed importance of optimal BP and BG control in slowing progression of CKD    6. Type 2 diabetes mellitus with stage 3a chronic kidney disease, unspecified whether long term insulin use  -A1c 10.8 on admission, uncontrolled. Insulin  regimen adjusted per inpatient team    7. Class 2 severe obesity due to excess calories with serious comorbidity and body mass index (BMI) of 35.0 to 35.9 in adult    8. Suspected sleep apnea  -     Ambulatory referral/consult to Sleep Disorders; Future; Expected date: 06/04/2025    Amanda Hong PA-C             [1]   Current Outpatient Medications on File Prior to Visit   Medication Sig Dispense Refill    acetaminophen (TYLENOL) 500 MG tablet Take 2 tablets (1,000 mg total) by mouth every 8 (eight) hours as needed for Pain. 30 tablet 0    albuterol (VENTOLIN HFA) 90 mcg/actuation inhaler Inhale 1-2 puffs into the lungs every 6 (six) hours as needed for Wheezing or Shortness of Breath. Rescue 18 g 0    albuterol-ipratropium (DUO-NEB) 2.5 mg-0.5 mg/3 mL nebulizer solution Take 3 mLs by nebulization every 6 (six) hours as needed for Wheezing or Shortness of Breath. Rescue 90 mL 2    aspirin (ECOTRIN) 81 MG EC tablet Take 81 mg by mouth once daily.      atorvastatin (LIPITOR) 40 MG tablet Take 80 mg by mouth every evening.      blood sugar diagnostic Strp To check BG 4 times daily, to use with insurance preferred meter 200 each 11    blood-glucose meter kit To check BG 4 times daily, to use with insurance preferred meter 1 each 0    buPROPion (WELLBUTRIN XL) 150 MG TB24 tablet Take 1 tablet (150 mg total) by mouth once daily. 30 tablet 1    clobetasol 0.05% (TEMOVATE) 0.05 % Oint Apply topically 2 (two) times daily.      clopidogreL (PLAVIX) 75 mg tablet Take 75 mg by mouth once daily.      dapagliflozin propanediol (FARXIGA) 10 mg tablet Take 1 tablet by mouth once daily.      DULERA 200-5 mcg/actuation inhaler Inhale 2 puffs into the lungs 2 (two) times daily.      ENTRESTO  mg per tablet Take 1 tablet by mouth 2 (two) times daily.      EScitalopram oxalate (LEXAPRO) 20 MG tablet Take 1 tablet (20 mg total) by mouth once daily. 30 tablet 1    furosemide (LASIX) 80 MG tablet Take 1 tablet (80 mg total) by mouth 2  "(two) times daily. 180 tablet 0    gabapentin (NEURONTIN) 300 MG capsule Take 300 mg by mouth 2 (two) times daily as needed.      insulin glargine, TOUJEO, (TOUJEO SOLOSTAR U-300 INSULIN) 300 unit/mL (1.5 mL) InPn pen Inject 25 Units into the skin once daily.      lancets Misc To check BG 4 times daily, to use with insurance preferred meter 200 each 11    levETIRAcetam (KEPPRA) 250 MG Tab Take 2 tablets (500 mg total) by mouth 2 (two) times daily for headaches 360 tablet 0    LIDOcaine-prilocaine (EMLA) cream Apply topically as needed. 30 g 3    loratadine (CLARITIN) 10 mg tablet Take 10 mg by mouth daily as needed for Allergies.      [Paused] metoprolol succinate (TOPROL-XL) 50 MG 24 hr tablet Take 1 tablet by mouth once daily.      ondansetron (ZOFRAN) 4 MG tablet Take 4 mg by mouth every 8 (eight) hours as needed.      pantoprazole (PROTONIX) 40 MG tablet Take 1 tablet (40 mg total) by mouth 2 (two) times daily before meals. 60 tablet 1    pen needle, diabetic 31 gauge x 3/16" Ndle 1 each.      pen needle, diabetic 32 gauge x 5/32" Ndle Use to inject insulin four times daily 100 each 1    polyethylene glycol (GLYCOLAX) 17 gram/dose powder Use cap to measure 17 grams, mix in liquid and take by mouth 2 (two) times daily as needed for Constipation. 238 g 1    potassium chloride (KLOR-CON) 10 MEQ TbSR Take 1 tablet (10 mEq total) by mouth as needed (take 1 tablet when you take lasix (furosemide)).      senna-docusate 8.6-50 mg (SENNA WITH DOCUSATE SODIUM) 8.6-50 mg per tablet Take 1 tablet by mouth 2 (two) times daily as needed for Constipation. 30 tablet 1    SYMBICORT 160-4.5 mcg/actuation HFAA Inhale 2 puffs into the lungs 2 (two) times daily.  0    topiramate (TOPAMAX) 100 MG tablet Take 50 mg by mouth 2 (two) times daily.      traMADoL (ULTRAM) 50 mg tablet Take 1 tablet (50 mg total) by mouth every 6 (six) hours as needed for Pain. 20 tablet 0    traZODone (DESYREL) 100 MG tablet Take 2 tablets (200 mg total) " by mouth every evening. 180 tablet 0    TRULICITY 3 mg/0.5 mL pen injector Inject 3 mg into the skin every 7 days. Fridays      VRAYLAR 1.5 mg Cap Take 1.5 mg by mouth once daily.       No current facility-administered medications on file prior to visit.

## 2025-05-28 ENCOUNTER — OFFICE VISIT (OUTPATIENT)
Dept: CARDIOLOGY | Facility: CLINIC | Age: 65
End: 2025-05-28
Payer: MEDICAID

## 2025-05-28 ENCOUNTER — DOCUMENTATION ONLY (OUTPATIENT)
Dept: CARDIOLOGY | Facility: CLINIC | Age: 65
End: 2025-05-28
Payer: MEDICAID

## 2025-05-28 ENCOUNTER — LAB VISIT (OUTPATIENT)
Dept: LAB | Facility: HOSPITAL | Age: 65
End: 2025-05-28
Payer: MEDICAID

## 2025-05-28 ENCOUNTER — TELEPHONE (OUTPATIENT)
Dept: CARDIOLOGY | Facility: CLINIC | Age: 65
End: 2025-05-28
Payer: MEDICAID

## 2025-05-28 VITALS
DIASTOLIC BLOOD PRESSURE: 63 MMHG | OXYGEN SATURATION: 100 % | BODY MASS INDEX: 35.7 KG/M2 | WEIGHT: 235.56 LBS | HEIGHT: 68 IN | HEART RATE: 72 BPM | SYSTOLIC BLOOD PRESSURE: 138 MMHG

## 2025-05-28 DIAGNOSIS — R29.818 SUSPECTED SLEEP APNEA: ICD-10-CM

## 2025-05-28 DIAGNOSIS — E66.01 CLASS 2 SEVERE OBESITY DUE TO EXCESS CALORIES WITH SERIOUS COMORBIDITY AND BODY MASS INDEX (BMI) OF 35.0 TO 35.9 IN ADULT: ICD-10-CM

## 2025-05-28 DIAGNOSIS — N18.31 STAGE 3A CHRONIC KIDNEY DISEASE: ICD-10-CM

## 2025-05-28 DIAGNOSIS — I10 PRIMARY HYPERTENSION: ICD-10-CM

## 2025-05-28 DIAGNOSIS — I25.10 CORONARY ARTERY DISEASE INVOLVING NATIVE CORONARY ARTERY OF NATIVE HEART WITHOUT ANGINA PECTORIS: ICD-10-CM

## 2025-05-28 DIAGNOSIS — I50.32 CHRONIC DIASTOLIC HEART FAILURE: Primary | ICD-10-CM

## 2025-05-28 DIAGNOSIS — E66.812 CLASS 2 SEVERE OBESITY DUE TO EXCESS CALORIES WITH SERIOUS COMORBIDITY AND BODY MASS INDEX (BMI) OF 35.0 TO 35.9 IN ADULT: ICD-10-CM

## 2025-05-28 DIAGNOSIS — R06.02 SOB (SHORTNESS OF BREATH): ICD-10-CM

## 2025-05-28 DIAGNOSIS — E11.22 TYPE 2 DIABETES MELLITUS WITH STAGE 3A CHRONIC KIDNEY DISEASE, UNSPECIFIED WHETHER LONG TERM INSULIN USE: ICD-10-CM

## 2025-05-28 DIAGNOSIS — E78.5 DYSLIPIDEMIA: ICD-10-CM

## 2025-05-28 DIAGNOSIS — N18.31 TYPE 2 DIABETES MELLITUS WITH STAGE 3A CHRONIC KIDNEY DISEASE, UNSPECIFIED WHETHER LONG TERM INSULIN USE: ICD-10-CM

## 2025-05-28 LAB
ABSOLUTE EOSINOPHIL (OHS): 0.07 K/UL
ABSOLUTE MONOCYTE (OHS): 0.45 K/UL (ref 0.3–1)
ABSOLUTE NEUTROPHIL COUNT (OHS): 4.28 K/UL (ref 1.8–7.7)
ALBUMIN SERPL BCP-MCNC: 3.5 G/DL (ref 3.5–5.2)
ALP SERPL-CCNC: 139 UNIT/L (ref 40–150)
ALT SERPL W/O P-5'-P-CCNC: 34 UNIT/L (ref 10–44)
ANION GAP (OHS): 14 MMOL/L (ref 8–16)
AST SERPL-CCNC: 20 UNIT/L (ref 11–45)
BASOPHILS # BLD AUTO: 0.05 K/UL
BASOPHILS NFR BLD AUTO: 0.7 %
BILIRUB SERPL-MCNC: 0.3 MG/DL (ref 0.1–1)
BNP SERPL-MCNC: 26 PG/ML (ref 0–99)
BUN SERPL-MCNC: 38 MG/DL (ref 8–23)
CALCIUM SERPL-MCNC: 9.1 MG/DL (ref 8.7–10.5)
CHLORIDE SERPL-SCNC: 103 MMOL/L (ref 95–110)
CO2 SERPL-SCNC: 23 MMOL/L (ref 23–29)
CREAT SERPL-MCNC: 1.7 MG/DL (ref 0.5–1.4)
ERYTHROCYTE [DISTWIDTH] IN BLOOD BY AUTOMATED COUNT: 12.8 % (ref 11.5–14.5)
GFR SERPLBLD CREATININE-BSD FMLA CKD-EPI: 34 ML/MIN/1.73/M2
GLUCOSE SERPL-MCNC: 251 MG/DL (ref 70–110)
HCT VFR BLD AUTO: 41.8 % (ref 37–48.5)
HGB BLD-MCNC: 12.9 GM/DL (ref 12–16)
IMM GRANULOCYTES # BLD AUTO: 0.03 K/UL (ref 0–0.04)
IMM GRANULOCYTES NFR BLD AUTO: 0.4 % (ref 0–0.5)
LYMPHOCYTES # BLD AUTO: 2.1 K/UL (ref 1–4.8)
MAGNESIUM SERPL-MCNC: 1.9 MG/DL (ref 1.6–2.6)
MCH RBC QN AUTO: 26.6 PG (ref 27–31)
MCHC RBC AUTO-ENTMCNC: 30.9 G/DL (ref 32–36)
MCV RBC AUTO: 86 FL (ref 82–98)
NUCLEATED RBC (/100WBC) (OHS): 0 /100 WBC
PHOSPHATE SERPL-MCNC: 4.3 MG/DL (ref 2.7–4.5)
PLATELET # BLD AUTO: 335 K/UL (ref 150–450)
PMV BLD AUTO: 10.3 FL (ref 9.2–12.9)
POTASSIUM SERPL-SCNC: 4.4 MMOL/L (ref 3.5–5.1)
PROT SERPL-MCNC: 8 GM/DL (ref 6–8.4)
RBC # BLD AUTO: 4.85 M/UL (ref 4–5.4)
RELATIVE EOSINOPHIL (OHS): 1 %
RELATIVE LYMPHOCYTE (OHS): 30.1 % (ref 18–48)
RELATIVE MONOCYTE (OHS): 6.4 % (ref 4–15)
RELATIVE NEUTROPHIL (OHS): 61.4 % (ref 38–73)
SODIUM SERPL-SCNC: 140 MMOL/L (ref 136–145)
WBC # BLD AUTO: 6.98 K/UL (ref 3.9–12.7)

## 2025-05-28 PROCEDURE — 99204 OFFICE O/P NEW MOD 45 MIN: CPT | Mod: S$PBB,,,

## 2025-05-28 PROCEDURE — 3075F SYST BP GE 130 - 139MM HG: CPT | Mod: CPTII,,,

## 2025-05-28 PROCEDURE — 85025 COMPLETE CBC W/AUTO DIFF WBC: CPT

## 2025-05-28 PROCEDURE — 36415 COLL VENOUS BLD VENIPUNCTURE: CPT

## 2025-05-28 PROCEDURE — 83735 ASSAY OF MAGNESIUM: CPT

## 2025-05-28 PROCEDURE — 84450 TRANSFERASE (AST) (SGOT): CPT

## 2025-05-28 PROCEDURE — 99999 PR PBB SHADOW E&M-EST. PATIENT-LVL V: CPT | Mod: PBBFAC,,,

## 2025-05-28 PROCEDURE — 4010F ACE/ARB THERAPY RXD/TAKEN: CPT | Mod: CPTII,,,

## 2025-05-28 PROCEDURE — 1111F DSCHRG MED/CURRENT MED MERGE: CPT | Mod: CPTII,,,

## 2025-05-28 PROCEDURE — 84100 ASSAY OF PHOSPHORUS: CPT

## 2025-05-28 PROCEDURE — 83880 ASSAY OF NATRIURETIC PEPTIDE: CPT

## 2025-05-28 PROCEDURE — 3046F HEMOGLOBIN A1C LEVEL >9.0%: CPT | Mod: CPTII,,,

## 2025-05-28 PROCEDURE — 99215 OFFICE O/P EST HI 40 MIN: CPT | Mod: PBBFAC

## 2025-05-28 PROCEDURE — 3008F BODY MASS INDEX DOCD: CPT | Mod: CPTII,,,

## 2025-05-28 PROCEDURE — 1159F MED LIST DOCD IN RCRD: CPT | Mod: CPTII,,,

## 2025-05-28 PROCEDURE — 1160F RVW MEDS BY RX/DR IN RCRD: CPT | Mod: CPTII,,,

## 2025-05-28 PROCEDURE — 3078F DIAST BP <80 MM HG: CPT | Mod: CPTII,,,

## 2025-05-28 RX ORDER — FUROSEMIDE 40 MG/1
40 TABLET ORAL 2 TIMES DAILY
Qty: 60 TABLET | Refills: 11 | Status: SHIPPED | OUTPATIENT
Start: 2025-05-28 | End: 2026-05-28

## 2025-05-28 NOTE — PROGRESS NOTES
"Heart Failure Transitional Care Clinic([facility]) First Week Visit     Pt presents to clinic 5/28/25.     Most Recent Hospital Discharge Date: 5/18/25  Last admission Diagnosis/chief complaint:sob        Visit Vitals:     Wt Readings from Last 3 Encounters:   05/28/25 106.8 kg (235 lb 9 oz)   05/17/25 109.4 kg (241 lb 2.9 oz)   04/01/25 107.5 kg (236 lb 15.9 oz)     Temp Readings from Last 3 Encounters:   05/18/25 98.1 °F (36.7 °C) (Oral)   04/01/25 97.9 °F (36.6 °C) (Oral)   02/11/25 97.9 °F (36.6 °C) (Oral)     BP Readings from Last 3 Encounters:   05/28/25 138/63   05/18/25 (!) 133/57   04/01/25 (!) 185/79     Pulse Readings from Last 3 Encounters:   05/28/25 72   05/18/25 62   04/01/25 (!) 54            Pt reports the following:  []  Shortness of Breath with activity  []  Shortness of Breath at rest/ sleeping on 2-3 pillows "some days"  []  Fatigue  []  Edema   [] Chest pain or tightness  [] Weight Increase since discharge  [x] None of the above    Pt reports being in the green (color) Zone. If in yellow/red, reminded that they should be calling HFT[facility] today or now.      Medications:     Pt reports having all medications available and understands how to take them appropriately. Reminded pt to call prior to making any changes to medications.      Education:    [x] Gave pt new  / Confirmed pt has  "Heart Failure Transitional Care Clinic Home Care Guide" .   Reviewed key points as listed below.      Recommend 2 gram sodium restriction and 1500cc fluid restriction.  Encourage physical activity with graded exercise program.  Requested patient to weigh themselves daily, and to notify us if their weight increases by more than 3 lbs in 1 day or 5 lbs in 3 days.      [x] Gave / Reviewed "Daily Weight and Symptom Tracker".  [facility] patient [facility] to "Yellow Zone" and "Red Zone".                  [x] Pt given list of low/high sodium food list                Gave pt Heart Healthy Nutrition " "guide.                   Watch for these Signs and Symptoms: If any of these occur, contact HFTCC immediately:   Increase in shortness of breath with movement   Increase in swelling in your legs and ankles   Weight gain of more than 3 pounds in a night or 5 pounds in 3 days.   Difficulty breathing when you are lying down   Worsening fatigue or tiredness   Stomach bloating, a full feeling or a loss of appetite   Increased coughing--especially when you are lying down     MyChart and Care Companion:              Patient active on myChart? Yes, patient uses regularly.    Contacting our Team:              Reviewed with pt how to contact [facility] RN via phone. Emphaszied use of [facility] and after hours numbers.       HF TCC Program Plan:  Pt educated on follow-up plan while in HFTCC program.   [x]  PT given /reviewed upcoming appointment/check in dates. These will include weekly contact with RN or visits with providers over the next 4-6 weeks.                   [x]  Pt educated that they will transitioned to long term care provider team at week 4-6.  This team will be either Cardiology, PCP or Advanced Heart Failure depending on needs.          Pt was able to verbalize back to RN in their own words correct diet/fluid restrictions, necessity for exercise, warning signs and symptoms, when and how to contact their TCC team .       Plan:     Per RAUL Hong      [x]  Pt given AVS with follow up appointment within 3 weeks and medication detail list for ease of use.     [x]  Explained to pt they will have a phone "check in" by RN in/on 1 week          Will follow up with pt at next clinic visit and RN navigator available for pt questions, issues or concerns.     Please refer to provider visit for additional details and assessment.    "

## 2025-05-28 NOTE — TELEPHONE ENCOUNTER
Per RAUL Hong, can we call and let her know it looks like she's mildly overdiuresed on labs. Cr uptrending to 1.7, BNP wnl. Recommend holding afternoon lasix today, then tomorrow resuming previous lasix dosing of 40mg BID.     Lab scheduled 6/4@10:00. 3wk fu scheduled 6/18@10:00 with 9:30 labs     Called patient and informed her of above. She verbalized understanding.

## 2025-05-28 NOTE — PATIENT INSTRUCTIONS
Will call today with lab results and further lasix recommendations.    Notify us ([phone]) with any worsening shortness of breath, swelling or 3lb weight gain on home scale.    Sleep Medicine and Nephrology (Kidney specialist) referrals placed.

## 2025-06-02 ENCOUNTER — PATIENT MESSAGE (OUTPATIENT)
Dept: PSYCHIATRY | Facility: CLINIC | Age: 65
End: 2025-06-02
Payer: MEDICAID

## 2025-06-04 ENCOUNTER — TELEPHONE (OUTPATIENT)
Dept: CARDIOLOGY | Facility: CLINIC | Age: 65
End: 2025-06-04
Payer: MEDICAID

## 2025-06-06 ENCOUNTER — RESULTS FOLLOW-UP (OUTPATIENT)
Dept: CARDIOLOGY | Facility: CLINIC | Age: 65
End: 2025-06-06

## 2025-06-06 ENCOUNTER — LAB VISIT (OUTPATIENT)
Dept: LAB | Facility: HOSPITAL | Age: 65
End: 2025-06-06
Payer: MEDICAID

## 2025-06-06 DIAGNOSIS — R06.02 SOB (SHORTNESS OF BREATH): ICD-10-CM

## 2025-06-06 LAB
ALBUMIN SERPL BCP-MCNC: 3.5 G/DL (ref 3.5–5.2)
ALP SERPL-CCNC: 135 UNIT/L (ref 40–150)
ALT SERPL W/O P-5'-P-CCNC: 51 UNIT/L (ref 10–44)
ANION GAP (OHS): 12 MMOL/L (ref 8–16)
AST SERPL-CCNC: 28 UNIT/L (ref 11–45)
BILIRUB SERPL-MCNC: 0.2 MG/DL (ref 0.1–1)
BNP SERPL-MCNC: 50 PG/ML (ref 0–99)
BUN SERPL-MCNC: 41 MG/DL (ref 8–23)
CALCIUM SERPL-MCNC: 9 MG/DL (ref 8.7–10.5)
CHLORIDE SERPL-SCNC: 105 MMOL/L (ref 95–110)
CO2 SERPL-SCNC: 24 MMOL/L (ref 23–29)
CREAT SERPL-MCNC: 1.4 MG/DL (ref 0.5–1.4)
GFR SERPLBLD CREATININE-BSD FMLA CKD-EPI: 43 ML/MIN/1.73/M2
GLUCOSE SERPL-MCNC: 232 MG/DL (ref 70–110)
POTASSIUM SERPL-SCNC: 4.8 MMOL/L (ref 3.5–5.1)
PROT SERPL-MCNC: 8 GM/DL (ref 6–8.4)
SODIUM SERPL-SCNC: 141 MMOL/L (ref 136–145)

## 2025-06-06 PROCEDURE — 80053 COMPREHEN METABOLIC PANEL: CPT

## 2025-06-06 PROCEDURE — 36415 COLL VENOUS BLD VENIPUNCTURE: CPT

## 2025-06-06 PROCEDURE — 83880 ASSAY OF NATRIURETIC PEPTIDE: CPT

## 2025-06-11 ENCOUNTER — TELEPHONE (OUTPATIENT)
Dept: CARDIOLOGY | Facility: CLINIC | Age: 65
End: 2025-06-11
Payer: MEDICAID

## 2025-06-11 NOTE — TELEPHONE ENCOUNTER
"Heart Failure Transitional Care Clinic    Attempted to call pt to complete 1 week "check in" call. Unable to reach pt at listed phone numbers.  Was able to leave message on voicemail encouraging pt to return call with [facility] phone number and reminded of upcoming appt 6/18/2025 for 10:00a with labs for 9:30a.     Will continue to try to reach patient.    "

## 2025-06-16 ENCOUNTER — OFFICE VISIT (OUTPATIENT)
Dept: INFECTIOUS DISEASES | Facility: CLINIC | Age: 65
End: 2025-06-16
Payer: MEDICAID

## 2025-06-16 VITALS
HEART RATE: 68 BPM | SYSTOLIC BLOOD PRESSURE: 110 MMHG | BODY MASS INDEX: 37.02 KG/M2 | DIASTOLIC BLOOD PRESSURE: 54 MMHG | RESPIRATION RATE: 18 BRPM | HEIGHT: 68 IN | WEIGHT: 244.25 LBS

## 2025-06-16 DIAGNOSIS — L97.515 DIABETIC ULCER OF TOE OF RIGHT FOOT ASSOCIATED WITH TYPE 2 DIABETES MELLITUS, WITH MUSCLE INVOLVEMENT WITHOUT EVIDENCE OF NECROSIS: Primary | ICD-10-CM

## 2025-06-16 DIAGNOSIS — E11.621 DIABETIC ULCER OF TOE OF RIGHT FOOT ASSOCIATED WITH TYPE 2 DIABETES MELLITUS, WITH MUSCLE INVOLVEMENT WITHOUT EVIDENCE OF NECROSIS: Primary | ICD-10-CM

## 2025-06-16 PROCEDURE — 3078F DIAST BP <80 MM HG: CPT | Mod: CPTII,,, | Performed by: INTERNAL MEDICINE

## 2025-06-16 PROCEDURE — 3046F HEMOGLOBIN A1C LEVEL >9.0%: CPT | Mod: CPTII,,, | Performed by: INTERNAL MEDICINE

## 2025-06-16 PROCEDURE — 1111F DSCHRG MED/CURRENT MED MERGE: CPT | Mod: CPTII,,, | Performed by: INTERNAL MEDICINE

## 2025-06-16 PROCEDURE — 3074F SYST BP LT 130 MM HG: CPT | Mod: CPTII,,, | Performed by: INTERNAL MEDICINE

## 2025-06-16 PROCEDURE — 99999 PR PBB SHADOW E&M-EST. PATIENT-LVL III: CPT | Mod: PBBFAC,,, | Performed by: INTERNAL MEDICINE

## 2025-06-16 PROCEDURE — 4010F ACE/ARB THERAPY RXD/TAKEN: CPT | Mod: CPTII,,, | Performed by: INTERNAL MEDICINE

## 2025-06-16 PROCEDURE — 99213 OFFICE O/P EST LOW 20 MIN: CPT | Mod: PBBFAC,PN | Performed by: INTERNAL MEDICINE

## 2025-06-16 PROCEDURE — 3008F BODY MASS INDEX DOCD: CPT | Mod: CPTII,,, | Performed by: INTERNAL MEDICINE

## 2025-06-16 PROCEDURE — 99213 OFFICE O/P EST LOW 20 MIN: CPT | Mod: S$PBB,,, | Performed by: INTERNAL MEDICINE

## 2025-06-16 NOTE — PROGRESS NOTES
[facility]  Clinic note    Patient Name: Devika Bustillo  YOB: 1960    PRESENTING HISTORY       History of Present Illness:  Ms. Devika Bustillo is a 64 y.o. female w/ significant PMHx of DM, HTN, and HL who presents due to possible osteo of right 5th metatarsal. She had a wound of this toe that has since healed. MRI showed possible early osteo roughly 2 months ago. She was interviewed by ID via telemedicine and placed on 14 days of cefpodoxime. Not currently on antibiotics    6/16 She has been off antibiotics since 3/10 and notes no recurrence of wound or worsening of toe. Denies redness or swelling    Review of Systems:  Constitutional: no fever or chills  Eyes: no visual changes  ENT: no nasal congestion or sore throat  Respiratory: no cough or shortness of breath  Cardiovascular: no chest pain  Gastrointestinal: no nausea or vomiting, no abdominal pain, no constipation, no diarrhea  Genitourinary: no hematuria or dysuria  Musculoskeletal: no arthralgias or myalgias + back pain  Skin: no rash  Neurological: no headaches, numbness, or paresthesias    PAST HISTORY:     Past Medical History:   Diagnosis Date    Asthma     Back pain     CHF (congestive heart failure)     Diabetes mellitus     Grade II diastolic dysfunction     Hyperlipidemia     Hypertension     Neuropathy     Obesity     Pulmonary embolism 1998    Pulmonary hypertension        Past Surgical History:   Procedure Laterality Date    ANGIOGRAM, CORONARY, WITH LEFT HEART CATHETERIZATION Left 1/17/2024    Procedure: Angiogram, Coronary, with Left Heart Cath;  Surgeon: Joe Marr MD;  Location: [facility] CATH LAB;  Service: Cardiology;  Laterality: Left;    CYST REMOVAL      HERNIA REPAIR      HYSTERECTOMY      TONSILLECTOMY      TUBAL LIGATION         Family History   Problem Relation Name Age of Onset    Kidney disease Mother      Kidney disease Father      Stroke Maternal Uncle         Social History     Socioeconomic  History    Marital status: Single   Tobacco Use    Smoking status: Never    Smokeless tobacco: Never   Substance and Sexual Activity    Alcohol use: No    Drug use: No    Sexual activity: Not Currently     Partners: Male   Social History Narrative    ** Merged History Encounter **          Social Drivers of Health     Financial Resource Strain: Low Risk  (5/16/2025)    Overall Financial Resource Strain (CARDIA)     Difficulty of Paying Living Expenses: Not hard at all   Recent Concern: Financial Resource Strain - Medium Risk (4/1/2025)    Overall Financial Resource Strain (CARDIA)     Difficulty of Paying Living Expenses: Somewhat hard   Food Insecurity: No Food Insecurity (5/16/2025)    Hunger Vital Sign     Worried About Running Out of Food in the Last Year: Never true     Ran Out of Food in the Last Year: Never true   Transportation Needs: No Transportation Needs (5/16/2025)    PRAPARE - Transportation     Lack of Transportation (Medical): No     Lack of Transportation (Non-Medical): No   Physical Activity: Sufficiently Active (5/16/2025)    Exercise Vital Sign     Days of Exercise per Week: 5 days     Minutes of Exercise per Session: 30 min   Stress: No Stress Concern Present (5/16/2025)    [employer] - Occupational Stress Questionnaire     Feeling of Stress : Not at all   Housing Stability: Low Risk  (5/16/2025)    Housing Stability Vital Sign     Unable to Pay for Housing in the Last Year: No     Homeless in the Last Year: No       MEDICATIONS & ALLERGIES:     Current Outpatient Medications on File Prior to Visit   Medication Sig    albuterol (VENTOLIN HFA) 90 mcg/actuation inhaler Inhale 1-2 puffs into the lungs every 6 (six) hours as needed for Wheezing or Shortness of Breath. Rescue    amoxicillin (AMOXIL) 500 MG capsule Take 500 mg by mouth every 6 (six) hours.    aspirin (ECOTRIN) 81 MG EC tablet Take 81 mg by mouth once daily.    atorvastatin (LIPITOR) 80 MG tablet Take 80  mg by mouth.    blood sugar diagnostic Strp To check BG 4 times daily, to use with insurance preferred meter    blood-glucose meter kit To check BG 4 times daily, to use with insurance preferred meter    blood-glucose sensor (FREESTYLE VIRI 2 PLUS SENSOR) Dalia 1 each by Other route.    cariprazine (VRAYLAR) 1.5 mg Cap Take 1.5 mg by mouth once daily.    clobetasol 0.05% (TEMOVATE) 0.05 % Oint Apply topically 2 (two) times daily.    clopidogreL (PLAVIX) 75 mg tablet Take 75 mg by mouth once daily.    dapagliflozin propanediol (FARXIGA) 10 mg tablet Take 1 tablet by mouth once daily.    DULERA 200-5 mcg/actuation inhaler Inhale 2 puffs into the lungs 2 (two) times daily.    ENTRESTO  mg per tablet Take 1 tablet by mouth 2 (two) times daily.    FREESTYLE VIRI 2 SENSOR Kit     furosemide (LASIX) 40 MG tablet Take 1 tablet (40 mg total) by mouth 2 (two) times daily.    hydrOXYzine (ATARAX) 50 MG tablet Take 1 tablet by mouth every evening.    ibuprofen (ADVIL,MOTRIN) 800 MG tablet Take 800 mg by mouth every 6 (six) hours as needed.    insulin glargine, TOUJEO, (TOUJEO SOLOSTAR U-300 INSULIN) 300 unit/mL (1.5 mL) InPn pen Inject 25 Units into the skin once daily.    lancets Misc To check BG 4 times daily, to use with insurance preferred meter    levETIRAcetam (KEPPRA) 250 MG Tab Take 2 tablets (500 mg total) by mouth 2 (two) times daily for headaches    LIDOcaine (LIDODERM) 5 % SMARTSIG:Topical    LIDOcaine-prilocaine (EMLA) cream Apply topically as needed.    loratadine (CLARITIN) 10 mg tablet Take 10 mg by mouth daily as needed for Allergies.    [Paused] metoprolol succinate (TOPROL-XL) 50 MG 24 hr tablet Take 1 tablet by mouth once daily.    NIFEdipine (ADALAT CC) 30 MG TbSR Take 30 mg by mouth.    ondansetron (ZOFRAN) 4 MG tablet Take 4 mg by mouth every 8 (eight) hours as needed.    pantoprazole (PROTONIX) 40 MG tablet Take 1 tablet (40 mg total) by mouth 2 (two) times daily before meals.    pen needle,  "diabetic 31 gauge x 3/16" Ndle 1 each.    pen needle, diabetic 31 gauge x 5/16" Ndle Use with insulin pen.    pen needle, diabetic 32 gauge x 5/32" Ndle Use to inject insulin four times daily    polyethylene glycol (GLYCOLAX) 17 gram/dose powder Use cap to measure 17 grams, mix in liquid and take by mouth 2 (two) times daily as needed for Constipation.    potassium chloride (KLOR-CON) 10 MEQ TbSR Take 1 tablet (10 mEq total) by mouth as needed (take 1 tablet when you take lasix (furosemide)).    senna-docusate 8.6-50 mg (SENNA WITH DOCUSATE SODIUM) 8.6-50 mg per tablet Take 1 tablet by mouth 2 (two) times daily as needed for Constipation.    SYMBICORT 160-4.5 mcg/actuation HFAA Inhale 2 puffs into the lungs 2 (two) times daily.    topiramate (TOPAMAX) 100 MG tablet Take 50 mg by mouth 2 (two) times daily.    TRULICITY 3 mg/0.5 mL pen injector Inject 3 mg into the skin every 7 days. Fridays    VRAYLAR 1.5 mg Cap Take 1.5 mg by mouth once daily.    zonisamide (ZONEGRAN) 100 MG Cap 1 q.h.s.; increase to 2 q.h.s. after 2 weeks if needed for headaches    albuterol-ipratropium (DUO-NEB) 2.5 mg-0.5 mg/3 mL nebulizer solution Take 3 mLs by nebulization every 6 (six) hours as needed for Wheezing or Shortness of Breath. Rescue    buPROPion (WELLBUTRIN XL) 150 MG TB24 tablet Take 1 tablet (150 mg total) by mouth once daily.    traZODone (DESYREL) 100 MG tablet Take 2 tablets (200 mg total) by mouth every evening.     No current facility-administered medications on file prior to visit.       Review of patient's allergies indicates:  No Known Allergies    OBJECTIVE:   Vital Signs:  Vitals:    06/16/25 1102   BP: (!) 110/54   Pulse: 68   Resp: 18   Weight: 110.8 kg (244 lb 4.3 oz)   Height: 5' 8" (1.727 m)       No results found for this or any previous visit (from the past 24 hours).      Physical Exam:   General:  Well developed, well nourished, no acute distress  HEENT:  Normocephalic, atraumatic, EOMI, clear sclera, throat " clear without erythema or exudates  CVS:  RRR, S1 and S2 normal, no murmurs, rubs, gallops  Resp:  Lungs clear to auscultation, no wheezes, rales, rhonchi  GI:  Abdomen soft, non-tender, non-distended, normoactive bowel sounds, no masses  MSK:  No muscle atrophy, peripheral edema, full range of motion  Skin:  No rashes, ulcers, erythema  Neuro:  CNII-XII grossly intact  Psych:  Alert and oriented to person, place, and time    ASSESSMENT:     1. Diabetic ulcer of toe of right foot associated with type 2 diabetes mellitus  64 y.o. female w/ significant PMHx of DM, HTN, and HL who presents due to possible osteo of right 5th metatarsal. Her wound has healed after antibiotic treatment       PLAN:     -given that wound of right 5th toe has healed infection and has not recurred after 3 months without therapy may mean infection is gone  -hold antibiotics for now  -will message podiatry for follow up - seems her doctor retired - will re-refer   RTC 2 months

## 2025-06-25 ENCOUNTER — OFFICE VISIT (OUTPATIENT)
Dept: OPHTHALMOLOGY | Facility: CLINIC | Age: 65
End: 2025-06-25
Attending: OPHTHALMOLOGY
Payer: MEDICAID

## 2025-06-25 ENCOUNTER — CLINICAL SUPPORT (OUTPATIENT)
Dept: OPHTHALMOLOGY | Facility: CLINIC | Age: 65
End: 2025-06-25
Payer: MEDICAID

## 2025-06-25 DIAGNOSIS — H35.372 PRERETINAL FIBROSIS, LEFT: ICD-10-CM

## 2025-06-25 DIAGNOSIS — E11.36 DIABETIC CATARACT OF RIGHT EYE: ICD-10-CM

## 2025-06-25 DIAGNOSIS — H20.12 CHRONIC ANTERIOR UVEITIS, LEFT: ICD-10-CM

## 2025-06-25 DIAGNOSIS — E11.3593 TYPE 2 DIABETES MELLITUS WITH BOTH EYES AFFECTED BY PROLIFERATIVE RETINOPATHY WITHOUT MACULAR EDEMA, WITH LONG-TERM CURRENT USE OF INSULIN: Primary | ICD-10-CM

## 2025-06-25 DIAGNOSIS — H35.342 MACULAR HOLE OF LEFT EYE: ICD-10-CM

## 2025-06-25 DIAGNOSIS — Z79.4 TYPE 2 DIABETES MELLITUS WITH BOTH EYES AFFECTED BY PROLIFERATIVE RETINOPATHY WITHOUT MACULAR EDEMA, WITH LONG-TERM CURRENT USE OF INSULIN: Primary | ICD-10-CM

## 2025-06-25 PROCEDURE — 1160F RVW MEDS BY RX/DR IN RCRD: CPT | Mod: CPTII,,, | Performed by: OPHTHALMOLOGY

## 2025-06-25 PROCEDURE — 92014 COMPRE OPH EXAM EST PT 1/>: CPT | Mod: S$PBB,,, | Performed by: OPHTHALMOLOGY

## 2025-06-25 PROCEDURE — 3046F HEMOGLOBIN A1C LEVEL >9.0%: CPT | Mod: CPTII,,, | Performed by: OPHTHALMOLOGY

## 2025-06-25 PROCEDURE — 4010F ACE/ARB THERAPY RXD/TAKEN: CPT | Mod: CPTII,,, | Performed by: OPHTHALMOLOGY

## 2025-06-25 PROCEDURE — 99214 OFFICE O/P EST MOD 30 MIN: CPT | Mod: PBBFAC,PO | Performed by: OPHTHALMOLOGY

## 2025-06-25 PROCEDURE — 1159F MED LIST DOCD IN RCRD: CPT | Mod: CPTII,,, | Performed by: OPHTHALMOLOGY

## 2025-06-25 PROCEDURE — 99999 PR PBB SHADOW E&M-EST. PATIENT-LVL IV: CPT | Mod: PBBFAC,,, | Performed by: OPHTHALMOLOGY

## 2025-06-25 PROCEDURE — 92134 CPTRZ OPH DX IMG PST SGM RTA: CPT | Mod: PBBFAC,PO | Performed by: OPHTHALMOLOGY

## 2025-06-25 NOTE — PROGRESS NOTES
Subjective:       Patient ID: Devika Bustillo is a 65 y.o. female      Chief Complaint   Patient presents with    Follow-up     DR palacios as instructed     History of Present Illness  HPI     Follow-up     Additional comments: DR palacios as instructed           Comments    Pt feels vision about the same.  OD is still good.  OS blurry    -Flashes   -Floaters   -pain           Last edited by Horacio Dudley MD on 6/27/2025 10:02 AM.        Imaging:    See report    Assessment/Plan:     1. Macular hole of left eye   Review of prior notes from [facility] reveal MH present since at least Jan 2024  Had PPV/ attempt at repair 4/2024, then had repeat PPV 6/2024  Hole with flat edges.  Given chronicity and configuration and multiple attempts at closure, would only expect modest vis imprv if closes and decreased chance of closure.  RBA surgery d/w pt again in great detail since patient was asking for surgery today.  Visual prognosis with and without surgery discussed  Recommend observation  Pt agrees    - Posterior Segment OCT Retina-Both eyes    2. Type 2 diabetes mellitus with both eyes affected by proliferative retinopathy without macular edema, with long-term current use of insulin  Tried to get FA previously to eval for ischemia and prolif but could not get venous access  Will have to follow clinically    Observe for now.  No jessy NVE/D  OD has superior tangle of vessels, stable compared to photo    Diabetic Retinopathy discussed in detail, all questions answered  Stressed importance of good BS/BP/Chol Control  RTC immediately PRN any vision changes, veda blurry vision, missing vision, floaters, distortions, etc      3. Preretinal fibrosis, left  No traction  Observe    4. Chronic anterior uveitis, left  Quiet today off gtts.  Pt d/c'd gtts on her own when she saw me last  Can hold gtts now    5. Diabetic cataract, right  Good Va overall  Fill glasses Rx  Observe        Recommend pt to f/u with Dr Stock, has been receiving good  care.    Pt wishes to f/u here      Visit today included increased complexity associated with the care of the episodic problem PDR, macular hole, cataract, uveitis addressed and managing the longitudinal care of the patient due to the serious and/or complex managed problem(s) PDR, macular hole, cataract, uveitis .    Follow up in about 3 months (around 9/25/2025) for Dilated examination (DILATE OU), OCT Mac.

## 2025-07-08 ENCOUNTER — DOCUMENTATION ONLY (OUTPATIENT)
Dept: CARDIOLOGY | Facility: CLINIC | Age: 65
End: 2025-07-08
Payer: MEDICAID

## 2025-07-08 NOTE — PROGRESS NOTES
Heart Failure Transitional [facility]    The pt's episode will be closed as of today due to being outside her 31 day window of care.

## 2025-07-24 ENCOUNTER — OFFICE VISIT (OUTPATIENT)
Dept: PODIATRY | Facility: CLINIC | Age: 65
End: 2025-07-24
Payer: MEDICAID

## 2025-07-24 VITALS — BODY MASS INDEX: 37.02 KG/M2 | HEIGHT: 68 IN | WEIGHT: 244.25 LBS

## 2025-07-24 DIAGNOSIS — L84 CORN OR CALLUS: ICD-10-CM

## 2025-07-24 DIAGNOSIS — E11.42 TYPE 2 DIABETES MELLITUS WITH DIABETIC POLYNEUROPATHY, UNSPECIFIED WHETHER LONG TERM INSULIN USE: Primary | ICD-10-CM

## 2025-07-24 DIAGNOSIS — B35.1 ONYCHOMYCOSIS DUE TO DERMATOPHYTE: ICD-10-CM

## 2025-07-24 PROCEDURE — 99214 OFFICE O/P EST MOD 30 MIN: CPT | Mod: PBBFAC,PN | Performed by: PODIATRIST

## 2025-07-24 PROCEDURE — 11721 DEBRIDE NAIL 6 OR MORE: CPT | Mod: Q9,PBBFAC,PN | Performed by: PODIATRIST

## 2025-07-24 PROCEDURE — 11056 PARNG/CUTG B9 HYPRKR LES 2-4: CPT | Performed by: PODIATRIST

## 2025-07-24 PROCEDURE — 99999 PR PBB SHADOW E&M-EST. PATIENT-LVL IV: CPT | Mod: PBBFAC,,, | Performed by: PODIATRIST

## 2025-07-24 RX ORDER — CICLOPIROX 80 MG/ML
SOLUTION TOPICAL NIGHTLY
Qty: 6.6 ML | Refills: 11 | Status: SHIPPED | OUTPATIENT
Start: 2025-07-24

## 2025-07-24 RX ORDER — UREA 40 %
CREAM (GRAM) TOPICAL DAILY
Qty: 85 G | Refills: 11 | Status: SHIPPED | OUTPATIENT
Start: 2025-07-24

## 2025-07-24 NOTE — PROGRESS NOTES
Subjective:      Patient ID: Devika Bustillo is a 65 y.o. female.    Chief Complaint: Foot Pain    Diabetes, increased risk amputation needing evaluation/management/optomization of foot care.    Cc2 thick discolored misshapen toenails all 10 toes.  Gradual onset, worsening chronically over the past several years.  Aggravated with socks shoes pressure ambulation.  No prior medical treatment.  Self trimming it is very difficult.  Denies trauma and surgery both toenails all feet    Cc3  thick hard callus both big toes.  Gradual onset, worsening over the past month or so.  Aggravated with increased weight-bearing prolonged standing some shoes.  No prior medical treatment.  No self-treatment.  Denies trauma and surgery both feet.      Chief Complaint   Patient presents with    Foot Pain       Casual shoes botrh feet    Review of Systems   Constitutional: Negative for chills, diaphoresis, fever, malaise/fatigue and night sweats.   Cardiovascular:  Positive for leg swelling. Negative for claudication, cyanosis and syncope.   Skin:  Positive for nail changes and suspicious lesions. Negative for color change, dry skin, rash and unusual hair distribution.   Musculoskeletal:  Negative for falls, joint pain, joint swelling, muscle cramps, muscle weakness and stiffness.   Gastrointestinal:  Negative for constipation, diarrhea, nausea and vomiting.   Neurological:  Positive for numbness, paresthesias and sensory change. Negative for brief paralysis, disturbances in coordination, focal weakness and tremors.           Objective:      Physical Exam  Constitutional:       Appearance: She is well-developed. She is not diaphoretic.      Comments: Oriented to time, place, and person.   Cardiovascular:      Pulses:           Dorsalis pedis pulses are 1+ on the right side and 1+ on the left side.        Posterior tibial pulses are 1+ on the right side and 1+ on the left side.      Comments: Capillary fill time 3-5 seconds.  All toes warm to  touch.      Less than 2+ pitting lower extremity edema bilateral.    Negative elevational pallor and dependent rubor bilateral.    Musculoskeletal:      Comments: Normal angle, base, station of gait. Decreased stride length, early heel off, moderately propulsive toe off bilateral.    All ten toes without clubbing, cyanosis, or signs of ischemia.      No pain to palpation bilateral lower extremities.      Range of motion, stability, muscle strength, and muscle tone are age and health appropriate normal bilateral feet and legs.       Skin:     General: Skin is warm and dry.      Coloration: Skin is not pale.      Findings: No abrasion, bruising, burn, ecchymosis, erythema, laceration, lesion, petechiae or rash.      Nails: There is no clubbing.      Comments: Skin thin, atrophic, with decreased density and distribution of pedal hair bilateral, but without hyperpigmentation, jessy discoloration,  ulcers, masses, nodules or cords palpated bilateral feet and legs.    Focal hyperkeratotic lesion consisting entirely of hyperkeratotic tissue without open skin, drainage, pus, fluctuance, malodor, or signs of infection medial hallux IPJ bilateral    Toenails 1st, 2nd, 3rd, 4th, 5th  bilateral are hypertrophic thickened 2-3 mm, dystrophic, discolored tanish brown with tan, gray crumbly subungual debris.  Tender to distal nail plate pressure, without periungual skin abnormality of each.      Toenails 1st, 2nd, 3rd, 4th, 5th  bilateral are hypertrophic thickened 2-3 mm, dystrophic, discolored tanish brown with tan, gray crumbly subungual debris.  Tender to distal nail plate pressure, without periungual skin abnormality of each.     Neurological:      Mental Status: She is alert and oriented to person, place, and time. She is not disoriented.      Sensory: Sensory deficit present.      Motor: No tremor, atrophy or abnormal muscle tone.      Comments: Decreased/absent vibratory sensation bilateral feet to 128Hz tuning  fork.    Diminished/loss of protective sensation all toes bilateral to 10 gram monofilament.    Paresthesias, and burning bilateral feet with no clearly identified trigger or source.     Psychiatric:         Behavior: Behavior is cooperative.           Assessment:       Encounter Diagnoses   Name Primary?    Type 2 diabetes mellitus with diabetic polyneuropathy, unspecified whether long term insulin use Yes    Onychomycosis due to dermatophyte     Corn or callus          Plan:       Devika was seen today for foot pain.    Diagnoses and all orders for this visit:    Type 2 diabetes mellitus with diabetic polyneuropathy, unspecified whether long term insulin use  -     urea (CARMOL) 40 % Crea; Apply topically once daily.  -     DIABETIC SHOES FOR HOME USE    Onychomycosis due to dermatophyte  -     urea (CARMOL) 40 % Crea; Apply topically once daily.  -     DIABETIC SHOES FOR HOME USE    Corn or callus  -     urea (CARMOL) 40 % Crea; Apply topically once daily.  -     DIABETIC SHOES FOR HOME USE    Other orders  -     ciclopirox (PENLAC) 8 % Soln; Apply topically nightly.      I counseled the patient on her conditions, their implications and medical management.        The patient has received literature on basic diabetic foot care.  Patient will inspect feet daily, wear protective shoe gear when ambulatory, and apply moisturizer to skin as needed to maintain elasticity and help prevent ulceration.    Inspect feet multiple times daily for signs of occurrence/recurrence ulceration.    Discussed conservative treatment with shoes of adequate dimensions, material, and style to alleviate symptoms and delay or prevent surgical intervention.    Penlac, urea, DM shoes, inserts.          No follow-ups on file.

## 2025-08-10 ENCOUNTER — HOSPITAL ENCOUNTER (INPATIENT)
Facility: HOSPITAL | Age: 65
LOS: 3 days | Discharge: HOME | DRG: 315 | End: 2025-08-13
Attending: STUDENT IN AN ORGANIZED HEALTH CARE EDUCATION/TRAINING PROGRAM | Admitting: HOSPITALIST
Payer: MEDICAID

## 2025-08-10 DIAGNOSIS — I50.9 ACUTE ON CHRONIC CONGESTIVE HEART FAILURE, UNSPECIFIED HEART FAILURE TYPE: Primary | ICD-10-CM

## 2025-08-10 DIAGNOSIS — R06.02 SOB (SHORTNESS OF BREATH) ON EXERTION: ICD-10-CM

## 2025-08-10 DIAGNOSIS — E11.65 UNCONTROLLED TYPE 2 DIABETES MELLITUS WITH HYPERGLYCEMIA: ICD-10-CM

## 2025-08-10 DIAGNOSIS — I50.9 ACUTE EXACERBATION OF CHF (CONGESTIVE HEART FAILURE): ICD-10-CM

## 2025-08-10 DIAGNOSIS — I50.9 ACUTE ON CHRONIC CONGESTIVE HEART FAILURE: ICD-10-CM

## 2025-08-10 DIAGNOSIS — R07.9 CHEST PAIN: ICD-10-CM

## 2025-08-10 LAB
ABSOLUTE EOSINOPHIL (OHS): 0.11 K/UL
ABSOLUTE MONOCYTE (OHS): 0.64 K/UL (ref 0.3–1)
ABSOLUTE NEUTROPHIL COUNT (OHS): 6.15 K/UL (ref 1.8–7.7)
ALBUMIN SERPL BCP-MCNC: 3.4 G/DL (ref 3.5–5.2)
ALP SERPL-CCNC: 202 UNIT/L (ref 40–150)
ALT SERPL W/O P-5'-P-CCNC: 65 UNIT/L (ref 0–55)
ANION GAP (OHS): 14 MMOL/L (ref 8–16)
AST SERPL-CCNC: 26 UNIT/L (ref 0–50)
BASOPHILS # BLD AUTO: 0.04 K/UL
BASOPHILS NFR BLD AUTO: 0.4 %
BILIRUB SERPL-MCNC: 0.3 MG/DL (ref 0.1–1)
BUN SERPL-MCNC: 42 MG/DL (ref 8–23)
CALCIUM SERPL-MCNC: 9.7 MG/DL (ref 8.7–10.5)
CHLORIDE SERPL-SCNC: 105 MMOL/L (ref 95–110)
CO2 SERPL-SCNC: 22 MMOL/L (ref 23–29)
CREAT SERPL-MCNC: 1.5 MG/DL (ref 0.5–1.4)
ERYTHROCYTE [DISTWIDTH] IN BLOOD BY AUTOMATED COUNT: 12.6 % (ref 11.5–14.5)
GFR SERPLBLD CREATININE-BSD FMLA CKD-EPI: 39 ML/MIN/1.73/M2
GLUCOSE SERPL-MCNC: 209 MG/DL (ref 70–110)
HCT VFR BLD AUTO: 35.9 % (ref 37–48.5)
HGB BLD-MCNC: 11.8 GM/DL (ref 12–16)
IMM GRANULOCYTES # BLD AUTO: 0.05 K/UL (ref 0–0.04)
IMM GRANULOCYTES NFR BLD AUTO: 0.5 % (ref 0–0.5)
LYMPHOCYTES # BLD AUTO: 2.54 K/UL (ref 1–4.8)
MCH RBC QN AUTO: 26.8 PG (ref 27–31)
MCHC RBC AUTO-ENTMCNC: 32.9 G/DL (ref 32–36)
MCV RBC AUTO: 81 FL (ref 82–98)
NT-PROBNP SERPL-MCNC: 580 PG/ML
NUCLEATED RBC (/100WBC) (OHS): 0 /100 WBC
PLATELET # BLD AUTO: 292 K/UL (ref 150–450)
PMV BLD AUTO: 10.4 FL (ref 9.2–12.9)
POTASSIUM SERPL-SCNC: 4 MMOL/L (ref 3.5–5.1)
PROT SERPL-MCNC: 8.1 GM/DL (ref 6–8.4)
RBC # BLD AUTO: 4.41 M/UL (ref 4–5.4)
RELATIVE EOSINOPHIL (OHS): 1.2 %
RELATIVE LYMPHOCYTE (OHS): 26.7 % (ref 18–48)
RELATIVE MONOCYTE (OHS): 6.7 % (ref 4–15)
RELATIVE NEUTROPHIL (OHS): 64.5 % (ref 38–73)
SODIUM SERPL-SCNC: 141 MMOL/L (ref 136–145)
TROPONIN I SERPL HS-MCNC: 10 NG/L
TROPONIN I SERPL HS-MCNC: 12 NG/L
WBC # BLD AUTO: 9.53 K/UL (ref 3.9–12.7)

## 2025-08-10 PROCEDURE — 84484 ASSAY OF TROPONIN QUANT: CPT

## 2025-08-10 PROCEDURE — 93005 ELECTROCARDIOGRAM TRACING: CPT

## 2025-08-10 PROCEDURE — G0378 HOSPITAL OBSERVATION PER HR: HCPCS

## 2025-08-10 PROCEDURE — 80053 COMPREHEN METABOLIC PANEL: CPT

## 2025-08-10 PROCEDURE — 85025 COMPLETE CBC W/AUTO DIFF WBC: CPT

## 2025-08-10 PROCEDURE — 96374 THER/PROPH/DIAG INJ IV PUSH: CPT

## 2025-08-10 PROCEDURE — 25000003 PHARM REV CODE 250

## 2025-08-10 PROCEDURE — 93010 ELECTROCARDIOGRAM REPORT: CPT | Mod: ,,, | Performed by: INTERNAL MEDICINE

## 2025-08-10 PROCEDURE — 25000003 PHARM REV CODE 250: Performed by: STUDENT IN AN ORGANIZED HEALTH CARE EDUCATION/TRAINING PROGRAM

## 2025-08-10 PROCEDURE — 63600175 PHARM REV CODE 636 W HCPCS: Performed by: STUDENT IN AN ORGANIZED HEALTH CARE EDUCATION/TRAINING PROGRAM

## 2025-08-10 PROCEDURE — 94761 N-INVAS EAR/PLS OXIMETRY MLT: CPT

## 2025-08-10 PROCEDURE — 96375 TX/PRO/DX INJ NEW DRUG ADDON: CPT

## 2025-08-10 PROCEDURE — 84484 ASSAY OF TROPONIN QUANT: CPT | Mod: 91 | Performed by: PHYSICIAN ASSISTANT

## 2025-08-10 PROCEDURE — 83880 ASSAY OF NATRIURETIC PEPTIDE: CPT

## 2025-08-10 PROCEDURE — 99285 EMERGENCY DEPT VISIT HI MDM: CPT | Mod: 25

## 2025-08-10 PROCEDURE — 12000002 HC ACUTE/MED SURGE SEMI-PRIVATE ROOM

## 2025-08-10 PROCEDURE — 63600175 PHARM REV CODE 636 W HCPCS

## 2025-08-10 RX ORDER — ACETAMINOPHEN 325 MG/1
650 TABLET ORAL
Status: COMPLETED | OUTPATIENT
Start: 2025-08-10 | End: 2025-08-10

## 2025-08-10 RX ORDER — INSULIN ASPART 100 [IU]/ML
0-5 INJECTION, SOLUTION INTRAVENOUS; SUBCUTANEOUS
Status: DISCONTINUED | OUTPATIENT
Start: 2025-08-11 | End: 2025-08-13 | Stop reason: HOSPADM

## 2025-08-10 RX ORDER — ACETAMINOPHEN 325 MG/1
650 TABLET ORAL EVERY 4 HOURS PRN
Status: DISCONTINUED | OUTPATIENT
Start: 2025-08-11 | End: 2025-08-13 | Stop reason: HOSPADM

## 2025-08-10 RX ORDER — GLUCAGON 1 MG
1 KIT INJECTION
Status: DISCONTINUED | OUTPATIENT
Start: 2025-08-11 | End: 2025-08-13 | Stop reason: HOSPADM

## 2025-08-10 RX ORDER — MORPHINE SULFATE 4 MG/ML
4 INJECTION, SOLUTION INTRAMUSCULAR; INTRAVENOUS
Refills: 0 | Status: COMPLETED | OUTPATIENT
Start: 2025-08-10 | End: 2025-08-10

## 2025-08-10 RX ORDER — IPRATROPIUM BROMIDE AND ALBUTEROL SULFATE 2.5; .5 MG/3ML; MG/3ML
3 SOLUTION RESPIRATORY (INHALATION) EVERY 4 HOURS PRN
Status: DISCONTINUED | OUTPATIENT
Start: 2025-08-11 | End: 2025-08-13 | Stop reason: HOSPADM

## 2025-08-10 RX ORDER — FUROSEMIDE 10 MG/ML
80 INJECTION INTRAMUSCULAR; INTRAVENOUS
Status: COMPLETED | OUTPATIENT
Start: 2025-08-10 | End: 2025-08-10

## 2025-08-10 RX ORDER — ENOXAPARIN SODIUM 100 MG/ML
40 INJECTION SUBCUTANEOUS EVERY 24 HOURS
Status: DISCONTINUED | OUTPATIENT
Start: 2025-08-11 | End: 2025-08-13 | Stop reason: HOSPADM

## 2025-08-10 RX ORDER — FUROSEMIDE 10 MG/ML
80 INJECTION INTRAMUSCULAR; INTRAVENOUS EVERY 12 HOURS
Status: DISCONTINUED | OUTPATIENT
Start: 2025-08-11 | End: 2025-08-11

## 2025-08-10 RX ORDER — POLYETHYLENE GLYCOL 3350 17 G/17G
17 POWDER, FOR SOLUTION ORAL DAILY PRN
Status: DISCONTINUED | OUTPATIENT
Start: 2025-08-11 | End: 2025-08-13 | Stop reason: HOSPADM

## 2025-08-10 RX ORDER — IBUPROFEN 200 MG
24 TABLET ORAL
Status: DISCONTINUED | OUTPATIENT
Start: 2025-08-11 | End: 2025-08-13 | Stop reason: HOSPADM

## 2025-08-10 RX ORDER — IBUPROFEN 200 MG
16 TABLET ORAL
Status: DISCONTINUED | OUTPATIENT
Start: 2025-08-11 | End: 2025-08-13 | Stop reason: HOSPADM

## 2025-08-10 RX ORDER — ASPIRIN 325 MG
325 TABLET ORAL
Status: COMPLETED | OUTPATIENT
Start: 2025-08-10 | End: 2025-08-10

## 2025-08-10 RX ORDER — NIFEDIPINE 30 MG/1
30 TABLET, EXTENDED RELEASE ORAL DAILY
Status: DISCONTINUED | OUTPATIENT
Start: 2025-08-11 | End: 2025-08-13

## 2025-08-10 RX ORDER — ONDANSETRON 8 MG/1
8 TABLET, ORALLY DISINTEGRATING ORAL EVERY 8 HOURS PRN
Status: DISCONTINUED | OUTPATIENT
Start: 2025-08-11 | End: 2025-08-13 | Stop reason: HOSPADM

## 2025-08-10 RX ORDER — ZONISAMIDE 100 MG/1
100 CAPSULE ORAL NIGHTLY
Status: DISCONTINUED | OUTPATIENT
Start: 2025-08-11 | End: 2025-08-13 | Stop reason: HOSPADM

## 2025-08-10 RX ORDER — TALC
6 POWDER (GRAM) TOPICAL NIGHTLY PRN
Status: DISCONTINUED | OUTPATIENT
Start: 2025-08-11 | End: 2025-08-13 | Stop reason: HOSPADM

## 2025-08-10 RX ADMIN — MORPHINE SULFATE 4 MG: 4 INJECTION INTRAVENOUS at 10:08

## 2025-08-10 RX ADMIN — ACETAMINOPHEN 650 MG: 325 TABLET ORAL at 08:08

## 2025-08-10 RX ADMIN — ASPIRIN 325 MG: 325 TABLET ORAL at 10:08

## 2025-08-10 RX ADMIN — FUROSEMIDE 80 MG: 10 INJECTION, SOLUTION INTRAVENOUS at 08:08

## 2025-08-11 ENCOUNTER — DOCUMENTATION ONLY (OUTPATIENT)
Dept: CARDIOLOGY | Facility: CLINIC | Age: 65
End: 2025-08-11
Payer: MEDICAID

## 2025-08-11 DIAGNOSIS — R06.02 SOB (SHORTNESS OF BREATH): Primary | ICD-10-CM

## 2025-08-11 LAB
ABSOLUTE EOSINOPHIL (OHS): 0.1 K/UL
ABSOLUTE MONOCYTE (OHS): 0.59 K/UL (ref 0.3–1)
ABSOLUTE NEUTROPHIL COUNT (OHS): 4.52 K/UL (ref 1.8–7.7)
ANION GAP (OHS): 9 MMOL/L (ref 8–16)
AORTIC SIZE INDEX (SOV): 1 CM/M2
AORTIC SIZE INDEX: 0.9 CM/M2
ASCENDING AORTA: 2.1 CM
AV AREA BY CONTINUOUS VTI: 2.2 CM2
AV INDEX (PROSTH): 0.79
AV LVOT MEAN GRADIENT: 3 MMHG
AV LVOT PEAK GRADIENT: 5 MMHG
AV MEAN GRADIENT: 6 MMHG
AV PEAK GRADIENT: 12 MMHG
AV VALVE AREA BY VELOCITY RATIO: 1.8 CM²
AV VALVE AREA: 2.2 CM2
AV VELOCITY RATIO: 0.65
BASOPHILS # BLD AUTO: 0.04 K/UL
BASOPHILS NFR BLD AUTO: 0.5 %
BSA FOR ECHO PROCEDURE: 2.3 M2
BUN SERPL-MCNC: 43 MG/DL (ref 8–23)
CALCIUM SERPL-MCNC: 8.7 MG/DL (ref 8.7–10.5)
CHLORIDE SERPL-SCNC: 109 MMOL/L (ref 95–110)
CO2 SERPL-SCNC: 25 MMOL/L (ref 23–29)
CREAT SERPL-MCNC: 1.8 MG/DL (ref 0.5–1.4)
CV ECHO LV RWT: 0.38 CM
DOP CALC AO PEAK VEL: 1.7 M/S
DOP CALC AO VTI: 38.2 CM
DOP CALC LVOT AREA: 2.8 CM2
DOP CALC LVOT DIAMETER: 1.9 CM
DOP CALC LVOT PEAK VEL: 1.1 M/S
DOP CALCLVOT PEAK VEL VTI: 30.3 CM
E WAVE DECELERATION TIME: 300 MS
E/A RATIO: 0.65
E/E' RATIO: 9 M/S
EAG (OHS): 283 MG/DL (ref 68–131)
ECHO EF ESTIMATED: 76 %
ECHO LV POSTERIOR WALL: 0.9 CM (ref 0.6–1.1)
EJECTION FRACTION: 55 %
ERYTHROCYTE [DISTWIDTH] IN BLOOD BY AUTOMATED COUNT: 12.7 % (ref 11.5–14.5)
FRACTIONAL SHORTENING: 43.8 % (ref 28–44)
GFR SERPLBLD CREATININE-BSD FMLA CKD-EPI: 31 ML/MIN/1.73/M2
GLUCOSE SERPL-MCNC: 168 MG/DL (ref 70–110)
HBA1C MFR BLD: 11.5 % (ref 4–5.6)
HCT VFR BLD AUTO: 32 % (ref 37–48.5)
HGB BLD-MCNC: 10.3 GM/DL (ref 12–16)
IMM GRANULOCYTES # BLD AUTO: 0.02 K/UL (ref 0–0.04)
IMM GRANULOCYTES NFR BLD AUTO: 0.3 % (ref 0–0.5)
INTERVENTRICULAR SEPTUM: 0.7 CM (ref 0.6–1.1)
IVRT: 141 MS
LA MAJOR: 6.3 CM
LA MINOR: 6.1 CM
LA WIDTH: 4.8 CM
LEFT ATRIUM SIZE: 3.8 CM
LEFT ATRIUM VOLUME INDEX MOD: 35 ML/M2
LEFT ATRIUM VOLUME INDEX: 43 ML/M2
LEFT ATRIUM VOLUME MOD: 77 ML
LEFT ATRIUM VOLUME: 96 CM3
LEFT INTERNAL DIMENSION IN SYSTOLE: 2.7 CM (ref 2.1–4)
LEFT VENTRICLE DIASTOLIC VOLUME INDEX: 48.65 ML/M2
LEFT VENTRICLE DIASTOLIC VOLUME: 108 ML
LEFT VENTRICLE MASS INDEX: 57.1 G/M2
LEFT VENTRICLE SYSTOLIC VOLUME INDEX: 11.7 ML/M2
LEFT VENTRICLE SYSTOLIC VOLUME: 26 ML
LEFT VENTRICULAR INTERNAL DIMENSION IN DIASTOLE: 4.8 CM (ref 3.5–6)
LEFT VENTRICULAR MASS: 126.7 G
LV LATERAL E/E' RATIO: 8.5
LV SEPTAL E/E' RATIO: 9.4
LYMPHOCYTES # BLD AUTO: 2.5 K/UL (ref 1–4.8)
Lab: 1.2 CM/M
Lab: 1.3 CM/M
MAGNESIUM SERPL-MCNC: 1.6 MG/DL (ref 1.6–2.6)
MCH RBC QN AUTO: 26.6 PG (ref 27–31)
MCHC RBC AUTO-ENTMCNC: 32.2 G/DL (ref 32–36)
MCV RBC AUTO: 83 FL (ref 82–98)
MV MEAN GRADIENT: 2 MMHG
MV PEAK A VEL: 1.3 M/S
MV PEAK E VEL: 0.85 M/S
MV PEAK GRADIENT: 7 MMHG
MV VALVE AREA BY PLANIMETRY: 1.83 CM2
NUCLEATED RBC (/100WBC) (OHS): 0 /100 WBC
OHS CV CPX PATIENT HEIGHT IN: 68
OHS CV RV/LV RATIO: 0.79 CM
OHS QRS DURATION: 80 MS
OHS QTC CALCULATION: 439 MS
PISA TR MAX VEL: 2.7 M/S
PLATELET # BLD AUTO: 239 K/UL (ref 150–450)
PMV BLD AUTO: 10 FL (ref 9.2–12.9)
POCT GLUCOSE: 161 MG/DL (ref 70–110)
POCT GLUCOSE: 166 MG/DL (ref 70–110)
POCT GLUCOSE: 204 MG/DL (ref 70–110)
POCT GLUCOSE: 229 MG/DL (ref 70–110)
POTASSIUM SERPL-SCNC: 3.9 MMOL/L (ref 3.5–5.1)
RA MAJOR: 4.88 CM
RA PRESSURE ESTIMATED: 3 MMHG
RA WIDTH: 3.97 CM
RBC # BLD AUTO: 3.87 M/UL (ref 4–5.4)
RELATIVE EOSINOPHIL (OHS): 1.3 %
RELATIVE LYMPHOCYTE (OHS): 32.2 % (ref 18–48)
RELATIVE MONOCYTE (OHS): 7.6 % (ref 4–15)
RELATIVE NEUTROPHIL (OHS): 58.1 % (ref 38–73)
RIGHT ATRIAL AREA: 18.5 CM2
RIGHT VENTRICLE DIASTOLIC BASEL DIMENSION: 3.8 CM
RV TB RVSP: 6 MMHG
RV TISSUE DOPPLER FREE WALL SYSTOLIC VELOCITY 1 (APICAL 4 CHAMBER VIEW): 15.1 CM/S
SINUS: 2.2 CM
SODIUM SERPL-SCNC: 143 MMOL/L (ref 136–145)
STJ: 2.2 CM
TDI LATERAL: 0.1 M/S
TDI SEPTAL: 0.09 M/S
TDI: 0.1 M/S
TRICUSPID ANNULAR PLANE SYSTOLIC EXCURSION: 2.4 CM
TROPONIN I SERPL HS-MCNC: 12 NG/L
TV PEAK GRADIENT: 29 MMHG
TV REST PULMONARY ARTERY PRESSURE: 32 MMHG
WBC # BLD AUTO: 7.77 K/UL (ref 3.9–12.7)
Z-SCORE OF LEFT VENTRICULAR DIMENSION IN END DIASTOLE: -4.8
Z-SCORE OF LEFT VENTRICULAR DIMENSION IN END SYSTOLE: -4.39

## 2025-08-11 PROCEDURE — 63600175 PHARM REV CODE 636 W HCPCS: Performed by: PHYSICIAN ASSISTANT

## 2025-08-11 PROCEDURE — 25500020 PHARM REV CODE 255: Performed by: HOSPITALIST

## 2025-08-11 PROCEDURE — 51798 US URINE CAPACITY MEASURE: CPT

## 2025-08-11 PROCEDURE — 96372 THER/PROPH/DIAG INJ SC/IM: CPT | Performed by: PHYSICIAN ASSISTANT

## 2025-08-11 PROCEDURE — 25000003 PHARM REV CODE 250: Performed by: PHYSICIAN ASSISTANT

## 2025-08-11 PROCEDURE — 63600175 PHARM REV CODE 636 W HCPCS

## 2025-08-11 PROCEDURE — 25000003 PHARM REV CODE 250

## 2025-08-11 PROCEDURE — 83735 ASSAY OF MAGNESIUM: CPT | Performed by: PHYSICIAN ASSISTANT

## 2025-08-11 PROCEDURE — 36415 COLL VENOUS BLD VENIPUNCTURE: CPT | Performed by: PHYSICIAN ASSISTANT

## 2025-08-11 PROCEDURE — 94761 N-INVAS EAR/PLS OXIMETRY MLT: CPT

## 2025-08-11 PROCEDURE — 80048 BASIC METABOLIC PNL TOTAL CA: CPT | Performed by: PHYSICIAN ASSISTANT

## 2025-08-11 PROCEDURE — 21400001 HC TELEMETRY ROOM

## 2025-08-11 PROCEDURE — 85025 COMPLETE CBC W/AUTO DIFF WBC: CPT | Performed by: PHYSICIAN ASSISTANT

## 2025-08-11 PROCEDURE — 83036 HEMOGLOBIN GLYCOSYLATED A1C: CPT | Performed by: PHYSICIAN ASSISTANT

## 2025-08-11 RX ORDER — FUROSEMIDE 10 MG/ML
80 INJECTION INTRAMUSCULAR; INTRAVENOUS 3 TIMES DAILY
Status: COMPLETED | OUTPATIENT
Start: 2025-08-11 | End: 2025-08-11

## 2025-08-11 RX ORDER — POTASSIUM CHLORIDE 20 MEQ/1
20 TABLET, EXTENDED RELEASE ORAL ONCE
Status: COMPLETED | OUTPATIENT
Start: 2025-08-11 | End: 2025-08-11

## 2025-08-11 RX ORDER — ASPIRIN 81 MG/1
81 TABLET ORAL DAILY
Status: DISCONTINUED | OUTPATIENT
Start: 2025-08-11 | End: 2025-08-13 | Stop reason: HOSPADM

## 2025-08-11 RX ORDER — INSULIN GLARGINE 100 [IU]/ML
25 INJECTION, SOLUTION SUBCUTANEOUS DAILY
Status: DISCONTINUED | OUTPATIENT
Start: 2025-08-11 | End: 2025-08-13 | Stop reason: HOSPADM

## 2025-08-11 RX ORDER — SACUBITRIL AND VALSARTAN 97; 103 MG/1; MG/1
1 TABLET ORAL 2 TIMES DAILY
Status: DISCONTINUED | OUTPATIENT
Start: 2025-08-11 | End: 2025-08-13

## 2025-08-11 RX ORDER — ATORVASTATIN CALCIUM 40 MG/1
80 TABLET, FILM COATED ORAL DAILY
Status: DISCONTINUED | OUTPATIENT
Start: 2025-08-11 | End: 2025-08-13 | Stop reason: HOSPADM

## 2025-08-11 RX ORDER — METHOCARBAMOL 500 MG/1
500 TABLET, FILM COATED ORAL ONCE
Status: COMPLETED | OUTPATIENT
Start: 2025-08-11 | End: 2025-08-11

## 2025-08-11 RX ORDER — NITROGLYCERIN 0.4 MG/1
0.4 TABLET SUBLINGUAL EVERY 5 MIN PRN
Status: DISCONTINUED | OUTPATIENT
Start: 2025-08-11 | End: 2025-08-13 | Stop reason: HOSPADM

## 2025-08-11 RX ORDER — PANTOPRAZOLE SODIUM 40 MG/1
40 TABLET, DELAYED RELEASE ORAL
Status: DISCONTINUED | OUTPATIENT
Start: 2025-08-11 | End: 2025-08-13 | Stop reason: HOSPADM

## 2025-08-11 RX ORDER — FUROSEMIDE 10 MG/ML
80 INJECTION INTRAMUSCULAR; INTRAVENOUS ONCE
Status: DISCONTINUED | OUTPATIENT
Start: 2025-08-11 | End: 2025-08-11

## 2025-08-11 RX ORDER — CLOPIDOGREL BISULFATE 75 MG/1
75 TABLET ORAL DAILY
Status: DISCONTINUED | OUTPATIENT
Start: 2025-08-11 | End: 2025-08-13 | Stop reason: HOSPADM

## 2025-08-11 RX ORDER — MAGNESIUM SULFATE HEPTAHYDRATE 40 MG/ML
2 INJECTION, SOLUTION INTRAVENOUS ONCE
Status: COMPLETED | OUTPATIENT
Start: 2025-08-11 | End: 2025-08-11

## 2025-08-11 RX ORDER — FUROSEMIDE 10 MG/ML
80 INJECTION INTRAMUSCULAR; INTRAVENOUS 3 TIMES DAILY
Status: DISCONTINUED | OUTPATIENT
Start: 2025-08-11 | End: 2025-08-11

## 2025-08-11 RX ORDER — FUROSEMIDE 10 MG/ML
80 INJECTION INTRAMUSCULAR; INTRAVENOUS 2 TIMES DAILY
Status: DISCONTINUED | OUTPATIENT
Start: 2025-08-11 | End: 2025-08-11

## 2025-08-11 RX ORDER — INSULIN ASPART 100 [IU]/ML
5 INJECTION, SOLUTION INTRAVENOUS; SUBCUTANEOUS
Status: DISCONTINUED | OUTPATIENT
Start: 2025-08-11 | End: 2025-08-13

## 2025-08-11 RX ADMIN — INSULIN ASPART 5 UNITS: 100 INJECTION, SOLUTION INTRAVENOUS; SUBCUTANEOUS at 05:08

## 2025-08-11 RX ADMIN — Medication 6 MG: at 01:08

## 2025-08-11 RX ADMIN — ZONISAMIDE 100 MG: 100 CAPSULE ORAL at 09:08

## 2025-08-11 RX ADMIN — SACUBITRIL AND VALSARTAN 1 TABLET: 97; 103 TABLET, FILM COATED ORAL at 09:08

## 2025-08-11 RX ADMIN — FUROSEMIDE 80 MG: 10 INJECTION, SOLUTION INTRAVENOUS at 11:08

## 2025-08-11 RX ADMIN — NIFEDIPINE 30 MG: 30 TABLET, FILM COATED, EXTENDED RELEASE ORAL at 09:08

## 2025-08-11 RX ADMIN — INSULIN ASPART 2 UNITS: 100 INJECTION, SOLUTION INTRAVENOUS; SUBCUTANEOUS at 12:08

## 2025-08-11 RX ADMIN — MAGNESIUM SULFATE HEPTAHYDRATE 2 G: 40 INJECTION, SOLUTION INTRAVENOUS at 02:08

## 2025-08-11 RX ADMIN — ACETAMINOPHEN 650 MG: 325 TABLET ORAL at 04:08

## 2025-08-11 RX ADMIN — FUROSEMIDE 80 MG: 10 INJECTION, SOLUTION INTRAVENOUS at 09:08

## 2025-08-11 RX ADMIN — ASPIRIN 81 MG: 81 TABLET, DELAYED RELEASE ORAL at 09:08

## 2025-08-11 RX ADMIN — ATORVASTATIN CALCIUM 80 MG: 40 TABLET, FILM COATED ORAL at 09:08

## 2025-08-11 RX ADMIN — METHOCARBAMOL 500 MG: 500 TABLET ORAL at 01:08

## 2025-08-11 RX ADMIN — INSULIN ASPART 1 UNITS: 100 INJECTION, SOLUTION INTRAVENOUS; SUBCUTANEOUS at 10:08

## 2025-08-11 RX ADMIN — POTASSIUM CHLORIDE 20 MEQ: 1500 TABLET, EXTENDED RELEASE ORAL at 02:08

## 2025-08-11 RX ADMIN — INSULIN GLARGINE 25 UNITS: 100 INJECTION, SOLUTION SUBCUTANEOUS at 09:08

## 2025-08-11 RX ADMIN — FUROSEMIDE 80 MG: 10 INJECTION, SOLUTION INTRAVENOUS at 04:08

## 2025-08-11 RX ADMIN — CLOPIDOGREL 75 MG: 75 TABLET ORAL at 09:08

## 2025-08-11 RX ADMIN — HUMAN ALBUMIN MICROSPHERES AND PERFLUTREN 0.11 MG: 10; .22 INJECTION, SOLUTION INTRAVENOUS at 09:08

## 2025-08-12 LAB
ABSOLUTE EOSINOPHIL (OHS): 0.1 K/UL
ABSOLUTE MONOCYTE (OHS): 0.69 K/UL (ref 0.3–1)
ABSOLUTE NEUTROPHIL COUNT (OHS): 3.3 K/UL (ref 1.8–7.7)
ANION GAP (OHS): 10 MMOL/L (ref 8–16)
BASOPHILS # BLD AUTO: 0.04 K/UL
BASOPHILS NFR BLD AUTO: 0.6 %
BUN SERPL-MCNC: 55 MG/DL (ref 8–23)
CALCIUM SERPL-MCNC: 8.4 MG/DL (ref 8.7–10.5)
CHLORIDE SERPL-SCNC: 107 MMOL/L (ref 95–110)
CO2 SERPL-SCNC: 23 MMOL/L (ref 23–29)
CREAT SERPL-MCNC: 1.9 MG/DL (ref 0.5–1.4)
ERYTHROCYTE [DISTWIDTH] IN BLOOD BY AUTOMATED COUNT: 12.7 % (ref 11.5–14.5)
GFR SERPLBLD CREATININE-BSD FMLA CKD-EPI: 29 ML/MIN/1.73/M2
GLUCOSE SERPL-MCNC: 216 MG/DL (ref 70–110)
HCT VFR BLD AUTO: 31.7 % (ref 37–48.5)
HGB BLD-MCNC: 10 GM/DL (ref 12–16)
IMM GRANULOCYTES # BLD AUTO: 0.02 K/UL (ref 0–0.04)
IMM GRANULOCYTES NFR BLD AUTO: 0.3 % (ref 0–0.5)
LYMPHOCYTES # BLD AUTO: 2.05 K/UL (ref 1–4.8)
MAGNESIUM SERPL-MCNC: 2 MG/DL (ref 1.6–2.6)
MCH RBC QN AUTO: 26.3 PG (ref 27–31)
MCHC RBC AUTO-ENTMCNC: 31.5 G/DL (ref 32–36)
MCV RBC AUTO: 83 FL (ref 82–98)
NUCLEATED RBC (/100WBC) (OHS): 0 /100 WBC
PHOSPHATE SERPL-MCNC: 5.8 MG/DL (ref 2.7–4.5)
PLATELET # BLD AUTO: 240 K/UL (ref 150–450)
PMV BLD AUTO: 10 FL (ref 9.2–12.9)
POCT GLUCOSE: 136 MG/DL (ref 70–110)
POCT GLUCOSE: 171 MG/DL (ref 70–110)
POCT GLUCOSE: 186 MG/DL (ref 70–110)
POCT GLUCOSE: 295 MG/DL (ref 70–110)
POTASSIUM SERPL-SCNC: 4.5 MMOL/L (ref 3.5–5.1)
RBC # BLD AUTO: 3.8 M/UL (ref 4–5.4)
RELATIVE EOSINOPHIL (OHS): 1.6 %
RELATIVE LYMPHOCYTE (OHS): 33.1 % (ref 18–48)
RELATIVE MONOCYTE (OHS): 11.1 % (ref 4–15)
RELATIVE NEUTROPHIL (OHS): 53.3 % (ref 38–73)
SODIUM SERPL-SCNC: 140 MMOL/L (ref 136–145)
WBC # BLD AUTO: 6.2 K/UL (ref 3.9–12.7)

## 2025-08-12 PROCEDURE — 84100 ASSAY OF PHOSPHORUS: CPT

## 2025-08-12 PROCEDURE — 85025 COMPLETE CBC W/AUTO DIFF WBC: CPT | Performed by: PHYSICIAN ASSISTANT

## 2025-08-12 PROCEDURE — 21400001 HC TELEMETRY ROOM

## 2025-08-12 PROCEDURE — 83735 ASSAY OF MAGNESIUM: CPT | Performed by: PHYSICIAN ASSISTANT

## 2025-08-12 PROCEDURE — 80048 BASIC METABOLIC PNL TOTAL CA: CPT | Performed by: PHYSICIAN ASSISTANT

## 2025-08-12 PROCEDURE — 36415 COLL VENOUS BLD VENIPUNCTURE: CPT | Performed by: PHYSICIAN ASSISTANT

## 2025-08-12 PROCEDURE — 25000003 PHARM REV CODE 250: Performed by: PHYSICIAN ASSISTANT

## 2025-08-12 PROCEDURE — 63600175 PHARM REV CODE 636 W HCPCS: Performed by: PHYSICIAN ASSISTANT

## 2025-08-12 RX ORDER — ACETAMINOPHEN 500 MG
1500 TABLET ORAL DAILY PRN
COMMUNITY

## 2025-08-12 RX ADMIN — INSULIN ASPART 5 UNITS: 100 INJECTION, SOLUTION INTRAVENOUS; SUBCUTANEOUS at 05:08

## 2025-08-12 RX ADMIN — SACUBITRIL AND VALSARTAN 1 TABLET: 97; 103 TABLET, FILM COATED ORAL at 08:08

## 2025-08-12 RX ADMIN — ASPIRIN 81 MG: 81 TABLET, DELAYED RELEASE ORAL at 08:08

## 2025-08-12 RX ADMIN — NIFEDIPINE 30 MG: 30 TABLET, FILM COATED, EXTENDED RELEASE ORAL at 08:08

## 2025-08-12 RX ADMIN — ZONISAMIDE 100 MG: 100 CAPSULE ORAL at 09:08

## 2025-08-12 RX ADMIN — INSULIN GLARGINE 25 UNITS: 100 INJECTION, SOLUTION SUBCUTANEOUS at 08:08

## 2025-08-12 RX ADMIN — ENOXAPARIN SODIUM 40 MG: 40 INJECTION SUBCUTANEOUS at 05:08

## 2025-08-12 RX ADMIN — SACUBITRIL AND VALSARTAN 1 TABLET: 97; 103 TABLET, FILM COATED ORAL at 09:08

## 2025-08-12 RX ADMIN — INSULIN ASPART 5 UNITS: 100 INJECTION, SOLUTION INTRAVENOUS; SUBCUTANEOUS at 12:08

## 2025-08-12 RX ADMIN — PANTOPRAZOLE SODIUM 40 MG: 40 TABLET, DELAYED RELEASE ORAL at 05:08

## 2025-08-12 RX ADMIN — ATORVASTATIN CALCIUM 80 MG: 40 TABLET, FILM COATED ORAL at 08:08

## 2025-08-12 RX ADMIN — CLOPIDOGREL 75 MG: 75 TABLET ORAL at 08:08

## 2025-08-13 VITALS
TEMPERATURE: 97 F | HEART RATE: 84 BPM | DIASTOLIC BLOOD PRESSURE: 80 MMHG | RESPIRATION RATE: 16 BRPM | HEIGHT: 68 IN | SYSTOLIC BLOOD PRESSURE: 135 MMHG | OXYGEN SATURATION: 98 % | BODY MASS INDEX: 36.98 KG/M2 | WEIGHT: 244 LBS

## 2025-08-13 PROBLEM — E11.65 UNCONTROLLED DIABETES MELLITUS WITH HYPERGLYCEMIA: Status: ACTIVE | Noted: 2025-08-13

## 2025-08-13 LAB
ABSOLUTE EOSINOPHIL (OHS): 0.09 K/UL
ABSOLUTE MONOCYTE (OHS): 0.73 K/UL (ref 0.3–1)
ABSOLUTE NEUTROPHIL COUNT (OHS): 2.64 K/UL (ref 1.8–7.7)
ANION GAP (OHS): 10 MMOL/L (ref 8–16)
AORTIC SIZE INDEX (SOV): 1.1 CM/M2
AORTIC SIZE INDEX: 1 CM/M2
ASCENDING AORTA: 2.2 CM
AV AREA BY CONTINUOUS VTI: 3.6 CM2
AV INDEX (PROSTH): 1.3
AV LVOT MEAN GRADIENT: 3 MMHG
AV LVOT PEAK GRADIENT: 4 MMHG
AV MEAN GRADIENT: 5 MMHG
AV PEAK GRADIENT: 7 MMHG
AV VALVE AREA BY VELOCITY RATIO: 2.2 CM²
AV VALVE AREA: 3.7 CM2
AV VELOCITY RATIO: 0.77
BASOPHILS # BLD AUTO: 0.03 K/UL
BASOPHILS NFR BLD AUTO: 0.6 %
BSA FOR ECHO PROCEDURE: 2.3 M2
BUN SERPL-MCNC: 54 MG/DL (ref 8–23)
CALCIUM SERPL-MCNC: 8.5 MG/DL (ref 8.7–10.5)
CHLORIDE SERPL-SCNC: 107 MMOL/L (ref 95–110)
CO2 SERPL-SCNC: 21 MMOL/L (ref 23–29)
CREAT SERPL-MCNC: 2 MG/DL (ref 0.5–1.4)
CV ECHO LV RWT: 0.36 CM
CV STRESS BASE HR: 75 BPM
DIASTOLIC BLOOD PRESSURE: 77 MMHG
DOP CALC AO PEAK VEL: 1.3 M/S
DOP CALC AO VTI: 18.9 CM
DOP CALC LVOT AREA: 2.8 CM2
DOP CALC LVOT DIAMETER: 1.9 CM
DOP CALC LVOT PEAK VEL: 1 M/S
DOP CALCLVOT PEAK VEL VTI: 24.5 CM
E WAVE DECELERATION TIME: 264 MS
E/A RATIO: 0.65
E/E' RATIO: 11 M/S
ECHO EF ESTIMATED: 73 %
ECHO LV POSTERIOR WALL: 0.7 CM (ref 0.6–1.1)
EJECTION FRACTION: 60 %
ERYTHROCYTE [DISTWIDTH] IN BLOOD BY AUTOMATED COUNT: 12.8 % (ref 11.5–14.5)
FRACTIONAL SHORTENING: 41 % (ref 28–44)
GFR SERPLBLD CREATININE-BSD FMLA CKD-EPI: 28 ML/MIN/1.73/M2
GLUCOSE SERPL-MCNC: 216 MG/DL (ref 70–110)
HCT VFR BLD AUTO: 32.6 % (ref 37–48.5)
HGB BLD-MCNC: 10.3 GM/DL (ref 12–16)
IMM GRANULOCYTES # BLD AUTO: 0.02 K/UL (ref 0–0.04)
IMM GRANULOCYTES NFR BLD AUTO: 0.4 % (ref 0–0.5)
INTERVENTRICULAR SEPTUM: 0.8 CM (ref 0.6–1.1)
LA MAJOR: 6 CM
LA MINOR: 5.7 CM
LA WIDTH: 4.1 CM
LEFT ATRIUM SIZE: 3.1 CM
LEFT ATRIUM VOLUME INDEX MOD: 27 ML/M2
LEFT ATRIUM VOLUME INDEX: 28 ML/M2
LEFT ATRIUM VOLUME MOD: 59 ML
LEFT ATRIUM VOLUME: 63 CM3
LEFT INTERNAL DIMENSION IN SYSTOLE: 2.3 CM (ref 2.1–4)
LEFT VENTRICLE DIASTOLIC VOLUME INDEX: 29.73 ML/M2
LEFT VENTRICLE DIASTOLIC VOLUME: 66 ML
LEFT VENTRICLE MASS INDEX: 37.1 G/M2
LEFT VENTRICLE SYSTOLIC VOLUME INDEX: 8.1 ML/M2
LEFT VENTRICLE SYSTOLIC VOLUME: 18 ML
LEFT VENTRICULAR INTERNAL DIMENSION IN DIASTOLE: 3.9 CM (ref 3.5–6)
LEFT VENTRICULAR MASS: 82.3 G
LV LATERAL E/E' RATIO: 10.5
LV SEPTAL E/E' RATIO: 12
LYMPHOCYTES # BLD AUTO: 1.43 K/UL (ref 1–4.8)
Lab: 1.3 CM/M
Lab: 1.4 CM/M
MAGNESIUM SERPL-MCNC: 1.9 MG/DL (ref 1.6–2.6)
MCH RBC QN AUTO: 26.7 PG (ref 27–31)
MCHC RBC AUTO-ENTMCNC: 31.6 G/DL (ref 32–36)
MCV RBC AUTO: 85 FL (ref 82–98)
MV PEAK A VEL: 1.3 M/S
MV PEAK E VEL: 0.84 M/S
NUCLEATED RBC (/100WBC) (OHS): 0 /100 WBC
OHS CV CPX 1 MINUTE RECOVERY HEART RATE: 127 BPM
OHS CV CPX 85 PERCENT MAX PREDICTED HEART RATE MALE: 133
OHS CV CPX MAX PREDICTED HEART RATE: 157
OHS CV CPX PATIENT HEIGHT IN: 68
OHS CV CPX PATIENT IS FEMALE: 1
OHS CV CPX PATIENT IS MALE: 0
OHS CV CPX PEAK DIASTOLIC BLOOD PRESSURE: 84 MMHG
OHS CV CPX PEAK HEAR RATE: 131 BPM
OHS CV CPX PEAK RATE PRESSURE PRODUCT: NORMAL
OHS CV CPX PEAK SYSTOLIC BLOOD PRESSURE: 212 MMHG
OHS CV CPX PERCENT MAX PREDICTED HEART RATE ACHIEVED: 87
OHS CV CPX RATE PRESSURE PRODUCT PRESENTING: NORMAL
OHS CV INITIAL DOSE: 10 MCG/KG/MIN
OHS CV PEAK DOSE: 30 MCG/KG/MIN
OHS CV RV/LV RATIO: 0.82 CM
PHOSPHATE SERPL-MCNC: 4.2 MG/DL (ref 2.7–4.5)
PLATELET # BLD AUTO: 224 K/UL (ref 150–450)
PMV BLD AUTO: 10.2 FL (ref 9.2–12.9)
POCT GLUCOSE: 163 MG/DL (ref 70–110)
POCT GLUCOSE: 189 MG/DL (ref 70–110)
POCT GLUCOSE: 249 MG/DL (ref 70–110)
POTASSIUM SERPL-SCNC: 4.4 MMOL/L (ref 3.5–5.1)
RA MAJOR: 5.23 CM
RA PRESSURE ESTIMATED: 3 MMHG
RA WIDTH: 4.05 CM
RBC # BLD AUTO: 3.86 M/UL (ref 4–5.4)
RELATIVE EOSINOPHIL (OHS): 1.8 %
RELATIVE LYMPHOCYTE (OHS): 28.9 % (ref 18–48)
RELATIVE MONOCYTE (OHS): 14.8 % (ref 4–15)
RELATIVE NEUTROPHIL (OHS): 53.5 % (ref 38–73)
RIGHT ATRIAL AREA: 17.7 CM2
RIGHT VENTRICLE DIASTOLIC BASEL DIMENSION: 3.2 CM
RV TISSUE DOPPLER FREE WALL SYSTOLIC VELOCITY 1 (APICAL 4 CHAMBER VIEW): 8.34 CM/S
SINUS: 2.4 CM
SODIUM SERPL-SCNC: 138 MMOL/L (ref 136–145)
STJ: 2.3 CM
SYSTOLIC BLOOD PRESSURE: 175 MMHG
TDI LATERAL: 0.08 M/S
TDI SEPTAL: 0.07 M/S
TDI: 0.08 M/S
TRICUSPID ANNULAR PLANE SYSTOLIC EXCURSION: 2.5 CM
WBC # BLD AUTO: 4.94 K/UL (ref 3.9–12.7)
Z-SCORE OF LEFT VENTRICULAR DIMENSION IN END DIASTOLE: -6.87
Z-SCORE OF LEFT VENTRICULAR DIMENSION IN END SYSTOLE: -5.66

## 2025-08-13 PROCEDURE — 82310 ASSAY OF CALCIUM: CPT | Performed by: PHYSICIAN ASSISTANT

## 2025-08-13 PROCEDURE — 36415 COLL VENOUS BLD VENIPUNCTURE: CPT | Performed by: PHYSICIAN ASSISTANT

## 2025-08-13 PROCEDURE — 84100 ASSAY OF PHOSPHORUS: CPT

## 2025-08-13 PROCEDURE — 63600175 PHARM REV CODE 636 W HCPCS: Performed by: HOSPITALIST

## 2025-08-13 PROCEDURE — 25000003 PHARM REV CODE 250: Performed by: PHYSICIAN ASSISTANT

## 2025-08-13 PROCEDURE — 25000003 PHARM REV CODE 250

## 2025-08-13 PROCEDURE — 63600175 PHARM REV CODE 636 W HCPCS

## 2025-08-13 PROCEDURE — 63600175 PHARM REV CODE 636 W HCPCS: Performed by: PHYSICIAN ASSISTANT

## 2025-08-13 PROCEDURE — 83735 ASSAY OF MAGNESIUM: CPT | Performed by: PHYSICIAN ASSISTANT

## 2025-08-13 PROCEDURE — 85025 COMPLETE CBC W/AUTO DIFF WBC: CPT

## 2025-08-13 RX ORDER — DOBUTAMINE HYDROCHLORIDE 400 MG/100ML
30 INJECTION, SOLUTION INTRAVENOUS
Status: COMPLETED | OUTPATIENT
Start: 2025-08-13 | End: 2025-08-13

## 2025-08-13 RX ORDER — INSULIN GLARGINE 300 [IU]/ML
50 INJECTION, SOLUTION SUBCUTANEOUS DAILY
Start: 2025-08-13

## 2025-08-13 RX ORDER — FUROSEMIDE 40 MG/1
40 TABLET ORAL 2 TIMES DAILY
Status: DISCONTINUED | OUTPATIENT
Start: 2025-08-13 | End: 2025-08-13

## 2025-08-13 RX ORDER — NIFEDIPINE 60 MG/1
60 TABLET, EXTENDED RELEASE ORAL DAILY
Status: DISCONTINUED | OUTPATIENT
Start: 2025-08-13 | End: 2025-08-13 | Stop reason: HOSPADM

## 2025-08-13 RX ORDER — INSULIN ASPART 100 [IU]/ML
0-5 INJECTION, SOLUTION INTRAVENOUS; SUBCUTANEOUS
Qty: 18 ML | Refills: 0 | Status: SHIPPED | OUTPATIENT
Start: 2025-08-13 | End: 2025-08-13

## 2025-08-13 RX ORDER — NIFEDIPINE 60 MG/1
60 TABLET, EXTENDED RELEASE ORAL ONCE
Status: DISCONTINUED | OUTPATIENT
Start: 2025-08-13 | End: 2025-08-13

## 2025-08-13 RX ORDER — FLASH GLUCOSE SENSOR
1 KIT MISCELLANEOUS
Qty: 2 KIT | Refills: 0 | Status: SHIPPED | OUTPATIENT
Start: 2025-08-13 | End: 2025-09-10

## 2025-08-13 RX ORDER — NIFEDIPINE 60 MG/1
60 TABLET, EXTENDED RELEASE ORAL ONCE
Status: COMPLETED | OUTPATIENT
Start: 2025-08-13 | End: 2025-08-13

## 2025-08-13 RX ORDER — NIFEDIPINE 30 MG/1
30 TABLET, EXTENDED RELEASE ORAL ONCE
Status: DISCONTINUED | OUTPATIENT
Start: 2025-08-13 | End: 2025-08-13

## 2025-08-13 RX ORDER — INSULIN ASPART 100 [IU]/ML
0-5 INJECTION, SOLUTION INTRAVENOUS; SUBCUTANEOUS
Qty: 18 ML | Refills: 0 | Status: SHIPPED | OUTPATIENT
Start: 2025-08-13 | End: 2025-11-11

## 2025-08-13 RX ORDER — PANTOPRAZOLE SODIUM 40 MG/1
40 TABLET, DELAYED RELEASE ORAL
Qty: 60 TABLET | Refills: 0 | Status: SHIPPED | OUTPATIENT
Start: 2025-08-13 | End: 2025-09-12

## 2025-08-13 RX ORDER — INSULIN ASPART 100 [IU]/ML
8 INJECTION, SOLUTION INTRAVENOUS; SUBCUTANEOUS
Status: DISCONTINUED | OUTPATIENT
Start: 2025-08-13 | End: 2025-08-13 | Stop reason: HOSPADM

## 2025-08-13 RX ADMIN — INSULIN ASPART 8 UNITS: 100 INJECTION, SOLUTION INTRAVENOUS; SUBCUTANEOUS at 04:08

## 2025-08-13 RX ADMIN — INSULIN GLARGINE 25 UNITS: 100 INJECTION, SOLUTION SUBCUTANEOUS at 09:08

## 2025-08-13 RX ADMIN — NIFEDIPINE 60 MG: 60 TABLET, FILM COATED, EXTENDED RELEASE ORAL at 01:08

## 2025-08-13 RX ADMIN — CLOPIDOGREL 75 MG: 75 TABLET ORAL at 09:08

## 2025-08-13 RX ADMIN — INSULIN ASPART 8 UNITS: 100 INJECTION, SOLUTION INTRAVENOUS; SUBCUTANEOUS at 12:08

## 2025-08-13 RX ADMIN — NIFEDIPINE 60 MG: 60 TABLET, FILM COATED, EXTENDED RELEASE ORAL at 09:08

## 2025-08-13 RX ADMIN — ENOXAPARIN SODIUM 40 MG: 40 INJECTION SUBCUTANEOUS at 04:08

## 2025-08-13 RX ADMIN — PANTOPRAZOLE SODIUM 40 MG: 40 TABLET, DELAYED RELEASE ORAL at 04:08

## 2025-08-13 RX ADMIN — ATORVASTATIN CALCIUM 80 MG: 40 TABLET, FILM COATED ORAL at 09:08

## 2025-08-13 RX ADMIN — ASPIRIN 81 MG: 81 TABLET, DELAYED RELEASE ORAL at 09:08

## 2025-08-13 RX ADMIN — DOBUTAMINE 30 MCG/KG/MIN: 12.5 INJECTION, SOLUTION, CONCENTRATE INTRAVENOUS at 11:08

## 2025-08-14 ENCOUNTER — TELEPHONE (OUTPATIENT)
Dept: PSYCHOLOGY | Facility: CLINIC | Age: 65
End: 2025-08-14
Payer: MEDICAID

## 2025-08-15 ENCOUNTER — TELEPHONE (OUTPATIENT)
Dept: CARDIOLOGY | Facility: CLINIC | Age: 65
End: 2025-08-15
Payer: MEDICAID

## 2025-08-20 ENCOUNTER — OFFICE VISIT (OUTPATIENT)
Dept: CARDIOLOGY | Facility: CLINIC | Age: 65
End: 2025-08-20
Payer: MEDICAID

## 2025-08-20 ENCOUNTER — LAB VISIT (OUTPATIENT)
Dept: LAB | Facility: HOSPITAL | Age: 65
End: 2025-08-20
Payer: MEDICAID

## 2025-08-20 VITALS
SYSTOLIC BLOOD PRESSURE: 142 MMHG | WEIGHT: 250.69 LBS | DIASTOLIC BLOOD PRESSURE: 58 MMHG | OXYGEN SATURATION: 98 % | BODY MASS INDEX: 37.99 KG/M2 | HEART RATE: 64 BPM | HEIGHT: 68 IN

## 2025-08-20 DIAGNOSIS — E66.01 CLASS 2 SEVERE OBESITY DUE TO EXCESS CALORIES WITH SERIOUS COMORBIDITY AND BODY MASS INDEX (BMI) OF 35.0 TO 35.9 IN ADULT: ICD-10-CM

## 2025-08-20 DIAGNOSIS — E11.22 TYPE 2 DIABETES MELLITUS WITH STAGE 3A CHRONIC KIDNEY DISEASE, UNSPECIFIED WHETHER LONG TERM INSULIN USE: ICD-10-CM

## 2025-08-20 DIAGNOSIS — R06.02 SOB (SHORTNESS OF BREATH): ICD-10-CM

## 2025-08-20 DIAGNOSIS — R29.818 SUSPECTED SLEEP APNEA: ICD-10-CM

## 2025-08-20 DIAGNOSIS — E78.5 DYSLIPIDEMIA: ICD-10-CM

## 2025-08-20 DIAGNOSIS — R00.2 PALPITATIONS: ICD-10-CM

## 2025-08-20 DIAGNOSIS — I25.10 CORONARY ARTERY DISEASE INVOLVING NATIVE CORONARY ARTERY OF NATIVE HEART WITHOUT ANGINA PECTORIS: ICD-10-CM

## 2025-08-20 DIAGNOSIS — E66.812 CLASS 2 SEVERE OBESITY DUE TO EXCESS CALORIES WITH SERIOUS COMORBIDITY AND BODY MASS INDEX (BMI) OF 35.0 TO 35.9 IN ADULT: ICD-10-CM

## 2025-08-20 DIAGNOSIS — N18.31 STAGE 3A CHRONIC KIDNEY DISEASE: ICD-10-CM

## 2025-08-20 DIAGNOSIS — I50.32 CHRONIC DIASTOLIC HEART FAILURE: Primary | ICD-10-CM

## 2025-08-20 DIAGNOSIS — I10 PRIMARY HYPERTENSION: ICD-10-CM

## 2025-08-20 DIAGNOSIS — N18.31 TYPE 2 DIABETES MELLITUS WITH STAGE 3A CHRONIC KIDNEY DISEASE, UNSPECIFIED WHETHER LONG TERM INSULIN USE: ICD-10-CM

## 2025-08-20 LAB
ABSOLUTE EOSINOPHIL (OHS): 0.11 K/UL
ABSOLUTE MONOCYTE (OHS): 0.54 K/UL (ref 0.3–1)
ABSOLUTE NEUTROPHIL COUNT (OHS): 5.93 K/UL (ref 1.8–7.7)
ALBUMIN SERPL BCP-MCNC: 3.3 G/DL (ref 3.5–5.2)
ALP SERPL-CCNC: 179 UNIT/L (ref 40–150)
ALT SERPL W/O P-5'-P-CCNC: 65 UNIT/L (ref 0–55)
ANION GAP (OHS): 12 MMOL/L (ref 8–16)
AST SERPL-CCNC: 38 UNIT/L (ref 0–50)
BASOPHILS # BLD AUTO: 0.05 K/UL
BASOPHILS NFR BLD AUTO: 0.6 %
BILIRUB SERPL-MCNC: 0.3 MG/DL (ref 0.1–1)
BUN SERPL-MCNC: 65 MG/DL (ref 8–23)
CALCIUM SERPL-MCNC: 9.9 MG/DL (ref 8.7–10.5)
CHLORIDE SERPL-SCNC: 106 MMOL/L (ref 95–110)
CO2 SERPL-SCNC: 21 MMOL/L (ref 23–29)
CREAT SERPL-MCNC: 1.9 MG/DL (ref 0.5–1.4)
ERYTHROCYTE [DISTWIDTH] IN BLOOD BY AUTOMATED COUNT: 12.6 % (ref 11.5–14.5)
GFR SERPLBLD CREATININE-BSD FMLA CKD-EPI: 29 ML/MIN/1.73/M2
GLUCOSE SERPL-MCNC: 160 MG/DL (ref 70–110)
HCT VFR BLD AUTO: 36.1 % (ref 37–48.5)
HGB BLD-MCNC: 11.3 GM/DL (ref 12–16)
IMM GRANULOCYTES # BLD AUTO: 0.08 K/UL (ref 0–0.04)
IMM GRANULOCYTES NFR BLD AUTO: 0.9 % (ref 0–0.5)
LYMPHOCYTES # BLD AUTO: 2.14 K/UL (ref 1–4.8)
MAGNESIUM SERPL-MCNC: 1.8 MG/DL (ref 1.6–2.6)
MCH RBC QN AUTO: 26.5 PG (ref 27–31)
MCHC RBC AUTO-ENTMCNC: 31.3 G/DL (ref 32–36)
MCV RBC AUTO: 85 FL (ref 82–98)
NT-PROBNP SERPL-MCNC: 225 PG/ML
NUCLEATED RBC (/100WBC) (OHS): 0 /100 WBC
PHOSPHATE SERPL-MCNC: 4.3 MG/DL (ref 2.7–4.5)
PLATELET # BLD AUTO: 296 K/UL (ref 150–450)
PMV BLD AUTO: 10.4 FL (ref 9.2–12.9)
POTASSIUM SERPL-SCNC: 5 MMOL/L (ref 3.5–5.1)
PROT SERPL-MCNC: 7.9 GM/DL (ref 6–8.4)
RBC # BLD AUTO: 4.26 M/UL (ref 4–5.4)
RELATIVE EOSINOPHIL (OHS): 1.2 %
RELATIVE LYMPHOCYTE (OHS): 24.2 % (ref 18–48)
RELATIVE MONOCYTE (OHS): 6.1 % (ref 4–15)
RELATIVE NEUTROPHIL (OHS): 67 % (ref 38–73)
SODIUM SERPL-SCNC: 139 MMOL/L (ref 136–145)
WBC # BLD AUTO: 8.85 K/UL (ref 3.9–12.7)

## 2025-08-20 PROCEDURE — 85025 COMPLETE CBC W/AUTO DIFF WBC: CPT

## 2025-08-20 PROCEDURE — 99999 PR PBB SHADOW E&M-EST. PATIENT-LVL V: CPT | Mod: PBBFAC,,,

## 2025-08-20 PROCEDURE — 84100 ASSAY OF PHOSPHORUS: CPT

## 2025-08-20 PROCEDURE — 83880 ASSAY OF NATRIURETIC PEPTIDE: CPT

## 2025-08-20 PROCEDURE — 99215 OFFICE O/P EST HI 40 MIN: CPT | Mod: PBBFAC

## 2025-08-20 PROCEDURE — 82947 ASSAY GLUCOSE BLOOD QUANT: CPT

## 2025-08-20 PROCEDURE — 36415 COLL VENOUS BLD VENIPUNCTURE: CPT

## 2025-08-20 PROCEDURE — 83735 ASSAY OF MAGNESIUM: CPT

## 2025-08-20 RX ORDER — FUROSEMIDE 40 MG/1
80 TABLET ORAL DAILY
Qty: 90 TABLET | Refills: 11 | Status: SHIPPED | OUTPATIENT
Start: 2025-08-20

## 2025-08-25 ENCOUNTER — OFFICE VISIT (OUTPATIENT)
Dept: INFECTIOUS DISEASES | Facility: CLINIC | Age: 65
End: 2025-08-25
Payer: MEDICAID

## 2025-08-25 ENCOUNTER — PATIENT MESSAGE (OUTPATIENT)
Dept: INFECTIOUS DISEASES | Facility: CLINIC | Age: 65
End: 2025-08-25

## 2025-08-25 ENCOUNTER — TELEPHONE (OUTPATIENT)
Dept: INFECTIOUS DISEASES | Facility: CLINIC | Age: 65
End: 2025-08-25
Payer: MEDICAID

## 2025-08-25 VITALS
HEIGHT: 68 IN | TEMPERATURE: 99 F | DIASTOLIC BLOOD PRESSURE: 76 MMHG | HEART RATE: 76 BPM | SYSTOLIC BLOOD PRESSURE: 156 MMHG | BODY MASS INDEX: 38.11 KG/M2

## 2025-08-25 DIAGNOSIS — M86.179 OTHER ACUTE OSTEOMYELITIS, UNSPECIFIED ANKLE AND FOOT: Primary | ICD-10-CM

## 2025-08-25 PROCEDURE — 1159F MED LIST DOCD IN RCRD: CPT | Mod: CPTII,,, | Performed by: INTERNAL MEDICINE

## 2025-08-25 PROCEDURE — 99999 PR PBB SHADOW E&M-EST. PATIENT-LVL V: CPT | Mod: PBBFAC,,, | Performed by: INTERNAL MEDICINE

## 2025-08-25 PROCEDURE — 99215 OFFICE O/P EST HI 40 MIN: CPT | Mod: PBBFAC,PN | Performed by: INTERNAL MEDICINE

## 2025-08-25 PROCEDURE — 4010F ACE/ARB THERAPY RXD/TAKEN: CPT | Mod: CPTII,,, | Performed by: INTERNAL MEDICINE

## 2025-08-25 PROCEDURE — 1111F DSCHRG MED/CURRENT MED MERGE: CPT | Mod: CPTII,,, | Performed by: INTERNAL MEDICINE

## 2025-08-25 PROCEDURE — 99213 OFFICE O/P EST LOW 20 MIN: CPT | Mod: S$PBB,,, | Performed by: INTERNAL MEDICINE

## 2025-08-25 PROCEDURE — 3078F DIAST BP <80 MM HG: CPT | Mod: CPTII,,, | Performed by: INTERNAL MEDICINE

## 2025-08-25 PROCEDURE — 3046F HEMOGLOBIN A1C LEVEL >9.0%: CPT | Mod: CPTII,,, | Performed by: INTERNAL MEDICINE

## 2025-08-25 PROCEDURE — 3008F BODY MASS INDEX DOCD: CPT | Mod: CPTII,,, | Performed by: INTERNAL MEDICINE

## 2025-08-25 PROCEDURE — 3077F SYST BP >= 140 MM HG: CPT | Mod: CPTII,,, | Performed by: INTERNAL MEDICINE

## 2025-08-25 RX ORDER — FLUTICASONE PROPIONATE 50 MCG
SPRAY, SUSPENSION (ML) NASAL
COMMUNITY
Start: 2025-08-22

## 2025-08-25 RX ORDER — CETIRIZINE HYDROCHLORIDE 10 MG/1
10 TABLET ORAL
COMMUNITY
Start: 2025-08-22 | End: 2026-08-22

## 2025-08-27 ENCOUNTER — TELEPHONE (OUTPATIENT)
Dept: CARDIOLOGY | Facility: CLINIC | Age: 65
End: 2025-08-27
Payer: MEDICAID

## 2025-09-03 ENCOUNTER — TELEPHONE (OUTPATIENT)
Dept: CARDIOLOGY | Facility: CLINIC | Age: 65
End: 2025-09-03
Payer: MEDICAID

## 2025-09-04 ENCOUNTER — TELEPHONE (OUTPATIENT)
Dept: CARDIOLOGY | Facility: CLINIC | Age: 65
End: 2025-09-04
Payer: MEDICAID

## 2025-09-05 LAB
OHS QRS DURATION: 76 MS
OHS QTC CALCULATION: 443 MS

## (undated) DEVICE — KIT CUSTOM MANIFOLD

## (undated) DEVICE — TRANSDUCER ADULT DISP

## (undated) DEVICE — KIT MICROINTRO 4F .018X40X7CM

## (undated) DEVICE — DRAPE ANGIO BRACH 38X44IN

## (undated) DEVICE — TRAY CATH LAB OMC

## (undated) DEVICE — COVER PROBE US 5.5X58L NON LTX

## (undated) DEVICE — SPIKE SHORT LG BORE 1-WAY 2IN

## (undated) DEVICE — KIT GLIDESHEATH SLEND 6FR 10CM

## (undated) DEVICE — CATH JACKY RADIAL 5FR 100CM

## (undated) DEVICE — CATH DXTERITY JL40 100CM 6FR

## (undated) DEVICE — SHEATH INTRODUCER 6FR 11CM

## (undated) DEVICE — GUIDEWIRE EMERALD .035IN 260CM

## (undated) DEVICE — DEVICE MYNX GRIP 6/7F

## (undated) DEVICE — OMNIPAQUE 350MG 150ML VIAL

## (undated) DEVICE — CATH DXTERITY JR40 100CM 6FR

## (undated) DEVICE — GUIDEWIRE EMERALD 150CM PTFE

## (undated) DEVICE — PAD DEFIB CADENCE ADULT R2